# Patient Record
Sex: FEMALE | Race: WHITE | NOT HISPANIC OR LATINO
[De-identification: names, ages, dates, MRNs, and addresses within clinical notes are randomized per-mention and may not be internally consistent; named-entity substitution may affect disease eponyms.]

---

## 2018-04-19 ENCOUNTER — RECORD ABSTRACTING (OUTPATIENT)
Age: 73
End: 2018-04-19

## 2018-04-20 ENCOUNTER — APPOINTMENT (OUTPATIENT)
Dept: CARDIOLOGY | Facility: CLINIC | Age: 73
End: 2018-04-20
Payer: MEDICARE

## 2018-04-20 VITALS
WEIGHT: 160 LBS | SYSTOLIC BLOOD PRESSURE: 155 MMHG | HEIGHT: 59 IN | RESPIRATION RATE: 16 BRPM | DIASTOLIC BLOOD PRESSURE: 85 MMHG | HEART RATE: 56 BPM | BODY MASS INDEX: 32.25 KG/M2

## 2018-04-20 PROCEDURE — 93000 ELECTROCARDIOGRAM COMPLETE: CPT

## 2018-04-20 PROCEDURE — 99214 OFFICE O/P EST MOD 30 MIN: CPT

## 2018-04-20 RX ORDER — DICLOFENAC SODIUM 75 MG/1
75 TABLET, DELAYED RELEASE ORAL
Refills: 0 | Status: DISCONTINUED | COMMUNITY
End: 2018-04-20

## 2018-05-03 ENCOUNTER — MEDICATION RENEWAL (OUTPATIENT)
Age: 73
End: 2018-05-03

## 2018-10-15 ENCOUNTER — APPOINTMENT (OUTPATIENT)
Dept: CARDIOLOGY | Facility: CLINIC | Age: 73
End: 2018-10-15
Payer: MEDICARE

## 2018-10-15 VITALS
HEART RATE: 51 BPM | WEIGHT: 162 LBS | SYSTOLIC BLOOD PRESSURE: 125 MMHG | RESPIRATION RATE: 18 BRPM | BODY MASS INDEX: 32.66 KG/M2 | HEIGHT: 59 IN | DIASTOLIC BLOOD PRESSURE: 75 MMHG

## 2018-10-15 PROCEDURE — 93000 ELECTROCARDIOGRAM COMPLETE: CPT

## 2018-10-15 PROCEDURE — 99214 OFFICE O/P EST MOD 30 MIN: CPT

## 2019-04-01 ENCOUNTER — APPOINTMENT (OUTPATIENT)
Dept: CARDIOLOGY | Facility: CLINIC | Age: 74
End: 2019-04-01
Payer: MEDICARE

## 2019-04-01 ENCOUNTER — NON-APPOINTMENT (OUTPATIENT)
Age: 74
End: 2019-04-01

## 2019-04-01 VITALS
BODY MASS INDEX: 32.46 KG/M2 | WEIGHT: 161 LBS | SYSTOLIC BLOOD PRESSURE: 157 MMHG | DIASTOLIC BLOOD PRESSURE: 80 MMHG | HEART RATE: 68 BPM | RESPIRATION RATE: 16 BRPM | HEIGHT: 59 IN

## 2019-04-01 PROCEDURE — 99214 OFFICE O/P EST MOD 30 MIN: CPT

## 2019-04-01 PROCEDURE — 93000 ELECTROCARDIOGRAM COMPLETE: CPT

## 2019-04-01 NOTE — HISTORY OF PRESENT ILLNESS
[FreeTextEntry1] : The patient presents here for cardiac evaluation with a history which includes:\par \par 1.	T-wave inversions on her EKG which became evident in January of this year.\par 2.	Rheumatoid arthritis.\par 3.	Second-hand smoke exposure.\par 4.	Hyperlipidemia.\par 5.	Hypertension.\par 6.	Obesity.\par 7.	Insulin-requiring diabetes mellitus\par \par \par Seeing Dr. Garcia with regard to her diabetes management.  By her own admission, her diet is frequently imperfect with regard to carbohydrate restriction.\par \par Blood work is done through his office but not available\par \par She denies any new symptoms of shortness of breath, chest pain, palpitations, PND, orthopnea, or edema.\par Chronic shoulder pain

## 2019-04-01 NOTE — ASSESSMENT
[FreeTextEntry1] : ECG: Sinus bradycardia 55  LVH by voltage. T-wave inversions inferiorly and laterally. Not substantially changed from prior study.\par \par Echocardiogram (January 2017) showing LVH, with hyperdynamic left ventricular systolic function\par \par Nuclear stress test January 2017 did not show any significant demonstrable ischemia.\par \par IMPRESSION:  \par 1.	Blood pressure control seems to be adequate.\par 2.	EKG changes have not evolved any further.\par 3.	Lipid and glycemic indices being followed through Dr. Garcia's office and Dr. Hugo's office.Not available for review.\par 4.	No regular exercise.\par 5.	Dietary indiscretions, despite understanding the consequences.\par \par Plan: No indication for any changes in current cardiac management.\par                  \par \par The patient was instructed to follow a symptom limited regimen of moderate aerobic exercise for 30 minutes 3 to 4 days a week. A warm up and cool down period are to be added to the regimen and small incremental changes can be made every few weeks. Any new symptoms of exercise related chest pain or dyspnea should be reported.\par \par Instructed the patient about the benefits of a diet that restricts portion sizes, increases frequency of meals and consists of  vegetables, (more green and leafy),fruit and nuts, whole grains, lean proteins and limits carbohydrates and meat and dairy fats\par \par I asked her to Get me copies of her blood work.\par \par PLAN:  All the above were discussed in some detail.  She understands the consequences of failure to adhere to appropriate dietary and exercise restrictions.  No changes in cardiac management at this juncture.  Followup here is in four to six months.\par

## 2019-04-01 NOTE — REASON FOR VISIT
[FreeTextEntry1] : Patient presents here for cardiac reevaluation with no new significant complaints.\par No intercurrent medical problems.\par

## 2019-04-01 NOTE — PHYSICAL EXAM
[FreeTextEntry1] : General Appearance:  She is a morbidly obese, alert, and oriented 71-year-old female in no apparent distress.  Her affect is appropriate.  Skin free of any significant lesions.  HEENT:  Normocephalic, atraumatic, moist mucous membranes, EOMs intact.  Neck:  No JVD or carotid bruits.  Chest:  Clear to auscultation bilaterally.  Cardiovascular Examination:  Normal S1 and S2.  No S3.  No S4.  There is 1/6 systolic murmur.  No rubs.  Regular rate and rhythm.  Abdominal Examination:  Soft, nontender, nondistended.  Extremities:  No clubbing, cyanosis, or edema.  Neurologic:  Alert and oriented x3.  Psychiatric:  Normal affect

## 2019-07-10 ENCOUNTER — RX RENEWAL (OUTPATIENT)
Age: 74
End: 2019-07-10

## 2019-09-27 ENCOUNTER — APPOINTMENT (OUTPATIENT)
Dept: CARDIOLOGY | Facility: CLINIC | Age: 74
End: 2019-09-27
Payer: MEDICARE

## 2019-09-27 ENCOUNTER — RECORD ABSTRACTING (OUTPATIENT)
Age: 74
End: 2019-09-27

## 2019-09-27 ENCOUNTER — NON-APPOINTMENT (OUTPATIENT)
Age: 74
End: 2019-09-27

## 2019-09-27 VITALS
HEIGHT: 59 IN | WEIGHT: 162 LBS | SYSTOLIC BLOOD PRESSURE: 137 MMHG | DIASTOLIC BLOOD PRESSURE: 65 MMHG | OXYGEN SATURATION: 98 % | RESPIRATION RATE: 16 BRPM | HEART RATE: 61 BPM | BODY MASS INDEX: 32.66 KG/M2

## 2019-09-27 PROCEDURE — 99214 OFFICE O/P EST MOD 30 MIN: CPT

## 2019-09-27 PROCEDURE — 93000 ELECTROCARDIOGRAM COMPLETE: CPT

## 2019-09-27 NOTE — HISTORY OF PRESENT ILLNESS
[FreeTextEntry1] : The patient presents here for cardiac evaluation with a history which includes:\par \par 1.	T-wave inversions on her EKG which became evident in January of this year.\par 2.	Rheumatoid arthritis.\par 3.	Second-hand smoke exposure.\par 4.	Hyperlipidemia.\par 5.	Hypertension.\par 6.	Obesity.\par 7.	Insulin-requiring diabetes mellitus\par \par \par Seeing Dr. aGrcia with regard to her diabetes management.  By her own admission, her diet is frequently imperfect with regard to carbohydrate restriction.\par \par Blood work is done through his office but not available\par \par She denies any new symptoms of shortness of breath, chest pain, palpitations, PND, orthopnea, or edema.\par Chronic shoulder pain

## 2019-09-27 NOTE — ASSESSMENT
[FreeTextEntry1] : ECG: SR at 61 bpm  LVH by voltage with secondary ST-T wave changes\par \par Lab data 7/16/19\par Chol. 142\par LDL    75\par HDL    47\par A1C  7.8\par Creat. 0.68\par \par Echocardiogram (January 2017) showing LVH, with hyperdynamic left ventricular systolic function\par \par Nuclear stress test January 2017 did not show any significant demonstrable ischemia.\par \par IMPRESSION:  \par 1.	Blood pressure control seems to be adequate, today 137/65\par 2.	EKG changes have not evolved any further.\par 3.	Lipid and glycemic indices being followed through Dr. Garcia's office and Dr. Hugo's office. A1C 7.8,                          lipids stable at this time \par 4.	No regular exercise, limited by arthritic  issues.\par 5.	Dietary indiscretions, despite understanding the consequences.\par \par Plan:\par 1. No indication for any changes in current cardiac management.\par \par 2. Continue to F/U with PCP as scheduled and have all blood work faxed to our office. \par \par 3.The patient was instructed to follow a symptom limited regimen of moderate aerobic exercise for 30 minutes 3 to 4 days a week. A warm up and cool down period are to be added to the regimen and small incremental changes can be made every few weeks. Any new symptoms of exercise related chest pain or dyspnea should be reported.\par \par 5. Instructed the patient about the benefits of a diet that restricts portion sizes, increases frequency of meals and consists of  vegetables, (more green and leafy),fruit and nuts, whole grains, lean proteins and limits carbohydrates and meat and dairy fats\par  \par Clinical follow up in 6 months

## 2019-09-27 NOTE — REASON FOR VISIT
[FreeTextEntry1] : Patient presents here for cardiac reevaluation with no new significant complaints.\par \par No intercurrent medical problems.\par \par Generally speaking feels well.\par \par No changes made to her diet, unsuccessful with weight loss. Currently wearing a blood glucose monitor device\par \par Major complaints relate to her arthritic issues.\par \par No chest pain,, edema, SOB, palps or orthopnea. \par

## 2020-03-20 ENCOUNTER — APPOINTMENT (OUTPATIENT)
Dept: CARDIOLOGY | Facility: CLINIC | Age: 75
End: 2020-03-20

## 2020-08-11 ENCOUNTER — RX RENEWAL (OUTPATIENT)
Age: 75
End: 2020-08-11

## 2021-04-16 ENCOUNTER — APPOINTMENT (OUTPATIENT)
Dept: ELECTROPHYSIOLOGY | Facility: CLINIC | Age: 76
End: 2021-04-16
Payer: MEDICARE

## 2021-04-16 ENCOUNTER — APPOINTMENT (OUTPATIENT)
Dept: CARDIOLOGY | Facility: CLINIC | Age: 76
End: 2021-04-16
Payer: MEDICARE

## 2021-04-16 VITALS
HEIGHT: 59 IN | SYSTOLIC BLOOD PRESSURE: 157 MMHG | HEART RATE: 80 BPM | DIASTOLIC BLOOD PRESSURE: 81 MMHG | OXYGEN SATURATION: 100 %

## 2021-04-16 PROCEDURE — 99214 OFFICE O/P EST MOD 30 MIN: CPT

## 2021-04-16 PROCEDURE — 93246 EXT ECG>7D<15D RECORDING: CPT

## 2021-04-16 NOTE — PHYSICAL EXAM
[General Appearance - Well Developed] : well developed [Normal Appearance] : normal appearance [General Appearance - Well Nourished] : well nourished [Normal Conjunctiva] : the conjunctiva exhibited no abnormalities [Eyelids - No Xanthelasma] : the eyelids demonstrated no xanthelasmas [Normal Oral Mucosa] : normal oral mucosa [Normal Oropharynx] : normal oropharynx [Normal Jugular Venous A Waves Present] : normal jugular venous A waves present [Normal Jugular Venous V Waves Present] : normal jugular venous V waves present [No Jugular Venous Sun A Waves] : no jugular venous sun A waves [Heart Rate And Rhythm] : heart rate and rhythm were normal [Heart Sounds] : normal S1 and S2 [Murmurs] : no murmurs present [Arterial Pulses Normal] : the arterial pulses were normal [Edema] : no peripheral edema present [Respiration, Rhythm And Depth] : normal respiratory rhythm and effort [Exaggerated Use Of Accessory Muscles For Inspiration] : no accessory muscle use [Chest Palpation] : palpation of the chest revealed no abnormalities [Auscultation Breath Sounds / Voice Sounds] : lungs were clear to auscultation bilaterally [Bowel Sounds] : normal bowel sounds [Abdomen Soft] : soft [Abdomen Tenderness] : non-tender [Abdomen Mass (___ Cm)] : no abdominal mass palpated [Nail Clubbing] : no clubbing of the fingernails [Cyanosis, Localized] : no localized cyanosis [Skin Color & Pigmentation] : normal skin color and pigmentation [] : no rash [Skin Turgor] : normal skin turgor [No Venous Stasis] : no venous stasis [Oriented To Time, Place, And Person] : oriented to person, place, and time [Impaired Insight] : insight and judgment were intact [Mood] : the mood was normal [FreeTextEntry1] : Unsteady gait

## 2021-04-16 NOTE — ASSESSMENT
[FreeTextEntry1] : Assessment:\par 1.  Cerebral Infarcts\par 2.  HTN\par 3. HLD\par \par Plan\par 1.  Echocardiogram\par 2.  Carotid duplex\par 3.  2 week zio patch to assure no paroxysms of afib\par 4.  Return in 1 month\par \par \par daughter is my patient.  Wants mother to transition care

## 2021-04-16 NOTE — HISTORY OF PRESENT ILLNESS
[FreeTextEntry1] : 74 Yo F hx DM, HTN. Had been following with outside cardiologist. Here today because since December has been feeling weak, falling, with frequent loss of balance. Went to neurologist who did MRI 2/13/21 multiple small subacute deep white matter infarcts in right and left parietal lobes. Chronic lacunar infarcts in thalami. 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 After finding out about possible strokes Daughter who is also a patient at [facility] wanted evaluation with Dr. Cuevas\par \par Home Meds:\par ASA 81mg qD\par Metformin 500mg BID\par metoprolol succinate 25mg qD\par Quinapril 40mg qD\par Atorvastatin 80 qD\par Sertraline 100mg qD\par Tresiba 20/50\par Memantine 5mg BID\par Donepezil 5mg qHS\par \par \par FH: \par Sister (Roseanna): HOCM\par Daughter (Donald): early MI and aortic disease/dissection

## 2021-04-16 NOTE — REVIEW OF SYSTEMS
[Joint Pain] : joint pain [Dizziness] : dizziness [Numbness (Hypesthesia)] : numbness [Negative] : Heme/Lymph

## 2021-05-06 ENCOUNTER — APPOINTMENT (OUTPATIENT)
Dept: CARDIOLOGY | Facility: CLINIC | Age: 76
End: 2021-05-06
Payer: MEDICARE

## 2021-05-06 PROCEDURE — 93306 TTE W/DOPPLER COMPLETE: CPT

## 2021-05-12 PROCEDURE — 93248 EXT ECG>7D<15D REV&INTERPJ: CPT

## 2021-05-27 ENCOUNTER — APPOINTMENT (OUTPATIENT)
Dept: CARDIOLOGY | Facility: CLINIC | Age: 76
End: 2021-05-27
Payer: MEDICARE

## 2021-05-27 VITALS — OXYGEN SATURATION: 100 % | HEART RATE: 75 BPM | SYSTOLIC BLOOD PRESSURE: 153 MMHG | DIASTOLIC BLOOD PRESSURE: 68 MMHG

## 2021-05-27 DIAGNOSIS — E66.9 OBESITY, UNSPECIFIED: ICD-10-CM

## 2021-05-27 PROCEDURE — 99215 OFFICE O/P EST HI 40 MIN: CPT

## 2021-05-27 NOTE — PHYSICAL EXAM
[General Appearance - Well Developed] : well developed [Normal Appearance] : normal appearance [General Appearance - Well Nourished] : well nourished [Normal Conjunctiva] : the conjunctiva exhibited no abnormalities [Eyelids - No Xanthelasma] : the eyelids demonstrated no xanthelasmas [Normal Oral Mucosa] : normal oral mucosa [Normal Oropharynx] : normal oropharynx [Normal Jugular Venous A Waves Present] : normal jugular venous A waves present [Normal Jugular Venous V Waves Present] : normal jugular venous V waves present [No Jugular Venous Sun A Waves] : no jugular venous sun A waves [Exaggerated Use Of Accessory Muscles For Inspiration] : no accessory muscle use [Respiration, Rhythm And Depth] : normal respiratory rhythm and effort [Auscultation Breath Sounds / Voice Sounds] : lungs were clear to auscultation bilaterally [Chest Palpation] : palpation of the chest revealed no abnormalities [Heart Rate And Rhythm] : heart rate and rhythm were normal [Heart Sounds] : normal S1 and S2 [Murmurs] : no murmurs present [Arterial Pulses Normal] : the arterial pulses were normal [Edema] : no peripheral edema present [Bowel Sounds] : normal bowel sounds [Abdomen Soft] : soft [Abdomen Tenderness] : non-tender [Abdomen Mass (___ Cm)] : no abdominal mass palpated [Nail Clubbing] : no clubbing of the fingernails [Cyanosis, Localized] : no localized cyanosis [Skin Color & Pigmentation] : normal skin color and pigmentation [Skin Turgor] : normal skin turgor [] : no rash [No Venous Stasis] : no venous stasis [Oriented To Time, Place, And Person] : oriented to person, place, and time [Impaired Insight] : insight and judgment were intact [Mood] : the mood was normal [FreeTextEntry1] : Unsteady gait

## 2021-05-27 NOTE — ASSESSMENT
[FreeTextEntry1] : Assessment:\par 1.  Cerebral Infarcts\par 2.  HTN\par 3.  HLD\par \par Plan\par 1.  Echocardiogram - reviewed, LVOT gradient.  Continue with metoprolol at 50mg daily \par 2.  Zio patch reviewed - SVT - continue Metoprolol 50mg daily \par 3.  Carotid duplex patient reports was ok per neurologist\par 4.  Continue antiplatelet therapy and statin therapy\par 5.  Continue metoprolol at current dose.  Continue quinapril \par 6.  Consider repeat echo in 6 months to re-assess gradients. \par 7.  Caution with HCTZ it may make the LVOT obstruction worse.  She knows that if she feels light headed or dizzy then she should stop this medication. Stay hydrated\par 8.  Return in 3 months.

## 2021-05-27 NOTE — HISTORY OF PRESENT ILLNESS
[FreeTextEntry1] : Followup visit 5/27/2021\par \par Metoprolol was increased to 50mg daily \par she feels ok\par Here with her daughter\par Using a walker\par Seeing a neurologist and rheumatologist.  \par Neurologist performed carotid duplex. States all was ok.\par Started on HCTZ by Rheumatologist for LE edema, which has improved since starting the med 2 weeks ago.\par  joined by phone\par \par \par Initial visit:\par 76 Yo F hx DM, HTN. Had been following with outside cardiologist. Here today because since December has been feeling weak, falling, with frequent loss of balance. Went to neurologist who did MRI 2/13/21 multiple small subacute deep white matter infarcts in right and left parietal lobes. Chronic lacunar infarcts in thalami. 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 After finding out about possible strokes Daughter who is also a patient at [facility] wanted evaluation with Dr. Cuevas\par \par Home Meds:\par ASA 81mg qD\par Metformin 500mg BID\par metoprolol succinate 25mg qD\par Quinapril 40mg qD\par Atorvastatin 80 qD\par Sertraline 100mg qD\par Tresiba 20/50\par Memantine 5mg BID\par Donepezil 5mg qHS\par \par \par FH: \par Sister (Roseanna): HOCM\par Daughter (Donald): early MI and aortic disease/dissection

## 2021-07-01 ENCOUNTER — APPOINTMENT (OUTPATIENT)
Dept: VASCULAR SURGERY | Facility: CLINIC | Age: 76
End: 2021-07-01
Payer: MEDICARE

## 2021-07-01 ENCOUNTER — NON-APPOINTMENT (OUTPATIENT)
Age: 76
End: 2021-07-01

## 2021-07-01 VITALS
DIASTOLIC BLOOD PRESSURE: 81 MMHG | HEIGHT: 59 IN | HEART RATE: 69 BPM | BODY MASS INDEX: 32.25 KG/M2 | TEMPERATURE: 97.2 F | WEIGHT: 160 LBS | SYSTOLIC BLOOD PRESSURE: 149 MMHG

## 2021-07-01 PROCEDURE — 99203 OFFICE O/P NEW LOW 30 MIN: CPT

## 2021-07-01 PROCEDURE — 93970 EXTREMITY STUDY: CPT

## 2021-07-02 ENCOUNTER — APPOINTMENT (OUTPATIENT)
Dept: VASCULAR SURGERY | Facility: CLINIC | Age: 76
End: 2021-07-02

## 2021-07-09 NOTE — HISTORY OF PRESENT ILLNESS
[FreeTextEntry1] : CHINEDU FORREST is a 75 year old female who presents for evaluation of carotid stenosis and lower extremity swelling. \par \par Referred by XIANG Rendon (neurology).\par \par Patient is company by her daughter, Donald.  The patient had been in [place] from October to December 2020.  Since returning, she has been having increasing weakness, difficulty ambulating, frequent falls with loss of balance.  She was evaluated by a neurologist and underwent an MRI of the brain in February 2021.  This was significant for multiple small subacute deep white matter infarcts in both the right and left parietal lobes.  There was also chronic lacunar infarcts in the thalami.  An MRA was significant for P1 stenosis of the right posterior cerebral artery and also the cavernous portion of the right ICA. 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 Huan Cuevas). She recently underwent echocardiogram (5/6/2021), which was significant for mild aortic stenosis, hyperdynamic left ventricular systolic function with slight increase in LVOT gradient. \par \par + PMH: Rheumatoid arthritis, HTN, HLD, DM\par + PSH: Denies\par + FH: Sister HOCM\par + SH: Quit smoking 20 years ago, denies EtOH use\par + Aller: NKDA\par \par PCP is Dr. Tom Gandhi.\par Vascular Cardiology is Dr. Huan Cuevas.

## 2021-07-09 NOTE — ASSESSMENT
[FreeTextEntry1] : CHINEDU FORREST is a 75 year old female presents for evaluation of global weakness with findings on MRA.\par \par - Will plan for carotid duplex. \par - Plan of care pending carotid duplex.

## 2021-07-09 NOTE — PHYSICAL EXAM
[2+] : left 2+ [Calm] : calm [Ankle Swelling (On Exam)] : not present [Varicose Veins Of Lower Extremities] : not present [] : not present [de-identified] : Well-appearing  [de-identified] : soft, nt, nd  [de-identified] : motor and sensation intact in all four extremities  [de-identified] : A&Ox4

## 2021-07-14 ENCOUNTER — APPOINTMENT (OUTPATIENT)
Dept: VASCULAR SURGERY | Facility: CLINIC | Age: 76
End: 2021-07-14
Payer: MEDICARE

## 2021-07-14 PROCEDURE — 93880 EXTRACRANIAL BILAT STUDY: CPT

## 2021-07-27 ENCOUNTER — NON-APPOINTMENT (OUTPATIENT)
Age: 76
End: 2021-07-27

## 2021-07-27 ENCOUNTER — APPOINTMENT (OUTPATIENT)
Dept: VASCULAR SURGERY | Facility: CLINIC | Age: 76
End: 2021-07-27

## 2021-07-27 NOTE — HISTORY OF PRESENT ILLNESS
[FreeTextEntry1] : CHINEDU FORREST is a 75 year old female who presents for evaluation of carotid stenosis and lower extremity swelling. \par \par Referred by XIANG Rendon (neurology).\par \par Patient is company by her daughter, Donald.  The patient had been in [place] from October to December 2020.  Since returning, she has been having increasing weakness, difficulty ambulating, frequent falls with loss of balance.  She was evaluated by a neurologist and underwent an MRI of the brain in February 2021.  This was significant for multiple small subacute deep white matter infarcts in both the right and left parietal lobes.  There was also chronic lacunar infarcts in the thalami.  An MRA was significant for P1 stenosis of the right posterior cerebral artery and also the cavernous portion of the right ICA. 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 Huan Cuevas). She recently underwent echocardiogram (5/6/2021), which was significant for mild aortic stenosis, hyperdynamic left ventricular systolic function with slight increase in LVOT gradient. \par \par + PMH: Rheumatoid arthritis, HTN, HLD, DM\par + PSH: Denies\par + FH: Sister HOCM\par + SH: Quit smoking 20 years ago, denies EtOH use\par + Aller: NKDA\par \par PCP is Dr. Tom Gandhi.\par Vascular Cardiology is Dr. uHan Cuevas. [de-identified] : 7/27/2021 - Pt presents for telephonic visit.

## 2021-07-27 NOTE — REASON FOR VISIT
[Home] : at home, [unfilled] , at the time of the visit. [Other Location: e.g. Home (Enter Location, City,State)___] : at [unfilled] [Family Member] : family member [Verbal consent obtained from patient] : the patient, [unfilled] [Follow-Up: _____] : a [unfilled] follow-up visit

## 2021-07-27 NOTE — HISTORY OF PRESENT ILLNESS
[FreeTextEntry1] : CHINEDU FORREST is a 75 year old female who presents for evaluation of carotid stenosis and lower extremity swelling. \par \par Referred by XIANG Rendon (neurology).\par \par Patient is company by her daughter, Donald.  The patient had been in [place] from October to December 2020.  Since returning, she has been having increasing weakness, difficulty ambulating, frequent falls with loss of balance.  She was evaluated by a neurologist and underwent an MRI of the brain in February 2021.  This was significant for multiple small subacute deep white matter infarcts in both the right and left parietal lobes.  There was also chronic lacunar infarcts in the thalami.  An MRA was significant for P1 stenosis of the right posterior cerebral artery and also the cavernous portion of the right ICA. 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 Huan Cuevas). She recently underwent echocardiogram (5/6/2021), which was significant for mild aortic stenosis, hyperdynamic left ventricular systolic function with slight increase in LVOT gradient. \par \par + PMH: Rheumatoid arthritis, HTN, HLD, DM\par + PSH: Denies\par + FH: Sister HOCM\par + SH: Quit smoking 20 years ago, denies EtOH use\par + Aller: NKDA\par \par PCP is Dr. Tom Gandhi.\par Vascular Cardiology is Dr. Huan Cuevas. [de-identified] : 7/27/2021 - Pt presents for telephonic visit.

## 2021-09-07 ENCOUNTER — APPOINTMENT (OUTPATIENT)
Dept: NEUROSURGERY | Facility: CLINIC | Age: 76
End: 2021-09-07
Payer: MEDICARE

## 2021-09-07 VITALS
TEMPERATURE: 98.5 F | HEIGHT: 59 IN | OXYGEN SATURATION: 94 % | BODY MASS INDEX: 32.25 KG/M2 | DIASTOLIC BLOOD PRESSURE: 76 MMHG | HEART RATE: 73 BPM | SYSTOLIC BLOOD PRESSURE: 186 MMHG | WEIGHT: 160 LBS

## 2021-09-07 DIAGNOSIS — I67.2 CEREBRAL ATHEROSCLEROSIS: ICD-10-CM

## 2021-09-07 DIAGNOSIS — R26.89 OTHER ABNORMALITIES OF GAIT AND MOBILITY: ICD-10-CM

## 2021-09-07 DIAGNOSIS — Z78.9 OTHER SPECIFIED HEALTH STATUS: ICD-10-CM

## 2021-09-07 PROCEDURE — 99205 OFFICE O/P NEW HI 60 MIN: CPT

## 2021-09-09 PROBLEM — Z78.9 DOES NOT USE TOBACCO: Status: ACTIVE | Noted: 2018-04-19

## 2021-09-09 PROBLEM — I67.2 ASYMPTOMATIC STENOSIS OF INTRACRANIAL ARTERY: Status: ACTIVE | Noted: 2021-09-09

## 2021-09-09 PROBLEM — R26.89: Status: ACTIVE | Noted: 2021-09-09

## 2021-09-09 NOTE — REVIEW OF SYSTEMS
[As Noted in HPI] : as noted in HPI [Negative] : Heme/Lymph [Leg Weakness] : leg weakness [Difficulty Walking] : difficulty walking [Numbness] : no numbness [Tingling] : no tingling [FreeTextEntry9] : ambulates with assistance of a rolling walker.

## 2021-09-09 NOTE — PHYSICAL EXAM
[General Appearance - Alert] : alert [General Appearance - In No Acute Distress] : in no acute distress [Oriented To Time, Place, And Person] : oriented to person, place, and time [Impaired Insight] : insight and judgment were intact [Affect] : the affect was normal [Person] : oriented to person [Place] : oriented to place [Time] : oriented to time [Short Term Intact] : short term memory intact [Fluency] : fluency intact [Comprehension] : comprehension intact [Cranial Nerves Optic (II)] : visual acuity intact bilaterally,  visual fields full to confrontation, pupils equal round and reactive to light [Cranial Nerves Oculomotor (III)] : extraocular motion intact [Cranial Nerves Trigeminal (V)] : facial sensation intact symmetrically [Cranial Nerves Glossopharyngeal (IX)] : tongue and palate midline [Cranial Nerves Facial (VII)] : face symmetrical [Cranial Nerves Accessory (XI - Cranial And Spinal)] : head turning and shoulder shrug symmetric [Cranial Nerves Hypoglossal (XII)] : there was no tongue deviation with protrusion [Motor Tone] : muscle tone was normal in all four extremities [Motor Strength] : muscle strength was normal in all four extremities [Sensation Tactile Decrease] : light touch was intact [Sensation Pain / Temperature Decrease] : pain and temperature was intact [Over the Past 2 Weeks, Have You Felt Down, Depressed, or Hopeless?] : 1.) Over the past 2 weeks, have you felt down, depressed, or hopeless? No [Over the Past 2 Weeks, Have You Felt Little Interest or Pleasure Doing Things?] : 2.) Over the past 2 weeks, have you felt little interest or pleasure doing things? No [FreeTextEntry8] : wide based magnetic gait with assistance of a walker.

## 2021-09-09 NOTE — ASSESSMENT
[FreeTextEntry1] : Impression: Asymptomatic intracranial stenosis. Multiple lacunar infarcts. At this point exam and findings like magnetic gait, memory loss and urinary incontinence are more characteristic of normal pressure hydrocephalus. The lacunar infarcts are not in specific areas or so numerable that will correlate with the mention problems. The intracranial atherosclerosis is diffuse and I don’t think patient is having perfusion problems. There is certainly generalized atrophy in the MRI which could be contributing to the symptoms or perhaps exclude an underlying dementia. I advice patient to pursue evaluation with Nsx first to exclude Normal pressure hydrocephalus.\par \par Plan:\par 1-Evaluation with Nsx exclude NPH \par 2-Secondary stroke prevention measures\par BP <140/90\par LDL <70 needs lipid panel follow up with PCP- Continue Atorvastatin \par HgA1C folllow up with PCP goal <6.5 \par Dietary adjustments avoid saturated fats \par Cont ASA 81 mg \par 3-No need for further follow up but daughter was educated about stroke symptoms and if she needs to see me can make an appointment any time \par 4-Follow up general neurology memory loss and rule out NPH

## 2021-09-16 ENCOUNTER — INPATIENT (INPATIENT)
Facility: HOSPITAL | Age: 76
LOS: 2 days | Discharge: HOME | DRG: 304 | End: 2021-09-19
Attending: STUDENT IN AN ORGANIZED HEALTH CARE EDUCATION/TRAINING PROGRAM | Admitting: FAMILY MEDICINE
Payer: MEDICARE

## 2021-09-16 VITALS
OXYGEN SATURATION: 92 % | HEART RATE: 68 BPM | DIASTOLIC BLOOD PRESSURE: 74 MMHG | SYSTOLIC BLOOD PRESSURE: 196 MMHG | TEMPERATURE: 99 F | RESPIRATION RATE: 18 BRPM

## 2021-09-16 DIAGNOSIS — R26.9 UNSPECIFIED ABNORMALITIES OF GAIT AND MOBILITY: ICD-10-CM

## 2021-09-16 DIAGNOSIS — Z92.89 PERSONAL HISTORY OF OTHER MEDICAL TREATMENT: Chronic | ICD-10-CM

## 2021-09-16 LAB
ALBUMIN SERPL ELPH-MCNC: 3.5 G/DL — SIGNIFICANT CHANGE UP (ref 3.3–5.2)
ALP SERPL-CCNC: 84 U/L — SIGNIFICANT CHANGE UP (ref 40–120)
ALT FLD-CCNC: 11 U/L — SIGNIFICANT CHANGE UP
ANION GAP SERPL CALC-SCNC: 11 MMOL/L — SIGNIFICANT CHANGE UP (ref 5–17)
ANION GAP SERPL CALC-SCNC: 14 MMOL/L — SIGNIFICANT CHANGE UP (ref 5–17)
ANISOCYTOSIS BLD QL: SIGNIFICANT CHANGE UP
APPEARANCE UR: CLEAR — SIGNIFICANT CHANGE UP
AST SERPL-CCNC: 13 U/L — SIGNIFICANT CHANGE UP
BACTERIA # UR AUTO: ABNORMAL
BASOPHILS # BLD AUTO: 0 K/UL — SIGNIFICANT CHANGE UP (ref 0–0.2)
BASOPHILS NFR BLD AUTO: 0 % — SIGNIFICANT CHANGE UP (ref 0–2)
BILIRUB SERPL-MCNC: 0.2 MG/DL — LOW (ref 0.4–2)
BILIRUB UR-MCNC: NEGATIVE — SIGNIFICANT CHANGE UP
BUN SERPL-MCNC: 16 MG/DL — SIGNIFICANT CHANGE UP (ref 8–20)
BUN SERPL-MCNC: 20.6 MG/DL — HIGH (ref 8–20)
CALCIUM SERPL-MCNC: 9 MG/DL — SIGNIFICANT CHANGE UP (ref 8.6–10.2)
CALCIUM SERPL-MCNC: 9.1 MG/DL — SIGNIFICANT CHANGE UP (ref 8.6–10.2)
CHLORIDE SERPL-SCNC: 101 MMOL/L — SIGNIFICANT CHANGE UP (ref 98–107)
CHLORIDE SERPL-SCNC: 102 MMOL/L — SIGNIFICANT CHANGE UP (ref 98–107)
CK SERPL-CCNC: 23 U/L — LOW (ref 25–170)
CO2 SERPL-SCNC: 25 MMOL/L — SIGNIFICANT CHANGE UP (ref 22–29)
CO2 SERPL-SCNC: 26 MMOL/L — SIGNIFICANT CHANGE UP (ref 22–29)
COD CRY URNS QL: ABNORMAL
COLOR SPEC: YELLOW — SIGNIFICANT CHANGE UP
CREAT SERPL-MCNC: 0.4 MG/DL — LOW (ref 0.5–1.3)
CREAT SERPL-MCNC: 0.5 MG/DL — SIGNIFICANT CHANGE UP (ref 0.5–1.3)
CRP SERPL-MCNC: 41 MG/L — HIGH
D DIMER BLD IA.RAPID-MCNC: 382 NG/ML DDU — HIGH
DIFF PNL FLD: ABNORMAL
ELLIPTOCYTES BLD QL SMEAR: SLIGHT — SIGNIFICANT CHANGE UP
EOSINOPHIL # BLD AUTO: 0.35 K/UL — SIGNIFICANT CHANGE UP (ref 0–0.5)
EOSINOPHIL NFR BLD AUTO: 2.7 % — SIGNIFICANT CHANGE UP (ref 0–6)
EPI CELLS # UR: SIGNIFICANT CHANGE UP
FERRITIN SERPL-MCNC: 158 NG/ML — HIGH (ref 15–150)
GLUCOSE SERPL-MCNC: 120 MG/DL — HIGH (ref 70–99)
GLUCOSE SERPL-MCNC: 256 MG/DL — HIGH (ref 70–99)
GLUCOSE UR QL: 100 MG/DL
HCT VFR BLD CALC: 38.5 % — SIGNIFICANT CHANGE UP (ref 34.5–45)
HCT VFR BLD CALC: 40 % — SIGNIFICANT CHANGE UP (ref 34.5–45)
HGB BLD-MCNC: 11.8 G/DL — SIGNIFICANT CHANGE UP (ref 11.5–15.5)
HGB BLD-MCNC: 12.4 G/DL — SIGNIFICANT CHANGE UP (ref 11.5–15.5)
INR BLD: 1.15 RATIO — SIGNIFICANT CHANGE UP (ref 0.88–1.16)
KETONES UR-MCNC: NEGATIVE — SIGNIFICANT CHANGE UP
LEUKOCYTE ESTERASE UR-ACNC: NEGATIVE — SIGNIFICANT CHANGE UP
LYMPHOCYTES # BLD AUTO: 1.92 K/UL — SIGNIFICANT CHANGE UP (ref 1–3.3)
LYMPHOCYTES # BLD AUTO: 15 % — SIGNIFICANT CHANGE UP (ref 13–44)
MANUAL SMEAR VERIFICATION: SIGNIFICANT CHANGE UP
MCHC RBC-ENTMCNC: 23 PG — LOW (ref 27–34)
MCHC RBC-ENTMCNC: 23 PG — LOW (ref 27–34)
MCHC RBC-ENTMCNC: 30.6 GM/DL — LOW (ref 32–36)
MCHC RBC-ENTMCNC: 31 GM/DL — LOW (ref 32–36)
MCV RBC AUTO: 74.1 FL — LOW (ref 80–100)
MCV RBC AUTO: 74.9 FL — LOW (ref 80–100)
MICROCYTES BLD QL: SIGNIFICANT CHANGE UP
MONOCYTES # BLD AUTO: 0.91 K/UL — HIGH (ref 0–0.9)
MONOCYTES NFR BLD AUTO: 7.1 % — SIGNIFICANT CHANGE UP (ref 2–14)
NEUTROPHILS # BLD AUTO: 9.62 K/UL — HIGH (ref 1.8–7.4)
NEUTROPHILS NFR BLD AUTO: 75.2 % — SIGNIFICANT CHANGE UP (ref 43–77)
NITRITE UR-MCNC: NEGATIVE — SIGNIFICANT CHANGE UP
OVALOCYTES BLD QL SMEAR: SIGNIFICANT CHANGE UP
PH UR: 6 — SIGNIFICANT CHANGE UP (ref 5–8)
PLAT MORPH BLD: NORMAL — SIGNIFICANT CHANGE UP
PLATELET # BLD AUTO: 391 K/UL — SIGNIFICANT CHANGE UP (ref 150–400)
PLATELET # BLD AUTO: 405 K/UL — HIGH (ref 150–400)
POIKILOCYTOSIS BLD QL AUTO: SIGNIFICANT CHANGE UP
POLYCHROMASIA BLD QL SMEAR: SLIGHT — SIGNIFICANT CHANGE UP
POTASSIUM SERPL-MCNC: 4 MMOL/L — SIGNIFICANT CHANGE UP (ref 3.5–5.3)
POTASSIUM SERPL-MCNC: 4.4 MMOL/L — SIGNIFICANT CHANGE UP (ref 3.5–5.3)
POTASSIUM SERPL-SCNC: 4 MMOL/L — SIGNIFICANT CHANGE UP (ref 3.5–5.3)
POTASSIUM SERPL-SCNC: 4.4 MMOL/L — SIGNIFICANT CHANGE UP (ref 3.5–5.3)
PROCALCITONIN SERPL-MCNC: 0.05 NG/ML — SIGNIFICANT CHANGE UP (ref 0.02–0.1)
PROT SERPL-MCNC: 6.3 G/DL — LOW (ref 6.6–8.7)
PROT UR-MCNC: 30 MG/DL
PROTHROM AB SERPL-ACNC: 13.3 SEC — SIGNIFICANT CHANGE UP (ref 10.6–13.6)
RBC # BLD: 5.14 M/UL — SIGNIFICANT CHANGE UP (ref 3.8–5.2)
RBC # BLD: 5.4 M/UL — HIGH (ref 3.8–5.2)
RBC # FLD: 16 % — HIGH (ref 10.3–14.5)
RBC # FLD: 16.4 % — HIGH (ref 10.3–14.5)
RBC BLD AUTO: ABNORMAL
RBC CASTS # UR COMP ASSIST: SIGNIFICANT CHANGE UP /HPF (ref 0–4)
SARS-COV-2 RNA SPEC QL NAA+PROBE: DETECTED
SODIUM SERPL-SCNC: 138 MMOL/L — SIGNIFICANT CHANGE UP (ref 135–145)
SODIUM SERPL-SCNC: 141 MMOL/L — SIGNIFICANT CHANGE UP (ref 135–145)
SP GR SPEC: 1.02 — SIGNIFICANT CHANGE UP (ref 1.01–1.02)
TROPONIN T SERPL-MCNC: <0.01 NG/ML — SIGNIFICANT CHANGE UP (ref 0–0.06)
UROBILINOGEN FLD QL: NEGATIVE MG/DL — SIGNIFICANT CHANGE UP
WBC # BLD: 12.79 K/UL — HIGH (ref 3.8–10.5)
WBC # BLD: 13.7 K/UL — HIGH (ref 3.8–10.5)
WBC # FLD AUTO: 12.79 K/UL — HIGH (ref 3.8–10.5)
WBC # FLD AUTO: 13.7 K/UL — HIGH (ref 3.8–10.5)
WBC UR QL: SIGNIFICANT CHANGE UP

## 2021-09-16 PROCEDURE — 93010 ELECTROCARDIOGRAM REPORT: CPT

## 2021-09-16 PROCEDURE — 99221 1ST HOSP IP/OBS SF/LOW 40: CPT

## 2021-09-16 PROCEDURE — 99285 EMERGENCY DEPT VISIT HI MDM: CPT | Mod: CS

## 2021-09-16 PROCEDURE — 71045 X-RAY EXAM CHEST 1 VIEW: CPT | Mod: 26

## 2021-09-16 PROCEDURE — 72125 CT NECK SPINE W/O DYE: CPT | Mod: 26,MH

## 2021-09-16 PROCEDURE — 70450 CT HEAD/BRAIN W/O DYE: CPT | Mod: 26,MH

## 2021-09-16 PROCEDURE — 99223 1ST HOSP IP/OBS HIGH 75: CPT

## 2021-09-16 RX ORDER — METOPROLOL TARTRATE 50 MG
50 TABLET ORAL DAILY
Refills: 0 | Status: DISCONTINUED | OUTPATIENT
Start: 2021-09-16 | End: 2021-09-19

## 2021-09-16 RX ORDER — INSULIN GLARGINE 100 [IU]/ML
20 INJECTION, SOLUTION SUBCUTANEOUS AT BEDTIME
Refills: 0 | Status: DISCONTINUED | OUTPATIENT
Start: 2021-09-16 | End: 2021-09-19

## 2021-09-16 RX ORDER — HYDRALAZINE HCL 50 MG
10 TABLET ORAL EVERY 6 HOURS
Refills: 0 | Status: DISCONTINUED | OUTPATIENT
Start: 2021-09-16 | End: 2021-09-17

## 2021-09-16 RX ORDER — ACETAMINOPHEN 500 MG
650 TABLET ORAL EVERY 6 HOURS
Refills: 0 | Status: DISCONTINUED | OUTPATIENT
Start: 2021-09-16 | End: 2021-09-19

## 2021-09-16 RX ORDER — SERTRALINE 25 MG/1
100 TABLET, FILM COATED ORAL DAILY
Refills: 0 | Status: DISCONTINUED | OUTPATIENT
Start: 2021-09-16 | End: 2021-09-19

## 2021-09-16 RX ORDER — SODIUM CHLORIDE 9 MG/ML
1000 INJECTION, SOLUTION INTRAVENOUS
Refills: 0 | Status: DISCONTINUED | OUTPATIENT
Start: 2021-09-16 | End: 2021-09-19

## 2021-09-16 RX ORDER — ASPIRIN/CALCIUM CARB/MAGNESIUM 324 MG
81 TABLET ORAL DAILY
Refills: 0 | Status: DISCONTINUED | OUTPATIENT
Start: 2021-09-16 | End: 2021-09-19

## 2021-09-16 RX ORDER — ACETAMINOPHEN 500 MG
975 TABLET ORAL ONCE
Refills: 0 | Status: COMPLETED | OUTPATIENT
Start: 2021-09-16 | End: 2021-09-16

## 2021-09-16 RX ORDER — FENOFIBRATE,MICRONIZED 130 MG
145 CAPSULE ORAL DAILY
Refills: 0 | Status: DISCONTINUED | OUTPATIENT
Start: 2021-09-16 | End: 2021-09-19

## 2021-09-16 RX ORDER — ATORVASTATIN CALCIUM 80 MG/1
40 TABLET, FILM COATED ORAL AT BEDTIME
Refills: 0 | Status: DISCONTINUED | OUTPATIENT
Start: 2021-09-16 | End: 2021-09-19

## 2021-09-16 RX ORDER — INSULIN LISPRO 100/ML
VIAL (ML) SUBCUTANEOUS
Refills: 0 | Status: DISCONTINUED | OUTPATIENT
Start: 2021-09-16 | End: 2021-09-19

## 2021-09-16 RX ORDER — DONEPEZIL HYDROCHLORIDE 10 MG/1
5 TABLET, FILM COATED ORAL AT BEDTIME
Refills: 0 | Status: DISCONTINUED | OUTPATIENT
Start: 2021-09-16 | End: 2021-09-19

## 2021-09-16 RX ORDER — DEXTROSE 50 % IN WATER 50 %
12.5 SYRINGE (ML) INTRAVENOUS ONCE
Refills: 0 | Status: DISCONTINUED | OUTPATIENT
Start: 2021-09-16 | End: 2021-09-19

## 2021-09-16 RX ORDER — METFORMIN HYDROCHLORIDE 850 MG/1
500 TABLET ORAL
Refills: 0 | Status: DISCONTINUED | OUTPATIENT
Start: 2021-09-16 | End: 2021-09-19

## 2021-09-16 RX ORDER — MEMANTINE HYDROCHLORIDE 10 MG/1
5 TABLET ORAL
Refills: 0 | Status: DISCONTINUED | OUTPATIENT
Start: 2021-09-16 | End: 2021-09-19

## 2021-09-16 RX ORDER — DEXTROSE 50 % IN WATER 50 %
25 SYRINGE (ML) INTRAVENOUS ONCE
Refills: 0 | Status: DISCONTINUED | OUTPATIENT
Start: 2021-09-16 | End: 2021-09-19

## 2021-09-16 RX ORDER — ONDANSETRON 8 MG/1
4 TABLET, FILM COATED ORAL EVERY 6 HOURS
Refills: 0 | Status: DISCONTINUED | OUTPATIENT
Start: 2021-09-16 | End: 2021-09-19

## 2021-09-16 RX ORDER — GLUCAGON INJECTION, SOLUTION 0.5 MG/.1ML
1 INJECTION, SOLUTION SUBCUTANEOUS ONCE
Refills: 0 | Status: DISCONTINUED | OUTPATIENT
Start: 2021-09-16 | End: 2021-09-19

## 2021-09-16 RX ORDER — DEXTROSE 50 % IN WATER 50 %
15 SYRINGE (ML) INTRAVENOUS ONCE
Refills: 0 | Status: DISCONTINUED | OUTPATIENT
Start: 2021-09-16 | End: 2021-09-19

## 2021-09-16 RX ORDER — LISINOPRIL 2.5 MG/1
40 TABLET ORAL DAILY
Refills: 0 | Status: DISCONTINUED | OUTPATIENT
Start: 2021-09-16 | End: 2021-09-19

## 2021-09-16 RX ORDER — ENOXAPARIN SODIUM 100 MG/ML
40 INJECTION SUBCUTANEOUS DAILY
Refills: 0 | Status: DISCONTINUED | OUTPATIENT
Start: 2021-09-16 | End: 2021-09-19

## 2021-09-16 RX ADMIN — MEMANTINE HYDROCHLORIDE 5 MILLIGRAM(S): 10 TABLET ORAL at 14:57

## 2021-09-16 RX ADMIN — Medication 50 MILLIGRAM(S): at 14:58

## 2021-09-16 RX ADMIN — METFORMIN HYDROCHLORIDE 500 MILLIGRAM(S): 850 TABLET ORAL at 14:57

## 2021-09-16 RX ADMIN — DONEPEZIL HYDROCHLORIDE 5 MILLIGRAM(S): 10 TABLET, FILM COATED ORAL at 14:57

## 2021-09-16 RX ADMIN — Medication 10 MILLIGRAM(S): at 16:24

## 2021-09-16 RX ADMIN — Medication 650 MILLIGRAM(S): at 14:58

## 2021-09-16 RX ADMIN — Medication 975 MILLIGRAM(S): at 14:57

## 2021-09-16 RX ADMIN — INSULIN GLARGINE 20 UNIT(S): 100 INJECTION, SOLUTION SUBCUTANEOUS at 22:29

## 2021-09-16 RX ADMIN — SERTRALINE 100 MILLIGRAM(S): 25 TABLET, FILM COATED ORAL at 14:58

## 2021-09-16 RX ADMIN — Medication 975 MILLIGRAM(S): at 15:25

## 2021-09-16 RX ADMIN — ATORVASTATIN CALCIUM 40 MILLIGRAM(S): 80 TABLET, FILM COATED ORAL at 14:57

## 2021-09-16 NOTE — ED ADULT TRIAGE NOTE - NS ED NURSE BANDS TYPE
Noted therapy is now recommending rehab for this pt. CM attempted to contact pt with no success at this time. CM to continue to follow. Name band;

## 2021-09-16 NOTE — ED PROVIDER NOTE - CLINICAL SUMMARY MEDICAL DECISION MAKING FREE TEXT BOX
Fall from standing tonight with frontal head trauma, at baseline mental status, CTH/C-spine with no acute findings. Steady functional decline now falling daily, likely not safe for dc. Will consult neurosurgery to assess need for inpatient evaluation for NPH, otherwise consider PT eval for GÓMEZ placement. Labs sent to evaluate for any obvious metabolic derangements but this does not appear to be an acute process.

## 2021-09-16 NOTE — ED ADULT NURSE REASSESSMENT NOTE - NS ED NURSE REASSESS COMMENT FT1
received pt from previous rn pt AAOx2 breathing w ease on RA in NAD VS WNL. family at bedside, pt c/oo 6/10 back pain pt repositioned awaiting lab results will continue to monitor.

## 2021-09-16 NOTE — H&P ADULT - NSHPPHYSICALEXAM_GEN_ALL_CORE
Vital Signs   T(C): 37.1 (16 Sep 2021 11:53), Max: 37.1 (16 Sep 2021 03:57)  T(F): 98.7 (16 Sep 2021 11:53), Max: 98.8 (16 Sep 2021 03:57)  HR: 62 (16 Sep 2021 11:53) (62 - 68)  BP: 209/76 (16 Sep 2021 11:53) (196/74 - 209/76)  RR: 18 (16 Sep 2021 11:53) (18 - 18)  SpO2: 100% (16 Sep 2021 11:53) (92% - 100%)  General: Elderly female sitting in bed comfortably. No acute distress  HEENT: PERRLA. EOMI. Clear conjunctivae. Moist mucus membrane. Superficial abrasion on right side of forehead.   Neck: Supple.   Chest: CTA bilaterally - no wheezing, rales or rhonchi.   Heart: Normal S1 & S2 with RRR.   Abdomen: Soft. Non-tender. Non-distended. + BS  Ext: No pedal edema. No calf tenderness   Neuro: Active and alert. No focal deficit. No speech disorder.  Skin: Warm and Dry  Psychiatry: Normal mood and affect

## 2021-09-16 NOTE — CONSULT NOTE ADULT - ASSESSMENT
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

## 2021-09-16 NOTE — CONSULT NOTE ADULT - SUBJECTIVE AND OBJECTIVE BOX
[facility]                                     Neurology at [place]                                 Jere Lawson, & Aaron                                  [address]                                        [place], [place], [address]                                             [phone]    CC: fall  HPI:  The patient is a 75y Female who presented with fall, memory loss and incontinence.  She has had urine incontinence for about one year and has used a walker for 9 months.  She does not admit to memory loss.  She had an evaulation scheduled for NPH as an outpatoent but came ot the ED due to fall.  Neurology is asked to evaluate.    PAST MEDICAL & SURGICAL HISTORY:  Rheumatoid arthritis        MEDICATIONS  (STANDING):  aspirin  chewable 81 milliGRAM(s) Oral daily  atorvastatin 40 milliGRAM(s) Oral at bedtime  dextrose 40% Gel 15 Gram(s) Oral once  dextrose 5%. 1000 milliLiter(s) (50 mL/Hr) IV Continuous <Continuous>  dextrose 5%. 1000 milliLiter(s) (100 mL/Hr) IV Continuous <Continuous>  dextrose 50% Injectable 25 Gram(s) IV Push once  dextrose 50% Injectable 12.5 Gram(s) IV Push once  dextrose 50% Injectable 25 Gram(s) IV Push once  donepezil 5 milliGRAM(s) Oral at bedtime  enoxaparin Injectable 40 milliGRAM(s) SubCutaneous daily  fenofibrate Tablet 145 milliGRAM(s) Oral daily  glucagon  Injectable 1 milliGRAM(s) IntraMuscular once  insulin glargine Injectable (LANTUS) 20 Unit(s) SubCutaneous at bedtime  insulin lispro (ADMELOG) corrective regimen sliding scale   SubCutaneous three times a day before meals  lisinopril 40 milliGRAM(s) Oral daily  memantine 5 milliGRAM(s) Oral two times a day  metFORMIN 500 milliGRAM(s) Oral two times a day  metoprolol succinate ER 50 milliGRAM(s) Oral daily  sertraline 100 milliGRAM(s) Oral daily    MEDICATIONS  (PRN):  acetaminophen   Tablet .. 650 milliGRAM(s) Oral every 6 hours PRN Temp greater or equal to 38C (100.4F), Mild Pain (1 - 3), Moderate Pain (4 - 6)  hydrALAZINE Injectable 10 milliGRAM(s) IV Push every 6 hours PRN If SBP > 160  ondansetron Injectable 4 milliGRAM(s) IV Push every 6 hours PRN Nausea and/or Vomiting      Allergies    No Known Allergies    Intolerances        SOCIAL HISTORY:  no tob,   no alcohol   no drugs    FAMILY HISTORY:  father with dementia and TIA      ROS: 14 point ROS negative other than what is present in HPI or below    Vital Signs Last 24 Hrs  T(C): 36.8 (16 Sep 2021 16:07), Max: 37.1 (16 Sep 2021 03:57)  T(F): 98.2 (16 Sep 2021 16:07), Max: 98.8 (16 Sep 2021 03:57)  HR: 73 (16 Sep 2021 16:07) (62 - 73)  BP: 179/88 (16 Sep 2021 16:07) (179/88 - 209/76)  BP(mean): --  RR: 16 (16 Sep 2021 16:07) (16 - 18)  SpO2: 99% (16 Sep 2021 16:07) (92% - 100%)      General: NAD    Detailed Neurologic Exam:    Mental status: The patient is awake and alert and has normal attention span.  The patient is fully oriented in 3 spheres. The patient is oriented to current events. The patient is able to name objects, follow commands, repeat sentences. 3/3 register 1/3 recall, 3/3 with prompt    Cranial nerves: Pupils equal and react symmetrically to light. There is no visual field deficit to confrontation. Extraocular motion is full with no nystagmus. There is no ptosis. Facial sensation is intact. Facial musculature is symmetric. Palate elevates symmetrically. Tongue is midline.    Motor: There is normal bulk and tone.  There is no tremor.  no focal weakness    Sensation: Intact to light touch and pin in 4 extremities    Reflexes: 1+ throughout and plantar responses are flexor.    Cerebellar: There is no dysmetria on finger to nose testing.    Gait : deferred    LABS:                         11.8   13.70 )-----------( 391      ( 16 Sep 2021 07:04 )             38.5       09-16    138  |  101  |  20.6<H>  ----------------------------<  256<H>  4.4   |  26.0  |  0.50    Ca    9.0      16 Sep 2021 07:04      RADIOLOGY & ADDITIONAL STUDIES (independently reviewed unless otherwise noted):  Head CT no acute CVA, mass or blood  (+) SVID, atrophy and mild  ex vacuo hydrocephalus

## 2021-09-16 NOTE — ED ADULT NURSE NOTE - NSIMPLEMENTINTERV_GEN_ALL_ED
Implemented All Fall with Harm Risk Interventions:  [place] to call system. Call bell, personal items and telephone within reach. Instruct patient to call for assistance. Room bathroom lighting operational. Non-slip footwear when patient is off stretcher. Physically safe environment: no spills, clutter or unnecessary equipment. Stretcher in lowest position, wheels locked, appropriate side rails in place. Provide visual cue, wrist band, yellow gown, etc. Monitor gait and stability. Monitor for mental status changes and reorient to person, place, and time. Review medications for side effects contributing to fall risk. Reinforce activity limits and safety measures with patient and family. Provide visual clues: red socks.

## 2021-09-16 NOTE — PROVIDER CONTACT NOTE (OTHER) - RECOMMENDATIONS
D/C recommendation: home PT, assist vs subacute rehab     barriers to D/C: depending on whether family willing to assist throughout the day,

## 2021-09-16 NOTE — PROVIDER CONTACT NOTE (OTHER) - ASSESSMENT
PT ordered. chart reviewed and noted. Neuro PA agreeable to OOB and PT without c-collar. pt received in ED, semifowler position in stretcher, primafit, agreeable to PT. pt requires assistance for functional mobility due to strength and balance deficits. headache 1/10 pain pre/post session: pt left as received will continue to follow, NAD, all lines intact,  goals: 2-3x a week for 2 weeks  bed mobility: S  transfers: S  gait: 50 feet RW supervision   stairs: 4 steps 1 rail supervision

## 2021-09-16 NOTE — H&P ADULT - NSICDXPASTMEDICALHX_GEN_ALL_CORE_FT
PAST MEDICAL HISTORY:  Dementia     Diabetes mellitus, type 2     History of CVA (cerebrovascular accident)     HLD (hyperlipidemia)     Hypertension     Rheumatoid arthritis

## 2021-09-16 NOTE — PHYSICAL THERAPY INITIAL EVALUATION ADULT - ADDITIONAL COMMENTS
owns and uses a RW, pt states family can assist throughout the day, 4 steps 1 rail to enter home, no stairs within home

## 2021-09-16 NOTE — CONSULT NOTE ADULT - ASSESSMENT
IMP:  FREQUENT FALLS  NEURO DECLINE RECENT  WAS PENDING W/U W DR HILARIO OUTPATIENT FOR NPH     PLAN:   CT HEAD (PENDING)  NEUROLOGY CONSULT  IMP:  FREQUENT FALLS, FALL YESTERDAY WITH FRONTAL CONTUSION /HEMATOMA, BL KNEE PAIN/TENDERNESS. + NECK PAIN . NO LOC  NEURO DECLINE RECENTLY, STARTED IN JAN 2021   WAS PENDING OUTPATIENT W/U W DR HILARIO  FOR NPH     PLAN:   CT HEAD (PENDING)  NEUROLOGY CONSULT   TRAUMA CONSULT, C COLLAR UNTIL C SPINE CLEAR   BED REST, FALL RISK

## 2021-09-16 NOTE — H&P ADULT - NSHPLABSRESULTS_GEN_ALL_CORE
LABS:                        11.8   13.70 )-----------( 391      ( 16 Sep 2021 07:04 )             38.5     09-16    138  |  101  |  20.6<H>  ----------------------------<  256<H>  4.4   |  26.0  |  0.50    Ca    9.0      16 Sep 2021 07:04    CT Head No Cont (09.16.21 @ 05:31)     CT HEAD: No acute intracranial hemorrhage, mass effect, or osseous fracture. Chronic changes, as described.    CT CERVICAL SPINE: No acute cervical spine fracture or traumatic malalignment. Multilevel cervical spondylosis.

## 2021-09-16 NOTE — ED PROVIDER NOTE - ENMT, MLM
See other 3/23/2021 encounters R/T details    Airway patent, Nasal mucosa clear. Mouth with normal mucosa.

## 2021-09-16 NOTE — H&P ADULT - NSHPSOCIALHISTORY_GEN_ALL_CORE
, lives with family.  Walks with walker.  No smoking, alcohol or illicit drug use. , lives with family.  Walks with walker.  Former smoker, quit > 35 years ago.  Drinks alcohol socially.   No illicit drug use.

## 2021-09-16 NOTE — H&P ADULT - ASSESSMENT
INCOMPLETE 75 years old female with history of CVA (no residual deficit), HTN, HLD, DM 2, Dementia and Arthritis brought by EMS with fall. As per patient, around 3 am this morning, she was trying to get out of bed to go to the bathroom but she could not reach her walker and fell on floor. Her  woke up from the loud noise from fall. He found her on the floor with contusion on right forehead. She was awake but confused so family called EMS.   On evaluation in ER, no acute injury except abrasion on right forehead. Seen by PT and was plan for discharge to [facility] however her COVID-19 swab came back positive. Positive exposure at home - granddaughter was recently diagnosed.  Denies cough, shortness of breath, chest pain or fever. Had 2 doses of Moderna in March and April.   She is currently complaining of headache due to injury. Denies visual symptoms or dizziness.  Has chronic pain in her knees due to arthritis, denies any change in it.   She is following Neurology as an outpatient due to balance problem and is due to see Dr. Foley for work up for NPH.     1) COVID-19 Infection  - Asymptomatic  - Supportive care    2) Fall  - CT Head and C. Spine with no acute injury   - PT    3) Leukocytosis  - Likely reactive    4) DM 2  - HbA1c  - Accu checks and ISS  - Continue Lantus 20 units at bedtime  - Continue Metformin    5) Hypertensive Urgency   - Resumed Metoprolol and Lisinopril (takes Quinapril at home)  - Hydralazine 10 mg IVP PRN    6) HLD  - Continue Lipitor and Fenofibrate    7) Dementia  - Continue Aricept and Namenda     DVT Prophylaxis -- Lovenox SQ    Dispo: [facility] once approved.     Updated patient's .  75 years old female with history of CVA (no residual deficit), HTN, HLD, DM 2, Dementia and Arthritis brought by EMS with fall. As per patient, around 3 am this morning, she was trying to get out of bed to go to the bathroom but she could not reach her walker and fell on floor. Her  woke up from the loud noise from fall. He found her on the floor with contusion on right forehead. She was awake but confused so family called EMS.   On evaluation in ER, no acute injury except abrasion on right forehead. Seen by PT and was plan for discharge to [facility] however her COVID-19 swab came back positive. Positive exposure at home - granddaughter was recently diagnosed.  Denies cough, shortness of breath, chest pain or fever. Had 2 doses of Moderna in March and April.   She is currently complaining of headache due to injury. Denies visual symptoms or dizziness.  Has chronic pain in her knees due to arthritis, denies any change in it.   She is following Neurology as an outpatient due to balance problem and is due to see Dr. Foley for work up for NPH.     1) COVID-19 Infection  - Asymptomatic  - Supportive care    2) Fall  - Has balance problem, following Neurology. Due to see Neurosurgery for NPH work up.   - CT Head and C. Spine with no acute injury   - PT    3) Hypertensive Urgency   - Resumed Metoprolol and Lisinopril (takes Quinapril at home)  - Hydralazine 10 mg IVP PRN    4) HLD  - Continue Lipitor and Fenofibrate    5) DM 2  - HbA1c  - Accu checks and ISS  - Continue Lantus 20 units at bedtime  - Continue Metformin    6) Dementia  - Continue Aricept and Namenda     DVT Prophylaxis -- Lovenox SQ    Dispo: [facility] once approved.     Updated patient's .

## 2021-09-16 NOTE — CONSULT NOTE ADULT - SUBJECTIVE AND OBJECTIVE BOX
CC: Patient is a 75y old  Female who presents with a chief complaint of frequent falls, neuro decline.   SOURCE;   HPI: Patient is a 75y old  Female who presents with a chief complaint of frequent falls, neuro decline.   Patient was scheduled for outpatient w/u for nph w Dr Chicas later this month but she has been having more frequent falls daily.      pt c/o + -headache  /10 on the pain scale   + - Nausea /+ - Vomiting  admits denies weakness  admits denies numbness/ tingling  admist denies visual changes  admist denies C/T/LS  Spine pain    PAST MEDICAL:    SURGICAL HISTORY:      SOCIAL HISTORY: + - EtOH, + - tobacco, + - drugs    FAMILY HISTORY:      ROS:  CONSTITUTIONAL: No fever, weight loss, or fatigue  EYES: No eye pain, visual disturbances, or discharge  ENMT:  No difficulty hearing, tinnitus, vertigo; No sinus or throat pain  NECK: No pain or stiffness  RESPIRATORY: No cough, wheezing, chills or hemoptysis; No shortness of breath  CARDIOVASCULAR: No chest pain, palpitations, dizziness, or leg swelling  GASTROINTESTINAL: No abdominal or epigastric pain. No nausea, vomiting, or hematemesis; No diarrhea or constipation. No melena or hematochezia.  GENITOURINARY: No dysuria, frequency, hematuria, or incontinence  NEUROLOGICAL: No headaches, memory loss, loss of strength, numbness, or tremors  SKIN: No itching, burning, rashes, or lesions   LYMPH NODES: No enlarged glands  ENDOCRINE: No heat or cold intolerance; No hair loss  MUSCULOSKELETAL: No joint pain or swelling; No muscle, back, or extremity pain  PSYCHIATRIC: No depression, anxiety, mood swings, or difficulty sleeping  HEME/LYMPH: No easy bruising, or bleeding gums  ALLERGY  AND IMMUNOLOGIC: No hives or eczema      MEDICATIONS  (STANDING):    MEDICATIONS  (PRN):      Allergies: No Known Allergies    Vital Signs Last 24 Hrs  T(C): 37.1 (16 Sep 2021 03:57), Max: 37.1 (16 Sep 2021 03:57)  T(F): 98.8 (16 Sep 2021 03:57), Max: 98.8 (16 Sep 2021 03:57)  HR: 68 (16 Sep 2021 03:57) (68 - 68)  BP: 196/74 (16 Sep 2021 03:57) (196/74 - 196/74)  BP(mean): --  RR: 18 (16 Sep 2021 03:57) (18 - 18)  SpO2: 92% (16 Sep 2021 03:57) (92% - 92%)    PHYSICAL EXAM:  GENERAL: NAD, well-groomed, well-developed  HEAD:  Atraumatic, Normocephalic  EYES: EOMI, PERRLA, conjunctiva and sclera clear  ENMT: No tonsillar erythema, exudates, or enlargement; Moist mucous membranes, Good dentition, No lesions  NECK: Supple, No JVD, Normal thyroid  NERVOUS SYSTEM:  Alert & Oriented X3, Good concentration; Motor Strength 5/5 B/L upper and lower extremities; DTRs 2+ intact and symmetric  CHEST/LUNG: Clear to percussion bilaterally; No rales, rhonchi, wheezing, or rubs  HEART: Regular rate and rhythm; No murmurs, rubs, or gallops  ABDOMEN: Soft, Nontender, Nondistended; Bowel sounds present  EXTREMITIES:  2+ Peripheral Pulses, No clubbing, cyanosis, or edema  LYMPH: No lymphadenopathy noted  SKIN: No rashes or lesions      RADIOLOGY & ADDITIONAL STUDIES:    CT HEAD PENDING              CC: Patient is a 75y old  Female who presents with a chief complaint of frequent falls, neuro decline.   SOURCE; Patients daughter at bedside   HPI: Patient is a 75y old  Female who presents with a chief complaint of frequent falls, neuro decline.   Uses walker at baseline for balance but falls with walker.   Patient was scheduled for outpatient w/u for nph w Dr Chicas later this month but she has been having more frequent falls daily.  Was referred to Dr Foley for NPH w/u.   Fell yesterday am has contusion to right forehead, denies LOC, also admits to posterior neck pain     -headache  /10 on the pain scale    - Nausea / - Vomiting  admits general  weakness   denies numbness/ tingling   denies visual changes  admits denies C  Spine pain    PAST MEDICAL: DEMENTIA, OBESITY, FREQUENT FALLS, HYDROCEPHALUS, LE EDEMA, RA, HTN, DM, CRANIAL ARTERY STENOSIS, URINARY INCONTINENCE, GAIT DISTURBANCE     SURGICAL HISTORY: OOPHORECTOMY, CARPEL TUNNEL RELEASE     SOCIAL HX: NO TOB, NO ETOH, NO DRUG ABUSE       FAMILY HISTORY:   MOTHER; CAD      ROS:  CONSTITUTIONAL: No fever, weight loss, or fatigue  EYES: No eye pain, visual disturbances, or discharge  ENMT:  No difficulty hearing, tinnitus, vertigo; No sinus or throat pain  NECK: No pain or stiffness  RESPIRATORY: No cough, wheezing, chills or hemoptysis; No shortness of breath  CARDIOVASCULAR: No chest pain, palpitations, dizziness, or leg swelling  GASTROINTESTINAL: No abdominal or epigastric pain. No nausea, vomiting, or hematemesis; No diarrhea or constipation. No melena or hematochezia.  GENITOURINARY: No dysuria, frequency, hematuria, or incontinence  NEUROLOGICAL: No headaches, memory loss, loss of strength, numbness, or tremors  SKIN: No itching, burning, rashes, or lesions   LYMPH NODES: No enlarged glands  ENDOCRINE: No heat or cold intolerance; No hair loss  MUSCULOSKELETAL: + joint pain AND swelling bilareral knees; No muscle, back, or extremity pain  PSYCHIATRIC: No depression, anxiety, mood swings, or difficulty sleeping, + dementia  HEME/LYMPH: No easy bruising, or bleeding gums  ALLERGY  AND IMMUNOLOGIC: No hives or eczema      MEDICATIONS  (STANDING): metoprolol, insulin, vit d       Allergies: No Known Allergies    Vital Signs Last 24 Hrs  T(C): 37.1 (16 Sep 2021 03:57), Max: 37.1 (16 Sep 2021 03:57)  T(F): 98.8 (16 Sep 2021 03:57), Max: 98.8 (16 Sep 2021 03:57)  HR: 68 (16 Sep 2021 03:57) (68 - 68)  BP: 196/74 (16 Sep 2021 03:57) (196/74 - 196/74)  BP(mean): --  RR: 18 (16 Sep 2021 03:57) (18 - 18)  SpO2: 92% (16 Sep 2021 03:57) (92% - 92%)    PHYSICAL EXAM:  GENERAL: NAD, well-groomed, well-developed  HEAD:  + right frontal hematoma and contusion,  Normocephalic  EYES: EOMI, PERRLA, conjunctiva and sclera clear  ENMT: Moist mucous membranes, Good dentition,  NECK: Supple, No JVD,   NERVOUS SYSTEM:  Alert & Oriented to self, place and partial date,   responses are delayed, need frequent requests and touch cues,  perrla, eomi, cranial nerves 2-12 intact  gross vision and fields intact.    Motor Strength 4/5 B/L upper and lower extremities equal symmetrical strength.   sensory intact  difficulty with fine motor and coordination   no pronator drift  SPINE: + c spine general tenderness no one point. no T/L spine tenderness  CHEST/LUNG: Clear bilaterally; No rales, rhonchi, wheezing, or rubs, no sternal or rib tenderness.   HEART: Regular rate and rhythm; + murmurs, no  rubs, or gallops  ABDOMEN: Soft, Nontender, Nondistended; Bowel sounds present  EXTREMITIES:  1+ Peripheral Pulses radial and DP, No clubbing, cyanosis, or edema  LYMPH: No lymphadenopathy noted  SKIN: No rashes or lesions      RADIOLOGY & ADDITIONAL STUDIES:    CT HEAD PENDING

## 2021-09-16 NOTE — ED PROVIDER NOTE - OBJECTIVE STATEMENT
75yof w/ RA was tryingt o ambulate to bathroom this morning, missed her walker and fell forward onto the floor striking her head. Denies any LOC. Was helped off the floor and able to bear weight but has contusion to the right forehead. Has frequent falls and steady functional decline for several months, falls almost daily, undergoing work up for possible NPH.

## 2021-09-16 NOTE — ED ADULT TRIAGE NOTE - CHIEF COMPLAINT QUOTE
BIBMES from home s/p fall from bed. Pt attempted to go get up and go to the bathroom but was unable to reach her walker and fell getting out of the bed, family was woken up by the sound and found the pt awake but on the floor. Swelling noted to forehead. As per family pt is not on anticoagulation and did not lose consciousness. As per family pt has been seeing neurological specialists for generalized deterioration over time r/t PMHx multiple strokes. Family reports pt is at her usual baseline with usual affect, oriented to self and situation but not date/time. No other complaints at this time.

## 2021-09-16 NOTE — ED PROVIDER NOTE - PROGRESS NOTE DETAILS
Teryr: reassessed, looks well, daughter at bedside, Terry [phone]  patient with steady decline for several months; seen [facility] neurology and neurosurgery; still with unsteady gait and multiple falls; general neuro consult initiated; PT and SW requested. Terry: plan for [facility], however, screened positive for COVID, will require 10 day stay; d/w hospitalist for admission

## 2021-09-16 NOTE — ED ADULT NURSE REASSESSMENT NOTE - NS ED NURSE REASSESS COMMENT FT1
report given to NICK babb on third floor. pt in NAD at this time breathing w ease on RA awaiting transport

## 2021-09-16 NOTE — ED PROVIDER NOTE - CARE PLAN
1 Principal Discharge DX:	Abnormal gait  Secondary Diagnosis:	Minor head injury  Secondary Diagnosis:	COVID-19 virus infection

## 2021-09-17 LAB
COVID-19 SPIKE DOMAIN AB INTERP: POSITIVE
COVID-19 SPIKE DOMAIN ANTIBODY RESULT: >250 U/ML — HIGH
GLUCOSE BLDC GLUCOMTR-MCNC: 172 MG/DL — HIGH (ref 70–99)
GLUCOSE BLDC GLUCOMTR-MCNC: 186 MG/DL — HIGH (ref 70–99)
GLUCOSE BLDC GLUCOMTR-MCNC: 220 MG/DL — HIGH (ref 70–99)
GLUCOSE BLDC GLUCOMTR-MCNC: 91 MG/DL — SIGNIFICANT CHANGE UP (ref 70–99)
HCV AB S/CO SERPL IA: 0.1 S/CO — SIGNIFICANT CHANGE UP (ref 0–0.99)
HCV AB SERPL-IMP: SIGNIFICANT CHANGE UP
SARS-COV-2 IGG+IGM SERPL QL IA: >250 U/ML — HIGH
SARS-COV-2 IGG+IGM SERPL QL IA: POSITIVE

## 2021-09-17 PROCEDURE — 99233 SBSQ HOSP IP/OBS HIGH 50: CPT

## 2021-09-17 RX ORDER — AMLODIPINE BESYLATE 2.5 MG/1
10 TABLET ORAL DAILY
Refills: 0 | Status: DISCONTINUED | OUTPATIENT
Start: 2021-09-17 | End: 2021-09-19

## 2021-09-17 RX ORDER — SODIUM CHLORIDE 9 MG/ML
1000 INJECTION INTRAMUSCULAR; INTRAVENOUS; SUBCUTANEOUS
Refills: 0 | Status: DISCONTINUED | OUTPATIENT
Start: 2021-09-17 | End: 2021-09-19

## 2021-09-17 RX ADMIN — AMLODIPINE BESYLATE 10 MILLIGRAM(S): 2.5 TABLET ORAL at 16:56

## 2021-09-17 RX ADMIN — LISINOPRIL 40 MILLIGRAM(S): 2.5 TABLET ORAL at 05:12

## 2021-09-17 RX ADMIN — Medication 50 MILLIGRAM(S): at 05:12

## 2021-09-17 RX ADMIN — ENOXAPARIN SODIUM 40 MILLIGRAM(S): 100 INJECTION SUBCUTANEOUS at 08:29

## 2021-09-17 RX ADMIN — METFORMIN HYDROCHLORIDE 500 MILLIGRAM(S): 850 TABLET ORAL at 05:55

## 2021-09-17 RX ADMIN — Medication 10 MILLIGRAM(S): at 08:44

## 2021-09-17 RX ADMIN — MEMANTINE HYDROCHLORIDE 5 MILLIGRAM(S): 10 TABLET ORAL at 16:56

## 2021-09-17 RX ADMIN — SERTRALINE 100 MILLIGRAM(S): 25 TABLET, FILM COATED ORAL at 08:29

## 2021-09-17 RX ADMIN — INSULIN GLARGINE 20 UNIT(S): 100 INJECTION, SOLUTION SUBCUTANEOUS at 23:59

## 2021-09-17 RX ADMIN — DONEPEZIL HYDROCHLORIDE 5 MILLIGRAM(S): 10 TABLET, FILM COATED ORAL at 23:59

## 2021-09-17 RX ADMIN — Medication 2: at 13:03

## 2021-09-17 RX ADMIN — MEMANTINE HYDROCHLORIDE 5 MILLIGRAM(S): 10 TABLET ORAL at 05:12

## 2021-09-17 RX ADMIN — SODIUM CHLORIDE 100 MILLILITER(S): 9 INJECTION INTRAMUSCULAR; INTRAVENOUS; SUBCUTANEOUS at 16:55

## 2021-09-17 RX ADMIN — Medication 81 MILLIGRAM(S): at 08:29

## 2021-09-17 RX ADMIN — METFORMIN HYDROCHLORIDE 500 MILLIGRAM(S): 850 TABLET ORAL at 16:56

## 2021-09-17 RX ADMIN — Medication 145 MILLIGRAM(S): at 08:30

## 2021-09-17 RX ADMIN — Medication 2: at 16:56

## 2021-09-18 LAB
A1C WITH ESTIMATED AVERAGE GLUCOSE RESULT: 7.7 % — HIGH (ref 4–5.6)
ANION GAP SERPL CALC-SCNC: 15 MMOL/L — SIGNIFICANT CHANGE UP (ref 5–17)
BASOPHILS # BLD AUTO: 0.06 K/UL — SIGNIFICANT CHANGE UP (ref 0–0.2)
BASOPHILS NFR BLD AUTO: 0.5 % — SIGNIFICANT CHANGE UP (ref 0–2)
BUN SERPL-MCNC: 15.2 MG/DL — SIGNIFICANT CHANGE UP (ref 8–20)
CALCIUM SERPL-MCNC: 8.8 MG/DL — SIGNIFICANT CHANGE UP (ref 8.6–10.2)
CHLORIDE SERPL-SCNC: 102 MMOL/L — SIGNIFICANT CHANGE UP (ref 98–107)
CO2 SERPL-SCNC: 24 MMOL/L — SIGNIFICANT CHANGE UP (ref 22–29)
CREAT SERPL-MCNC: 0.46 MG/DL — LOW (ref 0.5–1.3)
CULTURE RESULTS: SIGNIFICANT CHANGE UP
EOSINOPHIL # BLD AUTO: 0.45 K/UL — SIGNIFICANT CHANGE UP (ref 0–0.5)
EOSINOPHIL NFR BLD AUTO: 3.9 % — SIGNIFICANT CHANGE UP (ref 0–6)
ESTIMATED AVERAGE GLUCOSE: 174 MG/DL — HIGH (ref 68–114)
GLUCOSE BLDC GLUCOMTR-MCNC: 138 MG/DL — HIGH (ref 70–99)
GLUCOSE BLDC GLUCOMTR-MCNC: 159 MG/DL — HIGH (ref 70–99)
GLUCOSE BLDC GLUCOMTR-MCNC: 175 MG/DL — HIGH (ref 70–99)
GLUCOSE BLDC GLUCOMTR-MCNC: 195 MG/DL — HIGH (ref 70–99)
GLUCOSE SERPL-MCNC: 139 MG/DL — HIGH (ref 70–99)
HCT VFR BLD CALC: 39 % — SIGNIFICANT CHANGE UP (ref 34.5–45)
HGB BLD-MCNC: 11.9 G/DL — SIGNIFICANT CHANGE UP (ref 11.5–15.5)
IMM GRANULOCYTES NFR BLD AUTO: 0.4 % — SIGNIFICANT CHANGE UP (ref 0–1.5)
LYMPHOCYTES # BLD AUTO: 1.31 K/UL — SIGNIFICANT CHANGE UP (ref 1–3.3)
LYMPHOCYTES # BLD AUTO: 11.5 % — LOW (ref 13–44)
MAGNESIUM SERPL-MCNC: 1.7 MG/DL — SIGNIFICANT CHANGE UP (ref 1.6–2.6)
MCHC RBC-ENTMCNC: 23 PG — LOW (ref 27–34)
MCHC RBC-ENTMCNC: 30.5 GM/DL — LOW (ref 32–36)
MCV RBC AUTO: 75.4 FL — LOW (ref 80–100)
MONOCYTES # BLD AUTO: 1.08 K/UL — HIGH (ref 0–0.9)
MONOCYTES NFR BLD AUTO: 9.5 % — SIGNIFICANT CHANGE UP (ref 2–14)
NEUTROPHILS # BLD AUTO: 8.46 K/UL — HIGH (ref 1.8–7.4)
NEUTROPHILS NFR BLD AUTO: 74.2 % — SIGNIFICANT CHANGE UP (ref 43–77)
PLATELET # BLD AUTO: 380 K/UL — SIGNIFICANT CHANGE UP (ref 150–400)
POTASSIUM SERPL-MCNC: 4.2 MMOL/L — SIGNIFICANT CHANGE UP (ref 3.5–5.3)
POTASSIUM SERPL-SCNC: 4.2 MMOL/L — SIGNIFICANT CHANGE UP (ref 3.5–5.3)
RBC # BLD: 5.17 M/UL — SIGNIFICANT CHANGE UP (ref 3.8–5.2)
RBC # FLD: 16.5 % — HIGH (ref 10.3–14.5)
SODIUM SERPL-SCNC: 141 MMOL/L — SIGNIFICANT CHANGE UP (ref 135–145)
SPECIMEN SOURCE: SIGNIFICANT CHANGE UP
WBC # BLD: 11.4 K/UL — HIGH (ref 3.8–10.5)
WBC # FLD AUTO: 11.4 K/UL — HIGH (ref 3.8–10.5)

## 2021-09-18 PROCEDURE — 99232 SBSQ HOSP IP/OBS MODERATE 35: CPT

## 2021-09-18 RX ADMIN — Medication 2: at 12:15

## 2021-09-18 RX ADMIN — METFORMIN HYDROCHLORIDE 500 MILLIGRAM(S): 850 TABLET ORAL at 04:24

## 2021-09-18 RX ADMIN — Medication 145 MILLIGRAM(S): at 12:15

## 2021-09-18 RX ADMIN — METFORMIN HYDROCHLORIDE 500 MILLIGRAM(S): 850 TABLET ORAL at 17:27

## 2021-09-18 RX ADMIN — ATORVASTATIN CALCIUM 40 MILLIGRAM(S): 80 TABLET, FILM COATED ORAL at 00:00

## 2021-09-18 RX ADMIN — Medication 2: at 17:26

## 2021-09-18 RX ADMIN — ATORVASTATIN CALCIUM 40 MILLIGRAM(S): 80 TABLET, FILM COATED ORAL at 21:14

## 2021-09-18 RX ADMIN — Medication 50 MILLIGRAM(S): at 04:24

## 2021-09-18 RX ADMIN — Medication 81 MILLIGRAM(S): at 12:15

## 2021-09-18 RX ADMIN — MEMANTINE HYDROCHLORIDE 5 MILLIGRAM(S): 10 TABLET ORAL at 04:24

## 2021-09-18 RX ADMIN — LISINOPRIL 40 MILLIGRAM(S): 2.5 TABLET ORAL at 04:25

## 2021-09-18 RX ADMIN — AMLODIPINE BESYLATE 10 MILLIGRAM(S): 2.5 TABLET ORAL at 04:24

## 2021-09-18 RX ADMIN — SODIUM CHLORIDE 100 MILLILITER(S): 9 INJECTION INTRAMUSCULAR; INTRAVENOUS; SUBCUTANEOUS at 12:21

## 2021-09-18 RX ADMIN — INSULIN GLARGINE 20 UNIT(S): 100 INJECTION, SOLUTION SUBCUTANEOUS at 21:14

## 2021-09-18 RX ADMIN — SERTRALINE 100 MILLIGRAM(S): 25 TABLET, FILM COATED ORAL at 12:15

## 2021-09-18 RX ADMIN — MEMANTINE HYDROCHLORIDE 5 MILLIGRAM(S): 10 TABLET ORAL at 17:29

## 2021-09-18 RX ADMIN — ENOXAPARIN SODIUM 40 MILLIGRAM(S): 100 INJECTION SUBCUTANEOUS at 12:16

## 2021-09-18 RX ADMIN — DONEPEZIL HYDROCHLORIDE 5 MILLIGRAM(S): 10 TABLET, FILM COATED ORAL at 21:14

## 2021-09-18 NOTE — PROGRESS NOTE ADULT - SUBJECTIVE AND OBJECTIVE BOX
Patient is a 75y old  Female who presents with a chief complaint of Fall (16 Sep 2021 17:14)      INTERVAL HPI/OVERNIGHT EVENTS: pt seen and examined at bedside. She is not able to provide a clear history or ROS. Denies any complains, but reports frequent falls at home.   She is COVID positive, but without respiratory symptoms     MEDICATIONS  (STANDING):  aspirin  chewable 81 milliGRAM(s) Oral daily  atorvastatin 40 milliGRAM(s) Oral at bedtime  dextrose 40% Gel 15 Gram(s) Oral once  dextrose 5%. 1000 milliLiter(s) (50 mL/Hr) IV Continuous <Continuous>  dextrose 5%. 1000 milliLiter(s) (100 mL/Hr) IV Continuous <Continuous>  dextrose 50% Injectable 25 Gram(s) IV Push once  dextrose 50% Injectable 12.5 Gram(s) IV Push once  dextrose 50% Injectable 25 Gram(s) IV Push once  donepezil 5 milliGRAM(s) Oral at bedtime  enoxaparin Injectable 40 milliGRAM(s) SubCutaneous daily  fenofibrate Tablet 145 milliGRAM(s) Oral daily  glucagon  Injectable 1 milliGRAM(s) IntraMuscular once  insulin glargine Injectable (LANTUS) 20 Unit(s) SubCutaneous at bedtime  insulin lispro (ADMELOG) corrective regimen sliding scale   SubCutaneous three times a day before meals  lisinopril 40 milliGRAM(s) Oral daily  memantine 5 milliGRAM(s) Oral two times a day  metFORMIN 500 milliGRAM(s) Oral two times a day  metoprolol succinate ER 50 milliGRAM(s) Oral daily  sertraline 100 milliGRAM(s) Oral daily    MEDICATIONS  (PRN):  acetaminophen   Tablet .. 650 milliGRAM(s) Oral every 6 hours PRN Temp greater or equal to 38C (100.4F), Mild Pain (1 - 3), Moderate Pain (4 - 6)  hydrALAZINE Injectable 10 milliGRAM(s) IV Push every 6 hours PRN If SBP > 160  ondansetron Injectable 4 milliGRAM(s) IV Push every 6 hours PRN Nausea and/or Vomiting      Allergies    No Known Allergies    Intolerances        REVIEW OF SYSTEMS:  CONSTITUTIONAL: No fever, weight loss, or fatigue  RESPIRATORY: No cough, wheezing, chills or hemoptysis; No shortness of breath  CARDIOVASCULAR: No chest pain, palpitations, dizziness, or leg swelling  GASTROINTESTINAL: No abdominal or epigastric pain. No nausea, vomiting, or hematemesis; No diarrhea or constipation. No melena or hematochezia.  NEUROLOGICAL: No headaches, memory loss, loss of strength, numbness, or tremors  MUSCULOSKELETAL: No joint pain or swelling; No muscle, back, or extremity pain      Vital Signs Last 24 Hrs  T(C): 36.3 (17 Sep 2021 08:39), Max: 37 (16 Sep 2021 18:31)  T(F): 97.4 (17 Sep 2021 08:39), Max: 98.6 (16 Sep 2021 18:31)  HR: 54 (17 Sep 2021 08:39) (54 - 84)  BP: 183/72 (17 Sep 2021 08:39) (152/74 - 183/72)  BP(mean): --  RR: 18 (17 Sep 2021 08:39) (16 - 18)  SpO2: 96% (17 Sep 2021 08:39) (96% - 99%)    PHYSICAL EXAM:  GENERAL: Pleasant, soft spoken elderly lady, sitting on the bed   HEAD:  Atraumatic, Normocephalic  EYES: EOMI, PERRLA, conjunctiva and sclera clear  NECK: Supple, No JVD, Normal thyroid  NERVOUS SYSTEM:  Alert & Oriented X3, No gross focal deficits  CHEST/LUNG: Clear to percussion bilaterally; No rales, rhonchi, wheezing, or rubs  HEART: Regular rate and rhythm; No murmurs, rubs, or gallops  ABDOMEN: Soft, Nontender, Nondistended; Bowel sounds present  EXTREMITIES:  mild hand tremors   SKIN: No rashes or lesions    LABS:                        12.4   12.79 )-----------( 405      ( 16 Sep 2021 20:38 )             40.0     09-16    141  |  102  |  16.0  ----------------------------<  120<H>  4.0   |  25.0  |  0.40<L>    Ca    9.1      16 Sep 2021 20:38    TPro  6.3<L>  /  Alb  3.5  /  TBili  0.2<L>  /  DBili  x   /  AST  13  /  ALT  11  /  AlkPhos  84  09-16    PT/INR - ( 16 Sep 2021 20:38 )   PT: 13.3 sec;   INR: 1.15 ratio           Urinalysis Basic - ( 16 Sep 2021 10:56 )    Color: Yellow / Appearance: Clear / S.020 / pH: x  Gluc: x / Ketone: Negative  / Bili: Negative / Urobili: Negative mg/dL   Blood: x / Protein: 30 mg/dL / Nitrite: Negative   Leuk Esterase: Negative / RBC: 0-2 /HPF / WBC 0-2   Sq Epi: x / Non Sq Epi: Occasional / Bacteria: Occasional      CAPILLARY BLOOD GLUCOSE      POCT Blood Glucose.: 172 mg/dL (17 Sep 2021 13:02)  POCT Blood Glucose.: 91 mg/dL (17 Sep 2021 08:23)      RADIOLOGY & ADDITIONAL TESTS:    Imaging Personally Reviewed:  [x ] YES  [ ] NO    Consultant(s) Notes Reviewed:  [ x] YES  [ ] NO    Care Discussed with Consultants/Other Providers [x ] YES  [ ] NO    Plan of Care discussed with Housestaff [ ]YES [ ] NO
[facility] Division of Hospital Medicine    Chief Complaint:    Fall  SUBJECTIVE / OVERNIGHT EVENTS:  Patient seen and evaluated this morning. No overnight events.  Started on amlodipine yesterday for HTN urgency    Patient denies chest pain, SOB, abd pain, N/V, fever, chills, dysuria or any other complaints. All remainder ROS negative.     MEDICATIONS  (STANDING):  amLODIPine   Tablet 10 milliGRAM(s) Oral daily  aspirin  chewable 81 milliGRAM(s) Oral daily  atorvastatin 40 milliGRAM(s) Oral at bedtime  dextrose 40% Gel 15 Gram(s) Oral once  dextrose 5%. 1000 milliLiter(s) (50 mL/Hr) IV Continuous <Continuous>  dextrose 5%. 1000 milliLiter(s) (100 mL/Hr) IV Continuous <Continuous>  dextrose 50% Injectable 25 Gram(s) IV Push once  dextrose 50% Injectable 12.5 Gram(s) IV Push once  dextrose 50% Injectable 25 Gram(s) IV Push once  donepezil 5 milliGRAM(s) Oral at bedtime  enoxaparin Injectable 40 milliGRAM(s) SubCutaneous daily  fenofibrate Tablet 145 milliGRAM(s) Oral daily  glucagon  Injectable 1 milliGRAM(s) IntraMuscular once  insulin glargine Injectable (LANTUS) 20 Unit(s) SubCutaneous at bedtime  insulin lispro (ADMELOG) corrective regimen sliding scale   SubCutaneous three times a day before meals  lisinopril 40 milliGRAM(s) Oral daily  memantine 5 milliGRAM(s) Oral two times a day  metFORMIN 500 milliGRAM(s) Oral two times a day  metoprolol succinate ER 50 milliGRAM(s) Oral daily  sertraline 100 milliGRAM(s) Oral daily  sodium chloride 0.9%. 1000 milliLiter(s) (100 mL/Hr) IV Continuous <Continuous>    MEDICATIONS  (PRN):  acetaminophen   Tablet .. 650 milliGRAM(s) Oral every 6 hours PRN Temp greater or equal to 38C (100.4F), Mild Pain (1 - 3), Moderate Pain (4 - 6)  ondansetron Injectable 4 milliGRAM(s) IV Push every 6 hours PRN Nausea and/or Vomiting        I&O's Summary    17 Sep 2021 07:01  -  18 Sep 2021 07:00  --------------------------------------------------------  IN: 0 mL / OUT: 750 mL / NET: -750 mL        PHYSICAL EXAM:  Vital Signs Last 24 Hrs  T(C): 36.4 (18 Sep 2021 08:38), Max: 36.5 (17 Sep 2021 20:15)  T(F): 97.6 (18 Sep 2021 08:38), Max: 97.7 (17 Sep 2021 20:15)  HR: 67 (18 Sep 2021 08:38) (67 - 86)  BP: 148/66 (18 Sep 2021 08:38) (148/66 - 174/66)  BP(mean): --  RR: 19 (18 Sep 2021 08:38) (18 - 19)  SpO2: 98% (18 Sep 2021 08:38) (96% - 98%)        CONSTITUTIONAL: NAD, well-developed, well-groomed  ENMT: Moist oral mucosa, no pharyngeal injection or exudates; normal dentition  RESPIRATORY: Normal respiratory effort; lungs are clear to auscultation bilaterally  CARDIOVASCULAR: Regular rate and rhythm, normal S1 and S2, no murmur/rub/gallop; No lower extremity edema; Peripheral pulses are 2+ bilaterally  ABDOMEN: Nontender to palpation, normoactive bowel sounds, no rebound/guarding; No hepatosplenomegaly  MUSCLOSKELETAL: no clubbing or cyanosis of digits; no joint swelling or tenderness to palpation  PSYCH: A+O to person, place, and time; affect appropriate  NEUROLOGY: CN 2-12 are intact and symmetric; no gross sensory deficits;   SKIN: No rashes; no palpable lesions    LABS:                        11.9   11.40 )-----------( 380      ( 18 Sep 2021 08:47 )             39.0     09-18    141  |  102  |  15.2  ----------------------------<  139<H>  4.2   |  24.0  |  0.46<L>    Ca    8.8      18 Sep 2021 08:47  Mg     1.7     09-18    TPro  6.3<L>  /  Alb  3.5  /  TBili  0.2<L>  /  DBili  x   /  AST  13  /  ALT  11  /  AlkPhos  84  09-16    PT/INR - ( 16 Sep 2021 20:38 )   PT: 13.3 sec;   INR: 1.15 ratio           CARDIAC MARKERS ( 16 Sep 2021 20:38 )  x     / <0.01 ng/mL / 23 U/L / x     / x              Culture - Urine (collected 16 Sep 2021 17:26)  Source: Clean Catch Clean Catch (Midstream)  Final Report (18 Sep 2021 06:22):    >=3 organisms. Probable collection contamination.      CAPILLARY BLOOD GLUCOSE      POCT Blood Glucose.: 195 mg/dL (18 Sep 2021 16:41)  POCT Blood Glucose.: 159 mg/dL (18 Sep 2021 11:35)  POCT Blood Glucose.: 138 mg/dL (18 Sep 2021 07:03)  POCT Blood Glucose.: 220 mg/dL (17 Sep 2021 23:42)  POCT Blood Glucose.: 186 mg/dL (17 Sep 2021 16:55)        RADIOLOGY & ADDITIONAL TESTS:  Results Reviewed:   Imaging Personally Reviewed:  Electrocardiogram Personally Reviewed:

## 2021-09-18 NOTE — PROGRESS NOTE ADULT - ASSESSMENT
75 years old female with history of CVA (no residual deficit), HTN, HLD, DM 2, Dementia, Arthritis, NPH  brought by EMS with fall.  Incidentally COVID +     #COVID-19 Infection  Asymptomatic, no respiratory symptoms     #Fall  - Has balance problem, following Neurology. Due to see Neurosurgery for NPH work up.   Discussed with Dr. Foley -neuro surgeon who pt follows as outpatient, he will see her in the office   - CT Head and C. Spine with no acute injury   - PT done, recommending home with PT    #Hypertensive Urgency , BP still not controlled   -Restarted on home BP meds Metoprolol and Lisinopril (takes Quinapril at home)  as well as Amlodipine 10 mg po daily on 9/17     #HLD  - Continue Lipitor and Fenofibrate    # DM 2  - Accu checks and ISS  - Continue Lantus 20 units at bedtime  - Continue Metformin    #Dementia  - Continue Aricept and Namenda     DVT Prophylaxis -- Lovenox SQ    Dispo: DC home with PT , when BP improves. Likely discharge 9/19      
75 years old female with history of CVA (no residual deficit), HTN, HLD, DM 2, Dementia and Arthritis brought by EMS with fall. As per patient, around 3 am this morning, she was trying to get out of bed to go to the bathroom but she could not reach her walker and fell on floor. Her  woke up from the loud noise from fall. He found her on the floor with contusion on right forehead. She was awake but confused so family called EMS.   On evaluation in ER, no acute injury except abrasion on right forehead. Seen by PT and was plan for discharge to [facility] however her COVID-19 swab came back positive. Positive exposure at home - granddaughter was recently diagnosed.  Denies cough, shortness of breath, chest pain or fever. Had 2 doses of Moderna in March and April.   She is currently complaining of headache due to injury. Denies visual symptoms or dizziness.  Has chronic pain in her knees due to arthritis, denies any change in it.   She is following Neurology as an outpatient due to balance problem and is due to see Dr. Foley for work up for NPH.     1.  COVID-19 Infection  - Asymptomatic, no respiratory symptoms   Give IV fluids       2) Fall  - Has balance problem, following Neurology. Due to see Neurosurgery for NPH work up.   Discussed with Dr. Foley -neuro surgeon who pt follows as outpatient, he will see her in the office   - CT Head and C. Spine with no acute injury   - PT done, recommending home with PT    3) Hypertensive Urgency , BP still not controlled   - Resumed Metoprolol and Lisinopril (takes Quinapril at home)  d/c - Hydralazine 10 mg IVP PRN and start Amlodipine 10 mg po daily     4) HLD  - Continue Lipitor and Fenofibrate    5) DM 2  - HbA1c pending   - Accu checks and ISS  - Continue Lantus 20 units at bedtime  - Continue Metformin    6) Dementia  - Continue Aricept and Namenda     DVT Prophylaxis -- Lovenox SQ    Dispo: DC home with PT , when BP improves   Spoke to patients daughter Donald.

## 2021-09-19 ENCOUNTER — TRANSCRIPTION ENCOUNTER (OUTPATIENT)
Age: 76
End: 2021-09-19

## 2021-09-19 VITALS
DIASTOLIC BLOOD PRESSURE: 83 MMHG | TEMPERATURE: 98 F | SYSTOLIC BLOOD PRESSURE: 158 MMHG | OXYGEN SATURATION: 98 % | RESPIRATION RATE: 20 BRPM | HEART RATE: 73 BPM

## 2021-09-19 LAB
GLUCOSE BLDC GLUCOMTR-MCNC: 105 MG/DL — HIGH (ref 70–99)
GLUCOSE BLDC GLUCOMTR-MCNC: 157 MG/DL — HIGH (ref 70–99)
GLUCOSE BLDC GLUCOMTR-MCNC: 204 MG/DL — HIGH (ref 70–99)

## 2021-09-19 PROCEDURE — 82728 ASSAY OF FERRITIN: CPT

## 2021-09-19 PROCEDURE — 86769 SARS-COV-2 COVID-19 ANTIBODY: CPT

## 2021-09-19 PROCEDURE — 85025 COMPLETE CBC W/AUTO DIFF WBC: CPT

## 2021-09-19 PROCEDURE — 71045 X-RAY EXAM CHEST 1 VIEW: CPT

## 2021-09-19 PROCEDURE — 84145 PROCALCITONIN (PCT): CPT

## 2021-09-19 PROCEDURE — 82550 ASSAY OF CK (CPK): CPT

## 2021-09-19 PROCEDURE — 84484 ASSAY OF TROPONIN QUANT: CPT

## 2021-09-19 PROCEDURE — 36415 COLL VENOUS BLD VENIPUNCTURE: CPT

## 2021-09-19 PROCEDURE — 83735 ASSAY OF MAGNESIUM: CPT

## 2021-09-19 PROCEDURE — 85027 COMPLETE CBC AUTOMATED: CPT

## 2021-09-19 PROCEDURE — 80048 BASIC METABOLIC PNL TOTAL CA: CPT

## 2021-09-19 PROCEDURE — 85379 FIBRIN DEGRADATION QUANT: CPT

## 2021-09-19 PROCEDURE — 99285 EMERGENCY DEPT VISIT HI MDM: CPT

## 2021-09-19 PROCEDURE — 82962 GLUCOSE BLOOD TEST: CPT

## 2021-09-19 PROCEDURE — 83036 HEMOGLOBIN GLYCOSYLATED A1C: CPT

## 2021-09-19 PROCEDURE — 93005 ELECTROCARDIOGRAM TRACING: CPT

## 2021-09-19 PROCEDURE — 80053 COMPREHEN METABOLIC PANEL: CPT

## 2021-09-19 PROCEDURE — 86140 C-REACTIVE PROTEIN: CPT

## 2021-09-19 PROCEDURE — U0005: CPT

## 2021-09-19 PROCEDURE — 96372 THER/PROPH/DIAG INJ SC/IM: CPT | Mod: XU

## 2021-09-19 PROCEDURE — U0003: CPT

## 2021-09-19 PROCEDURE — 85610 PROTHROMBIN TIME: CPT

## 2021-09-19 PROCEDURE — 99239 HOSP IP/OBS DSCHRG MGMT >30: CPT

## 2021-09-19 PROCEDURE — 87086 URINE CULTURE/COLONY COUNT: CPT

## 2021-09-19 PROCEDURE — 72125 CT NECK SPINE W/O DYE: CPT

## 2021-09-19 PROCEDURE — 86803 HEPATITIS C AB TEST: CPT

## 2021-09-19 PROCEDURE — 96374 THER/PROPH/DIAG INJ IV PUSH: CPT

## 2021-09-19 PROCEDURE — 70450 CT HEAD/BRAIN W/O DYE: CPT

## 2021-09-19 PROCEDURE — 81001 URINALYSIS AUTO W/SCOPE: CPT

## 2021-09-19 RX ORDER — QUINAPRIL HYDROCHLORIDE 40 MG/1
1 TABLET, FILM COATED ORAL
Qty: 0 | Refills: 0 | DISCHARGE

## 2021-09-19 RX ORDER — AMLODIPINE BESYLATE 2.5 MG/1
1 TABLET ORAL
Qty: 0 | Refills: 0 | DISCHARGE
Start: 2021-09-19

## 2021-09-19 RX ORDER — AMLODIPINE BESYLATE 2.5 MG/1
1 TABLET ORAL
Qty: 1 | Refills: 0
Start: 2021-09-19

## 2021-09-19 RX ADMIN — ENOXAPARIN SODIUM 40 MILLIGRAM(S): 100 INJECTION SUBCUTANEOUS at 12:50

## 2021-09-19 RX ADMIN — Medication 81 MILLIGRAM(S): at 12:49

## 2021-09-19 RX ADMIN — AMLODIPINE BESYLATE 10 MILLIGRAM(S): 2.5 TABLET ORAL at 05:23

## 2021-09-19 RX ADMIN — Medication 650 MILLIGRAM(S): at 00:16

## 2021-09-19 RX ADMIN — Medication 50 MILLIGRAM(S): at 05:22

## 2021-09-19 RX ADMIN — LISINOPRIL 40 MILLIGRAM(S): 2.5 TABLET ORAL at 05:23

## 2021-09-19 RX ADMIN — SERTRALINE 100 MILLIGRAM(S): 25 TABLET, FILM COATED ORAL at 12:49

## 2021-09-19 RX ADMIN — Medication 145 MILLIGRAM(S): at 12:49

## 2021-09-19 RX ADMIN — METFORMIN HYDROCHLORIDE 500 MILLIGRAM(S): 850 TABLET ORAL at 05:22

## 2021-09-19 RX ADMIN — MEMANTINE HYDROCHLORIDE 5 MILLIGRAM(S): 10 TABLET ORAL at 05:22

## 2021-09-19 RX ADMIN — Medication 4: at 17:19

## 2021-09-19 RX ADMIN — Medication 2: at 12:49

## 2021-09-19 RX ADMIN — Medication 650 MILLIGRAM(S): at 05:22

## 2021-09-19 RX ADMIN — METFORMIN HYDROCHLORIDE 500 MILLIGRAM(S): 850 TABLET ORAL at 17:19

## 2021-09-19 RX ADMIN — MEMANTINE HYDROCHLORIDE 5 MILLIGRAM(S): 10 TABLET ORAL at 17:19

## 2021-09-19 NOTE — DISCHARGE NOTE PROVIDER - CARE PROVIDER_API CALL
Gaurav Foley; PhD)  Neurosurgery  [address] [place] [address]  Phone: [phone]  Fax: [phone]  Follow Up Time: 1 week

## 2021-09-19 NOTE — DISCHARGE NOTE NURSING/CASE MANAGEMENT/SOCIAL WORK - PATIENT PORTAL LINK FT
You can access the FollowMyHealth Patient Portal offered by [facility] by registering at the following website: [email or web address]. By joining [facility]’s FollowMyHealth portal, you will also be able to view your health information using other applications (apps) compatible with our system.

## 2021-09-19 NOTE — DISCHARGE NOTE PROVIDER - NSDCCPCAREPLAN_GEN_ALL_CORE_FT
PRINCIPAL DISCHARGE DIAGNOSIS  Diagnosis: Abnormal gait  Assessment and Plan of Treatment: Cont with home PT and please follow up with NSGY      SECONDARY DISCHARGE DIAGNOSES  Diagnosis: Minor head injury  Assessment and Plan of Treatment:     Diagnosis: COVID-19 virus infection  Assessment and Plan of Treatment:     Diagnosis: Hypertensive urgency  Assessment and Plan of Treatment: please cotinue your medications as prescribed

## 2021-09-19 NOTE — DISCHARGE NOTE PROVIDER - HOSPITAL COURSE
75 years old female with history of CVA (no residual deficit), HTN, HLD, DM 2, Dementia, Arthritis, NPH  brought by EMS with fall.  Incidentally COVID +.       #COVID-19 Infection  Asymptomatic, no respiratory symptoms     #Fall  - Has balance problem, following Neurology. Due to see Neurosurgery for NPH work up.   Discussed with Dr. Foley -neuro surgeon who pt follows as outpatient, he will see her in the office   - CT Head and C. Spine with no acute injury   - PT done, recommending home with PT    #Hypertensive Urgency    -Restarted on home BP meds Metoprolol and Lisinopril (takes Quinapril at home)  as well as Amlodipine 10 mg po daily on 9/17     #HLD  - Continue Lipitor and Fenofibrate    # DM 2  - Continue Lantus 20 units at bedtime  - Continue Metformin    #Dementia  - Continue Aricept and Namenda     Dispo: DC home with PT     Patient is medically stable for discharge with close Neuro Surg follow up and home PT.  BP stable. To be discharged to day with walker.    VITALS:   T(C): 36.4 (09-19-21 @ 10:19), Max: 36.8 (09-18-21 @ 21:00)  HR: 64 (09-19-21 @ 10:19) (54 - 69)  BP: 177/72 (09-19-21 @ 10:19) (125/70 - 177/72)  RR: 19 (09-19-21 @ 10:19) (18 - 20)  SpO2: 99% (09-19-21 @ 10:19) (94% - 99%)    CONSTITUTIONAL: NAD, Elderly appearing well-developed  ENMT: Moist oral mucosa, no pharyngeal injection or exudates; normal dentition  RESPIRATORY: Normal respiratory effort; lungs are clear to auscultation bilaterally  CARDIOVASCULAR: Regular rate and rhythm, normal S1 and S2, no murmur/rub/gallop; No lower extremity edema; Peripheral pulses are 2+ bilaterally  ABDOMEN: Nontender to palpation, normoactive bowel sounds, no rebound/guarding; No hepatosplenomegaly  MUSCLOSKELETAL: no clubbing or cyanosis of digits; no joint swelling or tenderness to palpation  PSYCH: A+O to person, place, affect appropriate  NEUROLOGY: CN 2-12 are intact and symmetric; no gross sensory deficits;   SKIN: No rashes; no palpable lesions      Discharge time: 33 minutes

## 2021-09-19 NOTE — DISCHARGE NOTE NURSING/CASE MANAGEMENT/SOCIAL WORK - NSSCCARECORD_GEN_ALL_CORE
Home Care Agency/Durable Medical Equipment Agency Xolair Counseling:  Patient informed of potential adverse effects including but not limited to fever, muscle aches, rash and allergic reactions.  The patient verbalized understanding of the proper use and possible adverse effects of Xolair.  All of the patient's questions and concerns were addressed.

## 2021-09-19 NOTE — DISCHARGE NOTE PROVIDER - NSDCMRMEDTOKEN_GEN_ALL_CORE_FT
aspirin 81 mg oral tablet, chewable: 1 tab(s) orally once a day  atorvastatin 80 mg oral tablet: 1 tab(s) orally once a day  donepezil 5 mg oral tablet: 1 tab(s) orally once a day (at bedtime)  ergocalciferol 1.25 mg (50,000 intl units) oral capsule: 1 cap(s) orally once a week  fenofibrate 145 mg oral tablet: 1 tab(s) orally once a day  hydrocodone-acetaminophen 5 mg-300 mg oral tablet: 1 tab(s) orally every 12 hours, As Needed  memantine 5 mg oral tablet: 1 tab(s) orally 2 times a day  metFORMIN 500 mg oral tablet: 1 tab(s) orally 2 times a day  Metoprolol Succinate ER 50 mg oral tablet, extended release: 1 tab(s) orally once a day  quinapril 40 mg oral tablet: 1 tab(s) orally once a day  sertraline 100 mg oral tablet: 1 tab(s) orally once a day  Tresiba FlexTouch 200 units/mL subcutaneous solution: subcutaneous 2 times a day  30 units in morning and 40 units at night.    amLODIPine 10 mg oral tablet: 1 tab(s) orally once a day  aspirin 81 mg oral tablet, chewable: 1 tab(s) orally once a day  atorvastatin 80 mg oral tablet: 1 tab(s) orally once a day  donepezil 5 mg oral tablet: 1 tab(s) orally once a day (at bedtime)  ergocalciferol 1.25 mg (50,000 intl units) oral capsule: 1 cap(s) orally once a week  fenofibrate 145 mg oral tablet: 1 tab(s) orally once a day  memantine 5 mg oral tablet: 1 tab(s) orally 2 times a day  metFORMIN 500 mg oral tablet: 1 tab(s) orally 2 times a day  Metoprolol Succinate ER 50 mg oral tablet, extended release: 1 tab(s) orally once a day  sertraline 100 mg oral tablet: 1 tab(s) orally once a day  Tresiba FlexTouch 200 units/mL subcutaneous solution: subcutaneous 2 times a day  30 units in morning and 40 units at night.

## 2021-09-19 NOTE — DISCHARGE NOTE NURSING/CASE MANAGEMENT/SOCIAL WORK - NSDCPEFALRISK_GEN_ALL_CORE
For information on Fall & injury Prevention, visit [email or web address]

## 2021-09-22 ENCOUNTER — NON-APPOINTMENT (OUTPATIENT)
Age: 76
End: 2021-09-22

## 2021-09-30 ENCOUNTER — APPOINTMENT (OUTPATIENT)
Dept: NEUROSURGERY | Facility: CLINIC | Age: 76
End: 2021-09-30
Payer: MEDICARE

## 2021-09-30 VITALS
BODY MASS INDEX: 30.24 KG/M2 | TEMPERATURE: 97.6 F | OXYGEN SATURATION: 96 % | HEART RATE: 57 BPM | SYSTOLIC BLOOD PRESSURE: 203 MMHG | HEIGHT: 59 IN | WEIGHT: 150 LBS | DIASTOLIC BLOOD PRESSURE: 64 MMHG

## 2021-09-30 DIAGNOSIS — R41.3 OTHER AMNESIA: ICD-10-CM

## 2021-09-30 PROCEDURE — 99213 OFFICE O/P EST LOW 20 MIN: CPT

## 2021-10-01 NOTE — HISTORY OF PRESENT ILLNESS
[FreeTextEntry1] : CHINEDU FORREST is a 75 year old female with PMH RA, HTN, HLD, DM. Former smoker, quit 20 years ago who presents for evaluation of symptoms that include urinary incontinence, difficulty ambulating, frequent falls, and memory issues. She is accompanied by her daughter and her . \par The patient had been in [place] from October to December 2020. Since returning, she has been having increasing weakness, difficulty ambulating, frequent falls with loss of balance. 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 \par Per daughter, she is having memory and cognition changes since December 2020. She denies any headaches, n/v or vision changes. She has not had seizures. She has intermittent urinary incontinence. She ambulates with the assistance of a rolling walker since December 2020. She takes aspirin 81 mg daily.Denies any family history of stroke or aneurysm. She was told by neurologist that she has dementia and all symptoms are caused by the multiple strokes that she has had. \par \par

## 2021-10-01 NOTE — DATA REVIEWED
[de-identified] : DATE OF EXAM: 02/12/2021\par MRI-3T BRAIN PRE AND POST IV CONTRAST\par History: Ataxia, multiple falls, cognitive impairment with concern of infarct or\par normal pressure hydrocephalus. Diabetes, hypertension.\par Technique: MR imaging of the brain was obtained both before and after the\par administration of 13cc of Gadoterate Meglumine on a 3.0 Suzanna system.\par Comparison examination: No prior studies are available for comparison with this\par examination.\par Skull base and calvarium: Normal.\par Brain parenchyma: Within the brain parenchyma there is no mass, mass effect or\par edema. There is moderate white matter microvascular disease disease in both\par cerebral hemispheres with small acute-subacute deep white matter infarcts in the\par right left parietal lobes (see axial DWI image #10 of series #59). In addition,\par there are chronic lacunar infarcts in both thalami, the right side of the ayad\par and the deep white matter of both cerebral hemispheres. There is mild atrophy of\par both cerebellar hemispheres and the vermis with abnormally increased signal in\par the middle cerebellar peduncles bilaterally, the medulla and ayad with mild\par atrophy of the ayad and medulla.\par Extra-axial spaces: No abnormal extra-axial collection or mass is noted.\par Blood products: No intracranial hemorrhage is seen.\par Ventricles and sulci: The ventricles and sulci are prominent secondary to\par diffuse cortical atrophy with preferential involvement of the medial temporal\par lobes. No hydrocephalus is seen.\par Orbits: No orbital masses are seen. The globes, extraocular muscles, lacrimal\par glands and optic nerves are normal.\par Paranasal sinuses: Normally aerated.\par Temporal bones: Normal.\par Craniocervical junction: Normal.\par IMPRESSION:\par Moderate white matter microvascular disease in both cerebral hemispheres with \par Page 2 of 2\par small subacute-subacute deep white matter infarcts in the right and left\par parietal lobes. Chronic lacunar infarcts are seen in both thalami, right side of\par the ayad in the deep white matter both cerebral hemispheres.\par Abnormally increased signal on FLAIR and T2-weighted images in the middle\par cerebellar peduncles bilaterally, the medulla and ayad with mild atrophy of the\par ayad and medulla. This pattern is nonspecific but can be seen in primary\par neurodegenerative syndromes and may be a finding of potential clinical\par significance in this patient with ataxia.\par Diffuse cortical atrophy with preferential involvement of the medial temporal\par lobes. This pattern of atrophy is nonspecific but can be seen in patients with\par Alzheimer's dementia.\par Chronic cerebral ischemia (Brain > Parenchyma) - I67.82\par White Matter disease, unspecified (Brain > Parenchyma) - R90.82\par Atrophy (Brain > Parenchyma)

## 2021-10-01 NOTE — PHYSICAL EXAM
[General Appearance - Alert] : alert [General Appearance - In No Acute Distress] : in no acute distress [Impaired Insight] : insight and judgment were intact [Affect] : the affect was normal [Person] : oriented to person [Place] : oriented to place [Time] : oriented to time [Cranial Nerves Optic (II)] : visual acuity intact bilaterally,  pupils equal round and reactive to light [Cranial Nerves Oculomotor (III)] : extraocular motion intact [Cranial Nerves Trigeminal (V)] : facial sensation intact symmetrically [Cranial Nerves Facial (VII)] : face symmetrical [Cranial Nerves Glossopharyngeal (IX)] : tongue and palate midline [Cranial Nerves Accessory (XI - Cranial And Spinal)] : head turning and shoulder shrug symmetric [Cranial Nerves Hypoglossal (XII)] : there was no tongue deviation with protrusion [Motor Strength] : muscle strength was normal in all four extremities [Limited Balance] : the patient's balance was impaired [Sclera] : the sclera and conjunctiva were normal [PERRL With Normal Accommodation] : pupils were equal in size, round, reactive to light, with normal accommodation [Extraocular Movements] : extraocular movements were intact [Involuntary Movements] : no involuntary movements were seen [Motor Tone] : muscle strength and tone were normal [FreeTextEntry8] : wide based magnetic gait with assistance of a walker.

## 2021-10-01 NOTE — ASSESSMENT
[FreeTextEntry1] : Patient with above history and imaging. Memory loss, difficulty walking, and incontinence may be due to NPH or chronic multiple infarctions. Will set up to get lumbar puncture to assess possible need for VPS.\par \par Plan:\par Set up date for lumbar puncture\par Patient knows to call the office if there are any new or worsening symptoms. \par All questions and concerns answered and addressed in detail to patient's complete satisfaction. Patient verbalized understanding and agreed to plan.\par \par

## 2021-10-20 DIAGNOSIS — G91.9 HYDROCEPHALUS, UNSPECIFIED: ICD-10-CM

## 2021-11-01 ENCOUNTER — INPATIENT (INPATIENT)
Facility: HOSPITAL | Age: 76
LOS: 9 days | Discharge: NURSING, REHAB OR LONG-TERM CARE | DRG: 65 | End: 2021-11-11
Attending: HOSPITALIST | Admitting: HOSPITALIST
Payer: MEDICARE

## 2021-11-01 VITALS
HEART RATE: 56 BPM | WEIGHT: 179.9 LBS | HEIGHT: 64 IN | DIASTOLIC BLOOD PRESSURE: 79 MMHG | RESPIRATION RATE: 16 BRPM | SYSTOLIC BLOOD PRESSURE: 174 MMHG | OXYGEN SATURATION: 95 % | TEMPERATURE: 98 F

## 2021-11-01 DIAGNOSIS — I63.9 CEREBRAL INFARCTION, UNSPECIFIED: ICD-10-CM

## 2021-11-01 DIAGNOSIS — Z92.89 PERSONAL HISTORY OF OTHER MEDICAL TREATMENT: Chronic | ICD-10-CM

## 2021-11-01 PROBLEM — E78.5 HYPERLIPIDEMIA, UNSPECIFIED: Chronic | Status: ACTIVE | Noted: 2021-09-16

## 2021-11-01 PROBLEM — E11.9 TYPE 2 DIABETES MELLITUS WITHOUT COMPLICATIONS: Chronic | Status: ACTIVE | Noted: 2021-09-16

## 2021-11-01 PROBLEM — M06.9 RHEUMATOID ARTHRITIS, UNSPECIFIED: Chronic | Status: ACTIVE | Noted: 2021-09-16

## 2021-11-01 PROBLEM — I10 ESSENTIAL (PRIMARY) HYPERTENSION: Chronic | Status: ACTIVE | Noted: 2021-09-16

## 2021-11-01 PROBLEM — F03.90 UNSPECIFIED DEMENTIA, UNSPECIFIED SEVERITY, WITHOUT BEHAVIORAL DISTURBANCE, PSYCHOTIC DISTURBANCE, MOOD DISTURBANCE, AND ANXIETY: Chronic | Status: ACTIVE | Noted: 2021-09-16

## 2021-11-01 PROBLEM — F03.90 UNSPECIFIED DEMENTIA WITHOUT BEHAVIORAL DISTURBANCE: Chronic | Status: ACTIVE | Noted: 2021-09-16

## 2021-11-01 PROBLEM — Z86.73 PERSONAL HISTORY OF TRANSIENT ISCHEMIC ATTACK (TIA), AND CEREBRAL INFARCTION WITHOUT RESIDUAL DEFICITS: Chronic | Status: ACTIVE | Noted: 2021-09-16

## 2021-11-01 LAB
ALBUMIN SERPL ELPH-MCNC: 3.8 G/DL — SIGNIFICANT CHANGE UP (ref 3.3–5.2)
ALP SERPL-CCNC: 86 U/L — SIGNIFICANT CHANGE UP (ref 40–120)
ALT FLD-CCNC: 16 U/L — SIGNIFICANT CHANGE UP
ANION GAP SERPL CALC-SCNC: 11 MMOL/L — SIGNIFICANT CHANGE UP (ref 5–17)
ANISOCYTOSIS BLD QL: SLIGHT — SIGNIFICANT CHANGE UP
APTT BLD: 30.3 SEC — SIGNIFICANT CHANGE UP (ref 27.5–35.5)
AST SERPL-CCNC: 19 U/L — SIGNIFICANT CHANGE UP
BASOPHILS # BLD AUTO: 0.13 K/UL — SIGNIFICANT CHANGE UP (ref 0–0.2)
BASOPHILS NFR BLD AUTO: 0.9 % — SIGNIFICANT CHANGE UP (ref 0–2)
BILIRUB SERPL-MCNC: 0.2 MG/DL — LOW (ref 0.4–2)
BUN SERPL-MCNC: 17.1 MG/DL — SIGNIFICANT CHANGE UP (ref 8–20)
CALCIUM SERPL-MCNC: 8.7 MG/DL — SIGNIFICANT CHANGE UP (ref 8.6–10.2)
CHLORIDE SERPL-SCNC: 103 MMOL/L — SIGNIFICANT CHANGE UP (ref 98–107)
CO2 SERPL-SCNC: 28 MMOL/L — SIGNIFICANT CHANGE UP (ref 22–29)
CREAT SERPL-MCNC: 0.51 MG/DL — SIGNIFICANT CHANGE UP (ref 0.5–1.3)
ELLIPTOCYTES BLD QL SMEAR: SLIGHT — SIGNIFICANT CHANGE UP
EOSINOPHIL # BLD AUTO: 0.61 K/UL — HIGH (ref 0–0.5)
EOSINOPHIL NFR BLD AUTO: 4.3 % — SIGNIFICANT CHANGE UP (ref 0–6)
ETHANOL SERPL-MCNC: <10 MG/DL — SIGNIFICANT CHANGE UP (ref 0–9)
GIANT PLATELETS BLD QL SMEAR: PRESENT — SIGNIFICANT CHANGE UP
GLUCOSE SERPL-MCNC: 65 MG/DL — LOW (ref 70–99)
HCT VFR BLD CALC: 41.4 % — SIGNIFICANT CHANGE UP (ref 34.5–45)
HGB BLD-MCNC: 13.2 G/DL — SIGNIFICANT CHANGE UP (ref 11.5–15.5)
INR BLD: 1.09 RATIO — SIGNIFICANT CHANGE UP (ref 0.88–1.16)
LYMPHOCYTES # BLD AUTO: 14.8 % — SIGNIFICANT CHANGE UP (ref 13–44)
LYMPHOCYTES # BLD AUTO: 2.09 K/UL — SIGNIFICANT CHANGE UP (ref 1–3.3)
MANUAL SMEAR VERIFICATION: SIGNIFICANT CHANGE UP
MCHC RBC-ENTMCNC: 23.9 PG — LOW (ref 27–34)
MCHC RBC-ENTMCNC: 31.9 GM/DL — LOW (ref 32–36)
MCV RBC AUTO: 74.9 FL — LOW (ref 80–100)
MICROCYTES BLD QL: SLIGHT — SIGNIFICANT CHANGE UP
MONOCYTES # BLD AUTO: 1.1 K/UL — HIGH (ref 0–0.9)
MONOCYTES NFR BLD AUTO: 7.8 % — SIGNIFICANT CHANGE UP (ref 2–14)
NEUTROPHILS # BLD AUTO: 10.09 K/UL — HIGH (ref 1.8–7.4)
NEUTROPHILS NFR BLD AUTO: 71.3 % — SIGNIFICANT CHANGE UP (ref 43–77)
OVALOCYTES BLD QL SMEAR: SLIGHT — SIGNIFICANT CHANGE UP
PLAT MORPH BLD: NORMAL — SIGNIFICANT CHANGE UP
PLATELET # BLD AUTO: 288 K/UL — SIGNIFICANT CHANGE UP (ref 150–400)
POIKILOCYTOSIS BLD QL AUTO: SLIGHT — SIGNIFICANT CHANGE UP
POTASSIUM SERPL-MCNC: 4 MMOL/L — SIGNIFICANT CHANGE UP (ref 3.5–5.3)
POTASSIUM SERPL-SCNC: 4 MMOL/L — SIGNIFICANT CHANGE UP (ref 3.5–5.3)
PROT SERPL-MCNC: 6.4 G/DL — LOW (ref 6.6–8.7)
PROTHROM AB SERPL-ACNC: 12.6 SEC — SIGNIFICANT CHANGE UP (ref 10.6–13.6)
RBC # BLD: 5.53 M/UL — HIGH (ref 3.8–5.2)
RBC # FLD: 18.1 % — HIGH (ref 10.3–14.5)
RBC BLD AUTO: SIGNIFICANT CHANGE UP
SODIUM SERPL-SCNC: 142 MMOL/L — SIGNIFICANT CHANGE UP (ref 135–145)
TROPONIN T SERPL-MCNC: <0.01 NG/ML — SIGNIFICANT CHANGE UP (ref 0–0.06)
VARIANT LYMPHS # BLD: 0.9 % — SIGNIFICANT CHANGE UP (ref 0–6)
WBC # BLD: 14.15 K/UL — HIGH (ref 3.8–10.5)
WBC # FLD AUTO: 14.15 K/UL — HIGH (ref 3.8–10.5)

## 2021-11-01 PROCEDURE — 70498 CT ANGIOGRAPHY NECK: CPT | Mod: 26,MA

## 2021-11-01 PROCEDURE — 99291 CRITICAL CARE FIRST HOUR: CPT | Mod: GC

## 2021-11-01 PROCEDURE — 99292 CRITICAL CARE ADDL 30 MIN: CPT

## 2021-11-01 PROCEDURE — 93010 ELECTROCARDIOGRAM REPORT: CPT

## 2021-11-01 PROCEDURE — 99223 1ST HOSP IP/OBS HIGH 75: CPT

## 2021-11-01 PROCEDURE — 70496 CT ANGIOGRAPHY HEAD: CPT | Mod: 26,MA

## 2021-11-01 RX ORDER — DEXTROSE 50 % IN WATER 50 %
25 SYRINGE (ML) INTRAVENOUS ONCE
Refills: 0 | Status: COMPLETED | OUTPATIENT
Start: 2021-11-01 | End: 2021-11-01

## 2021-11-01 RX ORDER — HYDRALAZINE HCL 50 MG
10 TABLET ORAL ONCE
Refills: 0 | Status: COMPLETED | OUTPATIENT
Start: 2021-11-01 | End: 2021-11-01

## 2021-11-01 RX ADMIN — Medication 25 MILLILITER(S): at 23:22

## 2021-11-01 RX ADMIN — Medication 10 MILLIGRAM(S): at 19:53

## 2021-11-01 NOTE — ED ADULT TRIAGE NOTE - INTERNATIONAL TRAVEL
Instructions: This plan will send the code FBSD to the PM system.  DO NOT or CHANGE the price. Detail Level: Simple Price (Do Not Change): 0.00 No

## 2021-11-01 NOTE — ED ADULT TRIAGE NOTE - CHIEF COMPLAINT QUOTE
slurred speech, slow to respond, left sided weakness. unknown last known normal. history of cva. dr. mtz called to bedside for eval.

## 2021-11-01 NOTE — H&P ADULT - HISTORY OF PRESENT ILLNESS
76F with PMHX CVA (no prior deficit), HTN, HLD, IDDM, Dementia, MDD admitted for Subacute CVA L Basal Ganglia, Mod-Severe Stenosis Prox R PCA, HTN Urgency, and Hypoglycemia.  on arrival with some improvement but remains hypertensive. Patient noted to have AMS with abnormal speech by daughter. LKWT 3 days ago. NIHSS 2. Out of window for TPA. Recent hospitalization reviewed. Hypogylcemic on arrival. Limited HPI/ROS 2/2 dementia and new CVA. ROS negative unless mentioned.     PMHX: CVA (no prior deficit), HTN, HLD, IDDM, Dementia, MDD  PSHX: Dental Surgery  Social Hx: Denies etoh/tobacco/drug abuse  FamHx: Father +TIA +Dementia

## 2021-11-01 NOTE — ED ADULT TRIAGE NOTE - TEMPERATURE IN FAHRENHEIT (DEGREES F)
98.3
Alert-The patient is alert, awake and responds to voice. The patient is oriented to time, place, and person. The triage nurse is able to obtain subjective information.

## 2021-11-01 NOTE — ED PROVIDER NOTE - ATTENDING CONTRIBUTION TO CARE
The patient seen immediately due to critical conditions and required multiple visits to stabilize the patient The patient seen immediately due to critical conditions and required multiple visits to stabilize the patient      CVA    I, Raj Neves, performed the initial face to face bedside interview with this patient regarding history of present illness, review of symptoms and relevant past medical, social and family history.  I completed an independent physical examination.  I was the initial provider who evaluated this patient. I have signed out the follow up of any pending tests (i.e. labs, radiological studies) to the resident.  I have communicated the patient’s plan of care and disposition with the resident

## 2021-11-01 NOTE — H&P ADULT - NSHPLABSRESULTS_GEN_ALL_CORE
CTH/CTA Head/Neck: IMPRESSION:  CT head:  -Interval development of age-indeterminate left basal ganglia lacunar infarct. Consider MRI for further evaluation of acuity.  -Numerous chronic lacunar infarcts and extensive small vessel disease again seen.  -No acute intracranial hemorrhage.    CT angiogram head:  -Moderate to severe stenoses of the proximal right PCA.    CT angiogram neck:  -Atherosclerotic changes without hemodynamically significant stenosis.

## 2021-11-01 NOTE — ED PROVIDER NOTE - PHYSICAL EXAMINATION
Gen: no acute distress  Head: normocephalic, atraumatic  Lung: CTAB, no respiratory distress, no wheezing, rales, rhonchi  CV: normal s1/s2, rrr,   Abd: soft, no tenderness, non-distended  MSK: No edema, no visible deformities, full range of motion in all 4 extremities  Neuro: mild aphasia, No focal neurologic deficits, 5/5 strength to all extremities, sensation intact, CN 2-12 intact  Skin: No rash   Psych: normal affect Gen: no acute distress  Head: normocephalic, atraumatic  Lung: CTAB, no respiratory distress, no wheezing, rales, rhonchi  CV: normal s1/s2, rrr,   Abd: soft, no tenderness, non-distended  MSK: No edema, no visible deformities, full range of motion in all 4 extremities  Neuro: mild aphasia, No focal neurologic deficits, 5/5 strength to all extremities, sensation intact, CN 2-12 intact, a/ox2  Skin: No rash   Psych: normal affect

## 2021-11-01 NOTE — H&P ADULT - NSHPPHYSICALEXAM_GEN_ALL_CORE
Vital Signs Last 24 Hrs  T(C): 36.6 (01 Nov 2021 22:34), Max: 36.8 (01 Nov 2021 15:41)  T(F): 97.9 (01 Nov 2021 22:34), Max: 98.3 (01 Nov 2021 15:41)  HR: 54 (02 Nov 2021 00:00) (54 - 61)  BP: 169/64 (02 Nov 2021 00:00) (169/64 - 227/96)  BP(mean): --  RR: 18 (01 Nov 2021 22:34) (16 - 18)  SpO2: 95% (01 Nov 2021 22:34) (95% - 97%)    Constitutional: Well-appearing, NAD, VSS  Head: NC/AT  Eyes: PERRL, EOMI, anicteric sclera, conjunctiva WNL  ENT: Normal Pharynx, MMM, No tonsillar exudate/erythema  Neck: Supple, Non-tender  Chest: Non-tender, no rashes  Cardio: RRR, s1/s2, no appreciable murmurs/rubs/gallops  Resp: BS CTA bilaterally, no wheezing/rhonchi/rales  Abd: Soft, Non-tender, Non-distended, no rebound/guarding/rigidity  : not examined  Rectal: not examined  MSK: moving all extremities, no motor weakness, full ROM x4  Ext: palpable distal pulses, good capillary refill, no clubbing/cyanosis/edema  Psych: appropriate, cooperative  Neuro: +aphasic/dysarthria, +confused, moving all extremities, limited neuro exam 2/2 current mental status  Skin: Warm/Dry. No rashes.

## 2021-11-01 NOTE — ED PROVIDER NOTE - PROGRESS NOTE DETAILS
Given the significant and immediate threats to this patient based on initial presentation, the benefits of emergency contrast-enhanced CT imaging without obtaining GFR/creatinine serum level results or consent greatly outweigh the potential risk of harm due to contrast-induced nephropathy. - Fernando Arcos, PGY-3 Reviewed all results with pt as well as plans for admission. Pt is comfortable with plan for admission. Questions answered. - Fernando Arcos, PGY-3

## 2021-11-01 NOTE — ED PROVIDER NOTE - CLINICAL SUMMARY MEDICAL DECISION MAKING FREE TEXT BOX
Pt presenting for evaluation of possible cva given onset of aphasia with lkw 3 days prior. Neuro intact, VSS. WIll priority CTH, CTA h/n, order labs, ua, trop, ekg, pt tba for cva w/u.

## 2021-11-01 NOTE — H&P ADULT - ASSESSMENT
ASSESSMENT:  76F with PMHX CVA (no prior deficit), HTN, HLD, IDDM, Dementia, MDD admitted for Subacute CVA L Basal Ganglia, Mod-Severe Stenosis Prox R PCA, HTN Urgency, and Hypoglycemia.     PLAN:  AMS, Subacute CVA L Basal Ganglia Infarct, Mod-Severe Stenosis Proximal R PCA  -CTH/CTA H/N +subacute L basal ganglia lacunar infarct + numerous chronic lacunar infarcts  -Admit to Medicine/Stroke Unit  -NIHSS 2 for dysarthria/aphasia and hemianopsia  -Out of Window for TPA   -Repeat labs AM  -Trend cardiac enzymes  -Check TTE  -A1C/TSH/FLP  -ASA 81mg PO q24  -Atorvastatin 80mg PO QHS  -VTE PPX SQH  -NPO pending Swallow  -PT/OT/Speech  -Neuro Consulted AM (Jere)    HTN Urgency  -Given LKWT 3 days ago probable subacute infarct and grossly uncontrolled SBP >230 on arrival will slowly correct as she is out of 24hr window for permissive HTN.   -Goal  for today  -Hydralazine 10mg IV x1 and q6 PRN SBP >180  -Metoprolol Succinate 50mg ER q24  -Amlodipine 10mg PO q24  -Previously on Quinapril with prior EMR notes saying Lisinopril but was not discharged on any ACEI/ARB.   -Restart above regimen and start ACEI if remains above goal in AM or continues to require PRNs.     Leukocytosis  -Unclear etiology. Trend CBC/WBC.   -No abx given lack of clear infection. Possibly reactive.   -Check Procalcitonin. Check UA/UCX. Check CXR. Check BCX. COVID/RVP pending.    Hypoglycemia, IDDM  -Patient was previously on Lantus 20 units QHS in hospital but discharged on Tresiba 30 units qAM and 40 units qhS. Arrives hypogylcemic in ER.   -D50 Amp X1 now. Repeat Accucheck After.  -Restart low basal coverage Lantus 20 units QHS.   -A1C. Accuchecks q6 while NPO. Please address proper insulin regimen prior to discharge pending further accuchecks and A1C.  -Hold Metformin 500mg PO BID while inpt    HLD  -Lipitor 80mg PO QHS  -Fenofibrate 145mg PO QHS    Dementia  -Donepezil 5mg PO QHS  -Memantine 5mg PO BID    COVID19 Infection  -Incidental on prior hospitalization. No hypoxia. COVID Pending.     MDD/Anxiety  -Sertraline 100mg PO q24    Recent Falls  -No obvious repeat fall. Fall Precautions. Workup last admission negative. Plan for outpt f/u for NPH workup.

## 2021-11-01 NOTE — ED ADULT NURSE NOTE - NSIMPLEMENTINTERV_GEN_ALL_ED
Implemented All Fall with Harm Risk Interventions:  [place] to call system. Call bell, personal items and telephone within reach. Instruct patient to call for assistance. Room bathroom lighting operational. Non-slip footwear when patient is off stretcher. Physically safe environment: no spills, clutter or unnecessary equipment. Stretcher in lowest position, wheels locked, appropriate side rails in place. Provide visual cue, wrist band, yellow gown, etc. Monitor gait and stability. Monitor for mental status changes and reorient to person, place, and time. Review medications for side effects contributing to fall risk. Reinforce activity limits and safety measures with patient and family. Provide visual clues: red socks.

## 2021-11-02 ENCOUNTER — TRANSCRIPTION ENCOUNTER (OUTPATIENT)
Age: 76
End: 2021-11-02

## 2021-11-02 LAB
ANION GAP SERPL CALC-SCNC: 10 MMOL/L — SIGNIFICANT CHANGE UP (ref 5–17)
BASOPHILS # BLD AUTO: 0.06 K/UL — SIGNIFICANT CHANGE UP (ref 0–0.2)
BASOPHILS NFR BLD AUTO: 0.5 % — SIGNIFICANT CHANGE UP (ref 0–2)
BUN SERPL-MCNC: 14.7 MG/DL — SIGNIFICANT CHANGE UP (ref 8–20)
CALCIUM SERPL-MCNC: 9.1 MG/DL — SIGNIFICANT CHANGE UP (ref 8.6–10.2)
CHLORIDE SERPL-SCNC: 103 MMOL/L — SIGNIFICANT CHANGE UP (ref 98–107)
CHOLEST SERPL-MCNC: 248 MG/DL — HIGH
CO2 SERPL-SCNC: 29 MMOL/L — SIGNIFICANT CHANGE UP (ref 22–29)
COVID-19 SPIKE DOMAIN AB INTERP: POSITIVE
COVID-19 SPIKE DOMAIN ANTIBODY RESULT: >250 U/ML — HIGH
CREAT SERPL-MCNC: 0.47 MG/DL — LOW (ref 0.5–1.3)
EOSINOPHIL # BLD AUTO: 0.16 K/UL — SIGNIFICANT CHANGE UP (ref 0–0.5)
EOSINOPHIL NFR BLD AUTO: 1.2 % — SIGNIFICANT CHANGE UP (ref 0–6)
GLUCOSE BLDC GLUCOMTR-MCNC: 102 MG/DL — HIGH (ref 70–99)
GLUCOSE BLDC GLUCOMTR-MCNC: 141 MG/DL — HIGH (ref 70–99)
GLUCOSE BLDC GLUCOMTR-MCNC: 63 MG/DL — LOW (ref 70–99)
GLUCOSE BLDC GLUCOMTR-MCNC: 64 MG/DL — LOW (ref 70–99)
GLUCOSE BLDC GLUCOMTR-MCNC: 73 MG/DL — SIGNIFICANT CHANGE UP (ref 70–99)
GLUCOSE BLDC GLUCOMTR-MCNC: 76 MG/DL — SIGNIFICANT CHANGE UP (ref 70–99)
GLUCOSE BLDC GLUCOMTR-MCNC: 79 MG/DL — SIGNIFICANT CHANGE UP (ref 70–99)
GLUCOSE SERPL-MCNC: 102 MG/DL — HIGH (ref 70–99)
HCT VFR BLD CALC: 44.4 % — SIGNIFICANT CHANGE UP (ref 34.5–45)
HDLC SERPL-MCNC: 53 MG/DL — SIGNIFICANT CHANGE UP
HGB BLD-MCNC: 13.7 G/DL — SIGNIFICANT CHANGE UP (ref 11.5–15.5)
IMM GRANULOCYTES NFR BLD AUTO: 0.4 % — SIGNIFICANT CHANGE UP (ref 0–1.5)
LIPID PNL WITH DIRECT LDL SERPL: 165 MG/DL — HIGH
LYMPHOCYTES # BLD AUTO: 1.44 K/UL — SIGNIFICANT CHANGE UP (ref 1–3.3)
LYMPHOCYTES # BLD AUTO: 10.8 % — LOW (ref 13–44)
MCHC RBC-ENTMCNC: 23.5 PG — LOW (ref 27–34)
MCHC RBC-ENTMCNC: 30.9 GM/DL — LOW (ref 32–36)
MCV RBC AUTO: 76.3 FL — LOW (ref 80–100)
MONOCYTES # BLD AUTO: 1.01 K/UL — HIGH (ref 0–0.9)
MONOCYTES NFR BLD AUTO: 7.6 % — SIGNIFICANT CHANGE UP (ref 2–14)
NEUTROPHILS # BLD AUTO: 10.56 K/UL — HIGH (ref 1.8–7.4)
NEUTROPHILS NFR BLD AUTO: 79.5 % — HIGH (ref 43–77)
NON HDL CHOLESTEROL: 195 MG/DL — HIGH
PLATELET # BLD AUTO: 295 K/UL — SIGNIFICANT CHANGE UP (ref 150–400)
POTASSIUM SERPL-MCNC: 4.2 MMOL/L — SIGNIFICANT CHANGE UP (ref 3.5–5.3)
POTASSIUM SERPL-SCNC: 4.2 MMOL/L — SIGNIFICANT CHANGE UP (ref 3.5–5.3)
PROCALCITONIN SERPL-MCNC: 0.07 NG/ML — SIGNIFICANT CHANGE UP (ref 0.02–0.1)
RAPID RVP RESULT: SIGNIFICANT CHANGE UP
RBC # BLD: 5.82 M/UL — HIGH (ref 3.8–5.2)
RBC # FLD: 18.6 % — HIGH (ref 10.3–14.5)
SARS-COV-2 IGG+IGM SERPL QL IA: >250 U/ML — HIGH
SARS-COV-2 IGG+IGM SERPL QL IA: POSITIVE
SARS-COV-2 RNA SPEC QL NAA+PROBE: SIGNIFICANT CHANGE UP
SODIUM SERPL-SCNC: 142 MMOL/L — SIGNIFICANT CHANGE UP (ref 135–145)
TRIGL SERPL-MCNC: 151 MG/DL — HIGH
WBC # BLD: 13.28 K/UL — HIGH (ref 3.8–10.5)
WBC # FLD AUTO: 13.28 K/UL — HIGH (ref 3.8–10.5)

## 2021-11-02 PROCEDURE — 99221 1ST HOSP IP/OBS SF/LOW 40: CPT

## 2021-11-02 PROCEDURE — 99233 SBSQ HOSP IP/OBS HIGH 50: CPT

## 2021-11-02 PROCEDURE — 93010 ELECTROCARDIOGRAM REPORT: CPT

## 2021-11-02 PROCEDURE — 93306 TTE W/DOPPLER COMPLETE: CPT | Mod: 26

## 2021-11-02 RX ORDER — DONEPEZIL HYDROCHLORIDE 10 MG/1
5 TABLET, FILM COATED ORAL AT BEDTIME
Refills: 0 | Status: DISCONTINUED | OUTPATIENT
Start: 2021-11-02 | End: 2021-11-11

## 2021-11-02 RX ORDER — ERGOCALCIFEROL 1.25 MG/1
1 CAPSULE ORAL
Qty: 0 | Refills: 0 | DISCHARGE

## 2021-11-02 RX ORDER — ATORVASTATIN CALCIUM 80 MG/1
80 TABLET, FILM COATED ORAL AT BEDTIME
Refills: 0 | Status: DISCONTINUED | OUTPATIENT
Start: 2021-11-01 | End: 2021-11-11

## 2021-11-02 RX ORDER — SODIUM CHLORIDE 9 MG/ML
1000 INJECTION, SOLUTION INTRAVENOUS
Refills: 0 | Status: DISCONTINUED | OUTPATIENT
Start: 2021-11-02 | End: 2021-11-11

## 2021-11-02 RX ORDER — SODIUM CHLORIDE 9 MG/ML
1000 INJECTION, SOLUTION INTRAVENOUS
Refills: 0 | Status: DISCONTINUED | OUTPATIENT
Start: 2021-11-02 | End: 2021-11-03

## 2021-11-02 RX ORDER — SODIUM CHLORIDE 9 MG/ML
1000 INJECTION, SOLUTION INTRAVENOUS
Refills: 0 | Status: DISCONTINUED | OUTPATIENT
Start: 2021-11-02 | End: 2021-11-04

## 2021-11-02 RX ORDER — ONDANSETRON 8 MG/1
4 TABLET, FILM COATED ORAL EVERY 8 HOURS
Refills: 0 | Status: DISCONTINUED | OUTPATIENT
Start: 2021-11-01 | End: 2021-11-11

## 2021-11-02 RX ORDER — METOPROLOL TARTRATE 50 MG
50 TABLET ORAL DAILY
Refills: 0 | Status: DISCONTINUED | OUTPATIENT
Start: 2021-11-02 | End: 2021-11-02

## 2021-11-02 RX ORDER — INSULIN GLARGINE 100 [IU]/ML
20 INJECTION, SOLUTION SUBCUTANEOUS AT BEDTIME
Refills: 0 | Status: DISCONTINUED | OUTPATIENT
Start: 2021-11-02 | End: 2021-11-02

## 2021-11-02 RX ORDER — DEXTROSE 50 % IN WATER 50 %
12.5 SYRINGE (ML) INTRAVENOUS ONCE
Refills: 0 | Status: COMPLETED | OUTPATIENT
Start: 2021-11-02 | End: 2021-11-02

## 2021-11-02 RX ORDER — DEXTROSE 50 % IN WATER 50 %
15 SYRINGE (ML) INTRAVENOUS ONCE
Refills: 0 | Status: DISCONTINUED | OUTPATIENT
Start: 2021-11-02 | End: 2021-11-11

## 2021-11-02 RX ORDER — INSULIN LISPRO 100/ML
VIAL (ML) SUBCUTANEOUS EVERY 6 HOURS
Refills: 0 | Status: DISCONTINUED | OUTPATIENT
Start: 2021-11-02 | End: 2021-11-05

## 2021-11-02 RX ORDER — FENOFIBRATE,MICRONIZED 130 MG
145 CAPSULE ORAL DAILY
Refills: 0 | Status: DISCONTINUED | OUTPATIENT
Start: 2021-11-02 | End: 2021-11-11

## 2021-11-02 RX ORDER — HYDRALAZINE HCL 50 MG
10 TABLET ORAL EVERY 6 HOURS
Refills: 0 | Status: DISCONTINUED | OUTPATIENT
Start: 2021-11-02 | End: 2021-11-08

## 2021-11-02 RX ORDER — ACETAMINOPHEN 500 MG
650 TABLET ORAL EVERY 6 HOURS
Refills: 0 | Status: DISCONTINUED | OUTPATIENT
Start: 2021-11-01 | End: 2021-11-11

## 2021-11-02 RX ORDER — HYDRALAZINE HCL 50 MG
10 TABLET ORAL ONCE
Refills: 0 | Status: COMPLETED | OUTPATIENT
Start: 2021-11-02 | End: 2021-11-02

## 2021-11-02 RX ORDER — AMLODIPINE BESYLATE 2.5 MG/1
10 TABLET ORAL DAILY
Refills: 0 | Status: DISCONTINUED | OUTPATIENT
Start: 2021-11-02 | End: 2021-11-02

## 2021-11-02 RX ORDER — DEXTROSE 50 % IN WATER 50 %
25 SYRINGE (ML) INTRAVENOUS ONCE
Refills: 0 | Status: DISCONTINUED | OUTPATIENT
Start: 2021-11-02 | End: 2021-11-11

## 2021-11-02 RX ORDER — LANOLIN ALCOHOL/MO/W.PET/CERES
3 CREAM (GRAM) TOPICAL AT BEDTIME
Refills: 0 | Status: DISCONTINUED | OUTPATIENT
Start: 2021-11-01 | End: 2021-11-11

## 2021-11-02 RX ORDER — DEXTROSE 50 % IN WATER 50 %
15 SYRINGE (ML) INTRAVENOUS ONCE
Refills: 0 | Status: COMPLETED | OUTPATIENT
Start: 2021-11-02 | End: 2021-11-02

## 2021-11-02 RX ORDER — GLUCAGON INJECTION, SOLUTION 0.5 MG/.1ML
1 INJECTION, SOLUTION SUBCUTANEOUS ONCE
Refills: 0 | Status: DISCONTINUED | OUTPATIENT
Start: 2021-11-02 | End: 2021-11-11

## 2021-11-02 RX ORDER — HEPARIN SODIUM 5000 [USP'U]/ML
5000 INJECTION INTRAVENOUS; SUBCUTANEOUS EVERY 8 HOURS
Refills: 0 | Status: DISCONTINUED | OUTPATIENT
Start: 2021-11-01 | End: 2021-11-11

## 2021-11-02 RX ORDER — MEMANTINE HYDROCHLORIDE 10 MG/1
5 TABLET ORAL
Refills: 0 | Status: DISCONTINUED | OUTPATIENT
Start: 2021-11-02 | End: 2021-11-11

## 2021-11-02 RX ORDER — SERTRALINE 25 MG/1
100 TABLET, FILM COATED ORAL DAILY
Refills: 0 | Status: DISCONTINUED | OUTPATIENT
Start: 2021-11-02 | End: 2021-11-11

## 2021-11-02 RX ORDER — ASPIRIN/CALCIUM CARB/MAGNESIUM 324 MG
81 TABLET ORAL DAILY
Refills: 0 | Status: DISCONTINUED | OUTPATIENT
Start: 2021-11-01 | End: 2021-11-11

## 2021-11-02 RX ORDER — DEXTROSE 50 % IN WATER 50 %
12.5 SYRINGE (ML) INTRAVENOUS ONCE
Refills: 0 | Status: DISCONTINUED | OUTPATIENT
Start: 2021-11-02 | End: 2021-11-11

## 2021-11-02 RX ADMIN — HEPARIN SODIUM 5000 UNIT(S): 5000 INJECTION INTRAVENOUS; SUBCUTANEOUS at 22:49

## 2021-11-02 RX ADMIN — Medication 12.5 GRAM(S): at 04:26

## 2021-11-02 RX ADMIN — HEPARIN SODIUM 5000 UNIT(S): 5000 INJECTION INTRAVENOUS; SUBCUTANEOUS at 14:06

## 2021-11-02 RX ADMIN — SODIUM CHLORIDE 100 MILLILITER(S): 9 INJECTION, SOLUTION INTRAVENOUS at 04:26

## 2021-11-02 RX ADMIN — Medication 15 GRAM(S): at 23:08

## 2021-11-02 RX ADMIN — MEMANTINE HYDROCHLORIDE 5 MILLIGRAM(S): 10 TABLET ORAL at 17:58

## 2021-11-02 RX ADMIN — ATORVASTATIN CALCIUM 80 MILLIGRAM(S): 80 TABLET, FILM COATED ORAL at 22:49

## 2021-11-02 RX ADMIN — HEPARIN SODIUM 5000 UNIT(S): 5000 INJECTION INTRAVENOUS; SUBCUTANEOUS at 05:38

## 2021-11-02 RX ADMIN — Medication 3 MILLIGRAM(S): at 22:50

## 2021-11-02 RX ADMIN — SODIUM CHLORIDE 50 MILLILITER(S): 9 INJECTION, SOLUTION INTRAVENOUS at 01:05

## 2021-11-02 RX ADMIN — Medication 10 MILLIGRAM(S): at 15:40

## 2021-11-02 RX ADMIN — Medication 10 MILLIGRAM(S): at 23:20

## 2021-11-02 RX ADMIN — DONEPEZIL HYDROCHLORIDE 5 MILLIGRAM(S): 10 TABLET, FILM COATED ORAL at 22:49

## 2021-11-02 NOTE — PROGRESS NOTE ADULT - SUBJECTIVE AND OBJECTIVE BOX
[facility] Division of Hospital Medicine    Chief Complaint:  CVA    SUBJECTIVE / OVERNIGHT EVENTS:  Pt admitted with suspected acute CVA  Case d/w Neuro.   Swallow eval appreciated   Per pt, her speech is at its baseline   Patient denies chest pain, SOB, abd pain, N/V, fever, chills, dysuria or any other complaints. All remainder ROS negative.     MEDICATIONS  (STANDING):  aspirin enteric coated 81 milliGRAM(s) Oral daily  atorvastatin 80 milliGRAM(s) Oral at bedtime  dextrose 40% Gel 15 Gram(s) Oral once  dextrose 5%. 1000 milliLiter(s) (50 mL/Hr) IV Continuous <Continuous>  dextrose 5%. 1000 milliLiter(s) (100 mL/Hr) IV Continuous <Continuous>  dextrose 5%. 1000 milliLiter(s) (50 mL/Hr) IV Continuous <Continuous>  dextrose 5%. 1000 milliLiter(s) (100 mL/Hr) IV Continuous <Continuous>  dextrose 50% Injectable 25 Gram(s) IV Push once  dextrose 50% Injectable 12.5 Gram(s) IV Push once  dextrose 50% Injectable 25 Gram(s) IV Push once  donepezil 5 milliGRAM(s) Oral at bedtime  fenofibrate Tablet 145 milliGRAM(s) Oral daily  glucagon  Injectable 1 milliGRAM(s) IntraMuscular once  heparin   Injectable 5000 Unit(s) SubCutaneous every 8 hours  insulin lispro (ADMELOG) corrective regimen sliding scale   SubCutaneous every 6 hours  memantine 5 milliGRAM(s) Oral two times a day  sertraline 100 milliGRAM(s) Oral daily    MEDICATIONS  (PRN):  acetaminophen     Tablet .. 650 milliGRAM(s) Oral every 6 hours PRN Temp greater or equal to 38C (100.4F), Mild Pain (1 - 3)  aluminum hydroxide/magnesium hydroxide/simethicone Suspension 30 milliLiter(s) Oral every 4 hours PRN Dyspepsia  hydrALAZINE Injectable 10 milliGRAM(s) IV Push every 6 hours PRN SBP >180  melatonin 3 milliGRAM(s) Oral at bedtime PRN Insomnia  ondansetron Injectable 4 milliGRAM(s) IV Push every 8 hours PRN Nausea and/or Vomiting        I&O's Summary    02 Nov 2021 07:01  -  02 Nov 2021 18:37  --------------------------------------------------------  IN: 600 mL / OUT: 100 mL / NET: 500 mL        PHYSICAL EXAM:  Vital Signs Last 24 Hrs  T(C): 36.8 (02 Nov 2021 15:28), Max: 36.8 (02 Nov 2021 15:28)  T(F): 98.2 (02 Nov 2021 15:28), Max: 98.2 (02 Nov 2021 15:28)  HR: 68 (02 Nov 2021 15:28) (49 - 68)  BP: 191/65 (02 Nov 2021 15:28) (150/75 - 227/96)  BP(mean): --  RR: 18 (02 Nov 2021 15:28) (18 - 19)  SpO2: 95% (02 Nov 2021 15:28) (95% - 97%)        CONSTITUTIONAL: Obese nontoxic appearing female, NAD  ENMT: Moist oral mucosa, no pharyngeal injection or exudates;   RESPIRATORY: Normal respiratory effort; lungs are clear to auscultation bilaterally  CARDIOVASCULAR: Regular rate and rhythm, normal S1 and S2, no murmur/rub/gallop; No lower extremity edema; Peripheral pulses are 2+ bilaterally  ABDOMEN: Nontender to palpation, normoactive bowel sounds, no rebound/guarding; No hepatosplenomegaly  MUSCLOSKELETAL:  no clubbing or cyanosis of digits; no joint swelling or tenderness to palpation  PSYCH: A+O to person, place, and time; affect appropriate  NEUROLOGY: CN 2-12 are intact and symmetric; no gross sensory deficits, RUE/LUE 4/5, RLE/LLE 4/5, DTR intact and symmetric, Questionable aphasia (pt does not have aphasia but appears dysarthric which pt reports is her baseline)  SKIN: No rashes; no palpable lesions    LABS:                        13.7   13.28 )-----------( 295      ( 02 Nov 2021 07:03 )             44.4     11-02    142  |  103  |  14.7  ----------------------------<  102<H>  4.2   |  29.0  |  0.47<L>    Ca    9.1      02 Nov 2021 07:03    TPro  6.4<L>  /  Alb  3.8  /  TBili  0.2<L>  /  DBili  x   /  AST  19  /  ALT  16  /  AlkPhos  86  11-01    PT/INR - ( 01 Nov 2021 18:20 )   PT: 12.6 sec;   INR: 1.09 ratio         PTT - ( 01 Nov 2021 18:20 )  PTT:30.3 sec  CARDIAC MARKERS ( 01 Nov 2021 18:20 )  x     / <0.01 ng/mL / x     / x     / x              CAPILLARY BLOOD GLUCOSE      POCT Blood Glucose.: 76 mg/dL (02 Nov 2021 17:19)  POCT Blood Glucose.: 79 mg/dL (02 Nov 2021 12:13)  POCT Blood Glucose.: 141 mg/dL (02 Nov 2021 05:03)  POCT Blood Glucose.: 63 mg/dL (02 Nov 2021 04:09)  POCT Blood Glucose.: 73 mg/dL (02 Nov 2021 00:30)        RADIOLOGY & ADDITIONAL TESTS:  Results Reviewed:   Imaging Personally Reviewed:  Electrocardiogram Personally Reviewed:      TTE   Summary:   1. Technically adequate study.   2. Left ventricular ejection fraction, by visual estimation, is >75%.   3. Hyperdynamic global left ventricular systolic function.   4. Moderately increased LV wall thickness.   5. Spectral Doppler shows impaired relaxation pattern of left ventricular myocardial filling (Grade I diastolic dysfunction).   6. There is moderate concentric left ventricular hypertrophy.   7. Normal left atrial size.   8. Normal right atrial size.   9. Trace mitral valve regurgitation.  10. There is no evidence of pericardial effusion.  11. There are no prior studies on this patient for comparison purposes.

## 2021-11-02 NOTE — OCCUPATIONAL THERAPY INITIAL EVALUATION ADULT - LIVES WITH, PROFILE
Pt questionable historian, at beginning of eval stated lives with , at end of eval stated lives with daughter. Recommend social work to clarify./children/spouse

## 2021-11-02 NOTE — OCCUPATIONAL THERAPY INITIAL EVALUATION ADULT - VISUAL ACUITY
Pt states reading glasses; no complaints in vision offered. Pt able to correctly identify # of digits held in central and peripheral fields bilaterally

## 2021-11-02 NOTE — OCCUPATIONAL THERAPY INITIAL EVALUATION ADULT - NS ASR FOLLOW COMMAND OT EVAL
Pt able to follow single step commands but requires additional time to process. Pt demonstrates decreased attention to task, decreased processing speed, decreased sequencing and decreased problem solving abilities. Pt required repetition and verbal cues throughout session./50% of the time

## 2021-11-02 NOTE — OCCUPATIONAL THERAPY INITIAL EVALUATION ADULT - MODIFIED CLINICAL TEST OF SENSORY INTEGRATION IN BALANCE TEST
Pt unsteady throughout requiring mod A x1 for static sitting, mod A x 2 for static and dynamic standing

## 2021-11-02 NOTE — SWALLOW BEDSIDE ASSESSMENT ADULT - SLP PERTINENT HISTORY OF CURRENT PROBLEM
As per MD note, "76F with PMHX CVA (no prior deficit), HTN, HLD, IDDM, Dementia, MDD admitted for Subacute CVA L Basal Ganglia, Mod-Severe Stenosis Prox R PCA, HTN Urgency, and Hypoglycemia".

## 2021-11-02 NOTE — OCCUPATIONAL THERAPY INITIAL EVALUATION ADULT - SPECIAL TRAINING, OT EVAL
Pt ambulated with RW and mod A x 2, +external cues short distances around bed area taking a few steps forward, backward and to the right due to decreased balance, endurance and strength. Pt with narrow base of support and decreased right step length. Pt required additional time and repetition of cues for all mobility tasks. Pt with decreased sequencing and processing abilities. Pt educated in energy conservation techniques including proper breathing and activity pacing.

## 2021-11-02 NOTE — OCCUPATIONAL THERAPY INITIAL EVALUATION ADULT - IMPAIRMENTS CONTRIBUTING IMPAIRED BED MOBILITY, REHAB EVAL
impaired balance/cognition/impaired coordination/decreased flexibility/impaired postural control/decreased strength

## 2021-11-02 NOTE — OCCUPATIONAL THERAPY INITIAL EVALUATION ADULT - DIAGNOSIS, OT EVAL
76 year old Female presents with a chief complaint of Subacute CVA L Basal Ganglia, mod-severe PCA Stenosis, Uncontrolled HTN, Hypogylcemia

## 2021-11-02 NOTE — PHYSICAL THERAPY INITIAL EVALUATION ADULT - ADDITIONAL COMMENTS
pt a questionable historian, states lives c , uses a RW, owns a shower chair, has 4 steps 1 rail to enter home, no other stairs within home

## 2021-11-02 NOTE — CONSULT NOTE ADULT - SUBJECTIVE AND OBJECTIVE BOX
HPI:  76F with PMHX CVA (no prior deficit), HTN, HLD, IDDM, Dementia, MDD admitted for Subacute CVA L Basal Ganglia, Mod-Severe Stenosis Prox R PCA, HTN Urgency, and Hypoglycemia.  on arrival with some improvement but remains hypertensive. Patient noted to have AMS with abnormal speech by daughter. LKWT 3 days ago. NIHSS 2. Out of window for TPA. Recent hospitalization reviewed. Hypogylcemic on arrival. Limited HPI/ROS 2/2 dementia and new CVA. ROS negative unless mentioned.     PMHX: CVA (no prior deficit), HTN, HLD, IDDM, Dementia, MDD  PSHX: Dental Surgery  Social Hx: Denies etoh/tobacco/drug abuse  FamHx: Father +TIA +Dementia (01 Nov 2021 23:48)    PAST MEDICAL & SURGICAL HISTORY:  Rheumatoid arthritis  Diabetes mellitus, type 2  Hypertension  HLD (hyperlipidemia)  Dementia  History of CVA (cerebrovascular accident)  History of dental surgery      REVIEW OF SYSTEMS:    CONSTITUTIONAL: No fever, weight loss, or fatigue  EYES: No eye pain, visual disturbances, or discharge  ENMT:  No difficulty hearing, tinnitus, vertigo; No sinus or throat pain  NECK: No pain or stiffness  BREASTS: No pain, masses, or nipple discharge  RESPIRATORY: No cough, wheezing, chills or hemoptysis; No shortness of breath  CARDIOVASCULAR: No chest pain, palpitations, dizziness, or leg swelling  GASTROINTESTINAL: No abdominal or epigastric pain. No nausea, vomiting, or hematemesis; No diarrhea or constipation. No melena or hematochezia.  GENITOURINARY: No dysuria, frequency, hematuria, or incontinence  NEUROLOGICAL: No headaches, memory loss, loss of strength, numbness, or tremors  SKIN: No itching, burning, rashes, or lesions   LYMPH NODES: No enlarged glands  ENDOCRINE: No heat or cold intolerance; No hair loss  MUSCULOSKELETAL: No joint pain or swelling; No muscle, back, or extremity pain  PSYCHIATRIC: No depression, anxiety, mood swings, or difficulty sleeping  HEME/LYMPH: No easy bruising, or bleeding gums  ALLERY AND IMMUNOLOGIC: No hives or eczema    Allergies  No Known Allergies  Intolerances      MEDICATIONS  (STANDING):  aspirin enteric coated 81 milliGRAM(s) Oral daily  atorvastatin 80 milliGRAM(s) Oral at bedtime  dextrose 40% Gel 15 Gram(s) Oral once  dextrose 5%. 1000 milliLiter(s) (50 mL/Hr) IV Continuous <Continuous>  dextrose 5%. 1000 milliLiter(s) (100 mL/Hr) IV Continuous <Continuous>  dextrose 5%. 1000 milliLiter(s) (50 mL/Hr) IV Continuous <Continuous>  dextrose 5%. 1000 milliLiter(s) (100 mL/Hr) IV Continuous <Continuous>  dextrose 50% Injectable 25 Gram(s) IV Push once  dextrose 50% Injectable 12.5 Gram(s) IV Push once  dextrose 50% Injectable 25 Gram(s) IV Push once  donepezil 5 milliGRAM(s) Oral at bedtime  fenofibrate Tablet 145 milliGRAM(s) Oral daily  glucagon  Injectable 1 milliGRAM(s) IntraMuscular once  heparin   Injectable 5000 Unit(s) SubCutaneous every 8 hours  insulin glargine Injectable (LANTUS) 20 Unit(s) SubCutaneous at bedtime  insulin lispro (ADMELOG) corrective regimen sliding scale   SubCutaneous every 6 hours  memantine 5 milliGRAM(s) Oral two times a day  sertraline 100 milliGRAM(s) Oral daily    MEDICATIONS  (PRN):  acetaminophen     Tablet .. 650 milliGRAM(s) Oral every 6 hours PRN Temp greater or equal to 38C (100.4F), Mild Pain (1 - 3)  aluminum hydroxide/magnesium hydroxide/simethicone Suspension 30 milliLiter(s) Oral every 4 hours PRN Dyspepsia  hydrALAZINE Injectable 10 milliGRAM(s) IV Push every 6 hours PRN SBP >180  melatonin 3 milliGRAM(s) Oral at bedtime PRN Insomnia  ondansetron Injectable 4 milliGRAM(s) IV Push every 8 hours PRN Nausea and/or Vomiting    SOCIAL HISTORY:  FAMILY HISTORY:    Vital Signs Last 24 Hrs  T(C): 36.8 (02 Nov 2021 15:28), Max: 36.8 (02 Nov 2021 15:28)  T(F): 98.2 (02 Nov 2021 15:28), Max: 98.2 (02 Nov 2021 15:28)  HR: 68 (02 Nov 2021 15:28) (49 - 68)  BP: 191/65 (02 Nov 2021 15:28) (150/75 - 227/96)  BP(mean): --  RR: 18 (02 Nov 2021 15:28) (18 - 19)  SpO2: 95% (02 Nov 2021 15:28) (95% - 97%)    PHYSICAL EXAM:    GENERAL: NAD, well-groomed, well-developed  HEAD:  Atraumatic, Normocephalic  EYES: EOMI, PERRLA, conjunctiva and sclera clear  ENMT: No tonsillar erythema, exudates, or enlargement; Moist mucous membranes, Good dentition, No lesions  NECK: Supple, No JVD, Normal thyroid  NERVOUS SYSTEM:  Alert & Oriented X3, Good concentration; Motor Strength 5/5 B/L upper and lower extremities; DTRs 2+ intact and symmetric  CHEST/LUNG: Clear to percussion bilaterally; No rales, rhonchi, wheezing, or rubs  HEART: Regular rate and rhythm; No murmurs, rubs, or gallops  ABDOMEN: Soft, Nontender, Nondistended; Bowel sounds present  EXTREMITIES:  2+ Peripheral Pulses, No clubbing, cyanosis, or edema  LYMPH: No lymphadenopathy noted  SKIN: No rashes or lesions    LABS:                        13.7   13.28 )-----------( 295      ( 02 Nov 2021 07:03 )             44.4     11-02    142  |  103  |  14.7  ----------------------------<  102<H>  4.2   |  29.0  |  0.47<L>    Ca    9.1      02 Nov 2021 07:03    TPro  6.4<L>  /  Alb  3.8  /  TBili  0.2<L>  /  DBili  x   /  AST  19  /  ALT  16  /  AlkPhos  86  11-01    PT/INR - ( 01 Nov 2021 18:20 )   PT: 12.6 sec;   INR: 1.09 ratio         PTT - ( 01 Nov 2021 18:20 )  PTT:30.3 sec      RADIOLOGY & ADDITIONAL STUDIES:  ~~~~~~~~~~~~~~~~~~~~~~~~~~~~~~  < from: CT Head No Cont (11.01.21 @ 16:11) >   EXAM:  CT ANGIO BRAIN (W)AW IC                         EXAM:  CT ANGIO NECK (W)AW IC                         EXAM:  CT BRAIN                        PROCEDURE DATE:  11/01/2021    <IMPRESSION:  CT head:  -Interval development of age-indeterminate left basal ganglia lacunar infarct. Consider MRI for further evaluation of acuity.  -Numerous chronic lacunar infarcts and extensive small vessel disease again seen.  -No acute intracranial hemorrhage.  CT angiogram head:  -Moderate to severe stenoses of the proximal right PCA.  CT angiogram neck:  -Atherosclerotic changes without hemodynamically significant stenosis.  ____________  NASCET criteria for internal carotid artery stenosis:  Mild: 0% to 49%  Moderate: 50% to 69%  Severe: 70% to 99%  Complete Occlusion  --- End of Report ---  < end of copied text    HPI:  76F with PMHX CVA (no prior deficit), HTN, HLD, IDDM, Dementia, MDD admitted for Subacute CVA L Basal Ganglia, Mod-Severe Stenosis Prox R PCA, HTN Urgency, and Hypoglycemia.  on arrival with some improvement but remains hypertensive. Patient noted to have AMS with abnormal speech by daughter. LKWT 3 days ago. NIHSS 2. Out of window for TPA. Recent hospitalization reviewed. Hypogylcemic on arrival. Limited HPI/ROS 2/2 dementia and new CVA. ROS negative unless mentioned.     PMHX: CVA (no prior deficit), HTN, HLD, IDDM, Dementia, MDD  PSHX: Dental Surgery  Social Hx: Denies etoh/tobacco/drug abuse  FamHx: Father +TIA +Dementia (01 Nov 2021 23:48)    PAST MEDICAL & SURGICAL HISTORY:  Rheumatoid arthritis  Diabetes mellitus, type 2  Hypertension  HLD (hyperlipidemia)  Dementia  History of CVA (cerebrovascular accident)  History of dental surgery      REVIEW OF SYSTEMS:  unable to review  CONSTITUTIONAL: No fever, weight loss, or fatigue  EYES: No eye pain, visual disturbances, or discharge  ENMT:  No difficulty hearing, tinnitus, vertigo; No sinus or throat pain  NECK: No pain or stiffness  BREASTS: No pain, masses, or nipple discharge  RESPIRATORY: No cough, wheezing, chills or hemoptysis; No shortness of breath  CARDIOVASCULAR: No chest pain, palpitations, dizziness, or leg swelling  GASTROINTESTINAL: No abdominal or epigastric pain. No nausea, vomiting, or hematemesis; No diarrhea or constipation. No melena or hematochezia.  GENITOURINARY: No dysuria, frequency, hematuria, or incontinence  NEUROLOGICAL: No headaches, memory loss, loss of strength, numbness, or tremors  SKIN: No itching, burning, rashes, or lesions   LYMPH NODES: No enlarged glands  ENDOCRINE: No heat or cold intolerance; No hair loss  MUSCULOSKELETAL: No joint pain or swelling; No muscle, back, or extremity pain  PSYCHIATRIC: No depression, anxiety, mood swings, or difficulty sleeping  HEME/LYMPH: No easy bruising, or bleeding gums  ALLERY AND IMMUNOLOGIC: No hives or eczema    Allergies  No Known Allergies  Intolerances      MEDICATIONS  (STANDING):  aspirin enteric coated 81 milliGRAM(s) Oral daily  atorvastatin 80 milliGRAM(s) Oral at bedtime  dextrose 40% Gel 15 Gram(s) Oral once  dextrose 5%. 1000 milliLiter(s) (50 mL/Hr) IV Continuous <Continuous>  dextrose 5%. 1000 milliLiter(s) (100 mL/Hr) IV Continuous <Continuous>  dextrose 5%. 1000 milliLiter(s) (50 mL/Hr) IV Continuous <Continuous>  dextrose 5%. 1000 milliLiter(s) (100 mL/Hr) IV Continuous <Continuous>  dextrose 50% Injectable 25 Gram(s) IV Push once  dextrose 50% Injectable 12.5 Gram(s) IV Push once  dextrose 50% Injectable 25 Gram(s) IV Push once  donepezil 5 milliGRAM(s) Oral at bedtime  fenofibrate Tablet 145 milliGRAM(s) Oral daily  glucagon  Injectable 1 milliGRAM(s) IntraMuscular once  heparin   Injectable 5000 Unit(s) SubCutaneous every 8 hours  insulin glargine Injectable (LANTUS) 20 Unit(s) SubCutaneous at bedtime  insulin lispro (ADMELOG) corrective regimen sliding scale   SubCutaneous every 6 hours  memantine 5 milliGRAM(s) Oral two times a day  sertraline 100 milliGRAM(s) Oral daily    MEDICATIONS  (PRN):  acetaminophen     Tablet .. 650 milliGRAM(s) Oral every 6 hours PRN Temp greater or equal to 38C (100.4F), Mild Pain (1 - 3)  aluminum hydroxide/magnesium hydroxide/simethicone Suspension 30 milliLiter(s) Oral every 4 hours PRN Dyspepsia  hydrALAZINE Injectable 10 milliGRAM(s) IV Push every 6 hours PRN SBP >180  melatonin 3 milliGRAM(s) Oral at bedtime PRN Insomnia  ondansetron Injectable 4 milliGRAM(s) IV Push every 8 hours PRN Nausea and/or Vomiting    SOCIAL HISTORY:  FAMILY HISTORY:    Vital Signs Last 24 Hrs  T(C): 36.8 (02 Nov 2021 15:28), Max: 36.8 (02 Nov 2021 15:28)  T(F): 98.2 (02 Nov 2021 15:28), Max: 98.2 (02 Nov 2021 15:28)  HR: 68 (02 Nov 2021 15:28) (49 - 68)  BP: 191/65 (02 Nov 2021 15:28) (150/75 - 227/96)  BP(mean): --  RR: 18 (02 Nov 2021 15:28) (18 - 19)  SpO2: 95% (02 Nov 2021 15:28) (95% - 97%)    PHYSICAL EXAM:  Awake, alert, resp, oriented self, place,   GENERAL: NAD, well-groomed, well-developed  HEAD:  Atraumatic, Normocephalic  EYES: EOMI, PERRLA, conjunctiva and sclera clear  ENMT: No tonsillar erythema, exudates, or enlargement; Moist mucous membranes, Good dentition, No facial,   NECK: Supple,   NERVOUS SYSTEM:  Alert & Oriented X2 ; Motor Strength 5/5 B/L upper and lower extremities; DTRs 2+ intact and symmetric,   CHEST/LUNG: Clear BS bilaterally; No rales, rhonchi, wheezing, or rubs  HEART: Regular rate and rhythm; No murmurs, rubs, or gallops  ABDOMEN: Soft, Nontender, Nondistended; Bowel sounds present  EXTREMITIES:  2+ Peripheral Pulses, No edema  Neg pronators, neg finger to nose      LABS:                        13.7   13.28 )-----------( 295      ( 02 Nov 2021 07:03 )             44.4     11-02    142  |  103  |  14.7  ----------------------------<  102<H>  4.2   |  29.0  |  0.47<L>    Ca    9.1      02 Nov 2021 07:03    TPro  6.4<L>  /  Alb  3.8  /  TBili  0.2<L>  /  DBili  x   /  AST  19  /  ALT  16  /  AlkPhos  86  11-01    PT/INR - ( 01 Nov 2021 18:20 )   PT: 12.6 sec;   INR: 1.09 ratio         PTT - ( 01 Nov 2021 18:20 )  PTT:30.3 sec      RADIOLOGY & ADDITIONAL STUDIES:  ~~~~~~~~~~~~~~~~~~~~~~~~~~~~~~  < from: CT Head No Cont (11.01.21 @ 16:11) >   EXAM:  CT ANGIO BRAIN (W)AW IC                         EXAM:  CT ANGIO NECK (W)AW IC                         EXAM:  CT BRAIN                        PROCEDURE DATE:  11/01/2021    <IMPRESSION:  CT head:  -Interval development of age-indeterminate left basal ganglia lacunar infarct. Consider MRI for further evaluation of acuity.  -Numerous chronic lacunar infarcts and extensive small vessel disease again seen.  -No acute intracranial hemorrhage.  CT angiogram head:  -Moderate to severe stenoses of the proximal right PCA.  CT angiogram neck:  -Atherosclerotic changes without hemodynamically significant stenosis.  ____________  NASCET criteria for internal carotid artery stenosis:  Mild: 0% to 49%  Moderate: 50% to 69%  Severe: 70% to 99%  Complete Occlusion  --- End of Report ---  < end of copied text

## 2021-11-02 NOTE — DISCHARGE NOTE NURSING/CASE MANAGEMENT/SOCIAL WORK - NSDCFUADDAPPT_GEN_ALL_CORE_FT
STAR:  Patient has a prescheduled appt with neurologist , Dr Alexander Arevalo ( [phone]) on ___________________________  ***STAR CVA*** Your Neurologist's office, Dr Alexander Arevalo [phone] to contact you after discharge from [facility] to schedule a follow-up appointment. Upon discharge, your medications will be managed by [facility].

## 2021-11-02 NOTE — SWALLOW BEDSIDE ASSESSMENT ADULT - ORAL PHASE
Within functional limits piecemeal deglutition/Decreased anterior-posterior movement of the bolus/Delayed oral transit time

## 2021-11-02 NOTE — PROVIDER CONTACT NOTE (HYPOGLYCEMIA EVENT) - NS PROVIDER CONTACT RECOMMEND-HYPO
activate additional dextrose 50% 12.5 give stat  increase d5 IVF to 75ml/hr  repeat fs 30 minutes post interventions

## 2021-11-02 NOTE — PROGRESS NOTE ADULT - ASSESSMENT
76F with a h/o CVA (no prior deficit), HTN, HLD, IDDM, Dementia, MDD admitted for Subacute CVA L Basal Ganglia, Mod-Severe Stenosis Prox R PCA, HTN Urgency, and Hypoglycemia.     AMS, Subacute CVA L Basal Ganglia Infarct, Mod-Severe Stenosis Proximal R PCA  Continue telemetry monitoring   CTH/CTA H/N +subacute L basal ganglia lacunar infarct + numerous chronic lacunar infarcts  ASA 81mg PO q24  Atorvastatin 80mg PO QHS  VTE PPX SQH  Swallow eval appreciated : bite sized with crushed meds  MRI briain  D/w neuro, will allow for permissive HTN for 24 hours followed by strict control     HTN Urgency   for today  Hydralazine 10mg IV x1 and q6 PRN SBP >180  Hold on PO Metoprolol Succinate 50mg ER daily and Amlodipine 10 mg daily for now. Restart Amlodipine in am (24 hour window). Restart Metoprolol only if sinus bradycardia resolved     Leukocytosis  Unclear etiology. Trend CBC/WBC.   No abx given lack of clear infection. Possibly reactive.   Procal neg, unlikely infectious     CHF  (Diastolic I HFpEF) with sinius Bradycardia  TTE noted,   Avoid AV geovanny blockers untill HR recovers (possible overmedicated with Metroprolol prior to presenting )    Hypoglycemia, IDDM  Was on 70 units daily Tresiba (30 am, 40 pm) at home despite being within acceptable limits on 20 units daily of lantus on prior admission  f/u A1C  Continue D5 infusion.   Resume PO now that cleared by swallow  Aspiration precautions  Restart Lantus 20 uits QHS IF BS improve   Lispro s/s till then   Hold Metformin while inpt   Endo eval in am if remains hypoglycemic    HLD  Lipitor 80mg PO QHS  Fenofibrate 145mg PO QHS    Dementia  Donepezil 5mg PO QHS  Memantine 5mg PO BID    COVID19 Infection  Incidental on prior hospitalization. No hypoxia. COVID Pending.     MDD/Anxiety  Sertraline 100mg PO q24    Recent Falls  No obvious repeat fall. Fall Precautions. Workup last admission negative  NSx eval for possible NPH     DVT ppx : heparin s/c

## 2021-11-02 NOTE — PROVIDER CONTACT NOTE (HYPOGLYCEMIA EVENT) - NS PROVIDER CONTACT BACKGROUND-HYPO
Age: 76y    Gender: Female    POCT Blood Glucose:  63 mg/dL (11-02-21 @ 04:09)  73 mg/dL (11-02-21 @ 00:30)  85 mg/dL (11-01-21 @ 15:39)      eMAR:  dextrose 50% Injectable   25 milliLiter(s) IV Push (11-01-21 @ 23:22)

## 2021-11-02 NOTE — PHYSICAL THERAPY INITIAL EVALUATION ADULT - PRECAUTIONS/LIMITATIONS, REHAB EVAL
keep head of bed above 30degrees/aspiration precautions/cardiac precautions/fall precautions/seizure precautions

## 2021-11-02 NOTE — PHYSICAL THERAPY INITIAL EVALUATION ADULT - GAIT PATTERN USED, PT EVAL
unsteadiness throughout requiring assist, decreased gait velocity and activity tolerance, decreased activity tolerance, decreased right step length

## 2021-11-02 NOTE — OCCUPATIONAL THERAPY INITIAL EVALUATION ADULT - PLANNED THERAPY INTERVENTIONS, OT EVAL
toilet/ADL retraining/balance training/bed mobility training/cognitive, visual perceptual/fine motor coordination training/motor coordination training/neuromuscular re-education/ROM/strengthening/transfer training

## 2021-11-02 NOTE — OCCUPATIONAL THERAPY INITIAL EVALUATION ADULT - ADDITIONAL COMMENTS
pt a questionable historian reports 4 MARYA with hand rail and none inside. Pt has a shower with doors and no grab bars. Pt states uses a RW. Pt states owns RW and shower chair. Pt states independent PTA with RW. Pt is right handed and states was driving. Recommend social work to clarify all info including PLOF, set up of home/bathroom, DME owned/used, level of assist needed PTA and level of assist available upon discharge.

## 2021-11-02 NOTE — DISCHARGE NOTE NURSING/CASE MANAGEMENT/SOCIAL WORK - PATIENT PORTAL LINK FT
You can access the FollowMyHealth Patient Portal offered by [facility] by registering at the following website: [email or web address]. By joining [facility]’s FollowMyHealth portal, you will also be able to view your health information using other applications (apps) compatible with our system.

## 2021-11-02 NOTE — OCCUPATIONAL THERAPY INITIAL EVALUATION ADULT - PERTINENT HX OF CURRENT PROBLEM, REHAB EVAL
CT head performed in the ED today shows a subacute left thalamo-capsular infarct. PMHX CVA (no prior deficit), HTN, HLD, IDDM, Dementia, MDD. Pt was brought in by daughter

## 2021-11-02 NOTE — CONSULT NOTE ADULT - SUBJECTIVE AND OBJECTIVE BOX
Neurology consult    CHINEDU FORREST 76y Female     Patient is a 76y old  Female who presents with a chief complaint of Subacute CVA, PCA Stenosis, Uncontrolled HTN, Hypogylcemia (01 Nov 2021 23:48)      HPI:  76F with PMHX CVA (no prior deficit), HTN, HLD, IDDM, Dementia brought in by family for evaluation of progressively worsening gait ataxia, urinary incontinence and memory difficulties over the past 10 months. Daughter at bedside reports that prior to Jan 2021 she used no assistive device to ambulate but now she has difficulty ambulating even with a walker. CT head performed in the ED today shows a subacute left thalamo-capsular infarct. She has been evaluated for NPH as an outpatient and is pending a LP.        PMHX: CVA (no prior deficit), HTN, HLD, IDDM, Dementia, MDD  PSHX: Dental Surgery  Social Hx: Denies etoh/tobacco/drug abuse  FamHx: Father +TIA +Dementia (01 Nov 2021 23:48)      MEDICATIONS    acetaminophen     Tablet .. 650 milliGRAM(s) Oral every 6 hours PRN  aluminum hydroxide/magnesium hydroxide/simethicone Suspension 30 milliLiter(s) Oral every 4 hours PRN  amLODIPine   Tablet 10 milliGRAM(s) Oral daily  aspirin enteric coated 81 milliGRAM(s) Oral daily  atorvastatin 80 milliGRAM(s) Oral at bedtime  dextrose 40% Gel 15 Gram(s) Oral once  dextrose 5%. 1000 milliLiter(s) IV Continuous <Continuous>  dextrose 5%. 1000 milliLiter(s) IV Continuous <Continuous>  dextrose 5%. 1000 milliLiter(s) IV Continuous <Continuous>  dextrose 5%. 1000 milliLiter(s) IV Continuous <Continuous>  dextrose 50% Injectable 25 Gram(s) IV Push once  dextrose 50% Injectable 12.5 Gram(s) IV Push once  dextrose 50% Injectable 25 Gram(s) IV Push once  donepezil 5 milliGRAM(s) Oral at bedtime  fenofibrate Tablet 145 milliGRAM(s) Oral daily  glucagon  Injectable 1 milliGRAM(s) IntraMuscular once  heparin   Injectable 5000 Unit(s) SubCutaneous every 8 hours  hydrALAZINE Injectable 10 milliGRAM(s) IV Push every 6 hours PRN  insulin glargine Injectable (LANTUS) 20 Unit(s) SubCutaneous at bedtime  insulin lispro (ADMELOG) corrective regimen sliding scale   SubCutaneous every 6 hours  melatonin 3 milliGRAM(s) Oral at bedtime PRN  memantine 5 milliGRAM(s) Oral two times a day  metoprolol succinate ER 50 milliGRAM(s) Oral daily  ondansetron Injectable 4 milliGRAM(s) IV Push every 8 hours PRN  sertraline 100 milliGRAM(s) Oral daily      PMH: Rheumatoid arthritis    Diabetes mellitus, type 2    Hypertension    HLD (hyperlipidemia)    Dementia    History of CVA (cerebrovascular accident)         PSH: History of dental surgery        Family history: No history of dementia, strokes, or seizures       SOCIAL HISTORY:  No history of tobacco or alcohol use     Allergies    No Known Allergies    Intolerances        Height (cm): 162.6 (11-01 @ 15:41)  Weight (kg): 81.6 (11-01 @ 15:41)  BMI (kg/m2): 30.9 (11-01 @ 15:41)    Vital Signs Last 24 Hrs  T(C): 36.6 (02 Nov 2021 08:24), Max: 36.8 (01 Nov 2021 15:41)  T(F): 97.8 (02 Nov 2021 08:24), Max: 98.3 (01 Nov 2021 15:41)  HR: 57 (02 Nov 2021 08:24) (49 - 61)  BP: 177/70 (02 Nov 2021 08:24) (150/75 - 227/96)  BP(mean): --  RR: 19 (02 Nov 2021 08:24) (16 - 19)  SpO2: 97% (02 Nov 2021 08:24) (95% - 97%)      On Neurological Examination:    Mental Status - Patient is alert, awake, oriented X2  Knows it is November but the year she said was 2001   Knows Judson is the president.. fluent, names correctly, no dysarthria no aphasia Follows commands well and able to answer questions appropriately. Mood and affect  normal    Cranial Nerves - PERRL, EOMI, VFF, V1-V3 intact, no gross facial asymmetry, tongue/uvula midline    Motor Exam -   Right upper 5/5  Left upper 5/5  Right lower 5/5  Left lower 5/5     nml bulk/tone    Sensory    Intact to light touch , EDITH and pinprick bilaterally    Coord: FTN intact bilaterally     Gait - Not assessed                             NIH SS: 0  Date: 11-  Time: 13.10  1a) Level of consciousness (0-3):   1b) Questions (0-2):   1c) Commands (0-2):   2  ) Gaze (0-2):   3  ) Visual field (0-3):   4  ) Facial palsy (0-3):   Motor  5a) Left arm (0-4):   5b) Right arm (0-4):   6a) Left leg (0-4):   6b) Right leg (0-4):   7  ) Ataxia (0-2):   Sensory  8  ) Sensory (0-2):   Speech  9  ) Language (0-3):   10) Dysarthria (0-2):   Extinction  11) Extinction/inattention (0-2):     Total score: 0    Prestroke Modified [place]: 4    (0: No symptoms and no disability.  1: No significant disability despite symptoms; able to carry out all usual duties and activities.  2: Slight disability; unable to carry out all previous activity but able to look after own affairs without assistance.  3: Moderate disability; requiring some help but able to walk without assistance.   4: Moderately severe disability; unable to walk without assistance and unable to attend to own bodily needs without assistance.  5: Severe disability; bedridden, incontinent and requiring constant nursing care and attention.   6: Dead. )         LABS:  CBC Full  -  ( 02 Nov 2021 07:03 )  WBC Count : 13.28 K/uL  RBC Count : 5.82 M/uL  Hemoglobin : 13.7 g/dL  Hematocrit : 44.4 %  Platelet Count - Automated : 295 K/uL  Mean Cell Volume : 76.3 fl  Mean Cell Hemoglobin : 23.5 pg  Mean Cell Hemoglobin Concentration : 30.9 gm/dL  Auto Neutrophil # : 10.56 K/uL  Auto Lymphocyte # : 1.44 K/uL  Auto Monocyte # : 1.01 K/uL  Auto Eosinophil # : 0.16 K/uL  Auto Basophil # : 0.06 K/uL  Auto Neutrophil % : 79.5 %  Auto Lymphocyte % : 10.8 %  Auto Monocyte % : 7.6 %  Auto Eosinophil % : 1.2 %  Auto Basophil % : 0.5 %      11-02    142  |  103  |  14.7  ----------------------------<  102<H>  4.2   |  29.0  |  0.47<L>    Ca    9.1      02 Nov 2021 07:03    TPro  6.4<L>  /  Alb  3.8  /  TBili  0.2<L>  /  DBili  x   /  AST  19  /  ALT  16  /  AlkPhos  86  11-01    LIVER FUNCTIONS - ( 01 Nov 2021 18:20 )  Alb: 3.8 g/dL / Pro: 6.4 g/dL / ALK PHOS: 86 U/L / ALT: 16 U/L / AST: 19 U/L / GGT: x           Hemoglobin A1C:   Lipid Panel 11-02 @ 07:03  Total Cholesterol, Serum 248  LDL --  Triglycerides 151      PT/INR - ( 01 Nov 2021 18:20 )   PT: 12.6 sec;   INR: 1.09 ratio         PTT - ( 01 Nov 2021 18:20 )  PTT:30.3 sec      RADIOLOGY     < from: CT Head No Cont (11.01.21 @ 16:11) >    CT head:  -Interval development of age-indeterminate left basal ganglia lacunar infarct. Consider MRI for further evaluation of acuity.  -Numerous chronic lacunar infarcts and extensive small vessel disease again seen.  -No acute intracranial hemorrhage.    CT angiogram head:  -Moderate to severe stenoses of the proximal right PCA.    CT angiogram neck:  -Atherosclerotic changes without hemodynamically significant stenosis.    _____________  TTE    Summary:   1. Technically adequate study.   2. Left ventricular ejection fraction, by visual estimation, is >75%.   3. Hyperdynamic global left ventricular systolic function.   4. Moderately increased LV wall thickness.   5. Spectral Doppler shows impaired relaxation pattern of left ventricular myocardial filling (Grade I diastolic dysfunction).   6. There is moderate concentric left ventricular hypertrophy.   7. Normal left atrial size.   8. Normal right atrial size.   9. Trace mitral valve regurgitation.  10. There is no evidence of pericardial effusion.  11. There are no prior studies on this patient for comparison purposes.    Daniela Ortega D.O. Electronically signed on 11/2/2021 at 10:27:26 AM

## 2021-11-02 NOTE — OCCUPATIONAL THERAPY INITIAL EVALUATION ADULT - GENERAL OBSERVATIONS, REHAB EVAL
Received pt semifowler on stretcher, NAD, +IV, +Tele/, +Primafit, +on RA. Patient confused throughout with some slurred speech noted. Patient agreeable to OT evaluation

## 2021-11-02 NOTE — SWALLOW BEDSIDE ASSESSMENT ADULT - SWALLOW EVAL: DIAGNOSIS
Pt presents w/mild oral dysphagia with easy-to-chew solids, +prolonged mastication w/piecemeal deglutition. Improved/functional with soft & bite-sized. Suspected pharyngeal dysphagia w/thin liquids, +delayed wet vocal quality at completion of trials. Improved & no overt s/s penetration or aspiration observed w/mildly thick liquids, or foods.

## 2021-11-02 NOTE — SWALLOW BEDSIDE ASSESSMENT ADULT - SLP GENERAL OBSERVATIONS
Pt recd awake/upright in stretcher in ED, A&A Ox1 w/increased time, +aphasic, reduced cognition, delayed processing, 0/10 pain pre/post, tolerating RA no overt distress, baseline hypophonia.

## 2021-11-03 LAB
A1C WITH ESTIMATED AVERAGE GLUCOSE RESULT: 7.6 % — HIGH (ref 4–5.6)
ANION GAP SERPL CALC-SCNC: 10 MMOL/L — SIGNIFICANT CHANGE UP (ref 5–17)
APPEARANCE UR: SIGNIFICANT CHANGE UP
BACTERIA # UR AUTO: ABNORMAL
BILIRUB UR-MCNC: NEGATIVE — SIGNIFICANT CHANGE UP
BUN SERPL-MCNC: 10.9 MG/DL — SIGNIFICANT CHANGE UP (ref 8–20)
CALCIUM SERPL-MCNC: 9.4 MG/DL — SIGNIFICANT CHANGE UP (ref 8.6–10.2)
CHLORIDE SERPL-SCNC: 104 MMOL/L — SIGNIFICANT CHANGE UP (ref 98–107)
CO2 SERPL-SCNC: 28 MMOL/L — SIGNIFICANT CHANGE UP (ref 22–29)
COLOR SPEC: YELLOW — SIGNIFICANT CHANGE UP
COMMENT - URINE: SIGNIFICANT CHANGE UP
CREAT SERPL-MCNC: 0.53 MG/DL — SIGNIFICANT CHANGE UP (ref 0.5–1.3)
DIFF PNL FLD: ABNORMAL
EPI CELLS # UR: SIGNIFICANT CHANGE UP
ESTIMATED AVERAGE GLUCOSE: 171 MG/DL — HIGH (ref 68–114)
GLUCOSE BLDC GLUCOMTR-MCNC: 186 MG/DL — HIGH (ref 70–99)
GLUCOSE BLDC GLUCOMTR-MCNC: 245 MG/DL — HIGH (ref 70–99)
GLUCOSE BLDC GLUCOMTR-MCNC: 76 MG/DL — SIGNIFICANT CHANGE UP (ref 70–99)
GLUCOSE SERPL-MCNC: 66 MG/DL — LOW (ref 70–99)
GLUCOSE UR QL: NEGATIVE — SIGNIFICANT CHANGE UP
HCT VFR BLD CALC: 42.9 % — SIGNIFICANT CHANGE UP (ref 34.5–45)
HGB BLD-MCNC: 13.3 G/DL — SIGNIFICANT CHANGE UP (ref 11.5–15.5)
KETONES UR-MCNC: ABNORMAL
LEUKOCYTE ESTERASE UR-ACNC: ABNORMAL
MCHC RBC-ENTMCNC: 23.1 PG — LOW (ref 27–34)
MCHC RBC-ENTMCNC: 31 GM/DL — LOW (ref 32–36)
MCV RBC AUTO: 74.5 FL — LOW (ref 80–100)
NITRITE UR-MCNC: POSITIVE
PH UR: 9 — HIGH (ref 5–8)
PLATELET # BLD AUTO: 303 K/UL — SIGNIFICANT CHANGE UP (ref 150–400)
POTASSIUM SERPL-MCNC: 4.2 MMOL/L — SIGNIFICANT CHANGE UP (ref 3.5–5.3)
POTASSIUM SERPL-SCNC: 4.2 MMOL/L — SIGNIFICANT CHANGE UP (ref 3.5–5.3)
PROT UR-MCNC: 500 MG/DL
RBC # BLD: 5.76 M/UL — HIGH (ref 3.8–5.2)
RBC # FLD: 18.9 % — HIGH (ref 10.3–14.5)
RBC CASTS # UR COMP ASSIST: ABNORMAL /HPF (ref 0–4)
SODIUM SERPL-SCNC: 142 MMOL/L — SIGNIFICANT CHANGE UP (ref 135–145)
SP GR SPEC: 1.01 — SIGNIFICANT CHANGE UP (ref 1.01–1.02)
TRI-PHOS CRY UR QL COMP ASSIST: ABNORMAL
UROBILINOGEN FLD QL: 1
WBC # BLD: 11.2 K/UL — HIGH (ref 3.8–10.5)
WBC # FLD AUTO: 11.2 K/UL — HIGH (ref 3.8–10.5)
WBC UR QL: SIGNIFICANT CHANGE UP

## 2021-11-03 PROCEDURE — 99232 SBSQ HOSP IP/OBS MODERATE 35: CPT

## 2021-11-03 PROCEDURE — 99223 1ST HOSP IP/OBS HIGH 75: CPT

## 2021-11-03 PROCEDURE — 99233 SBSQ HOSP IP/OBS HIGH 50: CPT

## 2021-11-03 PROCEDURE — 70551 MRI BRAIN STEM W/O DYE: CPT | Mod: 26

## 2021-11-03 RX ORDER — METOPROLOL TARTRATE 50 MG
5 TABLET ORAL ONCE
Refills: 0 | Status: COMPLETED | OUTPATIENT
Start: 2021-11-03 | End: 2021-11-03

## 2021-11-03 RX ORDER — PIPERACILLIN AND TAZOBACTAM 4; .5 G/20ML; G/20ML
3.38 INJECTION, POWDER, LYOPHILIZED, FOR SOLUTION INTRAVENOUS EVERY 8 HOURS
Refills: 0 | Status: COMPLETED | OUTPATIENT
Start: 2021-11-03 | End: 2021-11-06

## 2021-11-03 RX ORDER — METOPROLOL TARTRATE 50 MG
25 TABLET ORAL DAILY
Refills: 0 | Status: DISCONTINUED | OUTPATIENT
Start: 2021-11-03 | End: 2021-11-11

## 2021-11-03 RX ORDER — PIPERACILLIN AND TAZOBACTAM 4; .5 G/20ML; G/20ML
3.38 INJECTION, POWDER, LYOPHILIZED, FOR SOLUTION INTRAVENOUS ONCE
Refills: 0 | Status: COMPLETED | OUTPATIENT
Start: 2021-11-03 | End: 2021-11-03

## 2021-11-03 RX ORDER — AMLODIPINE BESYLATE 2.5 MG/1
10 TABLET ORAL DAILY
Refills: 0 | Status: DISCONTINUED | OUTPATIENT
Start: 2021-11-03 | End: 2021-11-09

## 2021-11-03 RX ADMIN — Medication 25 MILLIGRAM(S): at 10:38

## 2021-11-03 RX ADMIN — PIPERACILLIN AND TAZOBACTAM 25 GRAM(S): 4; .5 INJECTION, POWDER, LYOPHILIZED, FOR SOLUTION INTRAVENOUS at 11:16

## 2021-11-03 RX ADMIN — MEMANTINE HYDROCHLORIDE 5 MILLIGRAM(S): 10 TABLET ORAL at 17:27

## 2021-11-03 RX ADMIN — Medication 81 MILLIGRAM(S): at 11:56

## 2021-11-03 RX ADMIN — PIPERACILLIN AND TAZOBACTAM 200 GRAM(S): 4; .5 INJECTION, POWDER, LYOPHILIZED, FOR SOLUTION INTRAVENOUS at 02:27

## 2021-11-03 RX ADMIN — MEMANTINE HYDROCHLORIDE 5 MILLIGRAM(S): 10 TABLET ORAL at 06:13

## 2021-11-03 RX ADMIN — AMLODIPINE BESYLATE 10 MILLIGRAM(S): 2.5 TABLET ORAL at 10:38

## 2021-11-03 RX ADMIN — HEPARIN SODIUM 5000 UNIT(S): 5000 INJECTION INTRAVENOUS; SUBCUTANEOUS at 17:26

## 2021-11-03 RX ADMIN — SERTRALINE 100 MILLIGRAM(S): 25 TABLET, FILM COATED ORAL at 17:26

## 2021-11-03 RX ADMIN — Medication 1: at 11:56

## 2021-11-03 RX ADMIN — Medication 650 MILLIGRAM(S): at 10:38

## 2021-11-03 RX ADMIN — Medication 145 MILLIGRAM(S): at 11:55

## 2021-11-03 RX ADMIN — HEPARIN SODIUM 5000 UNIT(S): 5000 INJECTION INTRAVENOUS; SUBCUTANEOUS at 06:13

## 2021-11-03 RX ADMIN — Medication 650 MILLIGRAM(S): at 11:08

## 2021-11-03 RX ADMIN — Medication 5 MILLIGRAM(S): at 03:19

## 2021-11-03 RX ADMIN — Medication 10 MILLIGRAM(S): at 15:59

## 2021-11-03 RX ADMIN — Medication 2: at 17:27

## 2021-11-03 RX ADMIN — PIPERACILLIN AND TAZOBACTAM 25 GRAM(S): 4; .5 INJECTION, POWDER, LYOPHILIZED, FOR SOLUTION INTRAVENOUS at 17:26

## 2021-11-03 NOTE — CONSULT NOTE ADULT - SUBJECTIVE AND OBJECTIVE BOX
76yF was admitted on  with AMS by daughter.     Imaging performed:  CT head  - -Interval development of age-indeterminate left basal ganglia lacunar infarct. Consider MRI for further evaluation of acuity. -Numerous chronic lacunar infarcts and extensive small vessel disease again seen. -No acute intracranial hemorrhage.    CT angiogram head  - -Moderate to severe stenoses of the proximal right PCA.    CT angiogram neck  - -Atherosclerotic changes without hemodynamically significant stenosis.    Patient feels weak, but now doesnt.  Reports a mild headache.  As per RN, reports unable to swallow medications provided and BP has been uncontrolled.    REVIEW OF SYSTEMS  Constitutional - No fever, No weight loss, +fatigue  HEENT - No eye pain, No visual disturbances, No difficulty hearing, No tinnitus, No vertigo, No neck pain  Respiratory - No cough, No wheezing, No shortness of breath  Cardiovascular - No chest pain, No palpitations  Gastrointestinal - No abdominal pain, No nausea, No vomiting, No diarrhea, No constipation  Genitourinary - No dysuria, No frequency, No hematuria, No incontinence  Neurological - +headaches, +memory loss, +loss of strength, No numbness, No tremors  Skin - No itching, No rashes, No lesions   Endocrine - No temperature intolerance  Musculoskeletal - No joint pain, No joint swelling, No muscle pain  Psychiatric - No depression, No anxiety    VITALS  T(C): 36.9 (21 @ 07:45), Max: 36.9 (21 @ 23:14)  HR: 69 (21 @ 07:45) (57 - 89)  BP: 177/81 (21 @ 07:45) (160/66 - 191/65)  RR: 18 (21 @ 07:45) (17 - 20)  SpO2: 95% (21 @ 07:45) (93% - 97%)  Wt(kg): --    PAST MEDICAL & SURGICAL HISTORY  Rheumatoid arthritis    Diabetes mellitus, type 2    Hypertension    HLD (hyperlipidemia)    Dementia    History of CVA (cerebrovascular accident)    History of dental surgery        FUNCTIONAL HISTORY  Lives with spouse and daughter, 4 MARYA  Utilized  RW    CURRENT FUNCTIONAL STATUS   SLP  · Recommended Consistencies	Soft & bite-sized, w/mildly thick liquids     PT  Bed Mobility: Rolling/Turning:     · Level of [place]	minimum assist (75% patients effort)    Bed Mobility: Sit to Supine:     · Level of [place]	moderate assist (50% patients effort)    Bed Mobility: Supine to Sit:     · Level of [place]	moderate assist (50% patients effort)    Transfer: Sit to Stand:     · Level of [place]	moderate assist (50% patients effort)  · Physical Assist/Nonphysical Assist	2 person assist    Transfer: Stand to Sit:     · Level of [place]	moderate assist (50% patients effort)  · Physical Assist/Nonphysical Assist	2 person assist    Gait Skills:     · Level of [place]	5'x2 RW modAx2    Gait Analysis:     · Gait Pattern Used	unsteadiness throughout requiring assist, decreased gait velocity and activity tolerance, decreased activity tolerance, decreased right step length    Stair Negotiation:     · Level of [place]	unable to perform; safety     OT  Bathing Training:     · Level of [place]	maximum assist (25% patients effort); to sponge bathe  · Physical Assist/Nonphysical Assist	1 person assist; verbal cues; set-up required    Upper Body Dressing Training:     · Level of [place]	moderate assist (50% patients effort); seated to don gown  · Physical Assist/Nonphysical Assist	1 person assist; verbal cues; set-up required    Lower Body Dressing Training:     · Level of [place]	maximum assist (25% patients effort); seated to don socks  · Physical Assist/Nonphysical Assist	1 person assist; verbal cues; set-up required    Toilet Hygiene Training:     · Level of [place]	to assess; at time of eval +Primafit    Grooming Training:     · Level of [place]	minimum assist (75% patients effort)  · Physical Assist/Nonphysical Assist	1 person assist; verbal cues; set-up required    Eating/Self-Feeding Training:     · Level of [place]	Did not directly observe      SOCIAL HISTORY - as per documentation/history  Smoking - None  EtOH - None  Drugs - None    FAMILY HISTORY   TIA in father      RECENT LABS - Reviewed  CBC Full  -  ( 2021 07:42 )  WBC Count : 11.20 K/uL  RBC Count : 5.76 M/uL  Hemoglobin : 13.3 g/dL  Hematocrit : 42.9 %  Platelet Count - Automated : 303 K/uL  Mean Cell Volume : 74.5 fl  Mean Cell Hemoglobin : 23.1 pg  Mean Cell Hemoglobin Concentration : 31.0 gm/dL  Auto Neutrophil # : x  Auto Lymphocyte # : x  Auto Monocyte # : x  Auto Eosinophil # : x  Auto Basophil # : x  Auto Neutrophil % : x  Auto Lymphocyte % : x  Auto Monocyte % : x  Auto Eosinophil % : x  Auto Basophil % : x    11-    142  |  104  |  10.9  ----------------------------<  66<L>  4.2   |  28.0  |  0.53    Ca    9.4      2021 07:42    TPro  6.4<L>  /  Alb  3.8  /  TBili  0.2<L>  /  DBili  x   /  AST  19  /  ALT  16  /  AlkPhos  86  11-    Urinalysis Basic - ( 2021 00:11 )    Color: Yellow / Appearance: Turbid / S.015 / pH: x  Gluc: x / Ketone: Trace  / Bili: Negative / Urobili: 1   Blood: x / Protein: 500 mg/dL / Nitrite: Positive   Leuk Esterase: Moderate / RBC: 6-10 /HPF / WBC 3-5   Sq Epi: x / Non Sq Epi: Occasional / Bacteria: Moderate        ALLERGIES  No Known Allergies      MEDICATIONS   acetaminophen     Tablet .. 650 milliGRAM(s) Oral every 6 hours PRN  aluminum hydroxide/magnesium hydroxide/simethicone Suspension 30 milliLiter(s) Oral every 4 hours PRN  amLODIPine   Tablet 10 milliGRAM(s) Oral daily  aspirin enteric coated 81 milliGRAM(s) Oral daily  atorvastatin 80 milliGRAM(s) Oral at bedtime  dextrose 40% Gel 15 Gram(s) Oral once  dextrose 5%. 1000 milliLiter(s) IV Continuous <Continuous>  dextrose 5%. 1000 milliLiter(s) IV Continuous <Continuous>  dextrose 5%. 1000 milliLiter(s) IV Continuous <Continuous>  dextrose 5%. 1000 milliLiter(s) IV Continuous <Continuous>  dextrose 50% Injectable 25 Gram(s) IV Push once  dextrose 50% Injectable 12.5 Gram(s) IV Push once  dextrose 50% Injectable 25 Gram(s) IV Push once  donepezil 5 milliGRAM(s) Oral at bedtime  fenofibrate Tablet 145 milliGRAM(s) Oral daily  glucagon  Injectable 1 milliGRAM(s) IntraMuscular once  heparin   Injectable 5000 Unit(s) SubCutaneous every 8 hours  hydrALAZINE Injectable 10 milliGRAM(s) IV Push every 6 hours PRN  insulin lispro (ADMELOG) corrective regimen sliding scale   SubCutaneous every 6 hours  melatonin 3 milliGRAM(s) Oral at bedtime PRN  memantine 5 milliGRAM(s) Oral two times a day  metoprolol succinate ER 25 milliGRAM(s) Oral daily  ondansetron Injectable 4 milliGRAM(s) IV Push every 8 hours PRN  piperacillin/tazobactam IVPB.. 3.375 Gram(s) IV Intermittent every 8 hours  sertraline 100 milliGRAM(s) Oral daily      ----------------------------------------------------------------------------------------  PHYSICAL EXAM  Constitutional - NAD, Comfortable  HEENT - NCAT, EOMI  Neck - Supple, No limited ROM  Chest - Breathing comfortably, No wheezing  Cardiovascular - S1S2   Abdomen - Soft   Extremities - No C/C/E, No calf tenderness   Neurologic Exam -                    Cognitive - AAO to self, PART of situation, being in hospital      Communication - Fluent, No dysarthria     Cranial Nerves - CN 2-12 intact     Motor - No focal deficits, overall 4+/5     Sensory - Intact to LT     Reflexes - DTR Intact, No primitive reflexive  Psychiatric - Mood stable, Affect WNL  ----------------------------------------------------------------------------------------  ASSESSMENT/PLAN  76yFemale with functional deficits after developing AMS  R/O CVA - ASA, Tricor, Lipitor, MRI planned  HTN - Norvasc, Toprol, Hydralazine  UTI - Zosyn  Memory - Aricept, Zoloft  Sleep - Melatonin PRN  Oropharyngeal Dysphagia - Mild thick/Soft  Pain - Tylenol  DVT PPX - SCDs, Heparin  Rehab - Workup pending for R/O CVA as patient's functional status possibly be related to AMS from UTI/NPH/dementia combination.     Pending diagnosis, may need rehab if does not improve functionally with her medical acuity. Will continue to follow and current recommendations may change if functional progress changes.    Recommend ongoing mobilization by staff to maintain cardiopulmonary function and prevention of secondary complications related to debility. Discussed with rehab team.

## 2021-11-03 NOTE — PROGRESS NOTE ADULT - ASSESSMENT
IMP: Dementia NPH versus Vascular dementia or Mixed dementia.  - Left thalmo-capsular infarct. ( Age indeterminate)    PLan:/Rec:  - MRI Brain Stroke protocol reported as above shows an acute left thalamocapsular infarct.  - Would resume ASA 81 mg PO if OK with Neurosurgery ( after MRI Brain)  Lipitor 80 mg PO for LDL goal of < 80  SBP goal keep can be permissive for another 24 hours up to 180/100 an then normotensive.  DVT ppx - on SQ Heparin  PT OT evaluation.  Continue -Telemetry monitoring.

## 2021-11-03 NOTE — PROGRESS NOTE ADULT - SUBJECTIVE AND OBJECTIVE BOX
[facility] Division of Hospital Medicine    Chief Complaint:  CVA    SUBJECTIVE / OVERNIGHT EVENTS:  Adm with suspected CVA  In am per RN pt was chiking, however on evaluation pt was likely unable to tolerate taset of crushed PO meds  Swllow reeval appreciated, o difficulty   Patient denies chest pain, SOB, abd pain, N/V, fever, chills, dysuria or any other complaints. All remainder ROS negative.     MEDICATIONS  (STANDING):  aspirin enteric coated 81 milliGRAM(s) Oral daily  atorvastatin 80 milliGRAM(s) Oral at bedtime  dextrose 40% Gel 15 Gram(s) Oral once  dextrose 5%. 1000 milliLiter(s) (50 mL/Hr) IV Continuous <Continuous>  dextrose 5%. 1000 milliLiter(s) (100 mL/Hr) IV Continuous <Continuous>  dextrose 5%. 1000 milliLiter(s) (50 mL/Hr) IV Continuous <Continuous>  dextrose 5%. 1000 milliLiter(s) (100 mL/Hr) IV Continuous <Continuous>  dextrose 50% Injectable 25 Gram(s) IV Push once  dextrose 50% Injectable 12.5 Gram(s) IV Push once  dextrose 50% Injectable 25 Gram(s) IV Push once  donepezil 5 milliGRAM(s) Oral at bedtime  fenofibrate Tablet 145 milliGRAM(s) Oral daily  glucagon  Injectable 1 milliGRAM(s) IntraMuscular once  heparin   Injectable 5000 Unit(s) SubCutaneous every 8 hours  insulin lispro (ADMELOG) corrective regimen sliding scale   SubCutaneous every 6 hours  memantine 5 milliGRAM(s) Oral two times a day  sertraline 100 milliGRAM(s) Oral daily    MEDICATIONS  (PRN):  acetaminophen     Tablet .. 650 milliGRAM(s) Oral every 6 hours PRN Temp greater or equal to 38C (100.4F), Mild Pain (1 - 3)  aluminum hydroxide/magnesium hydroxide/simethicone Suspension 30 milliLiter(s) Oral every 4 hours PRN Dyspepsia  hydrALAZINE Injectable 10 milliGRAM(s) IV Push every 6 hours PRN SBP >180  melatonin 3 milliGRAM(s) Oral at bedtime PRN Insomnia  ondansetron Injectable 4 milliGRAM(s) IV Push every 8 hours PRN Nausea and/or Vomiting        I&O's Summary    02 Nov 2021 07:01  -  02 Nov 2021 18:37  --------------------------------------------------------  IN: 600 mL / OUT: 100 mL / NET: 500 mL        PHYSICAL EXAM:  Vital Signs Last 24 Hrs  T(C): 36.8 (02 Nov 2021 15:28), Max: 36.8 (02 Nov 2021 15:28)  T(F): 98.2 (02 Nov 2021 15:28), Max: 98.2 (02 Nov 2021 15:28)  HR: 68 (02 Nov 2021 15:28) (49 - 68)  BP: 191/65 (02 Nov 2021 15:28) (150/75 - 227/96)  BP(mean): --  RR: 18 (02 Nov 2021 15:28) (18 - 19)  SpO2: 95% (02 Nov 2021 15:28) (95% - 97%)        CONSTITUTIONAL: Obese nontoxic appearing female, NAD  ENMT: Moist oral mucosa, no pharyngeal injection or exudates;   RESPIRATORY: Normal respiratory effort; lungs are clear to auscultation bilaterally  CARDIOVASCULAR: Regular rate and rhythm, normal S1 and S2, no murmur/rub/gallop; No lower extremity edema; Peripheral pulses are 2+ bilaterally  ABDOMEN: Nontender to palpation, normoactive bowel sounds, no rebound/guarding; No hepatosplenomegaly  MUSCLOSKELETAL:  no clubbing or cyanosis of digits; no joint swelling or tenderness to palpation  PSYCH: A+O to person, place, and time; affect appropriate  NEUROLOGY: CN 2-12 are intact and symmetric; no gross sensory deficits, RUE/LUE 4/5, RLE/LLE 4/5, DTR intact and symmetric, Questionable aphasia (pt does not have aphasia but appears dysarthric which pt reports is her baseline)  SKIN: No rashes; no palpable lesions    LABS:                        13.7   13.28 )-----------( 295      ( 02 Nov 2021 07:03 )             44.4     11-02    142  |  103  |  14.7  ----------------------------<  102<H>  4.2   |  29.0  |  0.47<L>    Ca    9.1      02 Nov 2021 07:03    TPro  6.4<L>  /  Alb  3.8  /  TBili  0.2<L>  /  DBili  x   /  AST  19  /  ALT  16  /  AlkPhos  86  11-01    PT/INR - ( 01 Nov 2021 18:20 )   PT: 12.6 sec;   INR: 1.09 ratio         PTT - ( 01 Nov 2021 18:20 )  PTT:30.3 sec  CARDIAC MARKERS ( 01 Nov 2021 18:20 )  x     / <0.01 ng/mL / x     / x     / x              CAPILLARY BLOOD GLUCOSE      POCT Blood Glucose.: 76 mg/dL (02 Nov 2021 17:19)  POCT Blood Glucose.: 79 mg/dL (02 Nov 2021 12:13)  POCT Blood Glucose.: 141 mg/dL (02 Nov 2021 05:03)  POCT Blood Glucose.: 63 mg/dL (02 Nov 2021 04:09)  POCT Blood Glucose.: 73 mg/dL (02 Nov 2021 00:30)        RADIOLOGY & ADDITIONAL TESTS:  Results Reviewed:   Imaging Personally Reviewed:  Electrocardiogram Personally Reviewed:      TTE   Summary:   1. Technically adequate study.   2. Left ventricular ejection fraction, by visual estimation, is >75%.   3. Hyperdynamic global left ventricular systolic function.   4. Moderately increased LV wall thickness.   5. Spectral Doppler shows impaired relaxation pattern of left ventricular myocardial filling (Grade I diastolic dysfunction).   6. There is moderate concentric left ventricular hypertrophy.   7. Normal left atrial size.   8. Normal right atrial size.   9. Trace mitral valve regurgitation.  10. There is no evidence of pericardial effusion.  11. There are no prior studies on this patient for comparison purposes.

## 2021-11-03 NOTE — PROGRESS NOTE ADULT - ASSESSMENT
76F with a h/o CVA (no prior deficit), HTN, HLD, IDDM, Dementia, MDD admitted for Subacute CVA L Basal Ganglia, Mod-Severe Stenosis Prox R PCA, HTN Urgency, and Hypoglycemia.     AMS, Subacute CVA L Basal Ganglia Infarct, Mod-Severe Stenosis Proximal R PCA  Continue telemetry monitoring   CTH/CTA H/N +subacute L basal ganglia lacunar infarct + numerous chronic lacunar infarcts  ASA 81mg PO q24  Atorvastatin 80mg PO QHS  VTE PPX SQH  Swallow eval appreciated : bite sized with crushed meds  MRI briain pending   D/w neuro, will allow for permissive HTN for 24 hours followed by strict control     HTN Urgency  Hydralazine 10mg IV x1 and q6 PRN SBP >180  Restarted on  Metoprolol Succinate 25 mg lozano (reduced frpom 50 for bradycardia) y and Amlodipine 10 mg daily for now.     Leukocytosis  Unclear etiology. Trend CBC/WBC.   No abx given lack of clear infection. Possibly reactive.   Procal neg, unlikely infectious     CHF  (Diastolic I HFpEF) with sinius Bradycardia  TTE noted,   Avoid AV geovanny blockers untill HR recovers (possible overmedicated with Metroprolol prior to presenting )    Hypoglycemia, IDDM  Was on 70 units daily Tresiba (30 am, 40 pm) at home despite being within acceptable limits on 20 units daily of lantus on prior admission  f/u A1C  Continue D5 infusion.   Resume PO now that cleared by swallow  Aspiration precautions  Restart Lantus 20 uits QHS IF BS improve   Lispro s/s till then   Hold Metformin while inpt   Endo eval in am if remains hypoglycemic    HLD  Lipitor 80mg PO QHS  Fenofibrate 145mg PO QHS    Dementia  Donepezil 5mg PO QHS  Memantine 5mg PO BID    COVID19 Infection  Incidental on prior hospitalization. No hypoxia. COVID Pending.     MDD/Anxiety  Sertraline 100mg PO q24    Recent Falls  No obvious repeat fall. Fall Precautions. Workup last admission negative  NSx eval for possible NPH     DVT ppx : heparin s/c

## 2021-11-03 NOTE — PROGRESS NOTE ADULT - SUBJECTIVE AND OBJECTIVE BOX
HPI:  76F with PMHX CVA (no prior deficit), HTN, HLD, IDDM, Dementia brought in by family for evaluation of progressively worsening gait ataxia, urinary incontinence and memory difficulties over the past 10 months. Daughter at bedside reports that prior to Jan 2021 she used no assistive device to ambulate but now she has difficulty ambulating even with a walker. CT head performed in the ED today shows a subacute left thalamo-capsular infarct. She has been evaluated for NPH as an outpatient and is pending a LP.        PMHX: CVA (no prior deficit), HTN, HLD, IDDM, Dementia, MDD  PSHX: Dental Surgery  Social Hx: Denies etoh/tobacco/drug abuse  FamHx: Father +TIA +Dementia (01 Nov 2021 23:48)          SUBJECTIVE: No events overnight.  No new neurologic complaints.  ROS reported negative unless otherwise noted.    acetaminophen     Tablet .. 650 milliGRAM(s) Oral every 6 hours PRN  aluminum hydroxide/magnesium hydroxide/simethicone Suspension 30 milliLiter(s) Oral every 4 hours PRN  amLODIPine   Tablet 10 milliGRAM(s) Oral daily  aspirin enteric coated 81 milliGRAM(s) Oral daily  atorvastatin 80 milliGRAM(s) Oral at bedtime  dextrose 40% Gel 15 Gram(s) Oral once  dextrose 5%. 1000 milliLiter(s) IV Continuous <Continuous>  dextrose 5%. 1000 milliLiter(s) IV Continuous <Continuous>  dextrose 5%. 1000 milliLiter(s) IV Continuous <Continuous>  dextrose 50% Injectable 25 Gram(s) IV Push once  dextrose 50% Injectable 12.5 Gram(s) IV Push once  dextrose 50% Injectable 25 Gram(s) IV Push once  donepezil 5 milliGRAM(s) Oral at bedtime  fenofibrate Tablet 145 milliGRAM(s) Oral daily  glucagon  Injectable 1 milliGRAM(s) IntraMuscular once  heparin   Injectable 5000 Unit(s) SubCutaneous every 8 hours  hydrALAZINE Injectable 10 milliGRAM(s) IV Push every 6 hours PRN  insulin lispro (ADMELOG) corrective regimen sliding scale   SubCutaneous every 6 hours  melatonin 3 milliGRAM(s) Oral at bedtime PRN  memantine 5 milliGRAM(s) Oral two times a day  metoprolol succinate ER 25 milliGRAM(s) Oral daily  ondansetron Injectable 4 milliGRAM(s) IV Push every 8 hours PRN  piperacillin/tazobactam IVPB.. 3.375 Gram(s) IV Intermittent every 8 hours  sertraline 100 milliGRAM(s) Oral daily      PHYSICAL EXAM:   Vital Signs Last 24 Hrs  T(C): 36.4 (03 Nov 2021 11:51), Max: 36.9 (02 Nov 2021 23:14)  T(F): 97.5 (03 Nov 2021 11:51), Max: 98.4 (02 Nov 2021 23:14)  HR: 62 (03 Nov 2021 11:51) (60 - 89)  BP: 179/67 (03 Nov 2021 11:51) (160/66 - 191/65)  BP(mean): 107 (03 Nov 2021 02:33) (107 - 108)  RR: 18 (03 Nov 2021 11:51) (17 - 20)  SpO2: 96% (03 Nov 2021 11:51) (93% - 96%)    General: No acute distress  HEENT: EOM intact, visual fields full  Abdomen: Soft, nontender, nondistended   Extremities: No edema    NEUROLOGICAL EXAM:    Mental Status - Patient is alert, awake, oriented X2  Knows it is November but the year she said was 2001   Knows Judson is the president.. fluent, names correctly, no dysarthria no aphasia Follows commands well and able to answer questions appropriately. Mood and affect  normal    Cranial Nerves - PERRL, EOMI, VFF, V1-V3 intact, no gross facial asymmetry, tongue/uvula midline    Motor Exam -   Right upper 5-/5  Left upper 5/5  Right lower 5/5  Left lower 5/5     nml bulk/tone    Sensory    Intact to light touch , EDITH and pinprick bilaterally    Coord: FTN intact bilaterally     Gait - Not assessed                      NIH SS: 1  Date: 11-  Time: 15.10  1a) Level of consciousness (0-3):   1b) Questions (0-2):   1c) Commands (0-2):   2  ) Gaze (0-2):   3  ) Visual field (0-3):   4  ) Facial palsy (0-3):   Motor  5a) Left arm (0-4):   5b) Right arm (0-4): 1  6a) Left leg (0-4):   6b) Right leg (0-4):   7  ) Ataxia (0-2):   Sensory  8  ) Sensory (0-2):   Speech  9  ) Language (0-3):   10) Dysarthria (0-2):   Extinction  11) Extinction/inattention (0-2):     Total score: 1    LABS:                        13.3   11.20 )-----------( 303      ( 03 Nov 2021 07:42 )             42.9    11-03    142  |  104  |  10.9  ----------------------------<  66<L>  4.2   |  28.0  |  0.53    Ca    9.4      03 Nov 2021 07:42    TPro  6.4<L>  /  Alb  3.8  /  TBili  0.2<L>  /  DBili  x   /  AST  19  /  ALT  16  /  AlkPhos  86  11-01  PT/INR - ( 01 Nov 2021 18:20 )   PT: 12.6 sec;   INR: 1.09 ratio         PTT - ( 01 Nov 2021 18:20 )  PTT:30.3 sec      IMAGING: Reviewed by me.     CT head:  -Interval development of age-indeterminate left basal ganglia lacunar infarct. Consider MRI for further evaluation of acuity.  -Numerous chronic lacunar infarcts and extensive small vessel disease again seen.  -No acute intracranial hemorrhage.    CT angiogram head:  -Moderate to severe stenoses of the proximal right PCA.    CT angiogram neck:  -Atherosclerotic changes without hemodynamically significant stenosis.    _____________  TTE    Summary:   1. Technically adequate study.   2. Left ventricular ejection fraction, by visual estimation, is >75%.   3. Hyperdynamic global left ventricular systolic function.   4. Moderately increased LV wall thickness.   5. Spectral Doppler shows impaired relaxation pattern of left ventricular myocardial filling (Grade I diastolic dysfunction).   6. There is moderate concentric left ventricular hypertrophy.   7. Normal left atrial size.   8. Normal right atrial size.   9. Trace mitral valve regurgitation.  10. There is no evidence of pericardial effusion.  11. There are no prior studies on this patient for comparison purposes.    Daniela Ortega D.O. Electronically signed on 11/2/2021 at 10:27:26 AM     MRI Brain: 11-  IMPRESSION:  -Acute infarct involving the left posterior limb internal capsule/corona radiata. Additional smaller punctate acute infarcts involving the left anterior frontal corona radiata and right splenium.    -Numerous punctate chronic microhemorrhages, suggestive of underlying amyloid angiopathy.

## 2021-11-04 LAB
ANION GAP SERPL CALC-SCNC: 12 MMOL/L — SIGNIFICANT CHANGE UP (ref 5–17)
BUN SERPL-MCNC: 13.9 MG/DL — SIGNIFICANT CHANGE UP (ref 8–20)
CALCIUM SERPL-MCNC: 8.7 MG/DL — SIGNIFICANT CHANGE UP (ref 8.6–10.2)
CHLORIDE SERPL-SCNC: 102 MMOL/L — SIGNIFICANT CHANGE UP (ref 98–107)
CO2 SERPL-SCNC: 26 MMOL/L — SIGNIFICANT CHANGE UP (ref 22–29)
CREAT SERPL-MCNC: 0.61 MG/DL — SIGNIFICANT CHANGE UP (ref 0.5–1.3)
GLUCOSE BLDC GLUCOMTR-MCNC: 137 MG/DL — HIGH (ref 70–99)
GLUCOSE BLDC GLUCOMTR-MCNC: 141 MG/DL — HIGH (ref 70–99)
GLUCOSE BLDC GLUCOMTR-MCNC: 160 MG/DL — HIGH (ref 70–99)
GLUCOSE BLDC GLUCOMTR-MCNC: 209 MG/DL — HIGH (ref 70–99)
GLUCOSE SERPL-MCNC: 158 MG/DL — HIGH (ref 70–99)
HCT VFR BLD CALC: 38.8 % — SIGNIFICANT CHANGE UP (ref 34.5–45)
HGB BLD-MCNC: 12.1 G/DL — SIGNIFICANT CHANGE UP (ref 11.5–15.5)
MCHC RBC-ENTMCNC: 23.4 PG — LOW (ref 27–34)
MCHC RBC-ENTMCNC: 31.2 GM/DL — LOW (ref 32–36)
MCV RBC AUTO: 75 FL — LOW (ref 80–100)
PLATELET # BLD AUTO: 255 K/UL — SIGNIFICANT CHANGE UP (ref 150–400)
POTASSIUM SERPL-MCNC: 3.7 MMOL/L — SIGNIFICANT CHANGE UP (ref 3.5–5.3)
POTASSIUM SERPL-SCNC: 3.7 MMOL/L — SIGNIFICANT CHANGE UP (ref 3.5–5.3)
RBC # BLD: 5.17 M/UL — SIGNIFICANT CHANGE UP (ref 3.8–5.2)
RBC # FLD: 18.4 % — HIGH (ref 10.3–14.5)
SODIUM SERPL-SCNC: 140 MMOL/L — SIGNIFICANT CHANGE UP (ref 135–145)
WBC # BLD: 9.86 K/UL — SIGNIFICANT CHANGE UP (ref 3.8–10.5)
WBC # FLD AUTO: 9.86 K/UL — SIGNIFICANT CHANGE UP (ref 3.8–10.5)

## 2021-11-04 PROCEDURE — 99233 SBSQ HOSP IP/OBS HIGH 50: CPT

## 2021-11-04 PROCEDURE — 99232 SBSQ HOSP IP/OBS MODERATE 35: CPT

## 2021-11-04 RX ORDER — SODIUM CHLORIDE 9 MG/ML
1000 INJECTION, SOLUTION INTRAVENOUS
Refills: 0 | Status: DISCONTINUED | OUTPATIENT
Start: 2021-11-04 | End: 2021-11-07

## 2021-11-04 RX ORDER — SODIUM CHLORIDE 9 MG/ML
1000 INJECTION INTRAMUSCULAR; INTRAVENOUS; SUBCUTANEOUS
Refills: 0 | Status: DISCONTINUED | OUTPATIENT
Start: 2021-11-04 | End: 2021-11-04

## 2021-11-04 RX ADMIN — MEMANTINE HYDROCHLORIDE 5 MILLIGRAM(S): 10 TABLET ORAL at 17:41

## 2021-11-04 RX ADMIN — AMLODIPINE BESYLATE 10 MILLIGRAM(S): 2.5 TABLET ORAL at 05:01

## 2021-11-04 RX ADMIN — DONEPEZIL HYDROCHLORIDE 5 MILLIGRAM(S): 10 TABLET, FILM COATED ORAL at 21:22

## 2021-11-04 RX ADMIN — PIPERACILLIN AND TAZOBACTAM 25 GRAM(S): 4; .5 INJECTION, POWDER, LYOPHILIZED, FOR SOLUTION INTRAVENOUS at 17:41

## 2021-11-04 RX ADMIN — Medication 1: at 06:18

## 2021-11-04 RX ADMIN — HEPARIN SODIUM 5000 UNIT(S): 5000 INJECTION INTRAVENOUS; SUBCUTANEOUS at 17:41

## 2021-11-04 RX ADMIN — SODIUM CHLORIDE 75 MILLILITER(S): 9 INJECTION INTRAMUSCULAR; INTRAVENOUS; SUBCUTANEOUS at 00:54

## 2021-11-04 RX ADMIN — DONEPEZIL HYDROCHLORIDE 5 MILLIGRAM(S): 10 TABLET, FILM COATED ORAL at 00:57

## 2021-11-04 RX ADMIN — PIPERACILLIN AND TAZOBACTAM 25 GRAM(S): 4; .5 INJECTION, POWDER, LYOPHILIZED, FOR SOLUTION INTRAVENOUS at 09:40

## 2021-11-04 RX ADMIN — SODIUM CHLORIDE 75 MILLILITER(S): 9 INJECTION INTRAMUSCULAR; INTRAVENOUS; SUBCUTANEOUS at 21:22

## 2021-11-04 RX ADMIN — ATORVASTATIN CALCIUM 80 MILLIGRAM(S): 80 TABLET, FILM COATED ORAL at 21:22

## 2021-11-04 RX ADMIN — ATORVASTATIN CALCIUM 80 MILLIGRAM(S): 80 TABLET, FILM COATED ORAL at 00:19

## 2021-11-04 RX ADMIN — Medication 81 MILLIGRAM(S): at 12:06

## 2021-11-04 RX ADMIN — Medication 10 MILLIGRAM(S): at 21:21

## 2021-11-04 RX ADMIN — MEMANTINE HYDROCHLORIDE 5 MILLIGRAM(S): 10 TABLET ORAL at 05:01

## 2021-11-04 RX ADMIN — HEPARIN SODIUM 5000 UNIT(S): 5000 INJECTION INTRAVENOUS; SUBCUTANEOUS at 04:17

## 2021-11-04 RX ADMIN — HEPARIN SODIUM 5000 UNIT(S): 5000 INJECTION INTRAVENOUS; SUBCUTANEOUS at 09:41

## 2021-11-04 RX ADMIN — Medication 145 MILLIGRAM(S): at 12:06

## 2021-11-04 RX ADMIN — SERTRALINE 100 MILLIGRAM(S): 25 TABLET, FILM COATED ORAL at 12:06

## 2021-11-04 RX ADMIN — Medication 25 MILLIGRAM(S): at 05:01

## 2021-11-04 RX ADMIN — Medication 2: at 17:55

## 2021-11-04 RX ADMIN — PIPERACILLIN AND TAZOBACTAM 25 GRAM(S): 4; .5 INJECTION, POWDER, LYOPHILIZED, FOR SOLUTION INTRAVENOUS at 04:16

## 2021-11-04 RX ADMIN — Medication 10 MILLIGRAM(S): at 12:27

## 2021-11-04 NOTE — PROGRESS NOTE ADULT - SUBJECTIVE AND OBJECTIVE BOX
Patient found to have left sided CVAs on MRI.  Patient feels well.  Noted memory loss from Dr. Kennedy to my visit soon after.   Reports no pain.     REVIEW OF SYSTEMS  Constitutional - No fever,  +fatigue  Neurological - No headaches, +memory loss, No loss of strength, No numbness, No tremors  Musculoskeletal - No joint pain, No joint swelling, No muscle pain  Psychiatric - No depression, No anxiety    FUNCTIONAL PROGRESS  11/3 SLP  Summary: functional for soft/bite sized. Continue to suspect pharyngeal dysphagia with thin liquids with wet vocal quality and delayed throat clear post swallows. Pt left as received, NAD, pain 0/10, RN Mindy aware. Will follow to reasess as schedule permits.      PT  Bed Mobility: Rolling/Turning:     · Level of [place]	minimum assist (75% patients effort)    Bed Mobility: Sit to Supine:     · Level of [place]	moderate assist (50% patients effort)    Bed Mobility: Supine to Sit:     · Level of [place]	moderate assist (50% patients effort)    Transfer: Sit to Stand:     · Level of [place]	moderate assist (50% patients effort)  · Physical Assist/Nonphysical Assist	2 person assist    Transfer: Stand to Sit:     · Level of [place]	moderate assist (50% patients effort)  · Physical Assist/Nonphysical Assist	2 person assist    Gait Skills:     · Level of [place]	5'x2 RW modAx2    Gait Analysis:     · Gait Pattern Used	unsteadiness throughout requiring assist, decreased gait velocity and activity tolerance, decreased activity tolerance, decreased right step length    Stair Negotiation:     · Level of [place]	unable to perform; safety     OT  Bathing Training:     · Level of [place]	maximum assist (25% patients effort); to sponge bathe  · Physical Assist/Nonphysical Assist	1 person assist; verbal cues; set-up required    Upper Body Dressing Training:     · Level of [place]	moderate assist (50% patients effort); seated to don gown  · Physical Assist/Nonphysical Assist	1 person assist; verbal cues; set-up required    Lower Body Dressing Training:     · Level of [place]	maximum assist (25% patients effort); seated to don socks  · Physical Assist/Nonphysical Assist	1 person assist; verbal cues; set-up required    Toilet Hygiene Training:     · Level of [place]	to assess; at time of eval +Primafit    Grooming Training:     · Level of [place]	minimum assist (75% patients effort)  · Physical Assist/Nonphysical Assist	1 person assist; verbal cues; set-up required    Eating/Self-Feeding Training:     · Level of [place]	Did not directly observe    VITALS  T(C): 36.7 (21 @ 04:47), Max: 37.1 (21 @ 15:29)  HR: 67 (21 @ 08:10) (59 - 73)  BP: 185/64 (21 @ 08:10) (118/78 - 185/64)  RR: 18 (21 @ 08:10) (18 - 20)  SpO2: 96% (21 @ 08:10) (95% - 98%)  Wt(kg): --    MEDICATIONS   acetaminophen     Tablet .. 650 milliGRAM(s) every 6 hours PRN  aluminum hydroxide/magnesium hydroxide/simethicone Suspension 30 milliLiter(s) every 4 hours PRN  amLODIPine   Tablet 10 milliGRAM(s) daily  aspirin enteric coated 81 milliGRAM(s) daily  atorvastatin 80 milliGRAM(s) at bedtime  dextrose 40% Gel 15 Gram(s) once  dextrose 5%. 1000 milliLiter(s) <Continuous>  dextrose 5%. 1000 milliLiter(s) <Continuous>  dextrose 50% Injectable 25 Gram(s) once  dextrose 50% Injectable 12.5 Gram(s) once  dextrose 50% Injectable 25 Gram(s) once  donepezil 5 milliGRAM(s) at bedtime  fenofibrate Tablet 145 milliGRAM(s) daily  glucagon  Injectable 1 milliGRAM(s) once  heparin   Injectable 5000 Unit(s) every 8 hours  hydrALAZINE Injectable 10 milliGRAM(s) every 6 hours PRN  insulin lispro (ADMELOG) corrective regimen sliding scale   every 6 hours  melatonin 3 milliGRAM(s) at bedtime PRN  memantine 5 milliGRAM(s) two times a day  metoprolol succinate ER 25 milliGRAM(s) daily  ondansetron Injectable 4 milliGRAM(s) every 8 hours PRN  piperacillin/tazobactam IVPB.. 3.375 Gram(s) every 8 hours  sertraline 100 milliGRAM(s) daily  sodium chloride 0.9%. 1000 milliLiter(s) <Continuous>      RECENT LABS/IMAGING                          12.1   9.86  )-----------( 255      ( 2021 03:35 )             38.8     11-    140  |  102  |  13.9  ----------------------------<  158<H>  3.7   |  26.0  |  0.61    Ca    8.7      2021 03:35        Urinalysis Basic - ( 2021 00:11 )    Color: Yellow / Appearance: Turbid / S.015 / pH: x  Gluc: x / Ketone: Trace  / Bili: Negative / Urobili: 1   Blood: x / Protein: 500 mg/dL / Nitrite: Positive   Leuk Esterase: Moderate / RBC: 6-10 /HPF / WBC 3-5   Sq Epi: x / Non Sq Epi: Occasional / Bacteria: Moderate              CT head  - -Interval development of age-indeterminate left basal ganglia lacunar infarct. Consider MRI for further evaluation of acuity. -Numerous chronic lacunar infarcts and extensive small vessel disease again seen. -No acute intracranial hemorrhage.    CT angiogram head  - -Moderate to severe stenoses of the proximal right PCA.    CT angiogram neck  - -Atherosclerotic changes without hemodynamically significant stenosis.    MRI HEAD 11/3 - -Acute infarct involving the left posterior limb internal capsule/corona radiata. Additional smaller punctate acute infarcts involving the left anterior frontal corona radiata and right splenium. -Numerous punctate chronic microhemorrhages, suggestive of underlying amyloid angiopathy.  ----------------------------------------------------------------------------------------  PHYSICAL EXAM  Constitutional - NAD, Comfortable  Extremities - No C/C/E, No calf tenderness   Neurologic Exam -                    Cognitive - AAO to self, PART of situation, being in hospital      Communication - Fluent, No dysarthria     Cranial Nerves - CN 2-12 intact     Motor - No focal/latertalization of deficits, overall 4+/5     Sensory - Intact to LT     Reflexes - DTR Intact, No primitive reflexive  Psychiatric - Mood stable, Affect WNL  ----------------------------------------------------------------------------------------  ASSESSMENT/PLAN  76yFemale with functional deficits after developing AMS  R/O CVA - ASA, Tricor, Lipitor   HTN - Norvasc, Toprol, Hydralazine  UTI - Zosyn  Memory - Aricept, Zoloft  Sleep - Melatonin PRN  Oropharyngeal Dysphagia - Mild thick/Soft  Pain - Tylenol  DVT PPX - SCDs, Heparin  Rehab - Patient has CVA with significant limited movement, despite motor exam. Will likely need rehab, pending progress and level of assist at home needed.     Recommend ongoing mobilization by staff to maintain cardiopulmonary function and prevention of secondary complications related to debility. Discussed with rehab team.

## 2021-11-04 NOTE — PROGRESS NOTE ADULT - ASSESSMENT
76F with a h/o CVA (no prior deficit), HTN, HLD, IDDM, Dementia, MDD admitted for Subacute CVA L Basal Ganglia, Mod-Severe Stenosis Prox R PCA, HTN Urgency, and Hypoglycemia.     AMS, Subacute CVA L Basal Ganglia Infarct, Mod-Severe Stenosis Proximal R PCA  Continue telemetry monitoring   CTH/CTA H/N +subacute L basal ganglia lacunar infarct + numerous chronic lacunar infarcts  MRI with CVA  ASA 81mg PO q24  Atorvastatin 80mg PO QHS  VTE PPX SQH  Swallow eval appreciated : bite sized with crushed meds  MRI brain noted       HTN Urgency  Hydralazine 10mg IV x1 and q6 PRN SBP >180  Restarted on  Metoprolol Succinate 25 mg lozano (reduced frpom 50 for bradycardia) y and Amlodipine 10 mg daily for now.   Can continue permissive for additional 24 hrs (per neuro recs)     Leukocytosis  2/2 UTI (poa)  Ucx with GNR > in process   Continue PipTazo for now       CHF  (Diastolic I HFpEF) with sinus Bradycardia  TTE noted,   Bradycardia resolved     Hypoglycemia, IDDM  Was on 70 units daily Tresiba (30 am, 40 pm) at home despite being within acceptable limits on 20 units daily of lantus on prior admission  f/u A1C  Resolved  Restart Lantus 20 units QHS  Aspiration precautions  Lispro s/s till then   Hold Metformin while inpt     HLD  Lipitor 80mg PO QHS  Fenofibrate 145mg PO QHS    Dementia  Donepezil 5mg PO QHS  Memantine 5mg PO BID    COVID19 Infection  Incidental on prior hospitalization. No hypoxia. COVID Pending.     MDD/Anxiety  Sertraline 100mg PO q24    Recent Falls  No obvious repeat fall. Fall Precautions. Workup last admission negative  NSx eval for possible NPH     DVT ppx : heparin s/c

## 2021-11-04 NOTE — PROGRESS NOTE ADULT - SUBJECTIVE AND OBJECTIVE BOX
[facility] Division of Hospital Medicine    Chief Complaint:  CVA    SUBJECTIVE / OVERNIGHT EVENTS:  Adm with suspected CVA  MRI with acute CVA  Ucx with GNR  Patient denies chest pain, SOB, abd pain, N/V, fever, chills, dysuria or any other complaints. All remainder ROS negative.     MEDICATIONS  (STANDING):  aspirin enteric coated 81 milliGRAM(s) Oral daily  atorvastatin 80 milliGRAM(s) Oral at bedtime  dextrose 40% Gel 15 Gram(s) Oral once  dextrose 5%. 1000 milliLiter(s) (50 mL/Hr) IV Continuous <Continuous>  dextrose 5%. 1000 milliLiter(s) (100 mL/Hr) IV Continuous <Continuous>  dextrose 5%. 1000 milliLiter(s) (50 mL/Hr) IV Continuous <Continuous>  dextrose 5%. 1000 milliLiter(s) (100 mL/Hr) IV Continuous <Continuous>  dextrose 50% Injectable 25 Gram(s) IV Push once  dextrose 50% Injectable 12.5 Gram(s) IV Push once  dextrose 50% Injectable 25 Gram(s) IV Push once  donepezil 5 milliGRAM(s) Oral at bedtime  fenofibrate Tablet 145 milliGRAM(s) Oral daily  glucagon  Injectable 1 milliGRAM(s) IntraMuscular once  heparin   Injectable 5000 Unit(s) SubCutaneous every 8 hours  insulin lispro (ADMELOG) corrective regimen sliding scale   SubCutaneous every 6 hours  memantine 5 milliGRAM(s) Oral two times a day  sertraline 100 milliGRAM(s) Oral daily    MEDICATIONS  (PRN):  acetaminophen     Tablet .. 650 milliGRAM(s) Oral every 6 hours PRN Temp greater or equal to 38C (100.4F), Mild Pain (1 - 3)  aluminum hydroxide/magnesium hydroxide/simethicone Suspension 30 milliLiter(s) Oral every 4 hours PRN Dyspepsia  hydrALAZINE Injectable 10 milliGRAM(s) IV Push every 6 hours PRN SBP >180  melatonin 3 milliGRAM(s) Oral at bedtime PRN Insomnia  ondansetron Injectable 4 milliGRAM(s) IV Push every 8 hours PRN Nausea and/or Vomiting              PHYSICAL EXAM:  Vital Signs Last 24 Hrs  T(C): 36.1 (04 Nov 2021 15:44), Max: 36.7 (03 Nov 2021 21:34)  T(F): 97 (04 Nov 2021 15:44), Max: 98.1 (03 Nov 2021 21:34)  HR: 70 (04 Nov 2021 15:44) (59 - 73)  BP: 154/66 (04 Nov 2021 15:44) (154/66 - 185/64)  BP(mean): --  RR: 18 (04 Nov 2021 15:44) (18 - 20)  SpO2: 95% (04 Nov 2021 15:44) (95% - 96%)      CONSTITUTIONAL: Obese nontoxic appearing female, NAD  ENMT: Moist oral mucosa, no pharyngeal injection or exudates;   RESPIRATORY: Normal respiratory effort; lungs are clear to auscultation bilaterally  CARDIOVASCULAR: Regular rate and rhythm, normal S1 and S2, no murmur/rub/gallop; No lower extremity edema; Peripheral pulses are 2+ bilaterally  ABDOMEN: Nontender to palpation, normoactive bowel sounds, no rebound/guarding; No hepatosplenomegaly  MUSCLOSKELETAL:  no clubbing or cyanosis of digits; no joint swelling or tenderness to palpation  PSYCH: A+O to person, place, and time; affect appropriate  NEUROLOGY: CN 2-12 are intact and symmetric; no gross sensory deficits, RUE/LUE 4/5, RLE/LLE 4/5, DTR intact and symmetric, Questionable aphasia (pt does not have aphasia but appears dysarthric which pt reports is her baseline)  SKIN: No rashes; no palpable lesions    LABS:                        13.7   13.28 )-----------( 295      ( 02 Nov 2021 07:03 )             44.4     11-02    142  |  103  |  14.7  ----------------------------<  102<H>  4.2   |  29.0  |  0.47<L>    Ca    9.1      02 Nov 2021 07:03    TPro  6.4<L>  /  Alb  3.8  /  TBili  0.2<L>  /  DBili  x   /  AST  19  /  ALT  16  /  AlkPhos  86  11-01    PT/INR - ( 01 Nov 2021 18:20 )   PT: 12.6 sec;   INR: 1.09 ratio         PTT - ( 01 Nov 2021 18:20 )  PTT:30.3 sec  CARDIAC MARKERS ( 01 Nov 2021 18:20 )  x     / <0.01 ng/mL / x     / x     / x              CAPILLARY BLOOD GLUCOSE      POCT Blood Glucose.: 76 mg/dL (02 Nov 2021 17:19)  POCT Blood Glucose.: 79 mg/dL (02 Nov 2021 12:13)  POCT Blood Glucose.: 141 mg/dL (02 Nov 2021 05:03)  POCT Blood Glucose.: 63 mg/dL (02 Nov 2021 04:09)  POCT Blood Glucose.: 73 mg/dL (02 Nov 2021 00:30)        RADIOLOGY & ADDITIONAL TESTS:  Results Reviewed:   Imaging Personally Reviewed:  Electrocardiogram Personally Reviewed:      TTE   Summary:   1. Technically adequate study.   2. Left ventricular ejection fraction, by visual estimation, is >75%.   3. Hyperdynamic global left ventricular systolic function.   4. Moderately increased LV wall thickness.   5. Spectral Doppler shows impaired relaxation pattern of left ventricular myocardial filling (Grade I diastolic dysfunction).   6. There is moderate concentric left ventricular hypertrophy.   7. Normal left atrial size.   8. Normal right atrial size.   9. Trace mitral valve regurgitation.  10. There is no evidence of pericardial effusion.  11. There are no prior studies on this patient for comparison purposes.

## 2021-11-04 NOTE — PROGRESS NOTE ADULT - SUBJECTIVE AND OBJECTIVE BOX
HPI:  76F with PMHX CVA (no prior deficit), HTN, HLD, IDDM, Dementia brought in by family for evaluation of progressively worsening gait ataxia, urinary incontinence and memory difficulties over the past 10 months. Daughter at bedside reports that prior to Jan 2021 she used no assistive device to ambulate but now she has difficulty ambulating even with a walker. CT head performed in the ED today shows a subacute left thalamo-capsular infarct. She has been evaluated for NPH as an outpatient and is pending a LP.    11-  No new symptoms or complaints.    PMHX: CVA (no prior deficit), HTN, HLD, IDDM, Dementia, MDD  PSHX: Dental Surgery  Social Hx: Denies etoh/tobacco/drug abuse  FamHx: Father +TIA +Dementia (01 Nov 2021 23:48)          SUBJECTIVE: No events overnight.  No new neurologic complaints.  ROS reported negative unless otherwise noted.    acetaminophen     Tablet .. 650 milliGRAM(s) Oral every 6 hours PRN  aluminum hydroxide/magnesium hydroxide/simethicone Suspension 30 milliLiter(s) Oral every 4 hours PRN  amLODIPine   Tablet 10 milliGRAM(s) Oral daily  aspirin enteric coated 81 milliGRAM(s) Oral daily  atorvastatin 80 milliGRAM(s) Oral at bedtime  dextrose 40% Gel 15 Gram(s) Oral once  dextrose 5%. 1000 milliLiter(s) IV Continuous <Continuous>  dextrose 5%. 1000 milliLiter(s) IV Continuous <Continuous>  dextrose 5%. 1000 milliLiter(s) IV Continuous <Continuous>  dextrose 50% Injectable 25 Gram(s) IV Push once  dextrose 50% Injectable 12.5 Gram(s) IV Push once  dextrose 50% Injectable 25 Gram(s) IV Push once  donepezil 5 milliGRAM(s) Oral at bedtime  fenofibrate Tablet 145 milliGRAM(s) Oral daily  glucagon  Injectable 1 milliGRAM(s) IntraMuscular once  heparin   Injectable 5000 Unit(s) SubCutaneous every 8 hours  hydrALAZINE Injectable 10 milliGRAM(s) IV Push every 6 hours PRN  insulin lispro (ADMELOG) corrective regimen sliding scale   SubCutaneous every 6 hours  melatonin 3 milliGRAM(s) Oral at bedtime PRN  memantine 5 milliGRAM(s) Oral two times a day  metoprolol succinate ER 25 milliGRAM(s) Oral daily  ondansetron Injectable 4 milliGRAM(s) IV Push every 8 hours PRN  piperacillin/tazobactam IVPB.. 3.375 Gram(s) IV Intermittent every 8 hours  sertraline 100 milliGRAM(s) Oral daily      PHYSICAL EXAM:   Vital Signs Last 24 Hrs  T(C): 36.4 (03 Nov 2021 11:51), Max: 36.9 (02 Nov 2021 23:14)  T(F): 97.5 (03 Nov 2021 11:51), Max: 98.4 (02 Nov 2021 23:14)  HR: 62 (03 Nov 2021 11:51) (60 - 89)  BP: 179/67 (03 Nov 2021 11:51) (160/66 - 191/65)  BP(mean): 107 (03 Nov 2021 02:33) (107 - 108)  RR: 18 (03 Nov 2021 11:51) (17 - 20)  SpO2: 96% (03 Nov 2021 11:51) (93% - 96%)    General: No acute distress  HEENT: EOM intact, visual fields full  Abdomen: Soft, nontender, nondistended   Extremities: No edema    NEUROLOGICAL EXAM:    Mental Status - Patient is alert, awake, oriented X2  Knows it is November but the year she said was 2001   Knows Judson is the president.. fluent, names correctly, no dysarthria no aphasia Follows commands well and able to answer questions appropriately. Mood and affect  normal    Cranial Nerves - PERRL, EOMI, VFF, V1-V3 intact, no gross facial asymmetry, tongue/uvula midline    Motor Exam -   Right upper 5-/5  Left upper 5/5  Right lower 5/5  Left lower 5/5     nml bulk/tone    Sensory    Intact to light touch , EDITH and pinprick bilaterally    Coord: FTN intact bilaterally     Gait - Not assessed                      NIH SS: 1  Date: 11-  Time: 15.10  1a) Level of consciousness (0-3):   1b) Questions (0-2):   1c) Commands (0-2):   2  ) Gaze (0-2):   3  ) Visual field (0-3):   4  ) Facial palsy (0-3):   Motor  5a) Left arm (0-4):   5b) Right arm (0-4): 1  6a) Left leg (0-4):   6b) Right leg (0-4):   7  ) Ataxia (0-2):   Sensory  8  ) Sensory (0-2):   Speech  9  ) Language (0-3):   10) Dysarthria (0-2):   Extinction  11) Extinction/inattention (0-2):     Total score: 1    LABS:                        13.3   11.20 )-----------( 303      ( 03 Nov 2021 07:42 )             42.9    11-03    142  |  104  |  10.9  ----------------------------<  66<L>  4.2   |  28.0  |  0.53    Ca    9.4      03 Nov 2021 07:42    TPro  6.4<L>  /  Alb  3.8  /  TBili  0.2<L>  /  DBili  x   /  AST  19  /  ALT  16  /  AlkPhos  86  11-01  PT/INR - ( 01 Nov 2021 18:20 )   PT: 12.6 sec;   INR: 1.09 ratio         PTT - ( 01 Nov 2021 18:20 )  PTT:30.3 sec      IMAGING: Reviewed by me.     CT head:  -Interval development of age-indeterminate left basal ganglia lacunar infarct. Consider MRI for further evaluation of acuity.  -Numerous chronic lacunar infarcts and extensive small vessel disease again seen.  -No acute intracranial hemorrhage.    CT angiogram head:  -Moderate to severe stenoses of the proximal right PCA.    CT angiogram neck:  -Atherosclerotic changes without hemodynamically significant stenosis.    _____________  TTE    Summary:   1. Technically adequate study.   2. Left ventricular ejection fraction, by visual estimation, is >75%.   3. Hyperdynamic global left ventricular systolic function.   4. Moderately increased LV wall thickness.   5. Spectral Doppler shows impaired relaxation pattern of left ventricular myocardial filling (Grade I diastolic dysfunction).   6. There is moderate concentric left ventricular hypertrophy.   7. Normal left atrial size.   8. Normal right atrial size.   9. Trace mitral valve regurgitation.  10. There is no evidence of pericardial effusion.  11. There are no prior studies on this patient for comparison purposes.    Daniela Ortega D.O. Electronically signed on 11/2/2021 at 10:27:26 AM     MRI Brain: 11-  IMPRESSION:  -Acute infarct involving the left posterior limb internal capsule/corona radiata. Additional smaller punctate acute infarcts involving the left anterior frontal corona radiata and right splenium.    -Numerous punctate chronic microhemorrhages, suggestive of underlying amyloid angiopathy.               HPI:  76F with PMHX CVA (no prior deficit), HTN, HLD, IDDM, Dementia brought in by family for evaluation of progressively worsening gait ataxia, urinary incontinence and memory difficulties over the past 10 months. Daughter at bedside reports that prior to Jan 2021 she used no assistive device to ambulate but now she has difficulty ambulating even with a walker. CT head performed in the ED today shows a subacute left thalamo-capsular infarct. She has been evaluated for NPH as an outpatient and is pending a LP.    11-  No new symptoms or complaints.    PMHX: CVA (no prior deficit), HTN, HLD, IDDM, Dementia, MDD  PSHX: Dental Surgery  Social Hx: Denies etoh/tobacco/drug abuse  FamHx: Father +TIA +Dementia (01 Nov 2021 23:48)          SUBJECTIVE: No events overnight.  No new neurologic complaints.  ROS reported negative unless otherwise noted.    acetaminophen     Tablet .. 650 milliGRAM(s) Oral every 6 hours PRN  aluminum hydroxide/magnesium hydroxide/simethicone Suspension 30 milliLiter(s) Oral every 4 hours PRN  amLODIPine   Tablet 10 milliGRAM(s) Oral daily  aspirin enteric coated 81 milliGRAM(s) Oral daily  atorvastatin 80 milliGRAM(s) Oral at bedtime  dextrose 40% Gel 15 Gram(s) Oral once  dextrose 5%. 1000 milliLiter(s) IV Continuous <Continuous>  dextrose 5%. 1000 milliLiter(s) IV Continuous <Continuous>  dextrose 5%. 1000 milliLiter(s) IV Continuous <Continuous>  dextrose 50% Injectable 25 Gram(s) IV Push once  dextrose 50% Injectable 12.5 Gram(s) IV Push once  dextrose 50% Injectable 25 Gram(s) IV Push once  donepezil 5 milliGRAM(s) Oral at bedtime  fenofibrate Tablet 145 milliGRAM(s) Oral daily  glucagon  Injectable 1 milliGRAM(s) IntraMuscular once  heparin   Injectable 5000 Unit(s) SubCutaneous every 8 hours  hydrALAZINE Injectable 10 milliGRAM(s) IV Push every 6 hours PRN  insulin lispro (ADMELOG) corrective regimen sliding scale   SubCutaneous every 6 hours  melatonin 3 milliGRAM(s) Oral at bedtime PRN  memantine 5 milliGRAM(s) Oral two times a day  metoprolol succinate ER 25 milliGRAM(s) Oral daily  ondansetron Injectable 4 milliGRAM(s) IV Push every 8 hours PRN  piperacillin/tazobactam IVPB.. 3.375 Gram(s) IV Intermittent every 8 hours  sertraline 100 milliGRAM(s) Oral daily      PHYSICAL EXAM:   Vital Signs Last 24 Hrs  T(C): 36.4 (03 Nov 2021 11:51), Max: 36.9 (02 Nov 2021 23:14)  T(F): 97.5 (03 Nov 2021 11:51), Max: 98.4 (02 Nov 2021 23:14)  HR: 62 (03 Nov 2021 11:51) (60 - 89)  BP: 179/67 (03 Nov 2021 11:51) (160/66 - 191/65)  BP(mean): 107 (03 Nov 2021 02:33) (107 - 108)  RR: 18 (03 Nov 2021 11:51) (17 - 20)  SpO2: 96% (03 Nov 2021 11:51) (93% - 96%)    General: No acute distress  HEENT: EOM intact, visual fields full  Abdomen: Soft, nontender, nondistended   Extremities: No edema    NEUROLOGICAL EXAM:    Mental Status - Patient is alert, awake, oriented X2  Knows it is November but the year she said was 2001   Knows Judson is the president.. fluent, names correctly, no dysarthria no aphasia Follows commands well and able to answer questions appropriately. Mood and affect  normal    Cranial Nerves - PERRL, EOMI, VFF, V1-V3 intact, no gross facial asymmetry, tongue/uvula midline    Motor Exam -   Right upper 5-/5  Left upper 5/5  Right lower 5/5  Left lower 5/5     nml bulk/tone    Sensory    Intact to light touch , EDITH and pinprick bilaterally    Coord: FTN intact bilaterally     Gait - Not assessed                      NIH SS: 1  Date: 11-  Time: 15.10  1a) Level of consciousness (0-3):   1b) Questions (0-2):   1c) Commands (0-2):   2  ) Gaze (0-2):   3  ) Visual field (0-3):   4  ) Facial palsy (0-3):   Motor  5a) Left arm (0-4):   5b) Right arm (0-4): 1  6a) Left leg (0-4):   6b) Right leg (0-4):   7  ) Ataxia (0-2):   Sensory  8  ) Sensory (0-2):   Speech  9  ) Language (0-3):   10) Dysarthria (0-2):   Extinction  11) Extinction/inattention (0-2):     Total score: 1    LABS:                        13.3   11.20 )-----------( 303      ( 03 Nov 2021 07:42 )             42.9    11-03    142  |  104  |  10.9  ----------------------------<  66<L>  4.2   |  28.0  |  0.53    Ca    9.4      03 Nov 2021 07:42    TPro  6.4<L>  /  Alb  3.8  /  TBili  0.2<L>  /  DBili  x   /  AST  19  /  ALT  16  /  AlkPhos  86  11-01  PT/INR - ( 01 Nov 2021 18:20 )   PT: 12.6 sec;   INR: 1.09 ratio         PTT - ( 01 Nov 2021 18:20 )  PTT:30.3 sec      IMAGING: Reviewed by me.     CT head:  -Interval development of age-indeterminate left basal ganglia lacunar infarct. Consider MRI for further evaluation of acuity.  -Numerous chronic lacunar infarcts and extensive small vessel disease again seen.  -No acute intracranial hemorrhage.    CT angiogram head:  -Moderate to severe stenoses of the proximal right PCA.    CT angiogram neck:  -Atherosclerotic changes without hemodynamically significant stenosis.    _____________  TTE    Summary:   1. Technically adequate study.   2. Left ventricular ejection fraction, by visual estimation, is >75%.   3. Hyperdynamic global left ventricular systolic function.   4. Moderately increased LV wall thickness.   5. Spectral Doppler shows impaired relaxation pattern of left ventricular myocardial filling (Grade I diastolic dysfunction).   6. There is moderate concentric left ventricular hypertrophy.   7. Normal left atrial size.   8. Normal right atrial size.   9. Trace mitral valve regurgitation.  10. There is no evidence of pericardial effusion.  11. There are no prior studies on this patient for comparison purposes.    Daniela Ortega D.O. Electronically signed on 11/2/2021 at 10:27:26 AM.     MRI Brain: 11-  IMPRESSION:  -Acute infarct involving the left posterior limb internal capsule/corona radiata. Additional smaller punctate acute infarcts involving the left anterior frontal corona radiata and right splenium.    -Numerous punctate chronic microhemorrhages, suggestive of underlying amyloid angiopathy.

## 2021-11-05 LAB
-  AMIKACIN: SIGNIFICANT CHANGE UP
-  AMOXICILLIN/CLAVULANIC ACID: SIGNIFICANT CHANGE UP
-  AMPICILLIN/SULBACTAM: SIGNIFICANT CHANGE UP
-  AMPICILLIN: SIGNIFICANT CHANGE UP
-  AZTREONAM: SIGNIFICANT CHANGE UP
-  CEFAZOLIN: SIGNIFICANT CHANGE UP
-  CEFEPIME: SIGNIFICANT CHANGE UP
-  CEFOXITIN: SIGNIFICANT CHANGE UP
-  CEFTRIAXONE: SIGNIFICANT CHANGE UP
-  CIPROFLOXACIN: SIGNIFICANT CHANGE UP
-  ERTAPENEM: SIGNIFICANT CHANGE UP
-  GENTAMICIN: SIGNIFICANT CHANGE UP
-  LEVOFLOXACIN: SIGNIFICANT CHANGE UP
-  MEROPENEM: SIGNIFICANT CHANGE UP
-  NITROFURANTOIN: SIGNIFICANT CHANGE UP
-  PIPERACILLIN/TAZOBACTAM: SIGNIFICANT CHANGE UP
-  TOBRAMYCIN: SIGNIFICANT CHANGE UP
-  TRIMETHOPRIM/SULFAMETHOXAZOLE: SIGNIFICANT CHANGE UP
ANION GAP SERPL CALC-SCNC: 10 MMOL/L — SIGNIFICANT CHANGE UP (ref 5–17)
BUN SERPL-MCNC: 9.9 MG/DL — SIGNIFICANT CHANGE UP (ref 8–20)
CALCIUM SERPL-MCNC: 8.5 MG/DL — LOW (ref 8.6–10.2)
CHLORIDE SERPL-SCNC: 108 MMOL/L — HIGH (ref 98–107)
CO2 SERPL-SCNC: 24 MMOL/L — SIGNIFICANT CHANGE UP (ref 22–29)
CREAT SERPL-MCNC: 0.53 MG/DL — SIGNIFICANT CHANGE UP (ref 0.5–1.3)
CULTURE RESULTS: SIGNIFICANT CHANGE UP
GLUCOSE BLDC GLUCOMTR-MCNC: 134 MG/DL — HIGH (ref 70–99)
GLUCOSE BLDC GLUCOMTR-MCNC: 153 MG/DL — HIGH (ref 70–99)
GLUCOSE BLDC GLUCOMTR-MCNC: 159 MG/DL — HIGH (ref 70–99)
GLUCOSE BLDC GLUCOMTR-MCNC: 165 MG/DL — HIGH (ref 70–99)
GLUCOSE BLDC GLUCOMTR-MCNC: 186 MG/DL — HIGH (ref 70–99)
GLUCOSE SERPL-MCNC: 163 MG/DL — HIGH (ref 70–99)
HCT VFR BLD CALC: 36.8 % — SIGNIFICANT CHANGE UP (ref 34.5–45)
HGB BLD-MCNC: 11.2 G/DL — LOW (ref 11.5–15.5)
MCHC RBC-ENTMCNC: 22.9 PG — LOW (ref 27–34)
MCHC RBC-ENTMCNC: 30.4 GM/DL — LOW (ref 32–36)
MCV RBC AUTO: 75.3 FL — LOW (ref 80–100)
METHOD TYPE: SIGNIFICANT CHANGE UP
ORGANISM # SPEC MICROSCOPIC CNT: SIGNIFICANT CHANGE UP
ORGANISM # SPEC MICROSCOPIC CNT: SIGNIFICANT CHANGE UP
PLATELET # BLD AUTO: 250 K/UL — SIGNIFICANT CHANGE UP (ref 150–400)
POTASSIUM SERPL-MCNC: 3.9 MMOL/L — SIGNIFICANT CHANGE UP (ref 3.5–5.3)
POTASSIUM SERPL-SCNC: 3.9 MMOL/L — SIGNIFICANT CHANGE UP (ref 3.5–5.3)
RBC # BLD: 4.89 M/UL — SIGNIFICANT CHANGE UP (ref 3.8–5.2)
RBC # FLD: 18.4 % — HIGH (ref 10.3–14.5)
SODIUM SERPL-SCNC: 142 MMOL/L — SIGNIFICANT CHANGE UP (ref 135–145)
SPECIMEN SOURCE: SIGNIFICANT CHANGE UP
WBC # BLD: 11.22 K/UL — HIGH (ref 3.8–10.5)
WBC # FLD AUTO: 11.22 K/UL — HIGH (ref 3.8–10.5)

## 2021-11-05 PROCEDURE — 99232 SBSQ HOSP IP/OBS MODERATE 35: CPT

## 2021-11-05 PROCEDURE — 99233 SBSQ HOSP IP/OBS HIGH 50: CPT

## 2021-11-05 RX ORDER — INSULIN LISPRO 100/ML
VIAL (ML) SUBCUTANEOUS
Refills: 0 | Status: DISCONTINUED | OUTPATIENT
Start: 2021-11-05 | End: 2021-11-11

## 2021-11-05 RX ORDER — INSULIN LISPRO 100/ML
VIAL (ML) SUBCUTANEOUS AT BEDTIME
Refills: 0 | Status: DISCONTINUED | OUTPATIENT
Start: 2021-11-05 | End: 2021-11-11

## 2021-11-05 RX ADMIN — PIPERACILLIN AND TAZOBACTAM 25 GRAM(S): 4; .5 INJECTION, POWDER, LYOPHILIZED, FOR SOLUTION INTRAVENOUS at 02:38

## 2021-11-05 RX ADMIN — Medication 1: at 18:27

## 2021-11-05 RX ADMIN — AMLODIPINE BESYLATE 10 MILLIGRAM(S): 2.5 TABLET ORAL at 05:19

## 2021-11-05 RX ADMIN — PIPERACILLIN AND TAZOBACTAM 25 GRAM(S): 4; .5 INJECTION, POWDER, LYOPHILIZED, FOR SOLUTION INTRAVENOUS at 14:13

## 2021-11-05 RX ADMIN — Medication 1: at 00:46

## 2021-11-05 RX ADMIN — Medication 145 MILLIGRAM(S): at 11:43

## 2021-11-05 RX ADMIN — SODIUM CHLORIDE 75 MILLILITER(S): 9 INJECTION, SOLUTION INTRAVENOUS at 13:25

## 2021-11-05 RX ADMIN — HEPARIN SODIUM 5000 UNIT(S): 5000 INJECTION INTRAVENOUS; SUBCUTANEOUS at 17:41

## 2021-11-05 RX ADMIN — Medication 1: at 13:25

## 2021-11-05 RX ADMIN — MEMANTINE HYDROCHLORIDE 5 MILLIGRAM(S): 10 TABLET ORAL at 17:39

## 2021-11-05 RX ADMIN — Medication 10 MILLIGRAM(S): at 05:18

## 2021-11-05 RX ADMIN — Medication 10 MILLIGRAM(S): at 21:30

## 2021-11-05 RX ADMIN — PIPERACILLIN AND TAZOBACTAM 25 GRAM(S): 4; .5 INJECTION, POWDER, LYOPHILIZED, FOR SOLUTION INTRAVENOUS at 21:24

## 2021-11-05 RX ADMIN — Medication 1: at 05:39

## 2021-11-05 RX ADMIN — Medication 81 MILLIGRAM(S): at 11:42

## 2021-11-05 RX ADMIN — MEMANTINE HYDROCHLORIDE 5 MILLIGRAM(S): 10 TABLET ORAL at 05:18

## 2021-11-05 RX ADMIN — ATORVASTATIN CALCIUM 80 MILLIGRAM(S): 80 TABLET, FILM COATED ORAL at 21:31

## 2021-11-05 RX ADMIN — HEPARIN SODIUM 5000 UNIT(S): 5000 INJECTION INTRAVENOUS; SUBCUTANEOUS at 01:28

## 2021-11-05 RX ADMIN — Medication 25 MILLIGRAM(S): at 05:19

## 2021-11-05 RX ADMIN — SERTRALINE 100 MILLIGRAM(S): 25 TABLET, FILM COATED ORAL at 11:42

## 2021-11-05 RX ADMIN — DONEPEZIL HYDROCHLORIDE 5 MILLIGRAM(S): 10 TABLET, FILM COATED ORAL at 21:31

## 2021-11-05 RX ADMIN — SODIUM CHLORIDE 75 MILLILITER(S): 9 INJECTION, SOLUTION INTRAVENOUS at 00:44

## 2021-11-05 RX ADMIN — HEPARIN SODIUM 5000 UNIT(S): 5000 INJECTION INTRAVENOUS; SUBCUTANEOUS at 11:42

## 2021-11-05 NOTE — PROGRESS NOTE ADULT - SUBJECTIVE AND OBJECTIVE BOX
HPI:  76F with PMHX CVA (no prior deficit), HTN, HLD, IDDM, Dementia brought in by family for evaluation of progressively worsening gait ataxia, urinary incontinence and memory difficulties over the past 10 months. Daughter at bedside reports that prior to Jan 2021 she used no assistive device to ambulate but now she has difficulty ambulating even with a walker. CT head performed in the ED today shows a subacute left thalamo-capsular infarct. She has been evaluated for NPH as an outpatient and is pending a LP.    11-  No new symptoms or complaints.  11-05-21  Denies having new complaints.    PMHX: CVA (no prior deficit), HTN, HLD, IDDM, Dementia, MDD  PSHX: Dental Surgery  Social Hx: Denies etoh/tobacco/drug abuse  FamHx: Father +TIA +Dementia (01 Nov 2021 23:48)          SUBJECTIVE: No events overnight.  No new neurologic complaints.  ROS reported negative unless otherwise noted.    acetaminophen     Tablet .. 650 milliGRAM(s) Oral every 6 hours PRN  aluminum hydroxide/magnesium hydroxide/simethicone Suspension 30 milliLiter(s) Oral every 4 hours PRN  amLODIPine   Tablet 10 milliGRAM(s) Oral daily  aspirin enteric coated 81 milliGRAM(s) Oral daily  atorvastatin 80 milliGRAM(s) Oral at bedtime  dextrose 40% Gel 15 Gram(s) Oral once  dextrose 5%. 1000 milliLiter(s) IV Continuous <Continuous>  dextrose 5%. 1000 milliLiter(s) IV Continuous <Continuous>  dextrose 5%. 1000 milliLiter(s) IV Continuous <Continuous>  dextrose 50% Injectable 25 Gram(s) IV Push once  dextrose 50% Injectable 12.5 Gram(s) IV Push once  dextrose 50% Injectable 25 Gram(s) IV Push once  donepezil 5 milliGRAM(s) Oral at bedtime  fenofibrate Tablet 145 milliGRAM(s) Oral daily  glucagon  Injectable 1 milliGRAM(s) IntraMuscular once  heparin   Injectable 5000 Unit(s) SubCutaneous every 8 hours  hydrALAZINE Injectable 10 milliGRAM(s) IV Push every 6 hours PRN  insulin lispro (ADMELOG) corrective regimen sliding scale   SubCutaneous every 6 hours  melatonin 3 milliGRAM(s) Oral at bedtime PRN  memantine 5 milliGRAM(s) Oral two times a day  metoprolol succinate ER 25 milliGRAM(s) Oral daily  ondansetron Injectable 4 milliGRAM(s) IV Push every 8 hours PRN  piperacillin/tazobactam IVPB.. 3.375 Gram(s) IV Intermittent every 8 hours  sertraline 100 milliGRAM(s) Oral daily      PHYSICAL EXAM:   Vital Signs Last 24 Hrs  T(C): 36.4 (03 Nov 2021 11:51), Max: 36.9 (02 Nov 2021 23:14)  T(F): 97.5 (03 Nov 2021 11:51), Max: 98.4 (02 Nov 2021 23:14)  HR: 62 (03 Nov 2021 11:51) (60 - 89)  BP: 179/67 (03 Nov 2021 11:51) (160/66 - 191/65)  BP(mean): 107 (03 Nov 2021 02:33) (107 - 108)  RR: 18 (03 Nov 2021 11:51) (17 - 20)  SpO2: 96% (03 Nov 2021 11:51) (93% - 96%)        NEUROLOGICAL EXAM:    Mental Status - Patient is alert, awake, oriented X3 today ( Nov 2021)    Knows Judson is the president.. fluent, names correctly, no dysarthria no aphasia Follows commands well and able to answer questions appropriately. Mood and affect  normal    Cranial Nerves - PERRL, EOMI, VFF, V1-V3 intact, no gross facial asymmetry, tongue/uvula midline    Motor Exam -   Right upper 5-/5  Left upper 5/5  Right lower 5/5  Left lower 5/5     nml bulk/tone    Sensory    Intact to light touch , EDITH and pinprick bilaterally    Coord: FTN intact bilaterally     Gait - Not assessed                      NIH SS: 1  Date: 11-  Time: 15.10  1a) Level of consciousness (0-3):   1b) Questions (0-2):   1c) Commands (0-2):   2  ) Gaze (0-2):   3  ) Visual field (0-3):   4  ) Facial palsy (0-3):   Motor  5a) Left arm (0-4):   5b) Right arm (0-4): 1  6a) Left leg (0-4):   6b) Right leg (0-4):   7  ) Ataxia (0-2):   Sensory  8  ) Sensory (0-2):   Speech  9  ) Language (0-3):   10) Dysarthria (0-2):   Extinction  11) Extinction/inattention (0-2):     Total score: 1    LABS:                        13.3   11.20 )-----------( 303      ( 03 Nov 2021 07:42 )             42.9    11-03    142  |  104  |  10.9  ----------------------------<  66<L>  4.2   |  28.0  |  0.53    Ca    9.4      03 Nov 2021 07:42    TPro  6.4<L>  /  Alb  3.8  /  TBili  0.2<L>  /  DBili  x   /  AST  19  /  ALT  16  /  AlkPhos  86  11-01  PT/INR - ( 01 Nov 2021 18:20 )   PT: 12.6 sec;   INR: 1.09 ratio         PTT - ( 01 Nov 2021 18:20 )  PTT:30.3 sec      IMAGING: Reviewed by me.     CT head:  -Interval development of age-indeterminate left basal ganglia lacunar infarct. Consider MRI for further evaluation of acuity.  -Numerous chronic lacunar infarcts and extensive small vessel disease again seen.  -No acute intracranial hemorrhage.    CT angiogram head:  -Moderate to severe stenoses of the proximal right PCA.    CT angiogram neck:  -Atherosclerotic changes without hemodynamically significant stenosis.    _____________  TTE    Summary:   1. Technically adequate study.   2. Left ventricular ejection fraction, by visual estimation, is >75%.   3. Hyperdynamic global left ventricular systolic function.   4. Moderately increased LV wall thickness.   5. Spectral Doppler shows impaired relaxation pattern of left ventricular myocardial filling (Grade I diastolic dysfunction).   6. There is moderate concentric left ventricular hypertrophy.   7. Normal left atrial size.   8. Normal right atrial size.   9. Trace mitral valve regurgitation.  10. There is no evidence of pericardial effusion.  11. There are no prior studies on this patient for comparison purposes.    Daniela Ortega D.O. Electronically signed on 11/2/2021 at 10:27:26 AM.     MRI Brain: 11-  IMPRESSION:  -Acute infarct involving the left posterior limb internal capsule/corona radiata. Additional smaller punctate acute infarcts involving the left anterior frontal corona radiata and right splenium.    -Numerous punctate chronic microhemorrhages, suggestive of underlying amyloid angiopathy.

## 2021-11-05 NOTE — PROGRESS NOTE ADULT - SUBJECTIVE AND OBJECTIVE BOX
Patient a bit confused, unaware of her surroundings.  Able to be redirected, not restless or bothered by it.  Would like a cup of coffee. Breakfast being delivered soon.     REVIEW OF SYSTEMS  Constitutional - No fever,  +fatigue  Neurological - +memory loss, +loss of strength, No numbness, No tremors  Musculoskeletal - No joint pain, No joint swelling, No muscle pain  Psychiatric - No depression, No anxiety    FUNCTIONAL PROGRESS  11/3 SLP  Summary: functional for soft/bite sized. Continue to suspect pharyngeal dysphagia with thin liquids with wet vocal quality and delayed throat clear post swallows. Pt left as received, NAD, pain 0/10, RN Mindy aware. Will follow to reasess as schedule permits.     11/2 PT  Bed Mobility: Rolling/Turning:     · Level of [place]	minimum assist (75% patients effort)    Bed Mobility: Sit to Supine:     · Level of [place]	moderate assist (50% patients effort)    Bed Mobility: Supine to Sit:     · Level of [place]	moderate assist (50% patients effort)    Transfer: Sit to Stand:     · Level of [place]	moderate assist (50% patients effort)  · Physical Assist/Nonphysical Assist	2 person assist    Transfer: Stand to Sit:     · Level of [place]	moderate assist (50% patients effort)  · Physical Assist/Nonphysical Assist	2 person assist    Gait Skills:     · Level of [place]	5'x2 RW modAx2    Gait Analysis:     · Gait Pattern Used	unsteadiness throughout requiring assist, decreased gait velocity and activity tolerance, decreased activity tolerance, decreased right step length    Stair Negotiation:     · Level of [place]	unable to perform; safety    11/2 OT  Bathing Training:     · Level of [place]	maximum assist (25% patients effort); to sponge bathe  · Physical Assist/Nonphysical Assist	1 person assist; verbal cues; set-up required    Upper Body Dressing Training:     · Level of [place]	moderate assist (50% patients effort); seated to don gown  · Physical Assist/Nonphysical Assist	1 person assist; verbal cues; set-up required    Lower Body Dressing Training:     · Level of [place]	maximum assist (25% patients effort); seated to don socks  · Physical Assist/Nonphysical Assist	1 person assist; verbal cues; set-up required    Toilet Hygiene Training:     · Level of [place]	to assess; at time of eval +Primafit    Grooming Training:     · Level of [place]	minimum assist (75% patients effort)  · Physical Assist/Nonphysical Assist	1 person assist; verbal cues; set-up required    Eating/Self-Feeding Training:     · Level of [place]	Did not directly observe        VITALS  T(C): 36.9 (11-05-21 @ 07:30), Max: 36.9 (11-05-21 @ 07:30)  HR: 70 (11-05-21 @ 07:30) (62 - 82)  BP: 154/70 (11-05-21 @ 07:30) (140/62 - 187/74)  RR: 18 (11-05-21 @ 07:30) (16 - 18)  SpO2: 96% (11-05-21 @ 07:30) (95% - 98%)  Wt(kg): --    MEDICATIONS   acetaminophen     Tablet .. 650 milliGRAM(s) every 6 hours PRN  aluminum hydroxide/magnesium hydroxide/simethicone Suspension 30 milliLiter(s) every 4 hours PRN  amLODIPine   Tablet 10 milliGRAM(s) daily  aspirin enteric coated 81 milliGRAM(s) daily  atorvastatin 80 milliGRAM(s) at bedtime  dextrose 40% Gel 15 Gram(s) once  dextrose 5% + lactated ringers. 1000 milliLiter(s) <Continuous>  dextrose 5%. 1000 milliLiter(s) <Continuous>  dextrose 5%. 1000 milliLiter(s) <Continuous>  dextrose 50% Injectable 25 Gram(s) once  dextrose 50% Injectable 12.5 Gram(s) once  dextrose 50% Injectable 25 Gram(s) once  donepezil 5 milliGRAM(s) at bedtime  enalaprilat Injectable 1.25 milliGRAM(s) every 6 hours PRN  fenofibrate Tablet 145 milliGRAM(s) daily  glucagon  Injectable 1 milliGRAM(s) once  heparin   Injectable 5000 Unit(s) every 8 hours  hydrALAZINE Injectable 10 milliGRAM(s) every 6 hours PRN  insulin lispro (ADMELOG) corrective regimen sliding scale   every 6 hours  melatonin 3 milliGRAM(s) at bedtime PRN  memantine 5 milliGRAM(s) two times a day  metoprolol succinate ER 25 milliGRAM(s) daily  ondansetron Injectable 4 milliGRAM(s) every 8 hours PRN  piperacillin/tazobactam IVPB.. 3.375 Gram(s) every 8 hours  sertraline 100 milliGRAM(s) daily      RECENT LABS/IMAGING                          11.2   11.22 )-----------( 250      ( 05 Nov 2021 03:19 )             36.8     11-05    142  |  108<H>  |  9.9  ----------------------------<  163<H>  3.9   |  24.0  |  0.53    Ca    8.5<L>      05 Nov 2021 03:19                  CT head 11/1 - -Interval development of age-indeterminate left basal ganglia lacunar infarct. Consider MRI for further evaluation of acuity. -Numerous chronic lacunar infarcts and extensive small vessel disease again seen. -No acute intracranial hemorrhage.    CT angiogram head 11/1 - -Moderate to severe stenoses of the proximal right PCA.    CT angiogram neck 11/1 - -Atherosclerotic changes without hemodynamically significant stenosis.    MRI HEAD 11/3 - -Acute infarct involving the left posterior limb internal capsule/corona radiata. Additional smaller punctate acute infarcts involving the left anterior frontal corona radiata and right splenium. -Numerous punctate chronic microhemorrhages, suggestive of underlying amyloid angiopathy.  ----------------------------------------------------------------------------------------  PHYSICAL EXAM  Constitutional - NAD, Comfortable  Extremities - No C/C/E, No calf tenderness   Neurologic Exam -                    Cognitive - AAO to self, PART of situation, being in hospital      Communication - Fluent, No dysarthria     Cranial Nerves - CN 2-12 intact     Motor - No focal/ lateralization of deficits, overall 4+/5     Sensory - Intact to LT     Reflexes - DTR Intact, No primitive reflexive  Psychiatric - Mood stable, Affect WNL  ----------------------------------------------------------------------------------------  ASSESSMENT/PLAN  76yFemale with functional deficits after developing AMS  R/O CVA - ASA, Tricor, Lipitor   HTN - Norvasc, Toprol, Hydralazine, Vasotec  UTI - Zosyn  Memory - Aricept, Zoloft  Sleep - Melatonin PRN  Oropharyngeal Dysphagia - SLP reeval, NPO  Pain - Tylenol  DVT PPX - SCDs, Heparin  Rehab - Medically being optimized with swallow and BP. Will likely need rehab, pending progress and level of assist at home needed. Would benefit from AR, pending above.     Recommend ongoing mobilization by staff to maintain cardiopulmonary function and prevention of secondary complications related to debility. Discussed with rehab team.

## 2021-11-05 NOTE — PROGRESS NOTE ADULT - ASSESSMENT
IMP: Dementia NPH versus Vascular dementia or Mixed dementia.  - Left thalmo-capsular infarct. ( Age indeterminate)    PLan:/Rec:  - MRI Brain Stroke protocol reported as above shows an acute left thalamocapsular infarct.  - ASA 81 mg PO QD Lipitor 80 mg PO for LDL goal of < 80  SBP goal keep can be permissive for another 24 hours up to 180/100 an then normotensive.  DVT ppx - on SQ Heparin  PT OT evaluation.  Continue -Telemetry monitoring.

## 2021-11-05 NOTE — PROGRESS NOTE ADULT - ASSESSMENT
76F with a h/o CVA (no prior deficit), HTN, HLD, IDDM, Dementia, MDD admitted for Subacute CVA L Basal Ganglia, Mod-Severe Stenosis Prox R PCA, HTN Urgency, and Hypoglycemia.     AMS, Subacute CVA L Basal Ganglia Infarct, Mod-Severe Stenosis Proximal R PCA  Continue telemetry monitoring   CTH/CTA H/N +subacute L basal ganglia lacunar infarct + numerous chronic lacunar infarcts  MRI with CVA  ASA 81mg PO q24  Atorvastatin 80mg PO QHS  VTE PPX SQH  Swallow eval appreciated : bite sized with crushed meds. Pt refusing. Started back on D5 LR and NPO until swallow reevaluation  SInce swallow reeval will likely happen in am, restart prior recommendation diet.   Pt stated that she will eat/drink what she wishes. Understands risks of aspiration PNA, intubation and hypoxia however reports that it improves her mood  Cannot deny pt her rights however , will not enable bad decision. Pt may eat what she wishes (i.e. what daughter rings) as she understands risks involved  MRI brain noted       HTN Urgency  Hydralazine 10mg IV x1 and q6 PRN SBP >180  Restarted on  Metoprolol Succinate 25 mg lozano (reduced frpom 50 for bradycardia) y and Amlodipine 10 mg daily for now.   Enalapril 1.25 mg IV Q 6 PRN     Leukocytosis  2/2 UTI (poa)  Ucx with GNR > in process   Continue PipTazo for now       CHF  (Diastolic I HFpEF) with sinus Bradycardia  TTE noted,   Bradycardia resolved     Hypoglycemia, IDDM  Was on 70 units daily Tresiba (30 am, 40 pm) at home despite being within acceptable limits on 20 units daily of lantus on prior admission  f/u A1C  Resolved  Restart Lantus 20 units QHS  Aspiration precautions  Lispro s/s till then   Hold Metformin while inpt     HLD  Lipitor 80mg PO QHS  Fenofibrate 145mg PO QHS    Dementia  Donepezil 5mg PO QHS  Memantine 5mg PO BID    COVID19 Infection  Incidental on prior hospitalization. No hypoxia. COVID Pending.     MDD/Anxiety  Sertraline 100mg PO q24    Recent Falls  No obvious repeat fall. Fall Precautions. Workup last admission negative  NSx eval for possible NPH     DVT ppx : heparin s/c

## 2021-11-05 NOTE — PROGRESS NOTE ADULT - SUBJECTIVE AND OBJECTIVE BOX
[facility] Division of Hospital Medicine    Chief Complaint:  CVA    SUBJECTIVE / OVERNIGHT EVENTS:  Adm with suspected CVA  Speech significantly improved  Despite diet recs pts daughter reportedly insisted pt drink thin liquids after which pt observed to have coughing episodes.   Today d/w pt in detail. Risks of aspiration given oropharyngeal limitations at this time explained at great length. Has insight and understands risks but insists on eating ehat she wants  Patient denies chest pain, SOB, abd pain, N/V, fever, chills, dysuria or any other complaints. All remainder ROS negative.       MEDICATIONS  (STANDING):  amLODIPine   Tablet 10 milliGRAM(s) Oral daily  aspirin enteric coated 81 milliGRAM(s) Oral daily  atorvastatin 80 milliGRAM(s) Oral at bedtime  dextrose 40% Gel 15 Gram(s) Oral once  dextrose 5% + lactated ringers. 1000 milliLiter(s) (75 mL/Hr) IV Continuous <Continuous>  dextrose 5%. 1000 milliLiter(s) (50 mL/Hr) IV Continuous <Continuous>  dextrose 5%. 1000 milliLiter(s) (100 mL/Hr) IV Continuous <Continuous>  dextrose 50% Injectable 25 Gram(s) IV Push once  dextrose 50% Injectable 12.5 Gram(s) IV Push once  dextrose 50% Injectable 25 Gram(s) IV Push once  donepezil 5 milliGRAM(s) Oral at bedtime  fenofibrate Tablet 145 milliGRAM(s) Oral daily  glucagon  Injectable 1 milliGRAM(s) IntraMuscular once  heparin   Injectable 5000 Unit(s) SubCutaneous every 8 hours  insulin lispro (ADMELOG) corrective regimen sliding scale   SubCutaneous every 6 hours  memantine 5 milliGRAM(s) Oral two times a day  metoprolol succinate ER 25 milliGRAM(s) Oral daily  piperacillin/tazobactam IVPB.. 3.375 Gram(s) IV Intermittent every 8 hours  sertraline 100 milliGRAM(s) Oral daily    MEDICATIONS  (PRN):  acetaminophen     Tablet .. 650 milliGRAM(s) Oral every 6 hours PRN Temp greater or equal to 38C (100.4F), Mild Pain (1 - 3)  aluminum hydroxide/magnesium hydroxide/simethicone Suspension 30 milliLiter(s) Oral every 4 hours PRN Dyspepsia  enalaprilat Injectable 1.25 milliGRAM(s) IV Push every 6 hours PRN SBP > 160  hydrALAZINE Injectable 10 milliGRAM(s) IV Push every 6 hours PRN SBP >180  melatonin 3 milliGRAM(s) Oral at bedtime PRN Insomnia  ondansetron Injectable 4 milliGRAM(s) IV Push every 8 hours PRN Nausea and/or Vomiting        PHYSICAL EXAM:  Vital Signs Last 24 Hrs  T(C): 36.7 (05 Nov 2021 16:14), Max: 36.9 (05 Nov 2021 07:30)  T(F): 98 (05 Nov 2021 16:14), Max: 98.4 (05 Nov 2021 07:30)  HR: 57 (05 Nov 2021 16:14) (57 - 82)  BP: 148/60 (05 Nov 2021 16:14) (140/62 - 187/74)  BP(mean): --  RR: 18 (05 Nov 2021 16:14) (16 - 18)  SpO2: 97% (05 Nov 2021 16:14) (95% - 98%)      CONSTITUTIONAL: Obese nontoxic appearing female, NAD  ENMT: Moist oral mucosa, no pharyngeal injection or exudates;   RESPIRATORY: Normal respiratory effort; lungs are clear to auscultation bilaterally  CARDIOVASCULAR: Regular rate and rhythm, normal S1 and S2, no murmur/rub/gallop; No lower extremity edema; Peripheral pulses are 2+ bilaterally  ABDOMEN: Nontender to palpation, normoactive bowel sounds, no rebound/guarding; No hepatosplenomegaly  MUSCLOSKELETAL:  no clubbing or cyanosis of digits; no joint swelling or tenderness to palpation  PSYCH: A+O to person, place, and time; affect appropriate  NEUROLOGY: CN 2-12 are intact and symmetric; no gross sensory deficits, RUE/LUE 4/5, RLE/LLE 4/5, DTR intact and symmetric, Improved speech.   SKIN: No rashes; no palpable lesions    LABS:                                     11.2   11.22 )-----------( 250      ( 05 Nov 2021 03:19 )             36.8   11-05    142  |  108<H>  |  9.9  ----------------------------<  163<H>  3.9   |  24.0  |  0.53    Ca    8.5<L>      05 Nov 2021 03:19      CAPILLARY BLOOD GLUCOSE      POCT Blood Glucose.: 165 mg/dL (05 Nov 2021 17:49)  POCT Blood Glucose.: 186 mg/dL (05 Nov 2021 12:45)  POCT Blood Glucose.: 153 mg/dL (05 Nov 2021 05:26)  POCT Blood Glucose.: 159 mg/dL (05 Nov 2021 00:36)      RADIOLOGY & ADDITIONAL TESTS:  Results Reviewed:   Imaging Personally Reviewed:  Electrocardiogram Personally Reviewed:      TTE   Summary:   1. Technically adequate study.   2. Left ventricular ejection fraction, by visual estimation, is >75%.   3. Hyperdynamic global left ventricular systolic function.   4. Moderately increased LV wall thickness.   5. Spectral Doppler shows impaired relaxation pattern of left ventricular myocardial filling (Grade I diastolic dysfunction).   6. There is moderate concentric left ventricular hypertrophy.   7. Normal left atrial size.   8. Normal right atrial size.   9. Trace mitral valve regurgitation.  10. There is no evidence of pericardial effusion.  11. There are no prior studies on this patient for comparison purposes.

## 2021-11-06 LAB
ANION GAP SERPL CALC-SCNC: 13 MMOL/L — SIGNIFICANT CHANGE UP (ref 5–17)
BUN SERPL-MCNC: 11.2 MG/DL — SIGNIFICANT CHANGE UP (ref 8–20)
CALCIUM SERPL-MCNC: 8.9 MG/DL — SIGNIFICANT CHANGE UP (ref 8.6–10.2)
CHLORIDE SERPL-SCNC: 104 MMOL/L — SIGNIFICANT CHANGE UP (ref 98–107)
CO2 SERPL-SCNC: 25 MMOL/L — SIGNIFICANT CHANGE UP (ref 22–29)
CREAT SERPL-MCNC: 0.53 MG/DL — SIGNIFICANT CHANGE UP (ref 0.5–1.3)
GLUCOSE BLDC GLUCOMTR-MCNC: 146 MG/DL — HIGH (ref 70–99)
GLUCOSE BLDC GLUCOMTR-MCNC: 150 MG/DL — HIGH (ref 70–99)
GLUCOSE BLDC GLUCOMTR-MCNC: 222 MG/DL — HIGH (ref 70–99)
GLUCOSE BLDC GLUCOMTR-MCNC: 334 MG/DL — HIGH (ref 70–99)
GLUCOSE SERPL-MCNC: 162 MG/DL — HIGH (ref 70–99)
HCT VFR BLD CALC: 38.5 % — SIGNIFICANT CHANGE UP (ref 34.5–45)
HGB BLD-MCNC: 11.8 G/DL — SIGNIFICANT CHANGE UP (ref 11.5–15.5)
MCHC RBC-ENTMCNC: 23.2 PG — LOW (ref 27–34)
MCHC RBC-ENTMCNC: 30.6 GM/DL — LOW (ref 32–36)
MCV RBC AUTO: 75.8 FL — LOW (ref 80–100)
PLATELET # BLD AUTO: 261 K/UL — SIGNIFICANT CHANGE UP (ref 150–400)
POTASSIUM SERPL-MCNC: 4.1 MMOL/L — SIGNIFICANT CHANGE UP (ref 3.5–5.3)
POTASSIUM SERPL-SCNC: 4.1 MMOL/L — SIGNIFICANT CHANGE UP (ref 3.5–5.3)
RBC # BLD: 5.08 M/UL — SIGNIFICANT CHANGE UP (ref 3.8–5.2)
RBC # FLD: 18.5 % — HIGH (ref 10.3–14.5)
SODIUM SERPL-SCNC: 142 MMOL/L — SIGNIFICANT CHANGE UP (ref 135–145)
WBC # BLD: 10.92 K/UL — HIGH (ref 3.8–10.5)
WBC # FLD AUTO: 10.92 K/UL — HIGH (ref 3.8–10.5)

## 2021-11-06 PROCEDURE — 99232 SBSQ HOSP IP/OBS MODERATE 35: CPT

## 2021-11-06 RX ORDER — CEFEPIME 1 G/1
1000 INJECTION, POWDER, FOR SOLUTION INTRAMUSCULAR; INTRAVENOUS EVERY 8 HOURS
Refills: 0 | Status: DISCONTINUED | OUTPATIENT
Start: 2021-11-06 | End: 2021-11-07

## 2021-11-06 RX ADMIN — MEMANTINE HYDROCHLORIDE 5 MILLIGRAM(S): 10 TABLET ORAL at 17:35

## 2021-11-06 RX ADMIN — PIPERACILLIN AND TAZOBACTAM 25 GRAM(S): 4; .5 INJECTION, POWDER, LYOPHILIZED, FOR SOLUTION INTRAVENOUS at 05:28

## 2021-11-06 RX ADMIN — Medication 145 MILLIGRAM(S): at 12:01

## 2021-11-06 RX ADMIN — HEPARIN SODIUM 5000 UNIT(S): 5000 INJECTION INTRAVENOUS; SUBCUTANEOUS at 12:02

## 2021-11-06 RX ADMIN — ATORVASTATIN CALCIUM 80 MILLIGRAM(S): 80 TABLET, FILM COATED ORAL at 22:00

## 2021-11-06 RX ADMIN — AMLODIPINE BESYLATE 10 MILLIGRAM(S): 2.5 TABLET ORAL at 05:27

## 2021-11-06 RX ADMIN — Medication 81 MILLIGRAM(S): at 12:01

## 2021-11-06 RX ADMIN — Medication 25 MILLIGRAM(S): at 05:28

## 2021-11-06 RX ADMIN — DONEPEZIL HYDROCHLORIDE 5 MILLIGRAM(S): 10 TABLET, FILM COATED ORAL at 22:03

## 2021-11-06 RX ADMIN — HEPARIN SODIUM 5000 UNIT(S): 5000 INJECTION INTRAVENOUS; SUBCUTANEOUS at 05:27

## 2021-11-06 RX ADMIN — CEFEPIME 100 MILLIGRAM(S): 1 INJECTION, POWDER, FOR SOLUTION INTRAMUSCULAR; INTRAVENOUS at 15:11

## 2021-11-06 RX ADMIN — HEPARIN SODIUM 5000 UNIT(S): 5000 INJECTION INTRAVENOUS; SUBCUTANEOUS at 21:58

## 2021-11-06 RX ADMIN — MEMANTINE HYDROCHLORIDE 5 MILLIGRAM(S): 10 TABLET ORAL at 05:28

## 2021-11-06 RX ADMIN — Medication 4: at 12:02

## 2021-11-06 RX ADMIN — Medication 3 MILLIGRAM(S): at 21:59

## 2021-11-06 RX ADMIN — Medication 2: at 17:36

## 2021-11-06 RX ADMIN — SERTRALINE 100 MILLIGRAM(S): 25 TABLET, FILM COATED ORAL at 12:01

## 2021-11-06 RX ADMIN — CEFEPIME 100 MILLIGRAM(S): 1 INJECTION, POWDER, FOR SOLUTION INTRAMUSCULAR; INTRAVENOUS at 22:00

## 2021-11-06 NOTE — PROGRESS NOTE ADULT - SUBJECTIVE AND OBJECTIVE BOX
[facility]                                     Neurology at [place]                                 Jere Lawson, & Aaron                                  [address]                                        [place], [place], [address]                                             [phone]      CC: worsening gait  HPI:  76F with PMHX CVA (no prior deficit), HTN, HLD, IDDM, Dementia brought in by family for evaluation of progressively worsening gait ataxia, urinary incontinence and memory difficulties over the past 10 months. Daughter at bedside reports that prior to Jan 2021 she used no assistive device to ambulate but now she has difficulty ambulating even with a walker. CT head performed in the ED today shows a subacute left thalamo-capsular infarct. She has been evaluated for NPH as an outpatient and is pending a LP.    Interval HPI: no ne deficits    Review of systems (neurology): Denies headache or dizziness. Denies weakness/numbness.  Denies speech/language deficits. Denies diplopia/blurred vision.  Denies confusion    MEDICATIONS  (STANDING):  amLODIPine   Tablet 10 milliGRAM(s) Oral daily  aspirin enteric coated 81 milliGRAM(s) Oral daily  atorvastatin 80 milliGRAM(s) Oral at bedtime  cefepime   IVPB 1000 milliGRAM(s) IV Intermittent every 8 hours  dextrose 40% Gel 15 Gram(s) Oral once  dextrose 5% + lactated ringers. 1000 milliLiter(s) (75 mL/Hr) IV Continuous <Continuous>  dextrose 5%. 1000 milliLiter(s) (100 mL/Hr) IV Continuous <Continuous>  dextrose 5%. 1000 milliLiter(s) (50 mL/Hr) IV Continuous <Continuous>  dextrose 50% Injectable 25 Gram(s) IV Push once  dextrose 50% Injectable 12.5 Gram(s) IV Push once  dextrose 50% Injectable 25 Gram(s) IV Push once  donepezil 5 milliGRAM(s) Oral at bedtime  fenofibrate Tablet 145 milliGRAM(s) Oral daily  glucagon  Injectable 1 milliGRAM(s) IntraMuscular once  heparin   Injectable 5000 Unit(s) SubCutaneous every 8 hours  insulin lispro (ADMELOG) corrective regimen sliding scale   SubCutaneous three times a day before meals  insulin lispro (ADMELOG) corrective regimen sliding scale   SubCutaneous at bedtime  memantine 5 milliGRAM(s) Oral two times a day  metoprolol succinate ER 25 milliGRAM(s) Oral daily  sertraline 100 milliGRAM(s) Oral daily    MEDICATIONS  (PRN):  acetaminophen     Tablet .. 650 milliGRAM(s) Oral every 6 hours PRN Temp greater or equal to 38C (100.4F), Mild Pain (1 - 3)  aluminum hydroxide/magnesium hydroxide/simethicone Suspension 30 milliLiter(s) Oral every 4 hours PRN Dyspepsia  enalaprilat Injectable 1.25 milliGRAM(s) IV Push every 6 hours PRN SBP > 160  hydrALAZINE Injectable 10 milliGRAM(s) IV Push every 6 hours PRN SBP >180  melatonin 3 milliGRAM(s) Oral at bedtime PRN Insomnia  ondansetron Injectable 4 milliGRAM(s) IV Push every 8 hours PRN Nausea and/or Vomiting      Vital Signs Last 24 Hrs  T(C): 36.7 (06 Nov 2021 11:26), Max: 37.1 (05 Nov 2021 21:20)  T(F): 98.1 (06 Nov 2021 11:26), Max: 98.7 (05 Nov 2021 21:20)  HR: 72 (06 Nov 2021 11:26) (57 - 76)  BP: 158/73 (06 Nov 2021 11:26) (148/60 - 184/69)  BP(mean): --  RR: 18 (06 Nov 2021 11:26) (18 - 18)  SpO2: 96% (06 Nov 2021 11:26) (93% - 97%)    Detailed Neurologic Exam:    Mental status: The patient is awake and alert and has normal attention span.  The patient is fully oriented in 3 spheres. The patient is oriented to current events. The patient is able to name objects, follow commands, repeat sentences.    Cranial nerves: . Pupils equal and react symmetrically to light. There is no visual field deficit to confrontation. Extraocular motion is full with no nystagmus. There is no ptosis. Facial sensation is intact. Facial musculature is symmetric. Palate elevates symmetrically.  Tongue is midline.    Motor: There is normal bulk and tone.  There is no tremor.  Strength is 4+/5 in the right arm and leg.   Strength is 5/5 in the left arm and leg.    Sensation: Intact to light touch and pin in 4 extremities    Reflexes: 1+ throughout and plantar responses are flexor.    Cerebellar: There is no dysmetria on finger to nose testing.    Gait : deferred    Labs:                          11.8   10.92 )-----------( 261      ( 06 Nov 2021 07:36 )             38.5     11-06    142  |  104  |  11.2  ----------------------------<  162<H>  4.1   |  25.0  |  0.53    Ca    8.9      06 Nov 2021 07:36    Lipid Profile (11.02.21 @ 07:03)    LDL Cholesterol Calculated: 165 mg/dL      Radiology (studies independently reviewed unless otherwise noted):  MRI brain (+) left posterior limb IC/CR stroke, left frontal and right splenium acute strokes, (+) CAA      < from: TTE Echo Complete w/ Contrast w/ Doppler (11.02.21 @ 08:54) >  Summary:   1. Technically adequate study.   2. Left ventricular ejection fraction, by visual estimation, is >75%.   3. Hyperdynamic global left ventricular systolic function.   4. Moderately increased LV wall thickness.   5. Spectral Doppler shows impaired relaxation pattern of left ventricular myocardial filling (Grade I diastolic dysfunction).  6. There is moderate concentric left ventricular hypertrophy.   7. Normal left atrial size.   8. Normal right atrial size.   9. Trace mitral valve regurgitation.  10. There is no evidence of pericardial effusion.  11. There are no prior studies on this patient for comparison purposes.

## 2021-11-06 NOTE — PROGRESS NOTE ADULT - ASSESSMENT
The patient is a 76y Female who is followed by neurology because of stroke and CAA    Stroke   MRI (+) CVA  continue ASA, lipitor and DVT ppx  maintain normal BP  glucose control  PT/OT    stroke work up completed   There is no further neurologic workup suggested at this time.  We will be available for reconsultation as needed.    Thank you.   Huan Oquendo MD, PhD  [phone]

## 2021-11-06 NOTE — PROGRESS NOTE ADULT - ASSESSMENT
76F with a h/o CVA (no prior deficit), HTN, HLD, IDDM, Dementia, MDD admitted for Subacute CVA L Basal Ganglia, Mod-Severe Stenosis Prox R PCA, HTN Urgency, and Hypoglycemia. Pt also diagnosed with possible UTI on admission started on zosyn , urine cx today positive for proteus   1-  Subacute CVA L Basal Ganglia Infarct, Mod-Severe Stenosis Proximal R PCA  stable   neurology PM and R on board input appreciated   con ASA 81mg PO q24 and Atorvastatin 80mg PO QHS  VTE PPX SQH  Swallow eval appreciated : bite sized with crushed meds. Pt refusing. Started back on D5 LR and NPO until swallow reevaluation  SInce swallow reeval will likely happen in am, restart prior recommendation diet.   Pt stated that she will eat/drink what she wishes. Understands risks of aspiration PNA, intubation and hypoxia however reports that it improves her mood  Cannot deny pt her rights however , will not enable bad decision. Pt may eat what she wishes (i.e. what daughter rings) as she understands risks involved  MRI brain noted       HTN Urgency  Hydralazine 10mg IV x1 and q6 PRN SBP >180  Restarted on  Metoprolol Succinate 25 mg lozano (reduced frpom 50 for bradycardia) y and Amlodipine 10 mg daily for now.   Enalapril 1.25 mg IV Q 6 PRN     Leukocytosis  2/2 UTI (poa)  Ucx with GNR > in process   Continue PipTazo for now       CHF  (Diastolic I HFpEF) with sinus Bradycardia  TTE noted,   Bradycardia resolved     Hypoglycemia, IDDM  Was on 70 units daily Tresiba (30 am, 40 pm) at home despite being within acceptable limits on 20 units daily of lantus on prior admission  f/u A1C  Resolved  Restart Lantus 20 units QHS  Aspiration precautions  Lispro s/s till then   Hold Metformin while inpt     HLD  Lipitor 80mg PO QHS  Fenofibrate 145mg PO QHS    Dementia  Donepezil 5mg PO QHS  Memantine 5mg PO BID    COVID19 Infection  Incidental on prior hospitalization. No hypoxia. COVID Pending.     MDD/Anxiety  Sertraline 100mg PO q24    Recent Falls  No obvious repeat fall. Fall Precautions. Workup last admission negative  NSx eval for possible NPH     DVT ppx : heparin s/c    76F with a h/o CVA (no prior deficit), HTN, HLD, IDDM, Dementia, MDD admitted for Subacute CVA L Basal Ganglia, Mod-Severe Stenosis Prox R PCA, HTN Urgency, and Hypoglycemia. Pt also diagnosed with possible UTI on admission started on zosyn , urine cx today positive for proteus     1- Subacute CVA L Basal Ganglia Infarct, Mod-Severe Stenosis Proximal R PCA  neurology PM and R on board input appreciated   con ASA 81mg PO q24 and Atorvastatin 80mg PO QHS  Swallow eval appreciated : bite sized with crushed meds. Pt and daughter apparently has been refusing this diet rec by speech requested regular diet   aspiration precautions   per previous provider as documented ;   Pt stated that she will eat/drink what she wishes. Understands risks of aspiration PNA, intubation and hypoxia however reports that it improves her mood  Cannot deny pt her rights however , will not enable bad decision. Pt may eat what she wishes (i.e. what daughter rings) as she understands risks involved    2- UTI proteus miribalis   s/p zosyn 3 days , start  cefepime     3-HTN Urgency, improved   BP is controlled   cont   Metoprolol Succinate 25 mg lozano (reduced frpom 50 for bradycardia) y and Amlodipine 10 mg daily   Enalapril 1.25 mg IV Q 6 PRN     4-CHF  (Diastolic I HFpEF) / sinus Bradycardia  Bradycardia resolved   cont current  cardiac meds     5- IDDM with hypoglycemia on admission   improving   cont  Lantus 20 units QHS  Aspiration precautions  Lispro s/s till then   Hold Metformin while inpt     6-HLD  Lipitor 80mg PO QHS  Fenofibrate 145mg PO QHS    7-Dementia  Donepezil 5mg PO QHS  Memantine 5mg PO BID    8-COVID19 Infection  Incidental on prior hospitalization. No hypoxia    9-MDD/Anxiety  Sertraline 100mg PO q24    Recent Falls  No obvious repeat fall. Fall Precautions. Workup last admission negative  NSx eval for possible NPH     DVT ppx : heparin s/c   discharge to acute rehab likely

## 2021-11-06 NOTE — PROGRESS NOTE ADULT - SUBJECTIVE AND OBJECTIVE BOX
Patient is a 76y old  Female who presents with a chief complaint of Subacute CVA, PCA Stenosis, Uncontrolled HTN, Hypogylcemia (05 Nov 2021 20:11)      Patient seen and examined at bedside. No overnight events reported.     ALLERGIES:  No Known Allergies    MEDICATIONS  (STANDING):  amLODIPine   Tablet 10 milliGRAM(s) Oral daily  aspirin enteric coated 81 milliGRAM(s) Oral daily  atorvastatin 80 milliGRAM(s) Oral at bedtime  cefepime   IVPB 1000 milliGRAM(s) IV Intermittent every 8 hours  dextrose 40% Gel 15 Gram(s) Oral once  dextrose 5% + lactated ringers. 1000 milliLiter(s) (75 mL/Hr) IV Continuous <Continuous>  dextrose 5%. 1000 milliLiter(s) (50 mL/Hr) IV Continuous <Continuous>  dextrose 5%. 1000 milliLiter(s) (100 mL/Hr) IV Continuous <Continuous>  dextrose 50% Injectable 25 Gram(s) IV Push once  dextrose 50% Injectable 12.5 Gram(s) IV Push once  dextrose 50% Injectable 25 Gram(s) IV Push once  donepezil 5 milliGRAM(s) Oral at bedtime  fenofibrate Tablet 145 milliGRAM(s) Oral daily  glucagon  Injectable 1 milliGRAM(s) IntraMuscular once  heparin   Injectable 5000 Unit(s) SubCutaneous every 8 hours  insulin lispro (ADMELOG) corrective regimen sliding scale   SubCutaneous three times a day before meals  insulin lispro (ADMELOG) corrective regimen sliding scale   SubCutaneous at bedtime  memantine 5 milliGRAM(s) Oral two times a day  metoprolol succinate ER 25 milliGRAM(s) Oral daily  sertraline 100 milliGRAM(s) Oral daily    MEDICATIONS  (PRN):  acetaminophen     Tablet .. 650 milliGRAM(s) Oral every 6 hours PRN Temp greater or equal to 38C (100.4F), Mild Pain (1 - 3)  aluminum hydroxide/magnesium hydroxide/simethicone Suspension 30 milliLiter(s) Oral every 4 hours PRN Dyspepsia  enalaprilat Injectable 1.25 milliGRAM(s) IV Push every 6 hours PRN SBP > 160  hydrALAZINE Injectable 10 milliGRAM(s) IV Push every 6 hours PRN SBP >180  melatonin 3 milliGRAM(s) Oral at bedtime PRN Insomnia  ondansetron Injectable 4 milliGRAM(s) IV Push every 8 hours PRN Nausea and/or Vomiting    Vital Signs Last 24 Hrs  T(F): 98.5 (06 Nov 2021 04:28), Max: 98.7 (05 Nov 2021 21:20)  HR: 66 (06 Nov 2021 04:28) (57 - 76)  BP: 149/76 (06 Nov 2021 04:28) (148/60 - 184/69)  RR: 18 (06 Nov 2021 04:28) (18 - 18)  SpO2: 93% (06 Nov 2021 04:28) (93% - 97%)  I&O's Summary    05 Nov 2021 07:01  -  06 Nov 2021 07:00  --------------------------------------------------------  IN: 0 mL / OUT: 1200 mL / NET: -1200 mL        PHYSICAL EXAM:  General: awake with expressive aphasia   Neck: supple, no JVD   Lungs: CTA bilateral anteriorly   Cardio: RRR, S1/S2, No murmur  Abdomen: Soft, Nontender, Nondistended; Bowel sounds present  Extremities: No calf tenderness, No pitting edema  skin warm normal color   Neuro : limited due to aphasia , alert oriented to self and place   follows commands   moves all extremities , MDS normal, speech expressive arthralgia         LABS:                        11.8   10.92 )-----------( 261      ( 06 Nov 2021 07:36 )             38.5     11-06    142  |  104  |  11.2  ----------------------------<  162  4.1   |  25.0  |  0.53    Ca    8.9      06 Nov 2021 07:36        eGFR if Non : 93 mL/min/1.73M2 (11-06-21 @ 07:36)  eGFR if : 108 mL/min/1.73M2 (11-06-21 @ 07:36)              11-02 Chol 248 mg/dL LDL -- HDL 53 mg/dL Trig 151 mg/dL              POCT Blood Glucose.: 150 mg/dL (06 Nov 2021 08:36)  POCT Blood Glucose.: 134 mg/dL (05 Nov 2021 21:23)  POCT Blood Glucose.: 165 mg/dL (05 Nov 2021 17:49)  POCT Blood Glucose.: 186 mg/dL (05 Nov 2021 12:45)          Culture - Urine (collected 03 Nov 2021 05:40)  Source: Clean Catch Clean Catch (Midstream)  Final Report (05 Nov 2021 22:41):    >100,000 CFU/ml Proteus mirabilis  Organism: Proteus mirabilis (05 Nov 2021 22:41)  Organism: Proteus mirabilis (05 Nov 2021 22:41)      -  Amikacin: S <=16      -  Amoxicillin/Clavulanic Acid: S <=8/4      -  Ampicillin: S <=8 These ampicillin results predict results for amoxicillin      -  Ampicillin/Sulbactam: S <=4/2 Enterobacter, Klebsiella aerogenes, Citrobacter, and Serratia may develop resistance during prolonged therapy (3-4 days)      -  Aztreonam: S <=4      -  Cefazolin: S <=2 (MIC_CL_COM_ENTERIC_CEFAZU) For uncomplicated UTI with K. pneumoniae, E. coli, or P. mirablis: KIM <=16 is sensitive and KIM >=32 is resistant. This also predicts results for oral agents cefaclor, cefdinir, cefpodoxime, cefprozil, cefuroxime axetil, cephalexin and locarbef for uncomplicated UTI. Note that some isolates may be susceptible to these agents while testing resistant to cefazolin.      -  Cefepime: S <=2      -  Cefoxitin: S <=8      -  Ceftriaxone: S <=1 Enterobacter, Klebsiella aerogenes, Citrobacter, and Serratia may develop resistance during prolonged therapy      -  Ciprofloxacin: S <=0.25      -  Ertapenem: S <=0.5      -  Gentamicin: S <=2      -  Levofloxacin: S <=0.5      -  Meropenem: S <=1      -  Nitrofurantoin: R >64 Should not be used to treat pyelonephritis      -  Piperacillin/Tazobactam: S <=8      -  Tobramycin: S <=2      -  Trimethoprim/Sulfamethoxazole: S <=0.5/9.5      Method Type: KIM    Culture - Blood (collected 02 Nov 2021 07:08)  Source: .Blood Blood  Preliminary Report (04 Nov 2021 09:00):    No growth at 48 hours        RADIOLOGY & ADDITIONAL TESTS:       Patient is a 76y old  Female who presents with a chief complaint of Subacute CVA, PCA Stenosis, Uncontrolled HTN, Hypogylcemia (05 Nov 2021 20:11)      Patient seen and examined at bedside. No overnight events   she is with expressive aphasia       ALLERGIES:  No Known Allergies    MEDICATIONS  (STANDING):  amLODIPine   Tablet 10 milliGRAM(s) Oral daily  aspirin enteric coated 81 milliGRAM(s) Oral daily  atorvastatin 80 milliGRAM(s) Oral at bedtime  cefepime   IVPB 1000 milliGRAM(s) IV Intermittent every 8 hours  dextrose 40% Gel 15 Gram(s) Oral once  dextrose 5% + lactated ringers. 1000 milliLiter(s) (75 mL/Hr) IV Continuous <Continuous>  dextrose 5%. 1000 milliLiter(s) (50 mL/Hr) IV Continuous <Continuous>  dextrose 5%. 1000 milliLiter(s) (100 mL/Hr) IV Continuous <Continuous>  dextrose 50% Injectable 25 Gram(s) IV Push once  dextrose 50% Injectable 12.5 Gram(s) IV Push once  dextrose 50% Injectable 25 Gram(s) IV Push once  donepezil 5 milliGRAM(s) Oral at bedtime  fenofibrate Tablet 145 milliGRAM(s) Oral daily  glucagon  Injectable 1 milliGRAM(s) IntraMuscular once  heparin   Injectable 5000 Unit(s) SubCutaneous every 8 hours  insulin lispro (ADMELOG) corrective regimen sliding scale   SubCutaneous three times a day before meals  insulin lispro (ADMELOG) corrective regimen sliding scale   SubCutaneous at bedtime  memantine 5 milliGRAM(s) Oral two times a day  metoprolol succinate ER 25 milliGRAM(s) Oral daily  sertraline 100 milliGRAM(s) Oral daily    MEDICATIONS  (PRN):  acetaminophen     Tablet .. 650 milliGRAM(s) Oral every 6 hours PRN Temp greater or equal to 38C (100.4F), Mild Pain (1 - 3)  aluminum hydroxide/magnesium hydroxide/simethicone Suspension 30 milliLiter(s) Oral every 4 hours PRN Dyspepsia  enalaprilat Injectable 1.25 milliGRAM(s) IV Push every 6 hours PRN SBP > 160  hydrALAZINE Injectable 10 milliGRAM(s) IV Push every 6 hours PRN SBP >180  melatonin 3 milliGRAM(s) Oral at bedtime PRN Insomnia  ondansetron Injectable 4 milliGRAM(s) IV Push every 8 hours PRN Nausea and/or Vomiting    Vital Signs Last 24 Hrs  T(F): 98.5 (06 Nov 2021 04:28), Max: 98.7 (05 Nov 2021 21:20)  HR: 66 (06 Nov 2021 04:28) (57 - 76)  BP: 149/76 (06 Nov 2021 04:28) (148/60 - 184/69)  RR: 18 (06 Nov 2021 04:28) (18 - 18)  SpO2: 93% (06 Nov 2021 04:28) (93% - 97%)  I&O's Summary    05 Nov 2021 07:01  -  06 Nov 2021 07:00  --------------------------------------------------------  IN: 0 mL / OUT: 1200 mL / NET: -1200 mL        PHYSICAL EXAM:  General: awake with expressive aphasia   Neck: supple, no JVD   Lungs: CTA bilateral anteriorly   Cardio: RRR, S1/S2, No murmur  Abdomen: Soft, Nontender, Nondistended; Bowel sounds present  Extremities: No calf tenderness, No pitting edema  skin warm normal color   Neuro : limited due to aphasia , alert oriented to self and place   follows commands   moves all extremities , MDS normal, speech expressive aphasia         LABS:                        11.8   10.92 )-----------( 261      ( 06 Nov 2021 07:36 )             38.5     11-06    142  |  104  |  11.2  ----------------------------<  162  4.1   |  25.0  |  0.53    Ca    8.9      06 Nov 2021 07:36        eGFR if Non : 93 mL/min/1.73M2 (11-06-21 @ 07:36)  eGFR if : 108 mL/min/1.73M2 (11-06-21 @ 07:36)              11-02 Chol 248 mg/dL LDL -- HDL 53 mg/dL Trig 151 mg/dL              POCT Blood Glucose.: 150 mg/dL (06 Nov 2021 08:36)  POCT Blood Glucose.: 134 mg/dL (05 Nov 2021 21:23)  POCT Blood Glucose.: 165 mg/dL (05 Nov 2021 17:49)  POCT Blood Glucose.: 186 mg/dL (05 Nov 2021 12:45)          Culture - Urine (collected 03 Nov 2021 05:40)  Source: Clean Catch Clean Catch (Midstream)  Final Report (05 Nov 2021 22:41):    >100,000 CFU/ml Proteus mirabilis  Organism: Proteus mirabilis (05 Nov 2021 22:41)  Organism: Proteus mirabilis (05 Nov 2021 22:41)      -  Amikacin: S <=16      -  Amoxicillin/Clavulanic Acid: S <=8/4      -  Ampicillin: S <=8 These ampicillin results predict results for amoxicillin      -  Ampicillin/Sulbactam: S <=4/2 Enterobacter, Klebsiella aerogenes, Citrobacter, and Serratia may develop resistance during prolonged therapy (3-4 days)      -  Aztreonam: S <=4      -  Cefazolin: S <=2 (MIC_CL_COM_ENTERIC_CEFAZU) For uncomplicated UTI with K. pneumoniae, E. coli, or P. mirablis: KIM <=16 is sensitive and KIM >=32 is resistant. This also predicts results for oral agents cefaclor, cefdinir, cefpodoxime, cefprozil, cefuroxime axetil, cephalexin and locarbef for uncomplicated UTI. Note that some isolates may be susceptible to these agents while testing resistant to cefazolin.      -  Cefepime: S <=2      -  Cefoxitin: S <=8      -  Ceftriaxone: S <=1 Enterobacter, Klebsiella aerogenes, Citrobacter, and Serratia may develop resistance during prolonged therapy      -  Ciprofloxacin: S <=0.25      -  Ertapenem: S <=0.5      -  Gentamicin: S <=2      -  Levofloxacin: S <=0.5      -  Meropenem: S <=1      -  Nitrofurantoin: R >64 Should not be used to treat pyelonephritis      -  Piperacillin/Tazobactam: S <=8      -  Tobramycin: S <=2      -  Trimethoprim/Sulfamethoxazole: S <=0.5/9.5      Method Type: KIM    Culture - Blood (collected 02 Nov 2021 07:08)  Source: .Blood Blood  Preliminary Report (04 Nov 2021 09:00):    No growth at 48 hours        RADIOLOGY & ADDITIONAL TESTS:

## 2021-11-07 LAB
CULTURE RESULTS: SIGNIFICANT CHANGE UP
GLUCOSE BLDC GLUCOMTR-MCNC: 163 MG/DL — HIGH (ref 70–99)
GLUCOSE BLDC GLUCOMTR-MCNC: 213 MG/DL — HIGH (ref 70–99)
GLUCOSE BLDC GLUCOMTR-MCNC: 250 MG/DL — HIGH (ref 70–99)
GLUCOSE BLDC GLUCOMTR-MCNC: 293 MG/DL — HIGH (ref 70–99)
SPECIMEN SOURCE: SIGNIFICANT CHANGE UP

## 2021-11-07 PROCEDURE — 99223 1ST HOSP IP/OBS HIGH 75: CPT

## 2021-11-07 PROCEDURE — 93010 ELECTROCARDIOGRAM REPORT: CPT

## 2021-11-07 PROCEDURE — 99232 SBSQ HOSP IP/OBS MODERATE 35: CPT

## 2021-11-07 RX ORDER — LISINOPRIL 2.5 MG/1
20 TABLET ORAL DAILY
Refills: 0 | Status: DISCONTINUED | OUTPATIENT
Start: 2021-11-07 | End: 2021-11-09

## 2021-11-07 RX ORDER — INSULIN GLARGINE 100 [IU]/ML
15 INJECTION, SOLUTION SUBCUTANEOUS AT BEDTIME
Refills: 0 | Status: DISCONTINUED | OUTPATIENT
Start: 2021-11-07 | End: 2021-11-09

## 2021-11-07 RX ORDER — LISINOPRIL 2.5 MG/1
5 TABLET ORAL DAILY
Refills: 0 | Status: DISCONTINUED | OUTPATIENT
Start: 2021-11-07 | End: 2021-11-07

## 2021-11-07 RX ADMIN — INSULIN GLARGINE 15 UNIT(S): 100 INJECTION, SOLUTION SUBCUTANEOUS at 22:27

## 2021-11-07 RX ADMIN — Medication 145 MILLIGRAM(S): at 12:55

## 2021-11-07 RX ADMIN — HEPARIN SODIUM 5000 UNIT(S): 5000 INJECTION INTRAVENOUS; SUBCUTANEOUS at 06:03

## 2021-11-07 RX ADMIN — CEFEPIME 100 MILLIGRAM(S): 1 INJECTION, POWDER, FOR SOLUTION INTRAMUSCULAR; INTRAVENOUS at 06:04

## 2021-11-07 RX ADMIN — Medication 2: at 12:54

## 2021-11-07 RX ADMIN — Medication 81 MILLIGRAM(S): at 12:55

## 2021-11-07 RX ADMIN — LISINOPRIL 20 MILLIGRAM(S): 2.5 TABLET ORAL at 17:44

## 2021-11-07 RX ADMIN — Medication 1: at 08:53

## 2021-11-07 RX ADMIN — HEPARIN SODIUM 5000 UNIT(S): 5000 INJECTION INTRAVENOUS; SUBCUTANEOUS at 12:55

## 2021-11-07 RX ADMIN — MEMANTINE HYDROCHLORIDE 5 MILLIGRAM(S): 10 TABLET ORAL at 17:44

## 2021-11-07 RX ADMIN — Medication 10 MILLIGRAM(S): at 18:45

## 2021-11-07 RX ADMIN — Medication 3: at 17:20

## 2021-11-07 RX ADMIN — SERTRALINE 100 MILLIGRAM(S): 25 TABLET, FILM COATED ORAL at 12:55

## 2021-11-07 RX ADMIN — AMLODIPINE BESYLATE 10 MILLIGRAM(S): 2.5 TABLET ORAL at 06:03

## 2021-11-07 RX ADMIN — MEMANTINE HYDROCHLORIDE 5 MILLIGRAM(S): 10 TABLET ORAL at 06:02

## 2021-11-07 RX ADMIN — Medication 25 MILLIGRAM(S): at 06:03

## 2021-11-07 RX ADMIN — DONEPEZIL HYDROCHLORIDE 5 MILLIGRAM(S): 10 TABLET, FILM COATED ORAL at 22:25

## 2021-11-07 RX ADMIN — HEPARIN SODIUM 5000 UNIT(S): 5000 INJECTION INTRAVENOUS; SUBCUTANEOUS at 22:25

## 2021-11-07 RX ADMIN — ATORVASTATIN CALCIUM 80 MILLIGRAM(S): 80 TABLET, FILM COATED ORAL at 22:25

## 2021-11-07 NOTE — CONSULT NOTE ADULT - SUBJECTIVE AND OBJECTIVE BOX
[facility]  INFECTIOUS DISEASES AND INTERNAL MEDICINE at [place]  =======================================================  Dawit Green MD  Diplomates American Board of Internal Medicine and Infectious Diseases  Tel: [phone]      Fax: [phone]  =======================================================      [id number]  [facility]REST    CC: Patient is a 76y old  Female who presents with a chief complaint of Subacute CVA, PCA Stenosis, Uncontrolled HTN, Hypogylcemia (06 Nov 2021 15:31)      76y  Female with h/o CVA (no prior deficit), HTN, HLD, IDDM, Dementia, MDD admitted for Subacute CVA L Basal Ganglia, Mod-Severe Stenosis Prox R PCA, HTN Urgency, and Hypoglycemia. Pt also diagnosed with possible UTI on admission started on zosyn , urine culture positive for proteus. ID input requested.       Past Medical & Surgical Hx:  Rheumatoid arthritis  Diabetes mellitus, type 2  Hypertension  HLD (hyperlipidemia)  Dementia  History of CVA (cerebrovascular accident)  History of dental surgery      Social Hx:  Former smoker       FAMILY HISTORY:  Heart Disease - Daughter       Allergies  No Known Allergies       REVIEW OF SYSTEMS:  CONSTITUTIONAL:  No Fever or chills  HEENT:  No diplopia or blurred vision.  No earache, sore throat or runny nose.  CARDIOVASCULAR:  No pressure, squeezing, strangling, tightness, heaviness or aching about the chest, neck, axilla or epigastrium.  RESPIRATORY:  No cough, shortness of breath  GASTROINTESTINAL:  No nausea, vomiting or diarrhea.  GENITOURINARY:  No dysuria, frequency or urgency. No Blood in urine  MUSCULOSKELETAL:  no joint aches, no muscle pain  SKIN:  No change in skin, hair or nails.  NEUROLOGIC:  No Headaches, seizures   PSYCHIATRIC:  No disorder of thought or mood.  ENDOCRINE:  No heat or cold intolerance  HEMATOLOGICAL:  No easy bruising or bleeding.       Physical Exam:  GEN: NAD, pleasant  HEENT: normocephalic and atraumatic. EOMI. PERRL.  Anicteric  NECK: Supple.   LUNGS: Clear to auscultation.  HEART: Regular rate and rhythm  ABDOMEN: Soft, nontender, and nondistended.  Positive bowel sounds.    : No CVA tenderness  EXTREMITIES: Without any edema.  MSK: No joint swelling  NEUROLOGIC: No Focal Deficits  PSYCHIATRIC: Appropriate affect .  SKIN: No Rash      Vitals:  T(F): 97.8 (07 Nov 2021 04:27), Max: 98.7 (06 Nov 2021 16:21)  HR: 75 (07 Nov 2021 04:27)  BP: 171/69 (07 Nov 2021 04:27)  RR: 18 (07 Nov 2021 04:27)  SpO2: 96% (07 Nov 2021 04:27) (91% - 96%)  temp max in last 48H T(F): , Max: 98.7 (11-05-21 @ 21:20)      Current Antibiotics:  cefepime   IVPB 1000 milliGRAM(s) IV Intermittent every 8 hours    Other medications:  amLODIPine   Tablet 10 milliGRAM(s) Oral daily  aspirin enteric coated 81 milliGRAM(s) Oral daily  atorvastatin 80 milliGRAM(s) Oral at bedtime  dextrose 40% Gel 15 Gram(s) Oral once  dextrose 5% + lactated ringers. 1000 milliLiter(s) IV Continuous <Continuous>  dextrose 5%. 1000 milliLiter(s) IV Continuous <Continuous>  dextrose 5%. 1000 milliLiter(s) IV Continuous <Continuous>  dextrose 50% Injectable 25 Gram(s) IV Push once  dextrose 50% Injectable 12.5 Gram(s) IV Push once  dextrose 50% Injectable 25 Gram(s) IV Push once  donepezil 5 milliGRAM(s) Oral at bedtime  fenofibrate Tablet 145 milliGRAM(s) Oral daily  glucagon  Injectable 1 milliGRAM(s) IntraMuscular once  heparin   Injectable 5000 Unit(s) SubCutaneous every 8 hours  insulin lispro (ADMELOG) corrective regimen sliding scale   SubCutaneous three times a day before meals  insulin lispro (ADMELOG) corrective regimen sliding scale   SubCutaneous at bedtime  memantine 5 milliGRAM(s) Oral two times a day  metoprolol succinate ER 25 milliGRAM(s) Oral daily  sertraline 100 milliGRAM(s) Oral daily                            11.8   10.92 )-----------( 261      ( 06 Nov 2021 07:36 )             38.5     11-06    142  |  104  |  11.2  ----------------------------<  162<H>  4.1   |  25.0  |  0.53    Ca    8.9      06 Nov 2021 07:36      RECENT CULTURES:  11-03 @ 05:40 Clean Catch Clean Catch (Midstream) Proteus mirabilis    >100,000 CFU/ml Proteus mirabilis    11-02 @ 07:08 .Blood Blood     No growth at 5 days.        WBC Count: 10.92 K/uL (11-06-21 @ 07:36)  WBC Count: 11.22 K/uL (11-05-21 @ 03:19)  WBC Count: 9.86 K/uL (11-04-21 @ 03:35)  WBC Count: 11.20 K/uL (11-03-21 @ 07:42)    Creatinine, Serum: 0.53 mg/dL (11-06-21 @ 07:36)  Creatinine, Serum: 0.53 mg/dL (11-05-21 @ 03:19)  Creatinine, Serum: 0.61 mg/dL (11-04-21 @ 03:35)  Creatinine, Serum: 0.53 mg/dL (11-03-21 @ 07:42)    Procalcitonin, Serum: 0.07 ng/mL (11-02-21 @ 07:08)     SARS-CoV-2: NotDetec (11-01-21 @ 21:55)  Rapid RVP Result: NotDetec (11-01-21 @ 21:55)    Urinalysis + Microscopic Examination (11.03.21 @ 00:11)    Nitrite: Positive    Ketone - Urine: Trace    Bilirubin: Negative    Color: Yellow    Glucose Qualitative, Urine: Negative    Blood, Urine: Large    Protein, Urine: 500 mg/dL    pH Urine: 9.0    Leukocyte Esterase Concentration: Moderate    Specific Gravity: 1.015    Urine Appearance: Turbid    Urobilinogen: 1    Red Blood Cell - Urine: 6-10 /HPF    White Blood Cell - Urine: 3-5    Epithelial Cells: Occasional    Triple Phosphate Crystals: Moderate    Bacteria: Moderate    Comment - Urine: moderate amorphous seen 11/03/2021 00:25:08 EDT

## 2021-11-07 NOTE — CONSULT NOTE ADULT - REASON FOR ADMISSION
Subacute CVA, PCA Stenosis, Uncontrolled HTN, Hypogylcemia

## 2021-11-07 NOTE — PROGRESS NOTE ADULT - SUBJECTIVE AND OBJECTIVE BOX
Patient is a 76y old  Female who presents with a chief complaint of Subacute CVA, PCA Stenosis, Uncontrolled HTN, Hypogylcemia     she is seen in am , awake alert , forgetfull   no distress   resting in the bed , no overnight events   cardiac monitor reviewed , SB in 40's -50's at night     asymptomatic   ID consult rec  appreciated     MEDICATIONS  (STANDING):  amLODIPine   Tablet 10 milliGRAM(s) Oral daily  aspirin enteric coated 81 milliGRAM(s) Oral daily  atorvastatin 80 milliGRAM(s) Oral at bedtime  dextrose 40% Gel 15 Gram(s) Oral once  dextrose 5%. 1000 milliLiter(s) (100 mL/Hr) IV Continuous <Continuous>  dextrose 5%. 1000 milliLiter(s) (50 mL/Hr) IV Continuous <Continuous>  dextrose 50% Injectable 25 Gram(s) IV Push once  dextrose 50% Injectable 12.5 Gram(s) IV Push once  dextrose 50% Injectable 25 Gram(s) IV Push once  donepezil 5 milliGRAM(s) Oral at bedtime  fenofibrate Tablet 145 milliGRAM(s) Oral daily  glucagon  Injectable 1 milliGRAM(s) IntraMuscular once  heparin   Injectable 5000 Unit(s) SubCutaneous every 8 hours  insulin glargine Injectable (LANTUS) 15 Unit(s) SubCutaneous at bedtime  insulin lispro (ADMELOG) corrective regimen sliding scale   SubCutaneous three times a day before meals  insulin lispro (ADMELOG) corrective regimen sliding scale   SubCutaneous at bedtime  lisinopril 5 milliGRAM(s) Oral daily  memantine 5 milliGRAM(s) Oral two times a day  metoprolol succinate ER 25 milliGRAM(s) Oral daily  sertraline 100 milliGRAM(s) Oral daily      CAPILLARY BLOOD GLUCOSE      POCT Blood Glucose.: 163 mg/dL (07 Nov 2021 08:12)  POCT Blood Glucose.: 146 mg/dL (06 Nov 2021 21:37)  POCT Blood Glucose.: 222 mg/dL (06 Nov 2021 17:34)  POCT Blood Glucose.: 334 mg/dL (06 Nov 2021 12:00)    Vital Signs Last 24 Hrs  T(C): 36.6 (07 Nov 2021 09:27), Max: 37.1 (06 Nov 2021 16:21)  T(F): 97.9 (07 Nov 2021 09:27), Max: 98.7 (06 Nov 2021 16:21)  HR: 72 (07 Nov 2021 09:27) (71 - 75)  BP: 153/76 (07 Nov 2021 09:27) (145/58 - 171/69)  BP(mean): --  RR: 18 (07 Nov 2021 09:27) (18 - 18)  SpO2: 93% (07 Nov 2021 09:27) (91% - 96%)      PHYSICAL EXAM:  General: awake with expressive aphasia   Neck: supple, no JVD   Lungs: CTA bilateral anteriorly   Cardio: RRR, S1/S2, No murmur  Abdomen: Soft, Nontender, Nondistended; Bowel sounds present  Extremities: No calf tenderness, No pitting edema  skin warm normal color   Neuro : limited due to aphasia , alert oriented to self and place   follows commands   moves all extremities , MDS normal, speech expressive aphasia                           11.8   10.92 )-----------( 261      ( 06 Nov 2021 07:36 )             38.5   11-06    142  |  104  |  11.2  ----------------------------<  162<H>  4.1   |  25.0  |  0.53    Ca    8.9      06 Nov 2021 07:36              POCT Blood Glucose.: 150 mg/dL (06 Nov 2021 08:36)  POCT Blood Glucose.: 134 mg/dL (05 Nov 2021 21:23)  POCT Blood Glucose.: 165 mg/dL (05 Nov 2021 17:49)  POCT Blood Glucose.: 186 mg/dL (05 Nov 2021 12:45)          Culture - Urine (collected 03 Nov 2021 05:40)  Source: Clean Catch Clean Catch (Midstream)  Final Report (05 Nov 2021 22:41):    >100,000 CFU/ml Proteus mirabilis  Organism: Proteus mirabilis (05 Nov 2021 22:41)  Organism: Proteus mirabilis (05 Nov 2021 22:41)      -  Amikacin: S <=16      -  Amoxicillin/Clavulanic Acid: S <=8/4      -  Ampicillin: S <=8 These ampicillin results predict results for amoxicillin      -  Ampicillin/Sulbactam: S <=4/2 Enterobacter, Klebsiella aerogenes, Citrobacter, and Serratia may develop resistance during prolonged therapy (3-4 days)      -  Aztreonam: S <=4      -  Cefazolin: S <=2 (MIC_CL_COM_ENTERIC_CEFAZU) For uncomplicated UTI with K. pneumoniae, E. coli, or P. mirablis: KIM <=16 is sensitive and KIM >=32 is resistant. This also predicts results for oral agents cefaclor, cefdinir, cefpodoxime, cefprozil, cefuroxime axetil, cephalexin and locarbef for uncomplicated UTI. Note that some isolates may be susceptible to these agents while testing resistant to cefazolin.      -  Cefepime: S <=2      -  Cefoxitin: S <=8      -  Ceftriaxone: S <=1 Enterobacter, Klebsiella aerogenes, Citrobacter, and Serratia may develop resistance during prolonged therapy      -  Ciprofloxacin: S <=0.25      -  Ertapenem: S <=0.5      -  Gentamicin: S <=2      -  Levofloxacin: S <=0.5      -  Meropenem: S <=1      -  Nitrofurantoin: R >64 Should not be used to treat pyelonephritis      -  Piperacillin/Tazobactam: S <=8      -  Tobramycin: S <=2      -  Trimethoprim/Sulfamethoxazole: S <=0.5/9.5      Method Type: KIM    Culture - Blood (collected 02 Nov 2021 07:08)  Source: .Blood Blood  Preliminary Report (04 Nov 2021 09:00):    No growth at 48 hours        RADIOLOGY & ADDITIONAL TESTS:

## 2021-11-07 NOTE — PROGRESS NOTE ADULT - ASSESSMENT
76F with a h/o CVA (no prior deficit), HTN, HLD, IDDM, Dementia, MDD admitted for Subacute CVA L Basal Ganglia, Mod-Severe Stenosis Prox R PCA, HTN Urgency, and Hypoglycemia. Pt also diagnosed with possible UTI on admission started on zosyn , urine cx today positive for proteus , IFD consulted, HTN meds adjusted , bradycardic metoprolol dose decreased , hypoglycemia improved started lantus ;low dose     1- Subacute CVA L Basal Ganglia Infarct, Mod-Severe Stenosis Proximal R PCA  neurology,  PM and R on board ; input appreciated   con ASA 81mg PO q24 and Atorvastatin 80mg PO QHS  Swallow eval appreciated : bite sized with crushed meds. Pt and daughter apparently has been refusing this diet rec by speech requested regular diet   aspiration precautions   per previous provider as documented ;   Pt stated that she will eat/drink what she wishes. Understands risks of aspiration PNA, intubation and hypoxia however reports that it improves her mood  Cannot deny pt her rights however , will not enable bad decision. Pt may eat what she wishes (i.e. what daughter rings) as she understands risks involved    2- UTI proteus miribalis   s/p zosyn 3 days , started cefepime 11/06   ID input appreciated   d/w Dr sEtes     3-HTN Urgency  labile high last 24 hours   start lisinopril( old records reviewed pt was on quinapril at home few months ago   cont to monitor   cont   Metoprolol Succinate 25 mg lozano (reduced from 50 for bradycardia 9 and amlodipine       4-CHF  (Diastolic I HFpEF) / sinus Bradycardia  Bradycardia resolved   cont cardiac monitor   check ECG r/o arythmia   cont aspirin , B blocker statin     5- IDDM with hypoglycemia on admission   improving BG   resume lantus at bedtime 15 unit today , to adjust dose as needed   off lantus   20 units QHS  cont diet and accuchcek sliding scale insulin coverage   Hold Metformin while inpt     6-HLD  Lipitor 80mg PO QHS  Fenofibrate 145mg PO QHS    7-Dementia, stable   Donepezil 5mg PO QHS  Memantine 5mg PO BID    8-h/o recent COVID19 Infection  Incidental on prior hospitalization.   No hypoxia    9-MDD/Anxiety  Sertraline 100mg PO q24    Recent Falls  No obvious repeat fall. Fall Precautions. Workup last admission negative  NSx eval for possible NPH as uotpatient   discharge to acute rehab in 24 hours   check vit d levls , folate vit b 12       DVT ppx : heparin s/c   discharge to acute rehab likely in 1-2 days

## 2021-11-07 NOTE — DIETITIAN INITIAL EVALUATION ADULT. - ORAL INTAKE PTA/DIET HISTORY
Pt with good po intake, no weight loss noted. A1C noted of 7.6. Pt seen by SLP yesterday and diet recommended to be changed to easy to chew with mildly thick liquids. Diet not upgraded as of yet. Encouraged pt to follow consistent CHO diet, Dash and diet as per SLP. Pt with aphasia. Plan is GÓMEZ.

## 2021-11-07 NOTE — DIETITIAN INITIAL EVALUATION ADULT. - DIET TYPE
DASH/TLC (sodium and cholesterol restricted diet)/consistent carbohydrate (evening snack)/mildly thick liquids/easy to chew

## 2021-11-07 NOTE — DIETITIAN INITIAL EVALUATION ADULT. - OTHER INFO
76y old  Female who presents with a chief complaint of Subacute CVA, PCA Stenosis, Uncontrolled HTN, Hypoglycemia, dementia, now with UTI on abx.

## 2021-11-07 NOTE — CONSULT NOTE ADULT - ASSESSMENT
76yf presents with hx of ams x 3 days   Pt now present with new CVA, dementia and diff with gait.   Ct of the brain left basal ganglia infarct, numerous chronic lacunar infarcts and extensive small vessel disease. no heme.       Plan  1 Pt now being worked up for stroke etiology -Ct angio demonstrates mod to severe stenosis of the prox right PCA  2. BP - must maintain control  3.  PT/OT/PMR  4. will discuss with Dr Ocampo -would likely prefer that the patient to attend rehab and recover from the acute phase of CVA prior to beginning the normal out patient work up for NPH
IMP: Dementia NPH versus Vascular dementia or Mixed dementia.  - Left thalmo-capsular infarct. ( Age indeterminate)    PLan:/Rec:  - MRI Brain Stroke protocol.  - Would resume ASA 81 mg PO if OK with Neurosurgery ( after MRI Brain)  Lipitor 80 mg PO for LDL goal of < 80  SBP goal keep normotensive.  PT OT Eval  Neurosurgery eval regarding plan for inpatient NPH evaluation with high volume spinal tap and objective gait assessment..  Neuropsycholgy evaluation.    
76y  Female with h/o CVA (no prior deficit), HTN, HLD, IDDM, Dementia, MDD admitted for Subacute CVA L Basal Ganglia, Mod-Severe Stenosis Prox R PCA, HTN Urgency, and Hypoglycemia. Pt also diagnosed with possible UTI on admission started on zosyn , urine culture positive for proteus.      Uncomplicated UTI, Proteus mirabilis  Subacute CVA L Basal Ganglia      - Blood cultures no growth   - Urine culture Proteus mirabilis  - RVP/COVID 19 PCR negative   - UA minimally positive   - Procalcitonin level 0.07  - Patient has no urinary symptoms  - Discontinue  antibiotics   - Follow up cultures  - Trend Fever  - Trend WBC      Thank you for allowing me to participate in the care of your patient.   Will sign off. Please call PRN      d/w Marcy

## 2021-11-07 NOTE — DIETITIAN INITIAL EVALUATION ADULT. - PERTINENT LABORATORY DATA
11-06 Na142 mmol/L Glu 162 mg/dL<H> K+ 4.1 mmol/L Cr  0.53 mg/dL BUN 11.2 mg/dL Phos n/a   Alb n/a   PAB n/a

## 2021-11-08 LAB
24R-OH-CALCIDIOL SERPL-MCNC: 48.3 NG/ML — SIGNIFICANT CHANGE UP (ref 30–80)
ANION GAP SERPL CALC-SCNC: 11 MMOL/L — SIGNIFICANT CHANGE UP (ref 5–17)
BUN SERPL-MCNC: 18.5 MG/DL — SIGNIFICANT CHANGE UP (ref 8–20)
CALCIUM SERPL-MCNC: 8.6 MG/DL — SIGNIFICANT CHANGE UP (ref 8.6–10.2)
CHLORIDE SERPL-SCNC: 106 MMOL/L — SIGNIFICANT CHANGE UP (ref 98–107)
CO2 SERPL-SCNC: 27 MMOL/L — SIGNIFICANT CHANGE UP (ref 22–29)
CREAT SERPL-MCNC: 0.48 MG/DL — LOW (ref 0.5–1.3)
FOLATE SERPL-MCNC: 16.8 NG/ML — SIGNIFICANT CHANGE UP
GLUCOSE BLDC GLUCOMTR-MCNC: 178 MG/DL — HIGH (ref 70–99)
GLUCOSE BLDC GLUCOMTR-MCNC: 188 MG/DL — HIGH (ref 70–99)
GLUCOSE BLDC GLUCOMTR-MCNC: 269 MG/DL — HIGH (ref 70–99)
GLUCOSE BLDC GLUCOMTR-MCNC: 269 MG/DL — HIGH (ref 70–99)
GLUCOSE BLDC GLUCOMTR-MCNC: 302 MG/DL — HIGH (ref 70–99)
GLUCOSE SERPL-MCNC: 204 MG/DL — HIGH (ref 70–99)
HCT VFR BLD CALC: 37.9 % — SIGNIFICANT CHANGE UP (ref 34.5–45)
HGB BLD-MCNC: 11.7 G/DL — SIGNIFICANT CHANGE UP (ref 11.5–15.5)
MCHC RBC-ENTMCNC: 23.4 PG — LOW (ref 27–34)
MCHC RBC-ENTMCNC: 30.9 GM/DL — LOW (ref 32–36)
MCV RBC AUTO: 75.8 FL — LOW (ref 80–100)
PLATELET # BLD AUTO: 264 K/UL — SIGNIFICANT CHANGE UP (ref 150–400)
POTASSIUM SERPL-MCNC: 4 MMOL/L — SIGNIFICANT CHANGE UP (ref 3.5–5.3)
POTASSIUM SERPL-SCNC: 4 MMOL/L — SIGNIFICANT CHANGE UP (ref 3.5–5.3)
RBC # BLD: 5 M/UL — SIGNIFICANT CHANGE UP (ref 3.8–5.2)
RBC # FLD: 18.4 % — HIGH (ref 10.3–14.5)
SARS-COV-2 RNA SPEC QL NAA+PROBE: SIGNIFICANT CHANGE UP
SODIUM SERPL-SCNC: 144 MMOL/L — SIGNIFICANT CHANGE UP (ref 135–145)
TSH SERPL-MCNC: 2.44 UIU/ML — SIGNIFICANT CHANGE UP (ref 0.27–4.2)
VIT B12 SERPL-MCNC: 738 PG/ML — SIGNIFICANT CHANGE UP (ref 232–1245)
WBC # BLD: 12 K/UL — HIGH (ref 3.8–10.5)
WBC # FLD AUTO: 12 K/UL — HIGH (ref 3.8–10.5)

## 2021-11-08 PROCEDURE — 99233 SBSQ HOSP IP/OBS HIGH 50: CPT

## 2021-11-08 PROCEDURE — 99497 ADVNCD CARE PLAN 30 MIN: CPT

## 2021-11-08 RX ORDER — HYDRALAZINE HCL 50 MG
10 TABLET ORAL
Refills: 0 | Status: DISCONTINUED | OUTPATIENT
Start: 2021-11-08 | End: 2021-11-11

## 2021-11-08 RX ADMIN — Medication 1 TABLET(S): at 18:19

## 2021-11-08 RX ADMIN — MEMANTINE HYDROCHLORIDE 5 MILLIGRAM(S): 10 TABLET ORAL at 05:03

## 2021-11-08 RX ADMIN — HEPARIN SODIUM 5000 UNIT(S): 5000 INJECTION INTRAVENOUS; SUBCUTANEOUS at 05:04

## 2021-11-08 RX ADMIN — HEPARIN SODIUM 5000 UNIT(S): 5000 INJECTION INTRAVENOUS; SUBCUTANEOUS at 14:29

## 2021-11-08 RX ADMIN — Medication 10 MILLIGRAM(S): at 05:03

## 2021-11-08 RX ADMIN — ATORVASTATIN CALCIUM 80 MILLIGRAM(S): 80 TABLET, FILM COATED ORAL at 23:07

## 2021-11-08 RX ADMIN — Medication 650 MILLIGRAM(S): at 09:05

## 2021-11-08 RX ADMIN — Medication 145 MILLIGRAM(S): at 13:10

## 2021-11-08 RX ADMIN — AMLODIPINE BESYLATE 10 MILLIGRAM(S): 2.5 TABLET ORAL at 05:03

## 2021-11-08 RX ADMIN — DONEPEZIL HYDROCHLORIDE 5 MILLIGRAM(S): 10 TABLET, FILM COATED ORAL at 23:07

## 2021-11-08 RX ADMIN — Medication 1: at 23:09

## 2021-11-08 RX ADMIN — Medication 10 MILLIGRAM(S): at 19:07

## 2021-11-08 RX ADMIN — Medication 650 MILLIGRAM(S): at 08:35

## 2021-11-08 RX ADMIN — Medication 25 MILLIGRAM(S): at 05:03

## 2021-11-08 RX ADMIN — LISINOPRIL 20 MILLIGRAM(S): 2.5 TABLET ORAL at 05:03

## 2021-11-08 RX ADMIN — Medication 81 MILLIGRAM(S): at 13:10

## 2021-11-08 RX ADMIN — HEPARIN SODIUM 5000 UNIT(S): 5000 INJECTION INTRAVENOUS; SUBCUTANEOUS at 23:07

## 2021-11-08 RX ADMIN — Medication 1: at 18:17

## 2021-11-08 RX ADMIN — Medication 1: at 08:35

## 2021-11-08 RX ADMIN — Medication 3: at 13:08

## 2021-11-08 RX ADMIN — INSULIN GLARGINE 15 UNIT(S): 100 INJECTION, SOLUTION SUBCUTANEOUS at 23:08

## 2021-11-08 RX ADMIN — MEMANTINE HYDROCHLORIDE 5 MILLIGRAM(S): 10 TABLET ORAL at 19:07

## 2021-11-08 RX ADMIN — SERTRALINE 100 MILLIGRAM(S): 25 TABLET, FILM COATED ORAL at 13:10

## 2021-11-08 NOTE — CHART NOTE - NSCHARTNOTEFT_GEN_A_CORE
Hospital Medicine    Repeat FS post d50 amp x1 >70. D5W started 50cc/hr. Repeat Accucheck with recurrent hypoglycemia. Additional D50 x1 given. D5W increased to 100cc/hr. Changed to Accucheck q4. Repeat FS pending.
PA NOTE-MEDICINE  (LATE ENTRY)    Called by RN due to Pt's daughter wanting to give Pt Thin Liquids.   Pt admitted with Subacute CVA, PCA Stenosis, Uncontrolled HTN, Hypogylcemia  Had S/S Eval RX'd Thickened Liquids/ Soft Bite sized food but now reported to be coughing after feeds Daughter thinks it post nasal gtt causing coughing while eating.   NPO X Meds with applesause   Reeval Speech/Swall for AM  Diabetic;  D5 NS @ 75/hr   Q 6hr POCT Glucose   Continue to Monitor Pt   Recall PA if any changed in pt status
RN called to report positive UTI for patient , stated urine foul smelling and cloudy.  Urinalysis Basic - ( 2021 00:11 )    Color: Yellow / Appearance: Turbid / S.015 / pH: x  Gluc: x / Ketone: Trace  / Bili: Negative / Urobili: 1   Blood: x / Protein: 500 mg/dL / Nitrite: Positive   Leuk Esterase: Moderate / RBC: 6-10 /HPF / WBC 3-5   Sq Epi: x / Non Sq Epi: Occasional / Bacteria: Moderate    stat order for zosyn 3.1 gram IV ordered.  Urine cultures pending
Discussed with Spouse Emmanuel, patient was following rheumatology for RA, s/p infusions in the past.  Patient's plan to follow up with rheumatology in January 2022.  No infusions scheduled at this time.

## 2021-11-08 NOTE — PROGRESS NOTE ADULT - CONVERSATION DETAILS
pt is awake alert , asked her re goal advance directives , if she had conversation with her family re CPR . intubation   she said no, wishes not to be ressussiated , however would defer filling form until we discuss with her   called the daughter spoke  to her on the phone , all questions answered

## 2021-11-08 NOTE — PROGRESS NOTE ADULT - SUBJECTIVE AND OBJECTIVE BOX
Patient feels well.  Reports some left shoulder pain.   Discussed with Dr. Vidal and PT.     REVIEW OF SYSTEMS  Constitutional - No fever,  +fatigue  HEENT - No vertigo, No neck pain  Neurological - No headaches, +loss of strength, No numbness, No tremors  Musculoskeletal - +joint pain, No joint swelling, No muscle pain  Psychiatric - No depression, No anxiety    FUNCTIONAL PROGRESS  11/8 PT  Bed Mobility  Bed Mobility Training Supine-to-Sit: moderate assist (50% patient effort);  1 person assist  Bed Mobility Training Limitations: impaired ability to control trunk for mobility;  decreased strength;  impaired balance;  impaired postural control;  left/posterior lean in sitting    Sit-Stand Transfer Training  Transfer Training Sit-to-Stand Transfer: moderate assist (50% patient effort);  1 person assist;  weight-bearing as tolerated   rolling walker  Transfer Training Stand-to-Sit Transfer: moderate assist (50% patient effort);  1 person assist;  weight-bearing as tolerated   rolling walker  Sit-to-Stand Transfer Training Transfer Safety Analysis: decreased balance;  decreased weight-shifting ability;  decreased sequencing ability;  decreased strength;  impaired balance;  impaired postural control;  rolling walker    Gait Training  Gait Training: moderate assist (50% patient effort);  1 person assist;  weight-bearing as tolerated   rolling walker;  bed to chair  Gait Analysis: 2-point gait   decreased armaan;  shuffling;  retropulsion;  decreased step length;  decreased trunk rotation;  decreased weight-shifting ability;  posterior lean, difficulty advancing BLEs, verbal cues needed for sequencing. ;  impaired balance;  decreased strength;  impaired coordination;  impaired postural control;  Bed to Chair;  rolling walker        VITALS  T(C): 36.7 (11-08-21 @ 09:51), Max: 37.1 (11-07-21 @ 17:11)  HR: 61 (11-08-21 @ 09:51) (61 - 75)  BP: 128/73 (11-08-21 @ 09:51) (128/73 - 183/66)  RR: 18 (11-08-21 @ 09:51) (15 - 18)  SpO2: 98% (11-08-21 @ 09:51) (95% - 98%)  Wt(kg): --    MEDICATIONS   acetaminophen     Tablet .. 650 milliGRAM(s) every 6 hours PRN  aluminum hydroxide/magnesium hydroxide/simethicone Suspension 30 milliLiter(s) every 4 hours PRN  amLODIPine   Tablet 10 milliGRAM(s) daily  aspirin enteric coated 81 milliGRAM(s) daily  atorvastatin 80 milliGRAM(s) at bedtime  dextrose 40% Gel 15 Gram(s) once  dextrose 5%. 1000 milliLiter(s) <Continuous>  dextrose 5%. 1000 milliLiter(s) <Continuous>  dextrose 50% Injectable 25 Gram(s) once  dextrose 50% Injectable 12.5 Gram(s) once  dextrose 50% Injectable 25 Gram(s) once  donepezil 5 milliGRAM(s) at bedtime  enalaprilat Injectable 1.25 milliGRAM(s) every 6 hours PRN  fenofibrate Tablet 145 milliGRAM(s) daily  glucagon  Injectable 1 milliGRAM(s) once  heparin   Injectable 5000 Unit(s) every 8 hours  hydrALAZINE Injectable 10 milliGRAM(s) every 6 hours PRN  insulin glargine Injectable (LANTUS) 15 Unit(s) at bedtime  insulin lispro (ADMELOG) corrective regimen sliding scale   three times a day before meals  insulin lispro (ADMELOG) corrective regimen sliding scale   at bedtime  lisinopril 20 milliGRAM(s) daily  melatonin 3 milliGRAM(s) at bedtime PRN  memantine 5 milliGRAM(s) two times a day  metoprolol succinate ER 25 milliGRAM(s) daily  ondansetron Injectable 4 milliGRAM(s) every 8 hours PRN  sertraline 100 milliGRAM(s) daily      RECENT LABS/IMAGING                          11.7   12.00 )-----------( 264      ( 08 Nov 2021 07:08 )             37.9     11-08    144  |  106  |  18.5  ----------------------------<  204<H>  4.0   |  27.0  |  0.48<L>    Ca    8.6      08 Nov 2021 07:08                    CT head 11/1 - -Interval development of age-indeterminate left basal ganglia lacunar infarct. Consider MRI for further evaluation of acuity. -Numerous chronic lacunar infarcts and extensive small vessel disease again seen. -No acute intracranial hemorrhage.    CT angiogram head 11/1 - -Moderate to severe stenoses of the proximal right PCA.    CT angiogram neck 11/1 - -Atherosclerotic changes without hemodynamically significant stenosis.    MRI HEAD 11/3 - -Acute infarct involving the left posterior limb internal capsule/corona radiata. Additional smaller punctate acute infarcts involving the left anterior frontal corona radiata and right splenium. -Numerous punctate chronic microhemorrhages, suggestive of underlying amyloid angiopathy.  ----------------------------------------------------------------------------------------  PHYSICAL EXAM  Constitutional - NAD, Comfortable  Extremities - No C/C/E, No calf tenderness   Neurologic Exam -                    Cognitive - AAO to self, PART of situation, being in hospital      Communication - Fluent, No dysarthria     Cranial Nerves - CN 2-12 intact     Motor - No focal/ lateralization of deficits, overall 4+/5     Sensory - Intact to LT     Reflexes - DTR Intact, No primitive reflexive  Psychiatric - Mood stable, Affect WNL  ----------------------------------------------------------------------------------------  ASSESSMENT/PLAN  76yFemale with functional deficits after developing AMS  R/O CVA - ASA, Tricor, Lipitor   HTN - Lisinopril, Norvasc, Toprol, Hydralazine, Vasotec  UTI - Zosyn  Memory - Namanda, Zoloft  DM2 - Lantus  Sleep - Melatonin PRN  Oropharyngeal Dysphagia - Mildly thick Liquids  Pain - Tylenol  DVT PPX - SCDs, Heparin  Rehab - Recommend ACUTE inpatient rehabilitation for the functional deficits consisting of 3 hours of therapy/day & 24 hour RN/daily PMR physician for comorbid medical management. Will continue to follow for ongoing rehab needs and recommendations. Patient will be able to tolerate 3 hours a day.    Continue bedside therapy as well as OOB throughout the day with mobilization throughout the day with staff to maintain cardiopulmonary function and prevention of secondary complications related to debility.     Discussed with rehab clinical team.

## 2021-11-08 NOTE — PROGRESS NOTE ADULT - ASSESSMENT
76F with a h/o CVA (no prior deficit), HTN, HLD, IDDM, Dementia, MDD admitted for Subacute CVA L Basal Ganglia, Mod-Severe Stenosis Prox R PCA, HTN Urgency, and Hypoglycemia. Pt also diagnosed with possible UTI on admission started on zosyn , urine cx today positive for proteus , IFD consulted, HTN meds adjusted , bradycardic metoprolol dose decreased , hypoglycemia improved started llow dose lantus , high BP meds adjusted added hydralazine     1- Subacute CVA L Basal Ganglia Infarct, Mod-Severe Stenosis Proximal R PCA  neurology,  PM and R on board ; input appreciated   con ASA 81mg PO q24 and Atorvastatin 80mg PO QHS  Swallow eval appreciated : bite sized with crushed meds. Pt and daughter apparently has been refusing this diet rec by speech requested regular diet   aspiration precautions   per previous provider as documented ;   Pt stated that she will eat/drink what she wishes. Understands risks of aspiration PNA, intubation and hypoxia however reports that it improves her mood  Cannot deny pt her rights however , will not enable bad decision. Pt may eat what she wishes (i.e. what daughter rings) as she understands risks involved    2- UTI proteus miribalis   s/p zosyn 3 days , started cefepime 11/06   ID input appreciated   d/w Dr Estes     3-HTN Urgency, labile   improving   add hydralazine , cont lisinopril and metoprolol   start lisinopril( old records reviewed pt was on quinapril at home few months ago       4-CHF  (Diastolic I HFpEF) / sinus Bradycardia  Bradycardia improved , discontinue cardiac monitor   cont aspirin , B blocker , statin     5- IDDM with hypoglycemia on admission   improving BG   on lantus  qhs 15 unit , to adjust dose as needed   cont diet and accuchcek sliding scale insulin coverage   Hold Metformin while inpatient     6-HLD  Lipitor 80mg PO QHS  Fenofibrate 145mg PO QHS    7-Dementia, stable   on Donepezil 5mg PO QHS and   Memantine 5mg PO BID    8-h/o recent COVID 19 Infection  Incidental on prior hospitalization.   No hypoxia    9-MDD/Anxiety  Sertraline 100mg PO q24    10-Recent Falls  No obvious repeat fall. Fall Precautions. Workup last admission negative  NSx eval for possible NPH as out patient     discharge to acute rehab soon         DVT ppx : heparin s/c   discharge to acute rehab likely in 1-2 days

## 2021-11-08 NOTE — PROGRESS NOTE ADULT - SUBJECTIVE AND OBJECTIVE BOX
Patient is a 76y old  Female who presents with a chief complaint of Subacute CVA, PCA Stenosis, Uncontrolled HTN, Hypogylcemia     she is seen in am ,getting out of bed with PT c/o left shoulder arm discomfort   mod assist with PT sitting in the chair seen again later   now denies shoulder pain     BP is elevated , cont to adjust meds   no overnight events reported     Vital Signs Last 24 Hrs  T(C): 36.7 (08 Nov 2021 09:51), Max: 37.1 (07 Nov 2021 17:11)  T(F): 98.1 (08 Nov 2021 09:51), Max: 98.8 (07 Nov 2021 17:11)  HR: 61 (08 Nov 2021 09:51) (61 - 75)  BP: 128/73 (08 Nov 2021 09:51) (128/73 - 183/66)  BP(mean): 105 (08 Nov 2021 04:55) (105 - 105)  RR: 18 (08 Nov 2021 09:51) (15 - 18)  SpO2: 98% (08 Nov 2021 09:51) (95% - 98%)        PHYSICAL EXAM:  General: awake with expressive aphasia  Lungs: CTA bilateral anteriorly   Cardio: RRR, S1/S2, No murmur  Abdomen: Soft, Nontender, Nondistended; Bowel sounds present  Extremities: No calf tenderness, No pitting edema  skin warm normal color   Neuro : limited due to aphasia , alert oriented to self and place , no focal deficits   follows commands   moves all extremities , MDS normal, speech expressive aphasia   Musculoskletal : ROM in left shoulder normal                                11.7   12.00 )-----------( 264      ( 08 Nov 2021 07:08 )             37.9   11-08    144  |  106  |  18.5  ----------------------------<  204<H>  4.0   |  27.0  |  0.48<L>    Ca    8.6      08 Nov 2021 07:08          CAPILLARY BLOOD GLUCOSE      POCT Blood Glucose.: 178 mg/dL (08 Nov 2021 08:33)  POCT Blood Glucose.: 213 mg/dL (07 Nov 2021 22:23)  POCT Blood Glucose.: 293 mg/dL (07 Nov 2021 17:11)  POCT Blood Glucose.: 250 mg/dL (07 Nov 2021 12:04)      Culture - Urine (collected 03 Nov 2021 05:40)  Source: Clean Catch Clean Catch (Midstream)  Final Report (05 Nov 2021 22:41):    >100,000 CFU/ml Proteus mirabilis  Organism: Proteus mirabilis (05 Nov 2021 22:41)  Organism: Proteus mirabilis (05 Nov 2021 22:41)      -  Amikacin: S <=16      -  Amoxicillin/Clavulanic Acid: S <=8/4      -  Ampicillin: S <=8 These ampicillin results predict results for amoxicillin      -  Ampicillin/Sulbactam: S <=4/2 Enterobacter, Klebsiella aerogenes, Citrobacter, and Serratia may develop resistance during prolonged therapy (3-4 days)      -  Aztreonam: S <=4      -  Cefazolin: S <=2 (MIC_CL_COM_ENTERIC_CEFAZU) For uncomplicated UTI with K. pneumoniae, E. coli, or P. mirablis: KIM <=16 is sensitive and KIM >=32 is resistant. This also predicts results for oral agents cefaclor, cefdinir, cefpodoxime, cefprozil, cefuroxime axetil, cephalexin and locarbef for uncomplicated UTI. Note that some isolates may be susceptible to these agents while testing resistant to cefazolin.      -  Cefepime: S <=2      -  Cefoxitin: S <=8      -  Ceftriaxone: S <=1 Enterobacter, Klebsiella aerogenes, Citrobacter, and Serratia may develop resistance during prolonged therapy      -  Ciprofloxacin: S <=0.25      -  Ertapenem: S <=0.5      -  Gentamicin: S <=2      -  Levofloxacin: S <=0.5      -  Meropenem: S <=1      -  Nitrofurantoin: R >64 Should not be used to treat pyelonephritis      -  Piperacillin/Tazobactam: S <=8      -  Tobramycin: S <=2      -  Trimethoprim/Sulfamethoxazole: S <=0.5/9.5      Method Type: KIM    Culture - Blood (collected 02 Nov 2021 07:08)  Source: .Blood Blood  Preliminary Report (04 Nov 2021 09:00):    No growth at 48 hours        RADIOLOGY & ADDITIONAL TESTS:

## 2021-11-09 ENCOUNTER — TRANSCRIPTION ENCOUNTER (OUTPATIENT)
Age: 76
End: 2021-11-09

## 2021-11-09 LAB
ANION GAP SERPL CALC-SCNC: 11 MMOL/L — SIGNIFICANT CHANGE UP (ref 5–17)
BUN SERPL-MCNC: 22.9 MG/DL — HIGH (ref 8–20)
CALCIUM SERPL-MCNC: 9.2 MG/DL — SIGNIFICANT CHANGE UP (ref 8.6–10.2)
CHLORIDE SERPL-SCNC: 103 MMOL/L — SIGNIFICANT CHANGE UP (ref 98–107)
CO2 SERPL-SCNC: 27 MMOL/L — SIGNIFICANT CHANGE UP (ref 22–29)
CREAT SERPL-MCNC: 0.51 MG/DL — SIGNIFICANT CHANGE UP (ref 0.5–1.3)
GLUCOSE BLDC GLUCOMTR-MCNC: 211 MG/DL — HIGH (ref 70–99)
GLUCOSE BLDC GLUCOMTR-MCNC: 222 MG/DL — HIGH (ref 70–99)
GLUCOSE BLDC GLUCOMTR-MCNC: 234 MG/DL — HIGH (ref 70–99)
GLUCOSE BLDC GLUCOMTR-MCNC: 281 MG/DL — HIGH (ref 70–99)
GLUCOSE SERPL-MCNC: 263 MG/DL — HIGH (ref 70–99)
HCT VFR BLD CALC: 40.2 % — SIGNIFICANT CHANGE UP (ref 34.5–45)
HGB BLD-MCNC: 12.5 G/DL — SIGNIFICANT CHANGE UP (ref 11.5–15.5)
MCHC RBC-ENTMCNC: 24.1 PG — LOW (ref 27–34)
MCHC RBC-ENTMCNC: 31.1 GM/DL — LOW (ref 32–36)
MCV RBC AUTO: 77.5 FL — LOW (ref 80–100)
PLATELET # BLD AUTO: 298 K/UL — SIGNIFICANT CHANGE UP (ref 150–400)
POTASSIUM SERPL-MCNC: 4.5 MMOL/L — SIGNIFICANT CHANGE UP (ref 3.5–5.3)
POTASSIUM SERPL-SCNC: 4.5 MMOL/L — SIGNIFICANT CHANGE UP (ref 3.5–5.3)
RBC # BLD: 5.19 M/UL — SIGNIFICANT CHANGE UP (ref 3.8–5.2)
RBC # FLD: 18.5 % — HIGH (ref 10.3–14.5)
SODIUM SERPL-SCNC: 141 MMOL/L — SIGNIFICANT CHANGE UP (ref 135–145)
WBC # BLD: 12.35 K/UL — HIGH (ref 3.8–10.5)
WBC # FLD AUTO: 12.35 K/UL — HIGH (ref 3.8–10.5)

## 2021-11-09 PROCEDURE — 74230 X-RAY XM SWLNG FUNCJ C+: CPT | Mod: 26

## 2021-11-09 PROCEDURE — 99233 SBSQ HOSP IP/OBS HIGH 50: CPT

## 2021-11-09 PROCEDURE — 99232 SBSQ HOSP IP/OBS MODERATE 35: CPT

## 2021-11-09 RX ORDER — LISINOPRIL 2.5 MG/1
1 TABLET ORAL
Qty: 0 | Refills: 0 | DISCHARGE
Start: 2021-11-09

## 2021-11-09 RX ORDER — LISINOPRIL 2.5 MG/1
20 TABLET ORAL ONCE
Refills: 0 | Status: COMPLETED | OUTPATIENT
Start: 2021-11-09 | End: 2021-11-09

## 2021-11-09 RX ORDER — LISINOPRIL 2.5 MG/1
40 TABLET ORAL DAILY
Refills: 0 | Status: DISCONTINUED | OUTPATIENT
Start: 2021-11-10 | End: 2021-11-11

## 2021-11-09 RX ORDER — HYDRALAZINE HCL 50 MG
1 TABLET ORAL
Qty: 0 | Refills: 0 | DISCHARGE
Start: 2021-11-09

## 2021-11-09 RX ORDER — INSULIN DEGLUDEC 100 U/ML
0 INJECTION, SOLUTION SUBCUTANEOUS
Qty: 0 | Refills: 0 | DISCHARGE

## 2021-11-09 RX ORDER — INSULIN GLARGINE 100 [IU]/ML
18 INJECTION, SOLUTION SUBCUTANEOUS AT BEDTIME
Refills: 0 | Status: DISCONTINUED | OUTPATIENT
Start: 2021-11-09 | End: 2021-11-10

## 2021-11-09 RX ORDER — METOPROLOL TARTRATE 50 MG
1 TABLET ORAL
Qty: 0 | Refills: 0 | DISCHARGE

## 2021-11-09 RX ORDER — ACETAMINOPHEN 500 MG
2 TABLET ORAL
Qty: 0 | Refills: 0 | DISCHARGE
Start: 2021-11-09

## 2021-11-09 RX ORDER — METOPROLOL TARTRATE 50 MG
1 TABLET ORAL
Qty: 0 | Refills: 0 | DISCHARGE
Start: 2021-11-09

## 2021-11-09 RX ORDER — PIPERACILLIN AND TAZOBACTAM 4; .5 G/20ML; G/20ML
3.38 INJECTION, POWDER, LYOPHILIZED, FOR SOLUTION INTRAVENOUS ONCE
Refills: 0 | Status: COMPLETED | OUTPATIENT
Start: 2021-11-09 | End: 2021-11-09

## 2021-11-09 RX ORDER — PIPERACILLIN AND TAZOBACTAM 4; .5 G/20ML; G/20ML
3.38 INJECTION, POWDER, LYOPHILIZED, FOR SOLUTION INTRAVENOUS EVERY 8 HOURS
Refills: 0 | Status: DISCONTINUED | OUTPATIENT
Start: 2021-11-09 | End: 2021-11-11

## 2021-11-09 RX ORDER — INSULIN GLARGINE 100 [IU]/ML
22 INJECTION, SOLUTION SUBCUTANEOUS
Qty: 0 | Refills: 0 | DISCHARGE
Start: 2021-11-09

## 2021-11-09 RX ORDER — INSULIN GLARGINE 100 [IU]/ML
18 INJECTION, SOLUTION SUBCUTANEOUS
Qty: 0 | Refills: 0 | DISCHARGE
Start: 2021-11-09

## 2021-11-09 RX ORDER — SACCHAROMYCES BOULARDII 250 MG
250 POWDER IN PACKET (EA) ORAL
Refills: 0 | Status: DISCONTINUED | OUTPATIENT
Start: 2021-11-09 | End: 2021-11-11

## 2021-11-09 RX ADMIN — Medication 25 MILLIGRAM(S): at 07:15

## 2021-11-09 RX ADMIN — MEMANTINE HYDROCHLORIDE 5 MILLIGRAM(S): 10 TABLET ORAL at 18:25

## 2021-11-09 RX ADMIN — Medication 81 MILLIGRAM(S): at 11:07

## 2021-11-09 RX ADMIN — Medication 2: at 09:20

## 2021-11-09 RX ADMIN — PIPERACILLIN AND TAZOBACTAM 200 GRAM(S): 4; .5 INJECTION, POWDER, LYOPHILIZED, FOR SOLUTION INTRAVENOUS at 15:25

## 2021-11-09 RX ADMIN — Medication 1: at 21:23

## 2021-11-09 RX ADMIN — Medication 3 MILLIGRAM(S): at 21:23

## 2021-11-09 RX ADMIN — SERTRALINE 100 MILLIGRAM(S): 25 TABLET, FILM COATED ORAL at 11:06

## 2021-11-09 RX ADMIN — Medication 10 MILLIGRAM(S): at 19:13

## 2021-11-09 RX ADMIN — INSULIN GLARGINE 18 UNIT(S): 100 INJECTION, SOLUTION SUBCUTANEOUS at 21:40

## 2021-11-09 RX ADMIN — Medication 650 MILLIGRAM(S): at 11:10

## 2021-11-09 RX ADMIN — Medication 145 MILLIGRAM(S): at 11:07

## 2021-11-09 RX ADMIN — HEPARIN SODIUM 5000 UNIT(S): 5000 INJECTION INTRAVENOUS; SUBCUTANEOUS at 13:48

## 2021-11-09 RX ADMIN — ATORVASTATIN CALCIUM 80 MILLIGRAM(S): 80 TABLET, FILM COATED ORAL at 21:24

## 2021-11-09 RX ADMIN — Medication 1 TABLET(S): at 07:24

## 2021-11-09 RX ADMIN — MEMANTINE HYDROCHLORIDE 5 MILLIGRAM(S): 10 TABLET ORAL at 07:15

## 2021-11-09 RX ADMIN — AMLODIPINE BESYLATE 10 MILLIGRAM(S): 2.5 TABLET ORAL at 07:16

## 2021-11-09 RX ADMIN — Medication 2: at 18:22

## 2021-11-09 RX ADMIN — HEPARIN SODIUM 5000 UNIT(S): 5000 INJECTION INTRAVENOUS; SUBCUTANEOUS at 07:16

## 2021-11-09 RX ADMIN — HEPARIN SODIUM 5000 UNIT(S): 5000 INJECTION INTRAVENOUS; SUBCUTANEOUS at 21:23

## 2021-11-09 RX ADMIN — Medication 10 MILLIGRAM(S): at 07:15

## 2021-11-09 RX ADMIN — DONEPEZIL HYDROCHLORIDE 5 MILLIGRAM(S): 10 TABLET, FILM COATED ORAL at 21:23

## 2021-11-09 RX ADMIN — LISINOPRIL 20 MILLIGRAM(S): 2.5 TABLET ORAL at 07:15

## 2021-11-09 RX ADMIN — Medication 2: at 13:46

## 2021-11-09 NOTE — DISCHARGE NOTE PROVIDER - CARE PROVIDER_API CALL
Huan Oquendo; PhD)  Neurology; Vascular Neurology  [address] [facility], [address]  [place] [address]  Phone: [phone]  Fax: [phone]  Follow Up Time:     Alexander Arevalo)  Neurology; Vascular Neurology  [address] [facility], [address] [place] [id number]  Phone: [phone]  Fax: [phone]  Follow Up Time:     Primary doctor,   Phone: (   )    -  Fax: (   )    -  Follow Up Time:

## 2021-11-09 NOTE — DISCHARGE NOTE PROVIDER - HOSPITAL COURSE
76F with a h/o CVA (no prior deficit), HTN, HLD, IDDM, Dementia, MDD admitted for Subacute CVA L Basal Ganglia, Mod-Severe Stenosis Prox R PCA, HTN Urgency, and Hypoglycemia. Patient followed by neurology.  Per family, patient was followed by neurosurgery for evaluation of possible NPH with LP as outpatient.  Neurosurgery recommending outpatient follow up after rehab.  Pt also diagnosed with possible UTI on admission started on IV antibiotics, urine cx positive for proteus , ID consulted. HTN meds adjusted , bradycardic metoprolol dose decreased , hypoglycemia improved started low dose Lantus , high BP meds adjusted added hydralazine.  MBS performed and recommended easy to chew with thin liquids, with 100% supervision.  Patient followed by PT and PM&R and recommended acute rehab.  Patient stable for discharge to acute rehab today.

## 2021-11-09 NOTE — PROGRESS NOTE ADULT - SUBJECTIVE AND OBJECTIVE BOX
Patient is confused this AM, but calm.  Reports no pain this AM.   As per RN, family is noncompliant with diet.     REVIEW OF SYSTEMS  Constitutional - No fever,  +fatigue  Neurological - No headaches, +memory loss, No loss of strength   Musculoskeletal - No joint pain, No joint swelling, No muscle pain    FUNCTIONAL PROGRESS  11/8 PT  Bed Mobility  Bed Mobility Training Supine-to-Sit: moderate assist (50% patient effort);  1 person assist  Bed Mobility Training Limitations: impaired ability to control trunk for mobility;  decreased strength;  impaired balance;  impaired postural control;  left/posterior lean in sitting    Sit-Stand Transfer Training  Transfer Training Sit-to-Stand Transfer: moderate assist (50% patient effort);  1 person assist;  weight-bearing as tolerated   rolling walker  Transfer Training Stand-to-Sit Transfer: moderate assist (50% patient effort);  1 person assist;  weight-bearing as tolerated   rolling walker  Sit-to-Stand Transfer Training Transfer Safety Analysis: decreased balance;  decreased weight-shifting ability;  decreased sequencing ability;  decreased strength;  impaired balance;  impaired postural control;  rolling walker    Gait Training  Gait Training: moderate assist (50% patient effort);  1 person assist;  weight-bearing as tolerated   rolling walker;  bed to chair  Gait Analysis: 2-point gait   decreased armaan;  shuffling;  retropulsion;  decreased step length;  decreased trunk rotation;  decreased weight-shifting ability;  posterior lean, difficulty advancing BLEs, verbal cues needed for sequencing. ;  impaired balance;  decreased strength;  impaired coordination;  impaired postural control;  Bed to Chair;  rolling walker        VITALS  T(C): 36.8 (11-09-21 @ 04:10), Max: 36.8 (11-09-21 @ 04:10)  HR: 64 (11-09-21 @ 04:10) (61 - 68)  BP: 176/71 (11-09-21 @ 04:10) (128/73 - 176/71)  RR: 18 (11-09-21 @ 04:10) (18 - 18)  SpO2: 97% (11-09-21 @ 04:10) (95% - 98%)  Wt(kg): --    MEDICATIONS   acetaminophen     Tablet .. 650 milliGRAM(s) every 6 hours PRN  aluminum hydroxide/magnesium hydroxide/simethicone Suspension 30 milliLiter(s) every 4 hours PRN  amLODIPine   Tablet 10 milliGRAM(s) daily  amoxicillin  875 milliGRAM(s)/clavulanate 1 Tablet(s) every 12 hours  aspirin enteric coated 81 milliGRAM(s) daily  atorvastatin 80 milliGRAM(s) at bedtime  dextrose 40% Gel 15 Gram(s) once  dextrose 5%. 1000 milliLiter(s) <Continuous>  dextrose 5%. 1000 milliLiter(s) <Continuous>  dextrose 50% Injectable 25 Gram(s) once  dextrose 50% Injectable 12.5 Gram(s) once  dextrose 50% Injectable 25 Gram(s) once  donepezil 5 milliGRAM(s) at bedtime  fenofibrate Tablet 145 milliGRAM(s) daily  glucagon  Injectable 1 milliGRAM(s) once  heparin   Injectable 5000 Unit(s) every 8 hours  hydrALAZINE 10 milliGRAM(s) two times a day  insulin glargine Injectable (LANTUS) 15 Unit(s) at bedtime  insulin lispro (ADMELOG) corrective regimen sliding scale   three times a day before meals  insulin lispro (ADMELOG) corrective regimen sliding scale   at bedtime  lisinopril 20 milliGRAM(s) daily  melatonin 3 milliGRAM(s) at bedtime PRN  memantine 5 milliGRAM(s) two times a day  metoprolol succinate ER 25 milliGRAM(s) daily  ondansetron Injectable 4 milliGRAM(s) every 8 hours PRN  sertraline 100 milliGRAM(s) daily      RECENT LABS/IMAGING                          11.7   12.00 )-----------( 264      ( 08 Nov 2021 07:08 )             37.9     11-08    144  |  106  |  18.5  ----------------------------<  204<H>  4.0   |  27.0  |  0.48<L>    Ca    8.6      08 Nov 2021 07:08                  CT head 11/1 - -Interval development of age-indeterminate left basal ganglia lacunar infarct. Consider MRI for further evaluation of acuity. -Numerous chronic lacunar infarcts and extensive small vessel disease again seen. -No acute intracranial hemorrhage.    CT angiogram head 11/1 - -Moderate to severe stenoses of the proximal right PCA.    CT angiogram neck 11/1 - -Atherosclerotic changes without hemodynamically significant stenosis.    MRI HEAD 11/3 - -Acute infarct involving the left posterior limb internal capsule/corona radiata. Additional smaller punctate acute infarcts involving the left anterior frontal corona radiata and right splenium. -Numerous punctate chronic microhemorrhages, suggestive of underlying amyloid angiopathy.    ----------------------------------------------------------------------------------------  PHYSICAL EXAM  Constitutional - NAD, Comfortable  Extremities - No C/C/E, No calf tenderness   Neurologic Exam -                    Cognitive - AAO to self, PART of situation      Communication - Fluent, No dysarthria     Cranial Nerves - CN 2-12 intact     Motor - No focal/ lateralization of deficits, overall 4+/5     Sensory - Intact to LT     Reflexes - DTR Intact, No primitive reflexive  Psychiatric - Mood stable, Affect WNL  ----------------------------------------------------------------------------------------  ASSESSMENT/PLAN  76yFemale with functional deficits after developing AMS  R/O CVA - ASA, Tricor, Lipitor   HTN - Lisinopril, Norvasc, Toprol, Hydralazine, Vasotec  UTI - Augmentin  Memory - Namenda, Zoloft  DM2 - Lantus  Sleep - Melatonin PRN  Oropharyngeal Dysphagia - Mildly thick Liquids, MBS planned 11/9  Pain - Tylenol  DVT PPX - SCDs, Heparin  Rehab - Pending ongoing progress, continue to recommend ACUTE inpatient rehabilitation for the functional deficits consisting of 3 hours of therapy/day & 24 hour RN/daily PMR physician for comorbid medical management. Will continue to follow for ongoing rehab needs and recommendations. Patient will be able to tolerate 3 hours a day.    Continue bedside therapy as well as OOB throughout the day with mobilization throughout the day with staff to maintain cardiopulmonary function and prevention of secondary complications related to debility.     Discussed with rehab clinical team.

## 2021-11-09 NOTE — DISCHARGE NOTE PROVIDER - CARE PROVIDERS DIRECT ADDRESSES
[email or web address]

## 2021-11-09 NOTE — PROGRESS NOTE ADULT - ASSESSMENT
76F with a h/o CVA (no prior deficit), HTN, HLD, IDDM, Dementia, MDD admitted for Subacute CVA L Basal Ganglia, Mod-Severe Stenosis Prox R PCA, HTN Urgency, and Hypoglycemia. Pt also diagnosed with possible UTI on admission started on zosyn , urine cx today positive for proteus , IFD consulted, HTN meds adjusted , bradycardic metoprolol dose decreased , hypoglycemia improved started llow dose lantus , high BP meds adjusted added hydralazine     1- Subacute CVA L Basal Ganglia Infarct, Mod-Severe Stenosis Proximal R PCA  neurology,  PM and R  input appreciated   con ASA 81mg PO q24 and Atorvastatin 80mg PO QHS  Swallow eval appreciated : s/p MBS will change diet to esay chew m thin liquid per speech   100 % supervision for aspiration precautions   stable for acute rehab     2- UTI proteus miribalis   s/p zosyn 3 days , per Id no further iv abx , pt is asymptomatic   repeat CBC is pending   ID input appreciated   complete augmentin course     3-HTN Urgency, labile   improving , meds adjusted   increase lisinopril home dose quanipril 40 mg   on metoprolol, hydralazine increase dose frequency if needed   BP si better today   add hydralazine , cont lisinopril and metoprolol     4-CHF  (Diastolic I HFpEF) / sinus Bradycardia  Bradycardia improved , discontinue cardiac monitor   cont aspirin , low dose B blocker , statin     5- IDDM with hypoglycemia on admission   improving BG . cont to adjust lantus for BG control, increase dose to 18 unit today   at home on 20 units howevere she was with poor oral inatke and low BG on admission   cont diet and accuchcek sliding scale insulin coverage   Hold Metformin while inpatient     6-HLD LDL goal < 70   cont high dose intensity statin ; Lipitor 80mg PO QHS  Fenofibrate 145mg PO QHS    7-Dementia, stable   on Donepezil 5mg PO QHS and   Memantine 5mg PO BID    8-h/o recent COVID 19 Infection  Incidental on prior hospitalization.   No hypoxia  repeated COVID swab     9-MDD/Anxiety  Sertraline 100mg PO q24    10-Recent Falls  No obvious repeat fall. Fall Precautions. Workup last admission negative  NSx eval for possible NPH as out patient after rehab   updated the daughter on the phone       discharge to acute rehab soon         DVT ppx : heparin s/c   discharge to acute rehab likely in 1-2 days

## 2021-11-09 NOTE — SWALLOW VFSS/MBS ASSESSMENT ADULT - ROSENBEK'S PENETRATION ASPIRATION SCALE
(1) no aspiration, contrast does not enter airway x 1 via self-administration of larger cup sip; 1- no penetration or aspiration noted w/controlled amount of intake/(3) contrast remains above the vocal cords, visible residue remains (penetration) x 1 via larger cup sip; 1- no penetration or aspiration noted w/controlled amount of intake/(3) contrast remains above the vocal cords, visible residue remains (penetration)

## 2021-11-09 NOTE — PROGRESS NOTE ADULT - SUBJECTIVE AND OBJECTIVE BOX
Patient is a 76y old  Female who presents with a chief complaint of Subacute CVA, PCA Stenosis, Uncontrolled HTN, Hypogylcemia     she is seen examined , s/p MBS this am   d/w speech therapy will change the diet as recommended       Vital Signs Last 24 Hrs  T(C): 36.9 (09 Nov 2021 09:00), Max: 36.9 (09 Nov 2021 09:00)  T(F): 98.5 (09 Nov 2021 09:00), Max: 98.5 (09 Nov 2021 09:00)  HR: 57 (09 Nov 2021 11:00) (57 - 84)  BP: 147/67 (09 Nov 2021 11:00) (129/83 - 176/71)  BP(mean): --  RR: 18 (09 Nov 2021 09:00) (18 - 18)  SpO2: 96% (09 Nov 2021 09:00) (95% - 97%)      PHYSICAL EXAM:  General: awake with expressive aphasia  Lungs: CTA bilateral anteriorly   Cardio: RRR, S1/S2, No murmur  Abdomen: Soft, Nontender, Nondistended; Bowel sounds present  Extremities: No calf tenderness, No pitting edema  skin warm normal color   Neuro : , alert oriented to self and place , no focal deficits   follows commands   moves all extremities , MDS normal, speech expressive aphasia          cbc is pending                           11.7   12.00 )-----------( 264      ( 08 Nov 2021 07:08 )             37.9   11-08    144  |  106  |  18.5  ----------------------------<  204<H>  4.0   |  27.0  |  0.48<L>    Ca    8.6      08 Nov 2021 07:08        CAPILLARY BLOOD GLUCOSE      POCT Blood Glucose.: 222 mg/dL (09 Nov 2021 09:08)  POCT Blood Glucose.: 269 mg/dL (08 Nov 2021 23:05)  POCT Blood Glucose.: 302 mg/dL (08 Nov 2021 21:47)  POCT Blood Glucose.: 188 mg/dL (08 Nov 2021 17:20)  POCT Blood Glucose.: 269 mg/dL (08 Nov 2021 13:04)    Culture - Urine (collected 03 Nov 2021 05:40)  Source: Clean Catch Clean Catch (Midstream)  Final Report (05 Nov 2021 22:41):    >100,000 CFU/ml Proteus mirabilis  Organism: Proteus mirabilis (05 Nov 2021 22:41)  Organism: Proteus mirabilis (05 Nov 2021 22:41)      -  Amikacin: S <=16      -  Amoxicillin/Clavulanic Acid: S <=8/4      -  Ampicillin: S <=8 These ampicillin results predict results for amoxicillin      -  Ampicillin/Sulbactam: S <=4/2 Enterobacter, Klebsiella aerogenes, Citrobacter, and Serratia may develop resistance during prolonged therapy (3-4 days)      -  Aztreonam: S <=4      -  Cefazolin: S <=2 (MIC_CL_COM_ENTERIC_CEFAZU) For uncomplicated UTI with K. pneumoniae, E. coli, or P. mirablis: KIM <=16 is sensitive and KIM >=32 is resistant. This also predicts results for oral agents cefaclor, cefdinir, cefpodoxime, cefprozil, cefuroxime axetil, cephalexin and locarbef for uncomplicated UTI. Note that some isolates may be susceptible to these agents while testing resistant to cefazolin.      -  Cefepime: S <=2      -  Cefoxitin: S <=8      -  Ceftriaxone: S <=1 Enterobacter, Klebsiella aerogenes, Citrobacter, and Serratia may develop resistance during prolonged therapy      -  Ciprofloxacin: S <=0.25      -  Ertapenem: S <=0.5      -  Gentamicin: S <=2      -  Levofloxacin: S <=0.5      -  Meropenem: S <=1      -  Nitrofurantoin: R >64 Should not be used to treat pyelonephritis      -  Piperacillin/Tazobactam: S <=8      -  Tobramycin: S <=2      -  Trimethoprim/Sulfamethoxazole: S <=0.5/9.5      Method Type: KIM    Culture - Blood (collected 02 Nov 2021 07:08)  Source: .Blood Blood  Preliminary Report (04 Nov 2021 09:00):    No growth at 48 hours        RADIOLOGY & ADDITIONAL TESTS:

## 2021-11-09 NOTE — DISCHARGE NOTE PROVIDER - NSDCFUADDAPPT_GEN_ALL_CORE_FT
STAR:  Patient has a prescheduled appt with neurologist , Dr Alexander Arevalo ( [phone]) on ___________________________

## 2021-11-09 NOTE — SWALLOW VFSS/MBS ASSESSMENT ADULT - ORAL PREP COMMENTS
difficulty for self-administration of liquid trials via cup; requiring 1:1 assist difficulty for self-administration of liquid trials via cup sip; requiring 1:1 assist

## 2021-11-09 NOTE — SWALLOW VFSS/MBS ASSESSMENT ADULT - ORAL PHASE
within functional limits/Uncontrolled bolus / spillover in jeny-pharynx within functional limits/Uncontrolled bolus / spillover in jeny-pharynx/Uncontrolled bolus / spillover in hypopharynx Within functional limits/Uncontrolled bolus / spillover in jeny-pharynx Delayed oral transit time/Reduced anterior - posterior transport

## 2021-11-09 NOTE — SWALLOW VFSS/MBS ASSESSMENT ADULT - SLP GENERAL OBSERVATIONS
Pt recd awake/upright in stretcher in radiology, A&A Ox2, reduced cognition, aphasic, increased processing time, 0/10 pain pre/post, tolerating RA no overt distress.

## 2021-11-09 NOTE — SWALLOW VFSS/MBS ASSESSMENT ADULT - SLP PERTINENT HISTORY OF CURRENT PROBLEM
As per MD note, "76F with a h/o CVA (no prior deficit), HTN, HLD, IDDM, Dementia, MDD admitted for Subacute CVA L Basal Ganglia, Mod-Severe Stenosis Prox R PCA, HTN Urgency, and Hypoglycemia. Pt also diagnosed with possible UTI on admission started on zosyn , urine cx today positive for proteus , IFD consulted, HTN meds adjusted , bradycardic metoprolol dose decreased , hypoglycemia improved started llow dose lantus , high BP meds adjusted added hydralazine".

## 2021-11-09 NOTE — DISCHARGE NOTE PROVIDER - NSDCMRMEDTOKEN_GEN_ALL_CORE_FT
acetaminophen 325 mg oral tablet: 2 tab(s) orally every 6 hours, As needed, Temp greater or equal to 38C (100.4F), Mild Pain (1 - 3)  aspirin 81 mg oral tablet, chewable: 1 tab(s) orally once a day  atorvastatin 80 mg oral tablet: 1 tab(s) orally once a day  donepezil 5 mg oral tablet: 1 tab(s) orally once a day (at bedtime)  fenofibrate 145 mg oral tablet: 1 tab(s) orally once a day  hydrALAZINE 10 mg oral tablet: 1 tab(s) orally 2 times a day  insulin glargine: 18 unit(s) subcutaneous once a day (at bedtime)  lisinopril 40 mg oral tablet: 1 tab(s) orally once a day  memantine 5 mg oral tablet: 1 tab(s) orally 2 times a day  metFORMIN 500 mg oral tablet: 1 tab(s) orally 2 times a day  metoprolol succinate 25 mg oral tablet, extended release: 1 tab(s) orally once a day  Metoprolol Succinate ER 50 mg oral tablet, extended release: 1 tab(s) orally once a day  sertraline 100 mg oral tablet: 1 tab(s) orally once a day   acetaminophen 325 mg oral tablet: 2 tab(s) orally every 6 hours, As needed, Temp greater or equal to 38C (100.4F), Mild Pain (1 - 3)  amoxicillin-clavulanate 875 mg-125 mg oral tablet: 1 tab(s) orally every 12 hours for 2more days  aspirin 81 mg oral tablet, chewable: 1 tab(s) orally once a day  atorvastatin 80 mg oral tablet: 1 tab(s) orally once a day  donepezil 5 mg oral tablet: 1 tab(s) orally once a day (at bedtime)  fenofibrate 145 mg oral tablet: 1 tab(s) orally once a day  hydrALAZINE 10 mg oral tablet: 1 tab(s) orally 2 times a day  insulin glargine: 18 unit(s) subcutaneous once a day (at bedtime)  lisinopril 40 mg oral tablet: 1 tab(s) orally once a day  memantine 5 mg oral tablet: 1 tab(s) orally 2 times a day  metFORMIN 500 mg oral tablet: 1 tab(s) orally 2 times a day  metoprolol succinate 25 mg oral tablet, extended release: 1 tab(s) orally once a day  sertraline 100 mg oral tablet: 1 tab(s) orally once a day

## 2021-11-09 NOTE — DISCHARGE NOTE PROVIDER - NSDCCPCAREPLAN_GEN_ALL_CORE_FT
PRINCIPAL DISCHARGE DIAGNOSIS  Diagnosis: CVA (cerebrovascular accident)  Assessment and Plan of Treatment: Continue current medications as prescribed.    Follow up with primary doctor and neurology.      SECONDARY DISCHARGE DIAGNOSES  Diagnosis: HTN (hypertension)  Assessment and Plan of Treatment: Continue current medications as prescribed.  Follow up with primary doctor.    Diagnosis: DM (diabetes mellitus)  Assessment and Plan of Treatment: Continue current medications as prescribed.  Follow up with primary doctor.    Diagnosis: Dementia  Assessment and Plan of Treatment: Continue current medications.

## 2021-11-09 NOTE — DISCHARGE NOTE PROVIDER - REASON FOR ADMISSION
Subacute CVA, PCA Stenosis, Uncontrolled HTN, Hypoglycemia Subacute CVA, PCA Stenosis, Uncontrolled HTN, Hypogylcemia

## 2021-11-09 NOTE — SWALLOW VFSS/MBS ASSESSMENT ADULT - DIAGNOSTIC IMPRESSIONS
Pt presents w/mild-moderate oral dysphagia, & overall functional pharyngeal phase of swallow. Oral stage negatively impacted upon by reduced cognition, notable for difficulty for self-administration of liquid trials via cup sip; requiring hand-over-hand manipulation (inability to tilt cup anteriorly for successful acceptance of PO). +Moderate L sided anterior loss of thins x 1 w/attempted self-administration. Moderately increased oral transit time w/regular solids, improved/functional w/easy-to-chew. Overall functional bolus formation/propulsion noted, with all PO presented. Consistent premature pharyngeal entry to the valleculae w/all trials, w/the exception of thins, which fell to the pyriforms (given reduced oral containment/lingual strength & ROM). No significant oral residue observed.    Pharyngeal phase of swallow characterized by overall intact contractility & epiglottic deflection, w/only trace-min vallecular residue observed post intake (increasing w/consistencies of increased viscosity). Functional hyo-laryngeal elevation/excursion, however episode x 1-2 of reduced upper airway protection, with +penetration above the cords without clearance, during the swallow, with both nectar & thin via larger cup sip. Cues provided for small/controlled size of intake, w/NO further incident noted. NO aspiration visualized throughout course of study. Finger-like projection observed off of the posterior pharyngeal wall at level C5-C6, c/w appearance of cricopharyngeal bar, however do NOT impact bolus progression through pharynx. Pt presents w/mild-moderate oral dysphagia, & overall functional pharyngeal phase of swallow. Oral stage negatively impacted upon by reduced cognition, notable for difficulty for self-administration of liquid trials via cup sip; requiring hand-over-hand manipulation (inability to tilt cup anteriorly for successful acceptance of PO). +L sided anterior loss of thins x 1 w/attempted self-administration. Moderately increased oral transit time w/regular solids, improved/functional w/easy-to-chew, & remaining trials. Consistent premature pharyngeal entry to the valleculae w/all trials, w/the exception of thins, which fell to the pyriforms (given reduced oral containment/lingual strength & ROM). No significant oral residue observed.    Pharyngeal phase of swallow characterized by overall intact contractility & epiglottic deflection, w/only trace-min vallecular residue observed post intake (increasing w/consistencies of increased viscosity). Functional hyo-laryngeal elevation/excursion, however episode x 1-2 of reduced upper airway protection, with +penetration above the cords without clearance, during the swallow, with both nectar & thin via larger cup sip. Cues provided for small/controlled size of intake, w/NO further incident noted. NO aspiration visualized throughout course of study. Finger-like projection observed off of the posterior pharyngeal wall at level C5-C6, c/w appearance of cricopharyngeal bar, however do NOT impact bolus progression through pharynx.

## 2021-11-09 NOTE — DISCHARGE NOTE PROVIDER - PROVIDER TOKENS
PROVIDER:[TOKEN:[[id number]]],PROVIDER:[TOKEN:[[id number]]],FREE:[LAST:[Primary doctor],PHONE:[(   )    -],FAX:[(   )    -]]

## 2021-11-10 LAB
ANION GAP SERPL CALC-SCNC: 10 MMOL/L — SIGNIFICANT CHANGE UP (ref 5–17)
BUN SERPL-MCNC: 20.8 MG/DL — HIGH (ref 8–20)
CALCIUM SERPL-MCNC: 8.6 MG/DL — SIGNIFICANT CHANGE UP (ref 8.6–10.2)
CHLORIDE SERPL-SCNC: 102 MMOL/L — SIGNIFICANT CHANGE UP (ref 98–107)
CO2 SERPL-SCNC: 27 MMOL/L — SIGNIFICANT CHANGE UP (ref 22–29)
CREAT SERPL-MCNC: 0.64 MG/DL — SIGNIFICANT CHANGE UP (ref 0.5–1.3)
GLUCOSE BLDC GLUCOMTR-MCNC: 198 MG/DL — HIGH (ref 70–99)
GLUCOSE BLDC GLUCOMTR-MCNC: 246 MG/DL — HIGH (ref 70–99)
GLUCOSE BLDC GLUCOMTR-MCNC: 271 MG/DL — HIGH (ref 70–99)
GLUCOSE BLDC GLUCOMTR-MCNC: 349 MG/DL — HIGH (ref 70–99)
GLUCOSE SERPL-MCNC: 267 MG/DL — HIGH (ref 70–99)
HCT VFR BLD CALC: 38.8 % — SIGNIFICANT CHANGE UP (ref 34.5–45)
HGB BLD-MCNC: 11.9 G/DL — SIGNIFICANT CHANGE UP (ref 11.5–15.5)
MCHC RBC-ENTMCNC: 23.7 PG — LOW (ref 27–34)
MCHC RBC-ENTMCNC: 30.7 GM/DL — LOW (ref 32–36)
MCV RBC AUTO: 77.1 FL — LOW (ref 80–100)
PLATELET # BLD AUTO: 295 K/UL — SIGNIFICANT CHANGE UP (ref 150–400)
POTASSIUM SERPL-MCNC: 4.4 MMOL/L — SIGNIFICANT CHANGE UP (ref 3.5–5.3)
POTASSIUM SERPL-SCNC: 4.4 MMOL/L — SIGNIFICANT CHANGE UP (ref 3.5–5.3)
RBC # BLD: 5.03 M/UL — SIGNIFICANT CHANGE UP (ref 3.8–5.2)
RBC # FLD: 18.6 % — HIGH (ref 10.3–14.5)
SODIUM SERPL-SCNC: 139 MMOL/L — SIGNIFICANT CHANGE UP (ref 135–145)
WBC # BLD: 12.11 K/UL — HIGH (ref 3.8–10.5)
WBC # FLD AUTO: 12.11 K/UL — HIGH (ref 3.8–10.5)

## 2021-11-10 PROCEDURE — 99232 SBSQ HOSP IP/OBS MODERATE 35: CPT

## 2021-11-10 PROCEDURE — 99233 SBSQ HOSP IP/OBS HIGH 50: CPT

## 2021-11-10 RX ORDER — INSULIN GLARGINE 100 [IU]/ML
20 INJECTION, SOLUTION SUBCUTANEOUS AT BEDTIME
Refills: 0 | Status: DISCONTINUED | OUTPATIENT
Start: 2021-11-10 | End: 2021-11-11

## 2021-11-10 RX ORDER — FAMOTIDINE 10 MG/ML
20 INJECTION INTRAVENOUS DAILY
Refills: 0 | Status: DISCONTINUED | OUTPATIENT
Start: 2021-11-10 | End: 2021-11-11

## 2021-11-10 RX ADMIN — Medication 250 MILLIGRAM(S): at 05:10

## 2021-11-10 RX ADMIN — Medication 3: at 13:43

## 2021-11-10 RX ADMIN — LISINOPRIL 40 MILLIGRAM(S): 2.5 TABLET ORAL at 05:11

## 2021-11-10 RX ADMIN — Medication 145 MILLIGRAM(S): at 12:08

## 2021-11-10 RX ADMIN — Medication 10 MILLIGRAM(S): at 05:10

## 2021-11-10 RX ADMIN — ATORVASTATIN CALCIUM 80 MILLIGRAM(S): 80 TABLET, FILM COATED ORAL at 22:12

## 2021-11-10 RX ADMIN — PIPERACILLIN AND TAZOBACTAM 25 GRAM(S): 4; .5 INJECTION, POWDER, LYOPHILIZED, FOR SOLUTION INTRAVENOUS at 17:23

## 2021-11-10 RX ADMIN — MEMANTINE HYDROCHLORIDE 5 MILLIGRAM(S): 10 TABLET ORAL at 17:23

## 2021-11-10 RX ADMIN — Medication 650 MILLIGRAM(S): at 12:07

## 2021-11-10 RX ADMIN — PIPERACILLIN AND TAZOBACTAM 25 GRAM(S): 4; .5 INJECTION, POWDER, LYOPHILIZED, FOR SOLUTION INTRAVENOUS at 08:26

## 2021-11-10 RX ADMIN — Medication 81 MILLIGRAM(S): at 12:08

## 2021-11-10 RX ADMIN — DONEPEZIL HYDROCHLORIDE 5 MILLIGRAM(S): 10 TABLET, FILM COATED ORAL at 22:12

## 2021-11-10 RX ADMIN — Medication 1: at 09:10

## 2021-11-10 RX ADMIN — MEMANTINE HYDROCHLORIDE 5 MILLIGRAM(S): 10 TABLET ORAL at 05:12

## 2021-11-10 RX ADMIN — Medication 250 MILLIGRAM(S): at 17:23

## 2021-11-10 RX ADMIN — INSULIN GLARGINE 20 UNIT(S): 100 INJECTION, SOLUTION SUBCUTANEOUS at 22:11

## 2021-11-10 RX ADMIN — HEPARIN SODIUM 5000 UNIT(S): 5000 INJECTION INTRAVENOUS; SUBCUTANEOUS at 22:12

## 2021-11-10 RX ADMIN — PIPERACILLIN AND TAZOBACTAM 25 GRAM(S): 4; .5 INJECTION, POWDER, LYOPHILIZED, FOR SOLUTION INTRAVENOUS at 01:00

## 2021-11-10 RX ADMIN — HEPARIN SODIUM 5000 UNIT(S): 5000 INJECTION INTRAVENOUS; SUBCUTANEOUS at 12:08

## 2021-11-10 RX ADMIN — Medication 10 MILLIGRAM(S): at 17:23

## 2021-11-10 RX ADMIN — SERTRALINE 100 MILLIGRAM(S): 25 TABLET, FILM COATED ORAL at 12:08

## 2021-11-10 RX ADMIN — Medication 2: at 22:12

## 2021-11-10 RX ADMIN — HEPARIN SODIUM 5000 UNIT(S): 5000 INJECTION INTRAVENOUS; SUBCUTANEOUS at 05:10

## 2021-11-10 RX ADMIN — Medication 2: at 18:03

## 2021-11-10 NOTE — PROGRESS NOTE ADULT - ASSESSMENT
76F with a h/o CVA (no prior deficit), HTN, HLD, IDDM, Dementia, MDD admitted for Subacute CVA L Basal Ganglia, Mod-Severe Stenosis Prox R PCA, HTN Urgency, and Hypoglycemia. Pt also diagnosed with possible UTI on admission started on zosyn , urine cx today positive for proteus , IFD consulted, HTN meds adjusted , bradycardic metoprolol dose decreased , hypoglycemia improved started low dose lantus , high BP meds adjusted added hydralazine     1- Subacute CVA L Basal Ganglia Infarct, Mod-Severe Stenosis Proximal R PCA  neurology,  PM and R  input appreciated   con ASA 81mg PO q24 and Atorvastatin 80mg PO QHS  Swallow eval appreciated : s/p MBS will change diet to easy chew with thin liquid per speech   100 % supervision for aspiration precautions   stable for acute rehab     2- UTI proteus miribalis   Continue zosyn and change to Augmentin on dc   ID input appreciated    3-HTN Urgency, labile   improving , meds adjusted   increase lisinopril home dose quinapril 40 mg   on metoprolol, hydralazine increase dose frequency if needed   BP is better today   added hydralazine , cont lisinopril and metoprolol     4-CHF  (Diastolic I HFpEF) / sinus Bradycardia  Bradycardia improved   cont aspirin , low dose B blocker , statin     5- IDDM with hypoglycemia on admission   improving BG . cont to adjust lantus for BG control, increase dose to 20 unit today   at home on 20 units however she was with poor oral intake and low BG on admission   cont diet and accuchcek sliding scale insulin coverage   Hold Metformin while inpatient     6-HLD LDL goal < 70   cont high dose intensity statin ; Lipitor 80mg PO QHS  Fenofibrate 145mg PO QHS    7-Dementia, stable   on Donepezil 5mg PO QHS and   Memantine 5mg PO BID    8-h/o recent COVID 19 Infection  Incidental on prior hospitalization.   No hypoxia  repeated COVID swab     9-MDD/Anxiety  Sertraline 100mg PO q24    10-Recent Falls  No obvious repeat fall. Fall Precautions. Workup last admission negative  NSx eval for possible NPH as out patient after rehab        discharge to acute rehab soon         DVT ppx : heparin s/c   discharge to acute rehab, awaiting bed availability   76F with a h/o CVA (no prior deficit), HTN, HLD, IDDM, Dementia, MDD admitted for Subacute CVA L Basal Ganglia, Mod-Severe Stenosis Prox R PCA, HTN Urgency, and Hypoglycemia. Pt also diagnosed with possible UTI on admission started on zosyn , urine cx today positive for proteus , IFD consulted, HTN meds adjusted , bradycardic metoprolol dose decreased , hypoglycemia improved started low dose lantus , high BP meds adjusted added hydralazine     1- Subacute CVA L Basal Ganglia Infarct, Mod-Severe Stenosis Proximal R PCA  neurology,  PM and R  input appreciated   con ASA 81mg PO q24 and Atorvastatin 80mg PO QHS  Swallow eval appreciated : s/p MBS will change diet to easy chew with thin liquid per speech   100 % supervision for aspiration precautions   stable for acute rehab     2- UTI proteus miribalis   Continue zosyn   ID input appreciated  trend wbc , afebrile       3-HTN uncontrolled ,  labile   improving , meds adjusted   added hydralazine , cont lisinopril and metoprolol     4-CHF  (Diastolic I HFpEF) / sinus Bradycardia  Bradycardia improved   cont aspirin , low dose B blocker , statin     5- IDDM with hypoglycemia on admission   improving BG . cont to adjust lantus for BG control, increase dose to 20 unit today   at home on 20 units however she was with poor oral intake and low BG on admission   cont diet and accuchcek sliding scale insulin coverage   Hold Metformin while inpatient     6-HLD LDL goal < 70   cont high dose intensity statin ; Lipitor 80mg PO QHS  Fenofibrate 145mg PO QHS    7-Dementia, stable   on Donepezil 5mg PO QHS and   Memantine 5mg PO BID    8-h/o recent COVID 19 Infection  Incidental on prior hospitalization.   No hypoxia  repeated COVID swab     9-MDD/Anxiety  Sertraline 100mg PO q24    10-Recent Falls  No obvious repeat fall. Fall Precautions. Workup last admission negative  NSx eval for possible NPH as out patient after rehab        discharge to acute rehab soon         DVT ppx : heparin s/c   discharge to acute rehab, awaiting bed availability

## 2021-11-10 NOTE — PROGRESS NOTE ADULT - SUBJECTIVE AND OBJECTIVE BOX
CC: Subacute CVA, PCA Stenosis, Uncontrolled HTN, Hypogylcemia (10 Nov 2021 08:21)    HPI:  76F with PMHX CVA (no prior deficit), HTN, HLD, IDDM, Dementia, MDD admitted for Subacute CVA L Basal Ganglia, Mod-Severe Stenosis Prox R PCA, HTN Urgency, and Hypoglycemia.     INTERVAL HPI/OVERNIGHT EVENTS: Patient seen and examined sitting up in the bed.  Patient denies any headache, dizziness, SOB, CP, abdominal pain, nausea, vomiting, dysuria.  Other ROS reviewed and are negative.    Vital Signs Last 24 Hrs  T(C): 36.3 (10 Nov 2021 09:44), Max: 36.8 (09 Nov 2021 21:30)  T(F): 97.4 (10 Nov 2021 09:44), Max: 98.2 (09 Nov 2021 21:30)  HR: 77 (10 Nov 2021 13:13) (54 - 77)  BP: 149/71 (10 Nov 2021 13:13) (109/41 - 163/68)  BP(mean): --  RR: 18 (10 Nov 2021 09:44) (16 - 18)  SpO2: 92% (10 Nov 2021 09:44) (92% - 97%)  I&O's Detail    09 Nov 2021 07:01  -  10 Nov 2021 07:00  --------------------------------------------------------  IN:  Total IN: 0 mL    OUT:    Voided (mL): 400 mL  Total OUT: 400 mL    Total NET: -400 mL      10 Nov 2021 07:01  -  10 Nov 2021 14:06  --------------------------------------------------------  IN:  Total IN: 0 mL    OUT:    Voided (mL): 400 mL  Total OUT: 400 mL    Total NET: -400 mL      PHYSICAL EXAM:  GENERAL: NAD  HEAD:  Atraumatic, Normocephalic  NECK: Supple, No JVD, Normal thyroid  NERVOUS SYSTEM:  Alert & Oriented X3, expressive aphasia, Good concentration; Motor Strength 5/5 B/L upper and lower extremities  CHEST/LUNG: Clear to auscultation bilaterally  HEART: Regular rate and rhythm; No murmurs, rubs, or gallops  ABDOMEN: Soft, Nontender, Nondistended; Bowel sounds present  EXTREMITIES:  2+ Peripheral Pulses, No clubbing, cyanosis, or edema                                11.9   12.11 )-----------( 295      ( 10 Nov 2021 10:40 )             38.8     10 Nov 2021 10:40    139    |  102    |  20.8   ----------------------------<  267    4.4     |  27.0   |  0.64     Ca    8.6        10 Nov 2021 10:40        CAPILLARY BLOOD GLUCOSE  POCT Blood Glucose.: 271 mg/dL (10 Nov 2021 13:05)  POCT Blood Glucose.: 198 mg/dL (10 Nov 2021 08:29)  POCT Blood Glucose.: 281 mg/dL (09 Nov 2021 21:21)  POCT Blood Glucose.: 211 mg/dL (09 Nov 2021 18:18)          MEDICATIONS  (STANDING):  aspirin enteric coated 81 milliGRAM(s) Oral daily  atorvastatin 80 milliGRAM(s) Oral at bedtime  dextrose 40% Gel 15 Gram(s) Oral once  dextrose 5%. 1000 milliLiter(s) (100 mL/Hr) IV Continuous <Continuous>  dextrose 5%. 1000 milliLiter(s) (50 mL/Hr) IV Continuous <Continuous>  dextrose 50% Injectable 25 Gram(s) IV Push once  dextrose 50% Injectable 12.5 Gram(s) IV Push once  dextrose 50% Injectable 25 Gram(s) IV Push once  donepezil 5 milliGRAM(s) Oral at bedtime  fenofibrate Tablet 145 milliGRAM(s) Oral daily  glucagon  Injectable 1 milliGRAM(s) IntraMuscular once  heparin   Injectable 5000 Unit(s) SubCutaneous every 8 hours  hydrALAZINE 10 milliGRAM(s) Oral two times a day  insulin glargine Injectable (LANTUS) 18 Unit(s) SubCutaneous at bedtime  insulin lispro (ADMELOG) corrective regimen sliding scale   SubCutaneous three times a day before meals  insulin lispro (ADMELOG) corrective regimen sliding scale   SubCutaneous at bedtime  lisinopril 40 milliGRAM(s) Oral daily  memantine 5 milliGRAM(s) Oral two times a day  metoprolol succinate ER 25 milliGRAM(s) Oral daily  piperacillin/tazobactam IVPB.. 3.375 Gram(s) IV Intermittent every 8 hours  saccharomyces boulardii 250 milliGRAM(s) Oral two times a day  sertraline 100 milliGRAM(s) Oral daily    MEDICATIONS  (PRN):  acetaminophen     Tablet .. 650 milliGRAM(s) Oral every 6 hours PRN Temp greater or equal to 38C (100.4F), Mild Pain (1 - 3)  aluminum hydroxide/magnesium hydroxide/simethicone Suspension 30 milliLiter(s) Oral every 4 hours PRN Dyspepsia  melatonin 3 milliGRAM(s) Oral at bedtime PRN Insomnia  ondansetron Injectable 4 milliGRAM(s) IV Push every 8 hours PRN Nausea and/or Vomiting

## 2021-11-10 NOTE — PROGRESS NOTE ADULT - ATTENDING COMMENTS
awake alert , dysarthria   denies any pain .called the daughter Donald this am , update given to her about MBS , current diet condition and discharge [plan   still no bed for acute rehab   continue to dajut BP meds , agree with above increase lantus   cont PT

## 2021-11-10 NOTE — PROGRESS NOTE ADULT - SUBJECTIVE AND OBJECTIVE BOX
Patient fatigued.  Limited verbalizations.  Reports no pain.  Continues to be confused, but calm.     REVIEW OF SYSTEMS  Constitutional - No fever,  +fatigue  HEENT - No vertigo, No neck pain  Neurological - No headaches, +memory loss, No loss of strength, No numbness, No tremors  Musculoskeletal - No joint pain, No joint swelling, No muscle pain  Psychiatric - No depression, No anxiety    FUNCTIONAL PROGRESS  11/9 SLP  Speech Language Pathology Recommendations: 1. Easy-to-chew, w/thin liquids (VIA SMALL/SINGLE SIPS)2. 1:1 assist for all meals3. Strict aspiration precautions4. Meds whole in puree5. Upright for all PO/>30 minutes following6. Small bites/sips, slow rate7. Oral care8. SLP to follow for dysphagia tx, as schedule allows9. Rx speech/language MD nabil order needed     11/9 PT  Bed Mobility  Bed Mobility Training Supine-to-Sit: supervsion  Bed Mobility Training Limitations: impaired balance;  impaired coordination    Sit-Stand Transfer Training  Transfer Training Sit-to-Stand Transfer: moderate assist (50% patient effort);  1 person assist;  verbal cues;  rolling walker  Transfer Training Stand-to-Sit Transfer: moderate assist (50% patient effort);  1 person assist;  rolling walker  Sit-to-Stand Transfer Training Transfer Safety Analysis: decreased weight-shifting ability;  decreased strength;  impaired balance;  impaired coordination;  rolling walker    Gait Training  Gait Training: moderate assist (50% patient effort);  1 person assist;  rolling walker;  15 feet  Gait Analysis: 3-point gait   decreased armaan;  decreased step length;  decreased stride length;  posterior lean;  decreased strength;  impaired balance;  impaired coordination;  15 feet;  rolling walker    Therapeutic Exercise  Therapeutic Exercise Detail: sitting hip flex, LAQs, ankle pumps (15x each bilaterally)           VITALS  T(C): 36.3 (11-10-21 @ 09:44), Max: 36.8 (11-09-21 @ 21:30)  HR: 55 (11-10-21 @ 09:44) (54 - 61)  BP: 152/62 (11-10-21 @ 09:44) (109/41 - 163/68)  RR: 18 (11-10-21 @ 09:44) (16 - 18)  SpO2: 92% (11-10-21 @ 09:44) (92% - 97%)  Wt(kg): --    MEDICATIONS   acetaminophen     Tablet .. 650 milliGRAM(s) every 6 hours PRN  aluminum hydroxide/magnesium hydroxide/simethicone Suspension 30 milliLiter(s) every 4 hours PRN  aspirin enteric coated 81 milliGRAM(s) daily  atorvastatin 80 milliGRAM(s) at bedtime  dextrose 40% Gel 15 Gram(s) once  dextrose 5%. 1000 milliLiter(s) <Continuous>  dextrose 5%. 1000 milliLiter(s) <Continuous>  dextrose 50% Injectable 25 Gram(s) once  dextrose 50% Injectable 12.5 Gram(s) once  dextrose 50% Injectable 25 Gram(s) once  donepezil 5 milliGRAM(s) at bedtime  fenofibrate Tablet 145 milliGRAM(s) daily  glucagon  Injectable 1 milliGRAM(s) once  heparin   Injectable 5000 Unit(s) every 8 hours  hydrALAZINE 10 milliGRAM(s) two times a day  insulin glargine Injectable (LANTUS) 18 Unit(s) at bedtime  insulin lispro (ADMELOG) corrective regimen sliding scale   three times a day before meals  insulin lispro (ADMELOG) corrective regimen sliding scale   at bedtime  lisinopril 40 milliGRAM(s) daily  melatonin 3 milliGRAM(s) at bedtime PRN  memantine 5 milliGRAM(s) two times a day  metoprolol succinate ER 25 milliGRAM(s) daily  ondansetron Injectable 4 milliGRAM(s) every 8 hours PRN  piperacillin/tazobactam IVPB.. 3.375 Gram(s) every 8 hours  saccharomyces boulardii 250 milliGRAM(s) two times a day  sertraline 100 milliGRAM(s) daily      RECENT LABS/IMAGING                          11.9   12.11 )-----------( 295      ( 10 Nov 2021 10:40 )             38.8     11-10    139  |  102  |  20.8<H>  ----------------------------<  267<H>  4.4   |  27.0  |  0.64    Ca    8.6      10 Nov 2021 10:40                    CT head 11/1 - -Interval development of age-indeterminate left basal ganglia lacunar infarct. Consider MRI for further evaluation of acuity. -Numerous chronic lacunar infarcts and extensive small vessel disease again seen. -No acute intracranial hemorrhage.    CT angiogram head 11/1 - -Moderate to severe stenoses of the proximal right PCA.    CT angiogram neck 11/1 - -Atherosclerotic changes without hemodynamically significant stenosis.    MRI HEAD 11/3 - -Acute infarct involving the left posterior limb internal capsule/corona radiata. Additional smaller punctate acute infarcts involving the left anterior frontal corona radiata and right splenium. -Numerous punctate chronic microhemorrhages, suggestive of underlying amyloid angiopathy.    ----------------------------------------------------------------------------------------  PHYSICAL EXAM  Constitutional - NAD, Comfortable  Extremities - No C/C/E, No calf tenderness   Neurologic Exam -                    Cognitive - AAO to self, PART of situation      Communication - Fluent, No dysarthria     Cranial Nerves - CN 2-12 intact     Motor - No focal/ lateralization of deficits, overall 4+/5     Sensory - Intact to LT     Reflexes - DTR Intact, No primitive reflexive  Psychiatric - Mood stable, Affect WNL  ----------------------------------------------------------------------------------------  ASSESSMENT/PLAN  76yFemale with functional deficits after developing AMS  R/O CVA - ASA, Tricor, Lipitor   HTN - Lisinopril, Norvasc, Toprol, Hydralazine, Vasotec  UTI - Zosyn, Florastor  Memory - Namenda, Zoloft  DM2 - Lantus  Sleep - Melatonin PRN  Oropharyngeal Dysphagia - Easy to chew  Pain - Tylenol  DVT PPX - SCDs, Heparin  Rehab - Continue to recommend ACUTE inpatient rehabilitation for the functional deficits consisting of 3 hours of therapy/day & 24 hour RN/daily PMR physician for comorbid medical management. Will continue to follow for ongoing rehab needs and recommendations. Patient will be able to tolerate 3 hours a day.    Continue bedside therapy as well as OOB throughout the day with mobilization throughout the day with staff to maintain cardiopulmonary function and prevention of secondary complications related to debility.     Discussed with rehab clinical team.

## 2021-11-11 ENCOUNTER — INPATIENT (INPATIENT)
Facility: HOSPITAL | Age: 76
LOS: 22 days | Discharge: HOME HEALTH | DRG: 949 | End: 2021-12-04
Attending: STUDENT IN AN ORGANIZED HEALTH CARE EDUCATION/TRAINING PROGRAM | Admitting: STUDENT IN AN ORGANIZED HEALTH CARE EDUCATION/TRAINING PROGRAM
Payer: MEDICARE

## 2021-11-11 VITALS
TEMPERATURE: 98 F | RESPIRATION RATE: 18 BRPM | HEART RATE: 71 BPM | DIASTOLIC BLOOD PRESSURE: 65 MMHG | OXYGEN SATURATION: 94 % | SYSTOLIC BLOOD PRESSURE: 169 MMHG

## 2021-11-11 VITALS
HEIGHT: 59 IN | DIASTOLIC BLOOD PRESSURE: 74 MMHG | RESPIRATION RATE: 16 BRPM | WEIGHT: 147.49 LBS | OXYGEN SATURATION: 95 % | SYSTOLIC BLOOD PRESSURE: 159 MMHG | HEART RATE: 73 BPM | TEMPERATURE: 98 F

## 2021-11-11 DIAGNOSIS — I63.9 CEREBRAL INFARCTION, UNSPECIFIED: ICD-10-CM

## 2021-11-11 DIAGNOSIS — Z92.89 PERSONAL HISTORY OF OTHER MEDICAL TREATMENT: Chronic | ICD-10-CM

## 2021-11-11 LAB
ANION GAP SERPL CALC-SCNC: 8 MMOL/L — SIGNIFICANT CHANGE UP (ref 5–17)
BUN SERPL-MCNC: 18.9 MG/DL — SIGNIFICANT CHANGE UP (ref 8–20)
CALCIUM SERPL-MCNC: 8.9 MG/DL — SIGNIFICANT CHANGE UP (ref 8.6–10.2)
CHLORIDE SERPL-SCNC: 103 MMOL/L — SIGNIFICANT CHANGE UP (ref 98–107)
CO2 SERPL-SCNC: 28 MMOL/L — SIGNIFICANT CHANGE UP (ref 22–29)
CREAT SERPL-MCNC: 0.6 MG/DL — SIGNIFICANT CHANGE UP (ref 0.5–1.3)
GLUCOSE BLDC GLUCOMTR-MCNC: 263 MG/DL — HIGH (ref 70–99)
GLUCOSE BLDC GLUCOMTR-MCNC: 264 MG/DL — HIGH (ref 70–99)
GLUCOSE BLDC GLUCOMTR-MCNC: 316 MG/DL — HIGH (ref 70–99)
GLUCOSE BLDC GLUCOMTR-MCNC: 316 MG/DL — HIGH (ref 70–99)
GLUCOSE SERPL-MCNC: 293 MG/DL — HIGH (ref 70–99)
HCT VFR BLD CALC: 38.4 % — SIGNIFICANT CHANGE UP (ref 34.5–45)
HGB BLD-MCNC: 11.7 G/DL — SIGNIFICANT CHANGE UP (ref 11.5–15.5)
MAGNESIUM SERPL-MCNC: 1.9 MG/DL — SIGNIFICANT CHANGE UP (ref 1.6–2.6)
MCHC RBC-ENTMCNC: 23.5 PG — LOW (ref 27–34)
MCHC RBC-ENTMCNC: 30.5 GM/DL — LOW (ref 32–36)
MCV RBC AUTO: 77.3 FL — LOW (ref 80–100)
PHOSPHATE SERPL-MCNC: 3.3 MG/DL — SIGNIFICANT CHANGE UP (ref 2.4–4.7)
PLATELET # BLD AUTO: 287 K/UL — SIGNIFICANT CHANGE UP (ref 150–400)
POTASSIUM SERPL-MCNC: 4.5 MMOL/L — SIGNIFICANT CHANGE UP (ref 3.5–5.3)
POTASSIUM SERPL-SCNC: 4.5 MMOL/L — SIGNIFICANT CHANGE UP (ref 3.5–5.3)
RBC # BLD: 4.97 M/UL — SIGNIFICANT CHANGE UP (ref 3.8–5.2)
RBC # FLD: 18.8 % — HIGH (ref 10.3–14.5)
SODIUM SERPL-SCNC: 139 MMOL/L — SIGNIFICANT CHANGE UP (ref 135–145)
WBC # BLD: 11.32 K/UL — HIGH (ref 3.8–10.5)
WBC # FLD AUTO: 11.32 K/UL — HIGH (ref 3.8–10.5)

## 2021-11-11 PROCEDURE — 80307 DRUG TEST PRSMV CHEM ANLYZR: CPT

## 2021-11-11 PROCEDURE — 83735 ASSAY OF MAGNESIUM: CPT

## 2021-11-11 PROCEDURE — 70498 CT ANGIOGRAPHY NECK: CPT | Mod: MA

## 2021-11-11 PROCEDURE — 80061 LIPID PANEL: CPT

## 2021-11-11 PROCEDURE — 82962 GLUCOSE BLOOD TEST: CPT

## 2021-11-11 PROCEDURE — 87077 CULTURE AEROBIC IDENTIFY: CPT

## 2021-11-11 PROCEDURE — 74230 X-RAY XM SWLNG FUNCJ C+: CPT

## 2021-11-11 PROCEDURE — 83036 HEMOGLOBIN GLYCOSYLATED A1C: CPT

## 2021-11-11 PROCEDURE — 82746 ASSAY OF FOLIC ACID SERUM: CPT

## 2021-11-11 PROCEDURE — 81001 URINALYSIS AUTO W/SCOPE: CPT

## 2021-11-11 PROCEDURE — 82607 VITAMIN B-12: CPT

## 2021-11-11 PROCEDURE — 97163 PT EVAL HIGH COMPLEX 45 MIN: CPT

## 2021-11-11 PROCEDURE — 85027 COMPLETE CBC AUTOMATED: CPT

## 2021-11-11 PROCEDURE — U0003: CPT

## 2021-11-11 PROCEDURE — 87040 BLOOD CULTURE FOR BACTERIA: CPT

## 2021-11-11 PROCEDURE — 92526 ORAL FUNCTION THERAPY: CPT

## 2021-11-11 PROCEDURE — 84100 ASSAY OF PHOSPHORUS: CPT

## 2021-11-11 PROCEDURE — 86769 SARS-COV-2 COVID-19 ANTIBODY: CPT

## 2021-11-11 PROCEDURE — 84484 ASSAY OF TROPONIN QUANT: CPT

## 2021-11-11 PROCEDURE — 85610 PROTHROMBIN TIME: CPT

## 2021-11-11 PROCEDURE — 85025 COMPLETE CBC W/AUTO DIFF WBC: CPT

## 2021-11-11 PROCEDURE — 87186 SC STD MICRODIL/AGAR DIL: CPT

## 2021-11-11 PROCEDURE — 84145 PROCALCITONIN (PCT): CPT

## 2021-11-11 PROCEDURE — 84443 ASSAY THYROID STIM HORMONE: CPT

## 2021-11-11 PROCEDURE — 97167 OT EVAL HIGH COMPLEX 60 MIN: CPT

## 2021-11-11 PROCEDURE — 93005 ELECTROCARDIOGRAM TRACING: CPT

## 2021-11-11 PROCEDURE — 99285 EMERGENCY DEPT VISIT HI MDM: CPT | Mod: 25

## 2021-11-11 PROCEDURE — 92610 EVALUATE SWALLOWING FUNCTION: CPT

## 2021-11-11 PROCEDURE — 71045 X-RAY EXAM CHEST 1 VIEW: CPT | Mod: 26

## 2021-11-11 PROCEDURE — 70450 CT HEAD/BRAIN W/O DYE: CPT | Mod: MA

## 2021-11-11 PROCEDURE — 36415 COLL VENOUS BLD VENIPUNCTURE: CPT

## 2021-11-11 PROCEDURE — 80048 BASIC METABOLIC PNL TOTAL CA: CPT

## 2021-11-11 PROCEDURE — 99223 1ST HOSP IP/OBS HIGH 75: CPT

## 2021-11-11 PROCEDURE — 71045 X-RAY EXAM CHEST 1 VIEW: CPT

## 2021-11-11 PROCEDURE — 97110 THERAPEUTIC EXERCISES: CPT

## 2021-11-11 PROCEDURE — 99239 HOSP IP/OBS DSCHRG MGMT >30: CPT

## 2021-11-11 PROCEDURE — 97530 THERAPEUTIC ACTIVITIES: CPT

## 2021-11-11 PROCEDURE — 70551 MRI BRAIN STEM W/O DYE: CPT

## 2021-11-11 PROCEDURE — 0225U NFCT DS DNA&RNA 21 SARSCOV2: CPT

## 2021-11-11 PROCEDURE — 87086 URINE CULTURE/COLONY COUNT: CPT

## 2021-11-11 PROCEDURE — 80053 COMPREHEN METABOLIC PANEL: CPT

## 2021-11-11 PROCEDURE — 85730 THROMBOPLASTIN TIME PARTIAL: CPT

## 2021-11-11 PROCEDURE — 99232 SBSQ HOSP IP/OBS MODERATE 35: CPT

## 2021-11-11 PROCEDURE — C8929: CPT

## 2021-11-11 PROCEDURE — 82306 VITAMIN D 25 HYDROXY: CPT

## 2021-11-11 PROCEDURE — 97116 GAIT TRAINING THERAPY: CPT

## 2021-11-11 PROCEDURE — U0005: CPT

## 2021-11-11 PROCEDURE — 70496 CT ANGIOGRAPHY HEAD: CPT | Mod: MA

## 2021-11-11 RX ORDER — NIFEDIPINE 30 MG
30 TABLET, EXTENDED RELEASE 24 HR ORAL DAILY
Refills: 0 | Status: DISCONTINUED | OUTPATIENT
Start: 2021-11-11 | End: 2021-11-15

## 2021-11-11 RX ORDER — LANOLIN ALCOHOL/MO/W.PET/CERES
3 CREAM (GRAM) TOPICAL AT BEDTIME
Refills: 0 | Status: DISCONTINUED | OUTPATIENT
Start: 2021-11-11 | End: 2021-12-04

## 2021-11-11 RX ORDER — ASPIRIN/CALCIUM CARB/MAGNESIUM 324 MG
81 TABLET ORAL DAILY
Refills: 0 | Status: DISCONTINUED | OUTPATIENT
Start: 2021-11-12 | End: 2021-12-04

## 2021-11-11 RX ORDER — PREGABALIN 225 MG/1
1000 CAPSULE ORAL DAILY
Refills: 0 | Status: DISCONTINUED | OUTPATIENT
Start: 2021-11-11 | End: 2021-11-11

## 2021-11-11 RX ORDER — SACCHAROMYCES BOULARDII 250 MG
250 POWDER IN PACKET (EA) ORAL
Refills: 0 | Status: DISCONTINUED | OUTPATIENT
Start: 2021-11-11 | End: 2021-12-04

## 2021-11-11 RX ORDER — INSULIN LISPRO 100/ML
0 VIAL (ML) SUBCUTANEOUS
Qty: 0 | Refills: 0 | DISCHARGE
Start: 2021-11-11

## 2021-11-11 RX ORDER — DONEPEZIL HYDROCHLORIDE 10 MG/1
5 TABLET, FILM COATED ORAL AT BEDTIME
Refills: 0 | Status: DISCONTINUED | OUTPATIENT
Start: 2021-11-11 | End: 2021-12-04

## 2021-11-11 RX ORDER — LISINOPRIL 2.5 MG/1
40 TABLET ORAL DAILY
Refills: 0 | Status: DISCONTINUED | OUTPATIENT
Start: 2021-11-12 | End: 2021-11-13

## 2021-11-11 RX ORDER — SODIUM CHLORIDE 9 MG/ML
1000 INJECTION, SOLUTION INTRAVENOUS
Refills: 0 | Status: DISCONTINUED | OUTPATIENT
Start: 2021-11-11 | End: 2021-12-04

## 2021-11-11 RX ORDER — LANOLIN ALCOHOL/MO/W.PET/CERES
1 CREAM (GRAM) TOPICAL
Qty: 0 | Refills: 0 | DISCHARGE
Start: 2021-11-11

## 2021-11-11 RX ORDER — GLUCAGON INJECTION, SOLUTION 0.5 MG/.1ML
1 INJECTION, SOLUTION SUBCUTANEOUS ONCE
Refills: 0 | Status: DISCONTINUED | OUTPATIENT
Start: 2021-11-11 | End: 2021-12-04

## 2021-11-11 RX ORDER — FENOFIBRATE,MICRONIZED 130 MG
145 CAPSULE ORAL DAILY
Refills: 0 | Status: DISCONTINUED | OUTPATIENT
Start: 2021-11-12 | End: 2021-12-04

## 2021-11-11 RX ORDER — HYDRALAZINE HCL 50 MG
10 TABLET ORAL
Refills: 0 | Status: DISCONTINUED | OUTPATIENT
Start: 2021-11-11 | End: 2021-11-15

## 2021-11-11 RX ORDER — SERTRALINE 25 MG/1
100 TABLET, FILM COATED ORAL DAILY
Refills: 0 | Status: DISCONTINUED | OUTPATIENT
Start: 2021-11-12 | End: 2021-12-04

## 2021-11-11 RX ORDER — ACETAMINOPHEN 500 MG
650 TABLET ORAL EVERY 6 HOURS
Refills: 0 | Status: DISCONTINUED | OUTPATIENT
Start: 2021-11-11 | End: 2021-12-04

## 2021-11-11 RX ORDER — INSULIN GLARGINE 100 [IU]/ML
20 INJECTION, SOLUTION SUBCUTANEOUS AT BEDTIME
Refills: 0 | Status: DISCONTINUED | OUTPATIENT
Start: 2021-11-11 | End: 2021-11-12

## 2021-11-11 RX ORDER — ZINC OXIDE 200 MG/G
1 OINTMENT TOPICAL
Refills: 0 | Status: DISCONTINUED | OUTPATIENT
Start: 2021-11-11 | End: 2021-12-04

## 2021-11-11 RX ORDER — INSULIN LISPRO 100/ML
VIAL (ML) SUBCUTANEOUS AT BEDTIME
Refills: 0 | Status: DISCONTINUED | OUTPATIENT
Start: 2021-11-11 | End: 2021-12-04

## 2021-11-11 RX ORDER — DEXTROSE 50 % IN WATER 50 %
25 SYRINGE (ML) INTRAVENOUS ONCE
Refills: 0 | Status: DISCONTINUED | OUTPATIENT
Start: 2021-11-11 | End: 2021-12-04

## 2021-11-11 RX ORDER — INSULIN LISPRO 100/ML
5 VIAL (ML) SUBCUTANEOUS
Refills: 0 | Status: DISCONTINUED | OUTPATIENT
Start: 2021-11-11 | End: 2021-11-11

## 2021-11-11 RX ORDER — INFLUENZA VIRUS VACCINE 15; 15; 15; 15 UG/.5ML; UG/.5ML; UG/.5ML; UG/.5ML
0.7 SUSPENSION INTRAMUSCULAR ONCE
Refills: 0 | Status: DISCONTINUED | OUTPATIENT
Start: 2021-11-11 | End: 2021-12-04

## 2021-11-11 RX ORDER — METOPROLOL TARTRATE 50 MG
25 TABLET ORAL DAILY
Refills: 0 | Status: DISCONTINUED | OUTPATIENT
Start: 2021-11-12 | End: 2021-11-13

## 2021-11-11 RX ORDER — FAMOTIDINE 10 MG/ML
1 INJECTION INTRAVENOUS
Qty: 0 | Refills: 0 | DISCHARGE
Start: 2021-11-11

## 2021-11-11 RX ORDER — FAMOTIDINE 10 MG/ML
20 INJECTION INTRAVENOUS DAILY
Refills: 0 | Status: DISCONTINUED | OUTPATIENT
Start: 2021-11-12 | End: 2021-12-04

## 2021-11-11 RX ORDER — NIFEDIPINE 30 MG
30 TABLET, EXTENDED RELEASE 24 HR ORAL DAILY
Refills: 0 | Status: DISCONTINUED | OUTPATIENT
Start: 2021-11-11 | End: 2021-11-11

## 2021-11-11 RX ORDER — NIFEDIPINE 30 MG
1 TABLET, EXTENDED RELEASE 24 HR ORAL
Qty: 0 | Refills: 0 | DISCHARGE
Start: 2021-11-11

## 2021-11-11 RX ORDER — HEPARIN SODIUM 5000 [USP'U]/ML
5000 INJECTION INTRAVENOUS; SUBCUTANEOUS EVERY 8 HOURS
Refills: 0 | Status: DISCONTINUED | OUTPATIENT
Start: 2021-11-11 | End: 2021-12-04

## 2021-11-11 RX ORDER — INSULIN LISPRO 100/ML
5 VIAL (ML) SUBCUTANEOUS
Qty: 0 | Refills: 0 | DISCHARGE
Start: 2021-11-11

## 2021-11-11 RX ORDER — PREGABALIN 225 MG/1
1000 CAPSULE ORAL DAILY
Refills: 0 | Status: DISCONTINUED | OUTPATIENT
Start: 2021-11-11 | End: 2021-12-04

## 2021-11-11 RX ORDER — INSULIN LISPRO 100/ML
5 VIAL (ML) SUBCUTANEOUS
Refills: 0 | Status: DISCONTINUED | OUTPATIENT
Start: 2021-11-11 | End: 2021-11-12

## 2021-11-11 RX ORDER — PREGABALIN 225 MG/1
1 CAPSULE ORAL
Qty: 0 | Refills: 0 | DISCHARGE
Start: 2021-11-11

## 2021-11-11 RX ORDER — DEXTROSE 50 % IN WATER 50 %
15 SYRINGE (ML) INTRAVENOUS ONCE
Refills: 0 | Status: DISCONTINUED | OUTPATIENT
Start: 2021-11-11 | End: 2021-12-04

## 2021-11-11 RX ORDER — INSULIN LISPRO 100/ML
VIAL (ML) SUBCUTANEOUS
Refills: 0 | Status: DISCONTINUED | OUTPATIENT
Start: 2021-11-11 | End: 2021-12-04

## 2021-11-11 RX ORDER — MEMANTINE HYDROCHLORIDE 10 MG/1
5 TABLET ORAL
Refills: 0 | Status: DISCONTINUED | OUTPATIENT
Start: 2021-11-11 | End: 2021-12-02

## 2021-11-11 RX ORDER — DEXTROSE 50 % IN WATER 50 %
12.5 SYRINGE (ML) INTRAVENOUS ONCE
Refills: 0 | Status: DISCONTINUED | OUTPATIENT
Start: 2021-11-11 | End: 2021-12-04

## 2021-11-11 RX ORDER — ATORVASTATIN CALCIUM 80 MG/1
80 TABLET, FILM COATED ORAL AT BEDTIME
Refills: 0 | Status: DISCONTINUED | OUTPATIENT
Start: 2021-11-11 | End: 2021-12-04

## 2021-11-11 RX ADMIN — PIPERACILLIN AND TAZOBACTAM 25 GRAM(S): 4; .5 INJECTION, POWDER, LYOPHILIZED, FOR SOLUTION INTRAVENOUS at 00:40

## 2021-11-11 RX ADMIN — Medication 3: at 12:27

## 2021-11-11 RX ADMIN — HEPARIN SODIUM 5000 UNIT(S): 5000 INJECTION INTRAVENOUS; SUBCUTANEOUS at 21:31

## 2021-11-11 RX ADMIN — MEMANTINE HYDROCHLORIDE 5 MILLIGRAM(S): 10 TABLET ORAL at 05:13

## 2021-11-11 RX ADMIN — Medication 81 MILLIGRAM(S): at 12:26

## 2021-11-11 RX ADMIN — ZINC OXIDE 1 APPLICATION(S): 200 OINTMENT TOPICAL at 19:08

## 2021-11-11 RX ADMIN — Medication 145 MILLIGRAM(S): at 12:26

## 2021-11-11 RX ADMIN — DONEPEZIL HYDROCHLORIDE 5 MILLIGRAM(S): 10 TABLET, FILM COATED ORAL at 21:31

## 2021-11-11 RX ADMIN — Medication 3: at 08:59

## 2021-11-11 RX ADMIN — Medication 10 MILLIGRAM(S): at 05:12

## 2021-11-11 RX ADMIN — HEPARIN SODIUM 5000 UNIT(S): 5000 INJECTION INTRAVENOUS; SUBCUTANEOUS at 05:12

## 2021-11-11 RX ADMIN — Medication 10 MILLIGRAM(S): at 17:36

## 2021-11-11 RX ADMIN — Medication 250 MILLIGRAM(S): at 05:13

## 2021-11-11 RX ADMIN — SERTRALINE 100 MILLIGRAM(S): 25 TABLET, FILM COATED ORAL at 12:26

## 2021-11-11 RX ADMIN — ATORVASTATIN CALCIUM 80 MILLIGRAM(S): 80 TABLET, FILM COATED ORAL at 21:31

## 2021-11-11 RX ADMIN — INSULIN GLARGINE 20 UNIT(S): 100 INJECTION, SOLUTION SUBCUTANEOUS at 22:21

## 2021-11-11 RX ADMIN — Medication 4: at 16:46

## 2021-11-11 RX ADMIN — PIPERACILLIN AND TAZOBACTAM 25 GRAM(S): 4; .5 INJECTION, POWDER, LYOPHILIZED, FOR SOLUTION INTRAVENOUS at 09:00

## 2021-11-11 RX ADMIN — Medication 250 MILLIGRAM(S): at 17:36

## 2021-11-11 RX ADMIN — Medication 5 UNIT(S): at 12:28

## 2021-11-11 RX ADMIN — Medication 2: at 21:33

## 2021-11-11 RX ADMIN — Medication 5 UNIT(S): at 16:46

## 2021-11-11 RX ADMIN — FAMOTIDINE 20 MILLIGRAM(S): 10 INJECTION INTRAVENOUS at 12:27

## 2021-11-11 RX ADMIN — HEPARIN SODIUM 5000 UNIT(S): 5000 INJECTION INTRAVENOUS; SUBCUTANEOUS at 12:27

## 2021-11-11 RX ADMIN — Medication 25 MILLIGRAM(S): at 05:13

## 2021-11-11 RX ADMIN — MEMANTINE HYDROCHLORIDE 5 MILLIGRAM(S): 10 TABLET ORAL at 17:36

## 2021-11-11 RX ADMIN — LISINOPRIL 40 MILLIGRAM(S): 2.5 TABLET ORAL at 05:12

## 2021-11-11 NOTE — H&P ADULT - NSHPSOCIALHISTORY_GEN_ALL_CORE
Smoking - Denied  EtOH - Denied   Drugs - Denied     Patient lives with , uses a RW, owns a shower chair, has 4 steps 1 rail to enter home, no other stairs within home  PTA: Independent in ADLs and ambulation     CURRENT FUNCTIONAL STATUS 11/9  Bed Mobility: Mod A of 1 person  Transfers:  Mod A of 1 person  Gait: Mod A of 1 person, RW 15 ft Smoking - Denied  EtOH - Denied   Drugs - Denied     Patient lives with  and daughter in  in [place], uses a RW, owns a shower chair, has 4 steps 1 rail to enter home, no other stairs within home  PTA: Independent in ADLs and Mod I ambulation   Retired--   Some HS education    CURRENT FUNCTIONAL STATUS 11/9  Bed Mobility: Mod A of 1 person  Transfers:  Mod A of 1 person  Gait: Mod A of 1 person, RW 15 ft

## 2021-11-11 NOTE — PROGRESS NOTE ADULT - SUBJECTIVE AND OBJECTIVE BOX
Patient is a 76y old  Female who presents with a chief complaint of Subacute CVA, PCA Stenosis, Uncontrolled HTN, Hypoglycemia (10 Nov 2021 14:05)      Patient seen and examined at bedside. No overnight events reported.     ALLERGIES:  No Known Allergies    MEDICATIONS  (STANDING):  aspirin enteric coated 81 milliGRAM(s) Oral daily  atorvastatin 80 milliGRAM(s) Oral at bedtime  dextrose 40% Gel 15 Gram(s) Oral once  dextrose 5%. 1000 milliLiter(s) (100 mL/Hr) IV Continuous <Continuous>  dextrose 5%. 1000 milliLiter(s) (50 mL/Hr) IV Continuous <Continuous>  dextrose 50% Injectable 25 Gram(s) IV Push once  dextrose 50% Injectable 12.5 Gram(s) IV Push once  dextrose 50% Injectable 25 Gram(s) IV Push once  donepezil 5 milliGRAM(s) Oral at bedtime  famotidine    Tablet 20 milliGRAM(s) Oral daily  fenofibrate Tablet 145 milliGRAM(s) Oral daily  glucagon  Injectable 1 milliGRAM(s) IntraMuscular once  heparin   Injectable 5000 Unit(s) SubCutaneous every 8 hours  hydrALAZINE 10 milliGRAM(s) Oral two times a day  insulin glargine Injectable (LANTUS) 20 Unit(s) SubCutaneous at bedtime  insulin lispro (ADMELOG) corrective regimen sliding scale   SubCutaneous three times a day before meals  insulin lispro (ADMELOG) corrective regimen sliding scale   SubCutaneous at bedtime  lisinopril 40 milliGRAM(s) Oral daily  memantine 5 milliGRAM(s) Oral two times a day  metoprolol succinate ER 25 milliGRAM(s) Oral daily  NIFEdipine XL 30 milliGRAM(s) Oral daily  piperacillin/tazobactam IVPB.. 3.375 Gram(s) IV Intermittent every 8 hours  saccharomyces boulardii 250 milliGRAM(s) Oral two times a day  sertraline 100 milliGRAM(s) Oral daily    MEDICATIONS  (PRN):  acetaminophen     Tablet .. 650 milliGRAM(s) Oral every 6 hours PRN Temp greater or equal to 38C (100.4F), Mild Pain (1 - 3)  aluminum hydroxide/magnesium hydroxide/simethicone Suspension 30 milliLiter(s) Oral every 4 hours PRN Dyspepsia  melatonin 3 milliGRAM(s) Oral at bedtime PRN Insomnia  ondansetron Injectable 4 milliGRAM(s) IV Push every 8 hours PRN Nausea and/or Vomiting    Vital Signs Last 24 Hrs  T(F): 98 (11 Nov 2021 04:30), Max: 98.1 (10 Nov 2021 17:46)  HR: 65 (11 Nov 2021 04:30) (55 - 83)  BP: 144/67 (11 Nov 2021 04:30) (144/67 - 170/54)  RR: 18 (11 Nov 2021 04:30) (16 - 18)  SpO2: 96% (11 Nov 2021 04:30) (92% - 97%)  I&O's Summary    10 Nov 2021 07:01  -  11 Nov 2021 07:00  --------------------------------------------------------  IN: 0 mL / OUT: 400 mL / NET: -400 mL        PHYSICAL EXAM:  General:   ENT:   Neck:   Lungs:   Cardio: RRR, S1/S2, No murmur  Abdomen: Soft, Nontender, Nondistended; Bowel sounds present  Extremities: No calf tenderness, No pitting edema    LABS:                        11.7   11.32 )-----------( 287      ( 11 Nov 2021 07:50 )             38.4     11-11    139  |  103  |  18.9  ----------------------------<  293  4.5   |  28.0  |  0.60    Ca    8.9      11 Nov 2021 07:50  Phos  3.3     11-11  Mg     1.9     11-11          eGFR if Non African American: 89 mL/min/1.73M2 (11-11-21 @ 07:50)  eGFR if : 103 mL/min/1.73M2 (11-11-21 @ 07:50)              11-02 Chol 248 mg/dL LDL -- HDL 53 mg/dL Trig 151 mg/dL              POCT Blood Glucose.: 349 mg/dL (10 Nov 2021 22:10)  POCT Blood Glucose.: 246 mg/dL (10 Nov 2021 17:45)  POCT Blood Glucose.: 271 mg/dL (10 Nov 2021 13:05)          COVID-19 PCR: NotDetec (11-08-21 @ 08:58)    RADIOLOGY & ADDITIONAL TESTS:       Patient is a 76y old  Female who presents with a chief complaint of Subacute CVA, PCA Stenosis, Uncontrolled HTN, Hypoglycemia (10 Nov 2021 14:05)      Patient seen and examined at bedside in am , awake alert   No overnight events reported  + interittent cough noted earlier by nurse   pt denies SOB  sitting in the bed comfortable       ALLERGIES:  No Known Allergies    MEDICATIONS  (STANDING):  aspirin enteric coated 81 milliGRAM(s) Oral daily  atorvastatin 80 milliGRAM(s) Oral at bedtime  dextrose 40% Gel 15 Gram(s) Oral once  dextrose 5%. 1000 milliLiter(s) (100 mL/Hr) IV Continuous <Continuous>  dextrose 5%. 1000 milliLiter(s) (50 mL/Hr) IV Continuous <Continuous>  dextrose 50% Injectable 25 Gram(s) IV Push once  dextrose 50% Injectable 12.5 Gram(s) IV Push once  dextrose 50% Injectable 25 Gram(s) IV Push once  donepezil 5 milliGRAM(s) Oral at bedtime  famotidine    Tablet 20 milliGRAM(s) Oral daily  fenofibrate Tablet 145 milliGRAM(s) Oral daily  glucagon  Injectable 1 milliGRAM(s) IntraMuscular once  heparin   Injectable 5000 Unit(s) SubCutaneous every 8 hours  hydrALAZINE 10 milliGRAM(s) Oral two times a day  insulin glargine Injectable (LANTUS) 20 Unit(s) SubCutaneous at bedtime  insulin lispro (ADMELOG) corrective regimen sliding scale   SubCutaneous three times a day before meals  insulin lispro (ADMELOG) corrective regimen sliding scale   SubCutaneous at bedtime  lisinopril 40 milliGRAM(s) Oral daily  memantine 5 milliGRAM(s) Oral two times a day  metoprolol succinate ER 25 milliGRAM(s) Oral daily  NIFEdipine XL 30 milliGRAM(s) Oral daily  piperacillin/tazobactam IVPB.. 3.375 Gram(s) IV Intermittent every 8 hours  saccharomyces boulardii 250 milliGRAM(s) Oral two times a day  sertraline 100 milliGRAM(s) Oral daily    MEDICATIONS  (PRN):  acetaminophen     Tablet .. 650 milliGRAM(s) Oral every 6 hours PRN Temp greater or equal to 38C (100.4F), Mild Pain (1 - 3)  aluminum hydroxide/magnesium hydroxide/simethicone Suspension 30 milliLiter(s) Oral every 4 hours PRN Dyspepsia  melatonin 3 milliGRAM(s) Oral at bedtime PRN Insomnia  ondansetron Injectable 4 milliGRAM(s) IV Push every 8 hours PRN Nausea and/or Vomiting    Vital Signs Last 24 Hrs  T(F): 98 (11 Nov 2021 04:30), Max: 98.1 (10 Nov 2021 17:46)  HR: 65 (11 Nov 2021 04:30) (55 - 83)  BP: 144/67 (11 Nov 2021 04:30) (144/67 - 170/54)  RR: 18 (11 Nov 2021 04:30) (16 - 18)  SpO2: 96% (11 Nov 2021 04:30) (92% - 97%)  I&O's Summary    10 Nov 2021 07:01  -  11 Nov 2021 07:00  --------------------------------------------------------  IN: 0 mL / OUT: 400 mL / NET: -400 mL        PHYSICAL EXAM:  General: awake alert , no distress slow speech   Neck: supple, no JVD   Lungs: CTA bilateral , diminished BS poor inspiratory effort    Cardio: RRR, S1/S2, No murmur  Abdomen: Soft, Nontender, Nondistended; Bowel sounds present  Extremities: No calf tenderness, No pitting edema    LABS:                        11.7   11.32 )-----------( 287      ( 11 Nov 2021 07:50 )             38.4     11-11    139  |  103  |  18.9  ----------------------------<  293  4.5   |  28.0  |  0.60    Ca    8.9      11 Nov 2021 07:50  Phos  3.3     11-11  Mg     1.9     11-11          eGFR if Non African American: 89 mL/min/1.73M2 (11-11-21 @ 07:50)  eGFR if : 103 mL/min/1.73M2 (11-11-21 @ 07:50)              11-02 Chol 248 mg/dL LDL -- HDL 53 mg/dL Trig 151 mg/dL              POCT Blood Glucose.: 349 mg/dL (10 Nov 2021 22:10)  POCT Blood Glucose.: 246 mg/dL (10 Nov 2021 17:45)  POCT Blood Glucose.: 271 mg/dL (10 Nov 2021 13:05)          COVID-19 PCR: NotDetec (11-08-21 @ 08:58)    RADIOLOGY & ADDITIONAL TESTS:

## 2021-11-11 NOTE — H&P ADULT - NSHPPHYSICALEXAM_GEN_ALL_CORE
Vital Signs  T(C): 36.7 (11-11-21 @ 15:28), Max: 36.7 (11-10-21 @ 17:46)  HR: 73 (11-11-21 @ 15:28) (65 - 83)  BP: 159/74 (11-11-21 @ 15:28) (144/67 - 170/54)  RR: 16 (11-11-21 @ 15:28) (16 - 18)  SpO2: 95% (11-11-21 @ 15:28) (94% - 97%)    Gen - NAD, Comfortable  HEENT - NCAT, EOMI, MMM  Neck - Supple, No limited ROM  Pulm - CTAB, No wheeze, No rhonchi, No crackles  Cardiovascular - RRR, S1S2, No m/r/g  Abdomen - Soft, NT/ND, +BS  Extremities - No C/C/E, No calf tenderness  Neuro-     Cognitive - AAO to self, hospital ("[facility]" with cueing), month, and year     Communication - Fluent, No dysarthria, hypophonic, delay processing     Attention: Intact      Judgement: Good evidence of judgement     Memory: Recall 3 objects immediately with cueing x1 and 3 min later with cueing x2     Cranial Nerves - CN 2-12 intact     Motor -                    LEFT    UE - ShAB 5/5, EF 5/5, EE 5/5, WE 5/5,  5/5                    RIGHT UE - ShAB 4/5, EF 5/5, EE 5/5, WE 5/5,  5/5                    LEFT    LE - HF 5/5, KE 5/5, DF 5/5, PF 5/5                    RIGHT LE - HF 4/5, KE 5-/5, DF 5/5, PF 5/5        Sensory - Intact to LT     Reflexes - DTR Intact, No primitive reflex     Coordination - FTN intact     Tone - normal  Psychiatric - Mood stable, Affect WNL  Skin: Intact Vital Signs  T(C): 36.7 (11-11-21 @ 15:28), Max: 36.7 (11-10-21 @ 17:46)  HR: 73 (11-11-21 @ 15:28) (65 - 83)  BP: 159/74 (11-11-21 @ 15:28) (144/67 - 170/54)  RR: 16 (11-11-21 @ 15:28) (16 - 18)  SpO2: 95% (11-11-21 @ 15:28) (94% - 97%)    Gen - NAD, Comfortable  HEENT - NCAT, EOMI, MMM  Neck - Supple, No limited ROM  Pulm - CTAB, No wheeze, No rhonchi, No crackles  Cardiovascular - RRR, S1S2, No m/r/g  Abdomen - Soft, NT/ND, +BS  Extremities - No C/C/E, No calf tenderness  Neuro-     Cognitive - AAO to self, hospital ("[facility]" with cueing), month, and year     Communication - +dysarthria, hypophonic, delay processing     Attention: Intact      Judgement: Good evidence of judgement     Memory: Recall 3 objects immediately with cueing x1 and 3 min later with cueing x2     Cranial Nerves - CN 2-12 intact     Motor -                    LEFT    UE - ShAB 5/5, EF 5/5, EE 5/5, WE 5/5,  5/5                    RIGHT UE - ShAB 4/5, EF 5/5, EE 5/5, WE 5/5,  5/5                    LEFT    LE - HF 5/5, KE 5/5, DF 5/5, PF 5/5                    RIGHT LE - HF 4/5, KE 5-/5, DF 5/5, PF 5/5        Sensory - Intact to LT     Reflexes - DTR Intact, No primitive reflex     Coordination - FTN intact     Tone - normal  Psychiatric - Mood stable, Affect WNL  Skin: Intact

## 2021-11-11 NOTE — H&P ADULT - HISTORY OF PRESENT ILLNESS
This is a 76 Female with a h/o CVA (no prior deficit), HTN, HLD, IDDM, Dementia, MDD admitted to [facility] on 11/1 for Subacute CVA L Basal Ganglia, Mod-Severe Stenosis Prox R PCA, HTN Urgency, and Hypoglycemia. Patient followed by neurology OP. In ED,  on arrival with some improvement with medications but remains hypertensive. Patient noted to have AMS with abnormal speech by daughter. LKWT 3 days prior to presentation. NIHSS 2. Out of window for TPA.  Per family, patient was followed by neurosurgery for evaluation of possible NPH with LP as outpatient. Neurosurgery recommending outpatient follow up after rehab. Pt also diagnosed with possible UTI on admission and treatedwith IV antibiotics, urine cx positive for proteus, ID consulted. HTN meds adjusted, bradycardic metoprolol dose decreased, hypoglycemia improved started low dose Lantus, high BP meds adjusted added hydralazine.  MBS performed and recommended easy to chew with thin liquids, with 100% supervision.   Patient was evaluated by PM&R and therapy for functional deficits, gait/ADL impairments and acute rehabilitation was recommended. Patient was medically optimized for discharge to [facility] IRU on 11/11/21.     This is a 76 Female with a h/o CVA (no prior deficit), HTN, HLD, IDDM, Dementia, MDD admitted to [facility] on 11/1 for Subacute CVA L Basal Ganglia infarct,  Mod-Severe Stenosis Prox R PCA, HTN Urgency, and Hypoglycemia. Patient followed by neurology OP. In ED,  on arrival with some improvement with medications but remains hypertensive. Patient noted to have AMS with abnormal speech by daughter. LKWT 3 days prior to presentation. NIHSS 2. Out of window for TPA.  MRI brain showed--  left posterior limb IC/CR stroke, left frontal and right splenium acute strokes,    Per family, patient was followed by neurosurgery for evaluation of possible NPH with LP as outpatient. Neurosurgery recommending outpatient follow up after rehab. Pt also diagnosed with possible UTI on admission and treated with IV antibiotics, urine cx positive for proteus, ID consulted. HTN meds adjusted, bradycardic metoprolol dose decreased, hypoglycemia improved started low dose Lantus, high BP meds adjusted added hydralazine.  MBS performed and recommended easy to chew with thin liquids, with 100% supervision.   Patient was evaluated by PM&R and therapy for functional deficits, gait/ADL impairments and acute rehabilitation was recommended. Patient was medically optimized for discharge to [facility] IRU on 11/11/21.     This is a 76 Female with a h/o CVA (no prior deficit), HTN, HLD, IDDM, Dementia, MDD admitted to [facility] on 11/1 for Subacute CVA L Basal Ganglia infarct,  CTA showedMod-Severe Stenosis Prox R PCA.  Pt. with HTN Urgency, and Hypoglycemia. Patient followed by neurology OP. In ED,  on arrival with some improvement with medications but remains hypertensive. Patient noted to have AMS with abnormal speech by daughter. LKWT 3 days prior to presentation. NIHSS 2. Out of window for TPA.  MRI brain showed--  left posterior limb IC/CR stroke, left frontal and right splenium acute strokes,    Per family, patient was followed by neurosurgery for evaluation of possible NPH with LP as outpatient. Neurosurgery recommending outpatient follow up after rehab. Pt also diagnosed with possible UTI on admission and treated with IV antibiotics, urine cx positive for proteus, ID consulted. HTN meds adjusted, bradycardic metoprolol dose decreased, hypoglycemia improved started low dose Lantus, high BP meds adjusted added hydralazine.  MBS performed and recommended easy to chew with thin liquids, with 100% supervision.   Patient was evaluated by PM&R and therapy for functional deficits, gait/ADL impairments and acute rehabilitation was recommended. Patient was medically optimized for discharge to [facility] IRU on 11/11/21.

## 2021-11-11 NOTE — H&P ADULT - NSHPLABSRESULTS_GEN_ALL_CORE
11.7   11.32 )-----------( 287      ( 11 Nov 2021 07:50 )             38.4   11-11    139  |  103  |  18.9  ----------------------------<  293<H>  4.5   |  28.0  |  0.60    Ca    8.9      11 Nov 2021 07:50  Phos  3.3     11-11  Mg     1.9     11-11    TTE Echo Complete w/ Contrast w/ Doppler (11.02.21 @ 08:54)     Summary:   1. Technically adequate study.   2. Left ventricular ejection fraction, by visual estimation, is >75%.   3. Hyperdynamic global left ventricular systolic function.   4. Moderately increased LV wall thickness.   5. Spectral Doppler shows impaired relaxation pattern of left ventricular myocardial filling (Grade I diastolic dysfunction).  6. There is moderate concentric left ventricular hypertrophy.   7. Normal left atrial size.   8. Normal right atrial size.   9. Trace mitral valve regurgitation.  10. There is no evidence of pericardial effusion.  11. There are no prior studies on this patient for comparison purposes.    from: MR Head No Cont (11.03.21 @ 13:18)     IMPRESSION:  -Acute infarct involving the left posterior limb internalcapsule/corona radiata. Additional smaller punctate acute infarcts involving the left anterior frontal corona radiata and right splenium.    -Numerous punctate chronic microhemorrhages, suggestive of underlying amyloid angiopathy.      CT Angio Head w/ IV Cont (11.01.21 @ 16:15)     IMPRESSION:  CT head:  -Interval development of age-indeterminate left basal ganglia lacunar infarct. Consider MRI for further evaluation of acuity.  -Numerous chronic lacunar infarcts and extensive small vessel disease again seen.  -No acute intracranial hemorrhage.    CT angiogram head:  -Moderate to severe stenoses of the proximal right PCA.    CT angiogram neck:  -Atherosclerotic changes without hemodynamically significant stenosis.    _____________  NASCET criteria for internal carotid artery stenosis:  Mild: 0% to 49%  Moderate: 50% to 69%  Severe: 70% to 99%  Complete Occlusion

## 2021-11-11 NOTE — H&P ADULT - NSICDXPASTMEDICALHX_GEN_ALL_CORE_FT
PAST MEDICAL HISTORY:  Dementia     Diabetes mellitus, type 2     History of CVA (cerebrovascular accident)     HLD (hyperlipidemia)     Hypertension     Rheumatoid arthritis      PAST MEDICAL HISTORY:  Anxiety     Dementia     Diabetes mellitus, type 2     History of CVA (cerebrovascular accident)     HLD (hyperlipidemia)     Hypertension     Major depression     Rheumatoid arthritis     UTI (urinary tract infection)

## 2021-11-11 NOTE — PROGRESS NOTE ADULT - ASSESSMENT
76F with a h/o CVA (no prior deficit), HTN, HLD, IDDM, Dementia, MDD admitted for Subacute CVA L Basal Ganglia, Mod-Severe Stenosis Prox R PCA, HTN Urgency, and Hypoglycemia. Pt also diagnosed with possible UTI on admission started on zosyn , urine cx today positive for proteus , IFD consulted, HTN meds adjusted , bradycardic metoprolol dose decreased , hypoglycemia improved started low dose lantus , high BP meds adjusted added hydralazine     1- Subacute CVA L Basal Ganglia Infarct, Mod-Severe Stenosis Proximal R PCA  neurology,  PM and R  input appreciated   con ASA 81mg PO q24 and Atorvastatin 80mg PO QHS  Swallow eval appreciated : s/p MBS will change diet to easy chew with thin liquid per speech   100 % supervision for aspiration precautions   stable for acute rehab     2- UTI proteus miribalis   Continue zosyn   ID input appreciated  trend wbc , afebrile       3-HTN uncontrolled ,  labile   improving , meds adjusted   added hydralazine , cont lisinopril and metoprolol     4-CHF  (Diastolic I HFpEF) / sinus Bradycardia  Bradycardia improved   cont aspirin , low dose B blocker , statin     5- IDDM with hypoglycemia on admission   improving BG . cont to adjust lantus for BG control, increase dose to 20 unit today   at home on 20 units however she was with poor oral intake and low BG on admission   cont diet and accuchcek sliding scale insulin coverage   Hold Metformin while inpatient     6-HLD LDL goal < 70   cont high dose intensity statin ; Lipitor 80mg PO QHS  Fenofibrate 145mg PO QHS    7-Dementia, stable   on Donepezil 5mg PO QHS and   Memantine 5mg PO BID    8-h/o recent COVID 19 Infection  Incidental on prior hospitalization.   No hypoxia  repeated COVID swab     9-MDD/Anxiety  Sertraline 100mg PO q24    10-Recent Falls  No obvious repeat fall. Fall Precautions. Workup last admission negative  NSx eval for possible NPH as out patient after rehab        discharge to acute rehab soon         DVT ppx : heparin s/c   discharge to acute rehab, awaiting bed availability   76F with a h/o CVA (no prior deficit), HTN, HLD, IDDM, Dementia, MDD admitted for Subacute CVA L Basal Ganglia, Mod-Severe Stenosis Prox R PCA, HTN Urgency, and Hypoglycemia. Pt also diagnosed with possible UTI on admission started on zosyn , urine cx today positive for proteus , IFD consulted, HTN meds adjusted , bradycardic metoprolol dose decreased , hypoglycemia improved started low dose lantus , high BP meds adjusted added hydralazine     1- Subacute CVA L Basal Ganglia Infarct, Mod-Severe Stenosis Proximal R PCA  neurology,  PM and R  input appreciated   con ASA 81mg PO q24 and Atorvastatin 80mg PO QHS  Swallow eval appreciated : s/p MBS will change diet to easy chew with thin liquid per speech   needs  supervision for aspiration precautions with fluid intake eating   stable for acute rehab bed avalible     2- UTI proteus miribalis   Continue zosyn   trend wbc , afebrile       3-HTN uncontrolled ,  labile   improving , meds adjusted   added hydralazine , cont lisinopril and metoprolol     4-CHF  (Diastolic I HFpEF) / sinus Bradycardia  Bradycardia improved   cont aspirin , low dose B blocker , statin     5- IDDM with hypoglycemia on admission , resolved   improving BG . cont to adjust lantus for BG control   add  premeals insulin 4 units   at home on 20 units however she was with poor oral intake and low BG on admission   cont diet and accuchcek sliding scale insulin coverage   Hold Metformin while inpatient     6-HLD LDL goal < 70   cont high dose intensity statin ; Lipitor 80mg PO QHS  Fenofibrate 145mg PO QHS    7-Dementia, stable   on Donepezil 5mg PO QHS and   Memantine 5mg PO BID    8-h/o recent COVID 19 Infection  Incidental on prior hospitalization.   No hypoxia  repeated COVID swab neg     9-MDD/Anxiety  Sertraline 100mg PO q24    10-Recent Falls at home    Fall Precautions. Workup last admission negative  NSx eval for possible NPH as out patient after rehab  to see DR Ocampo       discharge to acute rehab soon     CXR ordered to evaluate cough       DVT ppx : heparin s/c   discharge to acute rehab

## 2021-11-11 NOTE — H&P ADULT - ATTENDING COMMENTS
Pt. seen with resident on Admission.  Agree with documentation above as per resident with amendments made as appropriate. Patient medically stable. Appropriate for acute rehabilitation    Left Basal Ganglia and Corona radiata infarct with cognitive deficits, and gait and ADL impairments.   cont. current medications

## 2021-11-11 NOTE — PROGRESS NOTE ADULT - PROVIDER SPECIALTY LIST ADULT
Hospitalist
Internal Medicine
Neurology
Rehab Medicine
Internal Medicine
Internal Medicine
Neurology
Rehab Medicine
Hospitalist
Hospitalist
Neurology
Rehab Medicine
Rehab Medicine
Hospitalist
Internal Medicine
Neurology

## 2021-11-11 NOTE — PROGRESS NOTE ADULT - SUBJECTIVE AND OBJECTIVE BOX
She report she is doing well today.  She is resting comfortably.       REVIEW OF SYSTEMS  Constitutional - No fever,  No fatigue  HEENT - No vertigo, No neck pain  Neurological - No headaches, No memory loss, No loss of strength, No numbness, No tremors  Skin - No rashes, No lesions   Musculoskeletal - No joint pain, No joint swelling, No muscle pain  Psychiatric - No depression, No anxiety    FUNCTIONAL PROGRESS    Bed Mobility  Bed Mobility Training Supine-to-Sit: supervsion  Bed Mobility Training Limitations: impaired balance;  impaired coordination    Sit-Stand Transfer Training  Transfer Training Sit-to-Stand Transfer: moderate assist (50% patient effort);  1 person assist;  verbal cues;  rolling walker  Transfer Training Stand-to-Sit Transfer: moderate assist (50% patient effort);  1 person assist;  rolling walker  Sit-to-Stand Transfer Training Transfer Safety Analysis: decreased weight-shifting ability;  decreased strength;  impaired balance;  impaired coordination;  rolling walker    Gait Training  Gait Training: moderate assist (50% patient effort);  1 person assist;  rolling walker;  15 feet  Gait Analysis: 3-point gait   decreased armaan;  decreased step length;  decreased stride length;  posterior lean;  decreased strength;  impaired balance;  impaired coordination;  15 feet;  rolling walker        VITALS  T(C): 36.3 (11-11-21 @ 09:54), Max: 36.7 (11-10-21 @ 17:46)  HR: 71 (11-11-21 @ 09:54) (65 - 83)  BP: 159/65 (11-11-21 @ 09:54) (144/67 - 170/54)  RR: 18 (11-11-21 @ 09:54) (16 - 18)  SpO2: 95% (11-11-21 @ 09:54) (94% - 97%)  Wt(kg): --    MEDICATIONS   acetaminophen     Tablet .. 650 milliGRAM(s) every 6 hours PRN  aluminum hydroxide/magnesium hydroxide/simethicone Suspension 30 milliLiter(s) every 4 hours PRN  aspirin enteric coated 81 milliGRAM(s) daily  atorvastatin 80 milliGRAM(s) at bedtime  cyanocobalamin 1000 MICROGram(s) daily  dextrose 40% Gel 15 Gram(s) once  dextrose 5%. 1000 milliLiter(s) <Continuous>  dextrose 5%. 1000 milliLiter(s) <Continuous>  dextrose 50% Injectable 25 Gram(s) once  dextrose 50% Injectable 12.5 Gram(s) once  dextrose 50% Injectable 25 Gram(s) once  donepezil 5 milliGRAM(s) at bedtime  famotidine    Tablet 20 milliGRAM(s) daily  fenofibrate Tablet 145 milliGRAM(s) daily  glucagon  Injectable 1 milliGRAM(s) once  heparin   Injectable 5000 Unit(s) every 8 hours  hydrALAZINE 10 milliGRAM(s) two times a day  insulin glargine Injectable (LANTUS) 20 Unit(s) at bedtime  insulin lispro (ADMELOG) corrective regimen sliding scale   three times a day before meals  insulin lispro (ADMELOG) corrective regimen sliding scale   at bedtime  insulin lispro Injectable (ADMELOG) 5 Unit(s) three times a day before meals  lisinopril 40 milliGRAM(s) daily  melatonin 3 milliGRAM(s) at bedtime PRN  memantine 5 milliGRAM(s) two times a day  metoprolol succinate ER 25 milliGRAM(s) daily  NIFEdipine XL 30 milliGRAM(s) daily  ondansetron Injectable 4 milliGRAM(s) every 8 hours PRN  piperacillin/tazobactam IVPB.. 3.375 Gram(s) every 8 hours  saccharomyces boulardii 250 milliGRAM(s) two times a day  sertraline 100 milliGRAM(s) daily      RECENT LABS/IMAGING - Reviewed                        11.7   11.32 )-----------( 287      ( 11 Nov 2021 07:50 )             38.4     11-11    139  |  103  |  18.9  ----------------------------<  293<H>  4.5   |  28.0  |  0.60    Ca    8.9      11 Nov 2021 07:50  Phos  3.3     11-11  Mg     1.9     11-11        ----------------------------------------------------------------------------------------  PHYSICAL EXAM  Constitutional - NAD, Comfortable  Extremities - No C/C/E, No calf tenderness   Neurologic Exam -                    Cognitive - AAO to self, month and year      Communication - Fluent, No dysarthria     Cranial Nerves - CN 2-12 intact     Motor -  4/5 throughout      Sensory - Intact to LT     Reflexes - DTR Intact, No primitive reflexive  Psychiatric - Mood stable, Affect WNL  ----------------------------------------------------------------------------------------  ASSESSMENT/PLAN  76yFemale with functional deficits after developing AMS  HTN - Lisinopril, Nifedipine, Hydralazine, metoprolol  UTI - Zosyn, WBC trending down   Memory - Namenda, Zoloft  DM2 - Lantus  Sleep - Melatonin PRN  Oropharyngeal Dysphagia - Easy to chew, aspiration precautions   Pain - Tylenol  DVT PPX - SCDs, Heparin  Rehab - Recommend ACUTE inpatient rehabilitation for the functional deficits consisting of 3 hours of therapy/day & 24 hour RN/daily PMR physician for comorbid medical management. Will continue to follow for ongoing rehab needs and recommendations. Patient will be able to tolerate 3 hours a day.    Continue bedside therapy as well as OOB throughout the day with mobilization throughout the day with staff to maintain cardiopulmonary function and prevention of secondary complications related to debility.

## 2021-11-11 NOTE — H&P ADULT - NSHPREVIEWOFSYSTEMS_GEN_ALL_CORE
REVIEW OF SYSTEMS  Constitutional: No fever, No Chills, No fatigue  HEENT: No eye pain, No visual disturbances, No difficulty hearing, No Dysphagia   Pulm: No cough,  No shortness of breath  Cardio: No chest pain, No palpitations  GI:  No abdominal pain, No nausea, No vomiting, No diarrhea, No constipation, No incontinence  : No dysuria, No frequency, No hematuria, No incontinence  Neuro: No headaches, No memory loss, +loss of strength, No numbness, No tremors  Skin: No itching, No rashes, No lesions   Endo: No temperature intolerance  MSK: No joint pain, No joint swelling, No muscle pain, No Neck or back pain  Psych:  No depression, No anxiety REVIEW OF SYSTEMS  Constitutional: No fever, No Chills, No fatigue  HEENT: No eye pain, No visual disturbances, No difficulty hearing, No Dysphagia   Pulm: No cough,  No shortness of breath  Cardio: No chest pain, No palpitations  GI:  No abdominal pain, No nausea, No vomiting, No diarrhea, No constipation, No incontinence  : No dysuria, No frequency, No hematuria, No incontinence  Neuro: No headaches, + memory loss, +loss of strength, No numbness, No tremors  Skin: No itching, No rashes, No lesions   Endo: No temperature intolerance  MSK: No joint pain, No joint swelling, No muscle pain, No Neck or back pain  Psych:  No depression, No anxiety

## 2021-11-11 NOTE — PROGRESS NOTE ADULT - REASON FOR ADMISSION
Subacute CVA, PCA Stenosis, Uncontrolled HTN, Hypogylcemia
Subacute CVA, PCA Stenosis, Uncontrolled HTN, Hypoglycemia
Subacute CVA, PCA Stenosis, Uncontrolled HTN, Hypogylcemia

## 2021-11-11 NOTE — H&P ADULT - ASSESSMENT
ASSESSMENT/PLAN  This is a 76 Female with a h/o CVA (no prior deficit), HTN, HLD, IDDM, Dementia, MDD admitted to [facility] on 11/1 for Subacute CVA L Basal Ganglia, Mod-Severe Stenosis Prox R PCA, HTN Urgency, AMS, abnormal speech, and Hypoglycemia.  In ED,  on arrival with some improvement with medications but remains hypertensive.  LKWT 3 days prior to presentation, NIHSS 2, Out of window for TPA. Hospital course significant for UTI- treated with ABT. Patient now with gait Instability, ADL impairments and Functional impairments.    # CVA  - Subacute CVA L Basal Ganglia  - Mod-Severe Stenosis Prox R PCA  - Start Comprehensive Rehab Program: PT/OT/ST  - PT: Focused on improving strength, endurance, coordination, balance, functional mobility, and transfers  - OT: Focused on improving strength, fine motor skills, coordination, posture and ADLs.    - ST: to diagnose and treat deficits in swallowing, cognition and communication.   - c/a ASA, high dose statin: lipitor    #NPH  - OP f/u with NSGY    #HTN  - Procardia XL  - Lisinopril 40 mg daily  - hydralazine 10 mg BID  - Metoprolol succinate XL 25mg daily    #HLD  - on Lipitor  - Fenofibrate 145mg daily    #DM II  - ISS and FS  - Lantus and Admelog    #Depression  - Sertraline 100mg daily    #UTI proteus miribalis   - Augmentin  875-125mg BID x 2 more days    #Pain management  - Tylenol PRN    #DVT ppx  - Heparin, SCD, TEDs    #Dementia  - Donepezil 5mg daily  - Memantine 5mg daily    #GI ppx  - Protonix  - maalox for dyspepsia    #Bowel Regimen  - Senna, miralax PRN    #Bladder management  - BS on admission, and q 8 hours (SC if > 400)  - Monitor UO    #FEN   - Diet: Easy to chew + 100% supervision  - Supplements:    #Precaution  - Fall, Aspiration, Seizure      #Skin:  - No active issues at this time  - Pressure injury/Skin: Turn Q2hrs while in bed, OOB to Chair, PT/OT     #Dysphagia    - SLP: evaluation and treatment  - s/p MBS: easy chew m thin liquid per speech. 100 % supervision for aspiration precaution      #Sleep:  - Melatonin   - Maintain quiet hours and low stim environment.  - Melatonin PRN to maximize participation in therapy during the day.   - Monitor sleep logs    Outpatient Follow-up (Specialty/Name of physician):    Huan Oquendo; PhD)  Neurology; Vascular Neurology  [address] [facility], [address]  [place] [address]  Phone: [phone]  Fax: [phone]    Alexander Arevalo (MD)  Neurology; Vascular Neurology  [address] [facility], [address] [place] [address]  Phone: [phone]  Fax: [phone]    MEDICAL PROGNOSIS: GOOD            REHAB POTENTIAL: GOOD             ESTIMATED DISPOSITION: HOME WITH HOME CARE            ELOS: 10-14 Days   EXPECTED THERAPY:     P.T. 1hr/day       O.T. 1hr/day      S.L.P. 1hr/day     P&O Unnecessary     EXP FREQUENCY: 5 days per 7 day period     PRESCREEN COMPARISON:   I have reviewed the prescreen information and I have found no relevant changes between the preadmission screening and my post admission evaluation     RATIONALE FOR INPATIENT ADMISSION - Patient demonstrates the following: (check all that apply)  [X] Medically appropriate for rehabilitation admission  [X] Has attainable rehab goals with an appropriate initial discharge plan  [X] Has rehabilitation potential (expected to make a significant improvement within a reasonable period of time)   [X] Requires close medical management by a rehab physician, rehab nursing care, Hospitalist and comprehensive interdisciplinary team (including PT, OT, & or SLP, Prosthetics and Orthotics)   ASSESSMENT/PLAN  This is a 76 Female with a h/o CVA (no prior deficit), HTN, HLD, IDDM, Dementia, MDD admitted to [facility] on 11/1 for Subacute CVA L Basal Ganglia, Mod-Severe Stenosis Prox R PCA, HTN Urgency, AMS, abnormal speech, and Hypoglycemia.  In ED,  on arrival with some improvement with medications but remains hypertensive.  LKWT 3 days prior to presentation, NIHSS 2, Out of window for TPA. Hospital course significant for UTI- treated with ABT. Patient now with gait Instability, ADL impairments and Functional impairments.    # CVA  - Subacute CVA L Basal Ganglia  - Mod-Severe Stenosis Prox R PCA  - Start Comprehensive Rehab Program: PT/OT/ST  - PT: Focused on improving strength, endurance, coordination, balance, functional mobility, and transfers  - OT: Focused on improving strength, fine motor skills, coordination, posture and ADLs.    - ST: to diagnose and treat deficits in swallowing, cognition and communication.   - c/a ASA, high dose statin: lipitor    #NPH  - OP f/u with NSGY    #HTN  - Procardia XL  - Lisinopril 40 mg daily  - hydralazine 10 mg BID  - Metoprolol succinate XL 25mg daily    #HLD  - on Lipitor  - Fenofibrate 145mg daily    #DM II  - ISS and FS  - Lantus and Admelog    #Depression  - Sertraline 100mg daily    #UTI proteus miribalis   - Augmentin  875-125mg BID x 2 more days    #Pain management  - Tylenol PRN    #DVT ppx  - Heparin, SCD, TEDs    #Dementia  - Donepezil 5mg daily  - Memantine 5mg daily    #GI ppx  - Protonix  - maalox for dyspepsia    #Bowel Regimen  - Senna, miralax PRN    #Bladder management  - BS on admission, and q 8 hours (SC if > 400)  - Monitor UO    #FEN   - Diet: Easy to chew + 100% supervision  - Supplements:    #Precaution  - Fall, Aspiration, Seizure      #Skin:  - No active issues at this time  - Desitin to buttocks for IAD  - Pressure injury/Skin: Turn Q2hrs while in bed, OOB to Chair, PT/OT     #Dysphagia    - SLP: evaluation and treatment  - s/p MBS: easy chew m thin liquid per speech. 100 % supervision for aspiration precaution      #Sleep:  - Melatonin   - Maintain quiet hours and low stim environment.  - Melatonin PRN to maximize participation in therapy during the day.   - Monitor sleep logs    Outpatient Follow-up (Specialty/Name of physician):    Huan Oquendo; PhD)  Neurology; Vascular Neurology  [address] [facility], [address]  [place] [address]  Phone: [phone]  Fax: [phone]    Alexander Arevalo (MD)  Neurology; Vascular Neurology  [address] [facility], [address] [place] [address]  Phone: [phone]  Fax: [phone]    MEDICAL PROGNOSIS: GOOD            REHAB POTENTIAL: GOOD             ESTIMATED DISPOSITION: HOME WITH HOME CARE            ELOS: 10-14 Days   EXPECTED THERAPY:     P.T. 1hr/day       O.T. 1hr/day      S.L.P. 1hr/day     P&O Unnecessary     EXP FREQUENCY: 5 days per 7 day period     PRESCREEN COMPARISON:   I have reviewed the prescreen information and I have found no relevant changes between the preadmission screening and my post admission evaluation     RATIONALE FOR INPATIENT ADMISSION - Patient demonstrates the following: (check all that apply)  [X] Medically appropriate for rehabilitation admission  [X] Has attainable rehab goals with an appropriate initial discharge plan  [X] Has rehabilitation potential (expected to make a significant improvement within a reasonable period of time)   [X] Requires close medical management by a rehab physician, rehab nursing care, Hospitalist and comprehensive interdisciplinary team (including PT, OT, & or SLP, Prosthetics and Orthotics)   ASSESSMENT/PLAN  This is a 76 Female with a h/o CVA (no prior deficit), HTN, HLD, IDDM, Dementia, MDD admitted to [facility] on 11/1 for Subacute CVA L Basal Ganglia, Mod-Severe Stenosis Prox R PCA, HTN Urgency, AMS, abnormal speech, and Hypoglycemia.  In ED,  on arrival with some improvement with medications but remains hypertensive.  LKWT 3 days prior to presentation, NIHSS 2, Out of window for TPA. Hospital course significant for UTI- treated with ABT. Patient now with gait Instability, ADL impairments and Functional impairments.    # CVA  - Subacute CVA L Basal Ganglia  - Mod-Severe Stenosis Prox R PCA  - Start Comprehensive Rehab Program: PT/OT/ST  - PT: Focused on improving strength, endurance, coordination, balance, functional mobility, and transfers  - OT: Focused on improving strength, fine motor skills, coordination, posture and ADLs.    - ST: to diagnose and treat deficits in swallowing, cognition and communication.   - c/a ASA, high dose statin: lipitor    #NPH  - OP f/u with NSGY    #HTN  - Procardia XL  - Lisinopril 40 mg daily  - hydralazine 10 mg BID  - Metoprolol succinate XL 25mg daily    #HLD  - on Lipitor  - Fenofibrate 145mg daily    #DM II  - ISS and FS  - Lantus and Admelog    #Depression  - Sertraline 100mg daily    #UTI proteus miribalis   - Augmentin  875-125mg BID x 2 more days    #Pain management  - Tylenol PRN    #DVT ppx  - Heparin, SCD, TEDs    #Dementia  - Donepezil 5mg daily  - Memantine 5mg daily    #GI ppx  - Protonix  - maalox for dyspepsia    #Bowel Regimen  - Senna, miralax PRN    #Bladder management  - BS on admission, and q 8 hours (SC if > 400)  - Monitor UO    #FEN   - Diet: Easy to chew + 100% supervision  - Supplements:    #Precaution  - Fall, Aspiration, Seizure      #Skin:  - No active issues at this time  - Desitin to buttocks for IAD  - Pressure injury/Skin: Turn Q2hrs while in bed, OOB to Chair, PT/OT     #Dysphagia    - SLP: evaluation and treatment  - s/p MBS: easy chew m thin liquid per speech. 100 % supervision for aspiration precaution      #Sleep:  - Maintain quiet hours and low stim environment.  - Melatonin PRN to maximize participation in therapy during the day.   - Monitor sleep logs    Outpatient Follow-up (Specialty/Name of physician):    Huan Oquendo; PhD)  Neurology; Vascular Neurology  [address] [facility], [address]  [place] [address]  Phone: [phone]  Fax: [phone]    Alexander Arevalo (MD)  Neurology; Vascular Neurology  [address] [facility], [address] [place] [address]  Phone: [phone]  Fax: [phone]    MEDICAL PROGNOSIS: GOOD            REHAB POTENTIAL: GOOD             ESTIMATED DISPOSITION: HOME WITH HOME CARE            ELOS: 10-14 Days   EXPECTED THERAPY:     P.T. 1hr/day       O.T. 1hr/day      S.L.P. 1hr/day     P&O Unnecessary     EXP FREQUENCY: 5 days per 7 day period     PRESCREEN COMPARISON:   I have reviewed the prescreen information and I have found no relevant changes between the preadmission screening and my post admission evaluation     RATIONALE FOR INPATIENT ADMISSION - Patient demonstrates the following: (check all that apply)  [X] Medically appropriate for rehabilitation admission  [X] Has attainable rehab goals with an appropriate initial discharge plan  [X] Has rehabilitation potential (expected to make a significant improvement within a reasonable period of time)   [X] Requires close medical management by a rehab physician, rehab nursing care, Hospitalist and comprehensive interdisciplinary team (including PT, OT, & or SLP, Prosthetics and Orthotics)   ASSESSMENT/PLAN  This is a 76 Female with a h/o CVA (no prior deficit), HTN, HLD, IDDM, Dementia, MDD admitted to [facility] on 11/1 for Subacute CVA L Basal Ganglia, Mod-Severe Stenosis Prox R PCA, HTN Urgency, AMS, abnormal speech, and Hypoglycemia.  In ED,  on arrival with some improvement with medications but remains hypertensive.  LKWT 3 days prior to presentation, NIHSS 2, Out of window for TPA. Hospital course significant for UTI- treated with ABT. Patient now with gait Instability, ADL impairments and Functional impairments.    # CVA  - Subacute CVA L Basal Ganglia with very mild right lower extremity weakness, no dysphagia  - Mod-Severe Stenosis Prox R PCA  - Start Comprehensive Rehab Program: PT/OT/ST  - PT: Focused on improving strength, endurance, coordination, balance, functional mobility, and transfers  - OT: Focused on improving strength, fine motor skills, coordination, posture and ADLs.    - ST: to diagnose and treat deficits in swallowing, cognition and communication.   - c/a ASA, high dose statin: lipitor    #NPH  - OP f/u with NSGY    #HTN  - Procardia XL  - Lisinopril 40 mg daily  - hydralazine 10 mg BID  - Metoprolol succinate XL 25mg daily    #HLD  - on Lipitor  - Fenofibrate 145mg daily    #DM II  - ISS and FS  - Lantus and Admelog    #Depression  - Sertraline 100mg daily    #UTI proteus miribalis   - Augmentin  875-125mg BID x 2 more days    #Pain management  - Tylenol PRN    #DVT ppx  - Heparin, SCD, TEDs    #Dementia  - Donepezil 5mg daily  - Memantine 5mg daily    #GI ppx  - Protonix  - maalox for dyspepsia    #Bowel Regimen  - Senna, miralax PRN    #Bladder management  - BS on admission, and q 8 hours (SC if > 400)  - Monitor UO    #FEN   - Diet: Easy to chew + 100% supervision  - Supplements:    #Precaution  - Fall, Aspiration, Seizure      #Skin:  - No active issues at this time  - Desitin to buttocks for IAD  - Pressure injury/Skin: Turn Q2hrs while in bed, OOB to Chair, PT/OT     #Dysphagia    - SLP: evaluation and treatment  - s/p MBS: easy chew m thin liquid per speech. 100 % supervision for aspiration precaution      #Sleep:  - Maintain quiet hours and low stim environment.  - Melatonin PRN to maximize participation in therapy during the day.   - Monitor sleep logs    Outpatient Follow-up (Specialty/Name of physician):    Huan Oquendo (MD; PhD)  Neurology; Vascular Neurology  [address] [facility], [address]  [place] [address]  Phone: [phone]  Fax: [phone]    Alexander Arevalo (MD)  Neurology; Vascular Neurology  [address] [facility], [address] [place] [address]  Phone: [phone]  Fax: [phone]    MEDICAL PROGNOSIS: GOOD            REHAB POTENTIAL: GOOD             ESTIMATED DISPOSITION: HOME WITH HOME CARE            ELOS: 10-14 Days   EXPECTED THERAPY:     P.T. 1hr/day       O.T. 1hr/day      S.L.P. 1hr/day     P&O Unnecessary     EXP FREQUENCY: 5 days per 7 day period     PRESCREEN COMPARISON:   I have reviewed the prescreen information and I have found no relevant changes between the preadmission screening and my post admission evaluation     RATIONALE FOR INPATIENT ADMISSION - Patient demonstrates the following: (check all that apply)  [X] Medically appropriate for rehabilitation admission  [X] Has attainable rehab goals with an appropriate initial discharge plan  [X] Has rehabilitation potential (expected to make a significant improvement within a reasonable period of time)   [X] Requires close medical management by a rehab physician, rehab nursing care, Hospitalist and comprehensive interdisciplinary team (including PT, OT, & or SLP, Prosthetics and Orthotics)

## 2021-11-12 LAB
ALBUMIN SERPL ELPH-MCNC: 2.8 G/DL — LOW (ref 3.3–5)
ALP SERPL-CCNC: 52 U/L — SIGNIFICANT CHANGE UP (ref 40–120)
ALT FLD-CCNC: 33 U/L — SIGNIFICANT CHANGE UP (ref 10–45)
ANION GAP SERPL CALC-SCNC: 5 MMOL/L — SIGNIFICANT CHANGE UP (ref 5–17)
AST SERPL-CCNC: 18 U/L — SIGNIFICANT CHANGE UP (ref 10–40)
BASOPHILS # BLD AUTO: 0.09 K/UL — SIGNIFICANT CHANGE UP (ref 0–0.2)
BASOPHILS NFR BLD AUTO: 0.8 % — SIGNIFICANT CHANGE UP (ref 0–2)
BILIRUB SERPL-MCNC: 0.2 MG/DL — SIGNIFICANT CHANGE UP (ref 0.2–1.2)
BUN SERPL-MCNC: 17 MG/DL — SIGNIFICANT CHANGE UP (ref 7–23)
CALCIUM SERPL-MCNC: 8.8 MG/DL — SIGNIFICANT CHANGE UP (ref 8.4–10.5)
CHLORIDE SERPL-SCNC: 109 MMOL/L — HIGH (ref 96–108)
CO2 SERPL-SCNC: 32 MMOL/L — HIGH (ref 22–31)
COVID-19 NUCLEOCAPSID GAM AB INTERP: POSITIVE
COVID-19 NUCLEOCAPSID TOTAL GAM ANTIBODY RESULT: 3.61 INDEX — HIGH
COVID-19 SPIKE DOMAIN AB INTERP: POSITIVE
COVID-19 SPIKE DOMAIN ANTIBODY RESULT: >250 U/ML — HIGH
CREAT SERPL-MCNC: 0.78 MG/DL — SIGNIFICANT CHANGE UP (ref 0.5–1.3)
EOSINOPHIL # BLD AUTO: 0.61 K/UL — HIGH (ref 0–0.5)
EOSINOPHIL NFR BLD AUTO: 5.7 % — SIGNIFICANT CHANGE UP (ref 0–6)
GLUCOSE BLDC GLUCOMTR-MCNC: 194 MG/DL — HIGH (ref 70–99)
GLUCOSE BLDC GLUCOMTR-MCNC: 314 MG/DL — HIGH (ref 70–99)
GLUCOSE BLDC GLUCOMTR-MCNC: 349 MG/DL — HIGH (ref 70–99)
GLUCOSE BLDC GLUCOMTR-MCNC: 355 MG/DL — HIGH (ref 70–99)
GLUCOSE BLDC GLUCOMTR-MCNC: 430 MG/DL — HIGH (ref 70–99)
GLUCOSE BLDC GLUCOMTR-MCNC: 440 MG/DL — HIGH (ref 70–99)
GLUCOSE SERPL-MCNC: 201 MG/DL — HIGH (ref 70–99)
HCT VFR BLD CALC: 37.2 % — SIGNIFICANT CHANGE UP (ref 34.5–45)
HGB BLD-MCNC: 11.6 G/DL — SIGNIFICANT CHANGE UP (ref 11.5–15.5)
IMM GRANULOCYTES NFR BLD AUTO: 0.3 % — SIGNIFICANT CHANGE UP (ref 0–1.5)
LYMPHOCYTES # BLD AUTO: 2.66 K/UL — SIGNIFICANT CHANGE UP (ref 1–3.3)
LYMPHOCYTES # BLD AUTO: 25 % — SIGNIFICANT CHANGE UP (ref 13–44)
MCHC RBC-ENTMCNC: 23.6 PG — LOW (ref 27–34)
MCHC RBC-ENTMCNC: 31.2 GM/DL — LOW (ref 32–36)
MCV RBC AUTO: 75.6 FL — LOW (ref 80–100)
MONOCYTES # BLD AUTO: 1.01 K/UL — HIGH (ref 0–0.9)
MONOCYTES NFR BLD AUTO: 9.5 % — SIGNIFICANT CHANGE UP (ref 2–14)
NEUTROPHILS # BLD AUTO: 6.25 K/UL — SIGNIFICANT CHANGE UP (ref 1.8–7.4)
NEUTROPHILS NFR BLD AUTO: 58.7 % — SIGNIFICANT CHANGE UP (ref 43–77)
NRBC # BLD: 0 /100 WBCS — SIGNIFICANT CHANGE UP (ref 0–0)
PLATELET # BLD AUTO: 272 K/UL — SIGNIFICANT CHANGE UP (ref 150–400)
POTASSIUM SERPL-MCNC: 4.2 MMOL/L — SIGNIFICANT CHANGE UP (ref 3.5–5.3)
POTASSIUM SERPL-SCNC: 4.2 MMOL/L — SIGNIFICANT CHANGE UP (ref 3.5–5.3)
PROT SERPL-MCNC: 6.1 G/DL — SIGNIFICANT CHANGE UP (ref 6–8.3)
RBC # BLD: 4.92 M/UL — SIGNIFICANT CHANGE UP (ref 3.8–5.2)
RBC # FLD: 18.8 % — HIGH (ref 10.3–14.5)
SARS-COV-2 IGG+IGM SERPL QL IA: 3.61 INDEX — HIGH
SARS-COV-2 IGG+IGM SERPL QL IA: >250 U/ML — HIGH
SARS-COV-2 IGG+IGM SERPL QL IA: POSITIVE
SARS-COV-2 IGG+IGM SERPL QL IA: POSITIVE
SARS-COV-2 RNA SPEC QL NAA+PROBE: SIGNIFICANT CHANGE UP
SODIUM SERPL-SCNC: 146 MMOL/L — HIGH (ref 135–145)
WBC # BLD: 10.65 K/UL — HIGH (ref 3.8–10.5)
WBC # FLD AUTO: 10.65 K/UL — HIGH (ref 3.8–10.5)

## 2021-11-12 PROCEDURE — 99223 1ST HOSP IP/OBS HIGH 75: CPT

## 2021-11-12 PROCEDURE — 99232 SBSQ HOSP IP/OBS MODERATE 35: CPT | Mod: GC

## 2021-11-12 RX ORDER — INSULIN GLARGINE 100 [IU]/ML
20 INJECTION, SOLUTION SUBCUTANEOUS AT BEDTIME
Refills: 0 | Status: DISCONTINUED | OUTPATIENT
Start: 2021-11-12 | End: 2021-11-13

## 2021-11-12 RX ORDER — INSULIN LISPRO 100/ML
8 VIAL (ML) SUBCUTANEOUS
Refills: 0 | Status: DISCONTINUED | OUTPATIENT
Start: 2021-11-12 | End: 2021-11-13

## 2021-11-12 RX ORDER — INSULIN GLARGINE 100 [IU]/ML
25 INJECTION, SOLUTION SUBCUTANEOUS AT BEDTIME
Refills: 0 | Status: DISCONTINUED | OUTPATIENT
Start: 2021-11-12 | End: 2021-11-12

## 2021-11-12 RX ADMIN — MEMANTINE HYDROCHLORIDE 5 MILLIGRAM(S): 10 TABLET ORAL at 17:38

## 2021-11-12 RX ADMIN — Medication 5: at 16:52

## 2021-11-12 RX ADMIN — Medication 30 MILLIGRAM(S): at 05:29

## 2021-11-12 RX ADMIN — DONEPEZIL HYDROCHLORIDE 5 MILLIGRAM(S): 10 TABLET, FILM COATED ORAL at 21:32

## 2021-11-12 RX ADMIN — INSULIN GLARGINE 20 UNIT(S): 100 INJECTION, SOLUTION SUBCUTANEOUS at 21:30

## 2021-11-12 RX ADMIN — HEPARIN SODIUM 5000 UNIT(S): 5000 INJECTION INTRAVENOUS; SUBCUTANEOUS at 21:32

## 2021-11-12 RX ADMIN — Medication 25 MILLIGRAM(S): at 05:29

## 2021-11-12 RX ADMIN — ZINC OXIDE 1 APPLICATION(S): 200 OINTMENT TOPICAL at 21:32

## 2021-11-12 RX ADMIN — Medication 250 MILLIGRAM(S): at 05:29

## 2021-11-12 RX ADMIN — MEMANTINE HYDROCHLORIDE 5 MILLIGRAM(S): 10 TABLET ORAL at 05:29

## 2021-11-12 RX ADMIN — HEPARIN SODIUM 5000 UNIT(S): 5000 INJECTION INTRAVENOUS; SUBCUTANEOUS at 05:29

## 2021-11-12 RX ADMIN — Medication 6: at 12:18

## 2021-11-12 RX ADMIN — Medication 8 UNIT(S): at 16:52

## 2021-11-12 RX ADMIN — FAMOTIDINE 20 MILLIGRAM(S): 10 INJECTION INTRAVENOUS at 12:10

## 2021-11-12 RX ADMIN — LISINOPRIL 40 MILLIGRAM(S): 2.5 TABLET ORAL at 05:29

## 2021-11-12 RX ADMIN — Medication 81 MILLIGRAM(S): at 12:10

## 2021-11-12 RX ADMIN — Medication 2: at 21:32

## 2021-11-12 RX ADMIN — Medication 250 MILLIGRAM(S): at 17:38

## 2021-11-12 RX ADMIN — ATORVASTATIN CALCIUM 80 MILLIGRAM(S): 80 TABLET, FILM COATED ORAL at 21:32

## 2021-11-12 RX ADMIN — Medication 145 MILLIGRAM(S): at 12:10

## 2021-11-12 RX ADMIN — Medication 10 MILLIGRAM(S): at 05:29

## 2021-11-12 RX ADMIN — Medication 1: at 08:01

## 2021-11-12 RX ADMIN — ZINC OXIDE 1 APPLICATION(S): 200 OINTMENT TOPICAL at 05:30

## 2021-11-12 RX ADMIN — Medication 10 MILLIGRAM(S): at 17:38

## 2021-11-12 RX ADMIN — Medication 8 UNIT(S): at 12:18

## 2021-11-12 RX ADMIN — HEPARIN SODIUM 5000 UNIT(S): 5000 INJECTION INTRAVENOUS; SUBCUTANEOUS at 13:58

## 2021-11-12 RX ADMIN — Medication 5 UNIT(S): at 08:01

## 2021-11-12 RX ADMIN — PREGABALIN 1000 MICROGRAM(S): 225 CAPSULE ORAL at 12:10

## 2021-11-12 RX ADMIN — SERTRALINE 100 MILLIGRAM(S): 25 TABLET, FILM COATED ORAL at 12:10

## 2021-11-12 NOTE — CONSULT NOTE ADULT - SUBJECTIVE AND OBJECTIVE BOX
Patient is a 76y old  Female who presents with a chief complaint of CVA (11 Nov 2021 13:54)      HPI:  HPI:  This is a 76 Female with a h/o CVA (no prior deficit), HTN, HLD, IDDM, Dementia, MDD admitted to [facility] on 11/1 for Subacute CVA L Basal Ganglia infarct,  CTA showedMod-Severe Stenosis Prox R PCA.  Pt. with HTN Urgency, and Hypoglycemia. Patient followed by neurology OP. In ED,  on arrival with some improvement with medications but remains hypertensive. Patient noted to have AMS with abnormal speech by daughter. LKWT 3 days prior to presentation. NIHSS 2. Out of window for TPA.  MRI brain showed--  left posterior limb IC/CR stroke, left frontal and right splenium acute strokes,    Per family, patient was followed by neurosurgery for evaluation of possible NPH with LP as outpatient. Neurosurgery recommending outpatient follow up after rehab. Pt also diagnosed with possible UTI on admission and treated with IV antibiotics, urine cx positive for proteus, ID consulted. HTN meds adjusted, bradycardic metoprolol dose decreased, hypoglycemia improved started low dose Lantus, high BP meds adjusted added hydralazine.  MBS performed and recommended easy to chew with thin liquids, with 100% supervision.   Patient was evaluated by PM&R and therapy for functional deficits, gait/ADL impairments and acute rehabilitation was recommended. Patient was medically optimized for discharge to  IRU on 11/11/21.     (11 Nov 2021 13:54)      PAST MEDICAL & SURGICAL HISTORY:  Rheumatoid arthritis  Diabetes mellitus, type 2  Hypertension  HLD (hyperlipidemia)  Dementia  History of CVA (cerebrovascular accident)  Major depression  Anxiety  UTI (urinary tract infection)  History of dental surgery    FAMILY HISTORY  Father: - at age - with history of   Mother: - at age - with history of     SOCIAL HISTORY  Substance Use (street drugs): (  ) never used  (  ) other:  Tobacco Usage:  (   ) never smoked   (   ) former smoker   (   ) current smoker  (     ) pack year  Alcohol Usage:  Sexual History:   Recent Travel:      Allergies  No Known Allergies  Intolerances    MEDICATIONS  influenza  Vaccine (HIGH DOSE) 0.7 milliLiter(s) IntraMuscular once  zinc oxide 40% Ointment 1 Application(s) Topical two times a day      REVIEW OF SYSTEMS:  CONSTITUTIONAL: No fever, weight loss, or fatigue  EYES: No eye pain, visual disturbances, or discharge  ENMT:  No difficulty hearing, tinnitus, vertigo; No sinus or throat pain  NECK: No pain or stiffness  BREASTS: No pain, masses, or nipple discharge  RESPIRATORY: No cough, wheezing, chills or hemoptysis; No shortness of breath  CARDIOVASCULAR: No chest pain, palpitations, dizziness, or leg swelling  GASTROINTESTINAL: No abdominal or epigastric pain. No nausea, vomiting, or hematemesis; No diarrhea or constipation. No melena or hematochezia.  GENITOURINARY: No dysuria, frequency, hematuria, or incontinence  NEUROLOGICAL: No headaches, memory loss, loss of strength, numbness, or tremors  SKIN: No itching, burning, rashes, or lesions   LYMPH NODES: No enlarged glands  ENDOCRINE: No heat or cold intolerance; No hair loss  MUSCULOSKELETAL: No joint pain or swelling; No muscle, back, or extremity pain  PSYCHIATRIC: No depression, anxiety, mood swings, or difficulty sleeping  HEME/LYMPH: No easy bruising, or bleeding gums  ALLERY AND IMMUNOLOGIC: No hives or eczema    ALL ROS REVIEWED AND NORMAL EXCEPT AS STATED ABOVE    T(C): 36.7 (11-11-21 @ 20:27), Max: 36.7 (11-11-21 @ 14:14)  HR: 78 (11-12-21 @ 05:40) (68 - 78)  BP: 150/70 (11-12-21 @ 05:40) (150/70 - 173/66)  RR: 16 (11-11-21 @ 20:27) (16 - 18)  SpO2: 96% (11-11-21 @ 20:27) (94% - 96%)  Wt(kg): --Vital Signs Last 24 Hrs  T(C): 36.7 (11 Nov 2021 20:27), Max: 36.7 (11 Nov 2021 14:14)  T(F): 98.1 (11 Nov 2021 20:27), Max: 98.1 (11 Nov 2021 14:14)  HR: 78 (12 Nov 2021 05:40) (68 - 78)  BP: 150/70 (12 Nov 2021 05:40) (150/70 - 173/66)  BP(mean): --  RR: 16 (11 Nov 2021 20:27) (16 - 18)  SpO2: 96% (11 Nov 2021 20:27) (94% - 96%)    PHYSICAL EXAM:  GENERAL: NAD, well-groomed, well-developed  HEAD:  Atraumatic, Normocephalic  EYES: EOMI, PERRLA, conjunctiva and sclera clear  ENMT: No tonsillar erythema, exudates, or enlargement; Moist mucous membranes, Good dentition, No lesions  NECK: Supple, No JVD, Normal thyroid  NERVOUS SYSTEM:  Alert & Oriented X3, Good concentration; Motor Strength 5/5 B/L upper and lower extremities; DTRs 2+ intact and symmetric  CHEST/LUNG: Clear to percussion bilaterally; No rales, rhonchi, wheezing, or rubs  HEART: Regular rate and rhythm; No murmurs, rubs, or gallops  ABDOMEN: Soft, Nontender, Nondistended; Bowel sounds present  EXTREMITIES:  2+ Peripheral Pulses, No clubbing, cyanosis, or edema  LYMPH: No lymphadenopathy noted  SKIN: No rashes or lesions    LABS:                        11.6   10.65 )-----------( 272      ( 12 Nov 2021 05:20 )             37.2     11-12    146<H>  |  109<H>  |  17  ----------------------------<  201<H>  4.2   |  32<H>  |  0.78    Ca    8.8      12 Nov 2021 05:20  Phos  3.3     11-11  Mg     1.9     11-11    TPro  6.1  /  Alb  2.8<L>  /  TBili  0.2  /  DBili  x   /  AST  18  /  ALT  33  /  AlkPhos  52  11-12    CAPILLARY BLOOD GLUCOSE    POCT Blood Glucose.: 316 mg/dL (11 Nov 2021 21:30)  POCT Blood Glucose.: 316 mg/dL (11 Nov 2021 16:36)  POCT Blood Glucose.: 264 mg/dL (11 Nov 2021 12:23)  POCT Blood Glucose.: 263 mg/dL (11 Nov 2021 08:58)      RADIOLOGY & ADDITIONAL TESTS:  Xray Chest 1 View- PORTABLE-Urgent (Xray Chest 1 View- PORTABLE-Urgent .) (11.11.21 @ 11:44) >  IMPRESSION:  No acute radiographic findings and no change    Consultant(s) Notes Reviewed:  [x ] YES  [ ] NO  Care Discussed with Consultants/Other Providers [ x] YES  [ ] NO  Imaging Personally Reviewed:  [ ] YES  [ ] NO This is a 76 Female with a h/o CVA (no prior deficit), HTN, HLD, IDDM, Dementia, MDD admitted to [facility] on 11/1 for Subacute CVA L Basal Ganglia infarct,  CTA showedMod-Severe Stenosis Prox R PCA.  Pt. with HTN Urgency, and Hypoglycemia. Patient followed by neurology OP. In ED,  on arrival with some improvement with medications but remains hypertensive. Patient noted to have AMS with abnormal speech by daughter. LKWT 3 days prior to presentation. NIHSS 2. Out of window for TPA.  MRI brain showed--  left posterior limb IC/CR stroke, left frontal and right splenium acute strokes,    Per family, patient was followed by neurosurgery for evaluation of possible NPH with LP as outpatient. Neurosurgery recommending outpatient follow up after rehab. Pt also diagnosed with possible UTI on admission and treated with IV antibiotics, urine cx positive for proteus, ID consulted. HTN meds adjusted, bradycardic metoprolol dose decreased, hypoglycemia improved started low dose Lantus, high BP meds adjusted added hydralazine.  MBS performed and recommended easy to chew with thin liquids, with 100% supervision.   Patient was evaluated by PM&R and therapy for functional deficits, gait/ADL impairments and acute rehabilitation was recommended. Patient was medically optimized for discharge to [facility] IRU on 11/11/21.    Denies chest pain, SOB, N/V, abd pain    PAST MEDICAL & SURGICAL HISTORY:  Rheumatoid arthritis  Diabetes mellitus, type 2  Hypertension  HLD (hyperlipidemia)  Dementia  History of CVA (cerebrovascular accident)  Major depression  Anxiety  UTI (urinary tract infection)  History of dental surgery    FAMILY HISTORY  Denies CAD, DM, HTN, HLD, CVA    SOCIAL HISTORY  Substance Use (street drugs): (x  ) never used  (  ) other:  Tobacco Usage:  (  x ) never smoked   (   ) former smoker   (   ) current smoker  (     ) pack year  Alcohol Usage:Denies  Recent Travel:Denies    Allergies  No Known Allergies  Intolerances    MEDICATIONS  influenza  Vaccine (HIGH DOSE) 0.7 milliLiter(s) IntraMuscular once  zinc oxide 40% Ointment 1 Application(s) Topical two times a day      REVIEW OF SYSTEMS:  CONSTITUTIONAL: No fever, weight loss, or fatigue  EYES: No eye pain, visual disturbances, or discharge  ENMT:  No difficulty hearing, tinnitus, vertigo; No sinus or throat pain  NECK: No pain or stiffness  BREASTS: No pain, masses, or nipple discharge  RESPIRATORY: No cough, wheezing, chills or hemoptysis; No shortness of breath  CARDIOVASCULAR: No chest pain, palpitations, dizziness, or leg swelling  GASTROINTESTINAL: No abdominal or epigastric pain. No nausea, vomiting, or hematemesis; No diarrhea or constipation. No melena or hematochezia.  GENITOURINARY: No dysuria, frequency, hematuria, or incontinence  NEUROLOGICAL: No headaches, memory loss, loss of strength, numbness, or tremors  SKIN: No itching, burning, rashes, or lesions   LYMPH NODES: No enlarged glands  ENDOCRINE: No heat or cold intolerance; No hair loss  MUSCULOSKELETAL: No joint pain or swelling; No muscle, back, or extremity pain  PSYCHIATRIC: No depression, anxiety, mood swings, or difficulty sleeping  HEME/LYMPH: No easy bruising, or bleeding gums  ALLERY AND IMMUNOLOGIC: No hives or eczema    ALL ROS REVIEWED AND NORMAL EXCEPT AS STATED ABOVE    T(C): 36.7 (11-11-21 @ 20:27), Max: 36.7 (11-11-21 @ 14:14)  HR: 78 (11-12-21 @ 05:40) (68 - 78)  BP: 150/70 (11-12-21 @ 05:40) (150/70 - 173/66)  RR: 16 (11-11-21 @ 20:27) (16 - 18)  SpO2: 96% (11-11-21 @ 20:27) (94% - 96%)  Wt(kg): --Vital Signs Last 24 Hrs  T(C): 36.7 (11 Nov 2021 20:27), Max: 36.7 (11 Nov 2021 14:14)  T(F): 98.1 (11 Nov 2021 20:27), Max: 98.1 (11 Nov 2021 14:14)  HR: 78 (12 Nov 2021 05:40) (68 - 78)  BP: 150/70 (12 Nov 2021 05:40) (150/70 - 173/66)  BP(mean): --  RR: 16 (11 Nov 2021 20:27) (16 - 18)  SpO2: 96% (11 Nov 2021 20:27) (94% - 96%)    PHYSICAL EXAM:  GENERAL: NAD, well-groomed, well-developed  HEAD:  Atraumatic, Normocephalic  EYES: EOMI, PERRLA, conjunctiva and sclera clear  ENMT: No tonsillar erythema, exudates, or enlargement; Moist mucous membranes, Good dentition, No lesions  NECK: Supple, No JVD, Normal thyroid  NERVOUS SYSTEM:  Alert & Oriented X3, Good concentration; Motor Strength 5/5 B/L upper and lower extremities; DTRs 2+ intact and symmetric  CHEST/LUNG: Clear to percussion bilaterally; No rales, rhonchi, wheezing, or rubs  HEART: Regular rate and rhythm; No murmurs, rubs, or gallops  ABDOMEN: Soft, Nontender, Nondistended; Bowel sounds present  EXTREMITIES:  2+ Peripheral Pulses, No clubbing, cyanosis, or edema  LYMPH: No lymphadenopathy noted  SKIN: No rashes or lesions    LABS:                        11.6   10.65 )-----------( 272      ( 12 Nov 2021 05:20 )             37.2     11-12    146<H>  |  109<H>  |  17  ----------------------------<  201<H>  4.2   |  32<H>  |  0.78    Ca    8.8      12 Nov 2021 05:20  Phos  3.3     11-11  Mg     1.9     11-11    TPro  6.1  /  Alb  2.8<L>  /  TBili  0.2  /  DBili  x   /  AST  18  /  ALT  33  /  AlkPhos  52  11-12    CAPILLARY BLOOD GLUCOSE    POCT Blood Glucose.: 316 mg/dL (11 Nov 2021 21:30)  POCT Blood Glucose.: 316 mg/dL (11 Nov 2021 16:36)  POCT Blood Glucose.: 264 mg/dL (11 Nov 2021 12:23)  POCT Blood Glucose.: 263 mg/dL (11 Nov 2021 08:58)      RADIOLOGY & ADDITIONAL TESTS:  Xray Chest 1 View- PORTABLE-Urgent (Xray Chest 1 View- PORTABLE-Urgent .) (11.11.21 @ 11:44) >  IMPRESSION:  No acute radiographic findings and no change    Consultant(s) Notes Reviewed:  [x ] YES  [ ] NO  Care Discussed with Consultants/Other Providers [ x] YES  [ ] NO  Imaging Personally Reviewed:  [ ] YES  [x ] NO

## 2021-11-12 NOTE — DIETITIAN INITIAL EVALUATION ADULT. - ORAL INTAKE PTA/DIET HISTORY
Pt is noted with aphasia and forgetfullness. Pt was unable to recall appetite PTA or UBW. Pt reports no food allergies.

## 2021-11-12 NOTE — PROGRESS NOTE ADULT - ATTENDING COMMENTS
Patient seen at bedside this am  Seated in chair  Was evaluated by OT prior to our rounds, but patient unable to recall her session  Calm and cooperative with exam  Hypophonic at times  Follows simple commands    Labs with elevated sodium- Encourage PO fluid  Finger sticks - elevated. On Lantus - 20 units and 8 units premeal    2. HTN:on multiple medications. Monitor BP closely  Hospitalist consult noted    3. Begin full rehab program

## 2021-11-12 NOTE — PROVIDER CONTACT NOTE (OTHER) - ACTION/TREATMENT ORDERED:
DR. diez notified. 8 units premeal given. 6 units sliding scale given. recheck bg in two hours. pt bg 349. dr diez made aware.

## 2021-11-12 NOTE — DIETITIAN INITIAL EVALUATION ADULT. - EDUCATION DIETARY MODIFICATIONS
(1) partially meets; needs review/practice/verbalization Pt unable to participate in meaningful nutrition education due to confusion./(0) unable to meet; needs instruction/verbalization

## 2021-11-12 NOTE — PROGRESS NOTE ADULT - SUBJECTIVE AND OBJECTIVE BOX
Patient is a 76y old  Female who presents with a chief complaint of CVA (12 Nov 2021 09:31)      HPI:  This is a 76 Female with a h/o CVA (no prior deficit), HTN, HLD, IDDM, Dementia, MDD admitted to [facility] on 11/1 for Subacute CVA L Basal Ganglia infarct,  CTA showedMod-Severe Stenosis Prox R PCA.  Pt. with HTN Urgency, and Hypoglycemia. Patient followed by neurology OP. In ED,  on arrival with some improvement with medications but remains hypertensive. Patient noted to have AMS with abnormal speech by daughter. LKWT 3 days prior to presentation. NIHSS 2. Out of window for TPA.  MRI brain showed--  left posterior limb IC/CR stroke, left frontal and right splenium acute strokes,    Per family, patient was followed by neurosurgery for evaluation of possible NPH with LP as outpatient. Neurosurgery recommending outpatient follow up after rehab. Pt also diagnosed with possible UTI on admission and treated with IV antibiotics, urine cx positive for proteus, ID consulted. HTN meds adjusted, bradycardic metoprolol dose decreased, hypoglycemia improved started low dose Lantus, high BP meds adjusted added hydralazine.  MBS performed and recommended easy to chew with thin liquids, with 100% supervision.   Patient was evaluated by PM&R and therapy for functional deficits, gait/ADL impairments and acute rehabilitation was recommended. Patient was medically optimized for discharge to [facility] IRU on 11/11/21.     (11 Nov 2021 13:54)      PAST MEDICAL & SURGICAL HISTORY:  Rheumatoid arthritis    Diabetes mellitus, type 2    Hypertension    HLD (hyperlipidemia)    Dementia    History of CVA (cerebrovascular accident)    Major depression    Anxiety    UTI (urinary tract infection)    History of dental surgery        Allergies    No Known Allergies    Intolerances      SUBJECTIVE: Patient seen and assessed at bedside. No acute events overnight. Slept well overnight. Denies pain, discomfort, headache.       VITALS  76y  Vital Signs Last 24 Hrs  T(C): 36.4 (12 Nov 2021 07:19), Max: 36.7 (11 Nov 2021 14:14)  T(F): 97.6 (12 Nov 2021 07:19), Max: 98.1 (11 Nov 2021 14:14)  HR: 56 (12 Nov 2021 07:19) (56 - 78)  BP: 134/69 (12 Nov 2021 07:19) (134/69 - 173/66)  BP(mean): --  RR: 16 (12 Nov 2021 07:19) (16 - 18)  SpO2: 99% (12 Nov 2021 07:19) (94% - 99%)  Daily Height in cm: 149.86 (11 Nov 2021 15:28)    Daily     Gen - NAD, Comfortable  HEENT - NCAT, EOMI, MMM  Neck - Supple, No limited ROM  Pulm - CTAB, No wheeze, No rhonchi, No crackles  Cardiovascular - RRR, S1S2, No m/r/g  Abdomen - Soft, NT/ND, +BS  Extremities - No C/C/E, No calf tenderness  Neuro-     Cognitive - AAO to self, hospital ("[place]" with cueing), month, and year     Communication - Fluent, +dysarthria, hypophonic, delay processing     Attention: Intact      Judgement: Good evidence of judgement     Memory: Recall 3 objects immediately with cueing x1 and 3 min later with cueing x2     Cranial Nerves - CN 2-12 intact     Motor -                    LEFT    UE - ShAB 5/5, EF 5/5, EE 5/5, WE 5/5,  5/5                    RIGHT UE - ShAB 4/5, EF 5/5, EE 5/5, WE 5/5,  5/5                    LEFT    LE - HF 5/5, KE 5/5, DF 5/5, PF 5/5                    RIGHT LE - HF 4/5, KE 5-/5, DF 5/5, PF 5/5        Sensory - Intact to LT     Reflexes - DTR Intact, No primitive reflex     Coordination - FTN intact     Tone - normal  Psychiatric - Mood stable, Affect WNL  Skin: Intact    RECENT LABS:                          11.6   10.65 )-----------( 272      ( 12 Nov 2021 05:20 )             37.2     11-12    146<H>  |  109<H>  |  17  ----------------------------<  201<H>  4.2   |  32<H>  |  0.78    Ca    8.8      12 Nov 2021 05:20  Phos  3.3     11-11  Mg     1.9     11-11    TPro  6.1  /  Alb  2.8<L>  /  TBili  0.2  /  DBili  x   /  AST  18  /  ALT  33  /  AlkPhos  52  11-12    LIVER FUNCTIONS - ( 12 Nov 2021 05:20 )  Alb: 2.8 g/dL / Pro: 6.1 g/dL / ALK PHOS: 52 U/L / ALT: 33 U/L / AST: 18 U/L / GGT: x                   CAPILLARY BLOOD GLUCOSE      POCT Blood Glucose.: 194 mg/dL (12 Nov 2021 07:22)  POCT Blood Glucose.: 316 mg/dL (11 Nov 2021 21:30)  POCT Blood Glucose.: 316 mg/dL (11 Nov 2021 16:36)  POCT Blood Glucose.: 264 mg/dL (11 Nov 2021 12:23)      MEDICATIONS  (STANDING):  aspirin enteric coated 81 milliGRAM(s) Oral daily  atorvastatin 80 milliGRAM(s) Oral at bedtime  cyanocobalamin 1000 MICROGram(s) Oral daily  dextrose 40% Gel 15 Gram(s) Oral once  dextrose 5%. 1000 milliLiter(s) (50 mL/Hr) IV Continuous <Continuous>  dextrose 5%. 1000 milliLiter(s) (100 mL/Hr) IV Continuous <Continuous>  dextrose 50% Injectable 25 Gram(s) IV Push once  dextrose 50% Injectable 12.5 Gram(s) IV Push once  dextrose 50% Injectable 25 Gram(s) IV Push once  donepezil 5 milliGRAM(s) Oral at bedtime  famotidine    Tablet 20 milliGRAM(s) Oral daily  fenofibrate Tablet 145 milliGRAM(s) Oral daily  glucagon  Injectable 1 milliGRAM(s) IntraMuscular once  heparin   Injectable 5000 Unit(s) SubCutaneous every 8 hours  hydrALAZINE 10 milliGRAM(s) Oral two times a day  influenza  Vaccine (HIGH DOSE) 0.7 milliLiter(s) IntraMuscular once  insulin glargine Injectable (LANTUS) 25 Unit(s) SubCutaneous at bedtime  insulin lispro (ADMELOG) corrective regimen sliding scale   SubCutaneous three times a day before meals  insulin lispro (ADMELOG) corrective regimen sliding scale   SubCutaneous at bedtime  insulin lispro Injectable (ADMELOG) 8 Unit(s) SubCutaneous three times a day before meals  lisinopril 40 milliGRAM(s) Oral daily  memantine 5 milliGRAM(s) Oral two times a day  metoprolol succinate ER 25 milliGRAM(s) Oral daily  NIFEdipine XL 30 milliGRAM(s) Oral daily  saccharomyces boulardii 250 milliGRAM(s) Oral two times a day  sertraline 100 milliGRAM(s) Oral daily  zinc oxide 40% Ointment 1 Application(s) Topical two times a day    MEDICATIONS  (PRN):  acetaminophen     Tablet .. 650 milliGRAM(s) Oral every 6 hours PRN Temp greater or equal to 38C (100.4F), Mild Pain (1 - 3)  aluminum hydroxide/magnesium hydroxide/simethicone Suspension 30 milliLiter(s) Oral every 4 hours PRN Dyspepsia  melatonin 3 milliGRAM(s) Oral at bedtime PRN Insomnia           Patient is a 76y old  Female who presents with a chief complaint of CVA (12 Nov 2021 09:31)      HPI:  This is a 76 Female with a h/o CVA (no prior deficit), HTN, HLD, IDDM, Dementia, MDD admitted to [facility] on 11/1 for Subacute CVA L Basal Ganglia infarct,  CTA showedMod-Severe Stenosis Prox R PCA.  Pt. with HTN Urgency, and Hypoglycemia. Patient followed by neurology OP. In ED,  on arrival with some improvement with medications but remains hypertensive. Patient noted to have AMS with abnormal speech by daughter. LKWT 3 days prior to presentation. NIHSS 2. Out of window for TPA.  MRI brain showed--  left posterior limb IC/CR stroke, left frontal and right splenium acute strokes,    Per family, patient was followed by neurosurgery for evaluation of possible NPH with LP as outpatient. Neurosurgery recommending outpatient follow up after rehab. Pt also diagnosed with possible UTI on admission and treated with IV antibiotics, urine cx positive for proteus, ID consulted. HTN meds adjusted, bradycardic metoprolol dose decreased, hypoglycemia improved started low dose Lantus, high BP meds adjusted added hydralazine.  MBS performed and recommended easy to chew with thin liquids, with 100% supervision.   Patient was evaluated by PM&R and therapy for functional deficits, gait/ADL impairments and acute rehabilitation was recommended. Patient was medically optimized for discharge to [facility] IRU on 11/11/21.     (11 Nov 2021 13:54)      PAST MEDICAL & SURGICAL HISTORY:  Rheumatoid arthritis    Diabetes mellitus, type 2    Hypertension    HLD (hyperlipidemia)    Dementia    History of CVA (cerebrovascular accident)    Major depression    Anxiety    UTI (urinary tract infection)    History of dental surgery        Allergies    No Known Allergies    Intolerances      SUBJECTIVE: Patient seen and assessed at bedside. No acute events overnight. Slept well overnight. Denies pain, discomfort, headache.       VITALS  76y  Vital Signs Last 24 Hrs  T(C): 36.4 (12 Nov 2021 07:19), Max: 36.7 (11 Nov 2021 14:14)  T(F): 97.6 (12 Nov 2021 07:19), Max: 98.1 (11 Nov 2021 14:14)  HR: 56 (12 Nov 2021 07:19) (56 - 78)  BP: 134/69 (12 Nov 2021 07:19) (134/69 - 173/66)  BP(mean): --  RR: 16 (12 Nov 2021 07:19) (16 - 18)  SpO2: 99% (12 Nov 2021 07:19) (94% - 99%)  Daily Height in cm: 149.86 (11 Nov 2021 15:28)    Daily     Gen - NAD, Comfortable  HEENT - NCAT, EOMI, MMM  Neck - Supple, No limited ROM  Pulm - CTAB, No wheeze, No rhonchi, No crackles  Cardiovascular - RRR, S1S2, No m/r/g  Abdomen - Soft, NT/ND, +BS  Extremities - No C/C/E, No calf tenderness  Neuro-     Cognitive - AAO to self, hospital ("[place]" with cueing), month, and year     Communication - +dysarthria, hypophonic, delay processing     Attention: Intact      Judgement: Good evidence of judgement     Memory: Recall 3 objects immediately with cueing x1 and 3 min later with cueing x2     Cranial Nerves - CN 2-12 intact     Motor -                    LEFT    UE - ShAB 5/5, EF 5/5, EE 5/5, WE 5/5,  5/5                    RIGHT UE - ShAB 4/5, EF 5/5, EE 5/5, WE 5/5,  5/5                    LEFT    LE - HF 5/5, KE 5/5, DF 5/5, PF 5/5                    RIGHT LE - HF 4/5, KE 5-/5, DF 5/5, PF 5/5        Sensory - Intact to LT     Reflexes - DTR Intact, No primitive reflex     Coordination - FTN intact     Tone - normal  Psychiatric - Mood stable, Affect WNL  Skin: Intact    RECENT LABS:                          11.6   10.65 )-----------( 272      ( 12 Nov 2021 05:20 )             37.2     11-12    146<H>  |  109<H>  |  17  ----------------------------<  201<H>  4.2   |  32<H>  |  0.78    Ca    8.8      12 Nov 2021 05:20  Phos  3.3     11-11  Mg     1.9     11-11    TPro  6.1  /  Alb  2.8<L>  /  TBili  0.2  /  DBili  x   /  AST  18  /  ALT  33  /  AlkPhos  52  11-12    LIVER FUNCTIONS - ( 12 Nov 2021 05:20 )  Alb: 2.8 g/dL / Pro: 6.1 g/dL / ALK PHOS: 52 U/L / ALT: 33 U/L / AST: 18 U/L / GGT: x                   CAPILLARY BLOOD GLUCOSE      POCT Blood Glucose.: 194 mg/dL (12 Nov 2021 07:22)  POCT Blood Glucose.: 316 mg/dL (11 Nov 2021 21:30)  POCT Blood Glucose.: 316 mg/dL (11 Nov 2021 16:36)  POCT Blood Glucose.: 264 mg/dL (11 Nov 2021 12:23)      MEDICATIONS  (STANDING):  aspirin enteric coated 81 milliGRAM(s) Oral daily  atorvastatin 80 milliGRAM(s) Oral at bedtime  cyanocobalamin 1000 MICROGram(s) Oral daily  dextrose 40% Gel 15 Gram(s) Oral once  dextrose 5%. 1000 milliLiter(s) (50 mL/Hr) IV Continuous <Continuous>  dextrose 5%. 1000 milliLiter(s) (100 mL/Hr) IV Continuous <Continuous>  dextrose 50% Injectable 25 Gram(s) IV Push once  dextrose 50% Injectable 12.5 Gram(s) IV Push once  dextrose 50% Injectable 25 Gram(s) IV Push once  donepezil 5 milliGRAM(s) Oral at bedtime  famotidine    Tablet 20 milliGRAM(s) Oral daily  fenofibrate Tablet 145 milliGRAM(s) Oral daily  glucagon  Injectable 1 milliGRAM(s) IntraMuscular once  heparin   Injectable 5000 Unit(s) SubCutaneous every 8 hours  hydrALAZINE 10 milliGRAM(s) Oral two times a day  influenza  Vaccine (HIGH DOSE) 0.7 milliLiter(s) IntraMuscular once  insulin glargine Injectable (LANTUS) 25 Unit(s) SubCutaneous at bedtime  insulin lispro (ADMELOG) corrective regimen sliding scale   SubCutaneous three times a day before meals  insulin lispro (ADMELOG) corrective regimen sliding scale   SubCutaneous at bedtime  insulin lispro Injectable (ADMELOG) 8 Unit(s) SubCutaneous three times a day before meals  lisinopril 40 milliGRAM(s) Oral daily  memantine 5 milliGRAM(s) Oral two times a day  metoprolol succinate ER 25 milliGRAM(s) Oral daily  NIFEdipine XL 30 milliGRAM(s) Oral daily  saccharomyces boulardii 250 milliGRAM(s) Oral two times a day  sertraline 100 milliGRAM(s) Oral daily  zinc oxide 40% Ointment 1 Application(s) Topical two times a day    MEDICATIONS  (PRN):  acetaminophen     Tablet .. 650 milliGRAM(s) Oral every 6 hours PRN Temp greater or equal to 38C (100.4F), Mild Pain (1 - 3)  aluminum hydroxide/magnesium hydroxide/simethicone Suspension 30 milliLiter(s) Oral every 4 hours PRN Dyspepsia  melatonin 3 milliGRAM(s) Oral at bedtime PRN Insomnia           Patient is a 76y old  Female who presents with a chief complaint of CVA (12 Nov 2021 09:31)      HPI:  This is a 76 Female with a h/o CVA (no prior deficit), HTN, HLD, IDDM, Dementia, MDD admitted to [facility] on 11/1 for Subacute CVA L Basal Ganglia infarct,  CTA showedMod-Severe Stenosis Prox R PCA.  Pt. with HTN Urgency, and Hypoglycemia. Patient followed by neurology OP. In ED,  on arrival with some improvement with medications but remains hypertensive. Patient noted to have AMS with abnormal speech by daughter. LKWT 3 days prior to presentation. NIHSS 2. Out of window for TPA.  MRI brain showed--  left posterior limb IC/CR stroke, left frontal and right splenium acute strokes,    Per family, patient was followed by neurosurgery for evaluation of possible NPH with LP as outpatient. Neurosurgery recommending outpatient follow up after rehab. Pt also diagnosed with possible UTI on admission and treated with IV antibiotics, urine cx positive for proteus, ID consulted. HTN meds adjusted, bradycardic metoprolol dose decreased, hypoglycemia improved started low dose Lantus, high BP meds adjusted added hydralazine.  MBS performed and recommended easy to chew with thin liquids, with 100% supervision.   Patient was evaluated by PM&R and therapy for functional deficits, gait/ADL impairments and acute rehabilitation was recommended. Patient was medically optimized for discharge to [facility] IRU on 11/11/21.     (11 Nov 2021 13:54)      PAST MEDICAL & SURGICAL HISTORY:  Rheumatoid arthritis    Diabetes mellitus, type 2    Hypertension    HLD (hyperlipidemia)    Dementia    History of CVA (cerebrovascular accident)    Major depression    Anxiety    UTI (urinary tract infection)    History of dental surgery        Allergies    No Known Allergies    Intolerances      SUBJECTIVE: Patient seen and assessed at bedside. No acute events overnight. Slept well overnight. Denies pain, discomfort, headache.       VITALS  76y  Vital Signs Last 24 Hrs  T(C): 36.4 (12 Nov 2021 07:19), Max: 36.7 (11 Nov 2021 14:14)  T(F): 97.6 (12 Nov 2021 07:19), Max: 98.1 (11 Nov 2021 14:14)  HR: 56 (12 Nov 2021 07:19) (56 - 78)  BP: 134/69 (12 Nov 2021 07:19) (134/69 - 173/66)  BP(mean): --  RR: 16 (12 Nov 2021 07:19) (16 - 18)  SpO2: 99% (12 Nov 2021 07:19) (94% - 99%)  Daily Height in cm: 149.86 (11 Nov 2021 15:28)    Daily     Gen - NAD, Comfortable  HEENT - NCAT, EOMI, MMM  Neck - Supple, No limited ROM  Pulm - CTAB, No wheeze, No rhonchi, No crackles  Cardiovascular - RRR, S1S2, No m/r/g  Abdomen - Soft, NT/ND, +BS  Extremities - No C/C/E, No calf tenderness  Neuro-     Cognitive - AAO to self, hospital ("[place]" with cueing), month, and year     Communication - +dysarthria, hypophonic, delay processing     Attention: Intact      Judgement: Good evidence of judgement     Memory: Recall 3 objects immediately with cueing x1 and 3 min later with cueing x2     Cranial Nerves - CN 2-12 intact     Motor -                    LEFT    UE - ShAB 5/5, EF 5/5, EE 5/5, WE 5/5,  5/5                    RIGHT UE - ShAB 5/5, EF 5/5, EE 5/5, WE 5/5,  5/5                    LEFT    LE - HF 5/5, KE 5/5, DF 5/5, PF 5/5                    RIGHT LE - HF 4/5, KE 5-/5, DF 5/5, PF 5/5        Sensory - Intact to LT     Reflexes - DTR Intact, No primitive reflex     Coordination - FTN intact     Tone - normal  Psychiatric - Mood stable, Affect WNL  Skin: Intact    RECENT LABS:                          11.6   10.65 )-----------( 272      ( 12 Nov 2021 05:20 )             37.2     11-12    146<H>  |  109<H>  |  17  ----------------------------<  201<H>  4.2   |  32<H>  |  0.78    Ca    8.8      12 Nov 2021 05:20  Phos  3.3     11-11  Mg     1.9     11-11    TPro  6.1  /  Alb  2.8<L>  /  TBili  0.2  /  DBili  x   /  AST  18  /  ALT  33  /  AlkPhos  52  11-12    LIVER FUNCTIONS - ( 12 Nov 2021 05:20 )  Alb: 2.8 g/dL / Pro: 6.1 g/dL / ALK PHOS: 52 U/L / ALT: 33 U/L / AST: 18 U/L / GGT: x                   CAPILLARY BLOOD GLUCOSE      POCT Blood Glucose.: 194 mg/dL (12 Nov 2021 07:22)  POCT Blood Glucose.: 316 mg/dL (11 Nov 2021 21:30)  POCT Blood Glucose.: 316 mg/dL (11 Nov 2021 16:36)  POCT Blood Glucose.: 264 mg/dL (11 Nov 2021 12:23)      MEDICATIONS  (STANDING):  aspirin enteric coated 81 milliGRAM(s) Oral daily  atorvastatin 80 milliGRAM(s) Oral at bedtime  cyanocobalamin 1000 MICROGram(s) Oral daily  dextrose 40% Gel 15 Gram(s) Oral once  dextrose 5%. 1000 milliLiter(s) (50 mL/Hr) IV Continuous <Continuous>  dextrose 5%. 1000 milliLiter(s) (100 mL/Hr) IV Continuous <Continuous>  dextrose 50% Injectable 25 Gram(s) IV Push once  dextrose 50% Injectable 12.5 Gram(s) IV Push once  dextrose 50% Injectable 25 Gram(s) IV Push once  donepezil 5 milliGRAM(s) Oral at bedtime  famotidine    Tablet 20 milliGRAM(s) Oral daily  fenofibrate Tablet 145 milliGRAM(s) Oral daily  glucagon  Injectable 1 milliGRAM(s) IntraMuscular once  heparin   Injectable 5000 Unit(s) SubCutaneous every 8 hours  hydrALAZINE 10 milliGRAM(s) Oral two times a day  influenza  Vaccine (HIGH DOSE) 0.7 milliLiter(s) IntraMuscular once  insulin glargine Injectable (LANTUS) 25 Unit(s) SubCutaneous at bedtime  insulin lispro (ADMELOG) corrective regimen sliding scale   SubCutaneous three times a day before meals  insulin lispro (ADMELOG) corrective regimen sliding scale   SubCutaneous at bedtime  insulin lispro Injectable (ADMELOG) 8 Unit(s) SubCutaneous three times a day before meals  lisinopril 40 milliGRAM(s) Oral daily  memantine 5 milliGRAM(s) Oral two times a day  metoprolol succinate ER 25 milliGRAM(s) Oral daily  NIFEdipine XL 30 milliGRAM(s) Oral daily  saccharomyces boulardii 250 milliGRAM(s) Oral two times a day  sertraline 100 milliGRAM(s) Oral daily  zinc oxide 40% Ointment 1 Application(s) Topical two times a day    MEDICATIONS  (PRN):  acetaminophen     Tablet .. 650 milliGRAM(s) Oral every 6 hours PRN Temp greater or equal to 38C (100.4F), Mild Pain (1 - 3)  aluminum hydroxide/magnesium hydroxide/simethicone Suspension 30 milliLiter(s) Oral every 4 hours PRN Dyspepsia  melatonin 3 milliGRAM(s) Oral at bedtime PRN Insomnia           Patient is a 76y old  Female who presents with a chief complaint of CVA (12 Nov 2021 09:31)      HPI:  This is a 76 Female with a h/o CVA (no prior deficit), HTN, HLD, IDDM, Dementia, MDD admitted to [facility] on 11/1 for Subacute CVA L Basal Ganglia infarct,  CTA showedMod-Severe Stenosis Prox R PCA.  Pt. with HTN Urgency, and Hypoglycemia. Patient followed by neurology OP. In ED,  on arrival with some improvement with medications but remains hypertensive. Patient noted to have AMS with abnormal speech by daughter. LKWT 3 days prior to presentation. NIHSS 2. Out of window for TPA.  MRI brain showed--  left posterior limb IC/CR stroke, left frontal and right splenium acute strokes,    Per family, patient was followed by neurosurgery for evaluation of possible NPH with LP as outpatient. Neurosurgery recommending outpatient follow up after rehab. Pt also diagnosed with possible UTI on admission and treated with IV antibiotics, urine cx positive for proteus, ID consulted. HTN meds adjusted, bradycardic metoprolol dose decreased, hypoglycemia improved started low dose Lantus, high BP meds adjusted added hydralazine.  MBS performed and recommended easy to chew with thin liquids, with 100% supervision.   Patient was evaluated by PM&R and therapy for functional deficits, gait/ADL impairments and acute rehabilitation was recommended. Patient was medically optimized for discharge to [facility] IRU on 11/11/21.     (11 Nov 2021 13:54)      PAST MEDICAL & SURGICAL HISTORY:  Rheumatoid arthritis    Diabetes mellitus, type 2    Hypertension    HLD (hyperlipidemia)    Dementia    History of CVA (cerebrovascular accident)    Major depression    Anxiety    UTI (urinary tract infection)    History of dental surgery        Allergies    No Known Allergies    Intolerances      SUBJECTIVE: Patient seen and assessed at bedside. No acute events overnight. Slept well overnight.     ROS:  Denies pain, discomfort, headache.   Denies CP/dyspnea  Denies abdominal pain       VITALS  76y  Vital Signs Last 24 Hrs  T(C): 36.4 (12 Nov 2021 07:19), Max: 36.7 (11 Nov 2021 14:14)  T(F): 97.6 (12 Nov 2021 07:19), Max: 98.1 (11 Nov 2021 14:14)  HR: 56 (12 Nov 2021 07:19) (56 - 78)  BP: 134/69 (12 Nov 2021 07:19) (134/69 - 173/66)  BP(mean): --  RR: 16 (12 Nov 2021 07:19) (16 - 18)  SpO2: 99% (12 Nov 2021 07:19) (94% - 99%)  Daily Height in cm: 149.86 (11 Nov 2021 15:28)    Daily     Gen - NAD, Comfortable  Pulm - CTAB,   Cardiovascular - RRR, S1S2, No m/r/g  Abdomen - Soft, NT/ND, +BS  Extremities - No C/C/E, No calf tenderness  Neuro-     Cognitive - AAO to self, hospital ("[place]" with cueing), month, and year     Communication - +dysarthria, hypophonic, delay processing     Attention: Intact      Judgement: Good evidence of judgement     No facial weakness        Motor -                    LEFT    UE - ShAB 5/5, EF 5/5, EE 5/5, WE 5/5,  5/5                    RIGHT UE - ShAB 5/5, EF 5/5, EE 5/5, WE 5/5,  5/5                    LEFT    LE - HF 5/5, KE 5/5, DF 5/5, PF 5/5                    RIGHT LE - HF 4/5, KE 5-/5, DF 5/5, PF 5/5        Sensory - Intact to LT     Coordination - FTN intact     Tone - normal  Psychiatric - Mood stable, Affect WNL  Skin: Intact    RECENT LABS:                          11.6   10.65 )-----------( 272      ( 12 Nov 2021 05:20 )             37.2     11-12    146<H>  |  109<H>  |  17  ----------------------------<  201<H>  4.2   |  32<H>  |  0.78    Ca    8.8      12 Nov 2021 05:20  Phos  3.3     11-11  Mg     1.9     11-11    TPro  6.1  /  Alb  2.8<L>  /  TBili  0.2  /  DBili  x   /  AST  18  /  ALT  33  /  AlkPhos  52  11-12    LIVER FUNCTIONS - ( 12 Nov 2021 05:20 )  Alb: 2.8 g/dL / Pro: 6.1 g/dL / ALK PHOS: 52 U/L / ALT: 33 U/L / AST: 18 U/L / GGT: x                   CAPILLARY BLOOD GLUCOSE      POCT Blood Glucose.: 194 mg/dL (12 Nov 2021 07:22)  POCT Blood Glucose.: 316 mg/dL (11 Nov 2021 21:30)  POCT Blood Glucose.: 316 mg/dL (11 Nov 2021 16:36)  POCT Blood Glucose.: 264 mg/dL (11 Nov 2021 12:23)      MEDICATIONS  (STANDING):  aspirin enteric coated 81 milliGRAM(s) Oral daily  atorvastatin 80 milliGRAM(s) Oral at bedtime  cyanocobalamin 1000 MICROGram(s) Oral daily  dextrose 40% Gel 15 Gram(s) Oral once  dextrose 5%. 1000 milliLiter(s) (50 mL/Hr) IV Continuous <Continuous>  dextrose 5%. 1000 milliLiter(s) (100 mL/Hr) IV Continuous <Continuous>  dextrose 50% Injectable 25 Gram(s) IV Push once  dextrose 50% Injectable 12.5 Gram(s) IV Push once  dextrose 50% Injectable 25 Gram(s) IV Push once  donepezil 5 milliGRAM(s) Oral at bedtime  famotidine    Tablet 20 milliGRAM(s) Oral daily  fenofibrate Tablet 145 milliGRAM(s) Oral daily  glucagon  Injectable 1 milliGRAM(s) IntraMuscular once  heparin   Injectable 5000 Unit(s) SubCutaneous every 8 hours  hydrALAZINE 10 milliGRAM(s) Oral two times a day  influenza  Vaccine (HIGH DOSE) 0.7 milliLiter(s) IntraMuscular once  insulin glargine Injectable (LANTUS) 25 Unit(s) SubCutaneous at bedtime  insulin lispro (ADMELOG) corrective regimen sliding scale   SubCutaneous three times a day before meals  insulin lispro (ADMELOG) corrective regimen sliding scale   SubCutaneous at bedtime  insulin lispro Injectable (ADMELOG) 8 Unit(s) SubCutaneous three times a day before meals  lisinopril 40 milliGRAM(s) Oral daily  memantine 5 milliGRAM(s) Oral two times a day  metoprolol succinate ER 25 milliGRAM(s) Oral daily  NIFEdipine XL 30 milliGRAM(s) Oral daily  saccharomyces boulardii 250 milliGRAM(s) Oral two times a day  sertraline 100 milliGRAM(s) Oral daily  zinc oxide 40% Ointment 1 Application(s) Topical two times a day    MEDICATIONS  (PRN):  acetaminophen     Tablet .. 650 milliGRAM(s) Oral every 6 hours PRN Temp greater or equal to 38C (100.4F), Mild Pain (1 - 3)  aluminum hydroxide/magnesium hydroxide/simethicone Suspension 30 milliLiter(s) Oral every 4 hours PRN Dyspepsia  melatonin 3 milliGRAM(s) Oral at bedtime PRN Insomnia

## 2021-11-12 NOTE — PROGRESS NOTE ADULT - SUBJECTIVE AND OBJECTIVE BOX
Patient is a 76y old  Female who presents with a chief complaint of CVA (12 Nov 2021 07:09)    No events overnight  Patient seen and examined at bedside.    ALLERGIES:  No Known Allergies    MEDICATIONS  (STANDING):  aspirin enteric coated 81 milliGRAM(s) Oral daily  atorvastatin 80 milliGRAM(s) Oral at bedtime  cyanocobalamin 1000 MICROGram(s) Oral daily  dextrose 40% Gel 15 Gram(s) Oral once  dextrose 5%. 1000 milliLiter(s) (50 mL/Hr) IV Continuous <Continuous>  dextrose 5%. 1000 milliLiter(s) (100 mL/Hr) IV Continuous <Continuous>  dextrose 50% Injectable 25 Gram(s) IV Push once  dextrose 50% Injectable 12.5 Gram(s) IV Push once  dextrose 50% Injectable 25 Gram(s) IV Push once  donepezil 5 milliGRAM(s) Oral at bedtime  famotidine    Tablet 20 milliGRAM(s) Oral daily  fenofibrate Tablet 145 milliGRAM(s) Oral daily  glucagon  Injectable 1 milliGRAM(s) IntraMuscular once  heparin   Injectable 5000 Unit(s) SubCutaneous every 8 hours  hydrALAZINE 10 milliGRAM(s) Oral two times a day  influenza  Vaccine (HIGH DOSE) 0.7 milliLiter(s) IntraMuscular once  insulin glargine Injectable (LANTUS) 20 Unit(s) SubCutaneous at bedtime  insulin lispro (ADMELOG) corrective regimen sliding scale   SubCutaneous three times a day before meals  insulin lispro (ADMELOG) corrective regimen sliding scale   SubCutaneous at bedtime  insulin lispro Injectable (ADMELOG) 5 Unit(s) SubCutaneous three times a day before meals  lisinopril 40 milliGRAM(s) Oral daily  memantine 5 milliGRAM(s) Oral two times a day  metoprolol succinate ER 25 milliGRAM(s) Oral daily  NIFEdipine XL 30 milliGRAM(s) Oral daily  saccharomyces boulardii 250 milliGRAM(s) Oral two times a day  sertraline 100 milliGRAM(s) Oral daily  zinc oxide 40% Ointment 1 Application(s) Topical two times a day    MEDICATIONS  (PRN):  acetaminophen     Tablet .. 650 milliGRAM(s) Oral every 6 hours PRN Temp greater or equal to 38C (100.4F), Mild Pain (1 - 3)  aluminum hydroxide/magnesium hydroxide/simethicone Suspension 30 milliLiter(s) Oral every 4 hours PRN Dyspepsia  melatonin 3 milliGRAM(s) Oral at bedtime PRN Insomnia    Vital Signs Last 24 Hrs  T(F): 97.6 (12 Nov 2021 07:19), Max: 98.1 (11 Nov 2021 14:14)  HR: 56 (12 Nov 2021 07:19) (56 - 78)  BP: 134/69 (12 Nov 2021 07:19) (134/69 - 173/66)  RR: 16 (12 Nov 2021 07:19) (16 - 18)  SpO2: 99% (12 Nov 2021 07:19) (94% - 99%)  I&O's Summary    BMI (kg/m2): 29.8 (11-11-21 @ 15:28)  PHYSICAL EXAM:  GENERAL: NAD, well-groomed, well-developed  HEAD:  Atraumatic, Normocephalic  EYES: EOMI, PERRLA, conjunctiva and sclera clear  ENMT: No tonsillar erythema, exudates, or enlargement; Moist mucous membranes, Good dentition, No lesions  NECK: Supple, No JVD, Normal thyroid  NERVOUS SYSTEM:  Alert & Oriented X3, Good concentration; Motor Strength 5/5 B/L upper and lower extremities; DTRs 2+ intact and symmetric  CHEST/LUNG: Clear to percussion bilaterally; No rales, rhonchi, wheezing, or rubs  HEART: Regular rate and rhythm; No murmurs, rubs, or gallops  ABDOMEN: Soft, Nontender, Nondistended; Bowel sounds present  EXTREMITIES:  2+ Peripheral Pulses, No clubbing, cyanosis, or edema  LYMPH: No lymphadenopathy noted  SKIN: No rashes or lesions    LABS:                        11.6   10.65 )-----------( 272      ( 12 Nov 2021 05:20 )             37.2       11-12    146  |  109  |  17  ----------------------------<  201  4.2   |  32  |  0.78    Ca    8.8      12 Nov 2021 05:20  Phos  3.3     11-11  Mg     1.9     11-11    TPro  6.1  /  Alb  2.8  /  TBili  0.2  /  DBili  x   /  AST  18  /  ALT  33  /  AlkPhos  52  11-12     eGFR if Non African American: 74 mL/min/1.73M2 (11-12-21 @ 05:20)  eGFR if African American: 86 mL/min/1.73M2 (11-12-21 @ 05:20)    11-02 Chol 248 mg/dL LDL -- HDL 53 mg/dL Trig 151 mg/dL    POCT Blood Glucose.: 194 mg/dL (12 Nov 2021 07:22)  POCT Blood Glucose.: 316 mg/dL (11 Nov 2021 21:30)  POCT Blood Glucose.: 316 mg/dL (11 Nov 2021 16:36)  POCT Blood Glucose.: 264 mg/dL (11 Nov 2021 12:23)    COVID-19 PCR: NotDetec (11-11-21 @ 17:00)  COVID-19 PCR: NotDetec (11-08-21 @ 08:58)  COVID-19 PCR: NotDetec (11-11-21 @ 17:00)  COVID-19 PCR: NotDetec (11-08-21 @ 08:58)  COVID-19 PCR: Detected (09-16-21 @ 07:04)

## 2021-11-12 NOTE — DIETITIAN INITIAL EVALUATION ADULT. - OTHER INFO
This is a 76 Female with a h/o CVA (no prior deficit), HTN, HLD, IDDM, Dementia, MDD admitted to [facility] on 11/1 for Subacute CVA L Basal Ganglia, Mod-Severe Stenosis Prox R PCA, HTN Urgency, AMS, abnormal speech, and Hypoglycemia.  In ED,  on arrival with some improvement with medications but remains hypertensive.  LKWT 3 days prior to presentation, NIHSS 2, Out of window for TPA. Hospital course significant for UTI- treated with ABT. Patient now with gait Instability, ADL impairments and Functional impairments. Patient was medically optimized for discharge to [facility] IRU on 11/11/21.    Pt reports good appetite since being admitted to [place]. Pt consumes % of meals and snacks as per documentation. Pt provided food preferences. Pt denies GI distress. Last BM on 11/11 as per nursing flowsheets. Pt denies chewing or swallowing difficulties. No edema noted as per nursing flowsheets. Skin noted with incontinence associated dermatitis as per nursing flowsheets. Latest HbA1c is 7.6 (on 11/3). Pt provided verbal education about consistent carbohydrate diet to maintain glycemic control.

## 2021-11-12 NOTE — DIETITIAN INITIAL EVALUATION ADULT. - PERTINENT MEDS FT
MEDICATIONS  (STANDING):  aspirin enteric coated 81 milliGRAM(s) Oral daily  atorvastatin 80 milliGRAM(s) Oral at bedtime  cyanocobalamin 1000 MICROGram(s) Oral daily  dextrose 40% Gel 15 Gram(s) Oral once  dextrose 5%. 1000 milliLiter(s) (50 mL/Hr) IV Continuous <Continuous>  dextrose 5%. 1000 milliLiter(s) (100 mL/Hr) IV Continuous <Continuous>  dextrose 50% Injectable 25 Gram(s) IV Push once  dextrose 50% Injectable 12.5 Gram(s) IV Push once  dextrose 50% Injectable 25 Gram(s) IV Push once  donepezil 5 milliGRAM(s) Oral at bedtime  famotidine    Tablet 20 milliGRAM(s) Oral daily  fenofibrate Tablet 145 milliGRAM(s) Oral daily  glucagon  Injectable 1 milliGRAM(s) IntraMuscular once  heparin   Injectable 5000 Unit(s) SubCutaneous every 8 hours  hydrALAZINE 10 milliGRAM(s) Oral two times a day  influenza  Vaccine (HIGH DOSE) 0.7 milliLiter(s) IntraMuscular once  insulin glargine Injectable (LANTUS) 20 Unit(s) SubCutaneous at bedtime  insulin lispro (ADMELOG) corrective regimen sliding scale   SubCutaneous three times a day before meals  insulin lispro (ADMELOG) corrective regimen sliding scale   SubCutaneous at bedtime  insulin lispro Injectable (ADMELOG) 8 Unit(s) SubCutaneous three times a day before meals  lisinopril 40 milliGRAM(s) Oral daily  memantine 5 milliGRAM(s) Oral two times a day  metoprolol succinate ER 25 milliGRAM(s) Oral daily  NIFEdipine XL 30 milliGRAM(s) Oral daily  saccharomyces boulardii 250 milliGRAM(s) Oral two times a day  sertraline 100 milliGRAM(s) Oral daily  zinc oxide 40% Ointment 1 Application(s) Topical two times a day    MEDICATIONS  (PRN):  acetaminophen     Tablet .. 650 milliGRAM(s) Oral every 6 hours PRN Temp greater or equal to 38C (100.4F), Mild Pain (1 - 3)  aluminum hydroxide/magnesium hydroxide/simethicone Suspension 30 milliLiter(s) Oral every 4 hours PRN Dyspepsia  melatonin 3 milliGRAM(s) Oral at bedtime PRN Insomnia

## 2021-11-12 NOTE — PROGRESS NOTE ADULT - ASSESSMENT
This is a 76 Female with a h/o CVA (no prior deficit), HTN, HLD, IDDM, Dementia, MDD admitted to [facility] on 11/1 for Subacute CVA L Basal Ganglia, Mod-Severe Stenosis Prox R PCA, HTN Urgency, AMS, abnormal speech, and Hypoglycemia.  In ED,  on arrival with some improvement with medications but remains hypertensive.  LKWT 3 days prior to presentation, NIHSS 2, Out of window for TPA. Hospital course significant for UTI- treated with ABT. Patient now with gait Instability, ADL impairments and Functional impairments.    # CVA  - Subacute CVA L Basal Ganglia  - Mod-Severe Stenosis Prox R PCA  - Start Comprehensive Rehab Program: PT/OT/ST  - PT: Focused on improving strength, endurance, coordination, balance, functional mobility, and transfers  - OT: Focused on improving strength, fine motor skills, coordination, posture and ADLs.    - ST: to diagnose and treat deficits in swallowing, cognition and communication.   - c/a ASA 81mg daily, lipitor 80mg qhs    #NPH  - OP f/u with NSGY    #HTN  - Procardia XL  - Lisinopril 40 mg daily  - hydralazine 10 mg BID  - Metoprolol succinate XL 25mg daily    #HLD  - on Lipitor  - Fenofibrate 145mg daily    #DM II  - A1c 11/3 was 7.6  - ISS and FS  - BS not controlled; will increase lantus to 25units  - Will adjust Admelog    #Depression  - Sertraline 100mg daily    #Dementia  - Donepezil 5mg daily  - Memantine 5mg daily    #UTI proteus miribalis   - S/p Augmentin    #DVT ppx  - Heparin

## 2021-11-12 NOTE — DIETITIAN INITIAL EVALUATION ADULT. - PERSON TAUGHT/METHOD
Pt provided verbal education about consistent carbohydrate diet to maintain glycemic control./verbal instruction/patient instructed

## 2021-11-12 NOTE — DIETITIAN INITIAL EVALUATION ADULT. - PERTINENT LABORATORY DATA
Date: 12 Nov 2021 05:20  Hemoglobin 11.6   Hematocrit 37.2     Date: 11-12  Sodium 146<H>  Potassium 4.2  Glucose Serum 201<H>  BUN 17  Creatinine 0.78    HbA1c 7.6 % (11/3)     ACCUCHECK  POCT Blood Glucose.: 430 mg/dL (12 Nov 2021 12:09)  POCT Blood Glucose.: 440 mg/dL (12 Nov 2021 12:07)  POCT Blood Glucose.: 194 mg/dL (12 Nov 2021 07:22)  POCT Blood Glucose.: 316 mg/dL (11 Nov 2021 21:30)

## 2021-11-12 NOTE — CONSULT NOTE ADULT - ASSESSMENT
This is a 76 Female with a h/o CVA (no prior deficit), HTN, HLD, IDDM, Dementia, MDD admitted to [facility] on 11/1 for Subacute CVA L Basal Ganglia, Mod-Severe Stenosis Prox R PCA, HTN Urgency, AMS, abnormal speech, and Hypoglycemia.  In ED,  on arrival with some improvement with medications but remains hypertensive.  LKWT 3 days prior to presentation, NIHSS 2, Out of window for TPA. Hospital course significant for UTI- treated with ABT. Patient now with gait Instability, ADL impairments and Functional impairments.    # CVA  - Subacute CVA L Basal Ganglia  - Mod-Severe Stenosis Prox R PCA  - Start Comprehensive Rehab Program: PT/OT/ST  - PT: Focused on improving strength, endurance, coordination, balance, functional mobility, and transfers  - OT: Focused on improving strength, fine motor skills, coordination, posture and ADLs.    - ST: to diagnose and treat deficits in swallowing, cognition and communication.   - c/a ASA 81mg daily, lipitor 80mg qhs    #NPH  - OP f/u with NSGY    #HTN  - Procardia XL  - Lisinopril 40 mg daily  - hydralazine 10 mg BID  - Metoprolol succinate XL 25mg daily    #HLD  - on Lipitor  - Fenofibrate 145mg daily    #DM II  - A1c 11/3 was 7.6  - ISS and FS  - Lantus 20units qhs and Admelog    #Depression  - Sertraline 100mg daily    #Dementia  - Donepezil 5mg daily  - Memantine 5mg daily    #UTI proteus miribalis   - Augmentin  875-125mg BID x 2 more days    #DVT ppx  - Heparin, SCD, TEDs    Thank you for pleasure of consult  Will follow course with you This is a 76 Female with a h/o CVA (no prior deficit), HTN, HLD, IDDM, Dementia, MDD admitted to [facility] on 11/1 for Subacute CVA L Basal Ganglia, Mod-Severe Stenosis Prox R PCA, HTN Urgency, AMS, abnormal speech, and Hypoglycemia.  In ED,  on arrival with some improvement with medications but remains hypertensive.  LKWT 3 days prior to presentation, NIHSS 2, Out of window for TPA. Hospital course significant for UTI- treated with ABT. Patient now with gait Instability, ADL impairments and Functional impairments.    # CVA  - Subacute CVA L Basal Ganglia  - Mod-Severe Stenosis Prox R PCA  - Start Comprehensive Rehab Program: PT/OT/ST  - PT: Focused on improving strength, endurance, coordination, balance, functional mobility, and transfers  - OT: Focused on improving strength, fine motor skills, coordination, posture and ADLs.    - ST: to diagnose and treat deficits in swallowing, cognition and communication.   - c/a ASA 81mg daily, lipitor 80mg qhs    #NPH  - OP f/u with NSGY    #HTN  - Procardia XL 30mg daily  - Lisinopril 40 mg daily  - hydralazine 10 mg BID  - Metoprolol succinate XL 25mg daily    #HLD  - on Lipitor  - Fenofibrate 145mg daily    #DM II  - A1c 11/3 was 7.6  - ISS and FS  - Increase regimen: Lantus 25units qhs and Admelog 8 units premeal    #Depression  - Sertraline 100mg daily    #Dementia  - Donepezil 5mg daily  - Memantine 5mg daily    #UTI proteus miribalis   - Augmentin  875-125mg BID x 2 more days    #DVT ppx  - Heparin, SCD, TEDs    Thank you for pleasure of consult  Will follow course with you This is a 76 Female with a h/o CVA (no prior deficit), HTN, HLD, IDDM, Dementia, MDD admitted to [facility] on 11/1 for Subacute CVA L Basal Ganglia, Mod-Severe Stenosis Prox R PCA, HTN Urgency, AMS, abnormal speech, and Hypoglycemia.  In ED,  on arrival with some improvement with medications but remains hypertensive.  LKWT 3 days prior to presentation, NIHSS 2, Out of window for TPA. Hospital course significant for UTI- treated with ABT. Patient now with gait Instability, ADL impairments and Functional impairments.    # CVA  - Subacute CVA L Basal Ganglia  - Mod-Severe Stenosis Prox R PCA  - Start Comprehensive Rehab Program: PT/OT/ST  - PT: Focused on improving strength, endurance, coordination, balance, functional mobility, and transfers  - OT: Focused on improving strength, fine motor skills, coordination, posture and ADLs.    - ST: to diagnose and treat deficits in swallowing, cognition and communication.   - c/a ASA 81mg daily, lipitor 80mg qhs    #NPH  - OP f/u with NSGY    #HTN  - Procardia XL 30mg daily  - Lisinopril 40 mg daily  - hydralazine 10 mg BID  - Metoprolol succinate XL 25mg daily    #HLD  - on Lipitor  - Fenofibrate 145mg daily    #DM II  - A1c 11/3 was 7.6  - ISS and FS  - C/w lantus 20units qhs and will increase fast acting to Admelog 8 units premeal  - DIet changed to diabetic diet    #Depression  - Sertraline 100mg daily    #Dementia  - Donepezil 5mg daily  - Memantine 5mg daily    #UTI proteus miribalis   - S/p augmentin  - Will monitor F/WBC count    #DVT ppx  - Heparin, SCD, TEDs    Thank you for pleasure of consult  Will follow course with you

## 2021-11-12 NOTE — PROGRESS NOTE ADULT - ASSESSMENT
This is a 76 Female with a h/o CVA (no prior deficit), HTN, HLD, IDDM, Dementia, MDD admitted to [facility] on 11/1 for Subacute CVA L Basal Ganglia, Mod-Severe Stenosis Prox R PCA, HTN Urgency, AMS, abnormal speech, and Hypoglycemia.  In ED,  on arrival with some improvement with medications but remains hypertensive.  LKWT 3 days prior to presentation, NIHSS 2, Out of window for TPA. Hospital course significant for UTI- treated with ABT. Patient now with gait Instability, ADL impairments and Functional impairments.    # CVA  - Subacute CVA L Basal Ganglia  - Mod-Severe Stenosis Prox R PCA  - Start Comprehensive Rehab Program: PT/OT/ST  - PT: Focused on improving strength, endurance, coordination, balance, functional mobility, and transfers  - OT: Focused on improving strength, fine motor skills, coordination, posture and ADLs.    - ST: to diagnose and treat deficits in swallowing, cognition and communication.   - c/a ASA 81mg daily, lipitor 80mg qhs    #NPH  - OP f/u with NSGY    #HTN  - Procardia XL  - Lisinopril 40 mg daily  - hydralazine 10 mg BID  - Metoprolol succinate XL 25mg daily    #HLD  - on Lipitor  - Fenofibrate 145mg daily    #DM II  - A1c 11/3 was 7.6  - ISS and FS  - Lantus 20units qhs and Admelog    #Depression  - Sertraline 100mg daily    #Dementia  - Donepezil 5mg daily  - Memantine 5mg daily    #UTI proteus miribalis   - Augmentin  875-125mg BID x 2 more days    #DVT ppx  - Heparin, SCD, TEDs

## 2021-11-12 NOTE — PROGRESS NOTE ADULT - SUBJECTIVE AND OBJECTIVE BOX
This is a 76 Female with a h/o CVA (no prior deficit), HTN, HLD, IDDM, Dementia, MDD admitted to [facility] on 11/1 for Subacute CVA L Basal Ganglia infarct,  CTA showedMod-Severe Stenosis Prox R PCA.  Pt. with HTN Urgency, and Hypoglycemia. Patient followed by neurology OP. In ED,  on arrival with some improvement with medications but remains hypertensive. Patient noted to have AMS with abnormal speech by daughter. LKWT 3 days prior to presentation. NIHSS 2. Out of window for TPA.  MRI brain showed--  left posterior limb IC/CR stroke, left frontal and right splenium acute strokes,    Per family, patient was followed by neurosurgery for evaluation of possible NPH with LP as outpatient. Neurosurgery recommending outpatient follow up after rehab. Pt also diagnosed with possible UTI on admission and treated with IV antibiotics, urine cx positive for proteus, ID consulted. HTN meds adjusted, bradycardic metoprolol dose decreased, hypoglycemia improved started low dose Lantus, high BP meds adjusted added hydralazine.  MBS performed and recommended easy to chew with thin liquids, with 100% supervision.   Patient was evaluated by PM&R and therapy for functional deficits, gait/ADL impairments and acute rehabilitation was recommended. Patient was medically optimized for discharge to [facility] IRU on 11/11/21.        PAST MEDICAL & SURGICAL HISTORY:  Rheumatoid arthritis  Diabetes mellitus, type 2  Hypertension  HLD (hyperlipidemia)  Dementia  History of CVA (cerebrovascular accident)  Major depression  Anxiety  UTI (urinary tract infection)  History of dental surgery    FAMILY HISTORY  Father: - at age - with history of   Mother: - at age - with history of     SOCIAL HISTORY  Substance Use (street drugs): (  ) never used  (  ) other:  Tobacco Usage:  (   ) never smoked   (   ) former smoker   (   ) current smoker  (     ) pack year  Alcohol Usage:  Sexual History:   Recent Travel:    Allergies  No Known Allergies  Intolerances    MEDICATIONS  influenza  Vaccine (HIGH DOSE) 0.7 milliLiter(s) IntraMuscular once  zinc oxide 40% Ointment 1 Application(s) Topical two times a day      REVIEW OF SYSTEMS:  CONSTITUTIONAL: No fever, weight loss, or fatigue  EYES: No eye pain, visual disturbances, or discharge  ENMT:  No difficulty hearing, tinnitus, vertigo; No sinus or throat pain  NECK: No pain or stiffness  BREASTS: No pain, masses, or nipple discharge  RESPIRATORY: No cough, wheezing, chills or hemoptysis; No shortness of breath  CARDIOVASCULAR: No chest pain, palpitations, dizziness, or leg swelling  GASTROINTESTINAL: No abdominal or epigastric pain. No nausea, vomiting, or hematemesis; No diarrhea or constipation. No melena or hematochezia.  GENITOURINARY: No dysuria, frequency, hematuria, or incontinence  NEUROLOGICAL: No headaches, memory loss, loss of strength, numbness, or tremors  SKIN: No itching, burning, rashes, or lesions   LYMPH NODES: No enlarged glands  ENDOCRINE: No heat or cold intolerance; No hair loss  MUSCULOSKELETAL: No joint pain or swelling; No muscle, back, or extremity pain  PSYCHIATRIC: No depression, anxiety, mood swings, or difficulty sleeping  HEME/LYMPH: No easy bruising, or bleeding gums  ALLERY AND IMMUNOLOGIC: No hives or eczema    ALL ROS REVIEWED AND NORMAL EXCEPT AS STATED ABOVE    T(C): 36.7 (11-11-21 @ 20:27), Max: 36.7 (11-11-21 @ 14:14)  HR: 78 (11-12-21 @ 05:40) (68 - 78)  BP: 150/70 (11-12-21 @ 05:40) (150/70 - 173/66)  RR: 16 (11-11-21 @ 20:27) (16 - 18)  SpO2: 96% (11-11-21 @ 20:27) (94% - 96%)  Wt(kg): --Vital Signs Last 24 Hrs  T(C): 36.7 (11 Nov 2021 20:27), Max: 36.7 (11 Nov 2021 14:14)  T(F): 98.1 (11 Nov 2021 20:27), Max: 98.1 (11 Nov 2021 14:14)  HR: 78 (12 Nov 2021 05:40) (68 - 78)  BP: 150/70 (12 Nov 2021 05:40) (150/70 - 173/66)  BP(mean): --  RR: 16 (11 Nov 2021 20:27) (16 - 18)  SpO2: 96% (11 Nov 2021 20:27) (94% - 96%)    PHYSICAL EXAM:  GENERAL: NAD, well-groomed, well-developed  HEAD:  Atraumatic, Normocephalic  EYES: EOMI, PERRLA, conjunctiva and sclera clear  ENMT: No tonsillar erythema, exudates, or enlargement; Moist mucous membranes, Good dentition, No lesions  NECK: Supple, No JVD, Normal thyroid  NERVOUS SYSTEM:  Alert & Oriented X3, Good concentration; Motor Strength 5/5 B/L upper and lower extremities; DTRs 2+ intact and symmetric  CHEST/LUNG: Clear to percussion bilaterally; No rales, rhonchi, wheezing, or rubs  HEART: Regular rate and rhythm; No murmurs, rubs, or gallops  ABDOMEN: Soft, Nontender, Nondistended; Bowel sounds present  EXTREMITIES:  2+ Peripheral Pulses, No clubbing, cyanosis, or edema  LYMPH: No lymphadenopathy noted  SKIN: No rashes or lesions    LABS:                        11.6   10.65 )-----------( 272      ( 12 Nov 2021 05:20 )             37.2     11-12    146<H>  |  109<H>  |  17  ----------------------------<  201<H>  4.2   |  32<H>  |  0.78    Ca    8.8      12 Nov 2021 05:20  Phos  3.3     11-11  Mg     1.9     11-11    TPro  6.1  /  Alb  2.8<L>  /  TBili  0.2  /  DBili  x   /  AST  18  /  ALT  33  /  AlkPhos  52  11-12    CAPILLARY BLOOD GLUCOSE    POCT Blood Glucose.: 316 mg/dL (11 Nov 2021 21:30)  POCT Blood Glucose.: 316 mg/dL (11 Nov 2021 16:36)  POCT Blood Glucose.: 264 mg/dL (11 Nov 2021 12:23)  POCT Blood Glucose.: 263 mg/dL (11 Nov 2021 08:58)    RADIOLOGY & ADDITIONAL TESTS:  Xray Chest 1 View- PORTABLE-Urgent (Xray Chest 1 View- PORTABLE-Urgent .) (11.11.21 @ 11:44) >  IMPRESSION:  No acute radiographic findings and no change    Consultant(s) Notes Reviewed:  [x ] YES  [ ] NO  Care Discussed with Consultants/Other Providers [ x] YES  [ ] NO  Imaging Personally Reviewed:  [ ] YES  [x ] NO

## 2021-11-12 NOTE — PROGRESS NOTE ADULT - ASSESSMENT
ASSESSMENT/PLAN  This is a 76 Female with a h/o CVA (no prior deficit), HTN, HLD, IDDM, Dementia, MDD admitted to [facility] on 11/1 for Subacute CVA L Basal Ganglia, Mod-Severe Stenosis Prox R PCA, HTN Urgency, AMS, abnormal speech, and Hypoglycemia.  In ED,  on arrival with some improvement with medications but remains hypertensive.  LKWT 3 days prior to presentation, NIHSS 2, Out of window for TPA. Hospital course significant for UTI- treated with ABT. Patient now with gait Instability, ADL impairments and Functional impairments.    # CVA  - Subacute CVA L Basal Ganglia  - Mod-Severe Stenosis Prox R PCA  - Start Comprehensive Rehab Program: PT/OT/ST  - PT: Focused on improving strength, endurance, coordination, balance, functional mobility, and transfers  - OT: Focused on improving strength, fine motor skills, coordination, posture and ADLs.    - ST: to diagnose and treat deficits in swallowing, cognition and communication.   - c/a ASA, high dose statin: lipitor    #NPH  - OP f/u with NSGY    #HTN  - Procardia XL  - Lisinopril 40 mg daily  - hydralazine 10 mg BID  - Metoprolol succinate XL 25mg daily    #HLD  - on Lipitor  - Fenofibrate 145mg daily    #DM II  - ISS and FS  - Lantus and Admelog    #Depression  - Sertraline 100mg daily    #Pain management  - Tylenol PRN    #DVT ppx  - Heparin, SCD, TEDs    #Dementia  - Donepezil 5mg daily  - Memantine 5mg daily    #GI ppx  - Protonix  - maalox for dyspepsia    #Bowel Regimen  - Senna, miralax PRN    #Bladder management  - BS on admission, and q 8 hours (SC if > 400)  - Monitor UO    #FEN   - Diet: Easy to chew + 100% supervision  - Supplements:    #Precaution  - Fall, Aspiration, Seizure      #Skin:  - No active issues at this time  - Desitin to buttocks for IAD  - Pressure injury/Skin: Turn Q2hrs while in bed, OOB to Chair, PT/OT     #Dysphagia    - SLP: evaluation and treatment  - s/p MBS: easy chew m thin liquid per speech. 100 % supervision for aspiration precaution      #Sleep:  - Maintain quiet hours and low stim environment.  - Melatonin PRN to maximize participation in therapy during the day.   - Monitor sleep logs    Outpatient Follow-up (Specialty/Name of physician):    Huan Oquendo; PhD)  Neurology; Vascular Neurology  [address] [facility], [address]  [place] [address]  Phone: [phone]  Fax: [phone]    Alexander Arevalo (MD)  Neurology; Vascular Neurology  [address] [facility], [address] [place] [address]  Phone: [phone]  Fax: [phone] ASSESSMENT/PLAN  This is a 76 Female with a h/o CVA (no prior deficit), HTN, HLD, IDDM, Dementia, MDD admitted to [facility] on 11/1 for Subacute CVA L Basal Ganglia, Mod-Severe Stenosis Prox R PCA, HTN Urgency, AMS, abnormal speech, and Hypoglycemia.  In ED,  on arrival with some improvement with medications but remains hypertensive.  LKWT 3 days prior to presentation, NIHSS 2, Out of window for TPA. Hospital course significant for UTI- treated with ABT. Patient now with gait Instability, ADL impairments and Functional impairments.    # CVA  - Subacute CVA L Basal Ganglia  - Mod-Severe Stenosis Prox R PCA  - Start Comprehensive Rehab Program: PT/OT/ST- total of 3 hrs/day 5 days/week   - c/w ASA, high dose statin: lipitor    #NPH  - OP f/u with NSGY    #HTN  - Procardia XL  - Lisinopril 40 mg daily  - hydralazine 10 mg BID  - Metoprolol succinate XL 25mg daily    #HLD  - on Lipitor  - Fenofibrate 145mg daily    #DM II  - ISS and FS  - Lantus and Admelog    #Depression  - Sertraline 100mg daily    #Pain management  - Tylenol PRN    #DVT ppx  - Heparin, SCD, TEDs    #Dementia  - Donepezil 5mg daily  - Memantine 5mg daily    #GI ppx  - Protonix  - maalox for dyspepsia    #Bowel Regimen  - Senna, miralax PRN    #Bladder management  - BS on admission, and q 8 hours (SC if > 400)  - Monitor UO    #FEN   - Diet: Easy to chew + 100% supervision  - Supplements:    #Precaution  - Fall, Aspiration, Seizure      #Skin:  - No active issues at this time  - Desitin to buttocks for IAD  - Pressure injury/Skin: Turn Q2hrs while in bed, OOB to Chair, PT/OT     #Dysphagia    - SLP: evaluation and treatment  - s/p MBS: easy chew m thin liquid per speech. 100 % supervision for aspiration precaution      #Sleep:  - Maintain quiet hours and low stim environment.  - Melatonin PRN to maximize participation in therapy during the day.   - Monitor sleep logs    Outpatient Follow-up (Specialty/Name of physician):    Huan Oquendo (MD; PhD)  Neurology; Vascular Neurology  [address] [facility], [address]  [place] [address]  Phone: [phone]  Fax: [phone]    Alexander Arevalo)  Neurology; Vascular Neurology  [address] [address], [address] [place] [address]  Phone: [phone]  Fax: [phone]

## 2021-11-13 LAB
GLUCOSE BLDC GLUCOMTR-MCNC: 118 MG/DL — HIGH (ref 70–99)
GLUCOSE BLDC GLUCOMTR-MCNC: 209 MG/DL — HIGH (ref 70–99)
GLUCOSE BLDC GLUCOMTR-MCNC: 239 MG/DL — HIGH (ref 70–99)
GLUCOSE BLDC GLUCOMTR-MCNC: 297 MG/DL — HIGH (ref 70–99)

## 2021-11-13 PROCEDURE — 99232 SBSQ HOSP IP/OBS MODERATE 35: CPT

## 2021-11-13 RX ORDER — METOPROLOL TARTRATE 50 MG
25 TABLET ORAL DAILY
Refills: 0 | Status: DISCONTINUED | OUTPATIENT
Start: 2021-11-13 | End: 2021-11-15

## 2021-11-13 RX ORDER — INSULIN GLARGINE 100 [IU]/ML
25 INJECTION, SOLUTION SUBCUTANEOUS AT BEDTIME
Refills: 0 | Status: DISCONTINUED | OUTPATIENT
Start: 2021-11-13 | End: 2021-11-14

## 2021-11-13 RX ORDER — INSULIN LISPRO 100/ML
10 VIAL (ML) SUBCUTANEOUS
Refills: 0 | Status: DISCONTINUED | OUTPATIENT
Start: 2021-11-13 | End: 2021-11-14

## 2021-11-13 RX ORDER — LISINOPRIL 2.5 MG/1
40 TABLET ORAL DAILY
Refills: 0 | Status: DISCONTINUED | OUTPATIENT
Start: 2021-11-13 | End: 2021-11-15

## 2021-11-13 RX ADMIN — Medication 3: at 08:19

## 2021-11-13 RX ADMIN — FAMOTIDINE 20 MILLIGRAM(S): 10 INJECTION INTRAVENOUS at 12:26

## 2021-11-13 RX ADMIN — MEMANTINE HYDROCHLORIDE 5 MILLIGRAM(S): 10 TABLET ORAL at 05:13

## 2021-11-13 RX ADMIN — ZINC OXIDE 1 APPLICATION(S): 200 OINTMENT TOPICAL at 05:14

## 2021-11-13 RX ADMIN — Medication 10 MILLIGRAM(S): at 17:04

## 2021-11-13 RX ADMIN — PREGABALIN 1000 MICROGRAM(S): 225 CAPSULE ORAL at 12:26

## 2021-11-13 RX ADMIN — Medication 250 MILLIGRAM(S): at 05:13

## 2021-11-13 RX ADMIN — INSULIN GLARGINE 25 UNIT(S): 100 INJECTION, SOLUTION SUBCUTANEOUS at 21:32

## 2021-11-13 RX ADMIN — HEPARIN SODIUM 5000 UNIT(S): 5000 INJECTION INTRAVENOUS; SUBCUTANEOUS at 21:33

## 2021-11-13 RX ADMIN — Medication 10 UNIT(S): at 12:27

## 2021-11-13 RX ADMIN — HEPARIN SODIUM 5000 UNIT(S): 5000 INJECTION INTRAVENOUS; SUBCUTANEOUS at 13:56

## 2021-11-13 RX ADMIN — Medication 2: at 12:27

## 2021-11-13 RX ADMIN — Medication 10 UNIT(S): at 17:04

## 2021-11-13 RX ADMIN — ATORVASTATIN CALCIUM 80 MILLIGRAM(S): 80 TABLET, FILM COATED ORAL at 21:33

## 2021-11-13 RX ADMIN — Medication 8 UNIT(S): at 08:18

## 2021-11-13 RX ADMIN — Medication 10 MILLIGRAM(S): at 05:21

## 2021-11-13 RX ADMIN — MEMANTINE HYDROCHLORIDE 5 MILLIGRAM(S): 10 TABLET ORAL at 17:03

## 2021-11-13 RX ADMIN — ZINC OXIDE 1 APPLICATION(S): 200 OINTMENT TOPICAL at 17:06

## 2021-11-13 RX ADMIN — SERTRALINE 100 MILLIGRAM(S): 25 TABLET, FILM COATED ORAL at 12:26

## 2021-11-13 RX ADMIN — Medication 30 MILLIGRAM(S): at 05:21

## 2021-11-13 RX ADMIN — Medication 145 MILLIGRAM(S): at 12:26

## 2021-11-13 RX ADMIN — Medication 81 MILLIGRAM(S): at 12:26

## 2021-11-13 RX ADMIN — Medication 250 MILLIGRAM(S): at 17:03

## 2021-11-13 RX ADMIN — HEPARIN SODIUM 5000 UNIT(S): 5000 INJECTION INTRAVENOUS; SUBCUTANEOUS at 05:13

## 2021-11-13 RX ADMIN — LISINOPRIL 40 MILLIGRAM(S): 2.5 TABLET ORAL at 05:21

## 2021-11-13 RX ADMIN — Medication 25 MILLIGRAM(S): at 05:21

## 2021-11-13 RX ADMIN — DONEPEZIL HYDROCHLORIDE 5 MILLIGRAM(S): 10 TABLET, FILM COATED ORAL at 21:33

## 2021-11-13 NOTE — PROVIDER CONTACT NOTE (OTHER) - ASSESSMENT
BG 314mg/dl, pt is asymptomatic of s/s of hyperglycemia. BG 314mg/dl, pt is asymptomatic of s/s of hyperglycemia. Pt has 2 POC >250mg/dl within 24 hours.

## 2021-11-13 NOTE — PROVIDER CONTACT NOTE (OTHER) - ACTION/TREATMENT ORDERED:
MD made aware. Dr. Tree Santos made aware of BG. 20 units lantus and 2 units admelog administered. No changes in plan at this time. Continue to monitor patient.

## 2021-11-13 NOTE — PROGRESS NOTE ADULT - SUBJECTIVE AND OBJECTIVE BOX
Patient is a 76y old  Female who presents with a chief complaint of CVA (12 Nov 2021 13:36)    No events overnight  Patient seen and examined at bedside.    ALLERGIES:  No Known Allergies    MEDICATIONS  (STANDING):  aspirin enteric coated 81 milliGRAM(s) Oral daily  atorvastatin 80 milliGRAM(s) Oral at bedtime  cyanocobalamin 1000 MICROGram(s) Oral daily  dextrose 40% Gel 15 Gram(s) Oral once  dextrose 5%. 1000 milliLiter(s) (50 mL/Hr) IV Continuous <Continuous>  dextrose 5%. 1000 milliLiter(s) (100 mL/Hr) IV Continuous <Continuous>  dextrose 50% Injectable 25 Gram(s) IV Push once  dextrose 50% Injectable 12.5 Gram(s) IV Push once  dextrose 50% Injectable 25 Gram(s) IV Push once  donepezil 5 milliGRAM(s) Oral at bedtime  famotidine    Tablet 20 milliGRAM(s) Oral daily  fenofibrate Tablet 145 milliGRAM(s) Oral daily  glucagon  Injectable 1 milliGRAM(s) IntraMuscular once  heparin   Injectable 5000 Unit(s) SubCutaneous every 8 hours  hydrALAZINE 10 milliGRAM(s) Oral two times a day  influenza  Vaccine (HIGH DOSE) 0.7 milliLiter(s) IntraMuscular once  insulin glargine Injectable (LANTUS) 20 Unit(s) SubCutaneous at bedtime  insulin lispro (ADMELOG) corrective regimen sliding scale   SubCutaneous three times a day before meals  insulin lispro (ADMELOG) corrective regimen sliding scale   SubCutaneous at bedtime  insulin lispro Injectable (ADMELOG) 8 Unit(s) SubCutaneous three times a day before meals  lisinopril 40 milliGRAM(s) Oral daily  memantine 5 milliGRAM(s) Oral two times a day  metoprolol succinate ER 25 milliGRAM(s) Oral daily  NIFEdipine XL 30 milliGRAM(s) Oral daily  saccharomyces boulardii 250 milliGRAM(s) Oral two times a day  sertraline 100 milliGRAM(s) Oral daily  zinc oxide 40% Ointment 1 Application(s) Topical two times a day    MEDICATIONS  (PRN):  acetaminophen     Tablet .. 650 milliGRAM(s) Oral every 6 hours PRN Temp greater or equal to 38C (100.4F), Mild Pain (1 - 3)  aluminum hydroxide/magnesium hydroxide/simethicone Suspension 30 milliLiter(s) Oral every 4 hours PRN Dyspepsia  melatonin 3 milliGRAM(s) Oral at bedtime PRN Insomnia    Vital Signs Last 24 Hrs  T(F): 98.6 (12 Nov 2021 19:51), Max: 98.6 (12 Nov 2021 19:51)  HR: 63 (13 Nov 2021 05:20) (63 - 80)  BP: 158/76 (13 Nov 2021 05:20) (139/56 - 158/76)  RR: 16 (12 Nov 2021 19:51) (16 - 16)  SpO2: 95% (12 Nov 2021 19:51) (95% - 95%)  I&O's Summary    13 Nov 2021 07:01  -  13 Nov 2021 10:39  --------------------------------------------------------  IN: 240 mL / OUT: 0 mL / NET: 240 mL      BMI (kg/m2): 29.8 (11-11-21 @ 15:28)  PHYSICAL EXAM:  GENERAL: NAD, well-groomed, well-developed  HEAD:  Atraumatic, Normocephalic  EYES: EOMI, PERRLA, conjunctiva and sclera clear  ENMT: No tonsillar erythema, exudates, or enlargement; Moist mucous membranes, Good dentition, No lesions  NECK: Supple, No JVD, Normal thyroid  NERVOUS SYSTEM:  Alert & Oriented X3, Good concentration; Motor Strength 5/5 B/L upper and lower extremities; DTRs 2+ intact and symmetric  CHEST/LUNG: Clear to percussion bilaterally; No rales, rhonchi, wheezing, or rubs  HEART: Regular rate and rhythm; No murmurs, rubs, or gallops  ABDOMEN: Soft, Nontender, Nondistended; Bowel sounds present  EXTREMITIES:  2+ Peripheral Pulses, No clubbing, cyanosis, or edema  LYMPH: No lymphadenopathy noted  SKIN: No rashes or lesions    LABS:                        11.6   10.65 )-----------( 272      ( 12 Nov 2021 05:20 )             37.2       11-12    146  |  109  |  17  ----------------------------<  201  4.2   |  32  |  0.78    Ca    8.8      12 Nov 2021 05:20  Phos  3.3     11-11  Mg     1.9     11-11    TPro  6.1  /  Alb  2.8  /  TBili  0.2  /  DBili  x   /  AST  18  /  ALT  33  /  AlkPhos  52  11-12     eGFR if Non African American: 74 mL/min/1.73M2 (11-12-21 @ 05:20)  eGFR if African American: 86 mL/min/1.73M2 (11-12-21 @ 05:20)    11-02 Chol 248 mg/dL LDL -- HDL 53 mg/dL Trig 151 mg/dL    POCT Blood Glucose.: 297 mg/dL (13 Nov 2021 08:02)  POCT Blood Glucose.: 314 mg/dL (12 Nov 2021 21:27)  POCT Blood Glucose.: 355 mg/dL (12 Nov 2021 16:48)  POCT Blood Glucose.: 349 mg/dL (12 Nov 2021 13:55)  POCT Blood Glucose.: 430 mg/dL (12 Nov 2021 12:09)  POCT Blood Glucose.: 440 mg/dL (12 Nov 2021 12:07)    COVID-19 PCR: NotDetec (11-11-21 @ 17:00)  COVID-19 PCR: NotDetec (11-08-21 @ 08:58)  COVID-19 PCR: NotDetec (11-11-21 @ 17:00)  COVID-19 PCR: NotDetec (11-08-21 @ 08:58)  COVID-19 PCR: Detected (09-16-21 @ 07:04)

## 2021-11-13 NOTE — PROGRESS NOTE ADULT - ASSESSMENT
his is a 76 Female with a h/o CVA (no prior deficit), HTN, HLD, IDDM, Dementia, MDD admitted to [facility] on 11/1 for Subacute CVA L Basal Ganglia, Mod-Severe Stenosis Prox R PCA, HTN Urgency, AMS, abnormal speech, and Hypoglycemia.  In ED,  on arrival with some improvement with medications but remains hypertensive.  LKWT 3 days prior to presentation, NIHSS 2, Out of window for TPA. Hospital course significant for UTI- treated with ABT. Patient now with gait Instability, ADL impairments and Functional impairments.    # CVA  - Subacute CVA L Basal Ganglia  - Mod-Severe Stenosis Prox R PCA  - Start Comprehensive Rehab Program: PT/OT/ST  - PT: Focused on improving strength, endurance, coordination, balance, functional mobility, and transfers  - OT: Focused on improving strength, fine motor skills, coordination, posture and ADLs.    - ST: to diagnose and treat deficits in swallowing, cognition and communication.   - c/a ASA 81mg daily, lipitor 80mg qhs    #NPH  - OP f/u with NSGY    #HTN  - Procardia XL 30mg daily  - Lisinopril 40 mg daily  - hydralazine 10 mg BID  - Metoprolol succinate XL 25mg daily    #HLD  - on Lipitor  - Fenofibrate 145mg daily    #DM II  - A1c 11/3 was 7.6  - ISS and FS  - Will increast lantus to 25units qhs and will increase fast acting to Admelog 10 units premeal  - DIet changed to diabetic diet    #Depression  - Sertraline 100mg daily    #Dementia  - Donepezil 5mg daily  - Memantine 5mg daily    #UTI proteus miribalis   - S/p augmentin  - Will monitor F/WBC count    #DVT ppx  - Heparin

## 2021-11-13 NOTE — PROVIDER CONTACT NOTE (OTHER) - SITUATION
Patient BG 314mg/dl. @20 units lantus and 2 units of admelog sliding scale administered. Patient BG 314mg/dl. 20 units lantus and 2 units of admelog sliding scale administered. MD made aware.

## 2021-11-13 NOTE — PROGRESS NOTE ADULT - SUBJECTIVE AND OBJECTIVE BOX
Patient is a 76y old  Female who presents with a chief complaint of left basal ganglia CVA with very mild right lower extremity weakness (13 Nov 2021 10:38)      HPI:  This is a 76 Female with a h/o CVA (no prior deficit), HTN, HLD, IDDM, Dementia, MDD admitted to [facility] on 11/1 for Subacute CVA L Basal Ganglia infarct,  CTA showedMod-Severe Stenosis Prox R PCA.  Pt. with HTN Urgency, and Hypoglycemia. Patient followed by neurology OP. In ED,  on arrival with some improvement with medications but remains hypertensive. Patient noted to have AMS with abnormal speech by daughter. LKWT 3 days prior to presentation. NIHSS 2. Out of window for TPA.  MRI brain showed--  left posterior limb IC/CR stroke, left frontal and right splenium acute strokes,    Per family, patient was followed by neurosurgery for evaluation of possible NPH with LP as outpatient. Neurosurgery recommending outpatient follow up after rehab. Pt also diagnosed with possible UTI on admission and treated with IV antibiotics, urine cx positive for proteus, ID consulted. HTN meds adjusted, bradycardic metoprolol dose decreased, hypoglycemia improved started low dose Lantus, high BP meds adjusted added hydralazine.  MBS performed and recommended easy to chew with thin liquids, with 100% supervision.   Patient was evaluated by PM&R and therapy for functional deficits, gait/ADL impairments and acute rehabilitation was recommended. Patient was medically optimized for discharge to [facility]U on 11/11/21.     (11 Nov 2021 13:54)    blood glucose elevated overnight.  Lantus recently decreased to 20 units from 25  patient denies complaint, seen transferring from toilet with OT    REVIEW OF SYSTEMS  + weakness  Denies cp or sob    PAST MEDICAL & SURGICAL HISTORY  Rheumatoid arthritis    Diabetes mellitus, type 2    Hypertension    HLD (hyperlipidemia)    Dementia    History of CVA (cerebrovascular accident)    Major depression    Anxiety    UTI (urinary tract infection)    History of dental surgery       FAMILY HISTORY   No pertinent family history in first degree relatives        RECENT LABS/IMAGING  CBC Full  -  ( 12 Nov 2021 05:20 )  WBC Count : 10.65 K/uL  RBC Count : 4.92 M/uL  Hemoglobin : 11.6 g/dL  Hematocrit : 37.2 %  Platelet Count - Automated : 272 K/uL  Mean Cell Volume : 75.6 fl  Mean Cell Hemoglobin : 23.6 pg  Mean Cell Hemoglobin Concentration : 31.2 gm/dL  Auto Neutrophil # : 6.25 K/uL  Auto Lymphocyte # : 2.66 K/uL  Auto Monocyte # : 1.01 K/uL  Auto Eosinophil # : 0.61 K/uL  Auto Basophil # : 0.09 K/uL  Auto Neutrophil % : 58.7 %  Auto Lymphocyte % : 25.0 %  Auto Monocyte % : 9.5 %  Auto Eosinophil % : 5.7 %  Auto Basophil % : 0.8 %    11-12    146<H>  |  109<H>  |  17  ----------------------------<  201<H>  4.2   |  32<H>  |  0.78    Ca    8.8      12 Nov 2021 05:20    TPro  6.1  /  Alb  2.8<L>  /  TBili  0.2  /  DBili  x   /  AST  18  /  ALT  33  /  AlkPhos  52  11-12        VITALS  T(C): 37 (11-12-21 @ 19:51), Max: 37 (11-12-21 @ 19:51)  HR: 63 (11-13-21 @ 05:20) (63 - 80)  BP: 158/76 (11-13-21 @ 05:20) (139/56 - 158/76)  RR: 16 (11-12-21 @ 19:51) (16 - 16)  SpO2: 95% (11-12-21 @ 19:51) (95% - 95%)  Wt(kg): --    ALLERGIES  No Known Allergies      MEDICATIONS   acetaminophen     Tablet .. 650 milliGRAM(s) Oral every 6 hours PRN  aluminum hydroxide/magnesium hydroxide/simethicone Suspension 30 milliLiter(s) Oral every 4 hours PRN  aspirin enteric coated 81 milliGRAM(s) Oral daily  atorvastatin 80 milliGRAM(s) Oral at bedtime  cyanocobalamin 1000 MICROGram(s) Oral daily  dextrose 40% Gel 15 Gram(s) Oral once  dextrose 5%. 1000 milliLiter(s) IV Continuous <Continuous>  dextrose 5%. 1000 milliLiter(s) IV Continuous <Continuous>  dextrose 50% Injectable 25 Gram(s) IV Push once  dextrose 50% Injectable 12.5 Gram(s) IV Push once  dextrose 50% Injectable 25 Gram(s) IV Push once  donepezil 5 milliGRAM(s) Oral at bedtime  famotidine    Tablet 20 milliGRAM(s) Oral daily  fenofibrate Tablet 145 milliGRAM(s) Oral daily  glucagon  Injectable 1 milliGRAM(s) IntraMuscular once  heparin   Injectable 5000 Unit(s) SubCutaneous every 8 hours  hydrALAZINE 10 milliGRAM(s) Oral two times a day  influenza  Vaccine (HIGH DOSE) 0.7 milliLiter(s) IntraMuscular once  insulin glargine Injectable (LANTUS) 25 Unit(s) SubCutaneous at bedtime  insulin lispro (ADMELOG) corrective regimen sliding scale   SubCutaneous three times a day before meals  insulin lispro (ADMELOG) corrective regimen sliding scale   SubCutaneous at bedtime  insulin lispro Injectable (ADMELOG) 10 Unit(s) SubCutaneous three times a day before meals  lisinopril 40 milliGRAM(s) Oral daily  melatonin 3 milliGRAM(s) Oral at bedtime PRN  memantine 5 milliGRAM(s) Oral two times a day  metoprolol succinate ER 25 milliGRAM(s) Oral daily  NIFEdipine XL 30 milliGRAM(s) Oral daily  saccharomyces boulardii 250 milliGRAM(s) Oral two times a day  sertraline 100 milliGRAM(s) Oral daily  zinc oxide 40% Ointment 1 Application(s) Topical two times a day      ----------------------------------------------------------------------------------------  PHYSICAL EXAM  Constitutional - NAD, Comfortable  HEENT - NCAT, EOMI  Neck - Supple, No limited ROM  Chest - CTA bilaterally, No wheeze, No rhonchi, No crackles  Cardiovascular - RRR, S1S2, No murmurs  Abdomen - BS+, Soft, NTND  Extremities - No C/C/E, No calf tenderness   Neurologic Exam -                    Cognitive - Awake, Alert, AAO to self, place, date, year, situation     Communication - mild dysarthria     Cranial Nerves -  no facial assymetry     Motor -                      LEFT    UE - ShAB 5/5, EF 5/5, EE 5/5, WE 5/5,  5/5                    RIGHT UE - ShAB 5/5, EF 5/5, EE 5/5, WE 5/5,  5/5                    LEFT    LE - HF 5/5, KE 5/5, DF 5/5, PF 5/5                    RIGHT LE - HF 4/5, KE 5/5, DF 5/5, PF 5/5        Sensory - Intact to LT      Balance - WNL Static  Psychiatric - Mood stable, Affect WNL  ----------------------------------------------------------------------------------------  ASSESSMENT/PLAN     76 Female with a h/o CVA (no prior deficit), HTN, HLD, IDDM, Dementia, MDD admitted to [facility] on 11/1 for Subacute CVA L Basal Ganglia, Mod-Severe Stenosis Prox R PCA, HTN Urgency, AMS, abnormal speech, and Hypoglycemia.  In ED,  on arrival with some improvement with medications but remains hypertensive.  LKWT 3 days prior to presentation, NIHSS 2, Out of window for TPA. Hospital course significant for UTI- treated with ABT. Patient now with gait Instability, ADL impairments and Functional impairments.    continue current treatment plan  monitor blood glucose- elevated bg this morning, lantus increased per hospitalist     # CVA  - Subacute CVA L Basal Ganglia  - Mod-Severe Stenosis Prox R PCA  - Start Comprehensive Rehab Program: PT/OT/ST- total of 3 hrs/day 5 days/week   - c/w ASA, high dose statin: lipitor    #NPH  - OP f/u with NSGY    #HTN  - Procardia XL  - Lisinopril 40 mg daily  - hydralazine 10 mg BID  - Metoprolol succinate XL 25mg daily    #HLD  - on Lipitor  - Fenofibrate 145mg daily    #DM II  - ISS and FS  - Lantus and Admelog    #Depression  - Sertraline 100mg daily    #Pain management  - Tylenol PRN    #DVT ppx  - Heparin, SCD, TEDs    #Dementia  - Donepezil 5mg daily  - Memantine 5mg daily    #GI ppx  - Protonix  - maalox for dyspepsia    #Bowel Regimen  - Senna, miralax PRN    #Bladder management  - BS on admission, and q 8 hours (SC if > 400)  - Monitor UO    #FEN   - Diet: Easy to chew + 100% supervision  - Supplements:    #Precaution  - Fall, Aspiration, Seizure      #Skin:  - No active issues at this time  - Desitin to buttocks for IAD  - Pressure injury/Skin: Turn Q2hrs while in bed, OOB to Chair, PT/OT     #Dysphagia    - SLP: evaluation and treatment  - s/p MBS: easy chew w thin liquid per speech. 100 % supervision for aspiration precaution      #Sleep:  - Maintain quiet hours and low stim environment.  - Melatonin PRN to maximize participation in therapy during the day.   - Monitor sleep logs

## 2021-11-14 LAB
GLUCOSE BLDC GLUCOMTR-MCNC: 180 MG/DL — HIGH (ref 70–99)
GLUCOSE BLDC GLUCOMTR-MCNC: 215 MG/DL — HIGH (ref 70–99)
GLUCOSE BLDC GLUCOMTR-MCNC: 252 MG/DL — HIGH (ref 70–99)
GLUCOSE BLDC GLUCOMTR-MCNC: 266 MG/DL — HIGH (ref 70–99)

## 2021-11-14 PROCEDURE — 99232 SBSQ HOSP IP/OBS MODERATE 35: CPT

## 2021-11-14 RX ORDER — INSULIN GLARGINE 100 [IU]/ML
30 INJECTION, SOLUTION SUBCUTANEOUS AT BEDTIME
Refills: 0 | Status: DISCONTINUED | OUTPATIENT
Start: 2021-11-14 | End: 2021-11-18

## 2021-11-14 RX ORDER — INSULIN LISPRO 100/ML
12 VIAL (ML) SUBCUTANEOUS
Refills: 0 | Status: DISCONTINUED | OUTPATIENT
Start: 2021-11-14 | End: 2021-11-19

## 2021-11-14 RX ADMIN — Medication 10 MILLIGRAM(S): at 17:24

## 2021-11-14 RX ADMIN — Medication 250 MILLIGRAM(S): at 05:31

## 2021-11-14 RX ADMIN — ATORVASTATIN CALCIUM 80 MILLIGRAM(S): 80 TABLET, FILM COATED ORAL at 21:22

## 2021-11-14 RX ADMIN — MEMANTINE HYDROCHLORIDE 5 MILLIGRAM(S): 10 TABLET ORAL at 05:31

## 2021-11-14 RX ADMIN — HEPARIN SODIUM 5000 UNIT(S): 5000 INJECTION INTRAVENOUS; SUBCUTANEOUS at 14:05

## 2021-11-14 RX ADMIN — SERTRALINE 100 MILLIGRAM(S): 25 TABLET, FILM COATED ORAL at 11:19

## 2021-11-14 RX ADMIN — Medication 10 MILLIGRAM(S): at 05:32

## 2021-11-14 RX ADMIN — HEPARIN SODIUM 5000 UNIT(S): 5000 INJECTION INTRAVENOUS; SUBCUTANEOUS at 05:31

## 2021-11-14 RX ADMIN — Medication 30 MILLIGRAM(S): at 05:32

## 2021-11-14 RX ADMIN — Medication 1: at 17:23

## 2021-11-14 RX ADMIN — INSULIN GLARGINE 30 UNIT(S): 100 INJECTION, SOLUTION SUBCUTANEOUS at 21:58

## 2021-11-14 RX ADMIN — ZINC OXIDE 1 APPLICATION(S): 200 OINTMENT TOPICAL at 17:21

## 2021-11-14 RX ADMIN — PREGABALIN 1000 MICROGRAM(S): 225 CAPSULE ORAL at 11:19

## 2021-11-14 RX ADMIN — Medication 81 MILLIGRAM(S): at 11:19

## 2021-11-14 RX ADMIN — Medication 12 UNIT(S): at 17:23

## 2021-11-14 RX ADMIN — Medication 3: at 07:58

## 2021-11-14 RX ADMIN — HEPARIN SODIUM 5000 UNIT(S): 5000 INJECTION INTRAVENOUS; SUBCUTANEOUS at 21:22

## 2021-11-14 RX ADMIN — Medication 250 MILLIGRAM(S): at 17:24

## 2021-11-14 RX ADMIN — MEMANTINE HYDROCHLORIDE 5 MILLIGRAM(S): 10 TABLET ORAL at 17:24

## 2021-11-14 RX ADMIN — Medication 10 UNIT(S): at 07:58

## 2021-11-14 RX ADMIN — ZINC OXIDE 1 APPLICATION(S): 200 OINTMENT TOPICAL at 05:32

## 2021-11-14 RX ADMIN — Medication 3: at 11:19

## 2021-11-14 RX ADMIN — Medication 10 UNIT(S): at 11:19

## 2021-11-14 RX ADMIN — FAMOTIDINE 20 MILLIGRAM(S): 10 INJECTION INTRAVENOUS at 11:19

## 2021-11-14 RX ADMIN — DONEPEZIL HYDROCHLORIDE 5 MILLIGRAM(S): 10 TABLET, FILM COATED ORAL at 21:22

## 2021-11-14 RX ADMIN — LISINOPRIL 40 MILLIGRAM(S): 2.5 TABLET ORAL at 05:31

## 2021-11-14 RX ADMIN — Medication 145 MILLIGRAM(S): at 11:19

## 2021-11-14 NOTE — PROGRESS NOTE ADULT - SUBJECTIVE AND OBJECTIVE BOX
Patient is a 76y old  Female who presents with a chief complaint of CVA (12 Nov 2021 13:36)    No events overnight  Patient seen and examined at bedside.    ALLERGIES:  No Known Allergies    MEDICATIONS  (STANDING):  aspirin enteric coated 81 milliGRAM(s) Oral daily  atorvastatin 80 milliGRAM(s) Oral at bedtime  cyanocobalamin 1000 MICROGram(s) Oral daily  dextrose 40% Gel 15 Gram(s) Oral once  dextrose 5%. 1000 milliLiter(s) (50 mL/Hr) IV Continuous <Continuous>  dextrose 5%. 1000 milliLiter(s) (100 mL/Hr) IV Continuous <Continuous>  dextrose 50% Injectable 25 Gram(s) IV Push once  dextrose 50% Injectable 12.5 Gram(s) IV Push once  dextrose 50% Injectable 25 Gram(s) IV Push once  donepezil 5 milliGRAM(s) Oral at bedtime  famotidine    Tablet 20 milliGRAM(s) Oral daily  fenofibrate Tablet 145 milliGRAM(s) Oral daily  glucagon  Injectable 1 milliGRAM(s) IntraMuscular once  heparin   Injectable 5000 Unit(s) SubCutaneous every 8 hours  hydrALAZINE 10 milliGRAM(s) Oral two times a day  influenza  Vaccine (HIGH DOSE) 0.7 milliLiter(s) IntraMuscular once  insulin glargine Injectable (LANTUS) 20 Unit(s) SubCutaneous at bedtime  insulin lispro (ADMELOG) corrective regimen sliding scale   SubCutaneous three times a day before meals  insulin lispro (ADMELOG) corrective regimen sliding scale   SubCutaneous at bedtime  insulin lispro Injectable (ADMELOG) 8 Unit(s) SubCutaneous three times a day before meals  lisinopril 40 milliGRAM(s) Oral daily  memantine 5 milliGRAM(s) Oral two times a day  metoprolol succinate ER 25 milliGRAM(s) Oral daily  NIFEdipine XL 30 milliGRAM(s) Oral daily  saccharomyces boulardii 250 milliGRAM(s) Oral two times a day  sertraline 100 milliGRAM(s) Oral daily  zinc oxide 40% Ointment 1 Application(s) Topical two times a day    MEDICATIONS  (PRN):  acetaminophen     Tablet .. 650 milliGRAM(s) Oral every 6 hours PRN Temp greater or equal to 38C (100.4F), Mild Pain (1 - 3)  aluminum hydroxide/magnesium hydroxide/simethicone Suspension 30 milliLiter(s) Oral every 4 hours PRN Dyspepsia  melatonin 3 milliGRAM(s) Oral at bedtime PRN Insomnia    ICU Vital Signs Last 24 Hrs  T(C): 36.4 (14 Nov 2021 07:55), Max: 37.1 (13 Nov 2021 17:00)  T(F): 97.6 (14 Nov 2021 07:55), Max: 98.7 (13 Nov 2021 17:00)  HR: 65 (14 Nov 2021 07:55) (56 - 66)  BP: 159/73 (14 Nov 2021 07:55) (135/66 - 159/73)  RR: 15 (14 Nov 2021 07:55) (15 - 17)  SpO2: 97% (14 Nov 2021 07:55) (95% - 97%)        BMI (kg/m2): 29.8 (11-11-21 @ 15:28)  PHYSICAL EXAM:  GENERAL: NAD, well-groomed, well-developed  HEAD:  Atraumatic, Normocephalic  EYES: EOMI, PERRLA, conjunctiva and sclera clear  ENMT: No tonsillar erythema, exudates, or enlargement; Moist mucous membranes, Good dentition, No lesions  NECK: Supple, No JVD, Normal thyroid  NERVOUS SYSTEM:  Alert & Oriented X3, Good concentration; Motor Strength 5/5 B/L upper and lower extremities; DTRs 2+ intact and symmetric  CHEST/LUNG: Clear to percussion bilaterally; No rales, rhonchi, wheezing, or rubs  HEART: Regular rate and rhythm; No murmurs, rubs, or gallops  ABDOMEN: Soft, Nontender, Nondistended; Bowel sounds present  EXTREMITIES:  2+ Peripheral Pulses, No clubbing, cyanosis, or edema  LYMPH: No lymphadenopathy noted  SKIN: No rashes or lesions    LABS:                        11.6   10.65 )-----------( 272      ( 12 Nov 2021 05:20 )             37.2       11-12    146  |  109  |  17  ----------------------------<  201  4.2   |  32  |  0.78    Ca    8.8      12 Nov 2021 05:20  Phos  3.3     11-11  Mg     1.9     11-11    TPro  6.1  /  Alb  2.8  /  TBili  0.2  /  DBili  x   /  AST  18  /  ALT  33  /  AlkPhos  52  11-12     eGFR if Non African American: 74 mL/min/1.73M2 (11-12-21 @ 05:20)  eGFR if African American: 86 mL/min/1.73M2 (11-12-21 @ 05:20)    11-02 Chol 248 mg/dL LDL -- HDL 53 mg/dL Trig 151 mg/dL    POCT Blood Glucose.: 297 mg/dL (13 Nov 2021 08:02)  POCT Blood Glucose.: 314 mg/dL (12 Nov 2021 21:27)  POCT Blood Glucose.: 355 mg/dL (12 Nov 2021 16:48)  POCT Blood Glucose.: 349 mg/dL (12 Nov 2021 13:55)  POCT Blood Glucose.: 430 mg/dL (12 Nov 2021 12:09)  POCT Blood Glucose.: 440 mg/dL (12 Nov 2021 12:07)    COVID-19 PCR: NotDetec (11-11-21 @ 17:00)  COVID-19 PCR: NotDetec (11-08-21 @ 08:58)  COVID-19 PCR: NotDetec (11-11-21 @ 17:00)  COVID-19 PCR: NotDetec (11-08-21 @ 08:58)  COVID-19 PCR: Detected (09-16-21 @ 07:04)

## 2021-11-14 NOTE — PROGRESS NOTE ADULT - ASSESSMENT
his is a 76 Female with a h/o CVA (no prior deficit), HTN, HLD, IDDM, Dementia, MDD admitted to [facility] on 11/1 for Subacute CVA L Basal Ganglia, Mod-Severe Stenosis Prox R PCA, HTN Urgency, AMS, abnormal speech, and Hypoglycemia.  In ED,  on arrival with some improvement with medications but remains hypertensive.  LKWT 3 days prior to presentation, NIHSS 2, Out of window for TPA. Hospital course significant for UTI- treated with ABT. Patient now with gait Instability, ADL impairments and Functional impairments.    # CVA  - Subacute CVA L Basal Ganglia  - Mod-Severe Stenosis Prox R PCA  - Start Comprehensive Rehab Program: PT/OT/ST  - PT: Focused on improving strength, endurance, coordination, balance, functional mobility, and transfers  - OT: Focused on improving strength, fine motor skills, coordination, posture and ADLs.    - ST: to diagnose and treat deficits in swallowing, cognition and communication.   - c/a ASA 81mg daily, lipitor 80mg qhs    #NPH  - OP f/u with NSGY    #HTN  - Procardia XL 30mg daily  - Lisinopril 40 mg daily  - hydralazine 10 mg BID  - Metoprolol succinate XL 25mg daily    #HLD  - on Lipitor  - Fenofibrate 145mg daily    #DM II kristen hyperglycemia, uncontrolled   - A1c 11/3 was 7.6  - ISS and FS  - Will increase lantus to 30units qhs and will increase fast acting to Admelog 12 units premeal  - DIet changed to diabetic diet    #Depression  - Sertraline 100mg daily    #Dementia  - Donepezil 5mg daily  - Memantine 5mg daily    #UTI proteus miribalis   - S/p augmentin  - Will monitor F/WBC count    #DVT ppx  - Heparin

## 2021-11-14 NOTE — PROGRESS NOTE ADULT - SUBJECTIVE AND OBJECTIVE BOX
Cc: Gait dysfunction    HPI: Patient with no new medical issues today.  Seen sitting in wheelchair, requests to go back to bed. Denies complaint.    Pain controlled, no chest pain, no N/V, no Fevers/Chills. No other new  ROS  Has been tolerating rehabilitation program.    acetaminophen     Tablet .. 650 milliGRAM(s) Oral every 6 hours PRN  aluminum hydroxide/magnesium hydroxide/simethicone Suspension 30 milliLiter(s) Oral every 4 hours PRN  aspirin enteric coated 81 milliGRAM(s) Oral daily  atorvastatin 80 milliGRAM(s) Oral at bedtime  cyanocobalamin 1000 MICROGram(s) Oral daily  dextrose 40% Gel 15 Gram(s) Oral once  dextrose 5%. 1000 milliLiter(s) IV Continuous <Continuous>  dextrose 5%. 1000 milliLiter(s) IV Continuous <Continuous>  dextrose 50% Injectable 25 Gram(s) IV Push once  dextrose 50% Injectable 12.5 Gram(s) IV Push once  dextrose 50% Injectable 25 Gram(s) IV Push once  donepezil 5 milliGRAM(s) Oral at bedtime  famotidine    Tablet 20 milliGRAM(s) Oral daily  fenofibrate Tablet 145 milliGRAM(s) Oral daily  glucagon  Injectable 1 milliGRAM(s) IntraMuscular once  heparin   Injectable 5000 Unit(s) SubCutaneous every 8 hours  hydrALAZINE 10 milliGRAM(s) Oral two times a day  influenza  Vaccine (HIGH DOSE) 0.7 milliLiter(s) IntraMuscular once  insulin glargine Injectable (LANTUS) 25 Unit(s) SubCutaneous at bedtime  insulin lispro (ADMELOG) corrective regimen sliding scale   SubCutaneous three times a day before meals  insulin lispro (ADMELOG) corrective regimen sliding scale   SubCutaneous at bedtime  insulin lispro Injectable (ADMELOG) 10 Unit(s) SubCutaneous three times a day before meals  lisinopril 40 milliGRAM(s) Oral daily  melatonin 3 milliGRAM(s) Oral at bedtime PRN  memantine 5 milliGRAM(s) Oral two times a day  metoprolol succinate ER 25 milliGRAM(s) Oral daily  NIFEdipine XL 30 milliGRAM(s) Oral daily  saccharomyces boulardii 250 milliGRAM(s) Oral two times a day  sertraline 100 milliGRAM(s) Oral daily  zinc oxide 40% Ointment 1 Application(s) Topical two times a day      T(C): 36.4 (11-14-21 @ 07:55), Max: 37.1 (11-13-21 @ 17:00)  HR: 65 (11-14-21 @ 07:55) (56 - 66)  BP: 159/73 (11-14-21 @ 07:55) (135/66 - 159/73)  RR: 15 (11-14-21 @ 07:55) (15 - 17)  SpO2: 97% (11-14-21 @ 07:55) (95% - 97%)    In NAD  HEENT- EOMI  Heart- RRR, S1S2  Lungs-  no labored breathing  Abd- + BS, NT  Ext- No calf pain  Neuro- Exam unchanged          Imp:     76 Female with a h/o CVA (no prior deficit), HTN, HLD, IDDM, Dementia, MDD admitted to [facility] on 11/1 for Subacute CVA L Basal Ganglia, Mod-Severe Stenosis Prox R PCA, HTN Urgency, AMS, abnormal speech, and Hypoglycemia.  In ED,  on arrival with some improvement with medications but remains hypertensive.  LKWT 3 days prior to presentation, NIHSS 2, Out of window for TPA. Hospital course significant for UTI- treated with ABT. Patient now with gait Instability, ADL impairments and Functional impairments. Admitted for comprehensive acute rehabilitation.    Plan:  - Continue therapies  - , on lantus 25 units and pre meal insulin 10 units lispro, sliding scale started 11/13  - DVT prophylaxis- heparin  - Skin- Turn q2h, check skin daily  - Continue current medications; patient medically stable.   - Patient is stable to continue current rehabilitation program.

## 2021-11-15 DIAGNOSIS — G91.2 (IDIOPATHIC) NORMAL PRESSURE HYDROCEPHALUS: ICD-10-CM

## 2021-11-15 DIAGNOSIS — E78.5 HYPERLIPIDEMIA, UNSPECIFIED: ICD-10-CM

## 2021-11-15 DIAGNOSIS — N39.0 URINARY TRACT INFECTION, SITE NOT SPECIFIED: ICD-10-CM

## 2021-11-15 DIAGNOSIS — I63.9 CEREBRAL INFARCTION, UNSPECIFIED: ICD-10-CM

## 2021-11-15 DIAGNOSIS — I10 ESSENTIAL (PRIMARY) HYPERTENSION: ICD-10-CM

## 2021-11-15 DIAGNOSIS — F03.90 UNSPECIFIED DEMENTIA WITHOUT BEHAVIORAL DISTURBANCE: ICD-10-CM

## 2021-11-15 DIAGNOSIS — E11.9 TYPE 2 DIABETES MELLITUS WITHOUT COMPLICATIONS: ICD-10-CM

## 2021-11-15 DIAGNOSIS — F32.9 MAJOR DEPRESSIVE DISORDER, SINGLE EPISODE, UNSPECIFIED: ICD-10-CM

## 2021-11-15 LAB
ALBUMIN SERPL ELPH-MCNC: 3 G/DL — LOW (ref 3.3–5)
ALP SERPL-CCNC: 54 U/L — SIGNIFICANT CHANGE UP (ref 40–120)
ALT FLD-CCNC: 31 U/L — SIGNIFICANT CHANGE UP (ref 10–45)
ANION GAP SERPL CALC-SCNC: 8 MMOL/L — SIGNIFICANT CHANGE UP (ref 5–17)
AST SERPL-CCNC: 19 U/L — SIGNIFICANT CHANGE UP (ref 10–40)
BILIRUB SERPL-MCNC: 0.3 MG/DL — SIGNIFICANT CHANGE UP (ref 0.2–1.2)
BUN SERPL-MCNC: 25 MG/DL — HIGH (ref 7–23)
CALCIUM SERPL-MCNC: 9.1 MG/DL — SIGNIFICANT CHANGE UP (ref 8.4–10.5)
CHLORIDE SERPL-SCNC: 104 MMOL/L — SIGNIFICANT CHANGE UP (ref 96–108)
CO2 SERPL-SCNC: 27 MMOL/L — SIGNIFICANT CHANGE UP (ref 22–31)
CREAT SERPL-MCNC: 0.77 MG/DL — SIGNIFICANT CHANGE UP (ref 0.5–1.3)
GLUCOSE BLDC GLUCOMTR-MCNC: 111 MG/DL — HIGH (ref 70–99)
GLUCOSE BLDC GLUCOMTR-MCNC: 248 MG/DL — HIGH (ref 70–99)
GLUCOSE BLDC GLUCOMTR-MCNC: 266 MG/DL — HIGH (ref 70–99)
GLUCOSE BLDC GLUCOMTR-MCNC: 270 MG/DL — HIGH (ref 70–99)
GLUCOSE SERPL-MCNC: 286 MG/DL — HIGH (ref 70–99)
HCT VFR BLD CALC: 38.6 % — SIGNIFICANT CHANGE UP (ref 34.5–45)
HGB BLD-MCNC: 12 G/DL — SIGNIFICANT CHANGE UP (ref 11.5–15.5)
MCHC RBC-ENTMCNC: 24.3 PG — LOW (ref 27–34)
MCHC RBC-ENTMCNC: 31.1 GM/DL — LOW (ref 32–36)
MCV RBC AUTO: 78.3 FL — LOW (ref 80–100)
NRBC # BLD: 0 /100 WBCS — SIGNIFICANT CHANGE UP (ref 0–0)
PLATELET # BLD AUTO: 272 K/UL — SIGNIFICANT CHANGE UP (ref 150–400)
POTASSIUM SERPL-MCNC: 4.3 MMOL/L — SIGNIFICANT CHANGE UP (ref 3.5–5.3)
POTASSIUM SERPL-SCNC: 4.3 MMOL/L — SIGNIFICANT CHANGE UP (ref 3.5–5.3)
PROT SERPL-MCNC: 6.2 G/DL — SIGNIFICANT CHANGE UP (ref 6–8.3)
RBC # BLD: 4.93 M/UL — SIGNIFICANT CHANGE UP (ref 3.8–5.2)
RBC # FLD: 19 % — HIGH (ref 10.3–14.5)
SODIUM SERPL-SCNC: 139 MMOL/L — SIGNIFICANT CHANGE UP (ref 135–145)
WBC # BLD: 10.45 K/UL — SIGNIFICANT CHANGE UP (ref 3.8–10.5)
WBC # FLD AUTO: 10.45 K/UL — SIGNIFICANT CHANGE UP (ref 3.8–10.5)

## 2021-11-15 PROCEDURE — 99232 SBSQ HOSP IP/OBS MODERATE 35: CPT

## 2021-11-15 RX ORDER — NIFEDIPINE 30 MG
30 TABLET, EXTENDED RELEASE 24 HR ORAL DAILY
Refills: 0 | Status: DISCONTINUED | OUTPATIENT
Start: 2021-11-15 | End: 2021-12-04

## 2021-11-15 RX ORDER — METOPROLOL TARTRATE 50 MG
25 TABLET ORAL DAILY
Refills: 0 | Status: DISCONTINUED | OUTPATIENT
Start: 2021-11-15 | End: 2021-11-17

## 2021-11-15 RX ORDER — HYDRALAZINE HCL 50 MG
10 TABLET ORAL
Refills: 0 | Status: DISCONTINUED | OUTPATIENT
Start: 2021-11-15 | End: 2021-12-04

## 2021-11-15 RX ORDER — LISINOPRIL 2.5 MG/1
40 TABLET ORAL DAILY
Refills: 0 | Status: DISCONTINUED | OUTPATIENT
Start: 2021-11-15 | End: 2021-12-04

## 2021-11-15 RX ADMIN — Medication 250 MILLIGRAM(S): at 05:11

## 2021-11-15 RX ADMIN — Medication 30 MILLIGRAM(S): at 05:11

## 2021-11-15 RX ADMIN — ZINC OXIDE 1 APPLICATION(S): 200 OINTMENT TOPICAL at 05:11

## 2021-11-15 RX ADMIN — MEMANTINE HYDROCHLORIDE 5 MILLIGRAM(S): 10 TABLET ORAL at 05:11

## 2021-11-15 RX ADMIN — SERTRALINE 100 MILLIGRAM(S): 25 TABLET, FILM COATED ORAL at 13:41

## 2021-11-15 RX ADMIN — LISINOPRIL 40 MILLIGRAM(S): 2.5 TABLET ORAL at 05:11

## 2021-11-15 RX ADMIN — HEPARIN SODIUM 5000 UNIT(S): 5000 INJECTION INTRAVENOUS; SUBCUTANEOUS at 05:11

## 2021-11-15 RX ADMIN — Medication 12 UNIT(S): at 12:30

## 2021-11-15 RX ADMIN — MEMANTINE HYDROCHLORIDE 5 MILLIGRAM(S): 10 TABLET ORAL at 17:06

## 2021-11-15 RX ADMIN — Medication 2: at 12:30

## 2021-11-15 RX ADMIN — INSULIN GLARGINE 30 UNIT(S): 100 INJECTION, SOLUTION SUBCUTANEOUS at 21:32

## 2021-11-15 RX ADMIN — ATORVASTATIN CALCIUM 80 MILLIGRAM(S): 80 TABLET, FILM COATED ORAL at 21:32

## 2021-11-15 RX ADMIN — Medication 12 UNIT(S): at 17:07

## 2021-11-15 RX ADMIN — Medication 30 MILLIGRAM(S): at 13:41

## 2021-11-15 RX ADMIN — HEPARIN SODIUM 5000 UNIT(S): 5000 INJECTION INTRAVENOUS; SUBCUTANEOUS at 21:32

## 2021-11-15 RX ADMIN — Medication 10 MILLIGRAM(S): at 05:10

## 2021-11-15 RX ADMIN — Medication 10 MILLIGRAM(S): at 17:06

## 2021-11-15 RX ADMIN — Medication 250 MILLIGRAM(S): at 17:06

## 2021-11-15 RX ADMIN — Medication 25 MILLIGRAM(S): at 13:41

## 2021-11-15 RX ADMIN — LISINOPRIL 40 MILLIGRAM(S): 2.5 TABLET ORAL at 13:47

## 2021-11-15 RX ADMIN — ZINC OXIDE 1 APPLICATION(S): 200 OINTMENT TOPICAL at 17:10

## 2021-11-15 RX ADMIN — PREGABALIN 1000 MICROGRAM(S): 225 CAPSULE ORAL at 13:36

## 2021-11-15 RX ADMIN — FAMOTIDINE 20 MILLIGRAM(S): 10 INJECTION INTRAVENOUS at 13:36

## 2021-11-15 RX ADMIN — Medication 145 MILLIGRAM(S): at 13:36

## 2021-11-15 RX ADMIN — Medication 3: at 17:07

## 2021-11-15 RX ADMIN — DONEPEZIL HYDROCHLORIDE 5 MILLIGRAM(S): 10 TABLET, FILM COATED ORAL at 21:32

## 2021-11-15 RX ADMIN — Medication 81 MILLIGRAM(S): at 13:36

## 2021-11-15 RX ADMIN — Medication 12 UNIT(S): at 08:34

## 2021-11-15 RX ADMIN — Medication 25 MILLIGRAM(S): at 05:11

## 2021-11-15 RX ADMIN — Medication 3: at 08:34

## 2021-11-15 RX ADMIN — HEPARIN SODIUM 5000 UNIT(S): 5000 INJECTION INTRAVENOUS; SUBCUTANEOUS at 14:10

## 2021-11-15 NOTE — PROGRESS NOTE ADULT - ASSESSMENT
ASSESSMENT/PLAN  This is a 76 Female with a h/o CVA (no prior deficit), HTN, HLD, IDDM, Dementia, MDD admitted to [facility] on 11/1 for Subacute CVA L Basal Ganglia, Mod-Severe Stenosis Prox R PCA, HTN Urgency, AMS, abnormal speech, and Hypoglycemia.  In ED,  on arrival with some improvement with medications but remains hypertensive.  LKWT 3 days prior to presentation, NIHSS 2, Out of window for TPA. Hospital course significant for UTI- treated with ABT. Patient now with gait Instability, ADL impairments and Functional impairments.    # CVA  - Subacute CVA L Basal Ganglia  - Mod-Severe Stenosis Prox R PCA  - Start Comprehensive Rehab Program: PT/OT/ST- total of 3 hrs/day 5 days/week   - c/w ASA, high dose statin: lipitor    #NPH  - OP f/u with NSGY    #HTN  - Procardia XL  - Lisinopril 40 mg daily  - hydralazine 10 mg BID  - Metoprolol succinate XL 25mg daily    #HLD  - on Lipitor  - Fenofibrate 145mg daily    #DM II  - ISS and FS  - Lantus and Admelog    #Depression  - Sertraline 100mg daily    #Pain management  - Tylenol PRN    #DVT ppx  - Heparin, SCD, TEDs    #Dementia  - Donepezil 5mg daily  - Memantine 5mg daily    #GI ppx  - Protonix  - maalox for dyspepsia    #Bowel Regimen  - Senna, miralax PRN    #Bladder management  - BS on admission, and q 8 hours (SC if > 400)  - Monitor UO    #FEN   - Diet: Easy to chew + 100% supervision  - Supplements:    #Precaution  - Fall, Aspiration, Seizure      #Skin:  - No active issues at this time  - Desitin to buttocks for IAD  - Pressure injury/Skin: Turn Q2hrs while in bed, OOB to Chair, PT/OT     #Dysphagia    - SLP: evaluation and treatment  - s/p MBS: easy chew m thin liquid per speech. 100 % supervision for aspiration precaution      #Sleep:  - Maintain quiet hours and low stim environment.  - Melatonin PRN to maximize participation in therapy during the day.   - Monitor sleep logs    Outpatient Follow-up (Specialty/Name of physician):    Huan Oquendo (MD; PhD)  Neurology; Vascular Neurology  [address] [facility] [address], [address]  [place] [address]  Phone: [phone]  Fax: [phone]    Alexander Arevalo)  Neurology; Vascular Neurology  [address], [address] [place] [address]  Phone: [phone]  Fax: [phone] ASSESSMENT/PLAN  This is a 76 Female with a h/o CVA (no prior deficit), HTN, HLD, IDDM, Dementia, MDD admitted to [facility] on 11/1 for Subacute CVA L Basal Ganglia, Mod-Severe Stenosis Prox R PCA, HTN Urgency, AMS, abnormal speech, and Hypoglycemia.  In ED,  on arrival with some improvement with medications but remains hypertensive.  LKWT 3 days prior to presentation, NIHSS 2, Out of window for TPA. Hospital course significant for UTI- treated with ABT. Patient now with gait Instability, ADL impairments and Functional impairments.    # CVA  - Subacute CVA L Basal Ganglia  - Mod-Severe Stenosis Prox R PCA  - Start Comprehensive Rehab Program: PT/OT/ST- total of 3 hrs/day 5 days/week   - c/w ASA, high dose statin: lipitor    #NPH  - OP f/u with NSGY    #HTN  --SBP goal 120-150s  - Procardia XL  - Lisinopril 40 mg daily  - hydralazine 10 mg BID  - Metoprolol succinate XL 25mg daily  -adjusted holding parameters.     #HLD  - on Lipitor  - Fenofibrate 145mg daily    #DM II  - ISS and FS  - Lantus and Admelog  -Management as per hospitalist    #Depression  - Sertraline 100mg daily    #Pain management  - Tylenol PRN    #DVT ppx  - Heparin, SCD, TEDs    #Dementia  - Donepezil 5mg daily  - Memantine 5mg daily    #GI ppx  - Protonix  - maalox for dyspepsia    #Bowel Regimen  - Senna, miralax PRN    #Bladder management  -voiding with low PVRs  --d/c PVR monitoring    #Dysphagia    - SLP: evaluation and treatment  - s/p MBS: easy chew m thin liquid per speech. 100 % supervision for aspiration precaution    #Precaution  - Fall, Aspiration, Seizure      #Skin:  - No active issues at this time  - Desitin to buttocks for IAD  - Pressure injury/Skin: Turn Q2hrs while in bed, OOB to Chair, PT/OT       #Sleep:  - Maintain quiet hours and low stim environment.  -melatonin PRN  - Monitor sleep logs    Outpatient Follow-up (Specialty/Name of physician):    Huan Oquendo (MD; PhD)  Neurology; Vascular Neurology  [address] [facility] [address] [facility], [address]  [place] [address]  Phone: [phone]  Fax: [phone]    Alexander Arevalo)  Neurology; Vascular Neurology  [address] [facility], [address] [place] [address]  Phone: [phone]  Fax: [phone] ASSESSMENT/PLAN  This is a 76 Female with a h/o CVA (no prior deficit), HTN, HLD, IDDM, Dementia, MDD admitted to [facility] on 11/1 for Subacute CVA L Basal Ganglia, Mod-Severe Stenosis Prox R PCA, HTN Urgency, AMS, abnormal speech, and Hypoglycemia.  In ED,  on arrival with some improvement with medications but remains hypertensive.  LKWT 3 days prior to presentation, NIHSS 2, Out of window for TPA. Hospital course significant for UTI- treated with ABT. Patient now with gait Instability, ADL impairments and Functional impairments.    # CVA  - Subacute CVA L Basal Ganglia  - Mod-Severe Stenosis Prox R PCA  - Start Comprehensive Rehab Program: PT/OT/ST- total of 3 hrs/day 5 days/week   - c/w ASA, high dose statin: lipitor    #NPH  - OP f/u with NSGY    #HTN  --SBP goal 120-150s  - Procardia XL  - Lisinopril 40 mg daily  - hydralazine 10 mg BID  - Metoprolol succinate XL 25mg daily  -adjusted holding parameters.     #HLD  - on Lipitor  - Fenofibrate 145mg daily    #DM II with hyperglycemia  - ISS and FS  - Lantus and Admelog  -Management as per hospitalist    #Depression  - Sertraline 100mg daily    #Pain management  - Tylenol PRN    #DVT ppx  - Heparin, SCD, TEDs    #Dementia  - Donepezil 5mg daily  - Memantine 5mg daily    #GI ppx  - Protonix  - maalox for dyspepsia    #Bowel Regimen  - Senna, miralax PRN    #Bladder management  -voiding with low PVRs  --d/c PVR monitoring    #Dysphagia    - SLP: evaluation and treatment  - s/p MBS: easy chew m thin liquid per speech. 100 % supervision for aspiration precaution    #Precaution  - Fall, Aspiration, Seizure      #Skin:  - No active issues at this time  - Desitin to buttocks for IAD  - Pressure injury/Skin: Turn Q2hrs while in bed, OOB to Chair, PT/OT       #Sleep:  - Maintain quiet hours and low stim environment.  -melatonin PRN  - Monitor sleep logs    Outpatient Follow-up (Specialty/Name of physician):    Huan Oquendo (MD; PhD)  Neurology; Vascular Neurology  [address] [facility], [address]  [place] [address]  Phone: [phone]  Fax: [phone]    Alexander Arevalo (MD)  Neurology; Vascular Neurology  [address] [facility], [address] [place] [address]  Phone: [phone]  Fax: [phone]

## 2021-11-15 NOTE — PROGRESS NOTE ADULT - ASSESSMENT
his is a 76 Female with a h/o CVA (no prior deficit), HTN, HLD, IDDM, Dementia, MDD admitted to [facility] on 11/1 for Subacute CVA L Basal Ganglia, Mod-Severe Stenosis Prox R PCA, HTN Urgency, AMS, abnormal speech, and Hypoglycemia.  In ED,  on arrival with some improvement with medications but remains hypertensive.  LKWT 3 days prior to presentation, NIHSS 2, Out of window for TPA. Hospital course significant for UTI- treated with ABT. Patient now with gait Instability, ADL impairments and Functional impairments.    # CVA  - Subacute CVA L Basal Ganglia  - Mod-Severe Stenosis Prox R PCA  - Start Comprehensive Rehab Program: PT/OT/ST  - PT: Focused on improving strength, endurance, coordination, balance, functional mobility, and transfers  - OT: Focused on improving strength, fine motor skills, coordination, posture and ADLs.    - ST: to diagnose and treat deficits in swallowing, cognition and communication.   - c/a ASA 81mg daily, lipitor 80mg qhs    #NPH  - OP f/u with NSGY    #HTN  - Procardia XL 30mg daily  - Lisinopril 40 mg daily  - hydralazine 10 mg BID  - Metoprolol succinate XL 25mg daily    #HLD  - on Lipitor  - Fenofibrate 145mg daily    #DM II kristen hyperglycemia, uncontrolled   - A1c 11/3 was 7.6  - ISS and FS  - Increased lantus to 30units qhs and Admelog to 12 units premeal on 11/14- will monitor response today and change tomorrow if needed  - DIet changed to diabetic diet    #Depression  - Sertraline 100mg daily    #Dementia  - Donepezil 5mg daily  - Memantine 5mg daily    #UTI proteus miribalis   - S/p augmentin  - Will monitor F/WBC count    #DVT ppx  - Heparin

## 2021-11-15 NOTE — PROGRESS NOTE ADULT - SUBJECTIVE AND OBJECTIVE BOX
Patient is a 76y old  Female who presents with a chief complaint of CVA (12 Nov 2021 09:31)      HPI:  This is a 76 Female with a h/o CVA (no prior deficit), HTN, HLD, IDDM, Dementia, MDD admitted to [facility] on 11/1 for Subacute CVA L Basal Ganglia infarct,  CTA showedMod-Severe Stenosis Prox R PCA.  Pt. with HTN Urgency, and Hypoglycemia. Patient followed by neurology OP. In ED,  on arrival with some improvement with medications but remains hypertensive. Patient noted to have AMS with abnormal speech by daughter. LKWT 3 days prior to presentation. NIHSS 2. Out of window for TPA.  MRI brain showed--  left posterior limb IC/CR stroke, left frontal and right splenium acute strokes,    Per family, patient was followed by neurosurgery for evaluation of possible NPH with LP as outpatient. Neurosurgery recommending outpatient follow up after rehab. Pt also diagnosed with possible UTI on admission and treated with IV antibiotics, urine cx positive for proteus, ID consulted. HTN meds adjusted, bradycardic metoprolol dose decreased, hypoglycemia improved started low dose Lantus, high BP meds adjusted added hydralazine.  MBS performed and recommended easy to chew with thin liquids, with 100% supervision.   Patient was evaluated by PM&R and therapy for functional deficits, gait/ADL impairments and acute rehabilitation was recommended. Patient was medically optimized for discharge to [facility] IRU on 11/11/21.     (11 Nov 2021 13:54)      PAST MEDICAL & SURGICAL HISTORY:  Rheumatoid arthritis    Diabetes mellitus, type 2    Hypertension    HLD (hyperlipidemia)    Dementia    History of CVA (cerebrovascular accident)    Major depression    Anxiety    UTI (urinary tract infection)    History of dental surgery        Allergies    No Known Allergies    Intolerances      SUBJECTIVE: Patient seen and assessed at bedside. No acute events overnight. Slept well overnight. Denies headache, pain, discomfort.    ROS:  Denies pain, discomfort, headache.   Denies CP/dyspnea  Denies palpitations  Denies abdominal pain       Vital Signs Last 24 Hrs  T(C): 36.4 (15 Nov 2021 08:00), Max: 36.7 (14 Nov 2021 19:25)  T(F): 97.6 (15 Nov 2021 08:00), Max: 98 (14 Nov 2021 19:25)  HR: 58 (15 Nov 2021 08:00) (58 - 82)  BP: 118/74 (15 Nov 2021 08:00) (118/74 - 167/74)  BP(mean): --  RR: 16 (15 Nov 2021 08:00) (15 - 16)  SpO2: 100% (15 Nov 2021 08:00) (95% - 100%)    Gen - NAD, Comfortable  Pulm - CTAB,   Cardiovascular - RRR, S1S2, No m/r/g  Abdomen - Soft, NT/ND, +BS  Extremities - No C/C/E, No calf tenderness  Neuro-     Cognitive - AAO to self, hospital ("[place]" with cueing), month, and year     Communication - +dysarthria, hypophonic, delay processing     Attention: Intact      Judgement: Good evidence of judgement     No facial weakness        Motor -                    LEFT    UE - ShAB 5/5, EF 5/5, EE 5/5, WE 5/5,  5/5                    RIGHT UE - ShAB 5/5, EF 5/5, EE 5/5, WE 5/5,  5/5                    LEFT    LE - HF 5/5, KE 5/5, DF 5/5, PF 5/5                    RIGHT LE - HF 4/5, KE 5-/5, DF 5/5, PF 5/5        Sensory - Intact to LT     Coordination - FTN intact     Tone - normal  Psychiatric - Mood stable, Affect WNL  Skin: Intact      RECENT LABS                        12.0   10.45 )-----------( 272      ( 15 Nov 2021 06:47 )             38.6     11-15    139  |  104  |  25<H>  ----------------------------<  286<H>  4.3   |  27  |  0.77    Ca    9.1      15 Nov 2021 06:47    TPro  6.2  /  Alb  3.0<L>  /  TBili  0.3  /  DBili  x   /  AST  19  /  ALT  31  /  AlkPhos  54  11-15        CAPILLARY BLOOD GLUCOSE      POCT Blood Glucose.: 270 mg/dL (15 Nov 2021 07:48)  POCT Blood Glucose.: 215 mg/dL (14 Nov 2021 21:21)  POCT Blood Glucose.: 180 mg/dL (14 Nov 2021 17:18)  POCT Blood Glucose.: 252 mg/dL (14 Nov 2021 11:18)                MEDICATIONS  (STANDING):  aspirin enteric coated 81 milliGRAM(s) Oral daily  atorvastatin 80 milliGRAM(s) Oral at bedtime  cyanocobalamin 1000 MICROGram(s) Oral daily  dextrose 40% Gel 15 Gram(s) Oral once  dextrose 5%. 1000 milliLiter(s) (50 mL/Hr) IV Continuous <Continuous>  dextrose 5%. 1000 milliLiter(s) (100 mL/Hr) IV Continuous <Continuous>  dextrose 50% Injectable 25 Gram(s) IV Push once  dextrose 50% Injectable 12.5 Gram(s) IV Push once  dextrose 50% Injectable 25 Gram(s) IV Push once  donepezil 5 milliGRAM(s) Oral at bedtime  famotidine    Tablet 20 milliGRAM(s) Oral daily  fenofibrate Tablet 145 milliGRAM(s) Oral daily  glucagon  Injectable 1 milliGRAM(s) IntraMuscular once  heparin   Injectable 5000 Unit(s) SubCutaneous every 8 hours  hydrALAZINE 10 milliGRAM(s) Oral two times a day  influenza  Vaccine (HIGH DOSE) 0.7 milliLiter(s) IntraMuscular once  insulin glargine Injectable (LANTUS) 30 Unit(s) SubCutaneous at bedtime  insulin lispro (ADMELOG) corrective regimen sliding scale   SubCutaneous three times a day before meals  insulin lispro (ADMELOG) corrective regimen sliding scale   SubCutaneous at bedtime  insulin lispro Injectable (ADMELOG) 12 Unit(s) SubCutaneous three times a day before meals  lisinopril 40 milliGRAM(s) Oral daily  memantine 5 milliGRAM(s) Oral two times a day  metoprolol succinate ER 25 milliGRAM(s) Oral daily  NIFEdipine XL 30 milliGRAM(s) Oral daily  saccharomyces boulardii 250 milliGRAM(s) Oral two times a day  sertraline 100 milliGRAM(s) Oral daily  zinc oxide 40% Ointment 1 Application(s) Topical two times a day    MEDICATIONS  (PRN):  acetaminophen     Tablet .. 650 milliGRAM(s) Oral every 6 hours PRN Temp greater or equal to 38C (100.4F), Mild Pain (1 - 3)  aluminum hydroxide/magnesium hydroxide/simethicone Suspension 30 milliLiter(s) Oral every 4 hours PRN Dyspepsia  melatonin 3 milliGRAM(s) Oral at bedtime PRN Insomnia           Patient is a 76y old  Female who presents with a chief complaint of CVA (12 Nov 2021 09:31)      HPI:  This is a 76 Female with a h/o CVA (no prior deficit), HTN, HLD, IDDM, Dementia, MDD admitted to [facility] on 11/1 for Subacute CVA L Basal Ganglia infarct,  CTA showedMod-Severe Stenosis Prox R PCA.  Pt. with HTN Urgency, and Hypoglycemia. Patient followed by neurology OP. In ED,  on arrival with some improvement with medications but remains hypertensive. Patient noted to have AMS with abnormal speech by daughter. LKWT 3 days prior to presentation. NIHSS 2. Out of window for TPA.  MRI brain showed--  left posterior limb IC/CR stroke, left frontal and right splenium acute strokes,    Per family, patient was followed by neurosurgery for evaluation of possible NPH with LP as outpatient. Neurosurgery recommending outpatient follow up after rehab. Pt also diagnosed with possible UTI on admission and treated with IV antibiotics, urine cx positive for proteus, ID consulted. HTN meds adjusted, bradycardic metoprolol dose decreased, hypoglycemia improved started low dose Lantus, high BP meds adjusted added hydralazine.  MBS performed and recommended easy to chew with thin liquids, with 100% supervision.   Patient was evaluated by PM&R and therapy for functional deficits, gait/ADL impairments and acute rehabilitation was recommended. Patient was medically optimized for discharge to [facility] IRU on 11/11/21.     (11 Nov 2021 13:54)      PAST MEDICAL & SURGICAL HISTORY:  Rheumatoid arthritis    Diabetes mellitus, type 2    Hypertension    HLD (hyperlipidemia)    Dementia    History of CVA (cerebrovascular accident)    Major depression    Anxiety    UTI (urinary tract infection)    History of dental surgery        Allergies    No Known Allergies    Intolerances      SUBJECTIVE: Patient seen and assessed at bedside. No acute events overnight. Slept well overnight. Denies headache, pain, discomfort. SBPs 120s in therapy.     ROS:  Denies pain, discomfort, headache.   Denies CP/dyspnea  Denies palpitations  Denies abdominal pain       Vital Signs Last 24 Hrs  T(C): 36.4 (15 Nov 2021 08:00), Max: 36.7 (14 Nov 2021 19:25)  T(F): 97.6 (15 Nov 2021 08:00), Max: 98 (14 Nov 2021 19:25)  HR: 58 (15 Nov 2021 08:00) (58 - 82)  BP: 118/74 (15 Nov 2021 08:00) (118/74 - 167/74)  BP(mean): --  RR: 16 (15 Nov 2021 08:00) (15 - 16)  SpO2: 100% (15 Nov 2021 08:00) (95% - 100%)    Gen - NAD, Comfortable  Pulm - CTAB,   Cardiovascular - RRR, S1S2, No m/r/g  Abdomen - Soft, NT/ND, +BS  Extremities - No C/C/E, No calf tenderness  Neuro-     Cognitive - AAO to self, hospital ("[place]" with cueing), --Needs cues for month, and year     Communication - +dysarthria, hypophonic, delay processing     Attention: impaired-- delayed processing, distractable     CN: visual fields intact, No facial weakness        Motor -                    LEFT    UE - ShAB 5/5, EF 5/5, EE 5/5, WE 5/5,  5/5                    RIGHT UE - ShAB 5/5, EF 5/5, EE 5/5, WE 5/5,  5/5                    LEFT    LE - HF 5/5, KE 5/5, DF 5/5, PF 5/5                    RIGHT LE - HF 4/5, KE 5-/5, DF 5/5, PF 5/5        Sensory - Intact to LT     Coordination - FTN intact     Tone - normal  Psychiatric - Mood stable, Affect WNL      RECENT LABS                        12.0   10.45 )-----------( 272      ( 15 Nov 2021 06:47 )             38.6     11-15    139  |  104  |  25<H>  ----------------------------<  286<H>  4.3   |  27  |  0.77    Ca    9.1      15 Nov 2021 06:47    TPro  6.2  /  Alb  3.0<L>  /  TBili  0.3  /  DBili  x   /  AST  19  /  ALT  31  /  AlkPhos  54  11-15        CAPILLARY BLOOD GLUCOSE      POCT Blood Glucose.: 270 mg/dL (15 Nov 2021 07:48)  POCT Blood Glucose.: 215 mg/dL (14 Nov 2021 21:21)  POCT Blood Glucose.: 180 mg/dL (14 Nov 2021 17:18)  POCT Blood Glucose.: 252 mg/dL (14 Nov 2021 11:18)                MEDICATIONS  (STANDING):  aspirin enteric coated 81 milliGRAM(s) Oral daily  atorvastatin 80 milliGRAM(s) Oral at bedtime  cyanocobalamin 1000 MICROGram(s) Oral daily  dextrose 40% Gel 15 Gram(s) Oral once  dextrose 5%. 1000 milliLiter(s) (50 mL/Hr) IV Continuous <Continuous>  dextrose 5%. 1000 milliLiter(s) (100 mL/Hr) IV Continuous <Continuous>  dextrose 50% Injectable 25 Gram(s) IV Push once  dextrose 50% Injectable 12.5 Gram(s) IV Push once  dextrose 50% Injectable 25 Gram(s) IV Push once  donepezil 5 milliGRAM(s) Oral at bedtime  famotidine    Tablet 20 milliGRAM(s) Oral daily  fenofibrate Tablet 145 milliGRAM(s) Oral daily  glucagon  Injectable 1 milliGRAM(s) IntraMuscular once  heparin   Injectable 5000 Unit(s) SubCutaneous every 8 hours  hydrALAZINE 10 milliGRAM(s) Oral two times a day  influenza  Vaccine (HIGH DOSE) 0.7 milliLiter(s) IntraMuscular once  insulin glargine Injectable (LANTUS) 30 Unit(s) SubCutaneous at bedtime  insulin lispro (ADMELOG) corrective regimen sliding scale   SubCutaneous three times a day before meals  insulin lispro (ADMELOG) corrective regimen sliding scale   SubCutaneous at bedtime  insulin lispro Injectable (ADMELOG) 12 Unit(s) SubCutaneous three times a day before meals  lisinopril 40 milliGRAM(s) Oral daily  memantine 5 milliGRAM(s) Oral two times a day  metoprolol succinate ER 25 milliGRAM(s) Oral daily  NIFEdipine XL 30 milliGRAM(s) Oral daily  saccharomyces boulardii 250 milliGRAM(s) Oral two times a day  sertraline 100 milliGRAM(s) Oral daily  zinc oxide 40% Ointment 1 Application(s) Topical two times a day    MEDICATIONS  (PRN):  acetaminophen     Tablet .. 650 milliGRAM(s) Oral every 6 hours PRN Temp greater or equal to 38C (100.4F), Mild Pain (1 - 3)  aluminum hydroxide/magnesium hydroxide/simethicone Suspension 30 milliLiter(s) Oral every 4 hours PRN Dyspepsia  melatonin 3 milliGRAM(s) Oral at bedtime PRN Insomnia

## 2021-11-15 NOTE — PROGRESS NOTE ADULT - ATTENDING COMMENTS
Pt. seen with resident.  Agree with documentation above as per resident with amendments made as appropriate. Patient medically stable. Making progress towards rehab goals.     Left Basal ganglia infarct , and right PCA stenosis  Adjusted parameters on HTN meds to hold for SBP <120.  goal -150.

## 2021-11-15 NOTE — PROGRESS NOTE ADULT - SUBJECTIVE AND OBJECTIVE BOX
Patient is a 76y old  Female who presents with a chief complaint of left basal ganglia CVA with very mild right lower extremity weakness (14 Nov 2021 14:33)      Patient seen and examined at bedside. No events overnight. Patient has no acute complaints at this time- denies headaches, changes in vision, chest pain, SOB. No other acute complaints.    ALLERGIES:  No Known Allergies    MEDICATIONS  (STANDING):  aspirin enteric coated 81 milliGRAM(s) Oral daily  atorvastatin 80 milliGRAM(s) Oral at bedtime  cyanocobalamin 1000 MICROGram(s) Oral daily  dextrose 40% Gel 15 Gram(s) Oral once  dextrose 5%. 1000 milliLiter(s) (50 mL/Hr) IV Continuous <Continuous>  dextrose 5%. 1000 milliLiter(s) (100 mL/Hr) IV Continuous <Continuous>  dextrose 50% Injectable 25 Gram(s) IV Push once  dextrose 50% Injectable 12.5 Gram(s) IV Push once  dextrose 50% Injectable 25 Gram(s) IV Push once  donepezil 5 milliGRAM(s) Oral at bedtime  famotidine    Tablet 20 milliGRAM(s) Oral daily  fenofibrate Tablet 145 milliGRAM(s) Oral daily  glucagon  Injectable 1 milliGRAM(s) IntraMuscular once  heparin   Injectable 5000 Unit(s) SubCutaneous every 8 hours  hydrALAZINE 10 milliGRAM(s) Oral two times a day  influenza  Vaccine (HIGH DOSE) 0.7 milliLiter(s) IntraMuscular once  insulin glargine Injectable (LANTUS) 30 Unit(s) SubCutaneous at bedtime  insulin lispro (ADMELOG) corrective regimen sliding scale   SubCutaneous three times a day before meals  insulin lispro (ADMELOG) corrective regimen sliding scale   SubCutaneous at bedtime  insulin lispro Injectable (ADMELOG) 12 Unit(s) SubCutaneous three times a day before meals  lisinopril 40 milliGRAM(s) Oral daily  memantine 5 milliGRAM(s) Oral two times a day  metoprolol succinate ER 25 milliGRAM(s) Oral daily  NIFEdipine XL 30 milliGRAM(s) Oral daily  saccharomyces boulardii 250 milliGRAM(s) Oral two times a day  sertraline 100 milliGRAM(s) Oral daily  zinc oxide 40% Ointment 1 Application(s) Topical two times a day    MEDICATIONS  (PRN):  acetaminophen     Tablet .. 650 milliGRAM(s) Oral every 6 hours PRN Temp greater or equal to 38C (100.4F), Mild Pain (1 - 3)  aluminum hydroxide/magnesium hydroxide/simethicone Suspension 30 milliLiter(s) Oral every 4 hours PRN Dyspepsia  melatonin 3 milliGRAM(s) Oral at bedtime PRN Insomnia    Vital Signs Last 24 Hrs  T(F): 97.6 (15 Nov 2021 08:00), Max: 98 (14 Nov 2021 19:25)  HR: 58 (15 Nov 2021 08:00) (58 - 82)  BP: 118/74 (15 Nov 2021 08:00) (118/74 - 167/74)  RR: 16 (15 Nov 2021 08:00) (15 - 16)  SpO2: 100% (15 Nov 2021 08:00) (95% - 100%)  I&O's Summary    14 Nov 2021 07:01  -  15 Nov 2021 07:00  --------------------------------------------------------  IN: 480 mL / OUT: 0 mL / NET: 480 mL        PHYSICAL EXAM:  GENERAL: NAD, well-groomed, sitting in chair comfortably  HEAD:  Atraumatic, Normocephalic  EYES: EOMI, PERRLA, conjunctiva and sclera clear  ENMT: No tonsillar erythema, exudates, or enlargement; Moist mucous membranes, Good dentition, No lesions  NECK: Supple, No JVD, Normal thyroid  CHEST/LUNG: Clear to percussion bilaterally; No rales, rhonchi, wheezing, or rubs  HEART: Regular rate and rhythm; No murmurs, rubs, or gallops  ABDOMEN: Soft, Nontender, Nondistended; Bowel sounds present  EXTREMITIES:  2+ Peripheral Pulses, No clubbing, cyanosis, or edema    LABS:                        12.0   10.45 )-----------( 272      ( 15 Nov 2021 06:47 )             38.6     11-15    139  |  104  |  25  ----------------------------<  286  4.3   |  27  |  0.77    Ca    9.1      15 Nov 2021 06:47    TPro  6.2  /  Alb  3.0  /  TBili  0.3  /  DBili  x   /  AST  19  /  ALT  31  /  AlkPhos  54  11-15    eGFR if Non African American: 76 mL/min/1.73M2 (11-15-21 @ 06:47)  eGFR if : 88 mL/min/1.73M2 (11-15-21 @ 06:47)            11-02 Chol 248 mg/dL LDL -- HDL 53 mg/dL Trig 151 mg/dL              POCT Blood Glucose.: 270 mg/dL (15 Nov 2021 07:48)  POCT Blood Glucose.: 215 mg/dL (14 Nov 2021 21:21)  POCT Blood Glucose.: 180 mg/dL (14 Nov 2021 17:18)  POCT Blood Glucose.: 252 mg/dL (14 Nov 2021 11:18)          COVID-19 PCR: NotDetec (11-11-21 @ 17:00)  COVID-19 PCR: NotDetec (11-08-21 @ 08:58)      RADIOLOGY & ADDITIONAL TESTS: reviewed all labs personally    Care Discussed with Consultants/Other Providers: discussed with Dr. Hurtado

## 2021-11-16 LAB
GLUCOSE BLDC GLUCOMTR-MCNC: 138 MG/DL — HIGH (ref 70–99)
GLUCOSE BLDC GLUCOMTR-MCNC: 144 MG/DL — HIGH (ref 70–99)
GLUCOSE BLDC GLUCOMTR-MCNC: 170 MG/DL — HIGH (ref 70–99)
GLUCOSE BLDC GLUCOMTR-MCNC: 181 MG/DL — HIGH (ref 70–99)

## 2021-11-16 PROCEDURE — 99232 SBSQ HOSP IP/OBS MODERATE 35: CPT

## 2021-11-16 RX ADMIN — LISINOPRIL 40 MILLIGRAM(S): 2.5 TABLET ORAL at 08:26

## 2021-11-16 RX ADMIN — SERTRALINE 100 MILLIGRAM(S): 25 TABLET, FILM COATED ORAL at 12:33

## 2021-11-16 RX ADMIN — Medication 10 MILLIGRAM(S): at 08:26

## 2021-11-16 RX ADMIN — Medication 12 UNIT(S): at 12:31

## 2021-11-16 RX ADMIN — HEPARIN SODIUM 5000 UNIT(S): 5000 INJECTION INTRAVENOUS; SUBCUTANEOUS at 13:41

## 2021-11-16 RX ADMIN — ATORVASTATIN CALCIUM 80 MILLIGRAM(S): 80 TABLET, FILM COATED ORAL at 21:13

## 2021-11-16 RX ADMIN — Medication 12 UNIT(S): at 17:05

## 2021-11-16 RX ADMIN — Medication 1: at 17:05

## 2021-11-16 RX ADMIN — PREGABALIN 1000 MICROGRAM(S): 225 CAPSULE ORAL at 12:06

## 2021-11-16 RX ADMIN — Medication 12 UNIT(S): at 08:30

## 2021-11-16 RX ADMIN — MEMANTINE HYDROCHLORIDE 5 MILLIGRAM(S): 10 TABLET ORAL at 06:05

## 2021-11-16 RX ADMIN — Medication 250 MILLIGRAM(S): at 17:15

## 2021-11-16 RX ADMIN — Medication 1: at 12:31

## 2021-11-16 RX ADMIN — DONEPEZIL HYDROCHLORIDE 5 MILLIGRAM(S): 10 TABLET, FILM COATED ORAL at 21:13

## 2021-11-16 RX ADMIN — ZINC OXIDE 1 APPLICATION(S): 200 OINTMENT TOPICAL at 06:07

## 2021-11-16 RX ADMIN — Medication 145 MILLIGRAM(S): at 12:06

## 2021-11-16 RX ADMIN — MEMANTINE HYDROCHLORIDE 5 MILLIGRAM(S): 10 TABLET ORAL at 17:15

## 2021-11-16 RX ADMIN — HEPARIN SODIUM 5000 UNIT(S): 5000 INJECTION INTRAVENOUS; SUBCUTANEOUS at 21:13

## 2021-11-16 RX ADMIN — HEPARIN SODIUM 5000 UNIT(S): 5000 INJECTION INTRAVENOUS; SUBCUTANEOUS at 06:05

## 2021-11-16 RX ADMIN — Medication 250 MILLIGRAM(S): at 06:05

## 2021-11-16 RX ADMIN — FAMOTIDINE 20 MILLIGRAM(S): 10 INJECTION INTRAVENOUS at 12:06

## 2021-11-16 RX ADMIN — Medication 81 MILLIGRAM(S): at 12:06

## 2021-11-16 RX ADMIN — Medication 10 MILLIGRAM(S): at 17:15

## 2021-11-16 RX ADMIN — ZINC OXIDE 1 APPLICATION(S): 200 OINTMENT TOPICAL at 17:15

## 2021-11-16 RX ADMIN — INSULIN GLARGINE 30 UNIT(S): 100 INJECTION, SOLUTION SUBCUTANEOUS at 21:14

## 2021-11-16 NOTE — PROGRESS NOTE ADULT - ASSESSMENT
his is a 76 Female with a h/o CVA (no prior deficit), HTN, HLD, IDDM, Dementia, MDD admitted to [facility] on 11/1 for Subacute CVA L Basal Ganglia, Mod-Severe Stenosis Prox R PCA, HTN Urgency, AMS, abnormal speech, and Hypoglycemia.  In ED,  on arrival with some improvement with medications but remains hypertensive.  LKWT 3 days prior to presentation, NIHSS 2, Out of window for TPA. Hospital course significant for UTI- treated with ABT. Patient now with gait Instability, ADL impairments and Functional impairments.    # CVA  - Subacute CVA L Basal Ganglia  - Mod-Severe Stenosis Prox R PCA  - continue Comprehensive Rehab Program: PT/OT/ST  - PT: Focused on improving strength, endurance, coordination, balance, functional mobility, and transfers  - OT: Focused on improving strength, fine motor skills, coordination, posture and ADLs.    - ST: to diagnose and treat deficits in swallowing, cognition and communication.   - c/a ASA 81mg daily, lipitor 80mg qhs    #NPH  - OP f/u with NSGY    #HTN  - Procardia XL 30mg daily  - Lisinopril 40 mg daily  - hydralazine 10 mg BID  - Metoprolol succinate XL 25mg daily    #HLD  - on Lipitor  - Fenofibrate 145mg daily    #DM II kristen hyperglycemia, uncontrolled   - A1c 11/3 was 7.6  - ISS and FS  - Increased lantus to 30units qhs and Admelog to 12 units premeal on 11/14  - DIet changed to diabetic diet    #Depression  - Sertraline 100mg daily    #Dementia  - Donepezil 5mg daily  - Memantine 5mg daily    #UTI proteus miribalis   - S/p augmentin  - Will monitor F/WBC count    #DVT ppx  - Heparin

## 2021-11-16 NOTE — PROGRESS NOTE ADULT - SUBJECTIVE AND OBJECTIVE BOX
Patient is a 76y old  Female who presents with a chief complaint of left basal ganglia CVA with very mild right lower extremity weakness (15 Nov 2021 10:14)    Patient seen and examined at bedside. No events overnight. Patient has no acute complaints at this time- denies headaches, changes in vision, chest pain, SOB. No other acute complaints. This morning, BP on lower end therefore morning meds were held. BP checked again later at 8:30 and told nurse to administer hydralazine and lisinopril only.    ALLERGIES:  No Known Allergies    MEDICATIONS  (STANDING):  aspirin enteric coated 81 milliGRAM(s) Oral daily  atorvastatin 80 milliGRAM(s) Oral at bedtime  cyanocobalamin 1000 MICROGram(s) Oral daily  dextrose 40% Gel 15 Gram(s) Oral once  dextrose 5%. 1000 milliLiter(s) (50 mL/Hr) IV Continuous <Continuous>  dextrose 5%. 1000 milliLiter(s) (100 mL/Hr) IV Continuous <Continuous>  dextrose 50% Injectable 25 Gram(s) IV Push once  dextrose 50% Injectable 12.5 Gram(s) IV Push once  dextrose 50% Injectable 25 Gram(s) IV Push once  donepezil 5 milliGRAM(s) Oral at bedtime  famotidine    Tablet 20 milliGRAM(s) Oral daily  fenofibrate Tablet 145 milliGRAM(s) Oral daily  glucagon  Injectable 1 milliGRAM(s) IntraMuscular once  heparin   Injectable 5000 Unit(s) SubCutaneous every 8 hours  hydrALAZINE 10 milliGRAM(s) Oral two times a day  influenza  Vaccine (HIGH DOSE) 0.7 milliLiter(s) IntraMuscular once  insulin glargine Injectable (LANTUS) 30 Unit(s) SubCutaneous at bedtime  insulin lispro (ADMELOG) corrective regimen sliding scale   SubCutaneous three times a day before meals  insulin lispro (ADMELOG) corrective regimen sliding scale   SubCutaneous at bedtime  insulin lispro Injectable (ADMELOG) 12 Unit(s) SubCutaneous three times a day before meals  lisinopril 40 milliGRAM(s) Oral daily  memantine 5 milliGRAM(s) Oral two times a day  metoprolol succinate ER 25 milliGRAM(s) Oral daily  NIFEdipine XL 30 milliGRAM(s) Oral daily  saccharomyces boulardii 250 milliGRAM(s) Oral two times a day  sertraline 100 milliGRAM(s) Oral daily  zinc oxide 40% Ointment 1 Application(s) Topical two times a day    MEDICATIONS  (PRN):  acetaminophen     Tablet .. 650 milliGRAM(s) Oral every 6 hours PRN Temp greater or equal to 38C (100.4F), Mild Pain (1 - 3)  aluminum hydroxide/magnesium hydroxide/simethicone Suspension 30 milliLiter(s) Oral every 4 hours PRN Dyspepsia  melatonin 3 milliGRAM(s) Oral at bedtime PRN Insomnia    Vital Signs Last 24 Hrs  T(F): 97.6 (16 Nov 2021 07:40), Max: 98.2 (15 Nov 2021 20:13)  HR: 54 (16 Nov 2021 08:25) (51 - 66)  BP: 136/66 (16 Nov 2021 08:25) (115/63 - 162/65)  RR: 14 (16 Nov 2021 07:40) (14 - 16)  SpO2: 99% (16 Nov 2021 07:40) (98% - 100%)  I&O's Summary      PHYSICAL EXAM:  GENERAL: NAD, well-groomed, sitting in chair comfortably having breakfast  HEAD:  Atraumatic, Normocephalic  EYES: EOMI, PERRLA, conjunctiva and sclera clear  ENMT: No tonsillar erythema, exudates, or enlargement; Moist mucous membranes, Good dentition, No lesions  NECK: Supple, No JVD, Normal thyroid  CHEST/LUNG: Clear to percussion bilaterally; No rales, rhonchi, wheezing, or rubs  HEART: Regular rate and rhythm; No murmurs, rubs, or gallops  ABDOMEN: Soft, Nontender, Nondistended; Bowel sounds present  EXTREMITIES:  2+ Peripheral Pulses, No clubbing, cyanosis, or edema    LABS:                        12.0   10.45 )-----------( 272      ( 15 Nov 2021 06:47 )             38.6     11-15    139  |  104  |  25  ----------------------------<  286  4.3   |  27  |  0.77    Ca    9.1      15 Nov 2021 06:47    TPro  6.2  /  Alb  3.0  /  TBili  0.3  /  DBili  x   /  AST  19  /  ALT  31  /  AlkPhos  54  11-15    eGFR if Non African American: 76 mL/min/1.73M2 (11-15-21 @ 06:47)  eGFR if : 88 mL/min/1.73M2 (11-15-21 @ 06:47)            11-02 Chol 248 mg/dL LDL -- HDL 53 mg/dL Trig 151 mg/dL              POCT Blood Glucose.: 144 mg/dL (16 Nov 2021 07:44)  POCT Blood Glucose.: 111 mg/dL (15 Nov 2021 21:19)  POCT Blood Glucose.: 266 mg/dL (15 Nov 2021 16:44)  POCT Blood Glucose.: 248 mg/dL (15 Nov 2021 12:18)          COVID-19 PCR: NotDetec (11-11-21 @ 17:00)  COVID-19 PCR: NotDetec (11-08-21 @ 08:58)      RADIOLOGY & ADDITIONAL TESTS:    Care Discussed with Consultants/Other Providers: discussed with Dr. Hurtado

## 2021-11-16 NOTE — PROGRESS NOTE ADULT - ATTENDING COMMENTS
Pt. seen with resident.  Agree with documentation above as per resident with amendments made as appropriate. Patient medically stable. Making progress towards rehab goals.       Left basal ganglia, right PCA stenosis  Monitor BP-- metoprolol and nifedipine held due to parameter

## 2021-11-16 NOTE — PROGRESS NOTE ADULT - ASSESSMENT
ASSESSMENT/PLAN  This is a 76 Female with a h/o CVA (no prior deficit), HTN, HLD, IDDM, Dementia, MDD admitted to [facility] on 11/1 for Subacute CVA L Basal Ganglia, Mod-Severe Stenosis Prox R PCA, HTN Urgency, AMS, abnormal speech, and Hypoglycemia.  In ED,  on arrival with some improvement with medications but remains hypertensive.  LKWT 3 days prior to presentation, NIHSS 2, Out of window for TPA. Hospital course significant for UTI- treated with ABT. Patient now with gait Instability, ADL impairments and Functional impairments.    # CVA  - Subacute CVA L Basal Ganglia  - Mod-Severe Stenosis Prox R PCA  - Start Comprehensive Rehab Program: PT/OT/ST- total of 3 hrs/day 5 days/week   - c/w ASA, high dose statin: lipitor    #NPH  - OP f/u with NSGY    #HTN  --SBP goal 120-150s  - Procardia XL  - Lisinopril 40 mg daily  - hydralazine 10 mg BID  - Metoprolol succinate XL 25mg daily. Consider holding given low BP in AM and low HR -- discussed with hospitalist  -adjusted holding parameters.     #HLD  - on Lipitor  - Fenofibrate 145mg daily    #DM II with hyperglycemia  - ISS and FS  - Lantus and Admelog  -Management as per hospitalist    #Depression  - Sertraline 100mg daily    #Pain management  - Tylenol PRN    #DVT ppx  - Heparin, SCD, TEDs    #Dementia  - Donepezil 5mg daily  - Memantine 5mg daily    #GI ppx  - Protonix  - maalox for dyspepsia    #Bowel Regimen  - Senna, miralax PRN    #Bladder management  -voiding with low PVRs  --d/c PVR monitoring    #Dysphagia    - SLP: evaluation and treatment  - s/p MBS: easy chew m thin liquid per speech. 100 % supervision for aspiration precaution    #Precaution  - Fall, Aspiration, Seizure      #Skin:  - No active issues at this time  - Desitin to buttocks for IAD  - Pressure injury/Skin: Turn Q2hrs while in bed, OOB to Chair, PT/OT       #Sleep:  - Maintain quiet hours and low stim environment.  -melatonin PRN  - Monitor sleep logs    Outpatient Follow-up (Specialty/Name of physician):    Huan Oquendo (MD; PhD)  Neurology; Vascular Neurology  [address] [facility], [address]  [place] [address]  Phone: [phone]  Fax: [phone]    Alexander Arevalo)  Neurology; Vascular Neurology  [address] [place] [address], [address] [place] [address]  Phone: [phone]  Fax: [phone] ASSESSMENT/PLAN  This is a 76 Female with a h/o CVA (no prior deficit), HTN, HLD, IDDM, Dementia, MDD admitted to [facility] on 11/1 for Subacute CVA L Basal Ganglia, Mod-Severe Stenosis Prox R PCA, HTN Urgency, AMS, abnormal speech, and Hypoglycemia.  In ED,  on arrival with some improvement with medications but remains hypertensive.  LKWT 3 days prior to presentation, NIHSS 2, Out of window for TPA. Hospital course significant for UTI- treated with ABT. Patient now with gait Instability, ADL impairments and Functional impairments.    # CVA  - Subacute CVA L Basal Ganglia  - Mod-Severe Stenosis Prox R PCA  - Start Comprehensive Rehab Program: PT/OT/ST- total of 3 hrs/day 5 days/week   - c/w ASA, high dose statin: lipitor    #NPH  - OP f/u with NSGY    #HTN  --SBP goal 120-150s  - Procardia XL  - Lisinopril 40 mg daily  - hydralazine 10 mg BID  - Metoprolol succinate XL 25mg daily. Consider holding given low BP in AM and low HR -- discussed with hospitalist  -adjusted holding parameters.     #HLD  - on Lipitor  - Fenofibrate 145mg daily    #DM II with hyperglycemia  - ISS and FS  - Lantus and Admelog  -Management as per hospitalist    #Depression  - Sertraline 100mg daily    #Pain management  - Tylenol PRN    #DVT ppx  - Heparin, SCD, TEDs    #Dementia  - Donepezil 5mg daily  - Memantine 5mg daily    #GI ppx  - Protonix  - maalox for dyspepsia    #Bowel Regimen  - Senna, miralax PRN    #Bladder management  -voiding with low PVRs  --d/c PVR monitoring    #Dysphagia    - SLP: evaluation and treatment  - s/p MBS: easy chew m thin liquid per speech. 100 % supervision for aspiration precaution    #Precaution  - Fall, Aspiration, Seizure      #Skin:  - No active issues at this time  - Desitin to buttocks for IAD  - Pressure injury/Skin: Turn Q2hrs while in bed, OOB to Chair, PT/OT       #Sleep:  - Maintain quiet hours and low stim environment.  -melatonin PRN  - Monitor sleep logs    #IDT 11/16:  - SW: lives in  [place]STE with  and daughter. She previously required assistance with ADLs. Owns WC, RW, and had home care.  - Barriers: retropulsion, coordination, poor balance  - Eating SV, Grooming Kaley, UBD Kaley, LBD maxA, bathing maxA, toilet transfer modA, shower transfer totalA, transfers Kaley, ambulation 62' with RW totalA and WC follow. Needs assist moving RLE forward and preventing hyperextension of knee, 4 steps with 2 HR maxA.  - On easy to chew diet, modA cognition, decreased attention. poor problem solving, mild-mod receptive deficits, mod dysarthria  - Goals: shower transfers with Kaley o/w SC with ADLs, Kaley with stairs and transfers, SV with ambulation, SV cognition  - Will need family training  - EDOD 12/4 home    Outpatient Follow-up (Specialty/Name of physician):    Huan Oquendo; PhD)  Neurology; Vascular Neurology  [address] [facility], [address]  [place] [address]  Phone: [phone]  Fax: [phone]    Alexander Arevalo)  Neurology; Vascular Neurology  [address] [place] [address], [address] [place] [address]  Phone: [phone]  Fax: [phone] ASSESSMENT/PLAN  This is a 76 Female with a h/o CVA (no prior deficit), HTN, HLD, IDDM, Dementia, MDD admitted to [facility] on 11/1 for Subacute CVA L Basal Ganglia, Mod-Severe Stenosis Prox R PCA, HTN Urgency, AMS, abnormal speech, and Hypoglycemia.  In ED,  on arrival with some improvement with medications but remains hypertensive.  LKWT 3 days prior to presentation, NIHSS 2, Out of window for TPA. Hospital course significant for UTI- treated with ABT. Patient now with gait Instability, ADL impairments and Functional impairments.    # CVA  - Subacute CVA L Basal Ganglia  - Mod-Severe Stenosis Prox R PCA  - Start Comprehensive Rehab Program: PT/OT/ST- total of 3 hrs/day 5 days/week   - c/w ASA, high dose statin: lipitor    #NPH  - OP f/u with NSGY    #HTN  --SBP goal 120-150s  - Procardia XL  - Lisinopril 40 mg daily  - hydralazine 10 mg BID  - Metoprolol succinate XL 25mg daily. Consider holding given low BP in AM and low HR -- discussed with hospitalist  -adjusted holding parameters.     #HLD  - on Lipitor  - Fenofibrate 145mg daily    #DM II with hyperglycemia  - ISS and FS  - Lantus and Admelog  -Management as per hospitalist    #Depression  - Sertraline 100mg daily    #Pain management  - Tylenol PRN    #DVT ppx  - Heparin, SCD, TEDs    #Dementia  - Donepezil 5mg daily  - Memantine 5mg daily    #GI ppx  - Protonix  - maalox for dyspepsia    #Bowel Regimen  - Senna, miralax PRN    #Bladder management  -voiding with low PVRs  --d/c PVR monitoring    #Dysphagia    - SLP: evaluation and treatment  - s/p MBS: easy chew m thin liquid per speech. 100 % supervision for aspiration precaution    #Precaution  - Fall, Aspiration, Seizure      #Skin:  - No active issues at this time  - Desitin to buttocks for IAD  - Pressure injury/Skin: Turn Q2hrs while in bed, OOB to Chair, PT/OT       #Sleep:  - Maintain quiet hours and low stim environment.  -melatonin PRN  - Monitor sleep logs    #IDT 11/16:  - SW: lives in  3STE with  and daughter.  and 2 granddaughters assisted with ADLs. Owns WC, RW, rollator.  - Barriers: retropulsion, coordination, poor balance  - Eating SV, Grooming Kaley, UBD Kaley, LBD maxA, bathing maxA, toilet transfer modA, shower transfer totalA, transfers Kaley, ambulation 62' with RW totalA and WC follow. Needs assist moving RLE forward and preventing hyperextension of knee, 4 steps with 2 HR maxA.  - On easy to chew diet, modA cognition, decreased attention. poor problem solving, mild-mod receptive deficits, mod dysarthria  - Goals: shower transfers with Kaley o/w SC with ADLs, Kaley with stairs and transfers, SV with ambulation, SV cognition  - Will need family training  - EDOD 12/4 home  - , Emmanuel, updated 11/16.     Outpatient Follow-up (Specialty/Name of physician):    Huan Oquendo; PhD)  Neurology; Vascular Neurology  [address] [facility], [address]  [place] [address]  Phone: [phone]  Fax: [phone]    Alexander Arevalo)  Neurology; Vascular Neurology  [address] [facility], [address] [place] [address]  Phone: [phone]  Fax: [phone] ASSESSMENT/PLAN  This is a 76 Female with a h/o CVA (no prior deficit), HTN, HLD, IDDM, Dementia, MDD admitted to [facility] on 11/1 for Subacute CVA L Basal Ganglia, Mod-Severe Stenosis Prox R PCA, HTN Urgency, AMS, abnormal speech, and Hypoglycemia.  In ED,  on arrival with some improvement with medications but remains hypertensive.  LKWT 3 days prior to presentation, NIHSS 2, Out of window for TPA. Hospital course significant for UTI- treated with ABT. Patient now with gait Instability, ADL impairments and Functional impairments.    # CVA  - Subacute CVA L Basal Ganglia  - Mod-Severe Stenosis Prox R PCA  - Start Comprehensive Rehab Program: PT/OT/ST- total of 3 hrs/day 5 days/week   - c/w ASA, high dose statin: lipitor    #NPH  - OP f/u with NSGY    #HTN  --SBP goal 120-150s  - Procardia XL  - Lisinopril 40 mg daily  - hydralazine 10 mg BID  - Metoprolol succinate XL 25mg daily. Consider holding given low BP in AM and low HR -- discussed with hospitalist  -adjusted holding parameters.     #HLD  - on Lipitor  - Fenofibrate 145mg daily    #DM II with hyperglycemia  - ISS and FS  - Lantus and Admelog  -Management as per hospitalist    #Depression  - Sertraline 100mg daily    #Pain management  - Tylenol PRN    #DVT ppx  - Heparin, SCD, TEDs    #Dementia  - Donepezil 5mg daily  - Memantine 5mg daily    #GI ppx  - Protonix  - maalox for dyspepsia    #Bowel Regimen  - Senna, miralax PRN    #Bladder management  -voiding with low PVRs  --d/c PVR monitoring    #Dysphagia    - SLP: evaluation and treatment  - s/p MBS: easy chew m thin liquid per speech. 100 % supervision for aspiration precaution    #Precaution  - Fall, Aspiration, Seizure      #Skin:  - No active issues at this time  - Desitin to buttocks for IAD  - Pressure injury/Skin: Turn Q2hrs while in bed, OOB to Chair, PT/OT       #Sleep:  - Maintain quiet hours and low stim environment.  -melatonin PRN  - Monitor sleep logs    #IDT 11/16:  - SW: lives in  3STE with  and daughter.  and 2 granddaughters assisted with ADLs. Owns WC, RW, rollator.  - Barriers: retropulsion, coordination, poor balance  - Eating SV, Grooming Kaley, UBD Kaley, LBD maxA, bathing maxA, toilet transfer modA, shower transfer totalA, transfers Kaley, ambulation 62' with RW Mod A and WC follow. Needs assist moving RLE forward and preventing hyperextension of knee, 4 steps with 2 HR maxA.  - On easy to chew diet, modA cognition, decreased attention. poor problem solving, decreased initiation,  mild-mod receptive deficits, mod dysarthria  - Goals: shower transfers with Kaley o/w SC with ADLs, Kaley with stairs and CG transfers, SV with ambulation, SV cognition  - Will need family training  - EDOD 12/4 home  - , Emmanuel, updated 11/16.     Outpatient Follow-up (Specialty/Name of physician):    Huan Oquendo; PhD)  Neurology; Vascular Neurology  [address] [facility], [address]  [place] [address]  Phone: [phone]  Fax: [phone]    Alexander Arevalo)  Neurology; Vascular Neurology  [address] [facility], [address] [place] [address]  Phone: [phone]  Fax: [phone]

## 2021-11-16 NOTE — PROGRESS NOTE ADULT - SUBJECTIVE AND OBJECTIVE BOX
Patient is a 76y old  Female who presents with a chief complaint of CVA (12 Nov 2021 09:31)      HPI:  This is a 76 Female with a h/o CVA (no prior deficit), HTN, HLD, IDDM, Dementia, MDD admitted to [facility] on 11/1 for Subacute CVA L Basal Ganglia infarct,  CTA showedMod-Severe Stenosis Prox R PCA.  Pt. with HTN Urgency, and Hypoglycemia. Patient followed by neurology OP. In ED,  on arrival with some improvement with medications but remains hypertensive. Patient noted to have AMS with abnormal speech by daughter. LKWT 3 days prior to presentation. NIHSS 2. Out of window for TPA.  MRI brain showed--  left posterior limb IC/CR stroke, left frontal and right splenium acute strokes,    Per family, patient was followed by neurosurgery for evaluation of possible NPH with LP as outpatient. Neurosurgery recommending outpatient follow up after rehab. Pt also diagnosed with possible UTI on admission and treated with IV antibiotics, urine cx positive for proteus, ID consulted. HTN meds adjusted, bradycardic metoprolol dose decreased, hypoglycemia improved started low dose Lantus, high BP meds adjusted added hydralazine.  MBS performed and recommended easy to chew with thin liquids, with 100% supervision.   Patient was evaluated by PM&R and therapy for functional deficits, gait/ADL impairments and acute rehabilitation was recommended. Patient was medically optimized for discharge to [facility] on 11/11/21.     (11 Nov 2021 13:54)      PAST MEDICAL & SURGICAL HISTORY:  Rheumatoid arthritis    Diabetes mellitus, type 2    Hypertension    HLD (hyperlipidemia)    Dementia    History of CVA (cerebrovascular accident)    Major depression    Anxiety    UTI (urinary tract infection)    History of dental surgery        Allergies    No Known Allergies    Intolerances      SUBJECTIVE: Patient seen and assessed at bedside. No acute events overnight. Slept well overnight. Denies headache, pain, discomfort, palpitations, CP. BP 110s in the AM, HR low 50s.     ROS:  Denies pain, discomfort, headache.   Denies CP/dyspnea  Denies palpitations  Denies abdominal pain     Vital Signs Last 24 Hrs  T(C): 36.4 (16 Nov 2021 07:40), Max: 36.8 (15 Nov 2021 20:13)  T(F): 97.6 (16 Nov 2021 07:40), Max: 98.2 (15 Nov 2021 20:13)  HR: 54 (16 Nov 2021 08:25) (51 - 66)  BP: 136/66 (16 Nov 2021 08:25) (115/63 - 162/65)  BP(mean): --  RR: 14 (16 Nov 2021 07:40) (14 - 16)  SpO2: 99% (16 Nov 2021 07:40) (98% - 100%)      Gen - NAD, Comfortable  Pulm - CTAB,   Cardiovascular - RRR, S1S2, No m/r/g  Abdomen - Soft, NT/ND, +BS  Extremities - No C/C/E, No calf tenderness  Neuro-     Cognitive - AAO to self, hospital ("[facility]" with cueing),month --Needs cue for year     Communication - +dysarthria, hypophonic, delay processing     Attention: impaired-- delayed processing, distractable     CN: visual fields intact, No facial weakness        Motor -                    LEFT    UE - ShAB 5/5, EF 5/5, EE 5/5, WE 5/5,  5/5                    RIGHT UE - ShAB 5/5, EF 5/5, EE 5/5, WE 5/5,  5/5                    LEFT    LE - HF 5/5, KE 5/5, DF 5/5, PF 5/5                    RIGHT LE - HF 4/5, KE 5-/5, DF 5/5, PF 5/5        Sensory - Intact to LT     Coordination - FTN intact     Tone - normal  Psychiatric - Mood stable, Affect WNL      RECENT LABS                        12.0   10.45 )-----------( 272      ( 15 Nov 2021 06:47 )             38.6     11-15    139  |  104  |  25<H>  ----------------------------<  286<H>  4.3   |  27  |  0.77    Ca    9.1      15 Nov 2021 06:47    TPro  6.2  /  Alb  3.0<L>  /  TBili  0.3  /  DBili  x   /  AST  19  /  ALT  31  /  AlkPhos  54  11-15        CAPILLARY BLOOD GLUCOSE      POCT Blood Glucose.: 270 mg/dL (15 Nov 2021 07:48)  POCT Blood Glucose.: 215 mg/dL (14 Nov 2021 21:21)  POCT Blood Glucose.: 180 mg/dL (14 Nov 2021 17:18)  POCT Blood Glucose.: 252 mg/dL (14 Nov 2021 11:18)          MEDICATIONS  (STANDING):  aspirin enteric coated 81 milliGRAM(s) Oral daily  atorvastatin 80 milliGRAM(s) Oral at bedtime  cyanocobalamin 1000 MICROGram(s) Oral daily  dextrose 40% Gel 15 Gram(s) Oral once  dextrose 5%. 1000 milliLiter(s) (50 mL/Hr) IV Continuous <Continuous>  dextrose 5%. 1000 milliLiter(s) (100 mL/Hr) IV Continuous <Continuous>  dextrose 50% Injectable 25 Gram(s) IV Push once  dextrose 50% Injectable 12.5 Gram(s) IV Push once  dextrose 50% Injectable 25 Gram(s) IV Push once  donepezil 5 milliGRAM(s) Oral at bedtime  famotidine    Tablet 20 milliGRAM(s) Oral daily  fenofibrate Tablet 145 milliGRAM(s) Oral daily  glucagon  Injectable 1 milliGRAM(s) IntraMuscular once  heparin   Injectable 5000 Unit(s) SubCutaneous every 8 hours  hydrALAZINE 10 milliGRAM(s) Oral two times a day  influenza  Vaccine (HIGH DOSE) 0.7 milliLiter(s) IntraMuscular once  insulin glargine Injectable (LANTUS) 30 Unit(s) SubCutaneous at bedtime  insulin lispro (ADMELOG) corrective regimen sliding scale   SubCutaneous three times a day before meals  insulin lispro (ADMELOG) corrective regimen sliding scale   SubCutaneous at bedtime  insulin lispro Injectable (ADMELOG) 12 Unit(s) SubCutaneous three times a day before meals  lisinopril 40 milliGRAM(s) Oral daily  memantine 5 milliGRAM(s) Oral two times a day  metoprolol succinate ER 25 milliGRAM(s) Oral daily  NIFEdipine XL 30 milliGRAM(s) Oral daily  saccharomyces boulardii 250 milliGRAM(s) Oral two times a day  sertraline 100 milliGRAM(s) Oral daily  zinc oxide 40% Ointment 1 Application(s) Topical two times a day    MEDICATIONS  (PRN):  acetaminophen     Tablet .. 650 milliGRAM(s) Oral every 6 hours PRN Temp greater or equal to 38C (100.4F), Mild Pain (1 - 3)  aluminum hydroxide/magnesium hydroxide/simethicone Suspension 30 milliLiter(s) Oral every 4 hours PRN Dyspepsia  melatonin 3 milliGRAM(s) Oral at bedtime PRN Insomnia

## 2021-11-17 LAB
GLUCOSE BLDC GLUCOMTR-MCNC: 131 MG/DL — HIGH (ref 70–99)
GLUCOSE BLDC GLUCOMTR-MCNC: 176 MG/DL — HIGH (ref 70–99)
GLUCOSE BLDC GLUCOMTR-MCNC: 214 MG/DL — HIGH (ref 70–99)
GLUCOSE BLDC GLUCOMTR-MCNC: 249 MG/DL — HIGH (ref 70–99)

## 2021-11-17 PROCEDURE — 99233 SBSQ HOSP IP/OBS HIGH 50: CPT

## 2021-11-17 PROCEDURE — 99232 SBSQ HOSP IP/OBS MODERATE 35: CPT

## 2021-11-17 RX ADMIN — PREGABALIN 1000 MICROGRAM(S): 225 CAPSULE ORAL at 12:14

## 2021-11-17 RX ADMIN — Medication 30 MILLIGRAM(S): at 05:33

## 2021-11-17 RX ADMIN — ATORVASTATIN CALCIUM 80 MILLIGRAM(S): 80 TABLET, FILM COATED ORAL at 21:47

## 2021-11-17 RX ADMIN — ZINC OXIDE 1 APPLICATION(S): 200 OINTMENT TOPICAL at 18:14

## 2021-11-17 RX ADMIN — Medication 81 MILLIGRAM(S): at 12:14

## 2021-11-17 RX ADMIN — Medication 2: at 12:17

## 2021-11-17 RX ADMIN — HEPARIN SODIUM 5000 UNIT(S): 5000 INJECTION INTRAVENOUS; SUBCUTANEOUS at 05:32

## 2021-11-17 RX ADMIN — LISINOPRIL 40 MILLIGRAM(S): 2.5 TABLET ORAL at 05:33

## 2021-11-17 RX ADMIN — Medication 10 MILLIGRAM(S): at 05:33

## 2021-11-17 RX ADMIN — MEMANTINE HYDROCHLORIDE 5 MILLIGRAM(S): 10 TABLET ORAL at 18:14

## 2021-11-17 RX ADMIN — MEMANTINE HYDROCHLORIDE 5 MILLIGRAM(S): 10 TABLET ORAL at 05:33

## 2021-11-17 RX ADMIN — Medication 10 MILLIGRAM(S): at 18:14

## 2021-11-17 RX ADMIN — SERTRALINE 100 MILLIGRAM(S): 25 TABLET, FILM COATED ORAL at 12:14

## 2021-11-17 RX ADMIN — FAMOTIDINE 20 MILLIGRAM(S): 10 INJECTION INTRAVENOUS at 12:14

## 2021-11-17 RX ADMIN — Medication 250 MILLIGRAM(S): at 18:14

## 2021-11-17 RX ADMIN — Medication 12 UNIT(S): at 16:55

## 2021-11-17 RX ADMIN — ZINC OXIDE 1 APPLICATION(S): 200 OINTMENT TOPICAL at 05:34

## 2021-11-17 RX ADMIN — HEPARIN SODIUM 5000 UNIT(S): 5000 INJECTION INTRAVENOUS; SUBCUTANEOUS at 21:47

## 2021-11-17 RX ADMIN — Medication 12 UNIT(S): at 08:32

## 2021-11-17 RX ADMIN — INSULIN GLARGINE 30 UNIT(S): 100 INJECTION, SOLUTION SUBCUTANEOUS at 21:46

## 2021-11-17 RX ADMIN — DONEPEZIL HYDROCHLORIDE 5 MILLIGRAM(S): 10 TABLET, FILM COATED ORAL at 21:47

## 2021-11-17 RX ADMIN — Medication 250 MILLIGRAM(S): at 05:32

## 2021-11-17 RX ADMIN — Medication 2: at 08:32

## 2021-11-17 RX ADMIN — Medication 12 UNIT(S): at 12:16

## 2021-11-17 RX ADMIN — HEPARIN SODIUM 5000 UNIT(S): 5000 INJECTION INTRAVENOUS; SUBCUTANEOUS at 14:33

## 2021-11-17 RX ADMIN — Medication 145 MILLIGRAM(S): at 12:14

## 2021-11-17 NOTE — PROGRESS NOTE ADULT - ASSESSMENT
his is a 76 Female with a h/o CVA (no prior deficit), HTN, HLD, IDDM, Dementia, MDD admitted to [facility] on 11/1 for Subacute CVA L Basal Ganglia, Mod-Severe Stenosis Prox R PCA, HTN Urgency, AMS, abnormal speech, and Hypoglycemia.  In ED,  on arrival with some improvement with medications but remains hypertensive.  LKWT 3 days prior to presentation, NIHSS 2, Out of window for TPA. Hospital course significant for UTI- treated with ABT. Patient now with gait Instability, ADL impairments and Functional impairments.    # CVA  - Subacute CVA L Basal Ganglia  - Mod-Severe Stenosis Prox R PCA  - continue Comprehensive Rehab Program: PT/OT/ST   - c/a ASA 81mg daily, lipitor 80mg qhs    #NPH  - OP f/u with NSGY    #HTN  - Procardia XL 30mg daily  - Lisinopril 40 mg daily  - hydralazine 10 mg BID  - discontinued Toprol 25mg daily due to bradycardia- can consider restarting low dose at 12.5mg daily if HR can tolerate    #HLD  - on Lipitor  - Fenofibrate 145mg daily    #DM II kristen hyperglycemia, uncontrolled   - A1c 11/3 was 7.6  - ISS and FS  - cotninue lantus to 30units qhs and Admelog to 12 units premeal   - diabetic diet    #Depression  - Sertraline 100mg daily    #Dementia  - Donepezil 5mg daily  - Memantine 5mg daily    #UTI proteus miribalis   - S/p augmentin  - Will monitor F/WBC count    #DVT ppx  - Heparin

## 2021-11-17 NOTE — PROGRESS NOTE ADULT - SUBJECTIVE AND OBJECTIVE BOX
HPI:  This is a 76 Female with a h/o CVA (no prior deficit), HTN, HLD, IDDM, Dementia, MDD admitted to [facility] on 11/1 for Subacute CVA L Basal Ganglia infarct,  CTA showedMod-Severe Stenosis Prox R PCA.  Pt. with HTN Urgency, and Hypoglycemia. Patient followed by neurology OP. In ED,  on arrival with some improvement with medications but remains hypertensive. Patient noted to have AMS with abnormal speech by daughter. LKWT 3 days prior to presentation. NIHSS 2. Out of window for TPA.  MRI brain showed--  left posterior limb IC/CR stroke, left frontal and right splenium acute strokes,    Per family, patient was followed by neurosurgery for evaluation of possible NPH with LP as outpatient. Neurosurgery recommending outpatient follow up after rehab. Pt also diagnosed with possible UTI on admission and treated with IV antibiotics, urine cx positive for proteus, ID consulted. HTN meds adjusted, bradycardic metoprolol dose decreased, hypoglycemia improved started low dose Lantus, high BP meds adjusted added hydralazine.  MBS performed and recommended easy to chew with thin liquids, with 100% supervision.   Patient was evaluated by PM&R and therapy for functional deficits, gait/ADL impairments and acute rehabilitation was recommended. Patient was medically optimized for discharge to [facility] IRU on 11/11/21.     (11 Nov 2021 13:54)      PAST MEDICAL & SURGICAL HISTORY:  Rheumatoid arthritis    Diabetes mellitus, type 2    Hypertension    HLD (hyperlipidemia)    Dementia    History of CVA (cerebrovascular accident)    Major depression    Anxiety    UTI (urinary tract infection)    History of dental surgery        Subjective:  Seen during OT.  , HR 68-- Tolerating therapy.  Slept during the night.  Eating meals.       VITALS  Vital Signs Last 24 Hrs  T(C): 36.3 (17 Nov 2021 07:40), Max: 36.4 (16 Nov 2021 19:55)  T(F): 97.4 (17 Nov 2021 07:40), Max: 97.6 (16 Nov 2021 19:55)  HR: 56 (17 Nov 2021 07:40) (52 - 68)  BP: 126/68 (17 Nov 2021 07:40) (126/68 - 172/67)  BP(mean): --  RR: 14 (17 Nov 2021 07:40) (14 - 14)  SpO2: 97% (17 Nov 2021 07:40) (95% - 99%)    REVIEW OF SYMPTOMS  Denies pain, discomfort, headache.   Denies CP/dyspnea  Denies palpitations  Denies abdominal pain     Physical Exam:  Gen - NAD, Comfortable  Pulm - CTAB,   Cardiovascular - RRR, S1S2, No m/r/g  Abdomen - Soft, NT/ND, +BS  Extremities - No C/C/E, No calf tenderness  Neuro-     Cognitive - AAO to self, hospital ("[facility]" with cueing),month --Needs cue for year     Communication - +dysarthria, hypophonic, delay processing     Attention: impaired-- delayed processing, distractable     CN: visual fields intact, No facial weakness        Motor -                    LEFT    UE - ShAB 5/5, EF 5/5, EE 5/5, WE 5/5,  5/5                    RIGHT UE - ShAB 5/5, EF 5/5, EE 5/5, WE 5/5,  5/5                    LEFT    LE - HF 5/5, KE 5/5, DF 5/5, PF 5/5                    RIGHT LE - HF 4/5, KE 5-/5, DF 5/5, PF 5/5        Sensory - Intact to LT     Coordination - FTN intact     Tone - normal  Psychiatric - Mood stable, Affect WNL      RECENT LABS                  RADIOLOGY/OTHER RESULTS      MEDICATIONS  (STANDING):  aspirin enteric coated 81 milliGRAM(s) Oral daily  atorvastatin 80 milliGRAM(s) Oral at bedtime  cyanocobalamin 1000 MICROGram(s) Oral daily  dextrose 40% Gel 15 Gram(s) Oral once  dextrose 5%. 1000 milliLiter(s) (50 mL/Hr) IV Continuous <Continuous>  dextrose 5%. 1000 milliLiter(s) (100 mL/Hr) IV Continuous <Continuous>  dextrose 50% Injectable 25 Gram(s) IV Push once  dextrose 50% Injectable 12.5 Gram(s) IV Push once  dextrose 50% Injectable 25 Gram(s) IV Push once  donepezil 5 milliGRAM(s) Oral at bedtime  famotidine    Tablet 20 milliGRAM(s) Oral daily  fenofibrate Tablet 145 milliGRAM(s) Oral daily  glucagon  Injectable 1 milliGRAM(s) IntraMuscular once  heparin   Injectable 5000 Unit(s) SubCutaneous every 8 hours  hydrALAZINE 10 milliGRAM(s) Oral two times a day  influenza  Vaccine (HIGH DOSE) 0.7 milliLiter(s) IntraMuscular once  insulin glargine Injectable (LANTUS) 30 Unit(s) SubCutaneous at bedtime  insulin lispro (ADMELOG) corrective regimen sliding scale   SubCutaneous three times a day before meals  insulin lispro (ADMELOG) corrective regimen sliding scale   SubCutaneous at bedtime  insulin lispro Injectable (ADMELOG) 12 Unit(s) SubCutaneous three times a day before meals  lisinopril 40 milliGRAM(s) Oral daily  memantine 5 milliGRAM(s) Oral two times a day  NIFEdipine XL 30 milliGRAM(s) Oral daily  saccharomyces boulardii 250 milliGRAM(s) Oral two times a day  sertraline 100 milliGRAM(s) Oral daily  zinc oxide 40% Ointment 1 Application(s) Topical two times a day    MEDICATIONS  (PRN):  acetaminophen     Tablet .. 650 milliGRAM(s) Oral every 6 hours PRN Temp greater or equal to 38C (100.4F), Mild Pain (1 - 3)  aluminum hydroxide/magnesium hydroxide/simethicone Suspension 30 milliLiter(s) Oral every 4 hours PRN Dyspepsia  melatonin 3 milliGRAM(s) Oral at bedtime PRN Insomnia

## 2021-11-17 NOTE — PROGRESS NOTE ADULT - SUBJECTIVE AND OBJECTIVE BOX
Patient is a 76y old  Female who presents with a chief complaint of left basal ganglia CVA with very mild right lower extremity weakness (16 Nov 2021 10:49)      Patient seen and examined at bedside. No events overnight. Patient has no acute complaints at this time- denies headaches, changes in vision, chest pain, SOB. No other acute complaints. Patient with isolated elevated BP overnight, however missed dose of procardia and toprol in the morning, BP now improved.    ALLERGIES:  No Known Allergies    MEDICATIONS  (STANDING):  aspirin enteric coated 81 milliGRAM(s) Oral daily  atorvastatin 80 milliGRAM(s) Oral at bedtime  cyanocobalamin 1000 MICROGram(s) Oral daily  dextrose 40% Gel 15 Gram(s) Oral once  dextrose 5%. 1000 milliLiter(s) (50 mL/Hr) IV Continuous <Continuous>  dextrose 5%. 1000 milliLiter(s) (100 mL/Hr) IV Continuous <Continuous>  dextrose 50% Injectable 25 Gram(s) IV Push once  dextrose 50% Injectable 12.5 Gram(s) IV Push once  dextrose 50% Injectable 25 Gram(s) IV Push once  donepezil 5 milliGRAM(s) Oral at bedtime  famotidine    Tablet 20 milliGRAM(s) Oral daily  fenofibrate Tablet 145 milliGRAM(s) Oral daily  glucagon  Injectable 1 milliGRAM(s) IntraMuscular once  heparin   Injectable 5000 Unit(s) SubCutaneous every 8 hours  hydrALAZINE 10 milliGRAM(s) Oral two times a day  influenza  Vaccine (HIGH DOSE) 0.7 milliLiter(s) IntraMuscular once  insulin glargine Injectable (LANTUS) 30 Unit(s) SubCutaneous at bedtime  insulin lispro (ADMELOG) corrective regimen sliding scale   SubCutaneous three times a day before meals  insulin lispro (ADMELOG) corrective regimen sliding scale   SubCutaneous at bedtime  insulin lispro Injectable (ADMELOG) 12 Unit(s) SubCutaneous three times a day before meals  lisinopril 40 milliGRAM(s) Oral daily  memantine 5 milliGRAM(s) Oral two times a day  NIFEdipine XL 30 milliGRAM(s) Oral daily  saccharomyces boulardii 250 milliGRAM(s) Oral two times a day  sertraline 100 milliGRAM(s) Oral daily  zinc oxide 40% Ointment 1 Application(s) Topical two times a day    MEDICATIONS  (PRN):  acetaminophen     Tablet .. 650 milliGRAM(s) Oral every 6 hours PRN Temp greater or equal to 38C (100.4F), Mild Pain (1 - 3)  aluminum hydroxide/magnesium hydroxide/simethicone Suspension 30 milliLiter(s) Oral every 4 hours PRN Dyspepsia  melatonin 3 milliGRAM(s) Oral at bedtime PRN Insomnia    Vital Signs Last 24 Hrs  T(F): 97.6 (16 Nov 2021 19:55), Max: 97.6 (16 Nov 2021 19:55)  HR: 52 (17 Nov 2021 05:25) (52 - 68)  BP: 145/66 (17 Nov 2021 05:25) (145/63 - 172/67)  RR: 14 (17 Nov 2021 05:25) (14 - 14)  SpO2: 95% (17 Nov 2021 05:25) (95% - 99%)  I&O's Summary    PHYSICAL EXAM:  GENERAL: NAD, well-groomed, sitting in bed comfortably   HEAD:  Atraumatic, Normocephalic  EYES:  PERRLA, conjunctiva and sclera clear  ENMT: No tonsillar erythema, exudates, or enlargement; Moist mucous membranes  NECK: Supple, No JVD, Normal thyroid  CHEST/LUNG: Clear to percussion bilaterally; No rales, rhonchi, wheezing, or rubs  HEART: Regular rate and rhythm; No murmurs, rubs, or gallops  ABDOMEN: Soft, Nontender, Nondistended; Bowel sounds present  EXTREMITIES:  2+ Peripheral Pulses, No clubbing, cyanosis, or edema    LABS:                        12.0   10.45 )-----------( 272      ( 15 Nov 2021 06:47 )             38.6     11-15    139  |  104  |  25  ----------------------------<  286  4.3   |  27  |  0.77    Ca    9.1      15 Nov 2021 06:47    TPro  6.2  /  Alb  3.0  /  TBili  0.3  /  DBili  x   /  AST  19  /  ALT  31  /  AlkPhos  54  11-15    eGFR if Non African American: 76 mL/min/1.73M2 (11-15-21 @ 06:47)  eGFR if : 88 mL/min/1.73M2 (11-15-21 @ 06:47)            11-02 Chol 248 mg/dL LDL -- HDL 53 mg/dL Trig 151 mg/dL              POCT Blood Glucose.: 214 mg/dL (17 Nov 2021 07:50)  POCT Blood Glucose.: 138 mg/dL (16 Nov 2021 21:12)  POCT Blood Glucose.: 170 mg/dL (16 Nov 2021 16:52)  POCT Blood Glucose.: 181 mg/dL (16 Nov 2021 12:03)          COVID-19 PCR: NotDetec (11-11-21 @ 17:00)  COVID-19 PCR: NotDetec (11-08-21 @ 08:58)      RADIOLOGY & ADDITIONAL TESTS:    Care Discussed with Consultants/Other Providers: discussed with Dr. Hurtado

## 2021-11-18 LAB
ALBUMIN SERPL ELPH-MCNC: 3 G/DL — LOW (ref 3.3–5)
ALP SERPL-CCNC: 52 U/L — SIGNIFICANT CHANGE UP (ref 40–120)
ALT FLD-CCNC: 17 U/L — SIGNIFICANT CHANGE UP (ref 10–45)
ANION GAP SERPL CALC-SCNC: 5 MMOL/L — SIGNIFICANT CHANGE UP (ref 5–17)
AST SERPL-CCNC: 15 U/L — SIGNIFICANT CHANGE UP (ref 10–40)
BILIRUB SERPL-MCNC: 0.3 MG/DL — SIGNIFICANT CHANGE UP (ref 0.2–1.2)
BUN SERPL-MCNC: 34 MG/DL — HIGH (ref 7–23)
CALCIUM SERPL-MCNC: 9.2 MG/DL — SIGNIFICANT CHANGE UP (ref 8.4–10.5)
CHLORIDE SERPL-SCNC: 105 MMOL/L — SIGNIFICANT CHANGE UP (ref 96–108)
CO2 SERPL-SCNC: 28 MMOL/L — SIGNIFICANT CHANGE UP (ref 22–31)
CREAT SERPL-MCNC: 0.9 MG/DL — SIGNIFICANT CHANGE UP (ref 0.5–1.3)
GLUCOSE BLDC GLUCOMTR-MCNC: 136 MG/DL — HIGH (ref 70–99)
GLUCOSE BLDC GLUCOMTR-MCNC: 206 MG/DL — HIGH (ref 70–99)
GLUCOSE BLDC GLUCOMTR-MCNC: 214 MG/DL — HIGH (ref 70–99)
GLUCOSE BLDC GLUCOMTR-MCNC: 220 MG/DL — HIGH (ref 70–99)
GLUCOSE SERPL-MCNC: 234 MG/DL — HIGH (ref 70–99)
HCT VFR BLD CALC: 37.5 % — SIGNIFICANT CHANGE UP (ref 34.5–45)
HGB BLD-MCNC: 11.7 G/DL — SIGNIFICANT CHANGE UP (ref 11.5–15.5)
MCHC RBC-ENTMCNC: 24.2 PG — LOW (ref 27–34)
MCHC RBC-ENTMCNC: 31.2 GM/DL — LOW (ref 32–36)
MCV RBC AUTO: 77.6 FL — LOW (ref 80–100)
NRBC # BLD: 0 /100 WBCS — SIGNIFICANT CHANGE UP (ref 0–0)
PLATELET # BLD AUTO: 261 K/UL — SIGNIFICANT CHANGE UP (ref 150–400)
POTASSIUM SERPL-MCNC: 4.7 MMOL/L — SIGNIFICANT CHANGE UP (ref 3.5–5.3)
POTASSIUM SERPL-SCNC: 4.7 MMOL/L — SIGNIFICANT CHANGE UP (ref 3.5–5.3)
PROT SERPL-MCNC: 6.2 G/DL — SIGNIFICANT CHANGE UP (ref 6–8.3)
RBC # BLD: 4.83 M/UL — SIGNIFICANT CHANGE UP (ref 3.8–5.2)
RBC # FLD: 20 % — HIGH (ref 10.3–14.5)
SARS-COV-2 RNA SPEC QL NAA+PROBE: SIGNIFICANT CHANGE UP
SODIUM SERPL-SCNC: 138 MMOL/L — SIGNIFICANT CHANGE UP (ref 135–145)
WBC # BLD: 8.76 K/UL — SIGNIFICANT CHANGE UP (ref 3.8–10.5)
WBC # FLD AUTO: 8.76 K/UL — SIGNIFICANT CHANGE UP (ref 3.8–10.5)

## 2021-11-18 PROCEDURE — 99232 SBSQ HOSP IP/OBS MODERATE 35: CPT

## 2021-11-18 PROCEDURE — 99233 SBSQ HOSP IP/OBS HIGH 50: CPT

## 2021-11-18 RX ORDER — INSULIN GLARGINE 100 [IU]/ML
32 INJECTION, SOLUTION SUBCUTANEOUS AT BEDTIME
Refills: 0 | Status: DISCONTINUED | OUTPATIENT
Start: 2021-11-18 | End: 2021-11-25

## 2021-11-18 RX ADMIN — HEPARIN SODIUM 5000 UNIT(S): 5000 INJECTION INTRAVENOUS; SUBCUTANEOUS at 22:10

## 2021-11-18 RX ADMIN — Medication 10 MILLIGRAM(S): at 17:35

## 2021-11-18 RX ADMIN — Medication 12 UNIT(S): at 11:47

## 2021-11-18 RX ADMIN — Medication 12 UNIT(S): at 08:03

## 2021-11-18 RX ADMIN — DONEPEZIL HYDROCHLORIDE 5 MILLIGRAM(S): 10 TABLET, FILM COATED ORAL at 22:10

## 2021-11-18 RX ADMIN — PREGABALIN 1000 MICROGRAM(S): 225 CAPSULE ORAL at 11:46

## 2021-11-18 RX ADMIN — Medication 2: at 11:47

## 2021-11-18 RX ADMIN — SERTRALINE 100 MILLIGRAM(S): 25 TABLET, FILM COATED ORAL at 14:00

## 2021-11-18 RX ADMIN — MEMANTINE HYDROCHLORIDE 5 MILLIGRAM(S): 10 TABLET ORAL at 06:13

## 2021-11-18 RX ADMIN — MEMANTINE HYDROCHLORIDE 5 MILLIGRAM(S): 10 TABLET ORAL at 17:33

## 2021-11-18 RX ADMIN — Medication 2: at 08:03

## 2021-11-18 RX ADMIN — ZINC OXIDE 1 APPLICATION(S): 200 OINTMENT TOPICAL at 17:33

## 2021-11-18 RX ADMIN — LISINOPRIL 40 MILLIGRAM(S): 2.5 TABLET ORAL at 06:13

## 2021-11-18 RX ADMIN — Medication 10 MILLIGRAM(S): at 06:13

## 2021-11-18 RX ADMIN — Medication 81 MILLIGRAM(S): at 11:46

## 2021-11-18 RX ADMIN — Medication 145 MILLIGRAM(S): at 11:46

## 2021-11-18 RX ADMIN — Medication 30 MILLIGRAM(S): at 06:13

## 2021-11-18 RX ADMIN — INSULIN GLARGINE 32 UNIT(S): 100 INJECTION, SOLUTION SUBCUTANEOUS at 22:09

## 2021-11-18 RX ADMIN — FAMOTIDINE 20 MILLIGRAM(S): 10 INJECTION INTRAVENOUS at 11:46

## 2021-11-18 RX ADMIN — HEPARIN SODIUM 5000 UNIT(S): 5000 INJECTION INTRAVENOUS; SUBCUTANEOUS at 13:59

## 2021-11-18 RX ADMIN — HEPARIN SODIUM 5000 UNIT(S): 5000 INJECTION INTRAVENOUS; SUBCUTANEOUS at 06:13

## 2021-11-18 RX ADMIN — Medication 2: at 16:19

## 2021-11-18 RX ADMIN — ZINC OXIDE 1 APPLICATION(S): 200 OINTMENT TOPICAL at 06:14

## 2021-11-18 RX ADMIN — Medication 12 UNIT(S): at 16:19

## 2021-11-18 RX ADMIN — Medication 250 MILLIGRAM(S): at 06:13

## 2021-11-18 RX ADMIN — Medication 250 MILLIGRAM(S): at 17:33

## 2021-11-18 RX ADMIN — ATORVASTATIN CALCIUM 80 MILLIGRAM(S): 80 TABLET, FILM COATED ORAL at 22:10

## 2021-11-18 NOTE — CHART NOTE - NSCHARTNOTEFT_GEN_A_CORE
Nutrition Follow Up Note  Hospital Course (Per Electronic Medical Record): 76 Female with a h/o CVA (no prior deficit), HTN, HLD, IDDM, Dementia, MDD admitted to [facility] on 11/1 for Subacute CVA L Basal Ganglia, Mod-Severe Stenosis Prox R PCA, HTN Urgency, AMS, abnormal speech, and Hypoglycemia.  In ED,  on arrival with some improvement with medications but remains hypertensive.  LKWT 3 days prior to presentation, NIHSS 2, Out of window for TPA. Hospital course significant for UTI- treated with ABT. Patient now with gait Instability, ADL impairments and Functional impairments.    Source: Medical Record [X] Patient [X] Family [ ]         Diet: Easy to chew, consistent carbohydrate (no snacks), DASH/TLC  Pt tolerating diet with good PO intake, typically eating 75-85% of meals per nursing flow sheets. Observed with outside food by bedside. Explained consistent carbohydrate diet + encouraged compliance to promote good glycemic control. Denies nausea, vomiting, diarrhea, constipation. Pt denies chewing/swallowing difficulties on current diet.    Enteral/Parenteral Nutrition: N/A    Current Weight: 147.9 lbs (11/17)  147.9 lbs (11/14)  147.4 lbs (11/11)      Pertinent Medications: MEDICATIONS  (STANDING):  aspirin enteric coated 81 milliGRAM(s) Oral daily  atorvastatin 80 milliGRAM(s) Oral at bedtime  cyanocobalamin 1000 MICROGram(s) Oral daily  dextrose 40% Gel 15 Gram(s) Oral once  dextrose 5%. 1000 milliLiter(s) (50 mL/Hr) IV Continuous <Continuous>  dextrose 5%. 1000 milliLiter(s) (100 mL/Hr) IV Continuous <Continuous>  dextrose 50% Injectable 25 Gram(s) IV Push once  dextrose 50% Injectable 12.5 Gram(s) IV Push once  dextrose 50% Injectable 25 Gram(s) IV Push once  donepezil 5 milliGRAM(s) Oral at bedtime  famotidine    Tablet 20 milliGRAM(s) Oral daily  fenofibrate Tablet 145 milliGRAM(s) Oral daily  glucagon  Injectable 1 milliGRAM(s) IntraMuscular once  heparin   Injectable 5000 Unit(s) SubCutaneous every 8 hours  hydrALAZINE 10 milliGRAM(s) Oral two times a day  influenza  Vaccine (HIGH DOSE) 0.7 milliLiter(s) IntraMuscular once  insulin glargine Injectable (LANTUS) 32 Unit(s) SubCutaneous at bedtime  insulin lispro (ADMELOG) corrective regimen sliding scale   SubCutaneous three times a day before meals  insulin lispro (ADMELOG) corrective regimen sliding scale   SubCutaneous at bedtime  insulin lispro Injectable (ADMELOG) 12 Unit(s) SubCutaneous three times a day before meals  lisinopril 40 milliGRAM(s) Oral daily  memantine 5 milliGRAM(s) Oral two times a day  NIFEdipine XL 30 milliGRAM(s) Oral daily  saccharomyces boulardii 250 milliGRAM(s) Oral two times a day  sertraline 100 milliGRAM(s) Oral daily  zinc oxide 40% Ointment 1 Application(s) Topical two times a day    MEDICATIONS  (PRN):  acetaminophen     Tablet .. 650 milliGRAM(s) Oral every 6 hours PRN Temp greater or equal to 38C (100.4F), Mild Pain (1 - 3)  aluminum hydroxide/magnesium hydroxide/simethicone Suspension 30 milliLiter(s) Oral every 4 hours PRN Dyspepsia  melatonin 3 milliGRAM(s) Oral at bedtime PRN Insomnia      Pertinent Labs:  11-18 Na138 mmol/L Glu 234 mg/dL<H> K+ 4.7 mmol/L Cr  0.90 mg/dL BUN 34 mg/dL<H> 11-18 Alb 3.0 g/dL<L> 11-02 Chol 248 mg/dL<H> LDL --    HDL 53 mg/dL Trig 151 mg/dL<H>  POCT glucose x 4: 206 (H), 214 (H), 220 (H), 176 (H)      Skin: incontinence associated dermatitis per nursing flow sheets.     Edema: No edema per nursing flow sheets     Last BM: on 11/18 per nursing flow sheets    Estimated Needs:   [X] No Change since Previous Assessment  [ ] Recalculated:     Previous Nutrition Diagnosis:   Altered nutrition related lab values    Nutrition Diagnosis is [X] Ongoing    [ ] Resolved   [ ] Not Applicable      New Nutrition Diagnosis: [X] Not Applicable  [ ] Inadequate Protein Energy Intake   [ ] Inadequate Oral Intake   [ ] Excessive Energy Intake   [ ] Increased Nutrient Needs   [ ] Obesity   [ ] Altered GI Function   [ ] Unintended Weight Loss   [ ] Food & Nutrition Related Knowledge Deficit  [ ] Limited Adherence to nutrition related recommendations   [ ] Malnutrition      Interventions:   1. Recommend continuing with current diet  2. Pt provided with nutrition education on current diet  3. follow SLP recommendations.    Monitoring & Evaluation:   [X] Weights   [X] PO Intake   [X] Follow Up (Per Protocol)  [X] Tolerance to Diet Prescription   [X] Other: Labs    RD Remains Available.  Jeannine Marinelli RD

## 2021-11-18 NOTE — PROGRESS NOTE ADULT - SUBJECTIVE AND OBJECTIVE BOX
Patient is a 76y old  Female who presents with a chief complaint of left basal ganglia CVA with very mild right lower extremity weakness (17 Nov 2021 11:49)    Patient seen and examined at bedside. No events overnight. Patient has no acute complaints at this time- denies headaches, changes in vision, chest pain, SOB.     ALLERGIES:  No Known Allergies    MEDICATIONS  (STANDING):  aspirin enteric coated 81 milliGRAM(s) Oral daily  atorvastatin 80 milliGRAM(s) Oral at bedtime  cyanocobalamin 1000 MICROGram(s) Oral daily  dextrose 40% Gel 15 Gram(s) Oral once  dextrose 5%. 1000 milliLiter(s) (50 mL/Hr) IV Continuous <Continuous>  dextrose 5%. 1000 milliLiter(s) (100 mL/Hr) IV Continuous <Continuous>  dextrose 50% Injectable 25 Gram(s) IV Push once  dextrose 50% Injectable 12.5 Gram(s) IV Push once  dextrose 50% Injectable 25 Gram(s) IV Push once  donepezil 5 milliGRAM(s) Oral at bedtime  famotidine    Tablet 20 milliGRAM(s) Oral daily  fenofibrate Tablet 145 milliGRAM(s) Oral daily  glucagon  Injectable 1 milliGRAM(s) IntraMuscular once  heparin   Injectable 5000 Unit(s) SubCutaneous every 8 hours  hydrALAZINE 10 milliGRAM(s) Oral two times a day  influenza  Vaccine (HIGH DOSE) 0.7 milliLiter(s) IntraMuscular once  insulin glargine Injectable (LANTUS) 32 Unit(s) SubCutaneous at bedtime  insulin lispro (ADMELOG) corrective regimen sliding scale   SubCutaneous three times a day before meals  insulin lispro (ADMELOG) corrective regimen sliding scale   SubCutaneous at bedtime  insulin lispro Injectable (ADMELOG) 12 Unit(s) SubCutaneous three times a day before meals  lisinopril 40 milliGRAM(s) Oral daily  memantine 5 milliGRAM(s) Oral two times a day  NIFEdipine XL 30 milliGRAM(s) Oral daily  saccharomyces boulardii 250 milliGRAM(s) Oral two times a day  sertraline 100 milliGRAM(s) Oral daily  zinc oxide 40% Ointment 1 Application(s) Topical two times a day    MEDICATIONS  (PRN):  acetaminophen     Tablet .. 650 milliGRAM(s) Oral every 6 hours PRN Temp greater or equal to 38C (100.4F), Mild Pain (1 - 3)  aluminum hydroxide/magnesium hydroxide/simethicone Suspension 30 milliLiter(s) Oral every 4 hours PRN Dyspepsia  melatonin 3 milliGRAM(s) Oral at bedtime PRN Insomnia    Vital Signs Last 24 Hrs  T(F): 98.1 (17 Nov 2021 21:39), Max: 98.1 (17 Nov 2021 21:39)  HR: 60 (18 Nov 2021 06:10) (60 - 68)  BP: 133/69 (18 Nov 2021 06:10) (125/70 - 133/69)  RR: 15 (18 Nov 2021 06:10) (14 - 15)  SpO2: 97% (18 Nov 2021 06:10) (96% - 97%)  I&O's Summary    17 Nov 2021 07:01  -  18 Nov 2021 07:00  --------------------------------------------------------  IN: 0 mL / OUT: 250 mL / NET: -250 mL      PHYSICAL EXAM:  GENERAL: NAD, well-groomed, sitting in chair comfortably having breakfast  HEAD:  Atraumatic, Normocephalic  EYES:  PERRLA, conjunctiva and sclera clear  ENMT: No tonsillar erythema, exudates, or enlargement; Moist mucous membranes  NECK: Supple, No JVD, Normal thyroid  CHEST/LUNG: Clear to percussion bilaterally; No rales, rhonchi, wheezing, or rubs  HEART: Regular rate and rhythm; No murmurs, rubs, or gallops  ABDOMEN: Soft, Nontender, Nondistended; Bowel sounds present  EXTREMITIES:  2+ Peripheral Pulses, No clubbing, cyanosis, or edema      LABS:                        11.7   8.76  )-----------( 261      ( 18 Nov 2021 05:52 )             37.5     11-18    138  |  105  |  34  ----------------------------<  234  4.7   |  28  |  0.90    Ca    9.2      18 Nov 2021 05:52    TPro  6.2  /  Alb  3.0  /  TBili  0.3  /  DBili  x   /  AST  15  /  ALT  17  /  AlkPhos  52  11-18    eGFR if Non African American: 62 mL/min/1.73M2 (11-18-21 @ 05:52)  eGFR if African American: 72 mL/min/1.73M2 (11-18-21 @ 05:52)            11-02 Chol 248 mg/dL LDL -- HDL 53 mg/dL Trig 151 mg/dL              POCT Blood Glucose.: 220 mg/dL (18 Nov 2021 08:01)  POCT Blood Glucose.: 176 mg/dL (17 Nov 2021 21:45)  POCT Blood Glucose.: 131 mg/dL (17 Nov 2021 16:28)  POCT Blood Glucose.: 249 mg/dL (17 Nov 2021 12:10)          COVID-19 PCR: NotDetec (11-11-21 @ 17:00)  COVID-19 PCR: NotDetec (11-08-21 @ 08:58)      RADIOLOGY & ADDITIONAL TESTS:    Care Discussed with Consultants/Other Providers: discussed with Dr. Hurtado   Patient is a 76y old  Female who presents with a chief complaint of left basal ganglia CVA with very mild right lower extremity weakness (17 Nov 2021 11:49)    Patient seen and examined at bedside. No events overnight. Patient has no acute complaints at this time- denies headaches, changes in vision, chest pain, SOB.     ALLERGIES:  No Known Allergies    MEDICATIONS  (STANDING):  aspirin enteric coated 81 milliGRAM(s) Oral daily  atorvastatin 80 milliGRAM(s) Oral at bedtime  cyanocobalamin 1000 MICROGram(s) Oral daily  dextrose 40% Gel 15 Gram(s) Oral once  dextrose 5%. 1000 milliLiter(s) (50 mL/Hr) IV Continuous <Continuous>  dextrose 5%. 1000 milliLiter(s) (100 mL/Hr) IV Continuous <Continuous>  dextrose 50% Injectable 25 Gram(s) IV Push once  dextrose 50% Injectable 12.5 Gram(s) IV Push once  dextrose 50% Injectable 25 Gram(s) IV Push once  donepezil 5 milliGRAM(s) Oral at bedtime  famotidine    Tablet 20 milliGRAM(s) Oral daily  fenofibrate Tablet 145 milliGRAM(s) Oral daily  glucagon  Injectable 1 milliGRAM(s) IntraMuscular once  heparin   Injectable 5000 Unit(s) SubCutaneous every 8 hours  hydrALAZINE 10 milliGRAM(s) Oral two times a day  influenza  Vaccine (HIGH DOSE) 0.7 milliLiter(s) IntraMuscular once  insulin glargine Injectable (LANTUS) 32 Unit(s) SubCutaneous at bedtime  insulin lispro (ADMELOG) corrective regimen sliding scale   SubCutaneous three times a day before meals  insulin lispro (ADMELOG) corrective regimen sliding scale   SubCutaneous at bedtime  insulin lispro Injectable (ADMELOG) 12 Unit(s) SubCutaneous three times a day before meals  lisinopril 40 milliGRAM(s) Oral daily  memantine 5 milliGRAM(s) Oral two times a day  NIFEdipine XL 30 milliGRAM(s) Oral daily  saccharomyces boulardii 250 milliGRAM(s) Oral two times a day  sertraline 100 milliGRAM(s) Oral daily  zinc oxide 40% Ointment 1 Application(s) Topical two times a day    MEDICATIONS  (PRN):  acetaminophen     Tablet .. 650 milliGRAM(s) Oral every 6 hours PRN Temp greater or equal to 38C (100.4F), Mild Pain (1 - 3)  aluminum hydroxide/magnesium hydroxide/simethicone Suspension 30 milliLiter(s) Oral every 4 hours PRN Dyspepsia  melatonin 3 milliGRAM(s) Oral at bedtime PRN Insomnia    Vital Signs Last 24 Hrs  T(F): 98.1 (17 Nov 2021 21:39), Max: 98.1 (17 Nov 2021 21:39)  HR: 60 (18 Nov 2021 06:10) (60 - 68)  BP: 133/69 (18 Nov 2021 06:10) (125/70 - 133/69)  RR: 15 (18 Nov 2021 06:10) (14 - 15)  SpO2: 97% (18 Nov 2021 06:10) (96% - 97%)  I&O's Summary    17 Nov 2021 07:01  -  18 Nov 2021 07:00  --------------------------------------------------------  IN: 0 mL / OUT: 250 mL / NET: -250 mL      PHYSICAL EXAM:  GENERAL: NAD, well-groomed, sitting in chair comfortably having breakfast  HEAD:  Atraumatic, Normocephalic  EYES:  PERRLA, conjunctiva and sclera clear  ENMT: No tonsillar erythema, exudates, or enlargement; Moist mucous membranes  NECK: Supple, No JVD, Normal thyroid  CHEST/LUNG: Clear to percussion bilaterally; No rales, rhonchi, wheezing, or rubs  HEART: Regular rate and rhythm; No murmurs, rubs, or gallops  ABDOMEN: Soft, Nontender, Nondistended; Bowel sounds present  EXTREMITIES:  2+ Peripheral Pulses, No clubbing, cyanosis, or edema      LABS:                        11.7   8.76  )-----------( 261      ( 18 Nov 2021 05:52 )             37.5     11-18    138  |  105  |  34  ----------------------------<  234  4.7   |  28  |  0.90    Ca    9.2      18 Nov 2021 05:52    TPro  6.2  /  Alb  3.0  /  TBili  0.3  /  DBili  x   /  AST  15  /  ALT  17  /  AlkPhos  52  11-18    eGFR if Non African American: 62 mL/min/1.73M2 (11-18-21 @ 05:52)  eGFR if African American: 72 mL/min/1.73M2 (11-18-21 @ 05:52)            11-02 Chol 248 mg/dL LDL -- HDL 53 mg/dL Trig 151 mg/dL              POCT Blood Glucose.: 220 mg/dL (18 Nov 2021 08:01)  POCT Blood Glucose.: 176 mg/dL (17 Nov 2021 21:45)  POCT Blood Glucose.: 131 mg/dL (17 Nov 2021 16:28)  POCT Blood Glucose.: 249 mg/dL (17 Nov 2021 12:10)          COVID-19 PCR: NotDetec (11-11-21 @ 17:00)  COVID-19 PCR: NotDetec (11-08-21 @ 08:58)      RADIOLOGY & ADDITIONAL TESTS: reviewed all labs personally    Care Discussed with Consultants/Other Providers: discussed with Dr. Hurtado

## 2021-11-18 NOTE — PROGRESS NOTE ADULT - ASSESSMENT
ASSESSMENT/PLAN  This is a 76 Female with a h/o CVA (no prior deficit), HTN, HLD, IDDM, Dementia, MDD admitted to [facility] on 11/1 for Subacute CVA L Basal Ganglia, Mod-Severe Stenosis Prox R PCA, HTN Urgency, AMS, abnormal speech, and Hypoglycemia.  In ED,  on arrival with some improvement with medications but remains hypertensive.  LKWT 3 days prior to presentation, NIHSS 2, Out of window for TPA. Hospital course significant for UTI- treated with ABT. Patient now with gait Instability, ADL impairments and Functional impairments.    # CVA  - Subacute CVA L Basal Ganglia  - Mod-Severe Stenosis Prox R PCA  - Start Comprehensive Rehab Program: PT/OT/ST- total of 3 hrs/day 5 days/week   - c/w ASA, high dose statin: lipitor    #NPH  - OP f/u with NSGY    #HTN  --SBP goal 120-140s  --D/c Metoprolol 11/16  - Procardia XL  - Lisinopril 40 mg daily  - hydralazine 10 mg BID  -  #HLD  - on Lipitor  - Fenofibrate 145mg daily    #DM II with hyperglycemia  - ISS and FS  - Lantus and Admelog  -Management as per hospitalist    #Depression  - Sertraline 100mg daily    #Pain management  - Tylenol PRN    #DVT ppx  - Heparin, SCD, TEDs    #Dementia  - Donepezil 5mg daily  - Memantine 5mg BID    #GI ppx  - Protonix  - maalox for dyspepsia    #Bowel Regimen  - Senna, miralax PRN    #Bladder management  -voiding with low PVRs      #Dysphagia    - SLP: evaluation and treatment  - s/p MBS: easy chew m thin liquid per speech. 100 % supervision for aspiration precaution    #Precaution  - Fall, Aspiration, Seizure      #Skin:  - No active issues at this time  - Desitin to buttocks for IAD  - Pressure injury/Skin: Turn Q2hrs while in bed, OOB to Chair, PT/OT       #Sleep:  - Maintain quiet hours and low stim environment.  -melatonin PRN  - Monitor sleep logs    #IDT 11/16:  - SW: lives in  3STE with  and daughter.  and 2 granddaughters assisted with ADLs. Owns WC, RW, rollator.  - Barriers: retropulsion, coordination, poor balance  - Eating SV, Grooming Kaley, UBD Kaley, LBD maxA, bathing maxA, toilet transfer modA, shower transfer totalA, transfers Kaley, ambulation 62' with RW Mod A and WC follow. Needs assist moving RLE forward and preventing hyperextension of knee, 4 steps with 2 HR maxA.  - On easy to chew diet, modA cognition, decreased attention. poor problem solving, decreased initiation,  mild-mod receptive deficits, mod dysarthria  - Goals: shower transfers with Kaley o/w SC with ADLs, Kaley with stairs and CG transfers, SV with ambulation, SV cognition  - Will need family training  - EDOD 12/4 home  - , Emmanuel, updated 11/16.     Outpatient Follow-up (Specialty/Name of physician):    Huan Oquendo; PhD)  Neurology; Vascular Neurology  [address] [facility], [address]  [place] [address]  Phone: [phone]  Fax: [phone]    Alexander Arevalo (MD)  Neurology; Vascular Neurology  [address] [place] [address], [address] [place] [address]  Phone: [phone]  Fax: [phone] ASSESSMENT/PLAN  This is a 76 Female with a h/o CVA (no prior deficit), HTN, HLD, IDDM, Dementia, MDD admitted to [facility] on 11/1 for Subacute CVA L Basal Ganglia, Mod-Severe Stenosis Prox R PCA, HTN Urgency, AMS, abnormal speech, and Hypoglycemia.  In ED,  on arrival with some improvement with medications but remains hypertensive.  LKWT 3 days prior to presentation, NIHSS 2, Out of window for TPA. Hospital course significant for UTI- treated with ABT. Patient now with gait Instability, ADL impairments and Functional impairments.    # CVA  - Subacute CVA L Basal Ganglia  - Mod-Severe Stenosis Prox R PCA  - cont Comprehensive Rehab Program: PT/OT/ST- total of 3 hrs/day 5 days/week   - c/w ASA, high dose statin: lipitor    #NPH  - OP f/u with NSGY    #HTN  --SBP goal 120-140s  -stopped Metoprolol 11/16  - Procardia XL  - Lisinopril 40 mg daily  - hydralazine 10 mg BID  -  #HLD  - on Lipitor  - Fenofibrate 145mg daily    #DM II with hyperglycemia  - ISS and FS  - Lantus and Admelog  -Management as per hospitalist    #Depression  - Sertraline 100mg daily    #Pain management  - Tylenol PRN    #DVT ppx  - Heparin, SCD, TEDs    #Dementia  - Donepezil 5mg daily  - Memantine 5mg BID    #GI ppx  - Protonix  - maalox for dyspepsia    #Bowel Regimen  - Senna, miralax PRN    #Bladder management  -voiding with low PVRs      #Dysphagia    - SLP: evaluation and treatment  - s/p MBS: easy chew m thin liquid per speech. 100 % supervision for aspiration precaution    #Precaution  - Fall, Aspiration, Seizure      #Skin:  - No active issues at this time  - Desitin to buttocks for IAD  - Pressure injury/Skin: Turn Q2hrs while in bed, OOB to Chair, PT/OT       #Sleep:  - Maintain quiet hours and low stim environment.  -melatonin PRN  - Monitor sleep logs    #IDT 11/16:  - SW: lives in  3STE with  and daughter.  and 2 granddaughters assisted with ADLs. Owns WC, RW, rollator.  - Barriers: retropulsion, coordination, poor balance  - Eating SV, Grooming Kaley, UBD Kaley, LBD maxA, bathing maxA, toilet transfer modA, shower transfer totalA, transfers Kaley, ambulation 62' with RW Mod A and WC follow. Needs assist moving RLE forward and preventing hyperextension of knee, 4 steps with 2 HR maxA.  - On easy to chew diet, modA cognition, decreased attention. poor problem solving, decreased initiation,  mild-mod receptive deficits, mod dysarthria  - Goals: shower transfers with Kaley o/w SC with ADLs, Kaley with stairs and CG transfers, SV with ambulation, SV cognition  - Will need family training  - EDOD 12/4 home  - , Emmanuel, updated 11/16.     Outpatient Follow-up (Specialty/Name of physician):    Huan Oquendo; PhD)  Neurology; Vascular Neurology  [address] [facility], [address]  [place] [address]  Phone: [phone]  Fax: [phone]    Alexander Arevalo (MD)  Neurology; Vascular Neurology  [address] [facility], [address] [place] [address]  Phone: [phone]  Fax: [phone]

## 2021-11-18 NOTE — PROGRESS NOTE ADULT - ATTENDING COMMENTS
Pt. seen with resident.  Agree with documentation above as per resident with amendments made as appropriate. Patient medically stable. Making progress towards rehab goals.       Left basal ganglia infarct  Stable.  BP stable--cont. current medications

## 2021-11-18 NOTE — PROGRESS NOTE ADULT - ASSESSMENT
his is a 76 Female with a h/o CVA (no prior deficit), HTN, HLD, IDDM, Dementia, MDD admitted to [facility] on 11/1 for Subacute CVA L Basal Ganglia, Mod-Severe Stenosis Prox R PCA, HTN Urgency, AMS, abnormal speech, and Hypoglycemia.  In ED,  on arrival with some improvement with medications but remains hypertensive.  LKWT 3 days prior to presentation, NIHSS 2, Out of window for TPA. Hospital course significant for UTI- treated with ABT. Patient now with gait Instability, ADL impairments and Functional impairments.    # CVA  - Subacute CVA L Basal Ganglia  - Mod-Severe Stenosis Prox R PCA  - continue Comprehensive Rehab Program: PT/OT/ST   - c/a ASA 81mg daily, lipitor 80mg qhs    #NPH  - OP f/u with NSGY    #HTN  - Procardia XL 30mg daily  - Lisinopril 40 mg daily  - hydralazine 10 mg BID  - discontinued Toprol 25mg daily due to bradycardia- can consider restarting low dose at 12.5mg daily if HR can tolerate    #HLD  - on Lipitor  - Fenofibrate 145mg daily    #DM II kristen hyperglycemia, uncontrolled   - A1c 11/3 was 7.6  - ISS and FS  - cotninue lantus to 30units qhs and Admelog to 12 units premeal   - diabetic diet    #Depression  - Sertraline 100mg daily    #Dementia  - Donepezil 5mg daily  - Memantine 5mg daily    #UTI proteus miribalis   - S/p augmentin  - Will monitor F/WBC count    #DVT ppx  - Heparin   76 Female with a h/o CVA (no prior deficit), HTN, HLD, IDDM, Dementia, MDD admitted to [facility] on 11/1 for Subacute CVA L Basal Ganglia, Mod-Severe Stenosis Prox R PCA, HTN Urgency, AMS, abnormal speech, and Hypoglycemia.  In ED,  on arrival with some improvement with medications but remains hypertensive.  LKWT 3 days prior to presentation, NIHSS 2, Out of window for TPA. Hospital course significant for UTI- treated with ABT. Patient now with gait Instability, ADL impairments and Functional impairments.    # CVA  - Subacute CVA L Basal Ganglia  - Mod-Severe Stenosis Prox R PCA  - continue Comprehensive Rehab Program: PT/OT/ST   - c/a ASA 81mg daily, lipitor 80mg qhs    #NPH  - OP f/u with NSGY    #HTN  - Procardia XL 30mg daily  - Lisinopril 40 mg daily  - hydralazine 10 mg BID  - discontinued Toprol 25mg daily 11/17 due to bradycardia- can consider restarting low dose at 12.5mg daily if HR can tolerate    #HLD  - on Lipitor  - Fenofibrate 145mg daily    #DM II kristen hyperglycemia, uncontrolled   - A1c 11/3 was 7.6  - ISS and FS  - increase lantus to 32units qhs and continue Admelog 12 units premeal   - diabetic diet    #Depression  - Sertraline 100mg daily    #Dementia  - Donepezil 5mg daily  - Memantine 5mg daily    #UTI proteus miribalis   - S/p augmentin  - Will monitor F/WBC count    #DVT ppx  - Heparin

## 2021-11-18 NOTE — PROGRESS NOTE ADULT - SUBJECTIVE AND OBJECTIVE BOX
HPI:  This is a 76 Female with a h/o CVA (no prior deficit), HTN, HLD, IDDM, Dementia, MDD admitted to [facility] on 11/1 for Subacute CVA L Basal Ganglia infarct,  CTA showedMod-Severe Stenosis Prox R PCA.  Pt. with HTN Urgency, and Hypoglycemia. Patient followed by neurology OP. In ED,  on arrival with some improvement with medications but remains hypertensive. Patient noted to have AMS with abnormal speech by daughter. LKWT 3 days prior to presentation. NIHSS 2. Out of window for TPA.  MRI brain showed--  left posterior limb IC/CR stroke, left frontal and right splenium acute strokes,    Per family, patient was followed by neurosurgery for evaluation of possible NPH with LP as outpatient. Neurosurgery recommending outpatient follow up after rehab. Pt also diagnosed with possible UTI on admission and treated with IV antibiotics, urine cx positive for proteus, ID consulted. HTN meds adjusted, bradycardic metoprolol dose decreased, hypoglycemia improved started low dose Lantus, high BP meds adjusted added hydralazine.  MBS performed and recommended easy to chew with thin liquids, with 100% supervision.   Patient was evaluated by PM&R and therapy for functional deficits, gait/ADL impairments and acute rehabilitation was recommended. Patient was medically optimized for discharge to [facility] IRU on 11/11/21.     (11 Nov 2021 13:54)      PAST MEDICAL & SURGICAL HISTORY:  Rheumatoid arthritis    Diabetes mellitus, type 2    Hypertension    HLD (hyperlipidemia)    Dementia    History of CVA (cerebrovascular accident)    Major depression    Anxiety    UTI (urinary tract infection)    History of dental surgery        Subjective: patient seen and assessed at bedside. No acute events overnight. Patient feeling well, slept well overnight. Denies HA, pain, discomfort.      Vital Signs Last 24 Hrs  T(C): 36.7 (18 Nov 2021 10:33), Max: 36.7 (17 Nov 2021 21:39)  T(F): 98.1 (18 Nov 2021 10:33), Max: 98.1 (17 Nov 2021 21:39)  HR: 55 (18 Nov 2021 10:33) (55 - 68)  BP: 145/66 (18 Nov 2021 10:33) (125/70 - 145/66)  BP(mean): --  RR: 14 (18 Nov 2021 10:33) (14 - 15)  SpO2: 97% (18 Nov 2021 10:33) (96% - 97%)    REVIEW OF SYMPTOMS  Denies pain, discomfort, headache.   Denies CP/dyspnea  Denies palpitations  Denies abdominal pain     Physical Exam:  Gen - NAD, Comfortable  Pulm - CTAB,   Cardiovascular - RRR, S1S2, No m/r/g  Abdomen - Soft, NT/ND, +BS  Extremities - No C/C/E, No calf tenderness  Neuro-     Cognitive - [facility] self[facility] --Needs cue for year     Communication - +dysarthria, hypophonic, delay processing     Attention: impaired-- delayed processing, distractable     CN: visual fields intact, No facial weakness        Motor -                    LEFT    UE - ShAB 5/5, EF 5/5, EE 5/5, WE 5/5,  5/5                    RIGHT UE - ShAB 5/5, EF 5/5, EE 5/5, WE 5/5,  5/5                    LEFT    LE - HF 5/5, KE 5/5, DF 5/5, PF 5/5                    RIGHT LE - HF 4/5, KE 5-/5, DF 5/5, PF 5/5        Sensory - Intact to LT     Coordination - FTN intact     Tone - normal  Psychiatric - Mood stable, Affect WNL      RECENT LABS                        11.7   8.76  )-----------( 261      ( 18 Nov 2021 05:52 )             37.5     11-18    138  |  105  |  34<H>  ----------------------------<  234<H>  4.7   |  28  |  0.90    Ca    9.2      18 Nov 2021 05:52    TPro  6.2  /  Alb  3.0<L>  /  TBili  0.3  /  DBili  x   /  AST  15  /  ALT  17  /  AlkPhos  52  11-18        CAPILLARY BLOOD GLUCOSE      POCT Blood Glucose.: 220 mg/dL (18 Nov 2021 08:01)  POCT Blood Glucose.: 176 mg/dL (17 Nov 2021 21:45)  POCT Blood Glucose.: 131 mg/dL (17 Nov 2021 16:28)  POCT Blood Glucose.: 249 mg/dL (17 Nov 2021 12:10)                        RADIOLOGY/OTHER RESULTS      MEDICATIONS  (STANDING):  aspirin enteric coated 81 milliGRAM(s) Oral daily  atorvastatin 80 milliGRAM(s) Oral at bedtime  cyanocobalamin 1000 MICROGram(s) Oral daily  dextrose 40% Gel 15 Gram(s) Oral once  dextrose 5%. 1000 milliLiter(s) (50 mL/Hr) IV Continuous <Continuous>  dextrose 5%. 1000 milliLiter(s) (100 mL/Hr) IV Continuous <Continuous>  dextrose 50% Injectable 25 Gram(s) IV Push once  dextrose 50% Injectable 12.5 Gram(s) IV Push once  dextrose 50% Injectable 25 Gram(s) IV Push once  donepezil 5 milliGRAM(s) Oral at bedtime  famotidine    Tablet 20 milliGRAM(s) Oral daily  fenofibrate Tablet 145 milliGRAM(s) Oral daily  glucagon  Injectable 1 milliGRAM(s) IntraMuscular once  heparin   Injectable 5000 Unit(s) SubCutaneous every 8 hours  hydrALAZINE 10 milliGRAM(s) Oral two times a day  influenza  Vaccine (HIGH DOSE) 0.7 milliLiter(s) IntraMuscular once  insulin glargine Injectable (LANTUS) 32 Unit(s) SubCutaneous at bedtime  insulin lispro (ADMELOG) corrective regimen sliding scale   SubCutaneous three times a day before meals  insulin lispro (ADMELOG) corrective regimen sliding scale   SubCutaneous at bedtime  insulin lispro Injectable (ADMELOG) 12 Unit(s) SubCutaneous three times a day before meals  lisinopril 40 milliGRAM(s) Oral daily  memantine 5 milliGRAM(s) Oral two times a day  NIFEdipine XL 30 milliGRAM(s) Oral daily  saccharomyces boulardii 250 milliGRAM(s) Oral two times a day  sertraline 100 milliGRAM(s) Oral daily  zinc oxide 40% Ointment 1 Application(s) Topical two times a day    MEDICATIONS  (PRN):  acetaminophen     Tablet .. 650 milliGRAM(s) Oral every 6 hours PRN Temp greater or equal to 38C (100.4F), Mild Pain (1 - 3)  aluminum hydroxide/magnesium hydroxide/simethicone Suspension 30 milliLiter(s) Oral every 4 hours PRN Dyspepsia  melatonin 3 milliGRAM(s) Oral at bedtime PRN Insomnia

## 2021-11-19 LAB
GLUCOSE BLDC GLUCOMTR-MCNC: 191 MG/DL — HIGH (ref 70–99)
GLUCOSE BLDC GLUCOMTR-MCNC: 260 MG/DL — HIGH (ref 70–99)
GLUCOSE BLDC GLUCOMTR-MCNC: 269 MG/DL — HIGH (ref 70–99)
GLUCOSE BLDC GLUCOMTR-MCNC: 288 MG/DL — HIGH (ref 70–99)

## 2021-11-19 PROCEDURE — 99232 SBSQ HOSP IP/OBS MODERATE 35: CPT

## 2021-11-19 RX ORDER — INSULIN LISPRO 100/ML
14 VIAL (ML) SUBCUTANEOUS
Refills: 0 | Status: DISCONTINUED | OUTPATIENT
Start: 2021-11-19 | End: 2021-11-26

## 2021-11-19 RX ADMIN — HEPARIN SODIUM 5000 UNIT(S): 5000 INJECTION INTRAVENOUS; SUBCUTANEOUS at 06:31

## 2021-11-19 RX ADMIN — Medication 14 UNIT(S): at 16:50

## 2021-11-19 RX ADMIN — HEPARIN SODIUM 5000 UNIT(S): 5000 INJECTION INTRAVENOUS; SUBCUTANEOUS at 21:41

## 2021-11-19 RX ADMIN — PREGABALIN 1000 MICROGRAM(S): 225 CAPSULE ORAL at 12:22

## 2021-11-19 RX ADMIN — Medication 12 UNIT(S): at 12:14

## 2021-11-19 RX ADMIN — INSULIN GLARGINE 32 UNIT(S): 100 INJECTION, SOLUTION SUBCUTANEOUS at 21:41

## 2021-11-19 RX ADMIN — Medication 3: at 12:16

## 2021-11-19 RX ADMIN — FAMOTIDINE 20 MILLIGRAM(S): 10 INJECTION INTRAVENOUS at 12:22

## 2021-11-19 RX ADMIN — ZINC OXIDE 1 APPLICATION(S): 200 OINTMENT TOPICAL at 18:25

## 2021-11-19 RX ADMIN — HEPARIN SODIUM 5000 UNIT(S): 5000 INJECTION INTRAVENOUS; SUBCUTANEOUS at 13:59

## 2021-11-19 RX ADMIN — Medication 12 UNIT(S): at 08:21

## 2021-11-19 RX ADMIN — Medication 10 MILLIGRAM(S): at 06:31

## 2021-11-19 RX ADMIN — ATORVASTATIN CALCIUM 80 MILLIGRAM(S): 80 TABLET, FILM COATED ORAL at 21:41

## 2021-11-19 RX ADMIN — SERTRALINE 100 MILLIGRAM(S): 25 TABLET, FILM COATED ORAL at 12:22

## 2021-11-19 RX ADMIN — Medication 3 MILLIGRAM(S): at 21:41

## 2021-11-19 RX ADMIN — Medication 30 MILLIGRAM(S): at 06:31

## 2021-11-19 RX ADMIN — MEMANTINE HYDROCHLORIDE 5 MILLIGRAM(S): 10 TABLET ORAL at 18:25

## 2021-11-19 RX ADMIN — DONEPEZIL HYDROCHLORIDE 5 MILLIGRAM(S): 10 TABLET, FILM COATED ORAL at 22:40

## 2021-11-19 RX ADMIN — Medication 3: at 08:21

## 2021-11-19 RX ADMIN — Medication 3: at 16:51

## 2021-11-19 RX ADMIN — Medication 250 MILLIGRAM(S): at 18:25

## 2021-11-19 RX ADMIN — Medication 81 MILLIGRAM(S): at 12:22

## 2021-11-19 RX ADMIN — MEMANTINE HYDROCHLORIDE 5 MILLIGRAM(S): 10 TABLET ORAL at 06:31

## 2021-11-19 RX ADMIN — Medication 10 MILLIGRAM(S): at 18:26

## 2021-11-19 RX ADMIN — Medication 250 MILLIGRAM(S): at 06:31

## 2021-11-19 RX ADMIN — LISINOPRIL 40 MILLIGRAM(S): 2.5 TABLET ORAL at 06:31

## 2021-11-19 RX ADMIN — Medication 145 MILLIGRAM(S): at 12:22

## 2021-11-19 RX ADMIN — ZINC OXIDE 1 APPLICATION(S): 200 OINTMENT TOPICAL at 06:31

## 2021-11-19 NOTE — PROGRESS NOTE ADULT - ATTENDING COMMENTS
Pt. seen with resident.  Agree with documentation above as per resident with amendments made as appropriate. Patient medically stable. Making progress towards rehab goals.     left Basal Ganglia infarct  Stable

## 2021-11-19 NOTE — PROGRESS NOTE ADULT - ASSESSMENT
ASSESSMENT/PLAN  This is a 76 Female with a h/o CVA (no prior deficit), HTN, HLD, IDDM, Dementia, MDD admitted to [facility] on 11/1 for Subacute CVA L Basal Ganglia, Mod-Severe Stenosis Prox R PCA, HTN Urgency, AMS, abnormal speech, and Hypoglycemia.  In ED,  on arrival with some improvement with medications but remains hypertensive.  LKWT 3 days prior to presentation, NIHSS 2, Out of window for TPA. Hospital course significant for UTI- treated with ABT. Patient now with gait Instability, ADL impairments and Functional impairments.    # CVA  - Subacute CVA L Basal Ganglia  - Mod-Severe Stenosis Prox R PCA  - cont Comprehensive Rehab Program: PT/OT/ST- total of 3 hrs/day 5 days/week   - c/w ASA, high dose statin: lipitor    #NPH  - OP f/u with NSGY    #HTN  --SBP goal 120-150s  -stopped Metoprolol 11/16  - Procardia XL  - Lisinopril 40 mg daily  - hydralazine 10 mg BID  -  #HLD  - on Lipitor  - Fenofibrate 145mg daily    #DM II with hyperglycemia  - ISS and FS  - Lantus and Admelog  -Management as per hospitalist    #Depression  - Sertraline 100mg daily    #Pain management  - Tylenol PRN    #DVT ppx  - Heparin, SCD, TEDs    #Dementia  - Donepezil 5mg daily  - Memantine 5mg BID    #GI ppx  - Protonix  - maalox for dyspepsia    #Bowel Regimen  - Senna, miralax PRN    #Bladder management  -voiding with low PVRs      #Dysphagia    - SLP: evaluation and treatment  - s/p MBS: easy chew m thin liquid per speech. 100 % supervision for aspiration precaution    #Precaution  - Fall, Aspiration, Seizure      #Skin:  - No active issues at this time  - Desitin to buttocks for IAD  - Pressure injury/Skin: Turn Q2hrs while in bed, OOB to Chair, PT/OT       #Sleep:  - Maintain quiet hours and low stim environment.  -melatonin PRN  - Monitor sleep logs    #IDT 11/16:  - SW: lives in  3STE with  and daughter.  and 2 granddaughters assisted with ADLs. Owns WC, RW, rollator.  - Barriers: retropulsion, coordination, poor balance  - Eating SV, Grooming Kaley, UBD Kaley, LBD maxA, bathing maxA, toilet transfer modA, shower transfer totalA, transfers Kaley, ambulation 62' with RW Mod A and WC follow. Needs assist moving RLE forward and preventing hyperextension of knee, 4 steps with 2 HR maxA.  - On easy to chew diet, modA cognition, decreased attention. poor problem solving, decreased initiation,  mild-mod receptive deficits, mod dysarthria  - Goals: shower transfers with Kaley o/w SC with ADLs, Kaley with stairs and CG transfers, SV with ambulation, SV cognition  - Will need family training  - EDOD 12/4 home  - , Emmanuel, updated 11/16.     Outpatient Follow-up (Specialty/Name of physician):    Huan Oquendo; PhD)  Neurology; Vascular Neurology  [address] [facility], [address]  [place] [address]  Phone: [phone]  Fax: [phone]    Alexander Arevalo (MD)  Neurology; Vascular Neurology  [address] [facility], [address] [place] [address]  Phone: [phone]  Fax: [phone]

## 2021-11-19 NOTE — PROGRESS NOTE ADULT - SUBJECTIVE AND OBJECTIVE BOX
HPI:  This is a 76 Female with a h/o CVA (no prior deficit), HTN, HLD, IDDM, Dementia, MDD admitted to [facility] on 11/1 for Subacute CVA L Basal Ganglia infarct,  CTA showedMod-Severe Stenosis Prox R PCA.  Pt. with HTN Urgency, and Hypoglycemia. Patient followed by neurology OP. In ED,  on arrival with some improvement with medications but remains hypertensive. Patient noted to have AMS with abnormal speech by daughter. LKWT 3 days prior to presentation. NIHSS 2. Out of window for TPA.  MRI brain showed--  left posterior limb IC/CR stroke, left frontal and right splenium acute strokes,    Per family, patient was followed by neurosurgery for evaluation of possible NPH with LP as outpatient. Neurosurgery recommending outpatient follow up after rehab. Pt also diagnosed with possible UTI on admission and treated with IV antibiotics, urine cx positive for proteus, ID consulted. HTN meds adjusted, bradycardic metoprolol dose decreased, hypoglycemia improved started low dose Lantus, high BP meds adjusted added hydralazine.  MBS performed and recommended easy to chew with thin liquids, with 100% supervision.   Patient was evaluated by PM&R and therapy for functional deficits, gait/ADL impairments and acute rehabilitation was recommended. Patient was medically optimized for discharge to [facility] IRU on 11/11/21.     (11 Nov 2021 13:54)      PAST MEDICAL & SURGICAL HISTORY:  Rheumatoid arthritis    Diabetes mellitus, type 2    Hypertension    HLD (hyperlipidemia)    Dementia    History of CVA (cerebrovascular accident)    Major depression    Anxiety    UTI (urinary tract infection)    History of dental surgery        Subjective: Patient seen and assessed at bedside, eating breakfast. No acute events overnight. Patient feeling well, slept well overnight. Denies HA, pain, discomfort.       Vital Signs Last 24 Hrs  T(C): 36.4 (19 Nov 2021 08:26), Max: 36.7 (18 Nov 2021 10:33)  T(F): 97.6 (19 Nov 2021 08:26), Max: 98.1 (18 Nov 2021 10:33)  HR: 63 (19 Nov 2021 08:26) (55 - 78)  BP: 117/69 (19 Nov 2021 08:26) (117/69 - 150/76)  BP(mean): --  RR: 16 (19 Nov 2021 08:26) (14 - 16)  SpO2: 98% (19 Nov 2021 08:26) (97% - 98%)    REVIEW OF SYMPTOMS  Denies pain, discomfort, headache.   Denies CP/dyspnea  Denies palpitations  Denies abdominal pain     Physical Exam:  Gen - NAD, Comfortable  Pulm - CTAB,   Cardiovascular - RRR, S1S2, No m/r/g  Abdomen - Soft, NT/ND, +BS  Extremities - No C/C/E, No calf tenderness  Neuro-     Cognitive - AAO to self, hospital ("[facility]" with cueing),month --Needs cue for year     Communication - +dysarthria, hypophonic, delay processing     Attention: impaired-- delayed processing, distractable     CN: visual fields intact, No facial weakness        Motor -                    LEFT    UE - ShAB 5/5, EF 5/5, EE 5/5, WE 5/5,  5/5                    RIGHT UE - ShAB 5/5, EF 5/5, EE 5/5, WE 5/5,  5/5                    LEFT    LE - HF 5/5, KE 5/5, DF 5/5, PF 5/5                    RIGHT LE - HF 4/5, KE 5-/5, DF 5/5, PF 5/5        Sensory - Intact to LT     Coordination - FTN intact     Tone - normal  Psychiatric - Mood stable, Affect WNL      RECENT LABS                        11.7   8.76  )-----------( 261      ( 18 Nov 2021 05:52 )             37.5     11-18    138  |  105  |  34<H>  ----------------------------<  234<H>  4.7   |  28  |  0.90    Ca    9.2      18 Nov 2021 05:52    TPro  6.2  /  Alb  3.0<L>  /  TBili  0.3  /  DBili  x   /  AST  15  /  ALT  17  /  AlkPhos  52  11-18        CAPILLARY BLOOD GLUCOSE      POCT Blood Glucose.: 260 mg/dL (19 Nov 2021 08:20)  POCT Blood Glucose.: 136 mg/dL (18 Nov 2021 22:08)  POCT Blood Glucose.: 206 mg/dL (18 Nov 2021 16:18)  POCT Blood Glucose.: 214 mg/dL (18 Nov 2021 11:44)                      RADIOLOGY/OTHER RESULTS      MEDICATIONS  (STANDING):  aspirin enteric coated 81 milliGRAM(s) Oral daily  atorvastatin 80 milliGRAM(s) Oral at bedtime  cyanocobalamin 1000 MICROGram(s) Oral daily  dextrose 40% Gel 15 Gram(s) Oral once  dextrose 5%. 1000 milliLiter(s) (50 mL/Hr) IV Continuous <Continuous>  dextrose 5%. 1000 milliLiter(s) (100 mL/Hr) IV Continuous <Continuous>  dextrose 50% Injectable 25 Gram(s) IV Push once  dextrose 50% Injectable 12.5 Gram(s) IV Push once  dextrose 50% Injectable 25 Gram(s) IV Push once  donepezil 5 milliGRAM(s) Oral at bedtime  famotidine    Tablet 20 milliGRAM(s) Oral daily  fenofibrate Tablet 145 milliGRAM(s) Oral daily  glucagon  Injectable 1 milliGRAM(s) IntraMuscular once  heparin   Injectable 5000 Unit(s) SubCutaneous every 8 hours  hydrALAZINE 10 milliGRAM(s) Oral two times a day  influenza  Vaccine (HIGH DOSE) 0.7 milliLiter(s) IntraMuscular once  insulin glargine Injectable (LANTUS) 32 Unit(s) SubCutaneous at bedtime  insulin lispro (ADMELOG) corrective regimen sliding scale   SubCutaneous three times a day before meals  insulin lispro (ADMELOG) corrective regimen sliding scale   SubCutaneous at bedtime  insulin lispro Injectable (ADMELOG) 12 Unit(s) SubCutaneous three times a day before meals  lisinopril 40 milliGRAM(s) Oral daily  memantine 5 milliGRAM(s) Oral two times a day  NIFEdipine XL 30 milliGRAM(s) Oral daily  saccharomyces boulardii 250 milliGRAM(s) Oral two times a day  sertraline 100 milliGRAM(s) Oral daily  zinc oxide 40% Ointment 1 Application(s) Topical two times a day    MEDICATIONS  (PRN):  acetaminophen     Tablet .. 650 milliGRAM(s) Oral every 6 hours PRN Temp greater or equal to 38C (100.4F), Mild Pain (1 - 3)  aluminum hydroxide/magnesium hydroxide/simethicone Suspension 30 milliLiter(s) Oral every 4 hours PRN Dyspepsia  melatonin 3 milliGRAM(s) Oral at bedtime PRN Insomnia

## 2021-11-20 LAB
GLUCOSE BLDC GLUCOMTR-MCNC: 125 MG/DL — HIGH (ref 70–99)
GLUCOSE BLDC GLUCOMTR-MCNC: 166 MG/DL — HIGH (ref 70–99)
GLUCOSE BLDC GLUCOMTR-MCNC: 180 MG/DL — HIGH (ref 70–99)
GLUCOSE BLDC GLUCOMTR-MCNC: 270 MG/DL — HIGH (ref 70–99)

## 2021-11-20 PROCEDURE — 99232 SBSQ HOSP IP/OBS MODERATE 35: CPT

## 2021-11-20 RX ORDER — METFORMIN HYDROCHLORIDE 850 MG/1
500 TABLET ORAL
Refills: 0 | Status: DISCONTINUED | OUTPATIENT
Start: 2021-11-20 | End: 2021-11-26

## 2021-11-20 RX ADMIN — LISINOPRIL 40 MILLIGRAM(S): 2.5 TABLET ORAL at 06:26

## 2021-11-20 RX ADMIN — Medication 250 MILLIGRAM(S): at 17:41

## 2021-11-20 RX ADMIN — Medication 1: at 12:09

## 2021-11-20 RX ADMIN — ZINC OXIDE 1 APPLICATION(S): 200 OINTMENT TOPICAL at 17:41

## 2021-11-20 RX ADMIN — Medication 10 MILLIGRAM(S): at 06:26

## 2021-11-20 RX ADMIN — ZINC OXIDE 1 APPLICATION(S): 200 OINTMENT TOPICAL at 06:27

## 2021-11-20 RX ADMIN — DONEPEZIL HYDROCHLORIDE 5 MILLIGRAM(S): 10 TABLET, FILM COATED ORAL at 21:42

## 2021-11-20 RX ADMIN — HEPARIN SODIUM 5000 UNIT(S): 5000 INJECTION INTRAVENOUS; SUBCUTANEOUS at 21:42

## 2021-11-20 RX ADMIN — Medication 3 MILLIGRAM(S): at 21:44

## 2021-11-20 RX ADMIN — Medication 250 MILLIGRAM(S): at 06:26

## 2021-11-20 RX ADMIN — FAMOTIDINE 20 MILLIGRAM(S): 10 INJECTION INTRAVENOUS at 11:31

## 2021-11-20 RX ADMIN — HEPARIN SODIUM 5000 UNIT(S): 5000 INJECTION INTRAVENOUS; SUBCUTANEOUS at 13:02

## 2021-11-20 RX ADMIN — Medication 14 UNIT(S): at 08:15

## 2021-11-20 RX ADMIN — Medication 30 MILLIGRAM(S): at 06:26

## 2021-11-20 RX ADMIN — Medication 14 UNIT(S): at 12:08

## 2021-11-20 RX ADMIN — Medication 3: at 08:16

## 2021-11-20 RX ADMIN — Medication 81 MILLIGRAM(S): at 11:31

## 2021-11-20 RX ADMIN — Medication 10 MILLIGRAM(S): at 17:41

## 2021-11-20 RX ADMIN — MEMANTINE HYDROCHLORIDE 5 MILLIGRAM(S): 10 TABLET ORAL at 06:26

## 2021-11-20 RX ADMIN — Medication 14 UNIT(S): at 17:03

## 2021-11-20 RX ADMIN — ATORVASTATIN CALCIUM 80 MILLIGRAM(S): 80 TABLET, FILM COATED ORAL at 21:42

## 2021-11-20 RX ADMIN — INSULIN GLARGINE 32 UNIT(S): 100 INJECTION, SOLUTION SUBCUTANEOUS at 21:43

## 2021-11-20 RX ADMIN — Medication 145 MILLIGRAM(S): at 11:31

## 2021-11-20 RX ADMIN — HEPARIN SODIUM 5000 UNIT(S): 5000 INJECTION INTRAVENOUS; SUBCUTANEOUS at 06:26

## 2021-11-20 RX ADMIN — SERTRALINE 100 MILLIGRAM(S): 25 TABLET, FILM COATED ORAL at 11:31

## 2021-11-20 RX ADMIN — METFORMIN HYDROCHLORIDE 500 MILLIGRAM(S): 850 TABLET ORAL at 17:41

## 2021-11-20 RX ADMIN — PREGABALIN 1000 MICROGRAM(S): 225 CAPSULE ORAL at 11:31

## 2021-11-20 RX ADMIN — MEMANTINE HYDROCHLORIDE 5 MILLIGRAM(S): 10 TABLET ORAL at 17:41

## 2021-11-20 NOTE — PROGRESS NOTE ADULT - SUBJECTIVE AND OBJECTIVE BOX
Patient is a 76y old  Female who presents with a chief complaint of left basal ganglia CVA with very mild right lower extremity weakness (20 Nov 2021 08:53)      SUBJECTIVE / OVERNIGHT EVENTS:  Pt seen and examined at bedside. No acute events overnight.    Allergies    No Known Allergies    Intolerances        MEDICATIONS  (STANDING):  aspirin enteric coated 81 milliGRAM(s) Oral daily  atorvastatin 80 milliGRAM(s) Oral at bedtime  cyanocobalamin 1000 MICROGram(s) Oral daily  dextrose 40% Gel 15 Gram(s) Oral once  dextrose 5%. 1000 milliLiter(s) (50 mL/Hr) IV Continuous <Continuous>  dextrose 5%. 1000 milliLiter(s) (100 mL/Hr) IV Continuous <Continuous>  dextrose 50% Injectable 25 Gram(s) IV Push once  dextrose 50% Injectable 12.5 Gram(s) IV Push once  dextrose 50% Injectable 25 Gram(s) IV Push once  donepezil 5 milliGRAM(s) Oral at bedtime  famotidine    Tablet 20 milliGRAM(s) Oral daily  fenofibrate Tablet 145 milliGRAM(s) Oral daily  glucagon  Injectable 1 milliGRAM(s) IntraMuscular once  heparin   Injectable 5000 Unit(s) SubCutaneous every 8 hours  hydrALAZINE 10 milliGRAM(s) Oral two times a day  influenza  Vaccine (HIGH DOSE) 0.7 milliLiter(s) IntraMuscular once  insulin glargine Injectable (LANTUS) 32 Unit(s) SubCutaneous at bedtime  insulin lispro (ADMELOG) corrective regimen sliding scale   SubCutaneous three times a day before meals  insulin lispro (ADMELOG) corrective regimen sliding scale   SubCutaneous at bedtime  insulin lispro Injectable (ADMELOG) 14 Unit(s) SubCutaneous three times a day before meals  lisinopril 40 milliGRAM(s) Oral daily  memantine 5 milliGRAM(s) Oral two times a day  metFORMIN 500 milliGRAM(s) Oral two times a day  NIFEdipine XL 30 milliGRAM(s) Oral daily  saccharomyces boulardii 250 milliGRAM(s) Oral two times a day  sertraline 100 milliGRAM(s) Oral daily  zinc oxide 40% Ointment 1 Application(s) Topical two times a day    MEDICATIONS  (PRN):  acetaminophen     Tablet .. 650 milliGRAM(s) Oral every 6 hours PRN Temp greater or equal to 38C (100.4F), Mild Pain (1 - 3)  aluminum hydroxide/magnesium hydroxide/simethicone Suspension 30 milliLiter(s) Oral every 4 hours PRN Dyspepsia  melatonin 3 milliGRAM(s) Oral at bedtime PRN Insomnia      Vital Signs Last 24 Hrs  T(C): 36.6 (20 Nov 2021 07:37), Max: 36.7 (19 Nov 2021 21:40)  T(F): 97.8 (20 Nov 2021 07:37), Max: 98 (19 Nov 2021 21:40)  HR: 74 (20 Nov 2021 07:37) (60 - 74)  BP: 148/74 (20 Nov 2021 07:37) (133/66 - 155/61)  BP(mean): --  RR: 16 (20 Nov 2021 07:37) (15 - 16)  SpO2: 98% (20 Nov 2021 07:37) (96% - 98%)  CAPILLARY BLOOD GLUCOSE      POCT Blood Glucose.: 270 mg/dL (20 Nov 2021 07:42)  POCT Blood Glucose.: 191 mg/dL (19 Nov 2021 21:37)  POCT Blood Glucose.: 288 mg/dL (19 Nov 2021 16:49)  POCT Blood Glucose.: 269 mg/dL (19 Nov 2021 12:13)    I&O's Summary      PHYSICAL EXAM:  GENERAL: NAD, well-developed  HEAD:  Atraumatic, Normocephalic  EYES: EOMI, PERRLA, conjunctiva and sclera clear  NECK: Supple, No JVD  CHEST/LUNG: Clear to auscultation bilaterally; No wheeze, nonlabored breathing  HEART: Regular rate and rhythm; No murmurs, rubs, or gallops  ABDOMEN: Soft, Nontender, Nondistended; Bowel sounds present  EXTREMITIES:  2+ Peripheral Pulses, No clubbing, cyanosis, or edema  NEUROLOGY: AAOx3, non-focal  PSYCH: calm, appropriate mood    LABS:                    RADIOLOGY & ADDITIONAL TESTS:  Results Reviewed:   Imaging Personally Reviewed:  Electrocardiogram Personally Reviewed:    COORDINATION OF CARE:  Care Discussed with Consultants/Other Providers [Y/N]:  Prior or Outpatient Records Reviewed [Y/N]:

## 2021-11-20 NOTE — PROGRESS NOTE ADULT - ASSESSMENT
76 Female with a h/o CVA (no prior deficit), HTN, HLD, IDDM, Dementia, MDD admitted to [facility] on 11/1 for Subacute CVA L Basal Ganglia, Mod-Severe Stenosis Prox R PCA, HTN Urgency, AMS, abnormal speech, and Hypoglycemia.  In ED,  on arrival with some improvement with medications but remains hypertensive.  LKWT 3 days prior to presentation, NIHSS 2, Out of window for TPA. Hospital course significant for UTI- treated with ABT. Patient now with gait Instability, ADL impairments and Functional impairments.    # CVA  - Subacute CVA L Basal Ganglia  - Mod-Severe Stenosis Prox R PCA  - continue Comprehensive Rehab Program: PT/OT/ST   - c/a ASA 81mg daily, lipitor 80mg qhs    #NPH  - OP f/u with NSGY    #HTN  - Procardia XL 30mg daily  - Lisinopril 40 mg daily  - hydralazine 10 mg BID    #HLD  - on Lipitor  - Fenofibrate 145mg daily    #DM II kristen hyperglycemia, uncontrolled   - HA1c  7.6  - ISS and FS  - Continue with Lantus 32units qhs and continue Admelog 12 units premeal   - Reviewed home meds, pt was on Metformin 500mg BID. Will restart it.  Expect better glycemic control    #Depression  - Sertraline 100mg daily    #Dementia  - Donepezil 5mg daily  - Memantine 5mg daily    #UTI proteus miribalis   - S/p augmentin    #DVT ppx  - Heparin

## 2021-11-20 NOTE — PROGRESS NOTE ADULT - SUBJECTIVE AND OBJECTIVE BOX
Pt. seen and examined at bedside.  No overnight events.      REVIEW OF SYSTEMS  Constitutional - No fever,  No fatigue  Neurological - No headaches, ++ loss of strength  Musculoskeletal - No joint pain, No joint swelling, No muscle pain    VITALS  T(C): 36.6 (11-20-21 @ 07:37), Max: 36.7 (11-19-21 @ 21:40)  HR: 74 (11-20-21 @ 07:37) (60 - 74)  BP: 148/74 (11-20-21 @ 07:37) (133/66 - 155/61)  RR: 16 (11-20-21 @ 07:37) (15 - 16)  SpO2: 98% (11-20-21 @ 07:37) (96% - 98%)  Wt(kg): --       MEDICATIONS   acetaminophen     Tablet .. 650 milliGRAM(s) every 6 hours PRN  aluminum hydroxide/magnesium hydroxide/simethicone Suspension 30 milliLiter(s) every 4 hours PRN  aspirin enteric coated 81 milliGRAM(s) daily  atorvastatin 80 milliGRAM(s) at bedtime  cyanocobalamin 1000 MICROGram(s) daily  dextrose 40% Gel 15 Gram(s) once  dextrose 5%. 1000 milliLiter(s) <Continuous>  dextrose 5%. 1000 milliLiter(s) <Continuous>  dextrose 50% Injectable 25 Gram(s) once  dextrose 50% Injectable 12.5 Gram(s) once  dextrose 50% Injectable 25 Gram(s) once  donepezil 5 milliGRAM(s) at bedtime  famotidine    Tablet 20 milliGRAM(s) daily  fenofibrate Tablet 145 milliGRAM(s) daily  glucagon  Injectable 1 milliGRAM(s) once  heparin   Injectable 5000 Unit(s) every 8 hours  hydrALAZINE 10 milliGRAM(s) two times a day  influenza  Vaccine (HIGH DOSE) 0.7 milliLiter(s) once  insulin glargine Injectable (LANTUS) 32 Unit(s) at bedtime  insulin lispro (ADMELOG) corrective regimen sliding scale   three times a day before meals  insulin lispro (ADMELOG) corrective regimen sliding scale   at bedtime  insulin lispro Injectable (ADMELOG) 14 Unit(s) three times a day before meals  lisinopril 40 milliGRAM(s) daily  melatonin 3 milliGRAM(s) at bedtime PRN  memantine 5 milliGRAM(s) two times a day  NIFEdipine XL 30 milliGRAM(s) daily  saccharomyces boulardii 250 milliGRAM(s) two times a day  sertraline 100 milliGRAM(s) daily  zinc oxide 40% Ointment 1 Application(s) two times a day      RECENT LABS/IMAGING                      POCT Blood Glucose.: 270 mg/dL (11-20-21 @ 07:42)  POCT Blood Glucose.: 191 mg/dL (11-19-21 @ 21:37)  POCT Blood Glucose.: 288 mg/dL (11-19-21 @ 16:49)  POCT Blood Glucose.: 269 mg/dL (11-19-21 @ 12:13)    ---------  PHYSICAL EXAM  Constitutional - NAD, Comfortable  Pulm - Breathing comfortably, No wheezing  Abd - Soft, NTND  Extremities - No edema, No calf tenderness  Neurologic Exam -                    Cognitive - A&Ox1     Communication - DYSARTHRIA/HYPOPHONIC     Motor - RIGHT HEMIPARESIS GROSSLY 4-/5     Sensory - Intact to LT  Psychiatric - Mood WNL, Affect WNL    ASSESSMENT/PLAN  76y Female with functional deficits LEFT BG CVA -> RIGHT HP  Continue current medical management  Pain - Tylenol PRN  DVT PPX - heparin   Injectable 5000 Unit(s) every 8 hours  Active issues - NONE  Continue 3hrs a day of comprehensive rehab program.

## 2021-11-21 LAB
GLUCOSE BLDC GLUCOMTR-MCNC: 100 MG/DL — HIGH (ref 70–99)
GLUCOSE BLDC GLUCOMTR-MCNC: 128 MG/DL — HIGH (ref 70–99)
GLUCOSE BLDC GLUCOMTR-MCNC: 143 MG/DL — HIGH (ref 70–99)
GLUCOSE BLDC GLUCOMTR-MCNC: 149 MG/DL — HIGH (ref 70–99)

## 2021-11-21 PROCEDURE — 99232 SBSQ HOSP IP/OBS MODERATE 35: CPT

## 2021-11-21 RX ADMIN — METFORMIN HYDROCHLORIDE 500 MILLIGRAM(S): 850 TABLET ORAL at 18:19

## 2021-11-21 RX ADMIN — FAMOTIDINE 20 MILLIGRAM(S): 10 INJECTION INTRAVENOUS at 11:26

## 2021-11-21 RX ADMIN — Medication 10 MILLIGRAM(S): at 18:19

## 2021-11-21 RX ADMIN — Medication 14 UNIT(S): at 12:26

## 2021-11-21 RX ADMIN — HEPARIN SODIUM 5000 UNIT(S): 5000 INJECTION INTRAVENOUS; SUBCUTANEOUS at 22:37

## 2021-11-21 RX ADMIN — MEMANTINE HYDROCHLORIDE 5 MILLIGRAM(S): 10 TABLET ORAL at 05:29

## 2021-11-21 RX ADMIN — SERTRALINE 100 MILLIGRAM(S): 25 TABLET, FILM COATED ORAL at 11:26

## 2021-11-21 RX ADMIN — Medication 30 MILLIGRAM(S): at 05:28

## 2021-11-21 RX ADMIN — Medication 14 UNIT(S): at 08:01

## 2021-11-21 RX ADMIN — Medication 14 UNIT(S): at 16:58

## 2021-11-21 RX ADMIN — MEMANTINE HYDROCHLORIDE 5 MILLIGRAM(S): 10 TABLET ORAL at 18:19

## 2021-11-21 RX ADMIN — ZINC OXIDE 1 APPLICATION(S): 200 OINTMENT TOPICAL at 05:29

## 2021-11-21 RX ADMIN — LISINOPRIL 40 MILLIGRAM(S): 2.5 TABLET ORAL at 05:28

## 2021-11-21 RX ADMIN — PREGABALIN 1000 MICROGRAM(S): 225 CAPSULE ORAL at 11:25

## 2021-11-21 RX ADMIN — HEPARIN SODIUM 5000 UNIT(S): 5000 INJECTION INTRAVENOUS; SUBCUTANEOUS at 05:28

## 2021-11-21 RX ADMIN — METFORMIN HYDROCHLORIDE 500 MILLIGRAM(S): 850 TABLET ORAL at 08:00

## 2021-11-21 RX ADMIN — ATORVASTATIN CALCIUM 80 MILLIGRAM(S): 80 TABLET, FILM COATED ORAL at 22:37

## 2021-11-21 RX ADMIN — HEPARIN SODIUM 5000 UNIT(S): 5000 INJECTION INTRAVENOUS; SUBCUTANEOUS at 14:34

## 2021-11-21 RX ADMIN — ZINC OXIDE 1 APPLICATION(S): 200 OINTMENT TOPICAL at 18:19

## 2021-11-21 RX ADMIN — Medication 145 MILLIGRAM(S): at 11:25

## 2021-11-21 RX ADMIN — Medication 250 MILLIGRAM(S): at 05:28

## 2021-11-21 RX ADMIN — INSULIN GLARGINE 32 UNIT(S): 100 INJECTION, SOLUTION SUBCUTANEOUS at 22:38

## 2021-11-21 RX ADMIN — Medication 10 MILLIGRAM(S): at 05:28

## 2021-11-21 RX ADMIN — Medication 250 MILLIGRAM(S): at 18:19

## 2021-11-21 RX ADMIN — Medication 81 MILLIGRAM(S): at 11:25

## 2021-11-21 RX ADMIN — DONEPEZIL HYDROCHLORIDE 5 MILLIGRAM(S): 10 TABLET, FILM COATED ORAL at 22:37

## 2021-11-21 NOTE — PROGRESS NOTE ADULT - ASSESSMENT
76 Female with a h/o CVA (no prior deficit), HTN, HLD, IDDM, Dementia, MDD admitted to [facility] on 11/1 for Subacute CVA L Basal Ganglia, Mod-Severe Stenosis Prox R PCA, HTN Urgency, AMS, abnormal speech, and Hypoglycemia.  In ED,  on arrival with some improvement with medications but remains hypertensive.  LKWT 3 days prior to presentation, NIHSS 2, Out of window for TPA. Hospital course significant for UTI- treated with ABT. Patient now with gait Instability, ADL impairments and Functional impairments.    # CVA  - Subacute CVA L Basal Ganglia  - Mod-Severe Stenosis Prox R PCA  - continue Comprehensive Rehab Program: PT/OT/ST   - c/a ASA 81mg daily, lipitor 80mg qhs    #NPH  - OP f/u with NSGY    #HTN  - Procardia XL 30mg daily  - Lisinopril 40 mg daily  - hydralazine 10 mg BID    #HLD  - on Lipitor  - Fenofibrate 145mg daily    #DM II kristen hyperglycemia, uncontrolled   - HA1c  7.6  - ISS and FS  - Continue with Lantus 32units qhs and continue Admelog 14 units premeal   - Home meds reviewed, restarted Metformin 500mg BID    #Depression  - Sertraline 100mg daily    #Dementia  - Donepezil 5mg daily  - Memantine 5mg daily    #UTI proteus miribalis   - S/p augmentin    #DVT ppx  - Heparin

## 2021-11-21 NOTE — PROGRESS NOTE ADULT - SUBJECTIVE AND OBJECTIVE BOX
Pt. seen and examined at bedside.  No overnight events.      REVIEW OF SYSTEMS  Constitutional - No fever,  No fatigue  Neurological - No headaches, ++ loss of strength  Musculoskeletal - No joint pain, No joint swelling, No muscle pain    VITALS  T(C): 36.4 (11-21-21 @ 08:03), Max: 36.4 (11-20-21 @ 20:07)  HR: 63 (11-21-21 @ 08:03) (56 - 73)  BP: 125/60 (11-21-21 @ 08:03) (125/60 - 157/68)  RR: 16 (11-21-21 @ 08:03) (15 - 16)  SpO2: 98% (11-21-21 @ 08:03) (95% - 98%)  Wt(kg): --       MEDICATIONS   acetaminophen     Tablet .. 650 milliGRAM(s) every 6 hours PRN  aluminum hydroxide/magnesium hydroxide/simethicone Suspension 30 milliLiter(s) every 4 hours PRN  aspirin enteric coated 81 milliGRAM(s) daily  atorvastatin 80 milliGRAM(s) at bedtime  cyanocobalamin 1000 MICROGram(s) daily  dextrose 40% Gel 15 Gram(s) once  dextrose 5%. 1000 milliLiter(s) <Continuous>  dextrose 5%. 1000 milliLiter(s) <Continuous>  dextrose 50% Injectable 25 Gram(s) once  dextrose 50% Injectable 12.5 Gram(s) once  dextrose 50% Injectable 25 Gram(s) once  donepezil 5 milliGRAM(s) at bedtime  famotidine    Tablet 20 milliGRAM(s) daily  fenofibrate Tablet 145 milliGRAM(s) daily  glucagon  Injectable 1 milliGRAM(s) once  heparin   Injectable 5000 Unit(s) every 8 hours  hydrALAZINE 10 milliGRAM(s) two times a day  influenza  Vaccine (HIGH DOSE) 0.7 milliLiter(s) once  insulin glargine Injectable (LANTUS) 32 Unit(s) at bedtime  insulin lispro (ADMELOG) corrective regimen sliding scale   three times a day before meals  insulin lispro (ADMELOG) corrective regimen sliding scale   at bedtime  insulin lispro Injectable (ADMELOG) 14 Unit(s) three times a day before meals  lisinopril 40 milliGRAM(s) daily  melatonin 3 milliGRAM(s) at bedtime PRN  memantine 5 milliGRAM(s) two times a day  metFORMIN 500 milliGRAM(s) two times a day  NIFEdipine XL 30 milliGRAM(s) daily  saccharomyces boulardii 250 milliGRAM(s) two times a day  sertraline 100 milliGRAM(s) daily  zinc oxide 40% Ointment 1 Application(s) two times a day      RECENT LABS/IMAGING                      POCT Blood Glucose.: 143 mg/dL (11-21-21 @ 07:59)  POCT Blood Glucose.: 166 mg/dL (11-20-21 @ 21:41)  POCT Blood Glucose.: 125 mg/dL (11-20-21 @ 17:02)  POCT Blood Glucose.: 180 mg/dL (11-20-21 @ 11:30)    ---------  PHYSICAL EXAM  Constitutional - NAD, Comfortable  Pulm - Breathing comfortably, No wheezing  Abd - Soft, NTND  Extremities - No edema, No calf tenderness  Neurologic Exam -                    Cognitive - A&Ox2     Communication - DYSARTHRIA/HYPOPHONIA     Motor - RIGHT HEMIPARESIS     Sensory - Intact to LT  Psychiatric - Mood WNL, Affect WNL    ASSESSMENT/PLAN  76y Female with functional deficits s/p L BG CVA -> R HP.  Continue current medical management  Pain - Tylenol PRN  DVT PPX - heparin   Injectable 5000 Unit(s) every 8 hours  Active issues - NONE  Continue 3hrs a day of comprehensive rehab program.

## 2021-11-21 NOTE — PROGRESS NOTE ADULT - SUBJECTIVE AND OBJECTIVE BOX
Patient is a 76y old  Female who presents with a chief complaint of left basal ganglia CVA with very mild right lower extremity weakness (21 Nov 2021 08:05)      SUBJECTIVE / OVERNIGHT EVENTS:  Pt seen and examined at bedside. No acute events overnight.  Pt denies cp, palpitations, sob, abd pain, N/V, fever, chills.    ROS:  All other review of systems negative    Allergies    No Known Allergies    Intolerances        MEDICATIONS  (STANDING):  aspirin enteric coated 81 milliGRAM(s) Oral daily  atorvastatin 80 milliGRAM(s) Oral at bedtime  cyanocobalamin 1000 MICROGram(s) Oral daily  dextrose 40% Gel 15 Gram(s) Oral once  dextrose 5%. 1000 milliLiter(s) (50 mL/Hr) IV Continuous <Continuous>  dextrose 5%. 1000 milliLiter(s) (100 mL/Hr) IV Continuous <Continuous>  dextrose 50% Injectable 25 Gram(s) IV Push once  dextrose 50% Injectable 12.5 Gram(s) IV Push once  dextrose 50% Injectable 25 Gram(s) IV Push once  donepezil 5 milliGRAM(s) Oral at bedtime  famotidine    Tablet 20 milliGRAM(s) Oral daily  fenofibrate Tablet 145 milliGRAM(s) Oral daily  glucagon  Injectable 1 milliGRAM(s) IntraMuscular once  heparin   Injectable 5000 Unit(s) SubCutaneous every 8 hours  hydrALAZINE 10 milliGRAM(s) Oral two times a day  influenza  Vaccine (HIGH DOSE) 0.7 milliLiter(s) IntraMuscular once  insulin glargine Injectable (LANTUS) 32 Unit(s) SubCutaneous at bedtime  insulin lispro (ADMELOG) corrective regimen sliding scale   SubCutaneous three times a day before meals  insulin lispro (ADMELOG) corrective regimen sliding scale   SubCutaneous at bedtime  insulin lispro Injectable (ADMELOG) 14 Unit(s) SubCutaneous three times a day before meals  lisinopril 40 milliGRAM(s) Oral daily  memantine 5 milliGRAM(s) Oral two times a day  metFORMIN 500 milliGRAM(s) Oral two times a day  NIFEdipine XL 30 milliGRAM(s) Oral daily  saccharomyces boulardii 250 milliGRAM(s) Oral two times a day  sertraline 100 milliGRAM(s) Oral daily  zinc oxide 40% Ointment 1 Application(s) Topical two times a day    MEDICATIONS  (PRN):  acetaminophen     Tablet .. 650 milliGRAM(s) Oral every 6 hours PRN Temp greater or equal to 38C (100.4F), Mild Pain (1 - 3)  aluminum hydroxide/magnesium hydroxide/simethicone Suspension 30 milliLiter(s) Oral every 4 hours PRN Dyspepsia  melatonin 3 milliGRAM(s) Oral at bedtime PRN Insomnia      Vital Signs Last 24 Hrs  T(C): 36.4 (21 Nov 2021 08:03), Max: 36.4 (20 Nov 2021 20:07)  T(F): 97.5 (21 Nov 2021 08:03), Max: 97.5 (20 Nov 2021 20:07)  HR: 63 (21 Nov 2021 08:03) (56 - 73)  BP: 125/60 (21 Nov 2021 08:03) (125/60 - 157/68)  BP(mean): --  RR: 16 (21 Nov 2021 08:03) (15 - 16)  SpO2: 98% (21 Nov 2021 08:03) (95% - 98%)  CAPILLARY BLOOD GLUCOSE      POCT Blood Glucose.: 143 mg/dL (21 Nov 2021 07:59)  POCT Blood Glucose.: 166 mg/dL (20 Nov 2021 21:41)  POCT Blood Glucose.: 125 mg/dL (20 Nov 2021 17:02)  POCT Blood Glucose.: 180 mg/dL (20 Nov 2021 11:30)    I&O's Summary      PHYSICAL EXAM:  GENERAL: NAD, well-developed  CHEST/LUNG: Clear to auscultation bilaterally; No wheeze, nonlabored breathing  HEART: Regular rate and rhythm; No murmurs, rubs, or gallops  ABDOMEN: Soft, Nontender, Nondistended; Bowel sounds present  EXTREMITIES:  2+ Peripheral Pulses, No clubbing, cyanosis, or edema  NEUROLOGY: AAOx3, non-focal  PSYCH: calm, appropriate mood    LABS:                    RADIOLOGY & ADDITIONAL TESTS:  Results Reviewed:   Imaging Personally Reviewed:  Electrocardiogram Personally Reviewed:    COORDINATION OF CARE:  Care Discussed with Consultants/Other Providers [Y/N]:  Prior or Outpatient Records Reviewed [Y/N]:

## 2021-11-22 LAB
ALBUMIN SERPL ELPH-MCNC: 3 G/DL — LOW (ref 3.3–5)
ALP SERPL-CCNC: 40 U/L — SIGNIFICANT CHANGE UP (ref 40–120)
ALT FLD-CCNC: 27 U/L — SIGNIFICANT CHANGE UP (ref 10–45)
ANION GAP SERPL CALC-SCNC: 8 MMOL/L — SIGNIFICANT CHANGE UP (ref 5–17)
AST SERPL-CCNC: 20 U/L — SIGNIFICANT CHANGE UP (ref 10–40)
BILIRUB SERPL-MCNC: 0.2 MG/DL — SIGNIFICANT CHANGE UP (ref 0.2–1.2)
BUN SERPL-MCNC: 36 MG/DL — HIGH (ref 7–23)
CALCIUM SERPL-MCNC: 9.1 MG/DL — SIGNIFICANT CHANGE UP (ref 8.4–10.5)
CHLORIDE SERPL-SCNC: 105 MMOL/L — SIGNIFICANT CHANGE UP (ref 96–108)
CO2 SERPL-SCNC: 28 MMOL/L — SIGNIFICANT CHANGE UP (ref 22–31)
CREAT SERPL-MCNC: 1.13 MG/DL — SIGNIFICANT CHANGE UP (ref 0.5–1.3)
GLUCOSE BLDC GLUCOMTR-MCNC: 107 MG/DL — HIGH (ref 70–99)
GLUCOSE BLDC GLUCOMTR-MCNC: 110 MG/DL — HIGH (ref 70–99)
GLUCOSE BLDC GLUCOMTR-MCNC: 125 MG/DL — HIGH (ref 70–99)
GLUCOSE BLDC GLUCOMTR-MCNC: 232 MG/DL — HIGH (ref 70–99)
GLUCOSE SERPL-MCNC: 228 MG/DL — HIGH (ref 70–99)
HCT VFR BLD CALC: 38.3 % — SIGNIFICANT CHANGE UP (ref 34.5–45)
HGB BLD-MCNC: 11.8 G/DL — SIGNIFICANT CHANGE UP (ref 11.5–15.5)
MCHC RBC-ENTMCNC: 24.9 PG — LOW (ref 27–34)
MCHC RBC-ENTMCNC: 30.8 GM/DL — LOW (ref 32–36)
MCV RBC AUTO: 80.8 FL — SIGNIFICANT CHANGE UP (ref 80–100)
NRBC # BLD: 0 /100 WBCS — SIGNIFICANT CHANGE UP (ref 0–0)
PLATELET # BLD AUTO: 282 K/UL — SIGNIFICANT CHANGE UP (ref 150–400)
POTASSIUM SERPL-MCNC: 4.3 MMOL/L — SIGNIFICANT CHANGE UP (ref 3.5–5.3)
POTASSIUM SERPL-SCNC: 4.3 MMOL/L — SIGNIFICANT CHANGE UP (ref 3.5–5.3)
PROT SERPL-MCNC: 6.2 G/DL — SIGNIFICANT CHANGE UP (ref 6–8.3)
RBC # BLD: 4.74 M/UL — SIGNIFICANT CHANGE UP (ref 3.8–5.2)
RBC # FLD: 20.2 % — HIGH (ref 10.3–14.5)
SODIUM SERPL-SCNC: 141 MMOL/L — SIGNIFICANT CHANGE UP (ref 135–145)
WBC # BLD: 9.05 K/UL — SIGNIFICANT CHANGE UP (ref 3.8–10.5)
WBC # FLD AUTO: 9.05 K/UL — SIGNIFICANT CHANGE UP (ref 3.8–10.5)

## 2021-11-22 PROCEDURE — 99232 SBSQ HOSP IP/OBS MODERATE 35: CPT

## 2021-11-22 RX ADMIN — SERTRALINE 100 MILLIGRAM(S): 25 TABLET, FILM COATED ORAL at 11:48

## 2021-11-22 RX ADMIN — Medication 250 MILLIGRAM(S): at 17:15

## 2021-11-22 RX ADMIN — Medication 145 MILLIGRAM(S): at 11:48

## 2021-11-22 RX ADMIN — METFORMIN HYDROCHLORIDE 500 MILLIGRAM(S): 850 TABLET ORAL at 17:15

## 2021-11-22 RX ADMIN — HEPARIN SODIUM 5000 UNIT(S): 5000 INJECTION INTRAVENOUS; SUBCUTANEOUS at 15:00

## 2021-11-22 RX ADMIN — Medication 2: at 08:15

## 2021-11-22 RX ADMIN — ZINC OXIDE 1 APPLICATION(S): 200 OINTMENT TOPICAL at 17:16

## 2021-11-22 RX ADMIN — LISINOPRIL 40 MILLIGRAM(S): 2.5 TABLET ORAL at 05:27

## 2021-11-22 RX ADMIN — Medication 250 MILLIGRAM(S): at 05:27

## 2021-11-22 RX ADMIN — PREGABALIN 1000 MICROGRAM(S): 225 CAPSULE ORAL at 11:48

## 2021-11-22 RX ADMIN — HEPARIN SODIUM 5000 UNIT(S): 5000 INJECTION INTRAVENOUS; SUBCUTANEOUS at 21:16

## 2021-11-22 RX ADMIN — Medication 30 MILLIGRAM(S): at 05:27

## 2021-11-22 RX ADMIN — HEPARIN SODIUM 5000 UNIT(S): 5000 INJECTION INTRAVENOUS; SUBCUTANEOUS at 05:26

## 2021-11-22 RX ADMIN — DONEPEZIL HYDROCHLORIDE 5 MILLIGRAM(S): 10 TABLET, FILM COATED ORAL at 21:14

## 2021-11-22 RX ADMIN — Medication 14 UNIT(S): at 08:15

## 2021-11-22 RX ADMIN — Medication 10 MILLIGRAM(S): at 05:27

## 2021-11-22 RX ADMIN — Medication 14 UNIT(S): at 17:15

## 2021-11-22 RX ADMIN — METFORMIN HYDROCHLORIDE 500 MILLIGRAM(S): 850 TABLET ORAL at 08:15

## 2021-11-22 RX ADMIN — ATORVASTATIN CALCIUM 80 MILLIGRAM(S): 80 TABLET, FILM COATED ORAL at 21:14

## 2021-11-22 RX ADMIN — Medication 14 UNIT(S): at 11:56

## 2021-11-22 RX ADMIN — FAMOTIDINE 20 MILLIGRAM(S): 10 INJECTION INTRAVENOUS at 11:48

## 2021-11-22 RX ADMIN — ZINC OXIDE 1 APPLICATION(S): 200 OINTMENT TOPICAL at 05:25

## 2021-11-22 RX ADMIN — MEMANTINE HYDROCHLORIDE 5 MILLIGRAM(S): 10 TABLET ORAL at 17:15

## 2021-11-22 RX ADMIN — MEMANTINE HYDROCHLORIDE 5 MILLIGRAM(S): 10 TABLET ORAL at 05:27

## 2021-11-22 RX ADMIN — Medication 81 MILLIGRAM(S): at 11:48

## 2021-11-22 RX ADMIN — INSULIN GLARGINE 32 UNIT(S): 100 INJECTION, SOLUTION SUBCUTANEOUS at 21:24

## 2021-11-22 NOTE — PROGRESS NOTE ADULT - SUBJECTIVE AND OBJECTIVE BOX
Patient is a 76y old  Female who presents with a chief complaint of left basal ganglia CVA with very mild right lower extremity weakness (21 Nov 2021 08:31)      Patient seen and examined at bedside.    ALLERGIES:  No Known Allergies    MEDICATIONS  (STANDING):  aspirin enteric coated 81 milliGRAM(s) Oral daily  atorvastatin 80 milliGRAM(s) Oral at bedtime  cyanocobalamin 1000 MICROGram(s) Oral daily  dextrose 40% Gel 15 Gram(s) Oral once  dextrose 5%. 1000 milliLiter(s) (50 mL/Hr) IV Continuous <Continuous>  dextrose 5%. 1000 milliLiter(s) (100 mL/Hr) IV Continuous <Continuous>  dextrose 50% Injectable 25 Gram(s) IV Push once  dextrose 50% Injectable 25 Gram(s) IV Push once  dextrose 50% Injectable 12.5 Gram(s) IV Push once  donepezil 5 milliGRAM(s) Oral at bedtime  famotidine    Tablet 20 milliGRAM(s) Oral daily  fenofibrate Tablet 145 milliGRAM(s) Oral daily  glucagon  Injectable 1 milliGRAM(s) IntraMuscular once  heparin   Injectable 5000 Unit(s) SubCutaneous every 8 hours  hydrALAZINE 10 milliGRAM(s) Oral two times a day  influenza  Vaccine (HIGH DOSE) 0.7 milliLiter(s) IntraMuscular once  insulin glargine Injectable (LANTUS) 32 Unit(s) SubCutaneous at bedtime  insulin lispro (ADMELOG) corrective regimen sliding scale   SubCutaneous three times a day before meals  insulin lispro (ADMELOG) corrective regimen sliding scale   SubCutaneous at bedtime  insulin lispro Injectable (ADMELOG) 14 Unit(s) SubCutaneous three times a day before meals  lisinopril 40 milliGRAM(s) Oral daily  memantine 5 milliGRAM(s) Oral two times a day  metFORMIN 500 milliGRAM(s) Oral two times a day  NIFEdipine XL 30 milliGRAM(s) Oral daily  saccharomyces boulardii 250 milliGRAM(s) Oral two times a day  sertraline 100 milliGRAM(s) Oral daily  zinc oxide 40% Ointment 1 Application(s) Topical two times a day    MEDICATIONS  (PRN):  acetaminophen     Tablet .. 650 milliGRAM(s) Oral every 6 hours PRN Temp greater or equal to 38C (100.4F), Mild Pain (1 - 3)  aluminum hydroxide/magnesium hydroxide/simethicone Suspension 30 milliLiter(s) Oral every 4 hours PRN Dyspepsia  melatonin 3 milliGRAM(s) Oral at bedtime PRN Insomnia    Vital Signs Last 24 Hrs  T(F): 97.7 (22 Nov 2021 08:11), Max: 98.3 (21 Nov 2021 22:28)  HR: 71 (22 Nov 2021 08:11) (62 - 71)  BP: 149/73 (22 Nov 2021 08:11) (117/62 - 149/73)  RR: 16 (22 Nov 2021 08:11) (15 - 16)  SpO2: 98% (22 Nov 2021 08:11) (95% - 98%)  I&O's Summary      PHYSICAL EXAM:  General: NAD, A/O x 3  ENT: MMM, no scleral icterus  Neck: Supple, No JVD, no thyroidomegaly  Lungs: Clear to auscultation bilaterally, no wheezes, no rales, no rhonchi, good inspiratory effort  Cardio: RRR, S1/S2, No murmurs  Abdomen: Soft, Nontender, Nondistended; Bowel sounds present  Extremities: No calf tenderness, No pitting edema, no skin changes    LABS:                        11.8   9.05  )-----------( 282      ( 22 Nov 2021 06:35 )             38.3       11-22    141  |  105  |  36  ----------------------------<  228  4.3   |  28  |  1.13    Ca    9.1      22 Nov 2021 06:35    TPro  6.2  /  Alb  3.0  /  TBili  0.2  /  DBili  x   /  AST  20  /  ALT  27  /  AlkPhos  40  11-22     eGFR if Non African American: 48 mL/min/1.73M2 (11-22-21 @ 06:35)  eGFR if African American: 55 mL/min/1.73M2 (11-22-21 @ 06:35)    11-02 Chol 248 mg/dL LDL -- HDL 53 mg/dL Trig 151 mg/dL    POCT Blood Glucose.: 232 mg/dL (22 Nov 2021 08:10)  POCT Blood Glucose.: 128 mg/dL (21 Nov 2021 22:31)  POCT Blood Glucose.: 100 mg/dL (21 Nov 2021 16:57)  POCT Blood Glucose.: 149 mg/dL (21 Nov 2021 12:24)    COVID-19 PCR: NotDetec (11-18-21 @ 07:02)  COVID-19 PCR: NotDetec (11-11-21 @ 17:00)  COVID-19 PCR: NotDetec (11-08-21 @ 08:58)  COVID-19 PCR: NotDetec (11-18-21 @ 07:02)  COVID-19 PCR: NotDetec (11-11-21 @ 17:00)  COVID-19 PCR: NotDetec (11-08-21 @ 08:58)  COVID-19 PCR: Detected (09-16-21 @ 07:04)

## 2021-11-22 NOTE — PROGRESS NOTE ADULT - ASSESSMENT
76 Female with a h/o CVA (no prior deficit), HTN, HLD, IDDM, Dementia, MDD admitted to [facility] on 11/1 for Subacute CVA L Basal Ganglia, Mod-Severe Stenosis Prox R PCA, HTN Urgency, AMS, abnormal speech, and Hypoglycemia.  In ED,  on arrival with some improvement with medications but remains hypertensive.  LKWT 3 days prior to presentation, NIHSS 2, Out of window for TPA. Hospital course significant for UTI- treated with ABT. Patient now with gait Instability, ADL impairments and Functional impairments.    # CVA  - Subacute CVA L Basal Ganglia  - Mod-Severe Stenosis Prox R PCA  - continue Comprehensive Rehab Program: PT/OT/ST   - c/a ASA 81mg daily, lipitor 80mg qhs    #NPH  - OP f/u with NSGY    #HTN  - Procardia XL 30mg daily  - Lisinopril 40 mg daily  - hydralazine 10 mg BID    #HLD  - on Lipitor  - Fenofibrate 145mg daily    #DM II kristen hyperglycemia, uncontrolled   - HA1c  7.6  - ISS and FS  - Continue with Lantus 32units qhs and continue Admelog 14 units premeal   - Home meds reviewed, restarted Metformin 500mg BID    #Depression  - Sertraline 100mg daily    #Dementia  - Donepezil 5mg daily  - Memantine 5mg BID    #UTI proteus miribalis   - S/p augmentin    #DVT ppx  - Heparin

## 2021-11-22 NOTE — PROGRESS NOTE ADULT - ASSESSMENT
ASSESSMENT/PLAN  This is a 76 Female with a h/o CVA (no prior deficit), HTN, HLD, IDDM, Dementia, MDD admitted to [facility] on 11/1 for Subacute CVA L Basal Ganglia, Mod-Severe Stenosis Prox R PCA, HTN Urgency, AMS, abnormal speech, and Hypoglycemia.  In ED,  on arrival with some improvement with medications but remains hypertensive.  LKWT 3 days prior to presentation, NIHSS 2, Out of window for TPA. Hospital course significant for UTI- treated with ABT. Patient now with gait Instability, ADL impairments and Functional impairments.    # CVA  - Subacute CVA L Basal Ganglia  - Mod-Severe Stenosis Prox R PCA  - cont Comprehensive Rehab Program: PT/OT/ST- total of 3 hrs/day 5 days/week   - c/w ASA, high dose statin: lipitor    #NPH  - OP f/u with NSGY    #HTN  --SBP goal 120-150s  -stopped Metoprolol 11/16  - Procardia XL  - Lisinopril 40 mg daily  - hydralazine 10 mg BID    #HLD  - on Lipitor  - Fenofibrate 145mg daily    #DM II with hyperglycemia  - ISS and FS  - Lantus and Admelog  -Management as per hospitalist    #Depression  - Sertraline 100mg daily    #Pain management  - Tylenol PRN    #DVT ppx  - Heparin, SCD, TEDs    #Dementia  - Donepezil 5mg daily  - Memantine 5mg BID    #GI ppx  - Protonix  - maalox for dyspepsia    #Bowel Regimen  - Senna, miralax PRN    #Bladder management  -voiding with low PVRs      #Dysphagia    - SLP: evaluation and treatment  - s/p MBS: easy chew m thin liquid per speech. 100 % supervision for aspiration precaution    #Precaution  - Fall, Aspiration, Seizure      #Skin:  - No active issues at this time  - Desitin to buttocks for IAD  - Pressure injury/Skin: Turn Q2hrs while in bed, OOB to Chair, PT/OT       #Sleep:  - Maintain quiet hours and low stim environment.  -melatonin PRN  - Monitor sleep logs    #IDT 11/16:  - SW: lives in  3STE with  and daughter.  and 2 granddaughters assisted with ADLs. Owns WC, RW, rollator.  - Barriers: retropulsion, coordination, poor balance  - Eating SV, Grooming Kaley, UBD Kaley, LBD maxA, bathing maxA, toilet transfer modA, shower transfer totalA, transfers Kaley, ambulation 62' with RW Mod A and WC follow. Needs assist moving RLE forward and preventing hyperextension of knee, 4 steps with 2 HR maxA.  - On easy to chew diet, modA cognition, decreased attention. poor problem solving, decreased initiation,  mild-mod receptive deficits, mod dysarthria  - Goals: shower transfers with Kaley o/w SC with ADLs, Kaley with stairs and CG transfers, SV with ambulation, SV cognition  - Will need family training  - EDOD 12/4 home  - , Emmanuel, updated 11/16.     Outpatient Follow-up (Specialty/Name of physician):    Huan Oquendo; PhD)  Neurology; Vascular Neurology  [address] [facility] [address]  [place] [address]  Phone: [phone]  Fax: [phone]    Alexander Arevalo (MD)  Neurology; Vascular Neurology  [address] [facility], [address] [place] [address]  Phone: [phone]  Fax: [phone] ASSESSMENT/PLAN  This is a 76 Female with a h/o CVA (no prior deficit), HTN, HLD, IDDM, Dementia, MDD admitted to [facility] on 11/1 for Subacute CVA L Basal Ganglia, Mod-Severe Stenosis Prox R PCA, HTN Urgency, AMS, abnormal speech, and Hypoglycemia.  In ED,  on arrival with some improvement with medications but remains hypertensive.  LKWT 3 days prior to presentation, NIHSS 2, Out of window for TPA. Hospital course significant for UTI- treated with ABT. Patient now with gait Instability, ADL impairments and Functional impairments.    # CVA  - Subacute CVA L Basal Ganglia  - Mod-Severe Stenosis Prox R PCA  - cont Comprehensive Rehab Program: PT/OT/ST- total of 3 hrs/day 5 days/week   - c/w ASA, high dose statin: lipitor    #NPH  - OP f/u with NSGY    #HTN  --SBP goal 120-150s  -stopped Metoprolol 11/16  - Procardia XL  - Lisinopril 40 mg daily  - hydralazine 10 mg BID    #HLD  - on Lipitor  - Fenofibrate 145mg daily    #DM II with hyperglycemia  - ISS and FS  - Lantus and Admelog  -Management as per hospitalist    #Depression  - Sertraline 100mg daily    #Pain management  - Tylenol PRN    #DVT ppx  - Heparin, SCD, TEDs    #Dementia  - Donepezil 5mg daily  - Memantine 5mg BID    #GI ppx  - Protonix  - maalox for dyspepsia    #Bowel Regimen  - Senna, miralax PRN    #Bladder management  -voiding with low PVRs      #Dysphagia    - SLP: evaluation and treatment  - s/p MBS: easy chew with thin liquid per speech. 100 % supervision for aspiration precaution    #Precaution  - Fall, Aspiration, Seizure      #Skin:  - No active issues at this time  - Desitin to buttocks for IAD  - Pressure injury/Skin: Turn Q2hrs while in bed, OOB to Chair, PT/OT       #Sleep:  - Maintain quiet hours and low stim environment.  -melatonin PRN  - Monitor sleep logs    #IDT 11/16:  - SW: lives in  3STE with  and daughter.  and 2 granddaughters assisted with ADLs. Owns WC, RW, rollator.  - Barriers: retropulsion, coordination, poor balance  - Eating SV, Grooming Kaley, UBD Kalye, LBD maxA, bathing maxA, toilet transfer modA, shower transfer totalA, transfers Kaley, ambulation 62' with RW Mod A and WC follow. Needs assist moving RLE forward and preventing hyperextension of knee, 4 steps with 2 HR maxA.  - On easy to chew diet, modA cognition, decreased attention. poor problem solving, decreased initiation,  mild-mod receptive deficits, mod dysarthria  - Goals: shower transfers with Kaley o/w SC with ADLs, Kaley with stairs and CG transfers, SV with ambulation, SV cognition  - Will need family training  - EDOD 12/4 home      Outpatient Follow-up (Specialty/Name of physician):    Huan Oquendo; PhD)  Neurology; Vascular Neurology  [address] [facility], [address]  [place] [address]  Phone: [phone]  Fax: [phone]    Alexander Arevalo (MD)  Neurology; Vascular Neurology  [address] [facility], [address] [place] [address]  Phone: [phone]  Fax: [phone]

## 2021-11-22 NOTE — PROGRESS NOTE ADULT - SUBJECTIVE AND OBJECTIVE BOX
HPI:  This is a 76 Female with a h/o CVA (no prior deficit), HTN, HLD, IDDM, Dementia, MDD admitted to [facility] on 11/1 for Subacute CVA L Basal Ganglia infarct,  CTA showedMod-Severe Stenosis Prox R PCA.  Pt. with HTN Urgency, and Hypoglycemia. Patient followed by neurology OP. In ED,  on arrival with some improvement with medications but remains hypertensive. Patient noted to have AMS with abnormal speech by daughter. LKWT 3 days prior to presentation. NIHSS 2. Out of window for TPA.  MRI brain showed--  left posterior limb IC/CR stroke, left frontal and right splenium acute strokes,    Per family, patient was followed by neurosurgery for evaluation of possible NPH with LP as outpatient. Neurosurgery recommending outpatient follow up after rehab. Pt also diagnosed with possible UTI on admission and treated with IV antibiotics, urine cx positive for proteus, ID consulted. HTN meds adjusted, bradycardic metoprolol dose decreased, hypoglycemia improved started low dose Lantus, high BP meds adjusted added hydralazine.  MBS performed and recommended easy to chew with thin liquids, with 100% supervision.   Patient was evaluated by PM&R and therapy for functional deficits, gait/ADL impairments and acute rehabilitation was recommended. Patient was medically optimized for discharge to [facility] IRU on 11/11/21.     (11 Nov 2021 13:54)      PAST MEDICAL & SURGICAL HISTORY:  Rheumatoid arthritis    Diabetes mellitus, type 2    Hypertension    HLD (hyperlipidemia)    Dementia    History of CVA (cerebrovascular accident)    Major depression    Anxiety    UTI (urinary tract infection)    History of dental surgery        Subjective: Patient seen and assessed at bedside, eating breakfast. No acute events overnight. Patient feeling well, slept well overnight. Denies HA, pain, discomfort.       Vital Signs Last 24 Hrs  T(C): 36.5 (22 Nov 2021 08:11), Max: 36.8 (21 Nov 2021 22:28)  T(F): 97.7 (22 Nov 2021 08:11), Max: 98.3 (21 Nov 2021 22:28)  HR: 71 (22 Nov 2021 08:11) (62 - 71)  BP: 149/73 (22 Nov 2021 08:11) (117/62 - 149/73)  BP(mean): --  RR: 16 (22 Nov 2021 08:11) (15 - 16)  SpO2: 98% (22 Nov 2021 08:11) (95% - 98%)    REVIEW OF SYMPTOMS  Denies pain, discomfort, headache.   Denies CP/dyspnea  Denies palpitations  Denies abdominal pain     Physical Exam:  Gen - NAD, Comfortable  Pulm - CTAB,   Cardiovascular - RRR, S1S2, No m/r/g  Abdomen - Soft, NT/ND, +BS  Extremities - No C/C/E, No calf tenderness  Neuro-     Cognitive - AAO to self, hospital ("[facility]" with cueing),month --Needs cue for year     Communication - +dysarthria, hypophonic, delay processing     Attention: impaired-- delayed processing, distractable     CN: visual fields intact, No facial weakness        Motor -                    LEFT    UE - ShAB 5/5, EF 5/5, EE 5/5, WE 5/5,  5/5                    RIGHT UE - ShAB 5/5, EF 5/5, EE 5/5, WE 5/5,  5/5                    LEFT    LE - HF 5/5, KE 5/5, DF 5/5, PF 5/5                    RIGHT LE - HF 4/5, KE 5-/5, DF 5/5, PF 5/5        Sensory - Intact to LT     Coordination - FTN intact     Tone - normal  Psychiatric - Mood stable, Affect WNL      RECENT LABS                        11.8   9.05  )-----------( 282      ( 22 Nov 2021 06:35 )             38.3     11-22    141  |  105  |  36<H>  ----------------------------<  228<H>  4.3   |  28  |  1.13    Ca    9.1      22 Nov 2021 06:35    TPro  6.2  /  Alb  3.0<L>  /  TBili  0.2  /  DBili  x   /  AST  20  /  ALT  27  /  AlkPhos  40  11-22        CAPILLARY BLOOD GLUCOSE      POCT Blood Glucose.: 110 mg/dL (22 Nov 2021 11:55)  POCT Blood Glucose.: 232 mg/dL (22 Nov 2021 08:10)  POCT Blood Glucose.: 128 mg/dL (21 Nov 2021 22:31)  POCT Blood Glucose.: 100 mg/dL (21 Nov 2021 16:57)  POCT Blood Glucose.: 149 mg/dL (21 Nov 2021 12:24)                            RADIOLOGY/OTHER RESULTS      MEDICATIONS  (STANDING):  aspirin enteric coated 81 milliGRAM(s) Oral daily  atorvastatin 80 milliGRAM(s) Oral at bedtime  cyanocobalamin 1000 MICROGram(s) Oral daily  dextrose 40% Gel 15 Gram(s) Oral once  dextrose 5%. 1000 milliLiter(s) (50 mL/Hr) IV Continuous <Continuous>  dextrose 5%. 1000 milliLiter(s) (100 mL/Hr) IV Continuous <Continuous>  dextrose 50% Injectable 25 Gram(s) IV Push once  dextrose 50% Injectable 25 Gram(s) IV Push once  dextrose 50% Injectable 12.5 Gram(s) IV Push once  donepezil 5 milliGRAM(s) Oral at bedtime  famotidine    Tablet 20 milliGRAM(s) Oral daily  fenofibrate Tablet 145 milliGRAM(s) Oral daily  glucagon  Injectable 1 milliGRAM(s) IntraMuscular once  heparin   Injectable 5000 Unit(s) SubCutaneous every 8 hours  hydrALAZINE 10 milliGRAM(s) Oral two times a day  influenza  Vaccine (HIGH DOSE) 0.7 milliLiter(s) IntraMuscular once  insulin glargine Injectable (LANTUS) 32 Unit(s) SubCutaneous at bedtime  insulin lispro (ADMELOG) corrective regimen sliding scale   SubCutaneous three times a day before meals  insulin lispro (ADMELOG) corrective regimen sliding scale   SubCutaneous at bedtime  insulin lispro Injectable (ADMELOG) 14 Unit(s) SubCutaneous three times a day before meals  lisinopril 40 milliGRAM(s) Oral daily  memantine 5 milliGRAM(s) Oral two times a day  metFORMIN 500 milliGRAM(s) Oral two times a day  NIFEdipine XL 30 milliGRAM(s) Oral daily  saccharomyces boulardii 250 milliGRAM(s) Oral two times a day  sertraline 100 milliGRAM(s) Oral daily  zinc oxide 40% Ointment 1 Application(s) Topical two times a day    MEDICATIONS  (PRN):  acetaminophen     Tablet .. 650 milliGRAM(s) Oral every 6 hours PRN Temp greater or equal to 38C (100.4F), Mild Pain (1 - 3)  aluminum hydroxide/magnesium hydroxide/simethicone Suspension 30 milliLiter(s) Oral every 4 hours PRN Dyspepsia  melatonin 3 milliGRAM(s) Oral at bedtime PRN Insomnia

## 2021-11-23 LAB
GLUCOSE BLDC GLUCOMTR-MCNC: 189 MG/DL — HIGH (ref 70–99)
GLUCOSE BLDC GLUCOMTR-MCNC: 220 MG/DL — HIGH (ref 70–99)
GLUCOSE BLDC GLUCOMTR-MCNC: 255 MG/DL — HIGH (ref 70–99)
GLUCOSE BLDC GLUCOMTR-MCNC: 90 MG/DL — SIGNIFICANT CHANGE UP (ref 70–99)

## 2021-11-23 PROCEDURE — 99232 SBSQ HOSP IP/OBS MODERATE 35: CPT

## 2021-11-23 RX ADMIN — HEPARIN SODIUM 5000 UNIT(S): 5000 INJECTION INTRAVENOUS; SUBCUTANEOUS at 05:24

## 2021-11-23 RX ADMIN — ATORVASTATIN CALCIUM 80 MILLIGRAM(S): 80 TABLET, FILM COATED ORAL at 21:57

## 2021-11-23 RX ADMIN — Medication 250 MILLIGRAM(S): at 05:23

## 2021-11-23 RX ADMIN — Medication 3: at 08:11

## 2021-11-23 RX ADMIN — SERTRALINE 100 MILLIGRAM(S): 25 TABLET, FILM COATED ORAL at 11:46

## 2021-11-23 RX ADMIN — FAMOTIDINE 20 MILLIGRAM(S): 10 INJECTION INTRAVENOUS at 11:46

## 2021-11-23 RX ADMIN — Medication 14 UNIT(S): at 08:11

## 2021-11-23 RX ADMIN — Medication 81 MILLIGRAM(S): at 11:46

## 2021-11-23 RX ADMIN — Medication 2: at 11:47

## 2021-11-23 RX ADMIN — INSULIN GLARGINE 32 UNIT(S): 100 INJECTION, SOLUTION SUBCUTANEOUS at 22:02

## 2021-11-23 RX ADMIN — ZINC OXIDE 1 APPLICATION(S): 200 OINTMENT TOPICAL at 18:06

## 2021-11-23 RX ADMIN — MEMANTINE HYDROCHLORIDE 5 MILLIGRAM(S): 10 TABLET ORAL at 05:23

## 2021-11-23 RX ADMIN — Medication 14 UNIT(S): at 16:48

## 2021-11-23 RX ADMIN — HEPARIN SODIUM 5000 UNIT(S): 5000 INJECTION INTRAVENOUS; SUBCUTANEOUS at 21:56

## 2021-11-23 RX ADMIN — HEPARIN SODIUM 5000 UNIT(S): 5000 INJECTION INTRAVENOUS; SUBCUTANEOUS at 13:46

## 2021-11-23 RX ADMIN — Medication 145 MILLIGRAM(S): at 11:46

## 2021-11-23 RX ADMIN — ZINC OXIDE 1 APPLICATION(S): 200 OINTMENT TOPICAL at 05:24

## 2021-11-23 RX ADMIN — METFORMIN HYDROCHLORIDE 500 MILLIGRAM(S): 850 TABLET ORAL at 08:10

## 2021-11-23 RX ADMIN — DONEPEZIL HYDROCHLORIDE 5 MILLIGRAM(S): 10 TABLET, FILM COATED ORAL at 21:57

## 2021-11-23 RX ADMIN — Medication 1: at 16:48

## 2021-11-23 RX ADMIN — Medication 250 MILLIGRAM(S): at 18:27

## 2021-11-23 RX ADMIN — Medication 14 UNIT(S): at 11:47

## 2021-11-23 RX ADMIN — METFORMIN HYDROCHLORIDE 500 MILLIGRAM(S): 850 TABLET ORAL at 18:26

## 2021-11-23 RX ADMIN — Medication 10 MILLIGRAM(S): at 18:26

## 2021-11-23 RX ADMIN — PREGABALIN 1000 MICROGRAM(S): 225 CAPSULE ORAL at 11:46

## 2021-11-23 RX ADMIN — Medication 10 MILLIGRAM(S): at 05:23

## 2021-11-23 RX ADMIN — MEMANTINE HYDROCHLORIDE 5 MILLIGRAM(S): 10 TABLET ORAL at 18:26

## 2021-11-23 RX ADMIN — Medication 30 MILLIGRAM(S): at 05:23

## 2021-11-23 RX ADMIN — LISINOPRIL 40 MILLIGRAM(S): 2.5 TABLET ORAL at 05:23

## 2021-11-23 NOTE — PROGRESS NOTE ADULT - ATTENDING COMMENTS
Pt. seen with resident.  Agree with documentation above as per resident with amendments made as appropriate. Patient medically stable. Making progress towards rehab goals.     left basal ganglia infarct  Stable.

## 2021-11-23 NOTE — PROGRESS NOTE ADULT - ASSESSMENT
76 Female with a h/o CVA (no prior deficit), HTN, HLD, IDDM, Dementia, MDD admitted to [facility] on 11/1 for Subacute CVA L Basal Ganglia, Mod-Severe Stenosis Prox R PCA, HTN Urgency, AMS, abnormal speech, and Hypoglycemia.  In ED,  on arrival with some improvement with medications but remains hypertensive.  LKWT 3 days prior to presentation, NIHSS 2, Out of window for TPA. Hospital course significant for UTI- treated with ABT. Patient now with gait Instability, ADL impairments and Functional impairments.    # CVA  - Subacute CVA L Basal Ganglia  - Mod-Severe Stenosis Prox R PCA  - continue Comprehensive Rehab Program: PT/OT/ST   - c/a ASA 81mg daily, lipitor 80mg qhs    #NPH  - OP f/u with NSGY    #HTN  - Procardia XL 30mg daily  - Lisinopril 40 mg daily  - hydralazine 10 mg BID    #HLD  - on Lipitor  - Fenofibrate 145mg daily    #DM II kristen hyperglycemia, uncontrolled   - HA1c  7.6  - ISS and FS  - Continue with Lantus 32units qhs and continue Admelog 14 units premeal   - Continue Metformin 500mg BID    #Depression  - Sertraline 100mg daily    #Dementia  - Donepezil 5mg daily  - Memantine 5mg BID    #UTI proteus miribalis   - S/p augmentin    #DVT ppx  - Heparin

## 2021-11-23 NOTE — PROGRESS NOTE ADULT - SUBJECTIVE AND OBJECTIVE BOX
Patient is a 76y old  Female who presents with a chief complaint of left basal ganglia CVA with very mild right lower extremity weakness (22 Nov 2021 12:07)      SUBJECTIVE / OVERNIGHT EVENTS:  Pt seen and examined at bedside. No acute events overnight.  Pt denies cp, palpitations, sob, abd pain, N/V, fever, chills.    ROS:  All other review of systems negative    Allergies    No Known Allergies    Intolerances        MEDICATIONS  (STANDING):  aspirin enteric coated 81 milliGRAM(s) Oral daily  atorvastatin 80 milliGRAM(s) Oral at bedtime  cyanocobalamin 1000 MICROGram(s) Oral daily  dextrose 40% Gel 15 Gram(s) Oral once  dextrose 5%. 1000 milliLiter(s) (50 mL/Hr) IV Continuous <Continuous>  dextrose 5%. 1000 milliLiter(s) (100 mL/Hr) IV Continuous <Continuous>  dextrose 50% Injectable 25 Gram(s) IV Push once  dextrose 50% Injectable 12.5 Gram(s) IV Push once  dextrose 50% Injectable 25 Gram(s) IV Push once  donepezil 5 milliGRAM(s) Oral at bedtime  famotidine    Tablet 20 milliGRAM(s) Oral daily  fenofibrate Tablet 145 milliGRAM(s) Oral daily  glucagon  Injectable 1 milliGRAM(s) IntraMuscular once  heparin   Injectable 5000 Unit(s) SubCutaneous every 8 hours  hydrALAZINE 10 milliGRAM(s) Oral two times a day  influenza  Vaccine (HIGH DOSE) 0.7 milliLiter(s) IntraMuscular once  insulin glargine Injectable (LANTUS) 32 Unit(s) SubCutaneous at bedtime  insulin lispro (ADMELOG) corrective regimen sliding scale   SubCutaneous three times a day before meals  insulin lispro (ADMELOG) corrective regimen sliding scale   SubCutaneous at bedtime  insulin lispro Injectable (ADMELOG) 14 Unit(s) SubCutaneous three times a day before meals  lisinopril 40 milliGRAM(s) Oral daily  memantine 5 milliGRAM(s) Oral two times a day  metFORMIN 500 milliGRAM(s) Oral two times a day  NIFEdipine XL 30 milliGRAM(s) Oral daily  saccharomyces boulardii 250 milliGRAM(s) Oral two times a day  sertraline 100 milliGRAM(s) Oral daily  zinc oxide 40% Ointment 1 Application(s) Topical two times a day    MEDICATIONS  (PRN):  acetaminophen     Tablet .. 650 milliGRAM(s) Oral every 6 hours PRN Temp greater or equal to 38C (100.4F), Mild Pain (1 - 3)  aluminum hydroxide/magnesium hydroxide/simethicone Suspension 30 milliLiter(s) Oral every 4 hours PRN Dyspepsia  melatonin 3 milliGRAM(s) Oral at bedtime PRN Insomnia      Vital Signs Last 24 Hrs  T(C): 37 (22 Nov 2021 19:59), Max: 37 (22 Nov 2021 19:59)  T(F): 98.6 (22 Nov 2021 19:59), Max: 98.6 (22 Nov 2021 19:59)  HR: 77 (23 Nov 2021 05:20) (67 - 77)  BP: 159/70 (23 Nov 2021 05:20) (117/56 - 159/70)  BP(mean): --  RR: 15 (22 Nov 2021 19:59) (15 - 16)  SpO2: 94% (22 Nov 2021 19:59) (94% - 96%)  CAPILLARY BLOOD GLUCOSE      POCT Blood Glucose.: 255 mg/dL (23 Nov 2021 08:09)  POCT Blood Glucose.: 107 mg/dL (22 Nov 2021 21:12)  POCT Blood Glucose.: 125 mg/dL (22 Nov 2021 16:44)  POCT Blood Glucose.: 110 mg/dL (22 Nov 2021 11:55)    I&O's Summary    22 Nov 2021 07:01  -  23 Nov 2021 07:00  --------------------------------------------------------  IN: 0 mL / OUT: 600 mL / NET: -600 mL        PHYSICAL EXAM:  General: NAD, A/O x 3  ENT: MMM, no scleral icterus  Neck: Supple, No JVD, no thyroidomegaly  Lungs: Clear to auscultation bilaterally, no wheezes, no rales, no rhonchi, good inspiratory effort  Cardio: RRR, S1/S2, No murmurs  Abdomen: Soft, Nontender, Nondistended; Bowel sounds present  Extremities: No calf tenderness, No pitting edema, no skin changes    LABS:                        11.8   9.05  )-----------( 282      ( 22 Nov 2021 06:35 )             38.3     11-22    141  |  105  |  36<H>  ----------------------------<  228<H>  4.3   |  28  |  1.13    Ca    9.1      22 Nov 2021 06:35    TPro  6.2  /  Alb  3.0<L>  /  TBili  0.2  /  DBili  x   /  AST  20  /  ALT  27  /  AlkPhos  40  11-22              RADIOLOGY & ADDITIONAL TESTS:  Results Reviewed:   Imaging Personally Reviewed:  Electrocardiogram Personally Reviewed:    COORDINATION OF CARE:  Care Discussed with Consultants/Other Providers [Y/N]:  Prior or Outpatient Records Reviewed [Y/N]: Patient is a 76y old  Female who presents with a chief complaint of left basal ganglia CVA with very mild right lower extremity weakness (22 Nov 2021 12:07)      SUBJECTIVE / OVERNIGHT EVENTS:  Pt seen and examined at bedside. No acute events overnight.  Pt denies cp, palpitations, sob, abd pain, N/V, fever, chills.    ROS:  All other review of systems negative    Allergies    No Known Allergies    Intolerances        MEDICATIONS  (STANDING):  aspirin enteric coated 81 milliGRAM(s) Oral daily  atorvastatin 80 milliGRAM(s) Oral at bedtime  cyanocobalamin 1000 MICROGram(s) Oral daily  dextrose 40% Gel 15 Gram(s) Oral once  dextrose 5%. 1000 milliLiter(s) (50 mL/Hr) IV Continuous <Continuous>  dextrose 5%. 1000 milliLiter(s) (100 mL/Hr) IV Continuous <Continuous>  dextrose 50% Injectable 25 Gram(s) IV Push once  dextrose 50% Injectable 12.5 Gram(s) IV Push once  dextrose 50% Injectable 25 Gram(s) IV Push once  donepezil 5 milliGRAM(s) Oral at bedtime  famotidine    Tablet 20 milliGRAM(s) Oral daily  fenofibrate Tablet 145 milliGRAM(s) Oral daily  glucagon  Injectable 1 milliGRAM(s) IntraMuscular once  heparin   Injectable 5000 Unit(s) SubCutaneous every 8 hours  hydrALAZINE 10 milliGRAM(s) Oral two times a day  influenza  Vaccine (HIGH DOSE) 0.7 milliLiter(s) IntraMuscular once  insulin glargine Injectable (LANTUS) 32 Unit(s) SubCutaneous at bedtime  insulin lispro (ADMELOG) corrective regimen sliding scale   SubCutaneous three times a day before meals  insulin lispro (ADMELOG) corrective regimen sliding scale   SubCutaneous at bedtime  insulin lispro Injectable (ADMELOG) 14 Unit(s) SubCutaneous three times a day before meals  lisinopril 40 milliGRAM(s) Oral daily  memantine 5 milliGRAM(s) Oral two times a day  metFORMIN 500 milliGRAM(s) Oral two times a day  NIFEdipine XL 30 milliGRAM(s) Oral daily  saccharomyces boulardii 250 milliGRAM(s) Oral two times a day  sertraline 100 milliGRAM(s) Oral daily  zinc oxide 40% Ointment 1 Application(s) Topical two times a day    MEDICATIONS  (PRN):  acetaminophen     Tablet .. 650 milliGRAM(s) Oral every 6 hours PRN Temp greater or equal to 38C (100.4F), Mild Pain (1 - 3)  aluminum hydroxide/magnesium hydroxide/simethicone Suspension 30 milliLiter(s) Oral every 4 hours PRN Dyspepsia  melatonin 3 milliGRAM(s) Oral at bedtime PRN Insomnia      Vital Signs Last 24 Hrs  T(C): 37 (22 Nov 2021 19:59), Max: 37 (22 Nov 2021 19:59)  T(F): 98.6 (22 Nov 2021 19:59), Max: 98.6 (22 Nov 2021 19:59)  HR: 77 (23 Nov 2021 05:20) (67 - 77)  BP: 159/70 (23 Nov 2021 05:20) (117/56 - 159/70)  BP(mean): --  RR: 15 (22 Nov 2021 19:59) (15 - 16)  SpO2: 94% (22 Nov 2021 19:59) (94% - 96%)  CAPILLARY BLOOD GLUCOSE      POCT Blood Glucose.: 255 mg/dL (23 Nov 2021 08:09)  POCT Blood Glucose.: 107 mg/dL (22 Nov 2021 21:12)  POCT Blood Glucose.: 125 mg/dL (22 Nov 2021 16:44)  POCT Blood Glucose.: 110 mg/dL (22 Nov 2021 11:55)    I&O's Summary    22 Nov 2021 07:01  -  23 Nov 2021 07:00  --------------------------------------------------------  IN: 0 mL / OUT: 600 mL / NET: -600 mL        PHYSICAL EXAM:  GENERAL: NAD, well-developed  CHEST/LUNG: Clear to auscultation bilaterally; No wheeze, nonlabored breathing  HEART: Regular rate and rhythm; No murmurs, rubs, or gallops  ABDOMEN: Soft, Nontender, Nondistended; Bowel sounds present  EXTREMITIES:  2+ Peripheral Pulses, No clubbing, cyanosis, or edema  NEUROLOGY: AAOx3, non-focal  PSYCH: calm, appropriate mood    LABS:                        11.8   9.05  )-----------( 282      ( 22 Nov 2021 06:35 )             38.3     11-22    141  |  105  |  36<H>  ----------------------------<  228<H>  4.3   |  28  |  1.13    Ca    9.1      22 Nov 2021 06:35    TPro  6.2  /  Alb  3.0<L>  /  TBili  0.2  /  DBili  x   /  AST  20  /  ALT  27  /  AlkPhos  40  11-22              RADIOLOGY & ADDITIONAL TESTS:  Results Reviewed:   Imaging Personally Reviewed:  Electrocardiogram Personally Reviewed:    COORDINATION OF CARE:  Care Discussed with Consultants/Other Providers [Y/N]:  Prior or Outpatient Records Reviewed [Y/N]:

## 2021-11-23 NOTE — PROGRESS NOTE ADULT - SUBJECTIVE AND OBJECTIVE BOX
HPI:  This is a 76 Female with a h/o CVA (no prior deficit), HTN, HLD, IDDM, Dementia, MDD admitted to [facility] on 11/1 for Subacute CVA L Basal Ganglia infarct,  CTA showedMod-Severe Stenosis Prox R PCA.  Pt. with HTN Urgency, and Hypoglycemia. Patient followed by neurology OP. In ED,  on arrival with some improvement with medications but remains hypertensive. Patient noted to have AMS with abnormal speech by daughter. LKWT 3 days prior to presentation. NIHSS 2. Out of window for TPA.  MRI brain showed--  left posterior limb IC/CR stroke, left frontal and right splenium acute strokes,    Per family, patient was followed by neurosurgery for evaluation of possible NPH with LP as outpatient. Neurosurgery recommending outpatient follow up after rehab. Pt also diagnosed with possible UTI on admission and treated with IV antibiotics, urine cx positive for proteus, ID consulted. HTN meds adjusted, bradycardic metoprolol dose decreased, hypoglycemia improved started low dose Lantus, high BP meds adjusted added hydralazine.  MBS performed and recommended easy to chew with thin liquids, with 100% supervision.   Patient was evaluated by PM&R and therapy for functional deficits, gait/ADL impairments and acute rehabilitation was recommended. Patient was medically optimized for discharge to [facility] IRU on 11/11/21.     (11 Nov 2021 13:54)      PAST MEDICAL & SURGICAL HISTORY:  Rheumatoid arthritis    Diabetes mellitus, type 2    Hypertension    HLD (hyperlipidemia)    Dementia    History of CVA (cerebrovascular accident)    Major depression    Anxiety    UTI (urinary tract infection)    History of dental surgery        Subjective: Patient seen and assessed at bedside, eating breakfast. No acute events overnight. Patient feeling well, slept well overnight. Denies HA, pain, discomfort, abdominal pain.       Vital Signs Last 24 Hrs  T(C): 37 (22 Nov 2021 19:59), Max: 37 (22 Nov 2021 19:59)  T(F): 98.6 (22 Nov 2021 19:59), Max: 98.6 (22 Nov 2021 19:59)  HR: 77 (23 Nov 2021 05:20) (67 - 77)  BP: 159/70 (23 Nov 2021 05:20) (117/56 - 159/70)  BP(mean): --  RR: 15 (22 Nov 2021 19:59) (15 - 16)  SpO2: 94% (22 Nov 2021 19:59) (94% - 96%)    REVIEW OF SYMPTOMS  Denies pain, discomfort, headache.   Denies CP/dyspnea  Denies palpitations  Denies abdominal pain     Physical Exam:  Gen - NAD, Comfortable  Pulm - CTAB,   Cardiovascular - RRR, S1S2, No m/r/g  Abdomen - Soft, NT/ND, +BS  Extremities - No C/C/E, No calf tenderness  Neuro-     Cognitive - AAO to self, hospital ("[facility]" with cueing),month --Needs cue for year     Communication - +dysarthria, hypophonic, delay processing     Attention: impaired-- delayed processing, distractable     CN: visual fields intact, No facial weakness        Motor -                    LEFT    UE - ShAB 5/5, EF 5/5, EE 5/5, WE 5/5,  5/5                    RIGHT UE - ShAB 5/5, EF 5/5, EE 5/5, WE 5/5,  5/5                    LEFT    LE - HF 5/5, KE 5/5, DF 5/5, PF 5/5                    RIGHT LE - HF 4/5, KE 5-/5, DF 5/5, PF 5/5        Sensory - Intact to LT     Coordination - FTN intact     Tone - normal  Psychiatric - Mood stable, Affect WNL      RECENT LABS                        11.8   9.05  )-----------( 282      ( 22 Nov 2021 06:35 )             38.3     11-22    141  |  105  |  36<H>  ----------------------------<  228<H>  4.3   |  28  |  1.13    Ca    9.1      22 Nov 2021 06:35    TPro  6.2  /  Alb  3.0<L>  /  TBili  0.2  /  DBili  x   /  AST  20  /  ALT  27  /  AlkPhos  40  11-22        CAPILLARY BLOOD GLUCOSE      POCT Blood Glucose.: 110 mg/dL (22 Nov 2021 11:55)  POCT Blood Glucose.: 232 mg/dL (22 Nov 2021 08:10)  POCT Blood Glucose.: 128 mg/dL (21 Nov 2021 22:31)  POCT Blood Glucose.: 100 mg/dL (21 Nov 2021 16:57)  POCT Blood Glucose.: 149 mg/dL (21 Nov 2021 12:24)                            RADIOLOGY/OTHER RESULTS      MEDICATIONS  (STANDING):  aspirin enteric coated 81 milliGRAM(s) Oral daily  atorvastatin 80 milliGRAM(s) Oral at bedtime  cyanocobalamin 1000 MICROGram(s) Oral daily  dextrose 40% Gel 15 Gram(s) Oral once  dextrose 5%. 1000 milliLiter(s) (50 mL/Hr) IV Continuous <Continuous>  dextrose 5%. 1000 milliLiter(s) (100 mL/Hr) IV Continuous <Continuous>  dextrose 50% Injectable 25 Gram(s) IV Push once  dextrose 50% Injectable 12.5 Gram(s) IV Push once  dextrose 50% Injectable 25 Gram(s) IV Push once  donepezil 5 milliGRAM(s) Oral at bedtime  famotidine    Tablet 20 milliGRAM(s) Oral daily  fenofibrate Tablet 145 milliGRAM(s) Oral daily  glucagon  Injectable 1 milliGRAM(s) IntraMuscular once  heparin   Injectable 5000 Unit(s) SubCutaneous every 8 hours  hydrALAZINE 10 milliGRAM(s) Oral two times a day  influenza  Vaccine (HIGH DOSE) 0.7 milliLiter(s) IntraMuscular once  insulin glargine Injectable (LANTUS) 32 Unit(s) SubCutaneous at bedtime  insulin lispro (ADMELOG) corrective regimen sliding scale   SubCutaneous three times a day before meals  insulin lispro (ADMELOG) corrective regimen sliding scale   SubCutaneous at bedtime  insulin lispro Injectable (ADMELOG) 14 Unit(s) SubCutaneous three times a day before meals  lisinopril 40 milliGRAM(s) Oral daily  memantine 5 milliGRAM(s) Oral two times a day  metFORMIN 500 milliGRAM(s) Oral two times a day  NIFEdipine XL 30 milliGRAM(s) Oral daily  saccharomyces boulardii 250 milliGRAM(s) Oral two times a day  sertraline 100 milliGRAM(s) Oral daily  zinc oxide 40% Ointment 1 Application(s) Topical two times a day    MEDICATIONS  (PRN):  acetaminophen     Tablet .. 650 milliGRAM(s) Oral every 6 hours PRN Temp greater or equal to 38C (100.4F), Mild Pain (1 - 3)  aluminum hydroxide/magnesium hydroxide/simethicone Suspension 30 milliLiter(s) Oral every 4 hours PRN Dyspepsia  melatonin 3 milliGRAM(s) Oral at bedtime PRN Insomnia

## 2021-11-24 LAB
GLUCOSE BLDC GLUCOMTR-MCNC: 102 MG/DL — HIGH (ref 70–99)
GLUCOSE BLDC GLUCOMTR-MCNC: 115 MG/DL — HIGH (ref 70–99)
GLUCOSE BLDC GLUCOMTR-MCNC: 130 MG/DL — HIGH (ref 70–99)
GLUCOSE BLDC GLUCOMTR-MCNC: 180 MG/DL — HIGH (ref 70–99)

## 2021-11-24 PROCEDURE — 99232 SBSQ HOSP IP/OBS MODERATE 35: CPT

## 2021-11-24 RX ADMIN — FAMOTIDINE 20 MILLIGRAM(S): 10 INJECTION INTRAVENOUS at 11:56

## 2021-11-24 RX ADMIN — Medication 250 MILLIGRAM(S): at 17:35

## 2021-11-24 RX ADMIN — Medication 10 MILLIGRAM(S): at 17:35

## 2021-11-24 RX ADMIN — METFORMIN HYDROCHLORIDE 500 MILLIGRAM(S): 850 TABLET ORAL at 08:13

## 2021-11-24 RX ADMIN — ATORVASTATIN CALCIUM 80 MILLIGRAM(S): 80 TABLET, FILM COATED ORAL at 22:19

## 2021-11-24 RX ADMIN — Medication 145 MILLIGRAM(S): at 11:56

## 2021-11-24 RX ADMIN — Medication 14 UNIT(S): at 08:13

## 2021-11-24 RX ADMIN — SERTRALINE 100 MILLIGRAM(S): 25 TABLET, FILM COATED ORAL at 11:56

## 2021-11-24 RX ADMIN — Medication 1: at 08:13

## 2021-11-24 RX ADMIN — Medication 14 UNIT(S): at 16:38

## 2021-11-24 RX ADMIN — Medication 3 MILLIGRAM(S): at 00:22

## 2021-11-24 RX ADMIN — MEMANTINE HYDROCHLORIDE 5 MILLIGRAM(S): 10 TABLET ORAL at 17:35

## 2021-11-24 RX ADMIN — ZINC OXIDE 1 APPLICATION(S): 200 OINTMENT TOPICAL at 05:40

## 2021-11-24 RX ADMIN — Medication 81 MILLIGRAM(S): at 11:56

## 2021-11-24 RX ADMIN — Medication 250 MILLIGRAM(S): at 05:40

## 2021-11-24 RX ADMIN — HEPARIN SODIUM 5000 UNIT(S): 5000 INJECTION INTRAVENOUS; SUBCUTANEOUS at 22:19

## 2021-11-24 RX ADMIN — HEPARIN SODIUM 5000 UNIT(S): 5000 INJECTION INTRAVENOUS; SUBCUTANEOUS at 13:40

## 2021-11-24 RX ADMIN — METFORMIN HYDROCHLORIDE 500 MILLIGRAM(S): 850 TABLET ORAL at 17:35

## 2021-11-24 RX ADMIN — MEMANTINE HYDROCHLORIDE 5 MILLIGRAM(S): 10 TABLET ORAL at 05:40

## 2021-11-24 RX ADMIN — Medication 14 UNIT(S): at 11:56

## 2021-11-24 RX ADMIN — DONEPEZIL HYDROCHLORIDE 5 MILLIGRAM(S): 10 TABLET, FILM COATED ORAL at 22:19

## 2021-11-24 RX ADMIN — HEPARIN SODIUM 5000 UNIT(S): 5000 INJECTION INTRAVENOUS; SUBCUTANEOUS at 05:41

## 2021-11-24 RX ADMIN — ZINC OXIDE 1 APPLICATION(S): 200 OINTMENT TOPICAL at 17:33

## 2021-11-24 RX ADMIN — PREGABALIN 1000 MICROGRAM(S): 225 CAPSULE ORAL at 11:56

## 2021-11-24 RX ADMIN — INSULIN GLARGINE 32 UNIT(S): 100 INJECTION, SOLUTION SUBCUTANEOUS at 22:19

## 2021-11-24 NOTE — PROGRESS NOTE ADULT - SUBJECTIVE AND OBJECTIVE BOX
Patient is a 76y old  Female who presents with a chief complaint of left basal ganglia CVA with very mild right lower extremity weakness (23 Nov 2021 10:05)      SUBJECTIVE / OVERNIGHT EVENTS:  Pt seen and examined at bedside. No acute events overnight.  Pt denies cp, palpitations, sob, abd pain, N/V, fever, chills.    ROS:  All other review of systems negative    Allergies    No Known Allergies    Intolerances        MEDICATIONS  (STANDING):  aspirin enteric coated 81 milliGRAM(s) Oral daily  atorvastatin 80 milliGRAM(s) Oral at bedtime  cyanocobalamin 1000 MICROGram(s) Oral daily  dextrose 40% Gel 15 Gram(s) Oral once  dextrose 5%. 1000 milliLiter(s) (50 mL/Hr) IV Continuous <Continuous>  dextrose 5%. 1000 milliLiter(s) (100 mL/Hr) IV Continuous <Continuous>  dextrose 50% Injectable 25 Gram(s) IV Push once  dextrose 50% Injectable 12.5 Gram(s) IV Push once  dextrose 50% Injectable 25 Gram(s) IV Push once  donepezil 5 milliGRAM(s) Oral at bedtime  famotidine    Tablet 20 milliGRAM(s) Oral daily  fenofibrate Tablet 145 milliGRAM(s) Oral daily  glucagon  Injectable 1 milliGRAM(s) IntraMuscular once  heparin   Injectable 5000 Unit(s) SubCutaneous every 8 hours  hydrALAZINE 10 milliGRAM(s) Oral two times a day  influenza  Vaccine (HIGH DOSE) 0.7 milliLiter(s) IntraMuscular once  insulin glargine Injectable (LANTUS) 32 Unit(s) SubCutaneous at bedtime  insulin lispro (ADMELOG) corrective regimen sliding scale   SubCutaneous three times a day before meals  insulin lispro (ADMELOG) corrective regimen sliding scale   SubCutaneous at bedtime  insulin lispro Injectable (ADMELOG) 14 Unit(s) SubCutaneous three times a day before meals  lisinopril 40 milliGRAM(s) Oral daily  memantine 5 milliGRAM(s) Oral two times a day  metFORMIN 500 milliGRAM(s) Oral two times a day  NIFEdipine XL 30 milliGRAM(s) Oral daily  saccharomyces boulardii 250 milliGRAM(s) Oral two times a day  sertraline 100 milliGRAM(s) Oral daily  zinc oxide 40% Ointment 1 Application(s) Topical two times a day    MEDICATIONS  (PRN):  acetaminophen     Tablet .. 650 milliGRAM(s) Oral every 6 hours PRN Temp greater or equal to 38C (100.4F), Mild Pain (1 - 3)  aluminum hydroxide/magnesium hydroxide/simethicone Suspension 30 milliLiter(s) Oral every 4 hours PRN Dyspepsia  melatonin 3 milliGRAM(s) Oral at bedtime PRN Insomnia      Vital Signs Last 24 Hrs  T(C): 36.5 (24 Nov 2021 08:08), Max: 36.5 (23 Nov 2021 19:51)  T(F): 97.7 (24 Nov 2021 08:08), Max: 97.7 (23 Nov 2021 19:51)  HR: 58 (24 Nov 2021 08:08) (58 - 84)  BP: 134/64 (24 Nov 2021 08:08) (118/71 - 146/68)  BP(mean): --  RR: 16 (24 Nov 2021 08:08) (16 - 16)  SpO2: 98% (24 Nov 2021 08:08) (95% - 100%)  CAPILLARY BLOOD GLUCOSE      POCT Blood Glucose.: 180 mg/dL (24 Nov 2021 08:11)  POCT Blood Glucose.: 90 mg/dL (23 Nov 2021 21:55)  POCT Blood Glucose.: 189 mg/dL (23 Nov 2021 16:47)  POCT Blood Glucose.: 220 mg/dL (23 Nov 2021 11:44)    I&O's Summary      PHYSICAL EXAM:  GENERAL: NAD, well-developed  CHEST/LUNG: Clear to auscultation bilaterally; No wheeze, nonlabored breathing  HEART: Regular rate and rhythm; No murmurs, rubs, or gallops  ABDOMEN: Soft, Nontender, Nondistended; Bowel sounds present  EXTREMITIES:  2+ Peripheral Pulses, No clubbing, cyanosis, or edema  NEUROLOGY: AAOx3, non-focal  PSYCH: calm, appropriate mood    LABS:                    RADIOLOGY & ADDITIONAL TESTS:  Results Reviewed:   Imaging Personally Reviewed:  Electrocardiogram Personally Reviewed:    COORDINATION OF CARE:  Care Discussed with Consultants/Other Providers [Y/N]:  Prior or Outpatient Records Reviewed [Y/N]:

## 2021-11-24 NOTE — PROGRESS NOTE ADULT - ASSESSMENT
76 Female with a h/o CVA (no prior deficit), HTN, HLD, IDDM, Dementia, MDD admitted to [facility] on 11/1 for Subacute CVA L Basal Ganglia, Mod-Severe Stenosis Prox R PCA, HTN Urgency, AMS, abnormal speech, and Hypoglycemia.  In ED,  on arrival with some improvement with medications but remains hypertensive.  LKWT 3 days prior to presentation, NIHSS 2, Out of window for TPA. Hospital course significant for UTI- treated with ABT. Patient now with gait Instability, ADL impairments and Functional impairments.    # CVA  - Subacute CVA L Basal Ganglia  - Mod-Severe Stenosis Prox R PCA  - continue Comprehensive Rehab Program: PT/OT/ST   - c/a ASA 81mg daily, lipitor 80mg qhs    #NPH  - OP f/u with NSGY    #HTN  - Procardia XL 30mg daily  - Lisinopril 40 mg daily  - hydralazine 10 mg BID    #HLD  - on Lipitor  - Fenofibrate 145mg daily    #DM II kristen hyperglycemia, uncontrolled   - HA1c 7.6  - Continue with Lantus 32units qhs and continue Admelog 14 units premeal + Metformin 500mg BID + ISS and FS    #Depression  - Sertraline 100mg daily    #Dementia  - Donepezil 5mg daily  - Memantine 5mg BID    #UTI proteus miribalis   - S/p augmentin    #DVT ppx  - Heparin

## 2021-11-24 NOTE — PROGRESS NOTE ADULT - ASSESSMENT
ASSESSMENT/PLAN  This is a 76 Female with a h/o CVA (no prior deficit), HTN, HLD, IDDM, Dementia, MDD admitted to [facility] on 11/1 for Subacute CVA L Basal Ganglia, Mod-Severe Stenosis Prox R PCA, HTN Urgency, AMS, abnormal speech, and Hypoglycemia.  In ED,  on arrival with some improvement with medications but remains hypertensive.  LKWT 3 days prior to presentation, NIHSS 2, Out of window for TPA. Hospital course significant for UTI- treated with ABT. Patient now with gait Instability, ADL impairments and Functional impairments.    # CVA  - Subacute CVA L Basal Ganglia  - Mod-Severe Stenosis Prox R PCA  - cont Comprehensive Rehab Program: PT/OT/ST- total of 3 hrs/day 5 days/week   - c/w ASA, high dose statin: lipitor    #NPH  - OP f/u with NSGY    #HTN  --SBP goal 120-150s  - Procardia XL  - Lisinopril 40 mg daily  - hydralazine 10 mg BID  bp controlled    #HLD  - on Lipitor  - Fenofibrate 145mg daily    #DM II with hyperglycemia  - ISS and FS  - Lantus and Admelog  -Management as per hospitalist    #Depression  - Sertraline 100mg daily    #Pain management  - Tylenol PRN    #DVT ppx  - Heparin, SCD, TEDs    #Dementia  - Donepezil 5mg daily  - Memantine 5mg BID    #GI ppx  - Protonix  - maalox for dyspepsia    #Bowel Regimen  - Senna, miralax PRN    #Bladder management  -voiding with low PVRs      #Dysphagia    - SLP: evaluation and treatment  - s/p MBS: easy chew with thin liquid per speech. 100 % supervision for aspiration precaution    #Precaution  - Fall, Aspiration, Seizure      #Skin:  - No active issues at this time  - Desitin to buttocks for IAD  - Pressure injury/Skin: Turn Q2hrs while in bed, OOB to Chair, PT/OT       #Sleep:  - Maintain quiet hours and low stim environment.  -melatonin PRN  - Monitor sleep logs    #IDT 11/23:  - SW: lives in  3STE with  and daughter.  and 2 granddaughters assisted with ADLs. Owns WC, RW, rollator.  - Barriers: retropulsion, coordination, poor balance  - Eating and Grooming SV, UBD Kaley, LBD modA, bathing maxA, toilet transfer modA, shower transfer totalA,   --transfers Kaley, ambulation 100' with tomas walker with dorsiflexion assist (for genu recurvatum) Min A, 8 steps with 2 HR mod A.  - On easy to chew diet, modA cognition, decreased attention. poor problem solving, decreased initiation,  mild-mod receptive deficits, mod dysarthria  - Goals: shower transfers with Kaley o/w SC with ADLs, Kaley with stairs and CG transfers, CG/Kaley with ambulation, SV cognition  - Will need family training  - EDOD 12/4 home      Outpatient Follow-up (Specialty/Name of physician):    Huan Oquendo; PhD)  Neurology; Vascular Neurology  [address], [address]  [place] [address]  Phone: [phone]  Fax: [phone]    Alexander Arevalo (MD)  Neurology; Vascular Neurology  [address] [facility], [address] [place] [id number]  Phone: [phone]  Fax: [phone]

## 2021-11-24 NOTE — PROGRESS NOTE ADULT - SUBJECTIVE AND OBJECTIVE BOX
HPI:  This is a 76 Female with a h/o CVA (no prior deficit), HTN, HLD, IDDM, Dementia, MDD admitted to [facility] on 11/1 for Subacute CVA L Basal Ganglia infarct,  CTA showedMod-Severe Stenosis Prox R PCA.  Pt. with HTN Urgency, and Hypoglycemia. Patient followed by neurology OP. In ED,  on arrival with some improvement with medications but remains hypertensive. Patient noted to have AMS with abnormal speech by daughter. LKWT 3 days prior to presentation. NIHSS 2. Out of window for TPA.  MRI brain showed--  left posterior limb IC/CR stroke, left frontal and right splenium acute strokes,    Per family, patient was followed by neurosurgery for evaluation of possible NPH with LP as outpatient. Neurosurgery recommending outpatient follow up after rehab. Pt also diagnosed with possible UTI on admission and treated with IV antibiotics, urine cx positive for proteus, ID consulted. HTN meds adjusted, bradycardic metoprolol dose decreased, hypoglycemia improved started low dose Lantus, high BP meds adjusted added hydralazine.  MBS performed and recommended easy to chew with thin liquids, with 100% supervision.   Patient was evaluated by PM&R and therapy for functional deficits, gait/ADL impairments and acute rehabilitation was recommended. Patient was medically optimized for discharge to [facility] IRU on 11/11/21.     (11 Nov 2021 13:54)      PAST MEDICAL & SURGICAL HISTORY:  Rheumatoid arthritis    Diabetes mellitus, type 2    Hypertension    HLD (hyperlipidemia)    Dementia    History of CVA (cerebrovascular accident)    Major depression    Anxiety    UTI (urinary tract infection)    History of dental surgery        Subjective: no new complaints or overnight issues      VITALS  Vital Signs Last 24 Hrs  T(C): 36.5 (24 Nov 2021 08:08), Max: 36.5 (23 Nov 2021 19:51)  T(F): 97.7 (24 Nov 2021 08:08), Max: 97.7 (23 Nov 2021 19:51)  HR: 58 (24 Nov 2021 08:08) (58 - 84)  BP: 134/64 (24 Nov 2021 08:08) (118/71 - 146/68)  BP(mean): --  RR: 16 (24 Nov 2021 08:08) (16 - 16)  SpO2: 98% (24 Nov 2021 08:08) (98% - 100%)    REVIEW OF SYMPTOMS  Neurological: cognitive deficits  Denies pain, discomfort, headache.   Denies CP/dyspnea  Denies palpitations  Denies abdominal pain     Physical Exam:  Gen - NAD, Comfortable  Pulm - CTAB,   Cardiovascular - RRR, S1S2, No m/r/g  Abdomen - Soft, NT/ND, +BS  Extremities - No C/C/E, No calf tenderness  Neuro-     Cognitive - AAO to self, hospital ("[facility]" with cueing),month --Needs cue for year     Communication - +dysarthria, hypophonic, delay processing     Attention: impaired-- delayed processing, distractable     CN: visual fields intact, No facial weakness        Motor -                    LEFT    UE - ShAB 5/5, EF 5/5, EE 5/5, WE 5/5,  5/5                    RIGHT UE - ShAB 5/5, EF 5/5, EE 5/5, WE 5/5,  5/5                    LEFT    LE - HF 5/5, KE 5/5, DF 5/5, PF 5/5                    RIGHT LE - HF 4/5, KE 5-/5, DF 5/5, PF 5/5        Sensory - Intact to LT     Coordination - FTN intact     Tone - normal  Psychiatric - Mood stable, Affect WNL        RECENT LABS                  RADIOLOGY/OTHER RESULTS      MEDICATIONS  (STANDING):  aspirin enteric coated 81 milliGRAM(s) Oral daily  atorvastatin 80 milliGRAM(s) Oral at bedtime  cyanocobalamin 1000 MICROGram(s) Oral daily  dextrose 40% Gel 15 Gram(s) Oral once  dextrose 5%. 1000 milliLiter(s) (50 mL/Hr) IV Continuous <Continuous>  dextrose 5%. 1000 milliLiter(s) (100 mL/Hr) IV Continuous <Continuous>  dextrose 50% Injectable 25 Gram(s) IV Push once  dextrose 50% Injectable 12.5 Gram(s) IV Push once  dextrose 50% Injectable 25 Gram(s) IV Push once  donepezil 5 milliGRAM(s) Oral at bedtime  famotidine    Tablet 20 milliGRAM(s) Oral daily  fenofibrate Tablet 145 milliGRAM(s) Oral daily  glucagon  Injectable 1 milliGRAM(s) IntraMuscular once  heparin   Injectable 5000 Unit(s) SubCutaneous every 8 hours  hydrALAZINE 10 milliGRAM(s) Oral two times a day  influenza  Vaccine (HIGH DOSE) 0.7 milliLiter(s) IntraMuscular once  insulin glargine Injectable (LANTUS) 32 Unit(s) SubCutaneous at bedtime  insulin lispro (ADMELOG) corrective regimen sliding scale   SubCutaneous three times a day before meals  insulin lispro (ADMELOG) corrective regimen sliding scale   SubCutaneous at bedtime  insulin lispro Injectable (ADMELOG) 14 Unit(s) SubCutaneous three times a day before meals  lisinopril 40 milliGRAM(s) Oral daily  memantine 5 milliGRAM(s) Oral two times a day  metFORMIN 500 milliGRAM(s) Oral two times a day  NIFEdipine XL 30 milliGRAM(s) Oral daily  saccharomyces boulardii 250 milliGRAM(s) Oral two times a day  sertraline 100 milliGRAM(s) Oral daily  zinc oxide 40% Ointment 1 Application(s) Topical two times a day    MEDICATIONS  (PRN):  acetaminophen     Tablet .. 650 milliGRAM(s) Oral every 6 hours PRN Temp greater or equal to 38C (100.4F), Mild Pain (1 - 3)  aluminum hydroxide/magnesium hydroxide/simethicone Suspension 30 milliLiter(s) Oral every 4 hours PRN Dyspepsia  melatonin 3 milliGRAM(s) Oral at bedtime PRN Insomnia

## 2021-11-24 NOTE — CHART NOTE - NSCHARTNOTEFT_GEN_A_CORE
Nutrition Follow Up Note  Hospital Course (Per Electronic Medical Record): 76 Female with a h/o CVA (no prior deficit), HTN, HLD, IDDM, Dementia, MDD admitted to [facility] on 11/1 for Subacute CVA L Basal Ganglia, Mod-Severe Stenosis Prox R PCA, HTN Urgency, AMS, abnormal speech, and Hypoglycemia.  In ED,  on arrival with some improvement with medications but remains hypertensive.  LKWT 3 days prior to presentation, NIHSS 2, Out of window for TPA. Hospital course significant for UTI- treated with ABT. Patient now with gait Instability, ADL impairments and Functional impairments.    Source: Medical Record [X] Patient [X] Family [ ]         Diet: Easy to chew, consistent carbohydrate (no snacks), DASH/TLC  Pt tolerating diet with good PO intake. Pt provided with verbal nutrition education on consistent carb diet. Pt able to identify one source of carbohydrates, discussed other sources of carbohydrates. Pt noted w/ elevated POCT glucose earlier in the week. Per discussion with pt, she reports compliance to current diet with the exception of sweet potato chips by bedside. Pt does not appear to be eating a large amount of chips daily (bag still mostly full).  Denies nausea, vomiting, diarrhea, constipation. Pt denies chewing/swallowing difficulties on current diet.    Enteral/Parenteral Nutrition: N/A    Current Weight: 153 lbs (11/23)  150.5 lbs (11/22)  150.7 lbs (11/20)  147.9 lbs (11/17)  147.9 lbs (11/14)  147.4 lbs (11/11)      Pertinent Medications: MEDICATIONS  (STANDING):  aspirin enteric coated 81 milliGRAM(s) Oral daily  atorvastatin 80 milliGRAM(s) Oral at bedtime  cyanocobalamin 1000 MICROGram(s) Oral daily  dextrose 40% Gel 15 Gram(s) Oral once  dextrose 5%. 1000 milliLiter(s) (50 mL/Hr) IV Continuous <Continuous>  dextrose 5%. 1000 milliLiter(s) (100 mL/Hr) IV Continuous <Continuous>  dextrose 50% Injectable 25 Gram(s) IV Push once  dextrose 50% Injectable 12.5 Gram(s) IV Push once  dextrose 50% Injectable 25 Gram(s) IV Push once  donepezil 5 milliGRAM(s) Oral at bedtime  famotidine    Tablet 20 milliGRAM(s) Oral daily  fenofibrate Tablet 145 milliGRAM(s) Oral daily  glucagon  Injectable 1 milliGRAM(s) IntraMuscular once  heparin   Injectable 5000 Unit(s) SubCutaneous every 8 hours  hydrALAZINE 10 milliGRAM(s) Oral two times a day  influenza  Vaccine (HIGH DOSE) 0.7 milliLiter(s) IntraMuscular once  insulin glargine Injectable (LANTUS) 32 Unit(s) SubCutaneous at bedtime  insulin lispro (ADMELOG) corrective regimen sliding scale   SubCutaneous three times a day before meals  insulin lispro (ADMELOG) corrective regimen sliding scale   SubCutaneous at bedtime  insulin lispro Injectable (ADMELOG) 14 Unit(s) SubCutaneous three times a day before meals  lisinopril 40 milliGRAM(s) Oral daily  memantine 5 milliGRAM(s) Oral two times a day  metFORMIN 500 milliGRAM(s) Oral two times a day  NIFEdipine XL 30 milliGRAM(s) Oral daily  saccharomyces boulardii 250 milliGRAM(s) Oral two times a day  sertraline 100 milliGRAM(s) Oral daily  zinc oxide 40% Ointment 1 Application(s) Topical two times a day    MEDICATIONS  (PRN):  acetaminophen     Tablet .. 650 milliGRAM(s) Oral every 6 hours PRN Temp greater or equal to 38C (100.4F), Mild Pain (1 - 3)  aluminum hydroxide/magnesium hydroxide/simethicone Suspension 30 milliLiter(s) Oral every 4 hours PRN Dyspepsia  melatonin 3 milliGRAM(s) Oral at bedtime PRN Insomnia      Pertinent Labs:  11-22 Na141 mmol/L Glu 228 mg/dL<H> K+ 4.3 mmol/L Cr  1.13 mg/dL BUN 36 mg/dL<H> 11-22 Alb 3.0 g/dL<L> 11-02 Chol 248 mg/dL<H> LDL --    HDL 53 mg/dL Trig 151 mg/dL<H>  POCT glucose x 4: 115 (H), 180 (H), 90 (WNL), 189 (H)      Skin: incontinence associated dermatitis per nursing flow sheets.     Edema: No edema per nursing flow sheets       Estimated Needs:   [X] No Change since Previous Assessment  [ ] Recalculated:     Previous Nutrition Diagnosis:   Altered nutrition related lab values    Nutrition Diagnosis is [X] Ongoing    [ ] Resolved   [ ] Not Applicable      New Nutrition Diagnosis: [X] Not Applicable  [ ] Inadequate Protein Energy Intake   [ ] Inadequate Oral Intake   [ ] Excessive Energy Intake   [ ] Increased Nutrient Needs   [ ] Obesity   [ ] Altered GI Function   [ ] Unintended Weight Loss   [ ] Food & Nutrition Related Knowledge Deficit  [ ] Limited Adherence to nutrition related recommendations   [ ] Malnutrition      Interventions:   1. Recommend continuing with current diet  2. Follow SLP recommendations  3. Pt provided with nutrition education on current diet.     Monitoring & Evaluation:   [X] Weights   [X] PO Intake   [X] Follow Up (Per Protocol)  [X] Tolerance to Diet Prescription   [X] Other: Labs     RD Remains Available.  Jeannine Marinelli RD

## 2021-11-25 LAB
GLUCOSE BLDC GLUCOMTR-MCNC: 112 MG/DL — HIGH (ref 70–99)
GLUCOSE BLDC GLUCOMTR-MCNC: 181 MG/DL — HIGH (ref 70–99)
GLUCOSE BLDC GLUCOMTR-MCNC: 69 MG/DL — LOW (ref 70–99)
GLUCOSE BLDC GLUCOMTR-MCNC: 71 MG/DL — SIGNIFICANT CHANGE UP (ref 70–99)
GLUCOSE BLDC GLUCOMTR-MCNC: 87 MG/DL — SIGNIFICANT CHANGE UP (ref 70–99)
SARS-COV-2 RNA SPEC QL NAA+PROBE: SIGNIFICANT CHANGE UP

## 2021-11-25 PROCEDURE — 99232 SBSQ HOSP IP/OBS MODERATE 35: CPT

## 2021-11-25 RX ORDER — INSULIN GLARGINE 100 [IU]/ML
25 INJECTION, SOLUTION SUBCUTANEOUS AT BEDTIME
Refills: 0 | Status: DISCONTINUED | OUTPATIENT
Start: 2021-11-25 | End: 2021-11-27

## 2021-11-25 RX ADMIN — ZINC OXIDE 1 APPLICATION(S): 200 OINTMENT TOPICAL at 06:18

## 2021-11-25 RX ADMIN — PREGABALIN 1000 MICROGRAM(S): 225 CAPSULE ORAL at 12:25

## 2021-11-25 RX ADMIN — HEPARIN SODIUM 5000 UNIT(S): 5000 INJECTION INTRAVENOUS; SUBCUTANEOUS at 13:21

## 2021-11-25 RX ADMIN — Medication 14 UNIT(S): at 07:58

## 2021-11-25 RX ADMIN — Medication 145 MILLIGRAM(S): at 12:25

## 2021-11-25 RX ADMIN — Medication 10 MILLIGRAM(S): at 18:04

## 2021-11-25 RX ADMIN — METFORMIN HYDROCHLORIDE 500 MILLIGRAM(S): 850 TABLET ORAL at 18:04

## 2021-11-25 RX ADMIN — HEPARIN SODIUM 5000 UNIT(S): 5000 INJECTION INTRAVENOUS; SUBCUTANEOUS at 21:48

## 2021-11-25 RX ADMIN — METFORMIN HYDROCHLORIDE 500 MILLIGRAM(S): 850 TABLET ORAL at 07:59

## 2021-11-25 RX ADMIN — Medication 250 MILLIGRAM(S): at 06:18

## 2021-11-25 RX ADMIN — Medication 14 UNIT(S): at 16:49

## 2021-11-25 RX ADMIN — LISINOPRIL 40 MILLIGRAM(S): 2.5 TABLET ORAL at 06:18

## 2021-11-25 RX ADMIN — Medication 81 MILLIGRAM(S): at 12:25

## 2021-11-25 RX ADMIN — Medication 1: at 16:49

## 2021-11-25 RX ADMIN — MEMANTINE HYDROCHLORIDE 5 MILLIGRAM(S): 10 TABLET ORAL at 06:18

## 2021-11-25 RX ADMIN — DONEPEZIL HYDROCHLORIDE 5 MILLIGRAM(S): 10 TABLET, FILM COATED ORAL at 21:48

## 2021-11-25 RX ADMIN — MEMANTINE HYDROCHLORIDE 5 MILLIGRAM(S): 10 TABLET ORAL at 18:04

## 2021-11-25 RX ADMIN — HEPARIN SODIUM 5000 UNIT(S): 5000 INJECTION INTRAVENOUS; SUBCUTANEOUS at 06:18

## 2021-11-25 RX ADMIN — FAMOTIDINE 20 MILLIGRAM(S): 10 INJECTION INTRAVENOUS at 12:25

## 2021-11-25 RX ADMIN — Medication 10 MILLIGRAM(S): at 06:18

## 2021-11-25 RX ADMIN — Medication 250 MILLIGRAM(S): at 18:04

## 2021-11-25 RX ADMIN — INSULIN GLARGINE 25 UNIT(S): 100 INJECTION, SOLUTION SUBCUTANEOUS at 22:07

## 2021-11-25 RX ADMIN — SERTRALINE 100 MILLIGRAM(S): 25 TABLET, FILM COATED ORAL at 12:25

## 2021-11-25 RX ADMIN — ATORVASTATIN CALCIUM 80 MILLIGRAM(S): 80 TABLET, FILM COATED ORAL at 21:48

## 2021-11-25 RX ADMIN — Medication 30 MILLIGRAM(S): at 06:18

## 2021-11-25 RX ADMIN — Medication 3 MILLIGRAM(S): at 21:48

## 2021-11-25 RX ADMIN — ZINC OXIDE 1 APPLICATION(S): 200 OINTMENT TOPICAL at 18:06

## 2021-11-25 NOTE — PROGRESS NOTE ADULT - SUBJECTIVE AND OBJECTIVE BOX
Patient is a 76y old  Female who presents with a chief complaint of left basal ganglia CVA with very mild right lower extremity weakness (25 Nov 2021 06:27)      SUBJECTIVE / OVERNIGHT EVENTS:  Pt seen and examined at bedside. No acute events overnight.  Pt denies cp, palpitations, sob, abd pain, N/V, fever, chills.    ROS:  All other review of systems negative    Allergies    No Known Allergies    Intolerances        MEDICATIONS  (STANDING):  aspirin enteric coated 81 milliGRAM(s) Oral daily  atorvastatin 80 milliGRAM(s) Oral at bedtime  cyanocobalamin 1000 MICROGram(s) Oral daily  dextrose 40% Gel 15 Gram(s) Oral once  dextrose 5%. 1000 milliLiter(s) (50 mL/Hr) IV Continuous <Continuous>  dextrose 5%. 1000 milliLiter(s) (100 mL/Hr) IV Continuous <Continuous>  dextrose 50% Injectable 25 Gram(s) IV Push once  dextrose 50% Injectable 12.5 Gram(s) IV Push once  dextrose 50% Injectable 25 Gram(s) IV Push once  donepezil 5 milliGRAM(s) Oral at bedtime  famotidine    Tablet 20 milliGRAM(s) Oral daily  fenofibrate Tablet 145 milliGRAM(s) Oral daily  glucagon  Injectable 1 milliGRAM(s) IntraMuscular once  heparin   Injectable 5000 Unit(s) SubCutaneous every 8 hours  hydrALAZINE 10 milliGRAM(s) Oral two times a day  influenza  Vaccine (HIGH DOSE) 0.7 milliLiter(s) IntraMuscular once  insulin glargine Injectable (LANTUS) 32 Unit(s) SubCutaneous at bedtime  insulin lispro (ADMELOG) corrective regimen sliding scale   SubCutaneous three times a day before meals  insulin lispro (ADMELOG) corrective regimen sliding scale   SubCutaneous at bedtime  insulin lispro Injectable (ADMELOG) 14 Unit(s) SubCutaneous three times a day before meals  lisinopril 40 milliGRAM(s) Oral daily  memantine 5 milliGRAM(s) Oral two times a day  metFORMIN 500 milliGRAM(s) Oral two times a day  NIFEdipine XL 30 milliGRAM(s) Oral daily  saccharomyces boulardii 250 milliGRAM(s) Oral two times a day  sertraline 100 milliGRAM(s) Oral daily  zinc oxide 40% Ointment 1 Application(s) Topical two times a day    MEDICATIONS  (PRN):  acetaminophen     Tablet .. 650 milliGRAM(s) Oral every 6 hours PRN Temp greater or equal to 38C (100.4F), Mild Pain (1 - 3)  aluminum hydroxide/magnesium hydroxide/simethicone Suspension 30 milliLiter(s) Oral every 4 hours PRN Dyspepsia  melatonin 3 milliGRAM(s) Oral at bedtime PRN Insomnia      Vital Signs Last 24 Hrs  T(C): 36.6 (24 Nov 2021 20:11), Max: 36.6 (24 Nov 2021 20:11)  T(F): 97.8 (24 Nov 2021 20:11), Max: 97.8 (24 Nov 2021 20:11)  HR: 57 (25 Nov 2021 06:17) (57 - 67)  BP: 145/63 (25 Nov 2021 06:17) (145/63 - 150/76)  BP(mean): --  RR: 15 (24 Nov 2021 20:11) (15 - 15)  SpO2: 97% (24 Nov 2021 20:11) (97% - 97%)  CAPILLARY BLOOD GLUCOSE      POCT Blood Glucose.: 112 mg/dL (25 Nov 2021 07:56)  POCT Blood Glucose.: 102 mg/dL (24 Nov 2021 22:09)  POCT Blood Glucose.: 130 mg/dL (24 Nov 2021 16:37)  POCT Blood Glucose.: 115 mg/dL (24 Nov 2021 11:53)    I&O's Summary      PHYSICAL EXAM:  GENERAL: NAD, well-developed  CHEST/LUNG: Clear to auscultation bilaterally; No wheeze, nonlabored breathing  HEART: Regular rate and rhythm; No murmurs, rubs, or gallops  ABDOMEN: Soft, Nontender, Nondistended; Bowel sounds present  EXTREMITIES:  2+ Peripheral Pulses, No clubbing, cyanosis, or edema  NEUROLOGY: AAOx3, non-focal  PSYCH: calm, appropriate mood    LABS:                    RADIOLOGY & ADDITIONAL TESTS:  Results Reviewed:   Imaging Personally Reviewed:  Electrocardiogram Personally Reviewed:    COORDINATION OF CARE:  Care Discussed with Consultants/Other Providers [Y/N]:  Prior or Outpatient Records Reviewed [Y/N]:

## 2021-11-25 NOTE — PROGRESS NOTE ADULT - ASSESSMENT
76 Female with a h/o CVA (no prior deficit), HTN, HLD, IDDM, Dementia, MDD admitted to [facility] on 11/1 for Subacute CVA L Basal Ganglia, Mod-Severe Stenosis Prox R PCA, HTN Urgency, AMS, abnormal speech, and Hypoglycemia.  In ED,  on arrival with some improvement with medications but remains hypertensive.  LKWT 3 days prior to presentation, NIHSS 2, Out of window for TPA. Hospital course significant for UTI- treated with ABT. Patient now with gait Instability, ADL impairments and Functional impairments.    # CVA  - Subacute CVA L Basal Ganglia  - Mod-Severe Stenosis Prox R PCA  - continue Comprehensive Rehab Program: PT/OT/ST   - c/a ASA 81mg daily, lipitor 80mg qhs    #NPH  - OP f/u with NSGY    #HTN  - Procardia XL 30mg daily  - Lisinopril 40 mg daily  - hydralazine 10 mg BID    #HLD  - on Lipitor  - Fenofibrate 145mg daily    #DM II  - HA1c 7.6  - Well controlled fingersticks  - Continue with Lantus 32units qhs and continue Admelog 14 units premeal + Metformin 500mg BID + ISS and FS    #Depression  - Sertraline 100mg daily    #Dementia  - Donepezil 5mg daily  - Memantine 5mg BID    #UTI proteus miribalis   - S/p augmentin    #DVT ppx  - Heparin

## 2021-11-25 NOTE — PROGRESS NOTE ADULT - SUBJECTIVE AND OBJECTIVE BOX
No overnight events.  Pain is controlled.   No other new ROS.  Has been tolerating rehabilitation program.    VITALS  T(C): 36.6 (11-24-21 @ 20:11), Max: 36.6 (11-24-21 @ 20:11)  HR: 57 (11-25-21 @ 06:17) (57 - 67)  BP: 145/63 (11-25-21 @ 06:17) (134/64 - 150/76)  RR: 15 (11-24-21 @ 20:11) (15 - 16)  SpO2: 97% (11-24-21 @ 20:11) (97% - 98%)  Wt(kg): --     MEDICATIONS   acetaminophen     Tablet .. 650 milliGRAM(s) every 6 hours PRN  aluminum hydroxide/magnesium hydroxide/simethicone Suspension 30 milliLiter(s) every 4 hours PRN  aspirin enteric coated 81 milliGRAM(s) daily  atorvastatin 80 milliGRAM(s) at bedtime  cyanocobalamin 1000 MICROGram(s) daily  dextrose 40% Gel 15 Gram(s) once  dextrose 5%. 1000 milliLiter(s) <Continuous>  dextrose 5%. 1000 milliLiter(s) <Continuous>  dextrose 50% Injectable 25 Gram(s) once  dextrose 50% Injectable 12.5 Gram(s) once  dextrose 50% Injectable 25 Gram(s) once  donepezil 5 milliGRAM(s) at bedtime  famotidine    Tablet 20 milliGRAM(s) daily  fenofibrate Tablet 145 milliGRAM(s) daily  glucagon  Injectable 1 milliGRAM(s) once  heparin   Injectable 5000 Unit(s) every 8 hours  hydrALAZINE 10 milliGRAM(s) two times a day  influenza  Vaccine (HIGH DOSE) 0.7 milliLiter(s) once  insulin glargine Injectable (LANTUS) 32 Unit(s) at bedtime  insulin lispro (ADMELOG) corrective regimen sliding scale   three times a day before meals  insulin lispro (ADMELOG) corrective regimen sliding scale   at bedtime  insulin lispro Injectable (ADMELOG) 14 Unit(s) three times a day before meals  lisinopril 40 milliGRAM(s) daily  melatonin 3 milliGRAM(s) at bedtime PRN  memantine 5 milliGRAM(s) two times a day  metFORMIN 500 milliGRAM(s) two times a day  NIFEdipine XL 30 milliGRAM(s) daily  saccharomyces boulardii 250 milliGRAM(s) two times a day  sertraline 100 milliGRAM(s) daily  zinc oxide 40% Ointment 1 Application(s) two times a day      RECENT LABS/IMAGING                   POCT Blood Glucose.: 102 mg/dL (11-24-21 @ 22:09)  POCT Blood Glucose.: 130 mg/dL (11-24-21 @ 16:37)  POCT Blood Glucose.: 115 mg/dL (11-24-21 @ 11:53)  POCT Blood Glucose.: 180 mg/dL (11-24-21 @ 08:11)    ------------------------------------------  PHYSICAL EXAM  Constitutional - NAD, Comfortable  Pulm - Breathing comfortably, No wheezing  Abd - Nondistended  Extremities - No calf tenderness  Neurologic Exam - Awake, Alert  Psychiatric - Mood WNL     ASSESSMENT/PLAN  76y Female with impairments in mobility and ADLs   - Continue current rehabilitation program 3hrs a day   - Continue current medications, patient is medically stable   - DVT prophylaxis  - Skin - OOB and mobilization daily

## 2021-11-26 LAB
ALBUMIN SERPL ELPH-MCNC: 3 G/DL — LOW (ref 3.3–5)
ALP SERPL-CCNC: 37 U/L — LOW (ref 40–120)
ALT FLD-CCNC: 30 U/L — SIGNIFICANT CHANGE UP (ref 10–45)
ANION GAP SERPL CALC-SCNC: 7 MMOL/L — SIGNIFICANT CHANGE UP (ref 5–17)
AST SERPL-CCNC: 18 U/L — SIGNIFICANT CHANGE UP (ref 10–40)
BASOPHILS # BLD AUTO: 0.05 K/UL — SIGNIFICANT CHANGE UP (ref 0–0.2)
BASOPHILS NFR BLD AUTO: 0.6 % — SIGNIFICANT CHANGE UP (ref 0–2)
BILIRUB SERPL-MCNC: 0.3 MG/DL — SIGNIFICANT CHANGE UP (ref 0.2–1.2)
BUN SERPL-MCNC: 30 MG/DL — HIGH (ref 7–23)
CALCIUM SERPL-MCNC: 8.7 MG/DL — SIGNIFICANT CHANGE UP (ref 8.4–10.5)
CHLORIDE SERPL-SCNC: 107 MMOL/L — SIGNIFICANT CHANGE UP (ref 96–108)
CO2 SERPL-SCNC: 28 MMOL/L — SIGNIFICANT CHANGE UP (ref 22–31)
CREAT SERPL-MCNC: 0.94 MG/DL — SIGNIFICANT CHANGE UP (ref 0.5–1.3)
EOSINOPHIL # BLD AUTO: 0.37 K/UL — SIGNIFICANT CHANGE UP (ref 0–0.5)
EOSINOPHIL NFR BLD AUTO: 4.8 % — SIGNIFICANT CHANGE UP (ref 0–6)
GLUCOSE BLDC GLUCOMTR-MCNC: 100 MG/DL — HIGH (ref 70–99)
GLUCOSE BLDC GLUCOMTR-MCNC: 105 MG/DL — HIGH (ref 70–99)
GLUCOSE BLDC GLUCOMTR-MCNC: 123 MG/DL — HIGH (ref 70–99)
GLUCOSE BLDC GLUCOMTR-MCNC: 129 MG/DL — HIGH (ref 70–99)
GLUCOSE SERPL-MCNC: 119 MG/DL — HIGH (ref 70–99)
HCT VFR BLD CALC: 36.5 % — SIGNIFICANT CHANGE UP (ref 34.5–45)
HGB BLD-MCNC: 11.3 G/DL — LOW (ref 11.5–15.5)
IMM GRANULOCYTES NFR BLD AUTO: 0.3 % — SIGNIFICANT CHANGE UP (ref 0–1.5)
LYMPHOCYTES # BLD AUTO: 1.83 K/UL — SIGNIFICANT CHANGE UP (ref 1–3.3)
LYMPHOCYTES # BLD AUTO: 23.8 % — SIGNIFICANT CHANGE UP (ref 13–44)
MCHC RBC-ENTMCNC: 25.1 PG — LOW (ref 27–34)
MCHC RBC-ENTMCNC: 31 GM/DL — LOW (ref 32–36)
MCV RBC AUTO: 81.1 FL — SIGNIFICANT CHANGE UP (ref 80–100)
MONOCYTES # BLD AUTO: 0.92 K/UL — HIGH (ref 0–0.9)
MONOCYTES NFR BLD AUTO: 11.9 % — SIGNIFICANT CHANGE UP (ref 2–14)
NEUTROPHILS # BLD AUTO: 4.51 K/UL — SIGNIFICANT CHANGE UP (ref 1.8–7.4)
NEUTROPHILS NFR BLD AUTO: 58.6 % — SIGNIFICANT CHANGE UP (ref 43–77)
NRBC # BLD: 0 /100 WBCS — SIGNIFICANT CHANGE UP (ref 0–0)
PLATELET # BLD AUTO: 307 K/UL — SIGNIFICANT CHANGE UP (ref 150–400)
POTASSIUM SERPL-MCNC: 4.2 MMOL/L — SIGNIFICANT CHANGE UP (ref 3.5–5.3)
POTASSIUM SERPL-SCNC: 4.2 MMOL/L — SIGNIFICANT CHANGE UP (ref 3.5–5.3)
PROT SERPL-MCNC: 6.1 G/DL — SIGNIFICANT CHANGE UP (ref 6–8.3)
RBC # BLD: 4.5 M/UL — SIGNIFICANT CHANGE UP (ref 3.8–5.2)
RBC # FLD: 19.9 % — HIGH (ref 10.3–14.5)
SODIUM SERPL-SCNC: 142 MMOL/L — SIGNIFICANT CHANGE UP (ref 135–145)
WBC # BLD: 7.7 K/UL — SIGNIFICANT CHANGE UP (ref 3.8–10.5)
WBC # FLD AUTO: 7.7 K/UL — SIGNIFICANT CHANGE UP (ref 3.8–10.5)

## 2021-11-26 PROCEDURE — 99232 SBSQ HOSP IP/OBS MODERATE 35: CPT

## 2021-11-26 RX ORDER — LIDOCAINE 4 G/100G
1 CREAM TOPICAL
Refills: 0 | Status: DISCONTINUED | OUTPATIENT
Start: 2021-11-26 | End: 2021-12-04

## 2021-11-26 RX ORDER — METFORMIN HYDROCHLORIDE 850 MG/1
1000 TABLET ORAL
Refills: 0 | Status: DISCONTINUED | OUTPATIENT
Start: 2021-11-26 | End: 2021-12-04

## 2021-11-26 RX ORDER — LIDOCAINE 4 G/100G
1 CREAM TOPICAL DAILY
Refills: 0 | Status: DISCONTINUED | OUTPATIENT
Start: 2021-11-26 | End: 2021-11-26

## 2021-11-26 RX ORDER — INSULIN LISPRO 100/ML
10 VIAL (ML) SUBCUTANEOUS
Refills: 0 | Status: DISCONTINUED | OUTPATIENT
Start: 2021-11-26 | End: 2021-11-28

## 2021-11-26 RX ADMIN — Medication 10 MILLIGRAM(S): at 06:17

## 2021-11-26 RX ADMIN — PREGABALIN 1000 MICROGRAM(S): 225 CAPSULE ORAL at 12:01

## 2021-11-26 RX ADMIN — Medication 14 UNIT(S): at 08:04

## 2021-11-26 RX ADMIN — ZINC OXIDE 1 APPLICATION(S): 200 OINTMENT TOPICAL at 06:18

## 2021-11-26 RX ADMIN — LIDOCAINE 1 PATCH: 4 CREAM TOPICAL at 12:01

## 2021-11-26 RX ADMIN — Medication 145 MILLIGRAM(S): at 12:01

## 2021-11-26 RX ADMIN — METFORMIN HYDROCHLORIDE 500 MILLIGRAM(S): 850 TABLET ORAL at 08:05

## 2021-11-26 RX ADMIN — SERTRALINE 100 MILLIGRAM(S): 25 TABLET, FILM COATED ORAL at 12:02

## 2021-11-26 RX ADMIN — MEMANTINE HYDROCHLORIDE 5 MILLIGRAM(S): 10 TABLET ORAL at 17:51

## 2021-11-26 RX ADMIN — Medication 10 UNIT(S): at 16:31

## 2021-11-26 RX ADMIN — ZINC OXIDE 1 APPLICATION(S): 200 OINTMENT TOPICAL at 18:03

## 2021-11-26 RX ADMIN — METFORMIN HYDROCHLORIDE 1000 MILLIGRAM(S): 850 TABLET ORAL at 18:38

## 2021-11-26 RX ADMIN — LISINOPRIL 40 MILLIGRAM(S): 2.5 TABLET ORAL at 06:17

## 2021-11-26 RX ADMIN — Medication 3 MILLIGRAM(S): at 21:28

## 2021-11-26 RX ADMIN — Medication 10 UNIT(S): at 11:59

## 2021-11-26 RX ADMIN — Medication 30 MILLIGRAM(S): at 06:17

## 2021-11-26 RX ADMIN — HEPARIN SODIUM 5000 UNIT(S): 5000 INJECTION INTRAVENOUS; SUBCUTANEOUS at 13:37

## 2021-11-26 RX ADMIN — ATORVASTATIN CALCIUM 80 MILLIGRAM(S): 80 TABLET, FILM COATED ORAL at 21:27

## 2021-11-26 RX ADMIN — FAMOTIDINE 20 MILLIGRAM(S): 10 INJECTION INTRAVENOUS at 12:01

## 2021-11-26 RX ADMIN — INSULIN GLARGINE 25 UNIT(S): 100 INJECTION, SOLUTION SUBCUTANEOUS at 21:28

## 2021-11-26 RX ADMIN — Medication 250 MILLIGRAM(S): at 06:17

## 2021-11-26 RX ADMIN — Medication 10 MILLIGRAM(S): at 17:51

## 2021-11-26 RX ADMIN — Medication 81 MILLIGRAM(S): at 12:00

## 2021-11-26 RX ADMIN — DONEPEZIL HYDROCHLORIDE 5 MILLIGRAM(S): 10 TABLET, FILM COATED ORAL at 21:27

## 2021-11-26 RX ADMIN — LIDOCAINE 1 PATCH: 4 CREAM TOPICAL at 19:26

## 2021-11-26 RX ADMIN — HEPARIN SODIUM 5000 UNIT(S): 5000 INJECTION INTRAVENOUS; SUBCUTANEOUS at 21:27

## 2021-11-26 RX ADMIN — Medication 250 MILLIGRAM(S): at 18:03

## 2021-11-26 RX ADMIN — HEPARIN SODIUM 5000 UNIT(S): 5000 INJECTION INTRAVENOUS; SUBCUTANEOUS at 06:17

## 2021-11-26 RX ADMIN — MEMANTINE HYDROCHLORIDE 5 MILLIGRAM(S): 10 TABLET ORAL at 06:17

## 2021-11-26 NOTE — PROGRESS NOTE ADULT - SUBJECTIVE AND OBJECTIVE BOX
HPI:  This is a 76 Female with a h/o CVA (no prior deficit), HTN, HLD, IDDM, Dementia, MDD admitted to [facility] on 11/1 for Subacute CVA L Basal Ganglia infarct,  CTA showedMod-Severe Stenosis Prox R PCA.  Pt. with HTN Urgency, and Hypoglycemia. Patient followed by neurology OP. In ED,  on arrival with some improvement with medications but remains hypertensive. Patient noted to have AMS with abnormal speech by daughter. LKWT 3 days prior to presentation. NIHSS 2. Out of window for TPA.  MRI brain showed--  left posterior limb IC/CR stroke, left frontal and right splenium acute strokes,    Per family, patient was followed by neurosurgery for evaluation of possible NPH with LP as outpatient. Neurosurgery recommending outpatient follow up after rehab. Pt also diagnosed with possible UTI on admission and treated with IV antibiotics, urine cx positive for proteus, ID consulted. HTN meds adjusted, bradycardic metoprolol dose decreased, hypoglycemia improved started low dose Lantus, high BP meds adjusted added hydralazine.  MBS performed and recommended easy to chew with thin liquids, with 100% supervision.   Patient was evaluated by PM&R and therapy for functional deficits, gait/ADL impairments and acute rehabilitation was recommended. Patient was medically optimized for discharge to [facility] IRU on 11/11/21.     (11 Nov 2021 13:54)      PAST MEDICAL & SURGICAL HISTORY:  Rheumatoid arthritis    Diabetes mellitus, type 2    Hypertension    HLD (hyperlipidemia)    Dementia    History of CVA (cerebrovascular accident)    Major depression    Anxiety    UTI (urinary tract infection)    History of dental surgery        Subjective: no new complaints or overnight issues. Endorsed pain in left shoulder. Denies headache, other pain, discomfort. She       Vital Signs Last 24 Hrs  T(C): 36.5 (25 Nov 2021 20:32), Max: 36.5 (25 Nov 2021 20:32)  T(F): 97.7 (25 Nov 2021 20:32), Max: 97.7 (25 Nov 2021 20:32)  HR: 64 (26 Nov 2021 06:16) (64 - 88)  BP: 131/63 (26 Nov 2021 06:16) (109/53 - 144/84)  BP(mean): --  RR: 16 (25 Nov 2021 20:32) (16 - 16)  SpO2: 96% (25 Nov 2021 20:32) (96% - 96%)    REVIEW OF SYMPTOMS  Neurological: cognitive deficits  Denies pain, discomfort, headache.   Denies CP/dyspnea  Denies palpitations  Denies abdominal pain     Physical Exam:  Gen - NAD, Comfortable  Pulm - CTAB,   Cardiovascular - RRR, S1S2, No m/r/g  Abdomen - Soft, NT/ND, +BS  Extremities - No C/C/E, No calf tenderness  Neuro-     Cognitive - AAO to self, hospital ("[facility]" with cueing),month --Needs cue for year     Communication - +dysarthria, hypophonic, delay processing     Attention: impaired-- delayed processing, distractable     CN: visual fields intact, No facial weakness        Motor -                    LEFT    UE - ShAB 5/5, EF 5/5, EE 5/5, WE 5/5,  5/5                    RIGHT UE - ShAB 5/5, EF 5/5, EE 5/5, WE 5/5,  5/5                    LEFT    LE - HF 5/5, KE 5/5, DF 5/5, PF 5/5                    RIGHT LE - HF 4/5, KE 5-/5, DF 5/5, PF 5/5        Sensory - Intact to LT     Coordination - FTN intact     Tone - normal  Psychiatric - Mood stable, Affect WNL        RECENT LABS               11.3   7.70  )-----------( 307      ( 26 Nov 2021 05:30 )             36.5     11-26    142  |  107  |  30<H>  ----------------------------<  119<H>  4.2   |  28  |  0.94    Ca    8.7      26 Nov 2021 05:30    TPro  6.1  /  Alb  3.0<L>  /  TBili  0.3  /  DBili  x   /  AST  18  /  ALT  30  /  AlkPhos  37<L>  11-26        CAPILLARY BLOOD GLUCOSE      POCT Blood Glucose.: 123 mg/dL (26 Nov 2021 08:00)  POCT Blood Glucose.: 87 mg/dL (25 Nov 2021 21:32)  POCT Blood Glucose.: 181 mg/dL (25 Nov 2021 16:47)  POCT Blood Glucose.: 71 mg/dL (25 Nov 2021 12:21)  POCT Blood Glucose.: 69 mg/dL (25 Nov 2021 12:17)        RADIOLOGY/OTHER RESULTS      MEDICATIONS  (STANDING):  aspirin enteric coated 81 milliGRAM(s) Oral daily  atorvastatin 80 milliGRAM(s) Oral at bedtime  cyanocobalamin 1000 MICROGram(s) Oral daily  dextrose 40% Gel 15 Gram(s) Oral once  dextrose 5%. 1000 milliLiter(s) (50 mL/Hr) IV Continuous <Continuous>  dextrose 5%. 1000 milliLiter(s) (100 mL/Hr) IV Continuous <Continuous>  dextrose 50% Injectable 25 Gram(s) IV Push once  dextrose 50% Injectable 12.5 Gram(s) IV Push once  dextrose 50% Injectable 25 Gram(s) IV Push once  donepezil 5 milliGRAM(s) Oral at bedtime  famotidine    Tablet 20 milliGRAM(s) Oral daily  fenofibrate Tablet 145 milliGRAM(s) Oral daily  glucagon  Injectable 1 milliGRAM(s) IntraMuscular once  heparin   Injectable 5000 Unit(s) SubCutaneous every 8 hours  hydrALAZINE 10 milliGRAM(s) Oral two times a day  influenza  Vaccine (HIGH DOSE) 0.7 milliLiter(s) IntraMuscular once  insulin glargine Injectable (LANTUS) 25 Unit(s) SubCutaneous at bedtime  insulin lispro (ADMELOG) corrective regimen sliding scale   SubCutaneous three times a day before meals  insulin lispro (ADMELOG) corrective regimen sliding scale   SubCutaneous at bedtime  insulin lispro Injectable (ADMELOG) 14 Unit(s) SubCutaneous three times a day before meals  lidocaine   4% Patch 1 Patch Transdermal daily  lisinopril 40 milliGRAM(s) Oral daily  memantine 5 milliGRAM(s) Oral two times a day  metFORMIN 500 milliGRAM(s) Oral two times a day  NIFEdipine XL 30 milliGRAM(s) Oral daily  saccharomyces boulardii 250 milliGRAM(s) Oral two times a day  sertraline 100 milliGRAM(s) Oral daily  zinc oxide 40% Ointment 1 Application(s) Topical two times a day    MEDICATIONS  (PRN):  acetaminophen     Tablet .. 650 milliGRAM(s) Oral every 6 hours PRN Temp greater or equal to 38C (100.4F), Mild Pain (1 - 3)  aluminum hydroxide/magnesium hydroxide/simethicone Suspension 30 milliLiter(s) Oral every 4 hours PRN Dyspepsia  melatonin 3 milliGRAM(s) Oral at bedtime PRN Insomnia   HPI:  This is a 76 Female with a h/o CVA (no prior deficit), HTN, HLD, IDDM, Dementia, MDD admitted to [facility] on 11/1 for Subacute CVA L Basal Ganglia infarct,  CTA showedMod-Severe Stenosis Prox R PCA.  Pt. with HTN Urgency, and Hypoglycemia. Patient followed by neurology OP. In ED,  on arrival with some improvement with medications but remains hypertensive. Patient noted to have AMS with abnormal speech by daughter. LKWT 3 days prior to presentation. NIHSS 2. Out of window for TPA.  MRI brain showed--  left posterior limb IC/CR stroke, left frontal and right splenium acute strokes,    Per family, patient was followed by neurosurgery for evaluation of possible NPH with LP as outpatient. Neurosurgery recommending outpatient follow up after rehab. Pt also diagnosed with possible UTI on admission and treated with IV antibiotics, urine cx positive for proteus, ID consulted. HTN meds adjusted, bradycardic metoprolol dose decreased, hypoglycemia improved started low dose Lantus, high BP meds adjusted added hydralazine.  MBS performed and recommended easy to chew with thin liquids, with 100% supervision.   Patient was evaluated by PM&R and therapy for functional deficits, gait/ADL impairments and acute rehabilitation was recommended. Patient was medically optimized for discharge to [facility] IRU on 11/11/21.     (11 Nov 2021 13:54)      PAST MEDICAL & SURGICAL HISTORY:  Rheumatoid arthritis    Diabetes mellitus, type 2    Hypertension    HLD (hyperlipidemia)    Dementia    History of CVA (cerebrovascular accident)    Major depression    Anxiety    UTI (urinary tract infection)    History of dental surgery        Subjective: no new complaints or overnight issues. Endorsed pain in left shoulder with motion. Denies headache, other pain, discomfort.       Vital Signs Last 24 Hrs  T(C): 36.5 (25 Nov 2021 20:32), Max: 36.5 (25 Nov 2021 20:32)  T(F): 97.7 (25 Nov 2021 20:32), Max: 97.7 (25 Nov 2021 20:32)  HR: 64 (26 Nov 2021 06:16) (64 - 88)  BP: 131/63 (26 Nov 2021 06:16) (109/53 - 144/84)  BP(mean): --  RR: 16 (25 Nov 2021 20:32) (16 - 16)  SpO2: 96% (25 Nov 2021 20:32) (96% - 96%)    REVIEW OF SYMPTOMS  Neurological: cognitive deficits  Denies pain, discomfort, headache.   Denies CP/dyspnea  Denies palpitations  Denies abdominal pain     Physical Exam:  Gen - NAD, Comfortable  Pulm - CTAB,   Cardiovascular - RRR, S1S2, No m/r/g  Abdomen - Soft, NT/ND, +BS  Extremities - No C/C/E, No calf tenderness  Neuro-     Cognitive - AAO to self, hospital ("[facility]" with cueing),month --Needs cue for year     Communication - +dysarthria, hypophonic, delay processing     Attention: impaired-- delayed processing, distractable     CN: visual fields intact, No facial weakness        Motor -                    LEFT    UE - ShAB 5/5, EF 5/5, EE 5/5, WE 5/5,  5/5                    RIGHT UE - ShAB 5/5, EF 5/5, EE 5/5, WE 5/5,  5/5                    LEFT    LE - HF 5/5, KE 5/5, DF 5/5, PF 5/5                    RIGHT LE - HF 4/5, KE 5-/5, DF 5/5, PF 5/5        Sensory - Intact to LT     Coordination - FTN intact     Tone - normal  Psychiatric - Mood stable, Affect WNL    MSK: full ROM left shoulder,  Neg neers, +prado.  some pain with lateral palpation        RECENT LABS               11.3   7.70  )-----------( 307      ( 26 Nov 2021 05:30 )             36.5     11-26    142  |  107  |  30<H>  ----------------------------<  119<H>  4.2   |  28  |  0.94    Ca    8.7      26 Nov 2021 05:30    TPro  6.1  /  Alb  3.0<L>  /  TBili  0.3  /  DBili  x   /  AST  18  /  ALT  30  /  AlkPhos  37<L>  11-26        CAPILLARY BLOOD GLUCOSE      POCT Blood Glucose.: 123 mg/dL (26 Nov 2021 08:00)  POCT Blood Glucose.: 87 mg/dL (25 Nov 2021 21:32)  POCT Blood Glucose.: 181 mg/dL (25 Nov 2021 16:47)  POCT Blood Glucose.: 71 mg/dL (25 Nov 2021 12:21)  POCT Blood Glucose.: 69 mg/dL (25 Nov 2021 12:17)        RADIOLOGY/OTHER RESULTS      MEDICATIONS  (STANDING):  aspirin enteric coated 81 milliGRAM(s) Oral daily  atorvastatin 80 milliGRAM(s) Oral at bedtime  cyanocobalamin 1000 MICROGram(s) Oral daily  dextrose 40% Gel 15 Gram(s) Oral once  dextrose 5%. 1000 milliLiter(s) (50 mL/Hr) IV Continuous <Continuous>  dextrose 5%. 1000 milliLiter(s) (100 mL/Hr) IV Continuous <Continuous>  dextrose 50% Injectable 25 Gram(s) IV Push once  dextrose 50% Injectable 12.5 Gram(s) IV Push once  dextrose 50% Injectable 25 Gram(s) IV Push once  donepezil 5 milliGRAM(s) Oral at bedtime  famotidine    Tablet 20 milliGRAM(s) Oral daily  fenofibrate Tablet 145 milliGRAM(s) Oral daily  glucagon  Injectable 1 milliGRAM(s) IntraMuscular once  heparin   Injectable 5000 Unit(s) SubCutaneous every 8 hours  hydrALAZINE 10 milliGRAM(s) Oral two times a day  influenza  Vaccine (HIGH DOSE) 0.7 milliLiter(s) IntraMuscular once  insulin glargine Injectable (LANTUS) 25 Unit(s) SubCutaneous at bedtime  insulin lispro (ADMELOG) corrective regimen sliding scale   SubCutaneous three times a day before meals  insulin lispro (ADMELOG) corrective regimen sliding scale   SubCutaneous at bedtime  insulin lispro Injectable (ADMELOG) 14 Unit(s) SubCutaneous three times a day before meals  lidocaine   4% Patch 1 Patch Transdermal daily  lisinopril 40 milliGRAM(s) Oral daily  memantine 5 milliGRAM(s) Oral two times a day  metFORMIN 500 milliGRAM(s) Oral two times a day  NIFEdipine XL 30 milliGRAM(s) Oral daily  saccharomyces boulardii 250 milliGRAM(s) Oral two times a day  sertraline 100 milliGRAM(s) Oral daily  zinc oxide 40% Ointment 1 Application(s) Topical two times a day    MEDICATIONS  (PRN):  acetaminophen     Tablet .. 650 milliGRAM(s) Oral every 6 hours PRN Temp greater or equal to 38C (100.4F), Mild Pain (1 - 3)  aluminum hydroxide/magnesium hydroxide/simethicone Suspension 30 milliLiter(s) Oral every 4 hours PRN Dyspepsia  melatonin 3 milliGRAM(s) Oral at bedtime PRN Insomnia

## 2021-11-26 NOTE — PROGRESS NOTE ADULT - ASSESSMENT
76 Female with a h/o CVA (no prior deficit), HTN, HLD, IDDM, Dementia, MDD admitted to [facility] on 11/1 for Subacute CVA L Basal Ganglia, Mod-Severe Stenosis Prox R PCA, HTN Urgency, AMS, abnormal speech, and Hypoglycemia.  In ED,  on arrival with some improvement with medications but remains hypertensive.  LKWT 3 days prior to presentation, NIHSS 2, Out of window for TPA. Hospital course significant for UTI- treated with ABT. Patient now with gait Instability, ADL impairments and Functional impairments.    # CVA  - Subacute CVA L Basal Ganglia  - Mod-Severe Stenosis Prox R PCA  - continue Comprehensive Rehab Program: PT/OT/ST   - c/a ASA 81mg daily, lipitor 80mg qhs    #NPH  - OP f/u with NSGY    #HTN  - Procardia XL 30mg daily  - Lisinopril 40 mg daily  - hydralazine 10 mg BID    #HLD  - on Lipitor  - Fenofibrate 145mg daily    #DM II  - HA1c 7.6  - Well controlled fingersticks with intermittent hypoglycemic episodes  - Agree with decreasing Lantus to 25units qhs. Decrease Admelog to 10units premeal. Increase Metformin to 1000mg BID + ISS and FS  - Expect to further decrease Lantus/Asdmelog    #Depression  - Sertraline 100mg daily    #Dementia  - Donepezil 5mg daily  - Memantine 5mg BID    #UTI proteus miribalis   - S/p augmentin    #DVT ppx  - Heparin

## 2021-11-26 NOTE — PROGRESS NOTE ADULT - SUBJECTIVE AND OBJECTIVE BOX
Patient is a 76y old  Female who presents with a chief complaint of left basal ganglia CVA with very mild right lower extremity weakness (26 Nov 2021 10:17)      SUBJECTIVE / OVERNIGHT EVENTS:  Pt seen and examined at bedside. No acute events overnight.  Pt denies cp, palpitations, sob, abd pain, N/V, fever, chills.    ROS:  All other review of systems negative    Allergies    No Known Allergies    Intolerances        MEDICATIONS  (STANDING):  aspirin enteric coated 81 milliGRAM(s) Oral daily  atorvastatin 80 milliGRAM(s) Oral at bedtime  cyanocobalamin 1000 MICROGram(s) Oral daily  dextrose 40% Gel 15 Gram(s) Oral once  dextrose 5%. 1000 milliLiter(s) (50 mL/Hr) IV Continuous <Continuous>  dextrose 5%. 1000 milliLiter(s) (100 mL/Hr) IV Continuous <Continuous>  dextrose 50% Injectable 25 Gram(s) IV Push once  dextrose 50% Injectable 12.5 Gram(s) IV Push once  dextrose 50% Injectable 25 Gram(s) IV Push once  donepezil 5 milliGRAM(s) Oral at bedtime  famotidine    Tablet 20 milliGRAM(s) Oral daily  fenofibrate Tablet 145 milliGRAM(s) Oral daily  glucagon  Injectable 1 milliGRAM(s) IntraMuscular once  heparin   Injectable 5000 Unit(s) SubCutaneous every 8 hours  hydrALAZINE 10 milliGRAM(s) Oral two times a day  influenza  Vaccine (HIGH DOSE) 0.7 milliLiter(s) IntraMuscular once  insulin glargine Injectable (LANTUS) 25 Unit(s) SubCutaneous at bedtime  insulin lispro (ADMELOG) corrective regimen sliding scale   SubCutaneous three times a day before meals  insulin lispro (ADMELOG) corrective regimen sliding scale   SubCutaneous at bedtime  insulin lispro Injectable (ADMELOG) 14 Unit(s) SubCutaneous three times a day before meals  lidocaine   4% Patch 1 Patch Transdermal daily  lisinopril 40 milliGRAM(s) Oral daily  memantine 5 milliGRAM(s) Oral two times a day  metFORMIN 500 milliGRAM(s) Oral two times a day  NIFEdipine XL 30 milliGRAM(s) Oral daily  saccharomyces boulardii 250 milliGRAM(s) Oral two times a day  sertraline 100 milliGRAM(s) Oral daily  zinc oxide 40% Ointment 1 Application(s) Topical two times a day    MEDICATIONS  (PRN):  acetaminophen     Tablet .. 650 milliGRAM(s) Oral every 6 hours PRN Temp greater or equal to 38C (100.4F), Mild Pain (1 - 3)  aluminum hydroxide/magnesium hydroxide/simethicone Suspension 30 milliLiter(s) Oral every 4 hours PRN Dyspepsia  melatonin 3 milliGRAM(s) Oral at bedtime PRN Insomnia      Vital Signs Last 24 Hrs  T(C): 36.5 (25 Nov 2021 20:32), Max: 36.5 (25 Nov 2021 20:32)  T(F): 97.7 (25 Nov 2021 20:32), Max: 97.7 (25 Nov 2021 20:32)  HR: 64 (26 Nov 2021 06:16) (64 - 88)  BP: 131/63 (26 Nov 2021 06:16) (109/53 - 144/84)  BP(mean): --  RR: 16 (25 Nov 2021 20:32) (16 - 16)  SpO2: 96% (25 Nov 2021 20:32) (96% - 96%)  CAPILLARY BLOOD GLUCOSE      POCT Blood Glucose.: 123 mg/dL (26 Nov 2021 08:00)  POCT Blood Glucose.: 87 mg/dL (25 Nov 2021 21:32)  POCT Blood Glucose.: 181 mg/dL (25 Nov 2021 16:47)  POCT Blood Glucose.: 71 mg/dL (25 Nov 2021 12:21)  POCT Blood Glucose.: 69 mg/dL (25 Nov 2021 12:17)    I&O's Summary      PHYSICAL EXAM:  GENERAL: NAD, well-developed  CHEST/LUNG: Clear to auscultation bilaterally; No wheeze, nonlabored breathing  HEART: Regular rate and rhythm; No murmurs, rubs, or gallops  ABDOMEN: Soft, Nontender, Nondistended; Bowel sounds present  EXTREMITIES:  2+ Peripheral Pulses, No clubbing, cyanosis, or edema  NEUROLOGY: AAOx3, non-focal  PSYCH: calm, appropriate mood    LABS:                        11.3   7.70  )-----------( 307      ( 26 Nov 2021 05:30 )             36.5     11-26    142  |  107  |  30<H>  ----------------------------<  119<H>  4.2   |  28  |  0.94    Ca    8.7      26 Nov 2021 05:30    TPro  6.1  /  Alb  3.0<L>  /  TBili  0.3  /  DBili  x   /  AST  18  /  ALT  30  /  AlkPhos  37<L>  11-26              RADIOLOGY & ADDITIONAL TESTS:  Results Reviewed:   Imaging Personally Reviewed:  Electrocardiogram Personally Reviewed:    COORDINATION OF CARE:  Care Discussed with Consultants/Other Providers [Y/N]:  Prior or Outpatient Records Reviewed [Y/N]:

## 2021-11-26 NOTE — PROGRESS NOTE ADULT - ASSESSMENT
ASSESSMENT/PLAN  This is a 76 Female with a h/o CVA (no prior deficit), HTN, HLD, IDDM, Dementia, MDD admitted to [facility] on 11/1 for Subacute CVA L Basal Ganglia, Mod-Severe Stenosis Prox R PCA, HTN Urgency, AMS, abnormal speech, and Hypoglycemia.  In ED,  on arrival with some improvement with medications but remains hypertensive.  LKWT 3 days prior to presentation, NIHSS 2, Out of window for TPA. Hospital course significant for UTI- treated with ABT. Patient now with gait Instability, ADL impairments and Functional impairments.    # CVA  - Subacute CVA L Basal Ganglia  - Mod-Severe Stenosis Prox R PCA  - cont Comprehensive Rehab Program: PT/OT/ST- total of 3 hrs/day 5 days/week   - c/w ASA, high dose statin: lipitor    #NPH  - OP f/u with NSGY    #HTN  --SBP goal 120-150s  - Procardia XL  - Lisinopril 40 mg daily  - hydralazine 10 mg BID  bp controlled    #HLD  - on Lipitor  - Fenofibrate 145mg daily    #DM II with hyperglycemia  - ISS and FS  - Lantus and Admelog  - Management as per hospitalist  - Episodes of hypoglycemia, Lantus decreased.    #Depression  - Sertraline 100mg daily    #Pain management  - Tylenol PRN    #DVT ppx  - Heparin, SCD, TEDs    #Dementia  - Donepezil 5mg daily  - Memantine 5mg BID    #GI ppx  - Protonix  - maalox for dyspepsia    #Bowel Regimen  - Senna, miralax PRN    #Bladder management  -voiding with low PVRs      #Dysphagia    - SLP: evaluation and treatment  - s/p MBS: easy chew with thin liquid per speech. 100 % supervision for aspiration precaution    #Precaution  - Fall, Aspiration, Seizure      #Skin:  - No active issues at this time  - Desitin to buttocks for IAD  - Pressure injury/Skin: Turn Q2hrs while in bed, OOB to Chair, PT/OT       #Sleep:  - Maintain quiet hours and low stim environment.  -melatonin PRN  - Monitor sleep logs    #IDT 11/23:  - SW: lives in  3STE with  and daughter.  and 2 granddaughters assisted with ADLs. Owns WC, RW, rollator.  - Barriers: retropulsion, coordination, poor balance  - Eating and Grooming SV, UBD Kaley, LBD modA, bathing maxA, toilet transfer modA, shower transfer totalA,   --transfers Kaley, ambulation 100' with tomas walker with dorsiflexion assist (for genu recurvatum) Min A, 8 steps with 2 HR mod A.  - On easy to chew diet, modA cognition, decreased attention. poor problem solving, decreased initiation,  mild-mod receptive deficits, mod dysarthria  - Goals: shower transfers with Kaley o/w SC with ADLs, Kaley with stairs and CG transfers, CG/Kaley with ambulation, SV cognition  - Will need family training  - EDOD 12/4 home      Outpatient Follow-up (Specialty/Name of physician):    Huan Oquendo; PhD)  Neurology; Vascular Neurology  [address] [facility], [address]  [place] [address]  Phone: [phone]  Fax: [phone]    Alexander Arevalo (MD)  Neurology; Vascular Neurology  [address] [facility], [address] [place] [address]  Phone: [phone]  Fax: [phone] ASSESSMENT/PLAN  This is a 76 Female with a h/o CVA (no prior deficit), HTN, HLD, IDDM, Dementia, MDD admitted to [facility] on 11/1 for Subacute CVA L Basal Ganglia, Mod-Severe Stenosis Prox R PCA, HTN Urgency, AMS, abnormal speech, and Hypoglycemia.  In ED,  on arrival with some improvement with medications but remains hypertensive.  LKWT 3 days prior to presentation, NIHSS 2, Out of window for TPA. Hospital course significant for UTI- treated with ABT. Patient now with gait Instability, ADL impairments and Functional impairments.    # CVA  - Subacute CVA L Basal Ganglia  - Mod-Severe Stenosis Prox R PCA  - cont Comprehensive Rehab Program: PT/OT/ST- total of 3 hrs/day 5 days/week   - c/w ASA, high dose statin: lipitor    #L shoulder pain  - Lidocaine patch    #NPH  - OP f/u with NSGY    #HTN  --SBP goal 120-150s  - Procardia XL  - Lisinopril 40 mg daily  - hydralazine 10 mg BID  bp controlled    #HLD  - on Lipitor  - Fenofibrate 145mg daily    #DM II with hyperglycemia  - ISS and FS  - Lantus and Admelog  - Management as per hospitalist  - Episodes of hypoglycemia, Lantus decreased.    #Depression  - Sertraline 100mg daily    #Pain management  - Tylenol PRN    #DVT ppx  - Heparin, SCD, TEDs    #Dementia  - Donepezil 5mg daily  - Memantine 5mg BID    #GI ppx  - Protonix  - maalox for dyspepsia    #Bowel Regimen  - Senna, miralax PRN    #Bladder management  -voiding with low PVRs      #Dysphagia    - SLP: evaluation and treatment  - s/p MBS: easy chew with thin liquid per speech. 100 % supervision for aspiration precaution    #Precaution  - Fall, Aspiration, Seizure      #Skin:  - No active issues at this time  - Desitin to buttocks for IAD  - Pressure injury/Skin: Turn Q2hrs while in bed, OOB to Chair, PT/OT       #Sleep:  - Maintain quiet hours and low stim environment.  -melatonin PRN  - Monitor sleep logs    #IDT 11/23:  - SW: lives in  3STE with  and daughter.  and 2 granddaughters assisted with ADLs. Owns WC, RW, rollator.  - Barriers: retropulsion, coordination, poor balance  - Eating and Grooming SV, UBD Kaley, LBD modA, bathing maxA, toilet transfer modA, shower transfer totalA,   --transfers Kaley, ambulation 100' with tomas walker with dorsiflexion assist (for genu recurvatum) Min A, 8 steps with 2 HR mod A.  - On easy to chew diet, modA cognition, decreased attention. poor problem solving, decreased initiation,  mild-mod receptive deficits, mod dysarthria  - Goals: shower transfers with Kaley o/w SC with ADLs, Kaley with stairs and CG transfers, CG/Kaley with ambulation, SV cognition  - Will need family training  - EDOD 12/4 home      Outpatient Follow-up (Specialty/Name of physician):    Huan Oquendo; PhD)  Neurology; Vascular Neurology  [address] [facility], [address]  [place] [address]  Phone: [phone]  Fax: [phone]    Alexander Arevalo (MD)  Neurology; Vascular Neurology  [address] [facility], [address] [place] [address]  Phone: [phone]  Fax: [phone] ASSESSMENT/PLAN  This is a 76 Female with a h/o CVA (no prior deficit), HTN, HLD, IDDM, Dementia, MDD admitted to [facility] on 11/1 for Subacute CVA L Basal Ganglia, Mod-Severe Stenosis Prox R PCA, HTN Urgency, AMS, abnormal speech, and Hypoglycemia.  In ED,  on arrival with some improvement with medications but remains hypertensive.  LKWT 3 days prior to presentation, NIHSS 2, Out of window for TPA. Hospital course significant for UTI- treated with ABT. Patient now with gait Instability, ADL impairments and Functional impairments.    # CVA  - Subacute CVA L Basal Ganglia  - Mod-Severe Stenosis Prox R PCA  - cont Comprehensive Rehab Program: PT/OT/ST- total of 3 hrs/day 5 days/week   - c/w ASA, high dose statin: lipitor    #L shoulder pain--likely RTC tendonitis  - Lidocaine patch in AM    #NPH  - OP f/u with NSGY    #HTN  --SBP goal 120-150s  - Procardia XL  - Lisinopril 40 mg daily  - hydralazine 10 mg BID  bp controlled    #HLD  - on Lipitor  - Fenofibrate 145mg daily    #DM II with hyperglycemia  - ISS and FS  - Lantus and Admelog  - Management as per hospitalist  - Episodes of hypoglycemia, Lantus & Ademelog decreased.    #Depression  - Sertraline 100mg daily    #Pain management  - Tylenol PRN    #DVT ppx  - Heparin, SCD, TEDs    #Dementia  - Donepezil 5mg daily  - Memantine 5mg BID    #GI ppx  - Protonix  - maalox for dyspepsia    #Bowel Regimen  - Senna, miralax PRN    #Bladder management  -voiding with low PVRs      #Dysphagia    - SLP: evaluation and treatment  - s/p MBS: easy chew with thin liquid per speech. 100 % supervision for aspiration precaution    #Precaution  - Fall, Aspiration, Seizure      #Skin:  - No active issues at this time  - Desitin to buttocks for IAD  - Pressure injury/Skin: Turn Q2hrs while in bed, OOB to Chair, PT/OT     #Sleep:  - Maintain quiet hours and low stim environment.  -melatonin PRN  - Monitor sleep logs    #IDT 11/23:  - SW: lives in  3STE with  and daughter.  and 2 granddaughters assisted with ADLs. Owns WC, RW, rollator.  - Barriers: retropulsion, coordination, poor balance  - Eating and Grooming SV, UBD Kaley, LBD modA, bathing maxA, toilet transfer modA, shower transfer totalA,   --transfers Kaley, ambulation 100' with tomas walker with dorsiflexion assist (for genu recurvatum) Min A, 8 steps with 2 HR mod A.  - On easy to chew diet, modA cognition, decreased attention. poor problem solving, decreased initiation,  mild-mod receptive deficits, mod dysarthria  - Goals: shower transfers with Kaley o/w SC with ADLs, Kaley with stairs and CG transfers, CG/Kaley with ambulation, SV cognition  - Will need family training  - EDOD 12/4 home      Outpatient Follow-up (Specialty/Name of physician):    Huan Oquendo; PhD)  Neurology; Vascular Neurology  [address] [facility], [address]  [place] [address]  Phone: [phone]  Fax: [phone]    Alexander Arevalo)  Neurology; Vascular Neurology  [address] [place] [address], [address] [place] [address]  Phone: [phone]  Fax: [phone]

## 2021-11-26 NOTE — PROGRESS NOTE ADULT - ATTENDING COMMENTS
Pt. seen with resident.  Agree with documentation above as per resident with amendments made as appropriate. Patient medically stable. Making progress towards rehab goals.       Left Basal ganglia infarct  Low FS-- Hospitalist adjusted insulin.

## 2021-11-27 LAB
GLUCOSE BLDC GLUCOMTR-MCNC: 114 MG/DL — HIGH (ref 70–99)
GLUCOSE BLDC GLUCOMTR-MCNC: 124 MG/DL — HIGH (ref 70–99)
GLUCOSE BLDC GLUCOMTR-MCNC: 190 MG/DL — HIGH (ref 70–99)
GLUCOSE BLDC GLUCOMTR-MCNC: 87 MG/DL — SIGNIFICANT CHANGE UP (ref 70–99)

## 2021-11-27 PROCEDURE — 99232 SBSQ HOSP IP/OBS MODERATE 35: CPT

## 2021-11-27 RX ORDER — INSULIN GLARGINE 100 [IU]/ML
15 INJECTION, SOLUTION SUBCUTANEOUS AT BEDTIME
Refills: 0 | Status: DISCONTINUED | OUTPATIENT
Start: 2021-11-27 | End: 2021-12-04

## 2021-11-27 RX ADMIN — ATORVASTATIN CALCIUM 80 MILLIGRAM(S): 80 TABLET, FILM COATED ORAL at 22:09

## 2021-11-27 RX ADMIN — ZINC OXIDE 1 APPLICATION(S): 200 OINTMENT TOPICAL at 17:14

## 2021-11-27 RX ADMIN — LIDOCAINE 1 PATCH: 4 CREAM TOPICAL at 00:26

## 2021-11-27 RX ADMIN — HEPARIN SODIUM 5000 UNIT(S): 5000 INJECTION INTRAVENOUS; SUBCUTANEOUS at 05:21

## 2021-11-27 RX ADMIN — INSULIN GLARGINE 15 UNIT(S): 100 INJECTION, SOLUTION SUBCUTANEOUS at 22:25

## 2021-11-27 RX ADMIN — Medication 10 UNIT(S): at 16:50

## 2021-11-27 RX ADMIN — Medication 145 MILLIGRAM(S): at 11:28

## 2021-11-27 RX ADMIN — HEPARIN SODIUM 5000 UNIT(S): 5000 INJECTION INTRAVENOUS; SUBCUTANEOUS at 13:03

## 2021-11-27 RX ADMIN — DONEPEZIL HYDROCHLORIDE 5 MILLIGRAM(S): 10 TABLET, FILM COATED ORAL at 22:09

## 2021-11-27 RX ADMIN — METFORMIN HYDROCHLORIDE 1000 MILLIGRAM(S): 850 TABLET ORAL at 17:13

## 2021-11-27 RX ADMIN — PREGABALIN 1000 MICROGRAM(S): 225 CAPSULE ORAL at 11:28

## 2021-11-27 RX ADMIN — Medication 30 MILLIGRAM(S): at 05:21

## 2021-11-27 RX ADMIN — MEMANTINE HYDROCHLORIDE 5 MILLIGRAM(S): 10 TABLET ORAL at 17:13

## 2021-11-27 RX ADMIN — MEMANTINE HYDROCHLORIDE 5 MILLIGRAM(S): 10 TABLET ORAL at 05:21

## 2021-11-27 RX ADMIN — ZINC OXIDE 1 APPLICATION(S): 200 OINTMENT TOPICAL at 05:25

## 2021-11-27 RX ADMIN — FAMOTIDINE 20 MILLIGRAM(S): 10 INJECTION INTRAVENOUS at 11:28

## 2021-11-27 RX ADMIN — Medication 10 UNIT(S): at 08:06

## 2021-11-27 RX ADMIN — Medication 1: at 16:50

## 2021-11-27 RX ADMIN — Medication 10 MILLIGRAM(S): at 05:21

## 2021-11-27 RX ADMIN — HEPARIN SODIUM 5000 UNIT(S): 5000 INJECTION INTRAVENOUS; SUBCUTANEOUS at 22:09

## 2021-11-27 RX ADMIN — Medication 250 MILLIGRAM(S): at 17:13

## 2021-11-27 RX ADMIN — SERTRALINE 100 MILLIGRAM(S): 25 TABLET, FILM COATED ORAL at 11:28

## 2021-11-27 RX ADMIN — Medication 250 MILLIGRAM(S): at 05:21

## 2021-11-27 RX ADMIN — Medication 10 MILLIGRAM(S): at 17:13

## 2021-11-27 RX ADMIN — LISINOPRIL 40 MILLIGRAM(S): 2.5 TABLET ORAL at 05:21

## 2021-11-27 RX ADMIN — METFORMIN HYDROCHLORIDE 1000 MILLIGRAM(S): 850 TABLET ORAL at 08:02

## 2021-11-27 RX ADMIN — Medication 10 UNIT(S): at 11:29

## 2021-11-27 RX ADMIN — Medication 81 MILLIGRAM(S): at 11:28

## 2021-11-27 NOTE — PROGRESS NOTE ADULT - SUBJECTIVE AND OBJECTIVE BOX
Patient is a 76y old  Female who presents with a chief complaint of left basal ganglia CVA with very mild right lower extremity weakness (27 Nov 2021 06:15)    Patient seen and examined at bedside. No acute medical complaints.     ALLERGIES:  No Known Allergies    MEDICATIONS  (STANDING):  aspirin enteric coated 81 milliGRAM(s) Oral daily  atorvastatin 80 milliGRAM(s) Oral at bedtime  cyanocobalamin 1000 MICROGram(s) Oral daily  dextrose 40% Gel 15 Gram(s) Oral once  dextrose 5%. 1000 milliLiter(s) (50 mL/Hr) IV Continuous <Continuous>  dextrose 5%. 1000 milliLiter(s) (100 mL/Hr) IV Continuous <Continuous>  dextrose 50% Injectable 25 Gram(s) IV Push once  dextrose 50% Injectable 12.5 Gram(s) IV Push once  dextrose 50% Injectable 25 Gram(s) IV Push once  donepezil 5 milliGRAM(s) Oral at bedtime  famotidine    Tablet 20 milliGRAM(s) Oral daily  fenofibrate Tablet 145 milliGRAM(s) Oral daily  glucagon  Injectable 1 milliGRAM(s) IntraMuscular once  heparin   Injectable 5000 Unit(s) SubCutaneous every 8 hours  hydrALAZINE 10 milliGRAM(s) Oral two times a day  influenza  Vaccine (HIGH DOSE) 0.7 milliLiter(s) IntraMuscular once  insulin glargine Injectable (LANTUS) 25 Unit(s) SubCutaneous at bedtime  insulin lispro (ADMELOG) corrective regimen sliding scale   SubCutaneous three times a day before meals  insulin lispro (ADMELOG) corrective regimen sliding scale   SubCutaneous at bedtime  insulin lispro Injectable (ADMELOG) 10 Unit(s) SubCutaneous three times a day before meals  lidocaine   4% Patch 1 Patch Transdermal <User Schedule>  lisinopril 40 milliGRAM(s) Oral daily  memantine 5 milliGRAM(s) Oral two times a day  metFORMIN 1000 milliGRAM(s) Oral two times a day  NIFEdipine XL 30 milliGRAM(s) Oral daily  saccharomyces boulardii 250 milliGRAM(s) Oral two times a day  sertraline 100 milliGRAM(s) Oral daily  zinc oxide 40% Ointment 1 Application(s) Topical two times a day    MEDICATIONS  (PRN):  acetaminophen     Tablet .. 650 milliGRAM(s) Oral every 6 hours PRN Temp greater or equal to 38C (100.4F), Mild Pain (1 - 3)  aluminum hydroxide/magnesium hydroxide/simethicone Suspension 30 milliLiter(s) Oral every 4 hours PRN Dyspepsia  melatonin 3 milliGRAM(s) Oral at bedtime PRN Insomnia    Vital Signs Last 24 Hrs  T(F): 97.9 (26 Nov 2021 21:27), Max: 97.9 (26 Nov 2021 21:27)  HR: 74 (27 Nov 2021 05:27) (63 - 74)  BP: 154/64 (27 Nov 2021 05:27) (127/67 - 154/64)  RR: 16 (26 Nov 2021 21:27) (15 - 16)  SpO2: 96% (26 Nov 2021 21:27) (96% - 98%)  I&O's Summary    26 Nov 2021 07:01  -  27 Nov 2021 07:00  --------------------------------------------------------  IN: 0 mL / OUT: 400 mL / NET: -400 mL          PHYSICAL EXAM:  General: NAD, +dysarthria, +hypophonic  ENT: MMM, no scleral icterus  Neck: Supple  Lungs: Respirations unlabored. Clear to auscultation bilaterally, no wheezes, rales, rhonchi  Cardio: RRR, S1/S2  Abdomen: Soft, Nontender, Nondistended; Bowel sounds present  Extremities: No calf tenderness, No pitting edema LE b/l    LABS:                        11.3   7.70  )-----------( 307      ( 26 Nov 2021 05:30 )             36.5       11-26    142  |  107  |  30  ----------------------------<  119  4.2   |  28  |  0.94    Ca    8.7      26 Nov 2021 05:30    TPro  6.1  /  Alb  3.0  /  TBili  0.3  /  DBili  x   /  AST  18  /  ALT  30  /  AlkPhos  37  11-26     eGFR if Non African American: 59 mL/min/1.73M2 (11-26-21 @ 05:30)  eGFR if : 69 mL/min/1.73M2 (11-26-21 @ 05:30)    11-02 Chol 248 mg/dL LDL -- HDL 53 mg/dL Trig 151 mg/dL    POCT Blood Glucose.: 100 mg/dL (26 Nov 2021 21:23)  POCT Blood Glucose.: 129 mg/dL (26 Nov 2021 16:28)  POCT Blood Glucose.: 105 mg/dL (26 Nov 2021 11:57)  POCT Blood Glucose.: 123 mg/dL (26 Nov 2021 08:00)    COVID-19 PCR: NotDetec (11-24-21 @ 06:00)  COVID-19 PCR: NotDetec (11-18-21 @ 07:02)  COVID-19 PCR: NotDetec (11-11-21 @ 17:00)  COVID-19 PCR: NotDetec (11-08-21 @ 08:58)    RADIOLOGY & ADDITIONAL TESTS: reviewed    Care Discussed with Consultants/Other Providers: yes

## 2021-11-27 NOTE — PROGRESS NOTE ADULT - SUBJECTIVE AND OBJECTIVE BOX
No overnight events.  Slept well.  Pain is controlled.   No other new ROS.  Has been tolerating rehabilitation program.    VITALS  T(C): 36.6 (11-26-21 @ 21:27), Max: 36.6 (11-26-21 @ 21:27)  HR: 74 (11-27-21 @ 05:27) (63 - 74)  BP: 154/64 (11-27-21 @ 05:27) (127/67 - 154/64)  RR: 16 (11-26-21 @ 21:27) (15 - 16)  SpO2: 96% (11-26-21 @ 21:27) (96% - 98%)  Wt(kg): --     MEDICATIONS   acetaminophen     Tablet .. 650 milliGRAM(s) every 6 hours PRN  aluminum hydroxide/magnesium hydroxide/simethicone Suspension 30 milliLiter(s) every 4 hours PRN  aspirin enteric coated 81 milliGRAM(s) daily  atorvastatin 80 milliGRAM(s) at bedtime  cyanocobalamin 1000 MICROGram(s) daily  dextrose 40% Gel 15 Gram(s) once  dextrose 5%. 1000 milliLiter(s) <Continuous>  dextrose 5%. 1000 milliLiter(s) <Continuous>  dextrose 50% Injectable 25 Gram(s) once  dextrose 50% Injectable 25 Gram(s) once  dextrose 50% Injectable 12.5 Gram(s) once  donepezil 5 milliGRAM(s) at bedtime  famotidine    Tablet 20 milliGRAM(s) daily  fenofibrate Tablet 145 milliGRAM(s) daily  glucagon  Injectable 1 milliGRAM(s) once  heparin   Injectable 5000 Unit(s) every 8 hours  hydrALAZINE 10 milliGRAM(s) two times a day  influenza  Vaccine (HIGH DOSE) 0.7 milliLiter(s) once  insulin glargine Injectable (LANTUS) 25 Unit(s) at bedtime  insulin lispro (ADMELOG) corrective regimen sliding scale   three times a day before meals  insulin lispro (ADMELOG) corrective regimen sliding scale   at bedtime  insulin lispro Injectable (ADMELOG) 10 Unit(s) three times a day before meals  lidocaine   4% Patch 1 Patch <User Schedule>  lisinopril 40 milliGRAM(s) daily  melatonin 3 milliGRAM(s) at bedtime PRN  memantine 5 milliGRAM(s) two times a day  metFORMIN 1000 milliGRAM(s) two times a day  NIFEdipine XL 30 milliGRAM(s) daily  saccharomyces boulardii 250 milliGRAM(s) two times a day  sertraline 100 milliGRAM(s) daily  zinc oxide 40% Ointment 1 Application(s) two times a day      RECENT LABS/IMAGING                        11.3   7.70  )-----------( 307      ( 26 Nov 2021 05:30 )             36.5     11-26    142  |  107  |  30<H>  ----------------------------<  119<H>  4.2   |  28  |  0.94    Ca    8.7      26 Nov 2021 05:30    TPro  6.1  /  Alb  3.0<L>  /  TBili  0.3  /  DBili  x   /  AST  18  /  ALT  30  /  AlkPhos  37<L>  11-26             POCT Blood Glucose.: 100 mg/dL (11-26-21 @ 21:23)  POCT Blood Glucose.: 129 mg/dL (11-26-21 @ 16:28)  POCT Blood Glucose.: 105 mg/dL (11-26-21 @ 11:57)  POCT Blood Glucose.: 123 mg/dL (11-26-21 @ 08:00)    ------------------------------------------  PHYSICAL EXAM  Constitutional - NAD, Comfortable  Pulm - Breathing comfortably, No wheezing  Abd - Nondistended  Extremities - No calf tenderness  Neurologic Exam - Awake, Alert  Psychiatric - Fatigued    ASSESSMENT/PLAN  76y Female with impairments in mobility and ADLs   - Continue current rehabilitation program 3hrs a day   - Continue current medications, patient is medically stable   - DVT prophylaxis  - Skin - OOB and mobilization daily

## 2021-11-27 NOTE — PROGRESS NOTE ADULT - ASSESSMENT
77 y/o F with h/o CVA (no prior deficit), HTN, HLD, Type 2 IDDM, Dementia, MDD admitted to [facility] on 11/1 for Subacute CVA L Basal Ganglia, Mod-Severe Stenosis Prox R PCA, HTN Urgency, AMS, abnormal speech, and Hypoglycemia.  In ED,  on arrival with some improvement with medications but remains hypertensive.  LKWT 3 days prior to presentation, NIHSS 2, Out of window for TPA. Hospital course significant for UTI- treated with ABT. Patient now with gait Instability, ADL impairments and Functional impairments.    # CVA  - Subacute CVA L Basal Ganglia  - Mod-Severe Stenosis Prox R PCA  - Continue comprehensive rehab program -PT/OT/SLP per rehab team  - Pain management, bowel regimen per rehab   - Continue ASA 81mg daily, lipitor 80mg qhs    #NPH  - OP f/u with NSGY    #HTN  - Procardia XL 30mg daily  - Lisinopril 40 mg daily  - Hydralazine 10 mg BID    #HLD  - Lipitor  - Fenofibrate 145mg daily    #DM II  - HA1c 7.6  - Well controlled fingersticks with intermittent hypoglycemic episodes  - Lantus to 25units qhs. Admelog 10units premeal. Metformin 1000mg BID + ISS and FS  - Expect to further decrease Lantus/Admelog    #Depression  - Sertraline 100mg daily    #Dementia  - Donepezil 5mg daily  - Memantine 5mg BID    #UTI proteus miribalis   - S/p augmentin    #DVT ppx - Heparin  #GI ppx - Pepcid

## 2021-11-28 LAB
GLUCOSE BLDC GLUCOMTR-MCNC: 148 MG/DL — HIGH (ref 70–99)
GLUCOSE BLDC GLUCOMTR-MCNC: 162 MG/DL — HIGH (ref 70–99)
GLUCOSE BLDC GLUCOMTR-MCNC: 184 MG/DL — HIGH (ref 70–99)
GLUCOSE BLDC GLUCOMTR-MCNC: 189 MG/DL — HIGH (ref 70–99)
GLUCOSE BLDC GLUCOMTR-MCNC: 91 MG/DL — SIGNIFICANT CHANGE UP (ref 70–99)

## 2021-11-28 PROCEDURE — 99233 SBSQ HOSP IP/OBS HIGH 50: CPT

## 2021-11-28 RX ORDER — INSULIN LISPRO 100/ML
5 VIAL (ML) SUBCUTANEOUS
Refills: 0 | Status: DISCONTINUED | OUTPATIENT
Start: 2021-11-28 | End: 2021-12-04

## 2021-11-28 RX ADMIN — HEPARIN SODIUM 5000 UNIT(S): 5000 INJECTION INTRAVENOUS; SUBCUTANEOUS at 05:28

## 2021-11-28 RX ADMIN — ZINC OXIDE 1 APPLICATION(S): 200 OINTMENT TOPICAL at 17:30

## 2021-11-28 RX ADMIN — LIDOCAINE 1 PATCH: 4 CREAM TOPICAL at 19:52

## 2021-11-28 RX ADMIN — LIDOCAINE 1 PATCH: 4 CREAM TOPICAL at 20:00

## 2021-11-28 RX ADMIN — DONEPEZIL HYDROCHLORIDE 5 MILLIGRAM(S): 10 TABLET, FILM COATED ORAL at 22:18

## 2021-11-28 RX ADMIN — MEMANTINE HYDROCHLORIDE 5 MILLIGRAM(S): 10 TABLET ORAL at 05:28

## 2021-11-28 RX ADMIN — Medication 81 MILLIGRAM(S): at 11:20

## 2021-11-28 RX ADMIN — LISINOPRIL 40 MILLIGRAM(S): 2.5 TABLET ORAL at 05:28

## 2021-11-28 RX ADMIN — FAMOTIDINE 20 MILLIGRAM(S): 10 INJECTION INTRAVENOUS at 11:20

## 2021-11-28 RX ADMIN — ZINC OXIDE 1 APPLICATION(S): 200 OINTMENT TOPICAL at 05:28

## 2021-11-28 RX ADMIN — MEMANTINE HYDROCHLORIDE 5 MILLIGRAM(S): 10 TABLET ORAL at 17:30

## 2021-11-28 RX ADMIN — INSULIN GLARGINE 15 UNIT(S): 100 INJECTION, SOLUTION SUBCUTANEOUS at 22:17

## 2021-11-28 RX ADMIN — HEPARIN SODIUM 5000 UNIT(S): 5000 INJECTION INTRAVENOUS; SUBCUTANEOUS at 13:11

## 2021-11-28 RX ADMIN — LIDOCAINE 1 PATCH: 4 CREAM TOPICAL at 08:10

## 2021-11-28 RX ADMIN — Medication 1: at 08:11

## 2021-11-28 RX ADMIN — Medication 250 MILLIGRAM(S): at 05:28

## 2021-11-28 RX ADMIN — Medication 10 MILLIGRAM(S): at 17:30

## 2021-11-28 RX ADMIN — Medication 5 UNIT(S): at 11:54

## 2021-11-28 RX ADMIN — Medication 250 MILLIGRAM(S): at 17:30

## 2021-11-28 RX ADMIN — Medication 30 MILLIGRAM(S): at 05:28

## 2021-11-28 RX ADMIN — HEPARIN SODIUM 5000 UNIT(S): 5000 INJECTION INTRAVENOUS; SUBCUTANEOUS at 22:17

## 2021-11-28 RX ADMIN — Medication 10 UNIT(S): at 08:11

## 2021-11-28 RX ADMIN — Medication 145 MILLIGRAM(S): at 11:19

## 2021-11-28 RX ADMIN — Medication 5 UNIT(S): at 16:48

## 2021-11-28 RX ADMIN — Medication 1: at 16:49

## 2021-11-28 RX ADMIN — SERTRALINE 100 MILLIGRAM(S): 25 TABLET, FILM COATED ORAL at 11:19

## 2021-11-28 RX ADMIN — PREGABALIN 1000 MICROGRAM(S): 225 CAPSULE ORAL at 11:20

## 2021-11-28 RX ADMIN — ATORVASTATIN CALCIUM 80 MILLIGRAM(S): 80 TABLET, FILM COATED ORAL at 22:17

## 2021-11-28 RX ADMIN — METFORMIN HYDROCHLORIDE 1000 MILLIGRAM(S): 850 TABLET ORAL at 08:11

## 2021-11-28 RX ADMIN — METFORMIN HYDROCHLORIDE 1000 MILLIGRAM(S): 850 TABLET ORAL at 17:30

## 2021-11-28 RX ADMIN — Medication 10 MILLIGRAM(S): at 05:28

## 2021-11-28 NOTE — PROVIDER CONTACT NOTE (OTHER) - REASON
Patient  mg/dl
Problem: Skin Integrity  Goal: Risk for impaired skin integrity will decrease  Pt is turned and repositioned q2h for skin integrity. Pt has wound on coccyx and mepilex is CDI.     Problem: Pain Management  Goal: Pain level will decrease to patient's comfort goal  Pt is on scheduled tylenol. Denies any pain or discomfort at this time. No facial grimacing noted. Appears comfortable in bed.     Problem: Metabolic:  Goal: Ability to maintain appropriate glucose levels will improve  ACHS 280, administered scheduled insulin as order per sliding scale (see MAR). No s/s of hyper/hypoglycemia noted.       
Pt's blood glucose low at 87
Hyperglycemia

## 2021-11-28 NOTE — PROGRESS NOTE ADULT - SUBJECTIVE AND OBJECTIVE BOX
Patient is a 76y old  Female who presents with a chief complaint of left basal ganglia CVA with very mild right lower extremity weakness (27 Nov 2021 07:48)    Patient seen and examined at bedside. NAD. denies acute medical complaints. noted with BG 87 overnight, lantus adjusted per rehab.     ALLERGIES:  No Known Allergies    MEDICATIONS  (STANDING):  aspirin enteric coated 81 milliGRAM(s) Oral daily  atorvastatin 80 milliGRAM(s) Oral at bedtime  cyanocobalamin 1000 MICROGram(s) Oral daily  dextrose 40% Gel 15 Gram(s) Oral once  dextrose 5%. 1000 milliLiter(s) (50 mL/Hr) IV Continuous <Continuous>  dextrose 5%. 1000 milliLiter(s) (100 mL/Hr) IV Continuous <Continuous>  dextrose 50% Injectable 25 Gram(s) IV Push once  dextrose 50% Injectable 12.5 Gram(s) IV Push once  dextrose 50% Injectable 25 Gram(s) IV Push once  donepezil 5 milliGRAM(s) Oral at bedtime  famotidine    Tablet 20 milliGRAM(s) Oral daily  fenofibrate Tablet 145 milliGRAM(s) Oral daily  glucagon  Injectable 1 milliGRAM(s) IntraMuscular once  heparin   Injectable 5000 Unit(s) SubCutaneous every 8 hours  hydrALAZINE 10 milliGRAM(s) Oral two times a day  influenza  Vaccine (HIGH DOSE) 0.7 milliLiter(s) IntraMuscular once  insulin glargine Injectable (LANTUS) 15 Unit(s) SubCutaneous at bedtime  insulin lispro (ADMELOG) corrective regimen sliding scale   SubCutaneous three times a day before meals  insulin lispro (ADMELOG) corrective regimen sliding scale   SubCutaneous at bedtime  insulin lispro Injectable (ADMELOG) 10 Unit(s) SubCutaneous three times a day before meals  lidocaine   4% Patch 1 Patch Transdermal <User Schedule>  lisinopril 40 milliGRAM(s) Oral daily  memantine 5 milliGRAM(s) Oral two times a day  metFORMIN 1000 milliGRAM(s) Oral two times a day  NIFEdipine XL 30 milliGRAM(s) Oral daily  saccharomyces boulardii 250 milliGRAM(s) Oral two times a day  sertraline 100 milliGRAM(s) Oral daily  zinc oxide 40% Ointment 1 Application(s) Topical two times a day    MEDICATIONS  (PRN):  acetaminophen     Tablet .. 650 milliGRAM(s) Oral every 6 hours PRN Temp greater or equal to 38C (100.4F), Mild Pain (1 - 3)  aluminum hydroxide/magnesium hydroxide/simethicone Suspension 30 milliLiter(s) Oral every 4 hours PRN Dyspepsia  melatonin 3 milliGRAM(s) Oral at bedtime PRN Insomnia    Vital Signs Last 24 Hrs  T(F): 97.9 (28 Nov 2021 08:09), Max: 98 (27 Nov 2021 19:40)  HR: 75 (28 Nov 2021 08:09) (63 - 75)  BP: 155/72 (28 Nov 2021 08:09) (136/67 - 155/72)  RR: 16 (28 Nov 2021 08:09) (16 - 16)  SpO2: 97% (28 Nov 2021 08:09) (96% - 97%)  I&O's Summary    27 Nov 2021 07:01  -  28 Nov 2021 07:00  --------------------------------------------------------  IN: 0 mL / OUT: 500 mL / NET: -500 mL      PHYSICAL EXAM:  General: NAD, +dysarthria, +hypophonic  ENT: MMM, no scleral icterus  Neck: Supple  Lungs: Respirations unlabored. Clear to auscultation bilaterally, no wheezes, rales, rhonchi  Cardio: RRR, S1/S2  Abdomen: Soft, Nontender, Nondistended; Bowel sounds present  Extremities: No calf tenderness, No pitting edema LE b/l    LABS:                        11.3   7.70  )-----------( 307      ( 26 Nov 2021 05:30 )             36.5       11-26    142  |  107  |  30  ----------------------------<  119  4.2   |  28  |  0.94    Ca    8.7      26 Nov 2021 05:30    TPro  6.1  /  Alb  3.0  /  TBili  0.3  /  DBili  x   /  AST  18  /  ALT  30  /  AlkPhos  37  11-26     eGFR if Non African American: 59 mL/min/1.73M2 (11-26-21 @ 05:30)  eGFR if : 69 mL/min/1.73M2 (11-26-21 @ 05:30)      11-02 Chol 248 mg/dL LDL -- HDL 53 mg/dL Trig 151 mg/dL    POCT Blood Glucose.: 189 mg/dL (28 Nov 2021 08:09)  POCT Blood Glucose.: 87 mg/dL (27 Nov 2021 21:58)  POCT Blood Glucose.: 190 mg/dL (27 Nov 2021 16:49)  POCT Blood Glucose.: 124 mg/dL (27 Nov 2021 11:27)          COVID-19 PCR: NotDetec (11-24-21 @ 06:00)  COVID-19 PCR: NotDetec (11-18-21 @ 07:02)  COVID-19 PCR: NotDetec (11-11-21 @ 17:00)  COVID-19 PCR: NotDetec (11-08-21 @ 08:58)      RADIOLOGY & ADDITIONAL TESTS: reviewed    Care Discussed with Consultants/Other Providers: yes

## 2021-11-28 NOTE — PROGRESS NOTE ADULT - ASSESSMENT
77 y/o F with h/o CVA (no prior deficit), HTN, HLD, Type 2 IDDM, Dementia, MDD admitted to [facility] on 11/1 for Subacute CVA L Basal Ganglia, Mod-Severe Stenosis Prox R PCA, HTN Urgency, AMS, abnormal speech, and Hypoglycemia.  In ED,  on arrival with some improvement with medications but remains hypertensive.  LKWT 3 days prior to presentation, NIHSS 2, Out of window for TPA. Hospital course significant for UTI- treated with ABT. Patient now with gait Instability, ADL impairments and Functional impairments.    # CVA  - Subacute CVA L Basal Ganglia  - Mod-Severe Stenosis Prox R PCA  - Continue comprehensive rehab program -PT/OT/SLP per rehab team  - Pain management, bowel regimen per rehab   - Continue ASA 81mg daily, lipitor 80mg qhs    #NPH  - OP f/u with NSGY    #HTN  - Procardia XL 30mg daily  - Lisinopril 40 mg daily  - Hydralazine 10 mg BID    #HLD  - Lipitor  - Fenofibrate 145mg daily    #DM II  - HA1c 7.6  - Well controlled fingersticks with intermittent hypoglycemic episodes  - Lantus decreased to 15un per rehab. Admelog 10units premeal. Metformin 1000mg BID + ISS and FS  - Expect to further decrease Lantus/Admelog - recommend decreasing admelog prior to further titrating off lantus.    #Depression  - Sertraline 100mg daily    #Dementia  - Donepezil 5mg daily  - Memantine 5mg BID    #UTI proteus miribalis   - S/p augmentin    #DVT ppx - Heparin  #GI ppx - Pepcid   77 y/o F with h/o CVA (no prior deficit), HTN, HLD, Type 2 IDDM, Dementia, MDD admitted to [facility] on 11/1 for Subacute CVA L Basal Ganglia, Mod-Severe Stenosis Prox R PCA, HTN Urgency, AMS, abnormal speech, and Hypoglycemia.  In ED,  on arrival with some improvement with medications but remains hypertensive.  LKWT 3 days prior to presentation, NIHSS 2, Out of window for TPA. Hospital course significant for UTI- treated with ABT. Patient now with gait Instability, ADL impairments and Functional impairments.    # CVA  - Subacute CVA L Basal Ganglia  - Mod-Severe Stenosis Prox R PCA  - Continue comprehensive rehab program -PT/OT/SLP per rehab team  - Pain management, bowel regimen per rehab   - Continue ASA 81mg daily, lipitor 80mg qhs    #NPH  - OP f/u with NSGY    #HTN  - Procardia XL 30mg daily  - Lisinopril 40 mg daily  - Hydralazine 10 mg BID    #HLD  - Lipitor  - Fenofibrate 145mg daily    #DM II  - HA1c 7.6  - Well controlled fingersticks with intermittent hypoglycemic episodes  - Lantus decreased to 15un per rehab. Admelog 10units premeal - will decrease to 5un (11/28). Metformin 1000mg BID + ISS and FS  - Expect to further decrease Lantus/Admelog - recommend decreasing admelog prior to further titrating off lantus.    #Depression  - Sertraline 100mg daily    #Dementia  - Donepezil 5mg daily  - Memantine 5mg BID    #UTI proteus miribalis   - S/p augmentin    #DVT ppx - Heparin  #GI ppx - Pepcid

## 2021-11-29 LAB
ALBUMIN SERPL ELPH-MCNC: 2.9 G/DL — LOW (ref 3.3–5)
ALP SERPL-CCNC: 34 U/L — LOW (ref 40–120)
ALT FLD-CCNC: 27 U/L — SIGNIFICANT CHANGE UP (ref 10–45)
ANION GAP SERPL CALC-SCNC: 8 MMOL/L — SIGNIFICANT CHANGE UP (ref 5–17)
AST SERPL-CCNC: 19 U/L — SIGNIFICANT CHANGE UP (ref 10–40)
BILIRUB SERPL-MCNC: 0.3 MG/DL — SIGNIFICANT CHANGE UP (ref 0.2–1.2)
BUN SERPL-MCNC: 30 MG/DL — HIGH (ref 7–23)
CALCIUM SERPL-MCNC: 8.9 MG/DL — SIGNIFICANT CHANGE UP (ref 8.4–10.5)
CHLORIDE SERPL-SCNC: 108 MMOL/L — SIGNIFICANT CHANGE UP (ref 96–108)
CO2 SERPL-SCNC: 27 MMOL/L — SIGNIFICANT CHANGE UP (ref 22–31)
CREAT SERPL-MCNC: 0.88 MG/DL — SIGNIFICANT CHANGE UP (ref 0.5–1.3)
GLUCOSE BLDC GLUCOMTR-MCNC: 105 MG/DL — HIGH (ref 70–99)
GLUCOSE BLDC GLUCOMTR-MCNC: 125 MG/DL — HIGH (ref 70–99)
GLUCOSE BLDC GLUCOMTR-MCNC: 126 MG/DL — HIGH (ref 70–99)
GLUCOSE BLDC GLUCOMTR-MCNC: 147 MG/DL — HIGH (ref 70–99)
GLUCOSE SERPL-MCNC: 132 MG/DL — HIGH (ref 70–99)
HCT VFR BLD CALC: 36.3 % — SIGNIFICANT CHANGE UP (ref 34.5–45)
HGB BLD-MCNC: 11.3 G/DL — LOW (ref 11.5–15.5)
MCHC RBC-ENTMCNC: 25.5 PG — LOW (ref 27–34)
MCHC RBC-ENTMCNC: 31.1 GM/DL — LOW (ref 32–36)
MCV RBC AUTO: 81.8 FL — SIGNIFICANT CHANGE UP (ref 80–100)
NRBC # BLD: 0 /100 WBCS — SIGNIFICANT CHANGE UP (ref 0–0)
PLATELET # BLD AUTO: 307 K/UL — SIGNIFICANT CHANGE UP (ref 150–400)
POTASSIUM SERPL-MCNC: 3.9 MMOL/L — SIGNIFICANT CHANGE UP (ref 3.5–5.3)
POTASSIUM SERPL-SCNC: 3.9 MMOL/L — SIGNIFICANT CHANGE UP (ref 3.5–5.3)
PROT SERPL-MCNC: 6.1 G/DL — SIGNIFICANT CHANGE UP (ref 6–8.3)
RBC # BLD: 4.44 M/UL — SIGNIFICANT CHANGE UP (ref 3.8–5.2)
RBC # FLD: 19.9 % — HIGH (ref 10.3–14.5)
SODIUM SERPL-SCNC: 143 MMOL/L — SIGNIFICANT CHANGE UP (ref 135–145)
WBC # BLD: 7.52 K/UL — SIGNIFICANT CHANGE UP (ref 3.8–10.5)
WBC # FLD AUTO: 7.52 K/UL — SIGNIFICANT CHANGE UP (ref 3.8–10.5)

## 2021-11-29 PROCEDURE — 99232 SBSQ HOSP IP/OBS MODERATE 35: CPT

## 2021-11-29 RX ADMIN — Medication 145 MILLIGRAM(S): at 12:02

## 2021-11-29 RX ADMIN — LIDOCAINE 1 PATCH: 4 CREAM TOPICAL at 08:21

## 2021-11-29 RX ADMIN — FAMOTIDINE 20 MILLIGRAM(S): 10 INJECTION INTRAVENOUS at 12:01

## 2021-11-29 RX ADMIN — Medication 10 MILLIGRAM(S): at 05:12

## 2021-11-29 RX ADMIN — METFORMIN HYDROCHLORIDE 1000 MILLIGRAM(S): 850 TABLET ORAL at 08:21

## 2021-11-29 RX ADMIN — HEPARIN SODIUM 5000 UNIT(S): 5000 INJECTION INTRAVENOUS; SUBCUTANEOUS at 21:24

## 2021-11-29 RX ADMIN — MEMANTINE HYDROCHLORIDE 5 MILLIGRAM(S): 10 TABLET ORAL at 05:12

## 2021-11-29 RX ADMIN — ZINC OXIDE 1 APPLICATION(S): 200 OINTMENT TOPICAL at 05:13

## 2021-11-29 RX ADMIN — Medication 5 UNIT(S): at 17:02

## 2021-11-29 RX ADMIN — INSULIN GLARGINE 15 UNIT(S): 100 INJECTION, SOLUTION SUBCUTANEOUS at 21:25

## 2021-11-29 RX ADMIN — Medication 5 UNIT(S): at 08:21

## 2021-11-29 RX ADMIN — LIDOCAINE 1 PATCH: 4 CREAM TOPICAL at 19:00

## 2021-11-29 RX ADMIN — ZINC OXIDE 1 APPLICATION(S): 200 OINTMENT TOPICAL at 17:05

## 2021-11-29 RX ADMIN — DONEPEZIL HYDROCHLORIDE 5 MILLIGRAM(S): 10 TABLET, FILM COATED ORAL at 21:24

## 2021-11-29 RX ADMIN — HEPARIN SODIUM 5000 UNIT(S): 5000 INJECTION INTRAVENOUS; SUBCUTANEOUS at 13:02

## 2021-11-29 RX ADMIN — Medication 5 UNIT(S): at 12:18

## 2021-11-29 RX ADMIN — Medication 81 MILLIGRAM(S): at 12:01

## 2021-11-29 RX ADMIN — Medication 30 MILLIGRAM(S): at 05:12

## 2021-11-29 RX ADMIN — HEPARIN SODIUM 5000 UNIT(S): 5000 INJECTION INTRAVENOUS; SUBCUTANEOUS at 05:11

## 2021-11-29 RX ADMIN — Medication 10 MILLIGRAM(S): at 17:02

## 2021-11-29 RX ADMIN — Medication 250 MILLIGRAM(S): at 17:02

## 2021-11-29 RX ADMIN — ATORVASTATIN CALCIUM 80 MILLIGRAM(S): 80 TABLET, FILM COATED ORAL at 21:24

## 2021-11-29 RX ADMIN — MEMANTINE HYDROCHLORIDE 5 MILLIGRAM(S): 10 TABLET ORAL at 17:02

## 2021-11-29 RX ADMIN — Medication 250 MILLIGRAM(S): at 05:12

## 2021-11-29 RX ADMIN — LIDOCAINE 1 PATCH: 4 CREAM TOPICAL at 20:00

## 2021-11-29 RX ADMIN — SERTRALINE 100 MILLIGRAM(S): 25 TABLET, FILM COATED ORAL at 12:02

## 2021-11-29 RX ADMIN — LISINOPRIL 40 MILLIGRAM(S): 2.5 TABLET ORAL at 05:12

## 2021-11-29 RX ADMIN — METFORMIN HYDROCHLORIDE 1000 MILLIGRAM(S): 850 TABLET ORAL at 17:02

## 2021-11-29 RX ADMIN — PREGABALIN 1000 MICROGRAM(S): 225 CAPSULE ORAL at 12:02

## 2021-11-29 NOTE — PROGRESS NOTE ADULT - ASSESSMENT
ASSESSMENT/PLAN  This is a 76 Female with a h/o CVA (no prior deficit), HTN, HLD, IDDM, Dementia, MDD admitted to [facility] on 11/1 for Subacute CVA L Basal Ganglia, Mod-Severe Stenosis Prox R PCA, HTN Urgency, AMS, abnormal speech, and Hypoglycemia.  In ED,  on arrival with some improvement with medications but remains hypertensive.  LKWT 3 days prior to presentation, NIHSS 2, Out of window for TPA. Hospital course significant for UTI- treated with ABT. Patient now with gait Instability, ADL impairments and Functional impairments.    # CVA  - Subacute CVA L Basal Ganglia  - Mod-Severe Stenosis Prox R PCA  - cont Comprehensive Rehab Program: PT/OT/ST- total of 3 hrs/day 5 days/week   - c/w ASA, high dose statin: lipitor    #L shoulder pain--likely RTC tendonitis  - Lidocaine patch in AM    #NPH  - OP f/u with NSGY    #HTN  --SBP goal 120-150s  - Procardia XL  - Lisinopril 40 mg daily  - hydralazine 10 mg BID  bp controlled    #HLD  - on Lipitor  - Fenofibrate 145mg daily    #DM II with hyperglycemia  - ISS and FS  - Lantus and Admelog  - Management as per hospitalist  - Episodes of hypoglycemia, Lantus & Ademelog decreased.    #Depression  - Sertraline 100mg daily    #Pain management  - Tylenol PRN    #DVT ppx  - Heparin, SCD, TEDs    #Dementia  - Donepezil 5mg daily  - Memantine 5mg BID    #GI ppx  - Protonix  - maalox for dyspepsia    #Bowel Regimen  - Senna, miralax PRN    #Bladder management  -voiding with low PVRs      #Dysphagia    - SLP: evaluation and treatment  - s/p MBS: easy chew with thin liquid per speech. 100 % supervision for aspiration precaution    #Precaution  - Fall, Aspiration, Seizure      #Skin:  - No active issues at this time  - Desitin to buttocks for IAD  - Pressure injury/Skin: Turn Q2hrs while in bed, OOB to Chair, PT/OT     #Sleep:  - Maintain quiet hours and low stim environment.  -melatonin PRN  - Monitor sleep logs    #IDT 11/23:  - SW: lives in  [place] with  and daughter.  and 2 granddaughters assisted with ADLs. Owns WC, RW, rollator.  - Barriers: retropulsion, coordination, poor balance  - Eating and Grooming SV, UBD Kaley, LBD modA, bathing maxA, toilet transfer modA, shower transfer totalA,   --transfers Kaley, ambulation 100' with tomas walker with dorsiflexion assist (for genu recurvatum) Min A, 8 steps with 2 HR mod A.  - On easy to chew diet, modA cognition, decreased attention. poor problem solving, decreased initiation,  mild-mod receptive deficits, mod dysarthria  - Goals: shower transfers with Kaley o/w SC with ADLs, Kaley with stairs and CG transfers, CG/Kaley with ambulation, SV cognition  - Will need family training  - EDOD 12/4 home      Outpatient Follow-up (Specialty/Name of physician):    Huan Oquendo; PhD)  Neurology; Vascular Neurology  [address] [facility], [address]  [place] [address]  Phone: [phone]  Fax: [phone]    Alexander Arevalo)  Neurology; Vascular Neurology  [address] [place] [address], [address] [place] [address]  Phone: [phone]  Fax: [phone] ASSESSMENT/PLAN  This is a 76 Female with a h/o CVA (no prior deficit), HTN, HLD, IDDM, Dementia, MDD admitted to [facility] on 11/1 for Subacute CVA L Basal Ganglia, Mod-Severe Stenosis Prox R PCA, HTN Urgency, AMS, abnormal speech, and Hypoglycemia.  In ED,  on arrival with some improvement with medications but remains hypertensive.  LKWT 3 days prior to presentation, NIHSS 2, Out of window for TPA. Hospital course significant for UTI- treated with ABT. Patient now with gait Instability, ADL impairments and Functional impairments.    # CVA  - Subacute CVA L Basal Ganglia  - Mod-Severe Stenosis Prox R PCA  - cont Comprehensive Rehab Program: PT/OT/ST- total of 3 hrs/day 5 days/week   - c/w ASA, high dose statin: lipitor    #L shoulder pain--likely RTC tendonitis  - Lidocaine patch in AM    #NPH  - OP f/u with NSGY    #HTN  --SBP goal 120-150s  - Procardia XL  - Lisinopril 40 mg daily  - hydralazine 10 mg BID  bp controlled    #HLD  - on Lipitor  - Fenofibrate 145mg daily    #DM II with hyperglycemia  - ISS and FS  - Lantus and Admelog  - Management as per hospitalist  - Episodes of hypoglycemia, Lantus & Ademelog decreased.  FS improved today    #Depression  - Sertraline 100mg daily    #Pain management  - Tylenol PRN    #DVT ppx  - Heparin, SCD, TEDs    #Dementia  - Donepezil 5mg daily  - Memantine 5mg BID    #GI ppx  - Protonix  - maalox for dyspepsia    #Bowel Regimen  - Senna, miralax PRN    #Bladder management  -voiding with low PVRs      #Dysphagia    - SLP: evaluation and treatment  - s/p MBS: easy chew with thin liquid per speech. 100 % supervision for aspiration precaution    #Precaution  - Fall, Aspiration, Seizure      #Skin:  - No active issues at this time  - Desitin to buttocks for IAD  - Pressure injury/Skin: Turn Q2hrs while in bed, OOB to Chair, PT/OT     #Sleep:  - Maintain quiet hours and low stim environment.  -melatonin PRN  - Monitor sleep logs    #IDT 11/23:  - SW: lives in  3STE with  and daughter.  and 2 granddaughters assisted with ADLs. Owns WC, RW, rollator.  - Barriers: retropulsion, coordination, poor balance  - Eating and Grooming SV, UBD Kaley, LBD modA, bathing maxA, toilet transfer modA, shower transfer totalA,   --transfers Kaley, ambulation 100' with tomas walker with dorsiflexion assist (for genu recurvatum) Min A, 8 steps with 2 HR mod A.  - On easy to chew diet, modA cognition, decreased attention. poor problem solving, decreased initiation,  mild-mod receptive deficits, mod dysarthria  - Goals: shower transfers with Kaley o/w SC with ADLs, Kaley with stairs and CG transfers, CG/Kaley with ambulation, SV cognition  - Will need family training  - EDOD 12/4 home      Outpatient Follow-up (Specialty/Name of physician):    Huan Oquendo; PhD)  Neurology; Vascular Neurology  [address] [facility], [address]  [place] [address]  Phone: [phone]  Fax: [phone]    Alexander Arevalo)  Neurology; Vascular Neurology  [address] [place] [address], [address] [place] [address]  Phone: [phone]  Fax: [phone]

## 2021-11-29 NOTE — PROGRESS NOTE ADULT - ATTENDING COMMENTS
Pt. seen with resident.  Agree with documentation above as per resident with amendments made as appropriate. Patient medically stable. Making progress towards rehab goals.     Left basal ganglia Infarct  Stable.  FT scheduled 12/1 to prep for d/c home 12/4

## 2021-11-29 NOTE — PROGRESS NOTE ADULT - SUBJECTIVE AND OBJECTIVE BOX
Patient is a 76y old  Female who presents with a chief complaint of left basal ganglia CVA with very mild right lower extremity weakness (28 Nov 2021 09:09)      SUBJECTIVE / OVERNIGHT EVENTS:  Pt seen and examined at bedside. No acute events overnight.  Pt denies cp, palpitations, sob, abd pain, N/V, fever, chills.    ROS:  All other review of systems negative    Allergies    No Known Allergies    Intolerances        MEDICATIONS  (STANDING):  aspirin enteric coated 81 milliGRAM(s) Oral daily  atorvastatin 80 milliGRAM(s) Oral at bedtime  cyanocobalamin 1000 MICROGram(s) Oral daily  dextrose 40% Gel 15 Gram(s) Oral once  dextrose 5%. 1000 milliLiter(s) (50 mL/Hr) IV Continuous <Continuous>  dextrose 5%. 1000 milliLiter(s) (100 mL/Hr) IV Continuous <Continuous>  dextrose 50% Injectable 25 Gram(s) IV Push once  dextrose 50% Injectable 12.5 Gram(s) IV Push once  dextrose 50% Injectable 25 Gram(s) IV Push once  donepezil 5 milliGRAM(s) Oral at bedtime  famotidine    Tablet 20 milliGRAM(s) Oral daily  fenofibrate Tablet 145 milliGRAM(s) Oral daily  glucagon  Injectable 1 milliGRAM(s) IntraMuscular once  heparin   Injectable 5000 Unit(s) SubCutaneous every 8 hours  hydrALAZINE 10 milliGRAM(s) Oral two times a day  influenza  Vaccine (HIGH DOSE) 0.7 milliLiter(s) IntraMuscular once  insulin glargine Injectable (LANTUS) 15 Unit(s) SubCutaneous at bedtime  insulin lispro (ADMELOG) corrective regimen sliding scale   SubCutaneous three times a day before meals  insulin lispro (ADMELOG) corrective regimen sliding scale   SubCutaneous at bedtime  insulin lispro Injectable (ADMELOG) 5 Unit(s) SubCutaneous three times a day before meals  lidocaine   4% Patch 1 Patch Transdermal <User Schedule>  lisinopril 40 milliGRAM(s) Oral daily  memantine 5 milliGRAM(s) Oral two times a day  metFORMIN 1000 milliGRAM(s) Oral two times a day  NIFEdipine XL 30 milliGRAM(s) Oral daily  saccharomyces boulardii 250 milliGRAM(s) Oral two times a day  sertraline 100 milliGRAM(s) Oral daily  zinc oxide 40% Ointment 1 Application(s) Topical two times a day    MEDICATIONS  (PRN):  acetaminophen     Tablet .. 650 milliGRAM(s) Oral every 6 hours PRN Temp greater or equal to 38C (100.4F), Mild Pain (1 - 3)  aluminum hydroxide/magnesium hydroxide/simethicone Suspension 30 milliLiter(s) Oral every 4 hours PRN Dyspepsia  melatonin 3 milliGRAM(s) Oral at bedtime PRN Insomnia      Vital Signs Last 24 Hrs  T(C): 36.6 (29 Nov 2021 08:16), Max: 36.6 (29 Nov 2021 08:16)  T(F): 97.8 (29 Nov 2021 08:16), Max: 97.8 (29 Nov 2021 08:16)  HR: 60 (29 Nov 2021 08:16) (60 - 77)  BP: 138/68 (29 Nov 2021 08:16) (121/60 - 158/66)  BP(mean): --  RR: 16 (29 Nov 2021 08:16) (16 - 16)  SpO2: 98% (29 Nov 2021 08:16) (95% - 98%)  CAPILLARY BLOOD GLUCOSE      POCT Blood Glucose.: 125 mg/dL (29 Nov 2021 08:18)  POCT Blood Glucose.: 184 mg/dL (28 Nov 2021 22:08)  POCT Blood Glucose.: 162 mg/dL (28 Nov 2021 16:46)  POCT Blood Glucose.: 148 mg/dL (28 Nov 2021 11:53)  POCT Blood Glucose.: 91 mg/dL (28 Nov 2021 11:19)    I&O's Summary      PHYSICAL EXAM:  GENERAL: NAD, well-developed  CHEST/LUNG: Clear to auscultation bilaterally; No wheeze, nonlabored breathing  HEART: Regular rate and rhythm; No murmurs, rubs, or gallops  ABDOMEN: Soft, Nontender, Nondistended; Bowel sounds present  EXTREMITIES:  2+ Peripheral Pulses, No clubbing, cyanosis, or edema  NEUROLOGY: AAOx3, non-focal  PSYCH: calm, appropriate mood    LABS:                        11.3   7.52  )-----------( 307      ( 29 Nov 2021 05:35 )             36.3     11-29    143  |  108  |  30<H>  ----------------------------<  132<H>  3.9   |  27  |  0.88    Ca    8.9      29 Nov 2021 05:35    TPro  6.1  /  Alb  2.9<L>  /  TBili  0.3  /  DBili  x   /  AST  19  /  ALT  27  /  AlkPhos  34<L>  11-29              RADIOLOGY & ADDITIONAL TESTS:  Results Reviewed:   Imaging Personally Reviewed:  Electrocardiogram Personally Reviewed:    COORDINATION OF CARE:  Care Discussed with Consultants/Other Providers [Y/N]:  Prior or Outpatient Records Reviewed [Y/N]:

## 2021-11-29 NOTE — PROGRESS NOTE ADULT - SUBJECTIVE AND OBJECTIVE BOX
HPI:  This is a 76 Female with a h/o CVA (no prior deficit), HTN, HLD, IDDM, Dementia, MDD admitted to [facility] on 11/1 for Subacute CVA L Basal Ganglia infarct,  CTA showedMod-Severe Stenosis Prox R PCA.  Pt. with HTN Urgency, and Hypoglycemia. Patient followed by neurology OP. In ED,  on arrival with some improvement with medications but remains hypertensive. Patient noted to have AMS with abnormal speech by daughter. LKWT 3 days prior to presentation. NIHSS 2. Out of window for TPA.  MRI brain showed--  left posterior limb IC/CR stroke, left frontal and right splenium acute strokes,    Per family, patient was followed by neurosurgery for evaluation of possible NPH with LP as outpatient. Neurosurgery recommending outpatient follow up after rehab. Pt also diagnosed with possible UTI on admission and treated with IV antibiotics, urine cx positive for proteus, ID consulted. HTN meds adjusted, bradycardic metoprolol dose decreased, hypoglycemia improved started low dose Lantus, high BP meds adjusted added hydralazine.  MBS performed and recommended easy to chew with thin liquids, with 100% supervision.   Patient was evaluated by PM&R and therapy for functional deficits, gait/ADL impairments and acute rehabilitation was recommended. Patient was medically optimized for discharge to [facility]U on 11/11/21.     (11 Nov 2021 13:54)      PAST MEDICAL & SURGICAL HISTORY:  Rheumatoid arthritis    Diabetes mellitus, type 2    Hypertension    HLD (hyperlipidemia)    Dementia    History of CVA (cerebrovascular accident)    Major depression    Anxiety    UTI (urinary tract infection)    History of dental surgery        Subjective: no new complaints or overnight issues. No acute events overnight. Denies headache, other pain, discomfort. Has lidocaine patch on L shoulder.      Vital Signs Last 24 Hrs  T(C): 36.6 (29 Nov 2021 08:16), Max: 36.6 (29 Nov 2021 08:16)  T(F): 97.8 (29 Nov 2021 08:16), Max: 97.8 (29 Nov 2021 08:16)  HR: 60 (29 Nov 2021 08:16) (60 - 77)  BP: 138/68 (29 Nov 2021 08:16) (121/60 - 158/66)  BP(mean): --  RR: 16 (29 Nov 2021 08:16) (16 - 16)  SpO2: 98% (29 Nov 2021 08:16) (95% - 98%)    REVIEW OF SYMPTOMS  Neurological: cognitive deficits  Denies pain, discomfort, headache.   Denies CP/dyspnea  Denies palpitations  Denies abdominal pain     Physical Exam:  Gen - NAD, Comfortable  Pulm - CTAB,   Cardiovascular - RRR, S1S2, No m/r/g  Abdomen - Soft, NT/ND, +BS  Extremities - No C/C/E, No calf tenderness  Neuro-     Cognitive - [facility] to self[facility]month --Needs cue for year     Communication - +dysarthria, hypophonic, delay processing     Attention: impaired-- delayed processing, distractable     CN: visual fields intact, No facial weakness        Motor -                    LEFT    UE - ShAB 5/5, EF 5/5, EE 5/5, WE 5/5,  5/5                    RIGHT UE - ShAB 5/5, EF 5/5, EE 5/5, WE 5/5,  5/5                    LEFT    LE - HF 5/5, KE 5/5, DF 5/5, PF 5/5                    RIGHT LE - HF 4/5, KE 5-/5, DF 5/5, PF 5/5        Sensory - Intact to LT     Coordination - FTN intact     Tone - normal  Psychiatric - Mood stable, Affect WNL    MSK: full ROM left shoulder,  Neg neers, +prado.  some pain with lateral palpation        RECENT LABS               11.3   7.52  )-----------( 307      ( 29 Nov 2021 05:35 )             36.3     11-29    143  |  108  |  30<H>  ----------------------------<  132<H>  3.9   |  27  |  0.88    Ca    8.9      29 Nov 2021 05:35    TPro  6.1  /  Alb  2.9<L>  /  TBili  0.3  /  DBili  x   /  AST  19  /  ALT  27  /  AlkPhos  34<L>  11-29        CAPILLARY BLOOD GLUCOSE      POCT Blood Glucose.: 125 mg/dL (29 Nov 2021 08:18)  POCT Blood Glucose.: 184 mg/dL (28 Nov 2021 22:08)  POCT Blood Glucose.: 162 mg/dL (28 Nov 2021 16:46)  POCT Blood Glucose.: 148 mg/dL (28 Nov 2021 11:53)  POCT Blood Glucose.: 91 mg/dL (28 Nov 2021 11:19)        RADIOLOGY/OTHER RESULTS      MEDICATIONS  (STANDING):  aspirin enteric coated 81 milliGRAM(s) Oral daily  atorvastatin 80 milliGRAM(s) Oral at bedtime  cyanocobalamin 1000 MICROGram(s) Oral daily  dextrose 40% Gel 15 Gram(s) Oral once  dextrose 5%. 1000 milliLiter(s) (50 mL/Hr) IV Continuous <Continuous>  dextrose 5%. 1000 milliLiter(s) (100 mL/Hr) IV Continuous <Continuous>  dextrose 50% Injectable 25 Gram(s) IV Push once  dextrose 50% Injectable 12.5 Gram(s) IV Push once  dextrose 50% Injectable 25 Gram(s) IV Push once  donepezil 5 milliGRAM(s) Oral at bedtime  famotidine    Tablet 20 milliGRAM(s) Oral daily  fenofibrate Tablet 145 milliGRAM(s) Oral daily  glucagon  Injectable 1 milliGRAM(s) IntraMuscular once  heparin   Injectable 5000 Unit(s) SubCutaneous every 8 hours  hydrALAZINE 10 milliGRAM(s) Oral two times a day  influenza  Vaccine (HIGH DOSE) 0.7 milliLiter(s) IntraMuscular once  insulin glargine Injectable (LANTUS) 15 Unit(s) SubCutaneous at bedtime  insulin lispro (ADMELOG) corrective regimen sliding scale   SubCutaneous three times a day before meals  insulin lispro (ADMELOG) corrective regimen sliding scale   SubCutaneous at bedtime  insulin lispro Injectable (ADMELOG) 5 Unit(s) SubCutaneous three times a day before meals  lidocaine   4% Patch 1 Patch Transdermal <User Schedule>  lisinopril 40 milliGRAM(s) Oral daily  memantine 5 milliGRAM(s) Oral two times a day  metFORMIN 1000 milliGRAM(s) Oral two times a day  NIFEdipine XL 30 milliGRAM(s) Oral daily  saccharomyces boulardii 250 milliGRAM(s) Oral two times a day  sertraline 100 milliGRAM(s) Oral daily  zinc oxide 40% Ointment 1 Application(s) Topical two times a day    MEDICATIONS  (PRN):  acetaminophen     Tablet .. 650 milliGRAM(s) Oral every 6 hours PRN Temp greater or equal to 38C (100.4F), Mild Pain (1 - 3)  aluminum hydroxide/magnesium hydroxide/simethicone Suspension 30 milliLiter(s) Oral every 4 hours PRN Dyspepsia  melatonin 3 milliGRAM(s) Oral at bedtime PRN Insomnia

## 2021-11-29 NOTE — PROGRESS NOTE ADULT - ASSESSMENT
76 Female with a h/o CVA (no prior deficit), HTN, HLD, IDDM, Dementia, MDD admitted to [facility] on 11/1 for Subacute CVA L Basal Ganglia, Mod-Severe Stenosis Prox R PCA, HTN Urgency, AMS, abnormal speech, and Hypoglycemia.  In ED,  on arrival with some improvement with medications but remains hypertensive.  LKWT 3 days prior to presentation, NIHSS 2, Out of window for TPA. Hospital course significant for UTI- treated with ABT. Patient now with gait Instability, ADL impairments and Functional impairments.    # CVA  - Subacute CVA L Basal Ganglia  - Mod-Severe Stenosis Prox R PCA  - continue Comprehensive Rehab Program: PT/OT/ST   - c/a ASA 81mg daily, lipitor 80mg qhs    #NPH  - OP f/u with NSGY    #HTN  - Procardia XL 30mg daily  - Lisinopril 40 mg daily  - hydralazine 10 mg BID    #HLD  - on Lipitor  - Fenofibrate 145mg daily    #DM II  - HA1c 7.6  - Continue Lantus 15units qhs + Admelog 5units premeal + Metformin 1000mg BID + ISS and FS  - Expect to further decrease Lantus/Admelog     #Depression  - Sertraline 100mg daily    #Dementia  - Donepezil 5mg daily  - Memantine 5mg BID    #UTI proteus miribalis   - S/p augmentin    #DVT ppx  - Heparin

## 2021-11-30 LAB
GLUCOSE BLDC GLUCOMTR-MCNC: 119 MG/DL — HIGH (ref 70–99)
GLUCOSE BLDC GLUCOMTR-MCNC: 130 MG/DL — HIGH (ref 70–99)
GLUCOSE BLDC GLUCOMTR-MCNC: 135 MG/DL — HIGH (ref 70–99)
GLUCOSE BLDC GLUCOMTR-MCNC: 146 MG/DL — HIGH (ref 70–99)
SARS-COV-2 RNA SPEC QL NAA+PROBE: SIGNIFICANT CHANGE UP

## 2021-11-30 PROCEDURE — 99232 SBSQ HOSP IP/OBS MODERATE 35: CPT

## 2021-11-30 RX ADMIN — SERTRALINE 100 MILLIGRAM(S): 25 TABLET, FILM COATED ORAL at 11:37

## 2021-11-30 RX ADMIN — Medication 10 MILLIGRAM(S): at 18:11

## 2021-11-30 RX ADMIN — Medication 5 UNIT(S): at 11:36

## 2021-11-30 RX ADMIN — Medication 250 MILLIGRAM(S): at 05:29

## 2021-11-30 RX ADMIN — FAMOTIDINE 20 MILLIGRAM(S): 10 INJECTION INTRAVENOUS at 11:37

## 2021-11-30 RX ADMIN — Medication 250 MILLIGRAM(S): at 18:09

## 2021-11-30 RX ADMIN — DONEPEZIL HYDROCHLORIDE 5 MILLIGRAM(S): 10 TABLET, FILM COATED ORAL at 21:15

## 2021-11-30 RX ADMIN — LIDOCAINE 1 PATCH: 4 CREAM TOPICAL at 07:58

## 2021-11-30 RX ADMIN — ZINC OXIDE 1 APPLICATION(S): 200 OINTMENT TOPICAL at 05:36

## 2021-11-30 RX ADMIN — Medication 5 UNIT(S): at 16:46

## 2021-11-30 RX ADMIN — HEPARIN SODIUM 5000 UNIT(S): 5000 INJECTION INTRAVENOUS; SUBCUTANEOUS at 05:28

## 2021-11-30 RX ADMIN — LISINOPRIL 40 MILLIGRAM(S): 2.5 TABLET ORAL at 05:29

## 2021-11-30 RX ADMIN — PREGABALIN 1000 MICROGRAM(S): 225 CAPSULE ORAL at 11:37

## 2021-11-30 RX ADMIN — LIDOCAINE 1 PATCH: 4 CREAM TOPICAL at 18:58

## 2021-11-30 RX ADMIN — LIDOCAINE 1 PATCH: 4 CREAM TOPICAL at 19:49

## 2021-11-30 RX ADMIN — HEPARIN SODIUM 5000 UNIT(S): 5000 INJECTION INTRAVENOUS; SUBCUTANEOUS at 14:17

## 2021-11-30 RX ADMIN — HEPARIN SODIUM 5000 UNIT(S): 5000 INJECTION INTRAVENOUS; SUBCUTANEOUS at 21:15

## 2021-11-30 RX ADMIN — Medication 5 UNIT(S): at 07:58

## 2021-11-30 RX ADMIN — Medication 30 MILLIGRAM(S): at 05:30

## 2021-11-30 RX ADMIN — Medication 145 MILLIGRAM(S): at 11:37

## 2021-11-30 RX ADMIN — METFORMIN HYDROCHLORIDE 1000 MILLIGRAM(S): 850 TABLET ORAL at 18:09

## 2021-11-30 RX ADMIN — INSULIN GLARGINE 15 UNIT(S): 100 INJECTION, SOLUTION SUBCUTANEOUS at 21:15

## 2021-11-30 RX ADMIN — METFORMIN HYDROCHLORIDE 1000 MILLIGRAM(S): 850 TABLET ORAL at 05:30

## 2021-11-30 RX ADMIN — ZINC OXIDE 1 APPLICATION(S): 200 OINTMENT TOPICAL at 18:09

## 2021-11-30 RX ADMIN — MEMANTINE HYDROCHLORIDE 5 MILLIGRAM(S): 10 TABLET ORAL at 18:09

## 2021-11-30 RX ADMIN — Medication 81 MILLIGRAM(S): at 11:37

## 2021-11-30 RX ADMIN — MEMANTINE HYDROCHLORIDE 5 MILLIGRAM(S): 10 TABLET ORAL at 05:29

## 2021-11-30 RX ADMIN — ATORVASTATIN CALCIUM 80 MILLIGRAM(S): 80 TABLET, FILM COATED ORAL at 21:15

## 2021-11-30 RX ADMIN — Medication 10 MILLIGRAM(S): at 05:30

## 2021-11-30 NOTE — PROGRESS NOTE ADULT - ASSESSMENT
ASSESSMENT/PLAN  This is a 76 Female with a h/o CVA (no prior deficit), HTN, HLD, IDDM, Dementia, MDD admitted to [facility] on 11/1 for Subacute CVA L Basal Ganglia, Mod-Severe Stenosis Prox R PCA, HTN Urgency, AMS, abnormal speech, and Hypoglycemia.  In ED,  on arrival with some improvement with medications but remains hypertensive.  LKWT 3 days prior to presentation, NIHSS 2, Out of window for TPA. Hospital course significant for UTI- treated with ABT. Patient now with gait Instability, ADL impairments and Functional impairments.    # CVA  - Subacute CVA L Basal Ganglia  - Mod-Severe Stenosis Prox R PCA  - cont Comprehensive Rehab Program: PT/OT/ST- total of 3 hrs/day 5 days/week   - c/w ASA, high dose statin: lipitor    #L shoulder pain--likely RTC tendonitis  - Lidocaine patch in AM    #NPH  - OP f/u with NSGY    #HTN  --SBP goal 120-150s  - Procardia XL  - Lisinopril 40 mg daily  - hydralazine 10 mg BID  bp controlled    #HLD  - on Lipitor  - Fenofibrate 145mg daily    #DM II with hyperglycemia  - ISS and FS  - Lantus and Admelog  - Management as per hospitalist  - Episodes of hypoglycemia, Lantus & Ademelog decreased.  FS improved    #Depression  - Sertraline 100mg daily    #Pain management  - Tylenol PRN    #DVT ppx  - Heparin, SCD, TEDs    #Dementia  - Donepezil 5mg daily  - Memantine 5mg BID    #GI ppx  - Protonix  - maalox for dyspepsia    #Bowel Regimen  - Senna, miralax PRN    #Bladder management  -voiding with low PVRs      #Dysphagia    - SLP: evaluation and treatment  - s/p MBS: easy chew with thin liquid per speech. 100 % supervision for aspiration precaution    #Precaution  - Fall, Aspiration, Seizure      #Skin:  - No active issues at this time  - Desitin to buttocks for IAD  - Pressure injury/Skin: Turn Q2hrs while in bed, OOB to Chair, PT/OT     #Sleep:  - Maintain quiet hours and low stim environment.  -melatonin PRN  - Monitor sleep logs    #IDT 11/23:  - SW: lives in  3STE with  and daughter.  and 2 granddaughters assisted with ADLs. Owns WC, RW, rollator.  - Barriers: retropulsion, coordination, poor balance  - Eating and Grooming SV, UBD Kaley, LBD modA, bathing maxA, toilet transfer modA, shower transfer totalA,   --transfers Kaley, ambulation 100' with tomas walker with dorsiflexion assist (for genu recurvatum) Min A, 8 steps with 2 HR mod A.  - On easy to chew diet, modA cognition, decreased attention. poor problem solving, decreased initiation,  mild-mod receptive deficits, mod dysarthria  - Goals: shower transfers with Kaley o/w SC with ADLs, Kaley with stairs and CG transfers, CG/Kaley with ambulation, SV cognition  - Will need family training  - EDOD 12/4 home      Outpatient Follow-up (Specialty/Name of physician):    Huan Oquendo; PhD)  Neurology; Vascular Neurology  [address] [facility], [address]  [place] [address]  Phone: [phone]  Fax: [phone]    Alexander Arevalo)  Neurology; Vascular Neurology  [address] [place] [address], [address] [place] [address]  Phone: [phone]  Fax: [phone] ASSESSMENT/PLAN  This is a 76 Female with a h/o CVA (no prior deficit), HTN, HLD, IDDM, Dementia, MDD admitted to [facility] on 11/1 for Subacute CVA L Basal Ganglia, Mod-Severe Stenosis Prox R PCA, HTN Urgency, AMS, abnormal speech, and Hypoglycemia.  In ED,  on arrival with some improvement with medications but remains hypertensive.  LKWT 3 days prior to presentation, NIHSS 2, Out of window for TPA. Hospital course significant for UTI- treated with ABT. Patient now with gait Instability, ADL impairments and Functional impairments.    # CVA  - Subacute CVA L Basal Ganglia  - Mod-Severe Stenosis Prox R PCA  - cont Comprehensive Rehab Program: PT/OT/ST- total of 3 hrs/day 5 days/week   - c/w ASA, high dose statin: lipitor    #L shoulder pain--likely RTC tendonitis  - Lidocaine patch in AM    #NPH  - OP f/u with NSGY    #HTN  --SBP goal 120-150s  - Procardia XL  - Lisinopril 40 mg daily  - hydralazine 10 mg BID  bp controlled    #HLD  - on Lipitor  - Fenofibrate 145mg daily    #DM II with hyperglycemia  - ISS and FS  - Lantus and Admelog  - Management as per hospitalist  -     #Depression  - Sertraline 100mg daily    #Pain management  - Tylenol PRN    #DVT ppx  - Heparin, SCD, TEDs    #Dementia  - Donepezil 5mg daily  - Memantine 5mg BID    #GI ppx  - Protonix  - maalox for dyspepsia    #Bowel Regimen  - Senna, miralax PRN    #Bladder management  -voiding with low PVRs      #Dysphagia    - SLP: evaluation and treatment  - s/p MBS: easy chew with thin liquid per speech. 100 % supervision for aspiration precaution    #Precaution  - Fall, Aspiration, Seizure      #Skin:  - No active issues at this time  - Desitin to buttocks for IAD  - Pressure injury/Skin: Turn Q2hrs while in bed, OOB to Chair, PT/OT     #Sleep:  - Maintain quiet hours and low stim environment.  -melatonin PRN  - Monitor sleep logs    #IDT 11/23:  - SW: lives in  3STE with  and daughter.  and 2 granddaughters assisted with ADLs. Owns WC, RW, rollator.  - Barriers: retropulsion, coordination, poor balance  - Eating and Grooming SV, UBD Kaley, LBD modA, bathing maxA, toilet transfer modA, shower transfer totalA,   --transfers Kaley, ambulation 100' with tomas walker with dorsiflexion assist (for genu recurvatum) Min A, 8 steps with 2 HR mod A.  - On easy to chew diet, modA cognition, decreased attention. poor problem solving, decreased initiation,  mild-mod receptive deficits, mod dysarthria  - Goals: shower transfers with Kaley o/w SC with ADLs, Kaley with stairs and CG transfers, CG/Kaley with ambulation, SV cognition  - Will need family training  - EDOD 12/4 home      Outpatient Follow-up (Specialty/Name of physician):    Huan Oquendo; PhD)  Neurology; Vascular Neurology  [address] [facility], [address]  [place] [address]  Phone: [phone]  Fax: [phone]    Alexander Arevalo (MD)  Neurology; Vascular Neurology  [address] [facility], [address] [place] [address]  Phone: [phone]  Fax: [phone] ASSESSMENT/PLAN  This is a 76 Female with a h/o CVA (no prior deficit), HTN, HLD, IDDM, Dementia, MDD admitted to [facility] on 11/1 for Subacute CVA L Basal Ganglia, Mod-Severe Stenosis Prox R PCA, HTN Urgency, AMS, abnormal speech, and Hypoglycemia.  In ED,  on arrival with some improvement with medications but remains hypertensive.  LKWT 3 days prior to presentation, NIHSS 2, Out of window for TPA. Hospital course significant for UTI- treated with ABT. Patient now with gait Instability, ADL impairments and Functional impairments.    # CVA  - Subacute CVA L Basal Ganglia  - Mod-Severe Stenosis Prox R PCA  - cont Comprehensive Rehab Program: PT/OT/ST- total of 3 hrs/day 5 days/week   - c/w ASA, high dose statin: lipitor    #L shoulder pain--likely RTC tendonitis  - Lidocaine patch in AM    #NPH  - OP f/u with NSGY    #HTN  --SBP goal 120-150s  - Procardia XL  - Lisinopril 40 mg daily  - hydralazine 10 mg BID  bp controlled    #HLD  - on Lipitor  - Fenofibrate 145mg daily    #DM II with hyperglycemia  - ISS and FS  - Lantus and Admelog  - Management as per hospitalist  -     #Depression  - Sertraline 100mg daily    #Pain management  - Tylenol PRN    #DVT ppx  - Heparin, SCD, TEDs    #Dementia  - Donepezil 5mg daily  - Memantine 5mg BID    #GI ppx  - Protonix  - maalox for dyspepsia    #Bowel Regimen  - Senna, miralax PRN    #Bladder management  -voiding with low PVRs      #Dysphagia    - SLP: evaluation and treatment  - s/p MBS: easy chew with thin liquid per speech. 100 % supervision for aspiration precaution    #Precaution  - Fall, Aspiration, Seizure      #Skin:  - No active issues at this time  - Desitin to buttocks for IAD  - Pressure injury/Skin: Turn Q2hrs while in bed, OOB to Chair, PT/OT     #Sleep:  - Maintain quiet hours and low stim environment.  -melatonin PRN  - Monitor sleep logs    #IDT 11/30:  - SW: lives in  3STE with  and daughter.  and 2 granddaughters assisted with ADLs. Owns WC, RW, rollator.  - Barriers: retropulsion, coordination, poor balance, poor carryover, cognition  - OT: on track to meet goals at WC level; eating and grooming SV, UBD Kaley, LBD modA, bathing maxA, toilet transfer and shower transfer modA,   --PT: not on track to meet goals; transfers min-modA, ambulation 100' with tomas walker with dorsiflexion assist (for genu recurvatum) min-modA, 4 steps with 1HR mod A. Level of assist fluctuates during the day and on a day to day basis. Would help if 2nd HR can be installed at home.   - SLP: on track; on easy to chew diet, modA cognition, decreased attention. poor problem solving, decreased initiation,  mild-mod receptive deficits, mod dysarthria  - Goals: shower transfers with Kaley o/w SC with ADLs, Kaley with stairs and CG transfers, CG/Kaley with ambulation, SV cognition  - Family training tomorrow 12/1 with  and daughter, Donald.  - EDOD 12/4 home      Outpatient Follow-up (Specialty/Name of physician):    Huan Oquendo; PhD)  Neurology; Vascular Neurology  [address] [facility], [address]  [place] [address]  Phone: [phone]  Fax: [phone]    Alexander Arevalo (MD)  Neurology; Vascular Neurology  [address] [facility], [address] [place] [address]  Phone: [phone]  Fax: [phone]

## 2021-11-30 NOTE — PROGRESS NOTE ADULT - SUBJECTIVE AND OBJECTIVE BOX
HPI:  This is a 76 Female with a h/o CVA (no prior deficit), HTN, HLD, IDDM, Dementia, MDD admitted to [facility] on 11/1 for Subacute CVA L Basal Ganglia infarct,  CTA showedMod-Severe Stenosis Prox R PCA.  Pt. with HTN Urgency, and Hypoglycemia. Patient followed by neurology OP. In ED,  on arrival with some improvement with medications but remains hypertensive. Patient noted to have AMS with abnormal speech by daughter. LKWT 3 days prior to presentation. NIHSS 2. Out of window for TPA.  MRI brain showed--  left posterior limb IC/CR stroke, left frontal and right splenium acute strokes,    Per family, patient was followed by neurosurgery for evaluation of possible NPH with LP as outpatient. Neurosurgery recommending outpatient follow up after rehab. Pt also diagnosed with possible UTI on admission and treated with IV antibiotics, urine cx positive for proteus, ID consulted. HTN meds adjusted, bradycardic metoprolol dose decreased, hypoglycemia improved started low dose Lantus, high BP meds adjusted added hydralazine.  MBS performed and recommended easy to chew with thin liquids, with 100% supervision.   Patient was evaluated by PM&R and therapy for functional deficits, gait/ADL impairments and acute rehabilitation was recommended. Patient was medically optimized for discharge to [facility] on 11/11/21.     (11 Nov 2021 13:54)      PAST MEDICAL & SURGICAL HISTORY:  Rheumatoid arthritis    Diabetes mellitus, type 2    Hypertension    HLD (hyperlipidemia)    Dementia    History of CVA (cerebrovascular accident)    Major depression    Anxiety    UTI (urinary tract infection)    History of dental surgery        Subjective: Patient seen and assessed at PT, walking stairs. no new complaints or overnight issues. No acute events overnight. Denies headache, other pain, discomfort.       Vital Signs Last 24 Hrs  T(C): 36.4 (29 Nov 2021 20:30), Max: 36.4 (29 Nov 2021 20:30)  T(F): 97.6 (29 Nov 2021 20:30), Max: 97.6 (29 Nov 2021 20:30)  HR: 68 (30 Nov 2021 05:36) (67 - 69)  BP: 137/69 (30 Nov 2021 05:36) (132/76 - 155/64)  BP(mean): --  RR: 16 (30 Nov 2021 05:36) (16 - 16)  SpO2: 97% (30 Nov 2021 05:36) (97% - 97%)    REVIEW OF SYMPTOMS  Neurological: cognitive deficits  Denies pain, discomfort, headache.   Denies CP/dyspnea  Denies palpitations  Denies abdominal pain     Physical Exam:  Gen - NAD, Comfortable  Pulm - CTAB,   Cardiovascular - RRR, S1S2, No m/r/g  Abdomen - Soft, NT/ND, +BS  Extremities - No C/C/E, No calf tenderness  Neuro-     Cognitive - AAO to self, hospital ("[facility]" with cueing),month --Needs cue for year     Communication - +dysarthria, hypophonic, delay processing     Attention: impaired-- delayed processing, distractable     CN: visual fields intact, No facial weakness        Motor -                    LEFT    UE - ShAB 5/5, EF 5/5, EE 5/5, WE 5/5,  5/5                    RIGHT UE - ShAB 5/5, EF 5/5, EE 5/5, WE 5/5,  5/5                    LEFT    LE - HF 5/5, KE 5/5, DF 5/5, PF 5/5                    RIGHT LE - HF 4/5, KE 5-/5, DF 5/5, PF 5/5        Sensory - Intact to LT     Coordination - FTN intact     Tone - normal  Psychiatric - Mood stable, Affect WNL    MSK: full ROM left shoulder,  Neg neers, +prado.  some pain with lateral palpation        RECENT LABS               11.3   7.52  )-----------( 307      ( 29 Nov 2021 05:35 )             36.3     11-29    143  |  108  |  30<H>  ----------------------------<  132<H>  3.9   |  27  |  0.88    Ca    8.9      29 Nov 2021 05:35    TPro  6.1  /  Alb  2.9<L>  /  TBili  0.3  /  DBili  x   /  AST  19  /  ALT  27  /  AlkPhos  34<L>  11-29        CAPILLARY BLOOD GLUCOSE      POCT Blood Glucose.: 119 mg/dL (30 Nov 2021 07:56)  POCT Blood Glucose.: 105 mg/dL (29 Nov 2021 21:23)  POCT Blood Glucose.: 147 mg/dL (29 Nov 2021 16:38)  POCT Blood Glucose.: 126 mg/dL (29 Nov 2021 12:00)        RADIOLOGY/OTHER RESULTS      MEDICATIONS  (STANDING):  aspirin enteric coated 81 milliGRAM(s) Oral daily  atorvastatin 80 milliGRAM(s) Oral at bedtime  cyanocobalamin 1000 MICROGram(s) Oral daily  dextrose 40% Gel 15 Gram(s) Oral once  dextrose 5%. 1000 milliLiter(s) (50 mL/Hr) IV Continuous <Continuous>  dextrose 5%. 1000 milliLiter(s) (100 mL/Hr) IV Continuous <Continuous>  dextrose 50% Injectable 25 Gram(s) IV Push once  dextrose 50% Injectable 12.5 Gram(s) IV Push once  dextrose 50% Injectable 25 Gram(s) IV Push once  donepezil 5 milliGRAM(s) Oral at bedtime  famotidine    Tablet 20 milliGRAM(s) Oral daily  fenofibrate Tablet 145 milliGRAM(s) Oral daily  glucagon  Injectable 1 milliGRAM(s) IntraMuscular once  heparin   Injectable 5000 Unit(s) SubCutaneous every 8 hours  hydrALAZINE 10 milliGRAM(s) Oral two times a day  influenza  Vaccine (HIGH DOSE) 0.7 milliLiter(s) IntraMuscular once  insulin glargine Injectable (LANTUS) 15 Unit(s) SubCutaneous at bedtime  insulin lispro (ADMELOG) corrective regimen sliding scale   SubCutaneous three times a day before meals  insulin lispro (ADMELOG) corrective regimen sliding scale   SubCutaneous at bedtime  insulin lispro Injectable (ADMELOG) 5 Unit(s) SubCutaneous three times a day before meals  lidocaine   4% Patch 1 Patch Transdermal <User Schedule>  lisinopril 40 milliGRAM(s) Oral daily  memantine 5 milliGRAM(s) Oral two times a day  metFORMIN 1000 milliGRAM(s) Oral two times a day  NIFEdipine XL 30 milliGRAM(s) Oral daily  saccharomyces boulardii 250 milliGRAM(s) Oral two times a day  sertraline 100 milliGRAM(s) Oral daily  zinc oxide 40% Ointment 1 Application(s) Topical two times a day    MEDICATIONS  (PRN):  acetaminophen     Tablet .. 650 milliGRAM(s) Oral every 6 hours PRN Temp greater or equal to 38C (100.4F), Mild Pain (1 - 3)  aluminum hydroxide/magnesium hydroxide/simethicone Suspension 30 milliLiter(s) Oral every 4 hours PRN Dyspepsia  melatonin 3 milliGRAM(s) Oral at bedtime PRN Insomnia

## 2021-11-30 NOTE — CHART NOTE - NSCHARTNOTEFT_GEN_A_CORE
Nutrition Follow Up Note  Hospital Course (Per Electronic Medical Record): 76 Female with a h/o CVA (no prior deficit), HTN, HLD, IDDM, Dementia, MDD admitted to [facility] on 11/1 for Subacute CVA L Basal Ganglia, Mod-Severe Stenosis Prox R PCA, HTN Urgency, AMS, abnormal speech, and Hypoglycemia.  In ED,  on arrival with some improvement with medications but remains hypertensive.  LKWT 3 days prior to presentation, NIHSS 2, Out of window for TPA. Hospital course significant for UTI- treated with ABT. Patient now with gait Instability, ADL impairments and Functional impairments.    Source: Medical Record [X] Patient [x] Family [ ]         Diet: Easy to chew, consistent carbohydrate (no snack), DASH/TLC  Pt tolerating diet with good PO intake, consuming % of meals per nursing flow sheets. Pt reports her appetite is "so-so". Denies nausea, vomiting, diarrhea, constipation. Pt denies chewing/swallowing difficulties on current diet. Obtained food preferences to optimize PO intake. Reviewed consistent carb heart healthy diet.    Enteral/Parenteral Nutrition: N/A    Current Weight: 157.4 lbs (11/29)  160.9 lbs (11/28)  152.3 lbs (11/25)  153 lbs (11/23)  150.5 lbs (11/22)  150.7 lbs (11/20)  147.9 lbs (11/17)  147.9 lbs (11/14)  147.4 lbs (11/11)      Pertinent Medications: MEDICATIONS  (STANDING):  aspirin enteric coated 81 milliGRAM(s) Oral daily  atorvastatin 80 milliGRAM(s) Oral at bedtime  cyanocobalamin 1000 MICROGram(s) Oral daily  dextrose 40% Gel 15 Gram(s) Oral once  dextrose 5%. 1000 milliLiter(s) (50 mL/Hr) IV Continuous <Continuous>  dextrose 5%. 1000 milliLiter(s) (100 mL/Hr) IV Continuous <Continuous>  dextrose 50% Injectable 25 Gram(s) IV Push once  dextrose 50% Injectable 12.5 Gram(s) IV Push once  dextrose 50% Injectable 25 Gram(s) IV Push once  donepezil 5 milliGRAM(s) Oral at bedtime  famotidine    Tablet 20 milliGRAM(s) Oral daily  fenofibrate Tablet 145 milliGRAM(s) Oral daily  glucagon  Injectable 1 milliGRAM(s) IntraMuscular once  heparin   Injectable 5000 Unit(s) SubCutaneous every 8 hours  hydrALAZINE 10 milliGRAM(s) Oral two times a day  influenza  Vaccine (HIGH DOSE) 0.7 milliLiter(s) IntraMuscular once  insulin glargine Injectable (LANTUS) 15 Unit(s) SubCutaneous at bedtime  insulin lispro (ADMELOG) corrective regimen sliding scale   SubCutaneous three times a day before meals  insulin lispro (ADMELOG) corrective regimen sliding scale   SubCutaneous at bedtime  insulin lispro Injectable (ADMELOG) 5 Unit(s) SubCutaneous three times a day before meals  lidocaine   4% Patch 1 Patch Transdermal <User Schedule>  lisinopril 40 milliGRAM(s) Oral daily  memantine 5 milliGRAM(s) Oral two times a day  metFORMIN 1000 milliGRAM(s) Oral two times a day  NIFEdipine XL 30 milliGRAM(s) Oral daily  saccharomyces boulardii 250 milliGRAM(s) Oral two times a day  sertraline 100 milliGRAM(s) Oral daily  zinc oxide 40% Ointment 1 Application(s) Topical two times a day    MEDICATIONS  (PRN):  acetaminophen     Tablet .. 650 milliGRAM(s) Oral every 6 hours PRN Temp greater or equal to 38C (100.4F), Mild Pain (1 - 3)  aluminum hydroxide/magnesium hydroxide/simethicone Suspension 30 milliLiter(s) Oral every 4 hours PRN Dyspepsia  melatonin 3 milliGRAM(s) Oral at bedtime PRN Insomnia      Pertinent Labs:  11-29 Na143 mmol/L Glu 132 mg/dL<H> K+ 3.9 mmol/L Cr  0.88 mg/dL BUN 30 mg/dL<H> 11-29 Alb 2.9 g/dL<L> 11-02 Chol 248 mg/dL<H> LDL --    HDL 53 mg/dL Trig 151 mg/dL<H>        Skin: bruised, incontinence associated dermatitis per nursing flow sheets.     Edema: No edema per nursing flow sheets     Last BM: on 11/27 Per nursing flowsheets     Estimated Needs:   [X] No Change since Previous Assessment  [ ] Recalculated:     Previous Nutrition Diagnosis:   Altered nutrition related lab values    Nutrition Diagnosis is [X] Ongoing    [ ] Resolved   [ ] Not Applicable      New Nutrition Diagnosis: [X] Not Applicable  [ ] Inadequate Protein Energy Intake   [ ] Inadequate Oral Intake   [ ] Excessive Energy Intake   [ ] Increased Nutrient Needs   [ ] Obesity   [ ] Altered GI Function   [ ] Unintended Weight Loss   [ ] Food & Nutrition Related Knowledge Deficit  [ ] Limited Adherence to nutrition related recommendations   [ ] Malnutrition      Interventions:   1. Recommend continuing with current plan of care  2. Pt provided with nutrition education on current diet.  3. Obtain and honor food preferences as able    Monitoring & Evaluation:   [X] Weights   [X] PO Intake   [X] Follow Up (Per Protocol)  [X] Tolerance to Diet Prescription   [X] Other: Labs     RD Remains Available.  Jeannine Marinelli RD

## 2021-11-30 NOTE — PROGRESS NOTE ADULT - ATTENDING COMMENTS
Pt. seen with resident.  Agree with documentation above as per resident with amendments made as appropriate. Patient medically stable. Making progress towards rehab goals.     Left basal ganglia infarct  family training scheduled tomorrow  will f/u with hospitalist regarding insulin regimen for discharge

## 2021-12-01 ENCOUNTER — TRANSCRIPTION ENCOUNTER (OUTPATIENT)
Age: 76
End: 2021-12-01

## 2021-12-01 LAB
GLUCOSE BLDC GLUCOMTR-MCNC: 116 MG/DL — HIGH (ref 70–99)
GLUCOSE BLDC GLUCOMTR-MCNC: 137 MG/DL — HIGH (ref 70–99)
GLUCOSE BLDC GLUCOMTR-MCNC: 168 MG/DL — HIGH (ref 70–99)
GLUCOSE BLDC GLUCOMTR-MCNC: 175 MG/DL — HIGH (ref 70–99)

## 2021-12-01 PROCEDURE — 99233 SBSQ HOSP IP/OBS HIGH 50: CPT

## 2021-12-01 RX ADMIN — DONEPEZIL HYDROCHLORIDE 5 MILLIGRAM(S): 10 TABLET, FILM COATED ORAL at 21:09

## 2021-12-01 RX ADMIN — Medication 1: at 16:46

## 2021-12-01 RX ADMIN — LIDOCAINE 1 PATCH: 4 CREAM TOPICAL at 20:17

## 2021-12-01 RX ADMIN — Medication 1: at 12:17

## 2021-12-01 RX ADMIN — Medication 10 MILLIGRAM(S): at 07:27

## 2021-12-01 RX ADMIN — INSULIN GLARGINE 15 UNIT(S): 100 INJECTION, SOLUTION SUBCUTANEOUS at 21:09

## 2021-12-01 RX ADMIN — LIDOCAINE 1 PATCH: 4 CREAM TOPICAL at 19:55

## 2021-12-01 RX ADMIN — Medication 250 MILLIGRAM(S): at 17:43

## 2021-12-01 RX ADMIN — Medication 5 UNIT(S): at 12:17

## 2021-12-01 RX ADMIN — PREGABALIN 1000 MICROGRAM(S): 225 CAPSULE ORAL at 12:17

## 2021-12-01 RX ADMIN — Medication 5 UNIT(S): at 16:46

## 2021-12-01 RX ADMIN — Medication 81 MILLIGRAM(S): at 12:17

## 2021-12-01 RX ADMIN — MEMANTINE HYDROCHLORIDE 5 MILLIGRAM(S): 10 TABLET ORAL at 05:58

## 2021-12-01 RX ADMIN — Medication 10 MILLIGRAM(S): at 17:46

## 2021-12-01 RX ADMIN — FAMOTIDINE 20 MILLIGRAM(S): 10 INJECTION INTRAVENOUS at 12:17

## 2021-12-01 RX ADMIN — Medication 30 MILLIGRAM(S): at 07:27

## 2021-12-01 RX ADMIN — HEPARIN SODIUM 5000 UNIT(S): 5000 INJECTION INTRAVENOUS; SUBCUTANEOUS at 05:56

## 2021-12-01 RX ADMIN — LISINOPRIL 40 MILLIGRAM(S): 2.5 TABLET ORAL at 07:27

## 2021-12-01 RX ADMIN — Medication 250 MILLIGRAM(S): at 05:58

## 2021-12-01 RX ADMIN — ATORVASTATIN CALCIUM 80 MILLIGRAM(S): 80 TABLET, FILM COATED ORAL at 21:09

## 2021-12-01 RX ADMIN — METFORMIN HYDROCHLORIDE 1000 MILLIGRAM(S): 850 TABLET ORAL at 17:43

## 2021-12-01 RX ADMIN — Medication 3 MILLIGRAM(S): at 21:09

## 2021-12-01 RX ADMIN — ZINC OXIDE 1 APPLICATION(S): 200 OINTMENT TOPICAL at 17:42

## 2021-12-01 RX ADMIN — METFORMIN HYDROCHLORIDE 1000 MILLIGRAM(S): 850 TABLET ORAL at 05:57

## 2021-12-01 RX ADMIN — LIDOCAINE 1 PATCH: 4 CREAM TOPICAL at 08:09

## 2021-12-01 RX ADMIN — HEPARIN SODIUM 5000 UNIT(S): 5000 INJECTION INTRAVENOUS; SUBCUTANEOUS at 14:33

## 2021-12-01 RX ADMIN — ZINC OXIDE 1 APPLICATION(S): 200 OINTMENT TOPICAL at 06:06

## 2021-12-01 RX ADMIN — SERTRALINE 100 MILLIGRAM(S): 25 TABLET, FILM COATED ORAL at 12:17

## 2021-12-01 RX ADMIN — Medication 145 MILLIGRAM(S): at 12:17

## 2021-12-01 RX ADMIN — MEMANTINE HYDROCHLORIDE 5 MILLIGRAM(S): 10 TABLET ORAL at 17:43

## 2021-12-01 RX ADMIN — Medication 5 UNIT(S): at 08:09

## 2021-12-01 RX ADMIN — HEPARIN SODIUM 5000 UNIT(S): 5000 INJECTION INTRAVENOUS; SUBCUTANEOUS at 21:09

## 2021-12-01 NOTE — PROGRESS NOTE ADULT - ASSESSMENT
ASSESSMENT/PLAN  This is a 76 Female with a h/o CVA (no prior deficit), HTN, HLD, IDDM, Dementia, MDD admitted to [facility] on 11/1 for Subacute CVA L Basal Ganglia, Mod-Severe Stenosis Prox R PCA, HTN Urgency, AMS, abnormal speech, and Hypoglycemia.  In ED,  on arrival with some improvement with medications but remains hypertensive.  LKWT 3 days prior to presentation, NIHSS 2, Out of window for TPA. Hospital course significant for UTI- treated with ABT. Patient now with gait Instability, ADL impairments and Functional impairments.    # CVA  - Subacute CVA L Basal Ganglia  - Mod-Severe Stenosis Prox R PCA  - cont Comprehensive Rehab Program: PT/OT/ST- total of 3 hrs/day 5 days/week   - c/w ASA, high dose statin: lipitor    #L shoulder pain--likely RTC tendonitis  - Lidocaine patch in AM    #NPH  - OP f/u with NSGY    #HTN  --SBP goal 120-150s  - Procardia XL  - Lisinopril 40 mg daily  - hydralazine 10 mg BID  bp controlled    #HLD  - on Lipitor  - Fenofibrate 145mg daily    #DM II with hyperglycemia  - ISS and FS  - Lantus and Admelog  --metformin  - Management as per hospitalist  --Discharge on Metformin 1000mg BID and Tresidba 10 units.    -     #Depression  - Sertraline 100mg daily    #Pain management  - Tylenol PRN    #DVT ppx  - Heparin, SCD, TEDs    #Dementia  - Donepezil 5mg daily  - Memantine 5mg BID    #GI ppx  - Protonix  - maalox for dyspepsia    #Bowel Regimen  - Senna, miralax PRN    #Bladder management  -voiding with low PVRs      #Dysphagia    - SLP: evaluation and treatment  - s/p MBS: easy chew with thin liquid per speech. 100 % supervision for aspiration precaution    #Precaution  - Fall, Aspiration, Seizure      #Skin:  - No active issues at this time  - Desitin to buttocks for IAD  - Pressure injury/Skin: Turn Q2hrs while in bed, OOB to Chair, PT/OT     #Sleep:  - Maintain quiet hours and low stim environment.  -melatonin PRN  - Monitor sleep logs    #IDT 11/30:  - SW: lives in  3STE with  and daughter.  and 2 granddaughters assisted with ADLs. Owns WC, RW, rollator.  - Barriers: retropulsion, coordination, poor balance, poor carryover, cognition  - OT: on track to meet goals at WC level; eating and grooming SV, UBD Kaley, LBD modA, bathing maxA, toilet transfer and shower transfer modA,   --PT: not on track to meet goals; transfers min-modA, ambulation 100' with tomas walker with dorsiflexion assist (for genu recurvatum) min-modA, 4 steps with 1HR mod A. Level of assist fluctuates during the day and on a day to day basis. Would help if 2nd HR can be installed at home.   - SLP: on track; on easy to chew diet, modA cognition, decreased attention. poor problem solving, decreased initiation,  mild-mod receptive deficits, mod dysarthria  - Goals: shower transfers with Kaley o/w SC with ADLs, Kaley with stairs and CG transfers, CG/Kaley with ambulation, SV cognition  - Family training tomorrow 12/1 with  and daughter, Donald.  - EDOD 12/4 home      Outpatient Follow-up (Specialty/Name of physician):    Huan Oquendo; PhD)  Neurology; Vascular Neurology  [address] [facility], [address]  [place] [address]  Phone: [phone]  Fax: [phone]    Alexander Arevalo (MD)  Neurology; Vascular Neurology  [address] [facility], [address] [place] [address]  Phone: [phone]  Fax: [phone]

## 2021-12-01 NOTE — DISCHARGE NOTE PROVIDER - HOSPITAL COURSE
HPI:  This is a 76 Female with a h/o CVA (no prior deficit), HTN, HLD, IDDM, Dementia, MDD admitted to [facility] on 11/1 for Subacute CVA L Basal Ganglia infarct,  CTA showedMod-Severe Stenosis Prox R PCA.  Pt. with HTN Urgency, and Hypoglycemia. Patient followed by neurology OP. In ED,  on arrival with some improvement with medications but remains hypertensive. Patient noted to have AMS with abnormal speech by daughter. LKWT 3 days prior to presentation. NIHSS 2. Out of window for TPA.  MRI brain showed--  left posterior limb IC/CR stroke, left frontal and right splenium acute strokes,    Per family, patient was followed by neurosurgery for evaluation of possible NPH with LP as outpatient. Neurosurgery recommending outpatient follow up after rehab. Pt also diagnosed with possible UTI on admission and treated with IV antibiotics, urine cx positive for proteus, ID consulted. HTN meds adjusted, bradycardic metoprolol dose decreased, hypoglycemia improved started low dose Lantus, high BP meds adjusted added hydralazine.  MBS performed and recommended easy to chew with thin liquids, with 100% supervision.   Patient was evaluated by PM&R and therapy for functional deficits, gait/ADL impairments and acute rehabilitation was recommended. Patient was medically optimized for discharge to [facility] IRU on 11/11/21.     (11 Nov 2021 13:54)      Rehab course significant for hyperglycemia, insulin increased and diet changed. Metoprolol was held for bradycardia. Patient c/o left shoulder pain, likely RTC tendonitis, given Lidocaine patch.     Functional Status:  - OT: on track to meet goals at WC level; eating and grooming SV, UBD Kaley, LBD modA, bathing maxA, toilet transfer and shower transfer modA,   --PT: not on track to meet goals; transfers min-modA, ambulation 100' with tomas walker with dorsiflexion assist (for genu recurvatum) min-modA, 4 steps with 1HR mod A. Level of assist fluctuates during the day and on a day to day basis. Would help if 2nd HR can be installed at home.   - SLP: on track; on easy to chew diet, modA cognition, decreased attention. poor problem solving, decreased initiation,  mild-mod receptive deficits, mod dysarthria    All other medical co-morbidities were stable. Patient tolerated course of inpatient PT/OT/SLP rehab with significant improvements and met rehab goals prior to discharge. Patient was medically cleared for discharge to home.     Outpatient Follow-ups:

## 2021-12-01 NOTE — DISCHARGE NOTE PROVIDER - NSDCACTIVITY_GEN_ALL_CORE
Bathing allowed/Do not drive or operate machinery/Showering allowed/Do not make important decisions/Walking - Indoors allowed/No heavy lifting/straining/Walking - Outdoors allowed Bathing allowed/Sex allowed/Do not drive or operate machinery/Showering allowed/Do not make important decisions/Walking - Indoors allowed/No heavy lifting/straining/Walking - Outdoors allowed

## 2021-12-01 NOTE — DISCHARGE NOTE PROVIDER - NSDCCPCAREPLAN_GEN_ALL_CORE_FT
PRINCIPAL DISCHARGE DIAGNOSIS  Diagnosis: Cerebrovascular accident (CVA)  Assessment and Plan of Treatment: Continue taking Aspirin and Lipitor as prescribed. Follow up with your neurologist upon discharge. Follow up with your physiatrist (rehab doctor) in 4 weeks after discharge.      SECONDARY DISCHARGE DIAGNOSES  Diagnosis: HTN (hypertension)  Assessment and Plan of Treatment: Continue your blood pressure medications as prescribed. Follow up with your primary care physician upon discharge to continue monitoring your blood pressure.     PRINCIPAL DISCHARGE DIAGNOSIS  Diagnosis: Cerebrovascular accident (CVA)  Assessment and Plan of Treatment: Continue taking Aspirin and Lipitor as prescribed. Follow up with your neurologist upon discharge. Follow up with your physiatrist (rehab doctor) in 4 weeks after discharge.      SECONDARY DISCHARGE DIAGNOSES  Diagnosis: Diabetes mellitus, type 2  Assessment and Plan of Treatment: Continue taking Metformin and insulin (Tresiba) as prescribed. Follow up with your primary care physician upon discharge.    Diagnosis: HTN (hypertension)  Assessment and Plan of Treatment: Continue your blood pressure medications as prescribed. Follow up with your primary care physician upon discharge to continue monitoring your blood pressure.

## 2021-12-01 NOTE — DISCHARGE NOTE PROVIDER - NSDCMRMEDTOKEN_GEN_ALL_CORE_FT
acetaminophen 325 mg oral tablet: 2 tab(s) orally every 6 hours, As needed, Temp greater or equal to 38C (100.4F), Mild Pain (1 - 3)  aspirin 81 mg oral tablet, chewable: 1 tab(s) orally once a day  atorvastatin 80 mg oral tablet: 1 tab(s) orally once a day  Augmentin 875 mg-125 mg oral tablet: 1 tab(s) orally every 12 hours start tonight for 3 days   cyanocobalamin 1000 mcg oral tablet: 1 tab(s) orally once a day  donepezil 5 mg oral tablet: 1 tab(s) orally once a day (at bedtime)  famotidine 20 mg oral tablet: 1 tab(s) orally once a day  fenofibrate 145 mg oral tablet: 1 tab(s) orally once a day  hydrALAZINE 10 mg oral tablet: 1 tab(s) orally 2 times a day  insulin glargine: 22 unit(s) subcutaneous once a day (at bedtime)  insulin lispro 100 units/mL injectable solution: 5 unit(s) injectable 3 times a day before meals   insulin lispro 100 units/mL injectable solution:  injectable   lisinopril 40 mg oral tablet: 1 tab(s) orally once a day  melatonin 3 mg oral tablet: 1 tab(s) orally once a day (at bedtime)  memantine 5 mg oral tablet: 1 tab(s) orally 2 times a day  metFORMIN 500 mg oral tablet: 1 tab(s) orally 2 times a day  metoprolol succinate 25 mg oral tablet, extended release: 1 tab(s) orally once a day  NIFEdipine 30 mg oral tablet, extended release: 1 tab(s) orally once a day (at ) 8 pm  right LE custom AFO: right LE custom AFO  Dx: Left basal ganglia AFO  sertraline 100 mg oral tablet: 1 tab(s) orally once a day   aspirin 81 mg oral delayed release tablet: 1 tab(s) orally once a day  atorvastatin 80 mg oral tablet: 1 tab(s) orally once a day (at bedtime)  cyanocobalamin 1000 mcg oral tablet: 1 tab(s) orally once a day  donepezil 5 mg oral tablet: 1 tab(s) orally once a day (at bedtime)  famotidine 20 mg oral tablet: 1 tab(s) orally once a day  fenofibrate 145 mg oral tablet: 1 tab(s) orally once a day  hydrALAZINE 10 mg oral tablet: 1 tab(s) orally 2 times a day  lidocaine 4% topical film:  topically to left shoulder as needed.  lisinopril 40 mg oral tablet: 1 tab(s) orally once a day  melatonin 3 mg oral tablet: 1 tab(s) orally once a day (at bedtime), As needed, Insomnia  memantine 10 mg oral tablet: 1 tab(s) orally 2 times a day  metFORMIN 1000 mg oral tablet: 1 tab(s) orally 2 times a day  NIFEdipine 30 mg oral tablet, extended release: 1 tab(s) orally once a day  right LE custom AFO: right LE custom AFO  Dx: Left basal ganglia AFO  sertraline 100 mg oral tablet: 1 tab(s) orally once a day   aspirin 81 mg oral delayed release tablet: 1 tab(s) orally once a day  atorvastatin 80 mg oral tablet: 1 tab(s) orally once a day (at bedtime)  cyanocobalamin 1000 mcg oral tablet: 1 tab(s) orally once a day  donepezil 5 mg oral tablet: 1 tab(s) orally once a day (at bedtime)  famotidine 20 mg oral tablet: 1 tab(s) orally once a day  fenofibrate 145 mg oral tablet: 1 tab(s) orally once a day  hydrALAZINE 10 mg oral tablet: 1 tab(s) orally 2 times a day  lidocaine 4% topical film:  topically to left shoulder as needed.  lisinopril 40 mg oral tablet: 1 tab(s) orally once a day  melatonin 3 mg oral tablet: 1 tab(s) orally once a day (at bedtime), As needed, Insomnia  memantine 10 mg oral tablet: 1 tab(s) orally 2 times a day  metFORMIN 1000 mg oral tablet: 1 tab(s) orally 2 times a day  NIFEdipine 30 mg oral tablet, extended release: 1 tab(s) orally once a day  right LE custom AFO: right LE custom AFO  Dx: Left basal ganglia AFO  sertraline 100 mg oral tablet: 1 tab(s) orally once a day  Tresiba 100 units/mL subcutaneous solution: 10 unit(s) subcutaneous once a day (at bedtime)

## 2021-12-01 NOTE — PROGRESS NOTE ADULT - SUBJECTIVE AND OBJECTIVE BOX
HPI:  This is a 76 Female with a h/o CVA (no prior deficit), HTN, HLD, IDDM, Dementia, MDD admitted to [facility] on 11/1 for Subacute CVA L Basal Ganglia infarct,  CTA showedMod-Severe Stenosis Prox R PCA.  Pt. with HTN Urgency, and Hypoglycemia. Patient followed by neurology OP. In ED,  on arrival with some improvement with medications but remains hypertensive. Patient noted to have AMS with abnormal speech by daughter. LKWT 3 days prior to presentation. NIHSS 2. Out of window for TPA.  MRI brain showed--  left posterior limb IC/CR stroke, left frontal and right splenium acute strokes,    Per family, patient was followed by neurosurgery for evaluation of possible NPH with LP as outpatient. Neurosurgery recommending outpatient follow up after rehab. Pt also diagnosed with possible UTI on admission and treated with IV antibiotics, urine cx positive for proteus, ID consulted. HTN meds adjusted, bradycardic metoprolol dose decreased, hypoglycemia improved started low dose Lantus, high BP meds adjusted added hydralazine.  MBS performed and recommended easy to chew with thin liquids, with 100% supervision.   Patient was evaluated by PM&R and therapy for functional deficits, gait/ADL impairments and acute rehabilitation was recommended. Patient was medically optimized for discharge to [facility] IRU on 11/11/21.     (11 Nov 2021 13:54)      PAST MEDICAL & SURGICAL HISTORY:  Rheumatoid arthritis    Diabetes mellitus, type 2    Hypertension    HLD (hyperlipidemia)    Dementia    History of CVA (cerebrovascular accident)    Major depression    Anxiety    UTI (urinary tract infection)    History of dental surgery        Subjective:  No new complaints or overnight issues.  family training scheduled today      VITALS  Vital Signs Last 24 Hrs  T(C): 36.4 (01 Dec 2021 08:12), Max: 36.6 (30 Nov 2021 19:52)  T(F): 97.5 (01 Dec 2021 08:12), Max: 97.9 (30 Nov 2021 19:52)  HR: 60 (01 Dec 2021 08:12) (60 - 66)  BP: 156/74 (01 Dec 2021 08:12) (124/68 - 156/74)  BP(mean): --  RR: 16 (01 Dec 2021 08:12) (16 - 16)  SpO2: 96% (01 Dec 2021 08:12) (96% - 97%)    REVIEW OF SYMPTOMS  Neurological: cognitive deficits  Denies pain, discomfort, headache.   Denies CP/dyspnea  Denies palpitations  Denies abdominal pain     Physical Exam:  Gen - NAD, Comfortable  Pulm - CTAB,   Cardiovascular - RRR,   Abdomen - Soft, NT/ND, +BS  Extremities - No edema  Neuro-     Cognitive - AAO to self, hospital ("[facility]" with cueing),month --Needs cue for year     Communication - +dysarthria, hypophonic, delay processing     Attention: impaired-- delayed processing, distractable     CN: visual fields intact, No facial weakness        Motor -                    LEFT    UE - ShAB 5/5, EF 5/5, EE 5/5, WE 5/5,  5/5                    RIGHT UE - ShAB 5/5, EF 5/5, EE 5/5, WE 5/5,  5/5                    LEFT    LE - HF 5/5, KE 5/5, DF 5/5, PF 5/5                    RIGHT LE - HF 4/5, KE 5-/5, DF 5/5, PF 5/5        Sensory - Intact to LT     Coordination - FTN intact     Tone - normal  Psychiatric - Mood stable, Affect WNL      RECENT LABS                  RADIOLOGY/OTHER RESULTS      MEDICATIONS  (STANDING):  aspirin enteric coated 81 milliGRAM(s) Oral daily  atorvastatin 80 milliGRAM(s) Oral at bedtime  cyanocobalamin 1000 MICROGram(s) Oral daily  dextrose 40% Gel 15 Gram(s) Oral once  dextrose 5%. 1000 milliLiter(s) (50 mL/Hr) IV Continuous <Continuous>  dextrose 5%. 1000 milliLiter(s) (100 mL/Hr) IV Continuous <Continuous>  dextrose 50% Injectable 25 Gram(s) IV Push once  dextrose 50% Injectable 12.5 Gram(s) IV Push once  dextrose 50% Injectable 25 Gram(s) IV Push once  donepezil 5 milliGRAM(s) Oral at bedtime  famotidine    Tablet 20 milliGRAM(s) Oral daily  fenofibrate Tablet 145 milliGRAM(s) Oral daily  glucagon  Injectable 1 milliGRAM(s) IntraMuscular once  heparin   Injectable 5000 Unit(s) SubCutaneous every 8 hours  hydrALAZINE 10 milliGRAM(s) Oral two times a day  influenza  Vaccine (HIGH DOSE) 0.7 milliLiter(s) IntraMuscular once  insulin glargine Injectable (LANTUS) 15 Unit(s) SubCutaneous at bedtime  insulin lispro (ADMELOG) corrective regimen sliding scale   SubCutaneous three times a day before meals  insulin lispro (ADMELOG) corrective regimen sliding scale   SubCutaneous at bedtime  insulin lispro Injectable (ADMELOG) 5 Unit(s) SubCutaneous three times a day before meals  lidocaine   4% Patch 1 Patch Transdermal <User Schedule>  lisinopril 40 milliGRAM(s) Oral daily  memantine 5 milliGRAM(s) Oral two times a day  metFORMIN 1000 milliGRAM(s) Oral two times a day  NIFEdipine XL 30 milliGRAM(s) Oral daily  saccharomyces boulardii 250 milliGRAM(s) Oral two times a day  sertraline 100 milliGRAM(s) Oral daily  zinc oxide 40% Ointment 1 Application(s) Topical two times a day    MEDICATIONS  (PRN):  acetaminophen     Tablet .. 650 milliGRAM(s) Oral every 6 hours PRN Temp greater or equal to 38C (100.4F), Mild Pain (1 - 3)  aluminum hydroxide/magnesium hydroxide/simethicone Suspension 30 milliLiter(s) Oral every 4 hours PRN Dyspepsia  melatonin 3 milliGRAM(s) Oral at bedtime PRN Insomnia         HPI:  This is a 76 Female with a h/o CVA (no prior deficit), HTN, HLD, IDDM, Dementia, MDD admitted to [facility] on 11/1 for Subacute CVA L Basal Ganglia infarct,  CTA showedMod-Severe Stenosis Prox R PCA.  Pt. with HTN Urgency, and Hypoglycemia. Patient followed by neurology OP. In ED,  on arrival with some improvement with medications but remains hypertensive. Patient noted to have AMS with abnormal speech by daughter. LKWT 3 days prior to presentation. NIHSS 2. Out of window for TPA.  MRI brain showed--  left posterior limb IC/CR stroke, left frontal and right splenium acute strokes,    Per family, patient was followed by neurosurgery for evaluation of possible NPH with LP as outpatient. Neurosurgery recommending outpatient follow up after rehab. Pt also diagnosed with possible UTI on admission and treated with IV antibiotics, urine cx positive for proteus, ID consulted. HTN meds adjusted, bradycardic metoprolol dose decreased, hypoglycemia improved started low dose Lantus, high BP meds adjusted added hydralazine.  MBS performed and recommended easy to chew with thin liquids, with 100% supervision.   Patient was evaluated by PM&R and therapy for functional deficits, gait/ADL impairments and acute rehabilitation was recommended. Patient was medically optimized for discharge to [facility] IRU on 11/11/21.     (11 Nov 2021 13:54)      PAST MEDICAL & SURGICAL HISTORY:  Rheumatoid arthritis    Diabetes mellitus, type 2    Hypertension    HLD (hyperlipidemia)    Dementia    History of CVA (cerebrovascular accident)    Major depression    Anxiety    UTI (urinary tract infection)    History of dental surgery        Subjective:  No new complaints or overnight issues.  family training scheduled today      VITALS  Vital Signs Last 24 Hrs  T(C): 36.4 (01 Dec 2021 08:12), Max: 36.6 (30 Nov 2021 19:52)  T(F): 97.5 (01 Dec 2021 08:12), Max: 97.9 (30 Nov 2021 19:52)  HR: 60 (01 Dec 2021 08:12) (60 - 66)  BP: 156/74 (01 Dec 2021 08:12) (124/68 - 156/74)  BP(mean): --  RR: 16 (01 Dec 2021 08:12) (16 - 16)  SpO2: 96% (01 Dec 2021 08:12) (96% - 97%)    REVIEW OF SYMPTOMS  Neurological: cognitive deficits  Denies pain, discomfort, headache.   Denies CP/dyspnea  Denies palpitations  Denies abdominal pain     Physical Exam:  Gen - NAD, Comfortable  Pulm - CTAB,   Cardiovascular - RRR,   Abdomen - Soft, NT/ND, +BS  Extremities - No edema  Neuro-     Cognitive - AAO to self, hospital ("[facility]" with cueing),month --Needs cue for year     Communication - +dysarthria, hypophonic, delay processing     Attention: impaired-- delayed processing, distractable     CN: visual fields intact, No facial weakness        Motor -                    LEFT    UE - ShAB 5/5, EF 5/5, EE 5/5, WE 5/5,  5/5                    RIGHT UE - ShAB 5/5, EF 5/5, EE 5/5, WE 5/5,  5/5                    LEFT    LE - HF 5/5, KE 5/5, DF 5/5, PF 5/5                    RIGHT LE - HF 4/5, KE 5-/5, DF 5/5, PF 5/5        Sensory - Intact to LT     Coordination - FTN intact     Tone - normal  Psychiatric - Mood stable, Affect WNL      RECENT LABS          RADIOLOGY/OTHER RESULTS      MEDICATIONS  (STANDING):  aspirin enteric coated 81 milliGRAM(s) Oral daily  atorvastatin 80 milliGRAM(s) Oral at bedtime  cyanocobalamin 1000 MICROGram(s) Oral daily  dextrose 40% Gel 15 Gram(s) Oral once  dextrose 5%. 1000 milliLiter(s) (50 mL/Hr) IV Continuous <Continuous>  dextrose 5%. 1000 milliLiter(s) (100 mL/Hr) IV Continuous <Continuous>  dextrose 50% Injectable 25 Gram(s) IV Push once  dextrose 50% Injectable 12.5 Gram(s) IV Push once  dextrose 50% Injectable 25 Gram(s) IV Push once  donepezil 5 milliGRAM(s) Oral at bedtime  famotidine    Tablet 20 milliGRAM(s) Oral daily  fenofibrate Tablet 145 milliGRAM(s) Oral daily  glucagon  Injectable 1 milliGRAM(s) IntraMuscular once  heparin   Injectable 5000 Unit(s) SubCutaneous every 8 hours  hydrALAZINE 10 milliGRAM(s) Oral two times a day  influenza  Vaccine (HIGH DOSE) 0.7 milliLiter(s) IntraMuscular once  insulin glargine Injectable (LANTUS) 15 Unit(s) SubCutaneous at bedtime  insulin lispro (ADMELOG) corrective regimen sliding scale   SubCutaneous three times a day before meals  insulin lispro (ADMELOG) corrective regimen sliding scale   SubCutaneous at bedtime  insulin lispro Injectable (ADMELOG) 5 Unit(s) SubCutaneous three times a day before meals  lidocaine   4% Patch 1 Patch Transdermal <User Schedule>  lisinopril 40 milliGRAM(s) Oral daily  memantine 5 milliGRAM(s) Oral two times a day  metFORMIN 1000 milliGRAM(s) Oral two times a day  NIFEdipine XL 30 milliGRAM(s) Oral daily  saccharomyces boulardii 250 milliGRAM(s) Oral two times a day  sertraline 100 milliGRAM(s) Oral daily  zinc oxide 40% Ointment 1 Application(s) Topical two times a day    MEDICATIONS  (PRN):  acetaminophen     Tablet .. 650 milliGRAM(s) Oral every 6 hours PRN Temp greater or equal to 38C (100.4F), Mild Pain (1 - 3)  aluminum hydroxide/magnesium hydroxide/simethicone Suspension 30 milliLiter(s) Oral every 4 hours PRN Dyspepsia  melatonin 3 milliGRAM(s) Oral at bedtime PRN Insomnia

## 2021-12-01 NOTE — DISCHARGE NOTE PROVIDER - PROVIDER TOKENS
PROVIDER:[TOKEN:[[id number]]],PROVIDER:[TOKEN:[[id number]]],PROVIDER:[TOKEN:[[id number]]] PROVIDER:[TOKEN:[[id number]]],PROVIDER:[TOKEN:[[id number]]],PROVIDER:[TOKEN:[[id number]],FOLLOWUP:[1 month]]

## 2021-12-01 NOTE — DISCHARGE NOTE PROVIDER - CARE PROVIDER_API CALL
Huan Oquendo; PhD)  Neurology; Vascular Neurology  [address] [facility], [address]  [place] [address]  Phone: [phone]  Fax: [phone]  Follow Up Time:     Alexander Arevalo)  Neurology; Vascular Neurology  [address] [facility], [address] [place] [address]  Phone: [phone]  Fax: [phone]  Follow Up Time:     Bela Hurtado (DO)  Brain Injury Medicine; PhysicalRehab Medicine  [address] [place] [address]  Phone: [phone]  Fax: [phone]  Follow Up Time:    Huan Oquendo; PhD)  Neurology; Vascular Neurology  [address] [facility], [address]  [place] [address]  Phone: [phone]  Fax: [phone]  Follow Up Time:     Alexander Arevalo)  Neurology; Vascular Neurology  [address] [facility] [address], [address] [place] [address]  Phone: [phone]  Fax: [phone]  Follow Up Time:     Letty Hammer (DO)  Brain Injury Medicine; PhysicalRehab Medicine  [address] [place] [address]  Phone: [phone]  Fax: [phone]  Follow Up Time: 1 month

## 2021-12-01 NOTE — DISCHARGE NOTE PROVIDER - CARE PROVIDERS DIRECT ADDRESSES
,[email or web address]. ([email or web address]) [email or web address] ,[email or web address]

## 2021-12-02 LAB
ALBUMIN SERPL ELPH-MCNC: 3.1 G/DL — LOW (ref 3.3–5)
ALP SERPL-CCNC: 40 U/L — SIGNIFICANT CHANGE UP (ref 40–120)
ALT FLD-CCNC: 23 U/L — SIGNIFICANT CHANGE UP (ref 10–45)
ANION GAP SERPL CALC-SCNC: 9 MMOL/L — SIGNIFICANT CHANGE UP (ref 5–17)
AST SERPL-CCNC: 19 U/L — SIGNIFICANT CHANGE UP (ref 10–40)
BILIRUB SERPL-MCNC: 0.2 MG/DL — SIGNIFICANT CHANGE UP (ref 0.2–1.2)
BUN SERPL-MCNC: 31 MG/DL — HIGH (ref 7–23)
CALCIUM SERPL-MCNC: 9.3 MG/DL — SIGNIFICANT CHANGE UP (ref 8.4–10.5)
CHLORIDE SERPL-SCNC: 105 MMOL/L — SIGNIFICANT CHANGE UP (ref 96–108)
CO2 SERPL-SCNC: 27 MMOL/L — SIGNIFICANT CHANGE UP (ref 22–31)
CREAT SERPL-MCNC: 0.92 MG/DL — SIGNIFICANT CHANGE UP (ref 0.5–1.3)
GLUCOSE BLDC GLUCOMTR-MCNC: 101 MG/DL — HIGH (ref 70–99)
GLUCOSE BLDC GLUCOMTR-MCNC: 106 MG/DL — HIGH (ref 70–99)
GLUCOSE BLDC GLUCOMTR-MCNC: 172 MG/DL — HIGH (ref 70–99)
GLUCOSE BLDC GLUCOMTR-MCNC: 85 MG/DL — SIGNIFICANT CHANGE UP (ref 70–99)
GLUCOSE SERPL-MCNC: 126 MG/DL — HIGH (ref 70–99)
HCT VFR BLD CALC: 36.6 % — SIGNIFICANT CHANGE UP (ref 34.5–45)
HGB BLD-MCNC: 11.5 G/DL — SIGNIFICANT CHANGE UP (ref 11.5–15.5)
MCHC RBC-ENTMCNC: 24.7 PG — LOW (ref 27–34)
MCHC RBC-ENTMCNC: 31.4 GM/DL — LOW (ref 32–36)
MCV RBC AUTO: 78.5 FL — LOW (ref 80–100)
NRBC # BLD: 0 /100 WBCS — SIGNIFICANT CHANGE UP (ref 0–0)
PLATELET # BLD AUTO: 300 K/UL — SIGNIFICANT CHANGE UP (ref 150–400)
POTASSIUM SERPL-MCNC: 4.2 MMOL/L — SIGNIFICANT CHANGE UP (ref 3.5–5.3)
POTASSIUM SERPL-SCNC: 4.2 MMOL/L — SIGNIFICANT CHANGE UP (ref 3.5–5.3)
PROT SERPL-MCNC: 6.3 G/DL — SIGNIFICANT CHANGE UP (ref 6–8.3)
RBC # BLD: 4.66 M/UL — SIGNIFICANT CHANGE UP (ref 3.8–5.2)
RBC # FLD: 19.7 % — HIGH (ref 10.3–14.5)
SODIUM SERPL-SCNC: 141 MMOL/L — SIGNIFICANT CHANGE UP (ref 135–145)
WBC # BLD: 7.37 K/UL — SIGNIFICANT CHANGE UP (ref 3.8–10.5)
WBC # FLD AUTO: 7.37 K/UL — SIGNIFICANT CHANGE UP (ref 3.8–10.5)

## 2021-12-02 PROCEDURE — 99232 SBSQ HOSP IP/OBS MODERATE 35: CPT

## 2021-12-02 RX ORDER — MEMANTINE HYDROCHLORIDE 10 MG/1
1 TABLET ORAL
Qty: 60 | Refills: 0
Start: 2021-12-02 | End: 2021-12-31

## 2021-12-02 RX ORDER — SERTRALINE 25 MG/1
1 TABLET, FILM COATED ORAL
Qty: 30 | Refills: 0
Start: 2021-12-02 | End: 2021-12-31

## 2021-12-02 RX ORDER — LANOLIN ALCOHOL/MO/W.PET/CERES
1 CREAM (GRAM) TOPICAL
Qty: 0 | Refills: 0 | DISCHARGE
Start: 2021-12-02

## 2021-12-02 RX ORDER — ATORVASTATIN CALCIUM 80 MG/1
1 TABLET, FILM COATED ORAL
Qty: 30 | Refills: 0
Start: 2021-12-02 | End: 2021-12-31

## 2021-12-02 RX ORDER — MEMANTINE HYDROCHLORIDE 10 MG/1
1 TABLET ORAL
Qty: 0 | Refills: 0 | DISCHARGE

## 2021-12-02 RX ORDER — DONEPEZIL HYDROCHLORIDE 10 MG/1
1 TABLET, FILM COATED ORAL
Qty: 30 | Refills: 0
Start: 2021-12-02 | End: 2021-12-31

## 2021-12-02 RX ORDER — FENOFIBRATE,MICRONIZED 130 MG
1 CAPSULE ORAL
Qty: 0 | Refills: 0 | DISCHARGE

## 2021-12-02 RX ORDER — INSULIN DEGLUDEC 100 U/ML
36 INJECTION, SOLUTION SUBCUTANEOUS
Qty: 3 | Refills: 0 | DISCHARGE
Start: 2021-12-02 | End: 2021-12-31

## 2021-12-02 RX ORDER — SERTRALINE 25 MG/1
1 TABLET, FILM COATED ORAL
Qty: 0 | Refills: 0 | DISCHARGE

## 2021-12-02 RX ORDER — INSULIN DEGLUDEC 100 U/ML
10 INJECTION, SOLUTION SUBCUTANEOUS
Qty: 3 | Refills: 0
Start: 2021-12-02 | End: 2021-12-31

## 2021-12-02 RX ORDER — ASPIRIN/CALCIUM CARB/MAGNESIUM 324 MG
1 TABLET ORAL
Qty: 0 | Refills: 0 | DISCHARGE

## 2021-12-02 RX ORDER — MEMANTINE HYDROCHLORIDE 10 MG/1
5 TABLET ORAL ONCE
Refills: 0 | Status: COMPLETED | OUTPATIENT
Start: 2021-12-02 | End: 2021-12-02

## 2021-12-02 RX ORDER — DONEPEZIL HYDROCHLORIDE 10 MG/1
1 TABLET, FILM COATED ORAL
Qty: 0 | Refills: 0 | DISCHARGE

## 2021-12-02 RX ORDER — PREGABALIN 225 MG/1
1 CAPSULE ORAL
Qty: 30 | Refills: 0
Start: 2021-12-02 | End: 2021-12-31

## 2021-12-02 RX ORDER — NIFEDIPINE 30 MG
1 TABLET, EXTENDED RELEASE 24 HR ORAL
Qty: 30 | Refills: 0
Start: 2021-12-02 | End: 2021-12-31

## 2021-12-02 RX ORDER — FAMOTIDINE 10 MG/ML
1 INJECTION INTRAVENOUS
Qty: 30 | Refills: 0
Start: 2021-12-02 | End: 2021-12-31

## 2021-12-02 RX ORDER — ATORVASTATIN CALCIUM 80 MG/1
1 TABLET, FILM COATED ORAL
Qty: 0 | Refills: 0 | DISCHARGE

## 2021-12-02 RX ORDER — FENOFIBRATE,MICRONIZED 130 MG
1 CAPSULE ORAL
Qty: 30 | Refills: 0
Start: 2021-12-02 | End: 2021-12-31

## 2021-12-02 RX ORDER — HYDRALAZINE HCL 50 MG
1 TABLET ORAL
Qty: 60 | Refills: 0
Start: 2021-12-02 | End: 2021-12-31

## 2021-12-02 RX ORDER — LIDOCAINE 4 G/100G
0 CREAM TOPICAL
Qty: 0 | Refills: 0 | DISCHARGE
Start: 2021-12-02

## 2021-12-02 RX ORDER — ASPIRIN/CALCIUM CARB/MAGNESIUM 324 MG
1 TABLET ORAL
Qty: 30 | Refills: 0
Start: 2021-12-02 | End: 2021-12-31

## 2021-12-02 RX ORDER — METFORMIN HYDROCHLORIDE 850 MG/1
1 TABLET ORAL
Qty: 60 | Refills: 0
Start: 2021-12-02 | End: 2021-12-31

## 2021-12-02 RX ORDER — MEMANTINE HYDROCHLORIDE 10 MG/1
10 TABLET ORAL
Refills: 0 | Status: DISCONTINUED | OUTPATIENT
Start: 2021-12-02 | End: 2021-12-04

## 2021-12-02 RX ORDER — LISINOPRIL 2.5 MG/1
1 TABLET ORAL
Qty: 30 | Refills: 0
Start: 2021-12-02 | End: 2021-12-31

## 2021-12-02 RX ORDER — METFORMIN HYDROCHLORIDE 850 MG/1
1 TABLET ORAL
Qty: 0 | Refills: 0 | DISCHARGE

## 2021-12-02 RX ADMIN — Medication 250 MILLIGRAM(S): at 05:27

## 2021-12-02 RX ADMIN — ZINC OXIDE 1 APPLICATION(S): 200 OINTMENT TOPICAL at 05:27

## 2021-12-02 RX ADMIN — Medication 145 MILLIGRAM(S): at 11:42

## 2021-12-02 RX ADMIN — LIDOCAINE 1 PATCH: 4 CREAM TOPICAL at 19:17

## 2021-12-02 RX ADMIN — LISINOPRIL 40 MILLIGRAM(S): 2.5 TABLET ORAL at 06:46

## 2021-12-02 RX ADMIN — HEPARIN SODIUM 5000 UNIT(S): 5000 INJECTION INTRAVENOUS; SUBCUTANEOUS at 21:29

## 2021-12-02 RX ADMIN — Medication 10 MILLIGRAM(S): at 06:46

## 2021-12-02 RX ADMIN — MEMANTINE HYDROCHLORIDE 10 MILLIGRAM(S): 10 TABLET ORAL at 17:50

## 2021-12-02 RX ADMIN — Medication 81 MILLIGRAM(S): at 11:42

## 2021-12-02 RX ADMIN — MEMANTINE HYDROCHLORIDE 5 MILLIGRAM(S): 10 TABLET ORAL at 11:41

## 2021-12-02 RX ADMIN — Medication 3 MILLIGRAM(S): at 21:29

## 2021-12-02 RX ADMIN — LIDOCAINE 1 PATCH: 4 CREAM TOPICAL at 20:17

## 2021-12-02 RX ADMIN — LIDOCAINE 1 PATCH: 4 CREAM TOPICAL at 08:01

## 2021-12-02 RX ADMIN — Medication 30 MILLIGRAM(S): at 06:46

## 2021-12-02 RX ADMIN — HEPARIN SODIUM 5000 UNIT(S): 5000 INJECTION INTRAVENOUS; SUBCUTANEOUS at 05:27

## 2021-12-02 RX ADMIN — ZINC OXIDE 1 APPLICATION(S): 200 OINTMENT TOPICAL at 17:48

## 2021-12-02 RX ADMIN — Medication 5 UNIT(S): at 17:47

## 2021-12-02 RX ADMIN — METFORMIN HYDROCHLORIDE 1000 MILLIGRAM(S): 850 TABLET ORAL at 08:01

## 2021-12-02 RX ADMIN — PREGABALIN 1000 MICROGRAM(S): 225 CAPSULE ORAL at 11:42

## 2021-12-02 RX ADMIN — METFORMIN HYDROCHLORIDE 1000 MILLIGRAM(S): 850 TABLET ORAL at 17:50

## 2021-12-02 RX ADMIN — Medication 250 MILLIGRAM(S): at 17:50

## 2021-12-02 RX ADMIN — SERTRALINE 100 MILLIGRAM(S): 25 TABLET, FILM COATED ORAL at 11:42

## 2021-12-02 RX ADMIN — HEPARIN SODIUM 5000 UNIT(S): 5000 INJECTION INTRAVENOUS; SUBCUTANEOUS at 13:24

## 2021-12-02 RX ADMIN — Medication 1: at 08:05

## 2021-12-02 RX ADMIN — Medication 5 UNIT(S): at 11:40

## 2021-12-02 RX ADMIN — Medication 5 UNIT(S): at 08:04

## 2021-12-02 RX ADMIN — FAMOTIDINE 20 MILLIGRAM(S): 10 INJECTION INTRAVENOUS at 11:42

## 2021-12-02 RX ADMIN — DONEPEZIL HYDROCHLORIDE 5 MILLIGRAM(S): 10 TABLET, FILM COATED ORAL at 21:29

## 2021-12-02 RX ADMIN — INSULIN GLARGINE 15 UNIT(S): 100 INJECTION, SOLUTION SUBCUTANEOUS at 21:29

## 2021-12-02 RX ADMIN — ATORVASTATIN CALCIUM 80 MILLIGRAM(S): 80 TABLET, FILM COATED ORAL at 21:29

## 2021-12-02 RX ADMIN — Medication 10 MILLIGRAM(S): at 21:29

## 2021-12-02 RX ADMIN — MEMANTINE HYDROCHLORIDE 5 MILLIGRAM(S): 10 TABLET ORAL at 05:27

## 2021-12-02 NOTE — PROGRESS NOTE ADULT - ASSESSMENT
76 Female with a h/o CVA (no prior deficit), HTN, HLD, IDDM, Dementia, MDD admitted to [facility] on 11/1 for Subacute CVA L Basal Ganglia, Mod-Severe Stenosis Prox R PCA, HTN Urgency, AMS, abnormal speech, and Hypoglycemia.  In ED,  on arrival with some improvement with medications but remains hypertensive.  LKWT 3 days prior to presentation, NIHSS 2, Out of window for TPA. Hospital course significant for UTI- treated with ABT. Patient now with gait Instability, ADL impairments and Functional impairments.    # CVA  - Subacute CVA L Basal Ganglia  - Mod-Severe Stenosis Prox R PCA  - continue Comprehensive Rehab Program: PT/OT/ST   - c/a ASA 81mg daily, lipitor 80mg qhs    #NPH  - OP f/u with NSGY    #HTN  - Procardia XL 30mg daily  - Lisinopril 40 mg daily  - hydralazine 10 mg BID    #HLD  - on Lipitor  - Fenofibrate 145mg daily    #DM II  - HA1c 7.6  - Continue Lantus 15units qhs + Admelog 5units premeal + Metformin 1000mg BID + ISS and FS    #Depression  - Sertraline 100mg daily    #Dementia  - Donepezil 5mg daily  - Memantine 5mg BID    #UTI proteus miribalis   - S/p augmentin    #DVT ppx  - Heparin

## 2021-12-02 NOTE — PROGRESS NOTE ADULT - ATTENDING COMMENTS
Pt. seen with resident.  Agree with documentation above as per resident with amendments made as appropriate. Patient medically stable. Making progress towards rehab goals.     left basal ganglia infarct  Daughter states pt. with acute decline for past 7 months-- not gradual-- so that is when stroke occurred and pt. misdiagnosed with dementia  Will trial increase namenda for cognitive deficits.

## 2021-12-02 NOTE — PROGRESS NOTE ADULT - ASSESSMENT
ASSESSMENT/PLAN  This is a 76 Female with a h/o CVA (no prior deficit), HTN, HLD, IDDM, Dementia, MDD admitted to [facility] on 11/1 for Subacute CVA L Basal Ganglia, Mod-Severe Stenosis Prox R PCA, HTN Urgency, AMS, abnormal speech, and Hypoglycemia.  In ED,  on arrival with some improvement with medications but remains hypertensive.  LKWT 3 days prior to presentation, NIHSS 2, Out of window for TPA. Hospital course significant for UTI- treated with ABT. Patient now with gait Instability, ADL impairments and Functional impairments.    # CVA  - Subacute CVA L Basal Ganglia  - Mod-Severe Stenosis Prox R PCA  - cont Comprehensive Rehab Program: PT/OT/ST- total of 3 hrs/day 5 days/week   - c/w ASA, high dose statin: lipitor    #L shoulder pain--likely RTC tendonitis  - Lidocaine patch in AM    #NPH  - OP f/u with NSGY    #HTN  --SBP goal 120-150s  - Procardia XL  - Lisinopril 40 mg daily  - hydralazine 10 mg BID  bp controlled    #HLD  - on Lipitor  - Fenofibrate 145mg daily    #DM II with hyperglycemia  - ISS and FS  - Lantus and Admelog  --metformin  - Management as per hospitalist  --Discharge on Metformin 1000mg BID and Tresidba 10 units.    -     #Depression  - Sertraline 100mg daily    #Pain management  - Tylenol PRN    #DVT ppx  - Heparin, SCD, TEDs    #Dementia  - Donepezil 5mg daily  - Memantine 5mg BID    #GI ppx  - Protonix  - maalox for dyspepsia    #Bowel Regimen  - Senna, miralax PRN    #Bladder management  -voiding with low PVRs      #Dysphagia    - SLP: evaluation and treatment  - s/p MBS: easy chew with thin liquid per speech. 100 % supervision for aspiration precaution    #Precaution  - Fall, Aspiration, Seizure      #Skin:  - No active issues at this time  - Desitin to buttocks for IAD  - Pressure injury/Skin: Turn Q2hrs while in bed, OOB to Chair, PT/OT     #Sleep:  - Maintain quiet hours and low stim environment.  -melatonin PRN  - Monitor sleep logs    #IDT 11/30:  - SW: lives in  3STE with  and daughter.  and 2 granddaughters assisted with ADLs. Owns WC, RW, rollator.  - Barriers: retropulsion, coordination, poor balance, poor carryover, cognition  - OT: on track to meet goals at WC level; eating and grooming SV, UBD Kaley, LBD modA, bathing maxA, toilet transfer and shower transfer modA,   --PT: not on track to meet goals; transfers min-modA, ambulation 100' with tomas walker with dorsiflexion assist (for genu recurvatum) min-modA, 4 steps with 1HR mod A. Level of assist fluctuates during the day and on a day to day basis. Would help if 2nd HR can be installed at home.   - SLP: on track; on easy to chew diet, modA cognition, decreased attention. poor problem solving, decreased initiation,  mild-mod receptive deficits, mod dysarthria  - Goals: shower transfers with Kaley o/w SC with ADLs, Kaley with stairs and CG transfers, CG/Kaley with ambulation, SV cognition  - Family training tomorrow 12/1 with  and daughter, Donald.  - EDOD 12/4 home      Outpatient Follow-up (Specialty/Name of physician):    Huan Oquendo; PhD)  Neurology; Vascular Neurology  [address], [address]  [place] [address]  Phone: [phone]  Fax: [phone]    Alexander Arevalo (MD)  Neurology; Vascular Neurology  [address] [facility], [address] [place] [address]  Phone: [phone]  Fax: [phone] ASSESSMENT/PLAN  This is a 76 Female with a h/o CVA (no prior deficit), HTN, HLD, IDDM, Dementia, MDD admitted to [facility] on 11/1 for Subacute CVA L Basal Ganglia, Mod-Severe Stenosis Prox R PCA, HTN Urgency, AMS, abnormal speech, and Hypoglycemia.  In ED,  on arrival with some improvement with medications but remains hypertensive.  LKWT 3 days prior to presentation, NIHSS 2, Out of window for TPA. Hospital course significant for UTI- treated with ABT. Patient now with gait Instability, ADL impairments and Functional impairments.    # CVA  - Subacute CVA L Basal Ganglia  - Mod-Severe Stenosis Prox R PCA  - cont Comprehensive Rehab Program: PT/OT/ST- total of 3 hrs/day 5 days/week   - c/w ASA, high dose statin: lipitor    #L shoulder pain--likely RTC tendonitis  - Lidocaine patch in AM    #NPH  - OP f/u with NSGY    #HTN  --SBP goal 120-150s  - Procardia XL  - Lisinopril 40 mg daily  - hydralazine 10 mg BID  bp controlled    #HLD  - on Lipitor  - Fenofibrate 145mg daily    #DM II with hyperglycemia  - ISS and FS  - Lantus and Admelog  --metformin  - Management as per hospitalist  --Discharge on Metformin 1000mg BID and Tresidba 10 units.    -     #Depression  - Sertraline 100mg daily    #Pain management  - Tylenol PRN    #DVT ppx  - Heparin, SCD, TEDs    #Dementia  - Donepezil 5mg daily  - Increase Memantine 5mg to 10mg BID    #GI ppx  - Protonix  - maalox for dyspepsia    #Bowel Regimen  - Senna, miralax PRN    #Bladder management  -voiding with low PVRs      #Dysphagia    - SLP: evaluation and treatment  - s/p MBS: easy chew with thin liquid per speech. 100 % supervision for aspiration precaution    #Precaution  - Fall, Aspiration, Seizure      #Skin:  - No active issues at this time  - Desitin to buttocks for IAD  - Pressure injury/Skin: Turn Q2hrs while in bed, OOB to Chair, PT/OT     #Sleep:  - Maintain quiet hours and low stim environment.  -melatonin PRN  - Monitor sleep logs    #IDT 11/30:  - SW: lives in  3STE with  and daughter.  and 2 granddaughters assisted with ADLs. Owns WC, RW, rollator.  - Barriers: retropulsion, coordination, poor balance, poor carryover, cognition  - OT: on track to meet goals at WC level; eating and grooming SV, UBD Kaley, LBD modA, bathing maxA, toilet transfer and shower transfer modA,   --PT: not on track to meet goals; transfers min-modA, ambulation 100' with tomas walker with dorsiflexion assist (for genu recurvatum) min-modA, 4 steps with 1HR mod A. Level of assist fluctuates during the day and on a day to day basis. Would help if 2nd HR can be installed at home.   - SLP: on track; on easy to chew diet, modA cognition, decreased attention. poor problem solving, decreased initiation,  mild-mod receptive deficits, mod dysarthria  - Goals: shower transfers with Kaley o/w SC with ADLs, Kaley with stairs and CG transfers, CG/Kaley with ambulation, SV cognition  - Family training tomorrow 12/1 with  and daughter, Donald.  - EDOD 12/4 home      Outpatient Follow-up (Specialty/Name of physician):    Huna Oquendo; PhD)  Neurology; Vascular Neurology  [address] [facility], [address]  [place] [address]  Phone: [phone]  Fax: [phone]    Alexander Arevalo (MD)  Neurology; Vascular Neurology  [address] [facility], [address] [place] [address]  Phone: [phone]  Fax: [phone] ASSESSMENT/PLAN  This is a 76 Female with a h/o CVA (no prior deficit), HTN, HLD, IDDM, Dementia, MDD admitted to [facility] on 11/1 for Subacute CVA L Basal Ganglia, Mod-Severe Stenosis Prox R PCA, HTN Urgency, AMS, abnormal speech, and Hypoglycemia.  In ED,  on arrival with some improvement with medications but remains hypertensive.  LKWT 3 days prior to presentation, NIHSS 2, Out of window for TPA. Hospital course significant for UTI- treated with ABT. Patient now with gait Instability, ADL impairments and Functional impairments.    # CVA  - Subacute CVA L Basal Ganglia  - Mod-Severe Stenosis Prox R PCA  - cont Comprehensive Rehab Program: PT/OT/ST- total of 3 hrs/day 5 days/week   - c/w ASA, high dose statin: lipitor    #L shoulder pain--likely RTC tendonitis  - Lidocaine patch in AM    #NPH  - OP f/u with NSGY    #HTN  --SBP goal 120-150s  - Procardia XL  - Lisinopril 40 mg daily  - hydralazine 10 mg BID  bp controlled    #HLD  - on Lipitor  - Fenofibrate 145mg daily    #DM II with hyperglycemia  - ISS and FS  - Lantus and Admelog  --metformin  - Management as per hospitalist  --Discharge on Metformin 1000mg BID and Tresiba 10 units.    -     #Depression  - Sertraline 100mg daily    #Pain management  - Tylenol PRN    #DVT ppx  - Heparin, SCD, TEDs    cognitive deficits  - Donepezil 5mg daily  - Increase Memantine 5mg to 10mg BID    #GI ppx  - Protonix  - maalox for dyspepsia    #Bowel Regimen  - Senna, miralax PRN    #Bladder management  -voiding with low PVRs      #Dysphagia    - SLP: evaluation and treatment  - s/p MBS: easy chew with thin liquid per speech. 100 % supervision for aspiration precaution    #Precaution  - Fall, Aspiration, Seizure      #Skin:  - No active issues at this time  - Desitin to buttocks for IAD  - Pressure injury/Skin: Turn Q2hrs while in bed, OOB to Chair, PT/OT     #Sleep:  - Maintain quiet hours and low stim environment.  -melatonin PRN  - Monitor sleep logs    #IDT 11/30:  - SW: lives in  3STE with  and daughter.  and 2 granddaughters assisted with ADLs. Owns WC, RW, rollator.  - Barriers: retropulsion, coordination, poor balance, poor carryover, cognition  - OT: on track to meet goals at WC level; eating and grooming SV, UBD Kaley, LBD modA, bathing maxA, toilet transfer and shower transfer modA,   --PT: not on track to meet goals; transfers min-modA, ambulation 100' with tomas walker with dorsiflexion assist (for genu recurvatum) min-modA, 4 steps with 1HR mod A. Level of assist fluctuates during the day and on a day to day basis. Would help if 2nd HR can be installed at home.   - SLP: on track; on easy to chew diet, modA cognition, decreased attention. poor problem solving, decreased initiation,  mild-mod receptive deficits, mod dysarthria  - Goals: shower transfers with Kaley o/w SC with ADLs, Kaley with stairs and CG transfers, CG/Kaley with ambulation, SV cognition  - Family training tomorrow 12/1 with  and daughter, Donald.  - EDOD 12/4 home      Outpatient Follow-up (Specialty/Name of physician):    Huan Oquendo; PhD)  Neurology; Vascular Neurology  [address] [facility], [address]  [place] [address]  Phone: [phone]  Fax: [phone]    Alexander Arevalo (MD)  Neurology; Vascular Neurology  [address] [facility], [address] [place] [address]  Phone: [phone]  Fax: [phone]

## 2021-12-02 NOTE — PROGRESS NOTE ADULT - SUBJECTIVE AND OBJECTIVE BOX
HPI:  This is a 76 Female with a h/o CVA (no prior deficit), HTN, HLD, IDDM, Dementia, MDD admitted to [facility] on 11/1 for Subacute CVA L Basal Ganglia infarct,  CTA showedMod-Severe Stenosis Prox R PCA.  Pt. with HTN Urgency, and Hypoglycemia. Patient followed by neurology OP. In ED,  on arrival with some improvement with medications but remains hypertensive. Patient noted to have AMS with abnormal speech by daughter. LKWT 3 days prior to presentation. NIHSS 2. Out of window for TPA.  MRI brain showed--  left posterior limb IC/CR stroke, left frontal and right splenium acute strokes,    Per family, patient was followed by neurosurgery for evaluation of possible NPH with LP as outpatient. Neurosurgery recommending outpatient follow up after rehab. Pt also diagnosed with possible UTI on admission and treated with IV antibiotics, urine cx positive for proteus, ID consulted. HTN meds adjusted, bradycardic metoprolol dose decreased, hypoglycemia improved started low dose Lantus, high BP meds adjusted added hydralazine.  MBS performed and recommended easy to chew with thin liquids, with 100% supervision.   Patient was evaluated by PM&R and therapy for functional deficits, gait/ADL impairments and acute rehabilitation was recommended. Patient was medically optimized for discharge to [facility] IRU on 11/11/21.     (11 Nov 2021 13:54)      PAST MEDICAL & SURGICAL HISTORY:  Rheumatoid arthritis    Diabetes mellitus, type 2    Hypertension    HLD (hyperlipidemia)    Dementia    History of CVA (cerebrovascular accident)    Major depression    Anxiety    UTI (urinary tract infection)    History of dental surgery        Subjective:  No new complaints or overnight issues. Patient denies pain or discomfort, denies HA.    Vital Signs Last 24 Hrs  T(C): 36.6 (02 Dec 2021 08:06), Max: 36.6 (02 Dec 2021 08:06)  T(F): 97.8 (02 Dec 2021 08:06), Max: 97.8 (02 Dec 2021 08:06)  HR: 68 (02 Dec 2021 08:06) (62 - 74)  BP: 111/60 (02 Dec 2021 08:06) (111/60 - 132/65)  BP(mean): --  RR: 16 (02 Dec 2021 08:06) (15 - 16)  SpO2: 96% (02 Dec 2021 08:06) (95% - 96%)    REVIEW OF SYMPTOMS  Neurological: cognitive deficits  Denies pain, discomfort, headache.   Denies CP/dyspnea  Denies palpitations  Denies abdominal pain     Physical Exam:  Gen - NAD, Comfortable  Pulm - CTAB,   Cardiovascular - RRR,   Abdomen - Soft, NT/ND, +BS  Extremities - No edema  Neuro-     Cognitive - AAO to self, hospital ("[facility]" with cueing),month --Needs cue for year     Communication - +dysarthria, hypophonic, delay processing     Attention: impaired-- delayed processing, distractable     CN: visual fields intact, No facial weakness        Motor -                    LEFT    UE - ShAB 5/5, EF 5/5, EE 5/5, WE 5/5,  5/5                    RIGHT UE - ShAB 5/5, EF 5/5, EE 5/5, WE 5/5,  5/5                    LEFT    LE - HF 5/5, KE 5/5, DF 5/5, PF 5/5                    RIGHT LE - HF 4/5, KE 5-/5, DF 5/5, PF 5/5        Sensory - Intact to LT     Coordination - FTN intact     Tone - normal  Psychiatric - Mood stable, Affect WNL      RECENT LABS    RECENT LABS               11.5   7.37  )-----------( 300      ( 02 Dec 2021 06:30 )             36.6     12-02    141  |  105  |  31<H>  ----------------------------<  126<H>  4.2   |  27  |  0.92    Ca    9.3      02 Dec 2021 06:30    TPro  6.3  /  Alb  3.1<L>  /  TBili  0.2  /  DBili  x   /  AST  19  /  ALT  23  /  AlkPhos  40  12-02        CAPILLARY BLOOD GLUCOSE      POCT Blood Glucose.: 172 mg/dL (02 Dec 2021 08:03)  POCT Blood Glucose.: 116 mg/dL (01 Dec 2021 21:08)  POCT Blood Glucose.: 175 mg/dL (01 Dec 2021 16:44)  POCT Blood Glucose.: 168 mg/dL (01 Dec 2021 11:57)        RADIOLOGY/OTHER RESULTS      MEDICATIONS  (STANDING):  aspirin enteric coated 81 milliGRAM(s) Oral daily  atorvastatin 80 milliGRAM(s) Oral at bedtime  cyanocobalamin 1000 MICROGram(s) Oral daily  dextrose 40% Gel 15 Gram(s) Oral once  dextrose 5%. 1000 milliLiter(s) (100 mL/Hr) IV Continuous <Continuous>  dextrose 5%. 1000 milliLiter(s) (50 mL/Hr) IV Continuous <Continuous>  dextrose 50% Injectable 25 Gram(s) IV Push once  dextrose 50% Injectable 12.5 Gram(s) IV Push once  dextrose 50% Injectable 25 Gram(s) IV Push once  donepezil 5 milliGRAM(s) Oral at bedtime  famotidine    Tablet 20 milliGRAM(s) Oral daily  fenofibrate Tablet 145 milliGRAM(s) Oral daily  glucagon  Injectable 1 milliGRAM(s) IntraMuscular once  heparin   Injectable 5000 Unit(s) SubCutaneous every 8 hours  hydrALAZINE 10 milliGRAM(s) Oral two times a day  influenza  Vaccine (HIGH DOSE) 0.7 milliLiter(s) IntraMuscular once  insulin glargine Injectable (LANTUS) 15 Unit(s) SubCutaneous at bedtime  insulin lispro (ADMELOG) corrective regimen sliding scale   SubCutaneous three times a day before meals  insulin lispro (ADMELOG) corrective regimen sliding scale   SubCutaneous at bedtime  insulin lispro Injectable (ADMELOG) 5 Unit(s) SubCutaneous three times a day before meals  lidocaine   4% Patch 1 Patch Transdermal <User Schedule>  lisinopril 40 milliGRAM(s) Oral daily  memantine 5 milliGRAM(s) Oral once  memantine 10 milliGRAM(s) Oral two times a day  metFORMIN 1000 milliGRAM(s) Oral two times a day  NIFEdipine XL 30 milliGRAM(s) Oral daily  saccharomyces boulardii 250 milliGRAM(s) Oral two times a day  sertraline 100 milliGRAM(s) Oral daily  zinc oxide 40% Ointment 1 Application(s) Topical two times a day    MEDICATIONS  (PRN):  acetaminophen     Tablet .. 650 milliGRAM(s) Oral every 6 hours PRN Temp greater or equal to 38C (100.4F), Mild Pain (1 - 3)  aluminum hydroxide/magnesium hydroxide/simethicone Suspension 30 milliLiter(s) Oral every 4 hours PRN Dyspepsia  melatonin 3 milliGRAM(s) Oral at bedtime PRN Insomnia       HPI:  This is a 76 Female with a h/o CVA (no prior deficit), HTN, HLD, IDDM,  MDD admitted to [facility] on 11/1 for Subacute CVA L Basal Ganglia infarct,  CTA showedMod-Severe Stenosis Prox R PCA.  Pt. with HTN Urgency, and Hypoglycemia. Patient followed by neurology OP. In ED,  on arrival with some improvement with medications but remains hypertensive. Patient noted to have AMS with abnormal speech by daughter. LKWT 3 days prior to presentation. NIHSS 2. Out of window for TPA.  MRI brain showed--  left posterior limb IC/CR stroke, left frontal and right splenium acute strokes,    Per family, patient was followed by neurosurgery for evaluation of possible NPH with LP as outpatient. Neurosurgery recommending outpatient follow up after rehab. Pt also diagnosed with possible UTI on admission and treated with IV antibiotics, urine cx positive for proteus, ID consulted. HTN meds adjusted, bradycardic metoprolol dose decreased, hypoglycemia improved started low dose Lantus, high BP meds adjusted added hydralazine.  MBS performed and recommended easy to chew with thin liquids, with 100% supervision.   Patient was evaluated by PM&R and therapy for functional deficits, gait/ADL impairments and acute rehabilitation was recommended. Patient was medically optimized for discharge to [facility] IRU on 11/11/21.     (11 Nov 2021 13:54)      PAST MEDICAL & SURGICAL HISTORY:  Rheumatoid arthritis    Diabetes mellitus, type 2    Hypertension    HLD (hyperlipidemia)    Dementia    History of CVA (cerebrovascular accident)    Major depression    Anxiety    UTI (urinary tract infection)    History of dental surgery        Subjective:  No new complaints or overnight issues. Patient denies pain or discomfort, denies HA.    Vital Signs Last 24 Hrs  T(C): 36.6 (02 Dec 2021 08:06), Max: 36.6 (02 Dec 2021 08:06)  T(F): 97.8 (02 Dec 2021 08:06), Max: 97.8 (02 Dec 2021 08:06)  HR: 68 (02 Dec 2021 08:06) (62 - 74)  BP: 111/60 (02 Dec 2021 08:06) (111/60 - 132/65)  BP(mean): --  RR: 16 (02 Dec 2021 08:06) (15 - 16)  SpO2: 96% (02 Dec 2021 08:06) (95% - 96%)    REVIEW OF SYMPTOMS  Neurological: cognitive deficits  Denies pain, discomfort, headache.   Denies CP/dyspnea  Denies palpitations  Denies abdominal pain     Physical Exam:  Gen - NAD, Comfortable  Pulm - CTAB,   Cardiovascular - RRR,   Abdomen - Soft, NT/ND, +BS  Extremities - No edema  Neuro-     Cognitive - AAO to self, hospital ("[facility]" with cueing) and year--Needs cue for month/date     Communication - +dysarthria, hypophonic, delay processing     Attention: impaired-- delayed processing, distractable     CN: visual fields intact, No facial weakness        Motor -                    LEFT    UE - ShAB 5/5, EF 5/5, EE 5/5, WE 5/5,  5/5                    RIGHT UE - ShAB 5/5, EF 5/5, EE 5/5, WE 5/5,  5/5                    LEFT    LE - HF 5/5, KE 5/5, DF 5/5, PF 5/5                    RIGHT LE - HF 4/5, KE 5-/5, DF 5/5, PF 5/5        Sensory - Intact to LT     Coordination - FTN intact     Tone - normal  Psychiatric - Mood stable, Affect WNL      RECENT LABS    RECENT LABS               11.5   7.37  )-----------( 300      ( 02 Dec 2021 06:30 )             36.6     12-02    141  |  105  |  31<H>  ----------------------------<  126<H>  4.2   |  27  |  0.92    Ca    9.3      02 Dec 2021 06:30    TPro  6.3  /  Alb  3.1<L>  /  TBili  0.2  /  DBili  x   /  AST  19  /  ALT  23  /  AlkPhos  40  12-02        CAPILLARY BLOOD GLUCOSE      POCT Blood Glucose.: 172 mg/dL (02 Dec 2021 08:03)  POCT Blood Glucose.: 116 mg/dL (01 Dec 2021 21:08)  POCT Blood Glucose.: 175 mg/dL (01 Dec 2021 16:44)  POCT Blood Glucose.: 168 mg/dL (01 Dec 2021 11:57)        RADIOLOGY/OTHER RESULTS      MEDICATIONS  (STANDING):  aspirin enteric coated 81 milliGRAM(s) Oral daily  atorvastatin 80 milliGRAM(s) Oral at bedtime  cyanocobalamin 1000 MICROGram(s) Oral daily  dextrose 40% Gel 15 Gram(s) Oral once  dextrose 5%. 1000 milliLiter(s) (100 mL/Hr) IV Continuous <Continuous>  dextrose 5%. 1000 milliLiter(s) (50 mL/Hr) IV Continuous <Continuous>  dextrose 50% Injectable 25 Gram(s) IV Push once  dextrose 50% Injectable 12.5 Gram(s) IV Push once  dextrose 50% Injectable 25 Gram(s) IV Push once  donepezil 5 milliGRAM(s) Oral at bedtime  famotidine    Tablet 20 milliGRAM(s) Oral daily  fenofibrate Tablet 145 milliGRAM(s) Oral daily  glucagon  Injectable 1 milliGRAM(s) IntraMuscular once  heparin   Injectable 5000 Unit(s) SubCutaneous every 8 hours  hydrALAZINE 10 milliGRAM(s) Oral two times a day  influenza  Vaccine (HIGH DOSE) 0.7 milliLiter(s) IntraMuscular once  insulin glargine Injectable (LANTUS) 15 Unit(s) SubCutaneous at bedtime  insulin lispro (ADMELOG) corrective regimen sliding scale   SubCutaneous three times a day before meals  insulin lispro (ADMELOG) corrective regimen sliding scale   SubCutaneous at bedtime  insulin lispro Injectable (ADMELOG) 5 Unit(s) SubCutaneous three times a day before meals  lidocaine   4% Patch 1 Patch Transdermal <User Schedule>  lisinopril 40 milliGRAM(s) Oral daily  memantine 5 milliGRAM(s) Oral once  memantine 10 milliGRAM(s) Oral two times a day  metFORMIN 1000 milliGRAM(s) Oral two times a day  NIFEdipine XL 30 milliGRAM(s) Oral daily  saccharomyces boulardii 250 milliGRAM(s) Oral two times a day  sertraline 100 milliGRAM(s) Oral daily  zinc oxide 40% Ointment 1 Application(s) Topical two times a day    MEDICATIONS  (PRN):  acetaminophen     Tablet .. 650 milliGRAM(s) Oral every 6 hours PRN Temp greater or equal to 38C (100.4F), Mild Pain (1 - 3)  aluminum hydroxide/magnesium hydroxide/simethicone Suspension 30 milliLiter(s) Oral every 4 hours PRN Dyspepsia  melatonin 3 milliGRAM(s) Oral at bedtime PRN Insomnia

## 2021-12-02 NOTE — PROGRESS NOTE ADULT - SUBJECTIVE AND OBJECTIVE BOX
Patient is a 76y old  Female who presents with a chief complaint of left basal ganglia CVA with very mild right lower extremity weakness (01 Dec 2021 16:56)      SUBJECTIVE / OVERNIGHT EVENTS:  Pt seen and examined at bedside. No acute events overnight.  Pt denies cp, palpitations, sob, abd pain, N/V, fever, chills.    ROS:  All other review of systems negative    Allergies    No Known Allergies    Intolerances        MEDICATIONS  (STANDING):  aspirin enteric coated 81 milliGRAM(s) Oral daily  atorvastatin 80 milliGRAM(s) Oral at bedtime  cyanocobalamin 1000 MICROGram(s) Oral daily  dextrose 40% Gel 15 Gram(s) Oral once  dextrose 5%. 1000 milliLiter(s) (50 mL/Hr) IV Continuous <Continuous>  dextrose 5%. 1000 milliLiter(s) (100 mL/Hr) IV Continuous <Continuous>  dextrose 50% Injectable 25 Gram(s) IV Push once  dextrose 50% Injectable 12.5 Gram(s) IV Push once  dextrose 50% Injectable 25 Gram(s) IV Push once  donepezil 5 milliGRAM(s) Oral at bedtime  famotidine    Tablet 20 milliGRAM(s) Oral daily  fenofibrate Tablet 145 milliGRAM(s) Oral daily  glucagon  Injectable 1 milliGRAM(s) IntraMuscular once  heparin   Injectable 5000 Unit(s) SubCutaneous every 8 hours  hydrALAZINE 10 milliGRAM(s) Oral two times a day  influenza  Vaccine (HIGH DOSE) 0.7 milliLiter(s) IntraMuscular once  insulin glargine Injectable (LANTUS) 15 Unit(s) SubCutaneous at bedtime  insulin lispro (ADMELOG) corrective regimen sliding scale   SubCutaneous three times a day before meals  insulin lispro (ADMELOG) corrective regimen sliding scale   SubCutaneous at bedtime  insulin lispro Injectable (ADMELOG) 5 Unit(s) SubCutaneous three times a day before meals  lidocaine   4% Patch 1 Patch Transdermal <User Schedule>  lisinopril 40 milliGRAM(s) Oral daily  memantine 5 milliGRAM(s) Oral once  memantine 10 milliGRAM(s) Oral two times a day  metFORMIN 1000 milliGRAM(s) Oral two times a day  NIFEdipine XL 30 milliGRAM(s) Oral daily  saccharomyces boulardii 250 milliGRAM(s) Oral two times a day  sertraline 100 milliGRAM(s) Oral daily  zinc oxide 40% Ointment 1 Application(s) Topical two times a day    MEDICATIONS  (PRN):  acetaminophen     Tablet .. 650 milliGRAM(s) Oral every 6 hours PRN Temp greater or equal to 38C (100.4F), Mild Pain (1 - 3)  aluminum hydroxide/magnesium hydroxide/simethicone Suspension 30 milliLiter(s) Oral every 4 hours PRN Dyspepsia  melatonin 3 milliGRAM(s) Oral at bedtime PRN Insomnia      Vital Signs Last 24 Hrs  T(C): 36.6 (02 Dec 2021 08:06), Max: 36.6 (02 Dec 2021 08:06)  T(F): 97.8 (02 Dec 2021 08:06), Max: 97.8 (02 Dec 2021 08:06)  HR: 68 (02 Dec 2021 08:06) (62 - 74)  BP: 111/60 (02 Dec 2021 08:06) (111/60 - 132/65)  BP(mean): --  RR: 16 (02 Dec 2021 08:06) (15 - 16)  SpO2: 96% (02 Dec 2021 08:06) (95% - 96%)  CAPILLARY BLOOD GLUCOSE      POCT Blood Glucose.: 172 mg/dL (02 Dec 2021 08:03)  POCT Blood Glucose.: 116 mg/dL (01 Dec 2021 21:08)  POCT Blood Glucose.: 175 mg/dL (01 Dec 2021 16:44)  POCT Blood Glucose.: 168 mg/dL (01 Dec 2021 11:57)    I&O's Summary      PHYSICAL EXAM:  GENERAL: NAD, well-developed  CHEST/LUNG: Clear to auscultation bilaterally; No wheeze, nonlabored breathing  HEART: Regular rate and rhythm; No murmurs, rubs, or gallops  ABDOMEN: Soft, Nontender, Nondistended; Bowel sounds present  EXTREMITIES:  2+ Peripheral Pulses, No clubbing, cyanosis, or edema  NEUROLOGY: AAOx3, non-focal  PSYCH: calm, appropriate mood    LABS:                        11.5   7.37  )-----------( 300      ( 02 Dec 2021 06:30 )             36.6     12-02    141  |  105  |  31<H>  ----------------------------<  126<H>  4.2   |  27  |  0.92    Ca    9.3      02 Dec 2021 06:30    TPro  6.3  /  Alb  3.1<L>  /  TBili  0.2  /  DBili  x   /  AST  19  /  ALT  23  /  AlkPhos  40  12-02              RADIOLOGY & ADDITIONAL TESTS:  Results Reviewed:   Imaging Personally Reviewed:  Electrocardiogram Personally Reviewed:    COORDINATION OF CARE:  Care Discussed with Consultants/Other Providers [Y/N]:  Prior or Outpatient Records Reviewed [Y/N]:

## 2021-12-03 ENCOUNTER — TRANSCRIPTION ENCOUNTER (OUTPATIENT)
Age: 76
End: 2021-12-03

## 2021-12-03 LAB
GLUCOSE BLDC GLUCOMTR-MCNC: 125 MG/DL — HIGH (ref 70–99)
GLUCOSE BLDC GLUCOMTR-MCNC: 134 MG/DL — HIGH (ref 70–99)
GLUCOSE BLDC GLUCOMTR-MCNC: 159 MG/DL — HIGH (ref 70–99)
GLUCOSE BLDC GLUCOMTR-MCNC: 171 MG/DL — HIGH (ref 70–99)

## 2021-12-03 PROCEDURE — 99233 SBSQ HOSP IP/OBS HIGH 50: CPT

## 2021-12-03 PROCEDURE — 99232 SBSQ HOSP IP/OBS MODERATE 35: CPT

## 2021-12-03 RX ADMIN — LIDOCAINE 1 PATCH: 4 CREAM TOPICAL at 08:13

## 2021-12-03 RX ADMIN — FAMOTIDINE 20 MILLIGRAM(S): 10 INJECTION INTRAVENOUS at 11:58

## 2021-12-03 RX ADMIN — Medication 250 MILLIGRAM(S): at 05:26

## 2021-12-03 RX ADMIN — Medication 10 MILLIGRAM(S): at 05:26

## 2021-12-03 RX ADMIN — HEPARIN SODIUM 5000 UNIT(S): 5000 INJECTION INTRAVENOUS; SUBCUTANEOUS at 21:09

## 2021-12-03 RX ADMIN — Medication 250 MILLIGRAM(S): at 17:02

## 2021-12-03 RX ADMIN — PREGABALIN 1000 MICROGRAM(S): 225 CAPSULE ORAL at 11:57

## 2021-12-03 RX ADMIN — Medication 3 MILLIGRAM(S): at 21:08

## 2021-12-03 RX ADMIN — Medication 5 UNIT(S): at 11:58

## 2021-12-03 RX ADMIN — Medication 1: at 17:03

## 2021-12-03 RX ADMIN — MEMANTINE HYDROCHLORIDE 10 MILLIGRAM(S): 10 TABLET ORAL at 05:26

## 2021-12-03 RX ADMIN — Medication 10 MILLIGRAM(S): at 17:02

## 2021-12-03 RX ADMIN — HEPARIN SODIUM 5000 UNIT(S): 5000 INJECTION INTRAVENOUS; SUBCUTANEOUS at 06:05

## 2021-12-03 RX ADMIN — LIDOCAINE 1 PATCH: 4 CREAM TOPICAL at 20:51

## 2021-12-03 RX ADMIN — LISINOPRIL 40 MILLIGRAM(S): 2.5 TABLET ORAL at 05:26

## 2021-12-03 RX ADMIN — INSULIN GLARGINE 15 UNIT(S): 100 INJECTION, SOLUTION SUBCUTANEOUS at 21:09

## 2021-12-03 RX ADMIN — METFORMIN HYDROCHLORIDE 1000 MILLIGRAM(S): 850 TABLET ORAL at 17:02

## 2021-12-03 RX ADMIN — ZINC OXIDE 1 APPLICATION(S): 200 OINTMENT TOPICAL at 06:08

## 2021-12-03 RX ADMIN — DONEPEZIL HYDROCHLORIDE 5 MILLIGRAM(S): 10 TABLET, FILM COATED ORAL at 21:08

## 2021-12-03 RX ADMIN — LIDOCAINE 1 PATCH: 4 CREAM TOPICAL at 19:51

## 2021-12-03 RX ADMIN — Medication 81 MILLIGRAM(S): at 11:58

## 2021-12-03 RX ADMIN — Medication 145 MILLIGRAM(S): at 11:58

## 2021-12-03 RX ADMIN — Medication 1: at 11:58

## 2021-12-03 RX ADMIN — Medication 5 UNIT(S): at 08:14

## 2021-12-03 RX ADMIN — ATORVASTATIN CALCIUM 80 MILLIGRAM(S): 80 TABLET, FILM COATED ORAL at 21:08

## 2021-12-03 RX ADMIN — SERTRALINE 100 MILLIGRAM(S): 25 TABLET, FILM COATED ORAL at 11:58

## 2021-12-03 RX ADMIN — METFORMIN HYDROCHLORIDE 1000 MILLIGRAM(S): 850 TABLET ORAL at 08:13

## 2021-12-03 RX ADMIN — HEPARIN SODIUM 5000 UNIT(S): 5000 INJECTION INTRAVENOUS; SUBCUTANEOUS at 14:34

## 2021-12-03 RX ADMIN — ZINC OXIDE 1 APPLICATION(S): 200 OINTMENT TOPICAL at 17:02

## 2021-12-03 RX ADMIN — Medication 30 MILLIGRAM(S): at 05:26

## 2021-12-03 RX ADMIN — MEMANTINE HYDROCHLORIDE 10 MILLIGRAM(S): 10 TABLET ORAL at 17:02

## 2021-12-03 RX ADMIN — Medication 5 UNIT(S): at 17:03

## 2021-12-03 NOTE — PROGRESS NOTE ADULT - ASSESSMENT
ASSESSMENT/PLAN  This is a 76 Female with a h/o CVA (no prior deficit), HTN, HLD, IDDM, Dementia, MDD admitted to [facility] on 11/1 for Subacute CVA L Basal Ganglia, Mod-Severe Stenosis Prox R PCA, HTN Urgency, AMS, abnormal speech, and Hypoglycemia.  In ED,  on arrival with some improvement with medications but remains hypertensive.  LKWT 3 days prior to presentation, NIHSS 2, Out of window for TPA. Hospital course significant for UTI- treated with ABT. Patient now with gait Instability, ADL impairments and Functional impairments.    # CVA  - Subacute CVA L Basal Ganglia  - Mod-Severe Stenosis Prox R PCA  - cont Comprehensive Rehab Program: PT/OT/ST- total of 3 hrs/day 5 days/week   - c/w ASA, high dose statin: lipitor    #L shoulder pain--likely RTC tendonitis  - Lidocaine patch in AM    #NPH  - OP f/u with NSGY    #HTN  --SBP goal 120-150s  - Procardia XL  - Lisinopril 40 mg daily  - hydralazine 10 mg BID  bp controlled    #HLD  - on Lipitor  - Fenofibrate 145mg daily    #DM II with hyperglycemia  - ISS and FS  - Lantus and Admelog  --metformin  - Management as per hospitalist  --Discharge on Metformin 1000mg BID and Tresiba 10 units.      #Depression  - Sertraline 100mg daily    #Pain management  - Tylenol PRN    #DVT ppx  - Heparin, SCD, TEDs    cognitive deficits  - Donepezil 5mg daily  - Increase Memantine 5mg to 10mg BID (12/2)    #GI ppx  - Protonix  - maalox for dyspepsia    #Bowel Regimen  - Senna, miralax PRN    #Bladder management  -voiding with low PVRs      #Dysphagia    - SLP: evaluation and treatment  - s/p MBS: easy chew with thin liquid per speech. 100 % supervision for aspiration precaution    #Precaution  - Fall, Aspiration, Seizure      #Skin:  - No active issues at this time  - Desitin to buttocks for IAD  - Pressure injury/Skin: Turn Q2hrs while in bed, OOB to Chair, PT/OT     #Sleep:  - Maintain quiet hours and low stim environment.  -melatonin PRN  - Monitor sleep logs    #IDT 11/30:  - SW: lives in  3STE with  and daughter.  and 2 granddaughters assisted with ADLs. Owns WC, RW, rollator.  - Barriers: retropulsion, coordination, poor balance, poor carryover, cognition  - OT: on track to meet goals at WC level; eating and grooming SV, UBD Kaley, LBD modA, bathing maxA, toilet transfer and shower transfer modA,   --PT: not on track to meet goals; transfers min-modA, ambulation 100' with tomas walker with dorsiflexion assist (for genu recurvatum) min-modA, 4 steps with 1HR mod A. Level of assist fluctuates during the day and on a day to day basis. Would help if 2nd HR can be installed at home.   - SLP: on track; on easy to chew diet, modA cognition, decreased attention. poor problem solving, decreased initiation,  mild-mod receptive deficits, mod dysarthria  - Goals: shower transfers with Kaley o/w SC with ADLs, Kaley with stairs and CG transfers, CG/Kaley with ambulation, SV cognition  - Family training tomorrow 12/1 with  and daughter, Donald.  - EDOD 12/4 home      Outpatient Follow-up (Specialty/Name of physician):    Huan Oquendo; PhD)  Neurology; Vascular Neurology  [address] [facility], [address]  [place] [address]  Phone: [phone]  Fax: [phone]    Alexander Arevalo (MD)  Neurology; Vascular Neurology  [address] [facility], [address] [place] [id number]  Phone: [phone]  Fax: [phone] ASSESSMENT/PLAN  This is a 76 Female with a h/o CVA (no prior deficit), HTN, HLD, IDDM, Dementia, MDD admitted to [facility] on 11/1 for Subacute CVA L Basal Ganglia, Mod-Severe Stenosis Prox R PCA, HTN Urgency, AMS, abnormal speech, and Hypoglycemia.  In ED,  on arrival with some improvement with medications but remains hypertensive.  LKWT 3 days prior to presentation, NIHSS 2, Out of window for TPA. Hospital course significant for UTI- treated with ABT. Patient now with gait Instability, ADL impairments and Functional impairments.    # CVA  - Subacute CVA L Basal Ganglia  - Mod-Severe Stenosis Prox R PCA  - cont Comprehensive Rehab Program: PT/OT/ST- total of 3 hrs/day 5 days/week   - c/w ASA, high dose statin: lipitor    #L shoulder pain--likely RTC tendonitis  - Lidocaine patch in AM    #NPH  - OP f/u with NSGY    #HTN  --SBP goal 120-150s  - Procardia XL  - Lisinopril 40 mg daily  - hydralazine 10 mg BID  bp controlled    #HLD  - on Lipitor  - Fenofibrate 145mg daily    #DM II with hyperglycemia  - ISS and FS  - Lantus and Admelog  --metformin  - Management as per hospitalist  --Discharge on Metformin 1000mg BID and Tresiba 10 units.      #Depression  - Sertraline 100mg daily    #Pain management  - Tylenol PRN    #DVT ppx  - Heparin, SCD, TEDs    cognitive deficits  - Donepezil 5mg daily  - Increased Memantine 5mg to 10mg BID (12/2)    #GI ppx  - Protonix  - maalox for dyspepsia    #Bowel Regimen  - Senna, miralax PRN    #Bladder management  -voiding with low PVRs      #Dysphagia    - SLP: evaluation and treatment  - s/p MBS: easy chew with thin liquid per speech. 100 % supervision for aspiration precaution    #Precaution  - Fall, Aspiration, Seizure      #Skin:  - No active issues at this time  - Desitin to buttocks for IAD  - Pressure injury/Skin: Turn Q2hrs while in bed, OOB to Chair, PT/OT     #Sleep:  - Maintain quiet hours and low stim environment.  -melatonin PRN  - Monitor sleep logs    #IDT 11/30:  - SW: lives in  3STE with  and daughter.  and 2 granddaughters assisted with ADLs. Owns WC, RW, rollator.  - Barriers: retropulsion, coordination, poor balance, poor carryover, cognition  - OT: on track to meet goals at WC level; eating and grooming SV, UBD Kaley, LBD modA, bathing maxA, toilet transfer and shower transfer modA,   --PT: not on track to meet goals; transfers min-modA, ambulation 100' with tomas walker with dorsiflexion assist (for genu recurvatum) min-modA, 4 steps with 1HR mod A. Level of assist fluctuates during the day and on a day to day basis. Would help if 2nd HR can be installed at home.   - SLP: on track; on easy to chew diet, modA cognition, decreased attention. poor problem solving, decreased initiation,  mild-mod receptive deficits, mod dysarthria  - Goals: shower transfers with Kaley o/w SC with ADLs, Kaley with stairs and CG transfers, CG/Kaley with ambulation, SV cognition  - Family training tomorrow 12/1 with  and daughter, Donald.  - EDOD 12/4 home      Outpatient Follow-up (Specialty/Name of physician):    Huan Oquendo; PhD)  Neurology; Vascular Neurology  [address] [facility], [address]  [place] [address]  Phone: [phone]  Fax: [phone]    Alexander Arevalo (MD)  Neurology; Vascular Neurology  [address] [facility], [address] [place] [address]  Phone: [phone]  Fax: [phone]

## 2021-12-03 NOTE — DISCHARGE NOTE NURSING/CASE MANAGEMENT/SOCIAL WORK - PATIENT PORTAL LINK FT
You can access the FollowMyHealth Patient Portal offered by [facility] by registering at the following website: [email or web address]. By joining [employer]’s FollowMyHealth portal, you will also be able to view your health information using other applications (apps) compatible with our system.

## 2021-12-03 NOTE — PROGRESS NOTE ADULT - SUBJECTIVE AND OBJECTIVE BOX
Patient is a 76y old  Female who presents with a chief complaint of left basal ganglia CVA with very mild right lower extremity weakness (03 Dec 2021 10:37)      Patient seen and examined at bedside.  Denies chest pain, dyspnea, abd pain.    ALLERGIES:  No Known Allergies    MEDICATIONS  (STANDING):  aspirin enteric coated 81 milliGRAM(s) Oral daily  atorvastatin 80 milliGRAM(s) Oral at bedtime  cyanocobalamin 1000 MICROGram(s) Oral daily  dextrose 40% Gel 15 Gram(s) Oral once  dextrose 5%. 1000 milliLiter(s) (50 mL/Hr) IV Continuous <Continuous>  dextrose 5%. 1000 milliLiter(s) (100 mL/Hr) IV Continuous <Continuous>  dextrose 50% Injectable 25 Gram(s) IV Push once  dextrose 50% Injectable 12.5 Gram(s) IV Push once  dextrose 50% Injectable 25 Gram(s) IV Push once  donepezil 5 milliGRAM(s) Oral at bedtime  famotidine    Tablet 20 milliGRAM(s) Oral daily  fenofibrate Tablet 145 milliGRAM(s) Oral daily  glucagon  Injectable 1 milliGRAM(s) IntraMuscular once  heparin   Injectable 5000 Unit(s) SubCutaneous every 8 hours  hydrALAZINE 10 milliGRAM(s) Oral two times a day  influenza  Vaccine (HIGH DOSE) 0.7 milliLiter(s) IntraMuscular once  insulin glargine Injectable (LANTUS) 15 Unit(s) SubCutaneous at bedtime  insulin lispro (ADMELOG) corrective regimen sliding scale   SubCutaneous three times a day before meals  insulin lispro (ADMELOG) corrective regimen sliding scale   SubCutaneous at bedtime  insulin lispro Injectable (ADMELOG) 5 Unit(s) SubCutaneous three times a day before meals  lidocaine   4% Patch 1 Patch Transdermal <User Schedule>  lisinopril 40 milliGRAM(s) Oral daily  memantine 10 milliGRAM(s) Oral two times a day  metFORMIN 1000 milliGRAM(s) Oral two times a day  NIFEdipine XL 30 milliGRAM(s) Oral daily  saccharomyces boulardii 250 milliGRAM(s) Oral two times a day  sertraline 100 milliGRAM(s) Oral daily  zinc oxide 40% Ointment 1 Application(s) Topical two times a day    MEDICATIONS  (PRN):  acetaminophen     Tablet .. 650 milliGRAM(s) Oral every 6 hours PRN Temp greater or equal to 38C (100.4F), Mild Pain (1 - 3)  aluminum hydroxide/magnesium hydroxide/simethicone Suspension 30 milliLiter(s) Oral every 4 hours PRN Dyspepsia  melatonin 3 milliGRAM(s) Oral at bedtime PRN Insomnia    Vital Signs Last 24 Hrs  T(F): 97.7 (03 Dec 2021 08:12), Max: 97.8 (02 Dec 2021 21:27)  HR: 72 (03 Dec 2021 08:12) (65 - 79)  BP: 117/65 (03 Dec 2021 08:12) (105/66 - 157/69)  RR: 16 (03 Dec 2021 08:12) (15 - 16)  SpO2: 97% (03 Dec 2021 08:12) (96% - 97%)  I&O's Summary      PHYSICAL EXAM:  General: NAD, A/O x 3  ENT: MMM  Neck: Supple, No JVD  Lungs: Clear to auscultation bilaterally  Cardio: RRR, S1/S2, No murmurs  Abdomen: Soft, Nontender, Nondistended; Bowel sounds present  Extremities: No calf tenderness, No pitting edema    LABS:                        11.5   7.37  )-----------( 300      ( 02 Dec 2021 06:30 )             36.6       12-02    141  |  105  |  31  ----------------------------<  126  4.2   |  27  |  0.92    Ca    9.3      02 Dec 2021 06:30    TPro  6.3  /  Alb  3.1  /  TBili  0.2  /  DBili  x   /  AST  19  /  ALT  23  /  AlkPhos  40  12-02     eGFR if Non African American: 61 mL/min/1.73M2 (12-02-21 @ 06:30)  eGFR if : 71 mL/min/1.73M2 (12-02-21 @ 06:30)             11-02 Chol 248 mg/dL LDL -- HDL 53 mg/dL Trig 151 mg/dL              POCT Blood Glucose.: 125 mg/dL (03 Dec 2021 08:11)  POCT Blood Glucose.: 85 mg/dL (02 Dec 2021 20:54)  POCT Blood Glucose.: 106 mg/dL (02 Dec 2021 16:23)  POCT Blood Glucose.: 101 mg/dL (02 Dec 2021 11:39)          COVID-19 PCR: NotDetec (11-30-21 @ 06:00)  COVID-19 PCR: NotDetec (11-24-21 @ 06:00)  COVID-19 PCR: NotDetec (11-18-21 @ 07:02)  COVID-19 PCR: NotDetec (11-11-21 @ 17:00)  COVID-19 PCR: NotDetec (11-08-21 @ 08:58)      RADIOLOGY & ADDITIONAL TESTS:    Care Discussed with Consultants/Other Providers:

## 2021-12-03 NOTE — DISCHARGE NOTE NURSING/CASE MANAGEMENT/SOCIAL WORK - NSDCPEFALRISK_GEN_ALL_CORE
For information on Fall & Injury Prevention, visit: [email or web address] OR  [email or web address] OR  https://www.cdc.gov/steadi/patient.html

## 2021-12-03 NOTE — PROGRESS NOTE ADULT - TIME BILLING
MEDICAL COMPLEXITY
MEDICAL COMPLEXITY
** Spoke with pt's  __daughter after family training--_which went well.   discussed discharge plan of care.  Pt. will have supervision/ assistance at home from family.  All questions answered.
discussion with family

## 2021-12-03 NOTE — PROGRESS NOTE ADULT - ASSESSMENT
76 Female with a h/o CVA (no prior deficit), HTN, HLD, IDDM, Dementia, MDD admitted to [facility] on 11/1 for Subacute CVA L Basal Ganglia, Mod-Severe Stenosis Prox R PCA, HTN Urgency, AMS, abnormal speech, and Hypoglycemia.  In ED,  on arrival with some improvement with medications but remains hypertensive.  LKWT 3 days prior to presentation, NIHSS 2, Out of window for TPA. Hospital course significant for UTI- treated with ABT. Patient now with gait Instability, ADL impairments and Functional impairments.    # CVA  - Subacute CVA L Basal Ganglia  - Mod-Severe Stenosis Prox R PCA  - continue Comprehensive Rehab Program: PT/OT/ST   - c/a ASA 81mg daily, lipitor 80mg qhs    #NPH  - OP f/u with NSGY    #HTN  - Procardia XL 30mg daily  - Lisinopril 40 mg daily  - hydralazine 10 mg BID    #HLD  - on Lipitor  - Fenofibrate 145mg daily    #DM II  - HA1c 7.6  - Continue Lantus 15units qhs + Admelog 5units premeal + Metformin 1000mg BID + ISS and FS    #Depression  - Sertraline 100mg daily    #Dementia  - Donepezil 5mg daily  - Memantine 5mg BID    #UTI proteus miribalis   - S/p augmentin    #DVT ppx  - Heparin

## 2021-12-03 NOTE — PROGRESS NOTE ADULT - SUBJECTIVE AND OBJECTIVE BOX
HPI:  This is a 76 Female with a h/o CVA (no prior deficit), HTN, HLD, IDDM,  MDD admitted to [facility] on 11/1 for Subacute CVA L Basal Ganglia infarct,  CTA showedMod-Severe Stenosis Prox R PCA.  Pt. with HTN Urgency, and Hypoglycemia. Patient followed by neurology OP. In ED,  on arrival with some improvement with medications but remains hypertensive. Patient noted to have AMS with abnormal speech by daughter. LKWT 3 days prior to presentation. NIHSS 2. Out of window for TPA.  MRI brain showed--  left posterior limb IC/CR stroke, left frontal and right splenium acute strokes,    Per family, patient was followed by neurosurgery for evaluation of possible NPH with LP as outpatient. Neurosurgery recommending outpatient follow up after rehab. Pt also diagnosed with possible UTI on admission and treated with IV antibiotics, urine cx positive for proteus, ID consulted. HTN meds adjusted, bradycardic metoprolol dose decreased, hypoglycemia improved started low dose Lantus, high BP meds adjusted added hydralazine.  MBS performed and recommended easy to chew with thin liquids, with 100% supervision.   Patient was evaluated by PM&R and therapy for functional deficits, gait/ADL impairments and acute rehabilitation was recommended. Patient was medically optimized for discharge to [facility] IRU on 11/11/21.     (11 Nov 2021 13:54)      PAST MEDICAL & SURGICAL HISTORY:  Rheumatoid arthritis    Diabetes mellitus, type 2    Hypertension    HLD (hyperlipidemia)    Dementia    History of CVA (cerebrovascular accident)    Major depression    Anxiety    UTI (urinary tract infection)    History of dental surgery        Subjective:  No new complaints or overnight issues. Patient denies pain or discomfort, denies HA. She was wiping her nose this AM, denies fever, chills, cough.    Vital Signs Last 24 Hrs  T(C): 36.5 (03 Dec 2021 08:12), Max: 36.6 (02 Dec 2021 21:27)  T(F): 97.7 (03 Dec 2021 08:12), Max: 97.8 (02 Dec 2021 21:27)  HR: 72 (03 Dec 2021 08:12) (65 - 79)  BP: 117/65 (03 Dec 2021 08:12) (105/66 - 157/69)  BP(mean): --  RR: 16 (03 Dec 2021 08:12) (15 - 16)  SpO2: 97% (03 Dec 2021 08:12) (96% - 97%)    REVIEW OF SYMPTOMS  Neurological: cognitive deficits  Denies pain, discomfort, headache.   Denies CP/dyspnea  Denies palpitations  Denies abdominal pain     Physical Exam:  Gen - NAD, Comfortable  Pulm - CTAB,   Cardiovascular - RRR,   Abdomen - Soft, NT/ND, +BS  Extremities - No edema  Neuro-     Cognitive - AAO to self, hospital ("[facility]" with cueing) and year--Needs cue for month/date     Communication - +dysarthria, hypophonic, delay processing     Attention: impaired-- delayed processing, distractable     CN: visual fields intact, No facial weakness        Motor -                    LEFT    UE - ShAB 5/5, EF 5/5, EE 5/5, WE 5/5,  5/5                    RIGHT UE - ShAB 5/5, EF 5/5, EE 5/5, WE 5/5,  5/5                    LEFT    LE - HF 5/5, KE 5/5, DF 5/5, PF 5/5                    RIGHT LE - HF 4/5, KE 5-/5, DF 5/5, PF 5/5        Sensory - Intact to LT     Coordination - FTN intact     Tone - normal  Psychiatric - Mood stable, Affect WNL      RECENT LABS               11.5   7.37  )-----------( 300      ( 02 Dec 2021 06:30 )             36.6     12-02    141  |  105  |  31<H>  ----------------------------<  126<H>  4.2   |  27  |  0.92    Ca    9.3      02 Dec 2021 06:30    TPro  6.3  /  Alb  3.1<L>  /  TBili  0.2  /  DBili  x   /  AST  19  /  ALT  23  /  AlkPhos  40  12-02      CAPILLARY BLOOD GLUCOSE      POCT Blood Glucose.: 125 mg/dL (03 Dec 2021 08:11)  POCT Blood Glucose.: 85 mg/dL (02 Dec 2021 20:54)  POCT Blood Glucose.: 106 mg/dL (02 Dec 2021 16:23)  POCT Blood Glucose.: 101 mg/dL (02 Dec 2021 11:39)        RADIOLOGY/OTHER RESULTS    MEDICATIONS  (STANDING):  aspirin enteric coated 81 milliGRAM(s) Oral daily  atorvastatin 80 milliGRAM(s) Oral at bedtime  cyanocobalamin 1000 MICROGram(s) Oral daily  dextrose 40% Gel 15 Gram(s) Oral once  dextrose 5%. 1000 milliLiter(s) (50 mL/Hr) IV Continuous <Continuous>  dextrose 5%. 1000 milliLiter(s) (100 mL/Hr) IV Continuous <Continuous>  dextrose 50% Injectable 25 Gram(s) IV Push once  dextrose 50% Injectable 12.5 Gram(s) IV Push once  dextrose 50% Injectable 25 Gram(s) IV Push once  donepezil 5 milliGRAM(s) Oral at bedtime  famotidine    Tablet 20 milliGRAM(s) Oral daily  fenofibrate Tablet 145 milliGRAM(s) Oral daily  glucagon  Injectable 1 milliGRAM(s) IntraMuscular once  heparin   Injectable 5000 Unit(s) SubCutaneous every 8 hours  hydrALAZINE 10 milliGRAM(s) Oral two times a day  influenza  Vaccine (HIGH DOSE) 0.7 milliLiter(s) IntraMuscular once  insulin glargine Injectable (LANTUS) 15 Unit(s) SubCutaneous at bedtime  insulin lispro (ADMELOG) corrective regimen sliding scale   SubCutaneous three times a day before meals  insulin lispro (ADMELOG) corrective regimen sliding scale   SubCutaneous at bedtime  insulin lispro Injectable (ADMELOG) 5 Unit(s) SubCutaneous three times a day before meals  lidocaine   4% Patch 1 Patch Transdermal <User Schedule>  lisinopril 40 milliGRAM(s) Oral daily  memantine 10 milliGRAM(s) Oral two times a day  metFORMIN 1000 milliGRAM(s) Oral two times a day  NIFEdipine XL 30 milliGRAM(s) Oral daily  saccharomyces boulardii 250 milliGRAM(s) Oral two times a day  sertraline 100 milliGRAM(s) Oral daily  zinc oxide 40% Ointment 1 Application(s) Topical two times a day    MEDICATIONS  (PRN):  acetaminophen     Tablet .. 650 milliGRAM(s) Oral every 6 hours PRN Temp greater or equal to 38C (100.4F), Mild Pain (1 - 3)  aluminum hydroxide/magnesium hydroxide/simethicone Suspension 30 milliLiter(s) Oral every 4 hours PRN Dyspepsia  melatonin 3 milliGRAM(s) Oral at bedtime PRN Insomnia   HPI:  This is a 76 Female with a h/o CVA (no prior deficit), HTN, HLD, IDDM,  MDD admitted to [facility] on 11/1 for Subacute CVA L Basal Ganglia infarct,  CTA showedMod-Severe Stenosis Prox R PCA.  Pt. with HTN Urgency, and Hypoglycemia. Patient followed by neurology OP. In ED,  on arrival with some improvement with medications but remains hypertensive. Patient noted to have AMS with abnormal speech by daughter. LKWT 3 days prior to presentation. NIHSS 2. Out of window for TPA.  MRI brain showed--  left posterior limb IC/CR stroke, left frontal and right splenium acute strokes,    Per family, patient was followed by neurosurgery for evaluation of possible NPH with LP as outpatient. Neurosurgery recommending outpatient follow up after rehab. Pt also diagnosed with possible UTI on admission and treated with IV antibiotics, urine cx positive for proteus, ID consulted. HTN meds adjusted, bradycardic metoprolol dose decreased, hypoglycemia improved started low dose Lantus, high BP meds adjusted added hydralazine.  MBS performed and recommended easy to chew with thin liquids, with 100% supervision.   Patient was evaluated by PM&R and therapy for functional deficits, gait/ADL impairments and acute rehabilitation was recommended. Patient was medically optimized for discharge to [facility] IRU on 11/11/21.   (11 Nov 2021 13:54)      PAST MEDICAL & SURGICAL HISTORY:  Rheumatoid arthritis    Diabetes mellitus, type 2    Hypertension    HLD (hyperlipidemia)    Dementia    History of CVA (cerebrovascular accident)    Major depression    Anxiety    UTI (urinary tract infection)    History of dental surgery        Subjective:  No new complaints or overnight issues. Patient denies pain or discomfort, denies HA. She was wiping her nose this AM, denies fever, chills, cough.    Vital Signs Last 24 Hrs  T(C): 36.5 (03 Dec 2021 08:12), Max: 36.6 (02 Dec 2021 21:27)  T(F): 97.7 (03 Dec 2021 08:12), Max: 97.8 (02 Dec 2021 21:27)  HR: 72 (03 Dec 2021 08:12) (65 - 79)  BP: 117/65 (03 Dec 2021 08:12) (105/66 - 157/69)  BP(mean): --  RR: 16 (03 Dec 2021 08:12) (15 - 16)  SpO2: 97% (03 Dec 2021 08:12) (96% - 97%)    REVIEW OF SYMPTOMS  Neurological: cognitive deficits  Denies pain, discomfort, headache.   Denies CP/dyspnea  Denies palpitations  Denies abdominal pain     Physical Exam:  Gen - NAD, Comfortable  Pulm - CTAB,   Cardiovascular - RRR,   Abdomen - Soft, NT/ND, +BS  Extremities - No edema  Neuro-     Cognitive - AAO to self, hospital ("[facility]" with cueing) and year--Needs cue for month/date     Communication - +dysarthria, hypophonic, delay processing     Attention: impaired-- delayed processing, distractable     CN: visual fields intact, No facial weakness        Motor -                    LEFT    UE - ShAB 5/5, EF 5/5, EE 5/5, WE 5/5,  5/5                    RIGHT UE - ShAB 5/5, EF 5/5, EE 5/5, WE 5/5,  5/5                    LEFT    LE - HF 5/5, KE 5/5, DF 5/5, PF 5/5                    RIGHT LE - HF 4/5, KE 5-/5, DF 5/5, PF 5/5        Sensory - Intact to LT     Coordination - FTN intact     Tone - normal  Psychiatric - Mood stable, Affect WNL      RECENT LABS               11.5   7.37  )-----------( 300      ( 02 Dec 2021 06:30 )             36.6     12-02    141  |  105  |  31<H>  ----------------------------<  126<H>  4.2   |  27  |  0.92    Ca    9.3      02 Dec 2021 06:30    TPro  6.3  /  Alb  3.1<L>  /  TBili  0.2  /  DBili  x   /  AST  19  /  ALT  23  /  AlkPhos  40  12-02      CAPILLARY BLOOD GLUCOSE      POCT Blood Glucose.: 125 mg/dL (03 Dec 2021 08:11)  POCT Blood Glucose.: 85 mg/dL (02 Dec 2021 20:54)  POCT Blood Glucose.: 106 mg/dL (02 Dec 2021 16:23)  POCT Blood Glucose.: 101 mg/dL (02 Dec 2021 11:39)        RADIOLOGY/OTHER RESULTS    MEDICATIONS  (STANDING):  aspirin enteric coated 81 milliGRAM(s) Oral daily  atorvastatin 80 milliGRAM(s) Oral at bedtime  cyanocobalamin 1000 MICROGram(s) Oral daily  dextrose 40% Gel 15 Gram(s) Oral once  dextrose 5%. 1000 milliLiter(s) (50 mL/Hr) IV Continuous <Continuous>  dextrose 5%. 1000 milliLiter(s) (100 mL/Hr) IV Continuous <Continuous>  dextrose 50% Injectable 25 Gram(s) IV Push once  dextrose 50% Injectable 12.5 Gram(s) IV Push once  dextrose 50% Injectable 25 Gram(s) IV Push once  donepezil 5 milliGRAM(s) Oral at bedtime  famotidine    Tablet 20 milliGRAM(s) Oral daily  fenofibrate Tablet 145 milliGRAM(s) Oral daily  glucagon  Injectable 1 milliGRAM(s) IntraMuscular once  heparin   Injectable 5000 Unit(s) SubCutaneous every 8 hours  hydrALAZINE 10 milliGRAM(s) Oral two times a day  influenza  Vaccine (HIGH DOSE) 0.7 milliLiter(s) IntraMuscular once  insulin glargine Injectable (LANTUS) 15 Unit(s) SubCutaneous at bedtime  insulin lispro (ADMELOG) corrective regimen sliding scale   SubCutaneous three times a day before meals  insulin lispro (ADMELOG) corrective regimen sliding scale   SubCutaneous at bedtime  insulin lispro Injectable (ADMELOG) 5 Unit(s) SubCutaneous three times a day before meals  lidocaine   4% Patch 1 Patch Transdermal <User Schedule>  lisinopril 40 milliGRAM(s) Oral daily  memantine 10 milliGRAM(s) Oral two times a day  metFORMIN 1000 milliGRAM(s) Oral two times a day  NIFEdipine XL 30 milliGRAM(s) Oral daily  saccharomyces boulardii 250 milliGRAM(s) Oral two times a day  sertraline 100 milliGRAM(s) Oral daily  zinc oxide 40% Ointment 1 Application(s) Topical two times a day    MEDICATIONS  (PRN):  acetaminophen     Tablet .. 650 milliGRAM(s) Oral every 6 hours PRN Temp greater or equal to 38C (100.4F), Mild Pain (1 - 3)  aluminum hydroxide/magnesium hydroxide/simethicone Suspension 30 milliLiter(s) Oral every 4 hours PRN Dyspepsia  melatonin 3 milliGRAM(s) Oral at bedtime PRN Insomnia

## 2021-12-03 NOTE — PROGRESS NOTE ADULT - ATTENDING COMMENTS
Pt. seen with resident.  Agree with documentation above as per resident with amendments made as appropriate. Patient medically stable. Making progress towards rehab goals.     left Basal ganglia infarct  Stable  d/c planning to home tomorrow-- Pt. seen with resident.  Agree with documentation above as per resident with amendments made as appropriate. Patient medically stable. Making progress towards rehab goals.     left Basal ganglia infarct  Stable  d/c planning to home tomorrow-- discharged medications and instructions discussed with pt's , Emmanuel Pearson.  All questions answered.

## 2021-12-03 NOTE — PROGRESS NOTE ADULT - ATTENDING SUPERVISION STATEMENT
Resident

## 2021-12-04 VITALS
TEMPERATURE: 98 F | DIASTOLIC BLOOD PRESSURE: 70 MMHG | RESPIRATION RATE: 15 BRPM | SYSTOLIC BLOOD PRESSURE: 128 MMHG | OXYGEN SATURATION: 98 % | HEART RATE: 54 BPM

## 2021-12-04 LAB
GLUCOSE BLDC GLUCOMTR-MCNC: 136 MG/DL — HIGH (ref 70–99)
GLUCOSE BLDC GLUCOMTR-MCNC: 137 MG/DL — HIGH (ref 70–99)

## 2021-12-04 PROCEDURE — 99238 HOSP IP/OBS DSCHRG MGMT 30/<: CPT

## 2021-12-04 PROCEDURE — 99232 SBSQ HOSP IP/OBS MODERATE 35: CPT

## 2021-12-04 RX ADMIN — FAMOTIDINE 20 MILLIGRAM(S): 10 INJECTION INTRAVENOUS at 11:07

## 2021-12-04 RX ADMIN — LISINOPRIL 40 MILLIGRAM(S): 2.5 TABLET ORAL at 06:10

## 2021-12-04 RX ADMIN — Medication 145 MILLIGRAM(S): at 11:07

## 2021-12-04 RX ADMIN — HEPARIN SODIUM 5000 UNIT(S): 5000 INJECTION INTRAVENOUS; SUBCUTANEOUS at 06:09

## 2021-12-04 RX ADMIN — PREGABALIN 1000 MICROGRAM(S): 225 CAPSULE ORAL at 11:06

## 2021-12-04 RX ADMIN — METFORMIN HYDROCHLORIDE 1000 MILLIGRAM(S): 850 TABLET ORAL at 07:32

## 2021-12-04 RX ADMIN — Medication 5 UNIT(S): at 07:32

## 2021-12-04 RX ADMIN — Medication 30 MILLIGRAM(S): at 06:09

## 2021-12-04 RX ADMIN — Medication 5 UNIT(S): at 11:06

## 2021-12-04 RX ADMIN — Medication 10 MILLIGRAM(S): at 06:10

## 2021-12-04 RX ADMIN — MEMANTINE HYDROCHLORIDE 10 MILLIGRAM(S): 10 TABLET ORAL at 06:10

## 2021-12-04 RX ADMIN — SERTRALINE 100 MILLIGRAM(S): 25 TABLET, FILM COATED ORAL at 11:06

## 2021-12-04 RX ADMIN — Medication 250 MILLIGRAM(S): at 06:10

## 2021-12-04 RX ADMIN — LIDOCAINE 1 PATCH: 4 CREAM TOPICAL at 07:32

## 2021-12-04 RX ADMIN — Medication 81 MILLIGRAM(S): at 11:07

## 2021-12-04 NOTE — PROGRESS NOTE ADULT - PROVIDER SPECIALTY LIST ADULT
Hospitalist
Hospitalist
Rehab Medicine
Hospitalist
Rehab Medicine
Hospitalist
Rehab Medicine
Hospitalist
Rehab Medicine

## 2021-12-04 NOTE — PROGRESS NOTE ADULT - SUBJECTIVE AND OBJECTIVE BOX
HPI:  This is a 76 Female with a h/o CVA (no prior deficit), HTN, HLD, IDDM, Dementia, MDD admitted to [facility] on 11/1 for Subacute CVA L Basal Ganglia infarct,  CTA showedMod-Severe Stenosis Prox R PCA.  Pt. with HTN Urgency, and Hypoglycemia. Patient followed by neurology OP. In ED,  on arrival with some improvement with medications but remains hypertensive. Patient noted to have AMS with abnormal speech by daughter. LKWT 3 days prior to presentation. NIHSS 2. Out of window for TPA.  MRI brain showed--  left posterior limb IC/CR stroke, left frontal and right splenium acute strokes,    Per family, patient was followed by neurosurgery for evaluation of possible NPH with LP as outpatient. Neurosurgery recommending outpatient follow up after rehab. Pt also diagnosed with possible UTI on admission and treated with IV antibiotics, urine cx positive for proteus, ID consulted. HTN meds adjusted, bradycardic metoprolol dose decreased, hypoglycemia improved started low dose Lantus, high BP meds adjusted added hydralazine.  MBS performed and recommended easy to chew with thin liquids, with 100% supervision.   Patient was evaluated by PM&R and therapy for functional deficits, gait/ADL impairments and acute rehabilitation was recommended. Patient was medically optimized for discharge to [facility] IRU on 11/11/21.     (11 Nov 2021 13:54)      PAST MEDICAL & SURGICAL HISTORY:  Rheumatoid arthritis    Diabetes mellitus, type 2    Hypertension    HLD (hyperlipidemia)    Dementia    History of CVA (cerebrovascular accident)    Major depression    Anxiety    UTI (urinary tract infection)    History of dental surgery        Subjective:  No new issues.  discharge today      VITALS  Vital Signs Last 24 Hrs  T(C): 36.4 (04 Dec 2021 07:35), Max: 36.4 (03 Dec 2021 21:05)  T(F): 97.5 (04 Dec 2021 07:35), Max: 97.5 (03 Dec 2021 21:05)  HR: 54 (04 Dec 2021 07:35) (54 - 73)  BP: 128/70 (04 Dec 2021 07:35) (122/64 - 138/63)  BP(mean): --  RR: 15 (04 Dec 2021 07:35) (15 - 15)  SpO2: 98% (04 Dec 2021 07:35) (97% - 98%)    REVIEW OF SYMPTOMS  Neurological: cognitive deficits  Denies pain, discomfort, headache.   Denies CP/dyspnea  Denies palpitations  Denies abdominal pain     Physical Exam:  Gen - NAD, Comfortable  Pulm - CTAB,   Cardiovascular - RRR,   Abdomen - Soft, NT/ND, +BS  Extremities - No edema  Neuro-     Cognitive - AAO to self, hospital ("[facility]" with cueing) and year--Needs cue for month/date     Communication - +dysarthria, hypophonic, delay processing     Attention: impaired-- delayed processing, distractable     CN: visual fields intact, No facial weakness        Motor -                    LEFT    UE - ShAB 5/5, EF 5/5, EE 5/5, WE 5/5,  5/5                    RIGHT UE - ShAB 5/5, EF 5/5, EE 5/5, WE 5/5,  5/5                    LEFT    LE - HF 5/5, KE 5/5, DF 5/5, PF 5/5                    RIGHT LE - HF 4/5, KE 5-/5, DF 5/5, PF 5/5        Sensory - Intact to LT     Coordination - FTN intact     Tone - normal  Psychiatric - Mood stable, Affect WNL      RECENT LABS                  RADIOLOGY/OTHER RESULTS      MEDICATIONS  (STANDING):  aspirin enteric coated 81 milliGRAM(s) Oral daily  atorvastatin 80 milliGRAM(s) Oral at bedtime  cyanocobalamin 1000 MICROGram(s) Oral daily  dextrose 40% Gel 15 Gram(s) Oral once  dextrose 5%. 1000 milliLiter(s) (50 mL/Hr) IV Continuous <Continuous>  dextrose 5%. 1000 milliLiter(s) (100 mL/Hr) IV Continuous <Continuous>  dextrose 50% Injectable 25 Gram(s) IV Push once  dextrose 50% Injectable 25 Gram(s) IV Push once  dextrose 50% Injectable 12.5 Gram(s) IV Push once  donepezil 5 milliGRAM(s) Oral at bedtime  famotidine    Tablet 20 milliGRAM(s) Oral daily  fenofibrate Tablet 145 milliGRAM(s) Oral daily  glucagon  Injectable 1 milliGRAM(s) IntraMuscular once  heparin   Injectable 5000 Unit(s) SubCutaneous every 8 hours  hydrALAZINE 10 milliGRAM(s) Oral two times a day  influenza  Vaccine (HIGH DOSE) 0.7 milliLiter(s) IntraMuscular once  insulin glargine Injectable (LANTUS) 15 Unit(s) SubCutaneous at bedtime  insulin lispro (ADMELOG) corrective regimen sliding scale   SubCutaneous three times a day before meals  insulin lispro (ADMELOG) corrective regimen sliding scale   SubCutaneous at bedtime  insulin lispro Injectable (ADMELOG) 5 Unit(s) SubCutaneous three times a day before meals  lidocaine   4% Patch 1 Patch Transdermal <User Schedule>  lisinopril 40 milliGRAM(s) Oral daily  memantine 10 milliGRAM(s) Oral two times a day  metFORMIN 1000 milliGRAM(s) Oral two times a day  NIFEdipine XL 30 milliGRAM(s) Oral daily  saccharomyces boulardii 250 milliGRAM(s) Oral two times a day  sertraline 100 milliGRAM(s) Oral daily  zinc oxide 40% Ointment 1 Application(s) Topical two times a day    MEDICATIONS  (PRN):  acetaminophen     Tablet .. 650 milliGRAM(s) Oral every 6 hours PRN Temp greater or equal to 38C (100.4F), Mild Pain (1 - 3)  aluminum hydroxide/magnesium hydroxide/simethicone Suspension 30 milliLiter(s) Oral every 4 hours PRN Dyspepsia  melatonin 3 milliGRAM(s) Oral at bedtime PRN Insomnia

## 2021-12-04 NOTE — PROGRESS NOTE ADULT - ASSESSMENT
ASSESSMENT/PLAN  This is a 76 Female with a h/o CVA (no prior deficit), HTN, HLD, IDDM, Dementia, MDD admitted to [facility] on 11/1 for Subacute CVA L Basal Ganglia, Mod-Severe Stenosis Prox R PCA, HTN Urgency, AMS, abnormal speech, and Hypoglycemia.  In ED,  on arrival with some improvement with medications but remains hypertensive.  LKWT 3 days prior to presentation, NIHSS 2, Out of window for TPA. Hospital course significant for UTI- treated with ABT. Patient now with gait Instability, ADL impairments and Functional impairments.    Patient medically stable for discharge to home today.  Discharge medications and instructions reviewed with patient's  yesterday.  All questions answered.     # CVA  - Subacute CVA L Basal Ganglia  - Mod-Severe Stenosis Prox R PCA  - cont Comprehensive Rehab Program with home care therapy  - c/w ASA, high dose statin: lipitor    #L shoulder pain--likely RTC tendonitis  - Lidocaine patch in AM    #NPH  - OP f/u with NSGY    #HTN  --SBP goal 120-150s  - Procardia XL  - Lisinopril 40 mg daily  - hydralazine 10 mg BID  bp controlled    #HLD  - on Lipitor  - Fenofibrate 145mg daily    #DM II with hyperglycemia  - Management as per hospitalist  --Discharge on Metformin 1000mg BID and Tresiba 10 units.      #Depression  - Sertraline 100mg daily    #Pain management  - Tylenol PRN        cognitive deficits  - Donepezil 5mg daily  - cont. Memantine 10mg BID (12/2)    #GI ppx  - Protonix  - maalox for dyspepsia    #Bowel Regimen  - Senna, miralax PRN    #Bladder management  -voiding with low PVRs      #Dysphagia    - SLP: evaluation and treatment  - s/p MBS: easy chew with thin liquid per speech. 100 % supervision for aspiration precaution    #Precaution  - Fall, Aspiration, Seizure        #IDT 11/30:  - SW: lives in  [address] with  and daughter.  and 2 granddaughters assisted with ADLs. Owns WC, RW, rollator.  - Barriers: retropulsion, coordination, poor balance, poor carryover, cognition  - OT: on track to meet goals at WC level; eating and grooming SV, UBD Kaley, LBD modA, bathing maxA, toilet transfer and shower transfer modA,   --PT: not on track to meet goals; transfers min-modA, ambulation 100' with tomas walker with dorsiflexion assist (for genu recurvatum) min-modA, 4 steps with 1HR mod A. Level of assist fluctuates during the day and on a day to day basis. Would help if 2nd HR can be installed at home.   - SLP: on track; on easy to chew diet, modA cognition, decreased attention. poor problem solving, decreased initiation,  mild-mod receptive deficits, mod dysarthria  - Goals: shower transfers with Kaley o/w SC with ADLs, Kaley with stairs and CG transfers, CG/Kaley with ambulation, SV cognition  - Family training tomorrow 12/1 with  and daughter, Donald.  - EDOD 12/4 home      Outpatient Follow-up (Specialty/Name of physician):    Huan Oquendo; PhD)  Neurology; Vascular Neurology  [address] [facility], [address]  [place] [address]  Phone: [phone]  Fax: [phone]    Alexander Arevalo)  Neurology; Vascular Neurology  [address], [address] [place] [address]  Phone: [phone]  Fax: [phone]

## 2021-12-04 NOTE — PROGRESS NOTE ADULT - ASSESSMENT
76 Female with a h/o CVA (no prior deficit), HTN, HLD, IDDM, Dementia, MDD admitted to [facility] on 11/1 for Subacute CVA L Basal Ganglia, Mod-Severe Stenosis Prox R PCA, HTN Urgency, AMS, abnormal speech, and Hypoglycemia.  In ED,  on arrival with some improvement with medications but remains hypertensive.  LKWT 3 days prior to presentation, NIHSS 2, Out of window for TPA. Hospital course significant for UTI- treated with ABT. Patient now with gait Instability, ADL impairments and Functional impairments.    # CVA  - Subacute CVA L Basal Ganglia  - Mod-Severe Stenosis Prox R PCA  - continue Comprehensive Rehab Program: PT/OT/ST   - c/a ASA 81mg daily, lipitor 80mg qhs    #NPH  - OP f/u with NSGY    #HTN  - Procardia XL 30mg daily  - Lisinopril 40 mg daily  - hydralazine 10 mg BID    #HLD  - on Lipitor  - Fenofibrate 145mg daily    #DM II  - HA1c 7.6  - Continue Lantus 15units qhs + Admelog 5units premeal + Metformin 1000mg BID + ISS and FS    #Depression  - Sertraline 100mg daily    #Dementia  - Donepezil 5mg daily  - Memantine 5mg BID    #UTI proteus miribalis   - S/p augmentin    #DVT ppx  - Heparin

## 2021-12-04 NOTE — PROGRESS NOTE ADULT - SUBJECTIVE AND OBJECTIVE BOX
Patient is a 76y old  Female who presents with a chief complaint of left basal ganglia CVA with very mild right lower extremity weakness (03 Dec 2021 11:04)      Patient seen and examined at bedside.  Denies chest pain, dyspnea, abd pain.    ALLERGIES:  No Known Allergies    MEDICATIONS  (STANDING):  aspirin enteric coated 81 milliGRAM(s) Oral daily  atorvastatin 80 milliGRAM(s) Oral at bedtime  cyanocobalamin 1000 MICROGram(s) Oral daily  dextrose 40% Gel 15 Gram(s) Oral once  dextrose 5%. 1000 milliLiter(s) (50 mL/Hr) IV Continuous <Continuous>  dextrose 5%. 1000 milliLiter(s) (100 mL/Hr) IV Continuous <Continuous>  dextrose 50% Injectable 25 Gram(s) IV Push once  dextrose 50% Injectable 25 Gram(s) IV Push once  dextrose 50% Injectable 12.5 Gram(s) IV Push once  donepezil 5 milliGRAM(s) Oral at bedtime  famotidine    Tablet 20 milliGRAM(s) Oral daily  fenofibrate Tablet 145 milliGRAM(s) Oral daily  glucagon  Injectable 1 milliGRAM(s) IntraMuscular once  heparin   Injectable 5000 Unit(s) SubCutaneous every 8 hours  hydrALAZINE 10 milliGRAM(s) Oral two times a day  influenza  Vaccine (HIGH DOSE) 0.7 milliLiter(s) IntraMuscular once  insulin glargine Injectable (LANTUS) 15 Unit(s) SubCutaneous at bedtime  insulin lispro (ADMELOG) corrective regimen sliding scale   SubCutaneous three times a day before meals  insulin lispro (ADMELOG) corrective regimen sliding scale   SubCutaneous at bedtime  insulin lispro Injectable (ADMELOG) 5 Unit(s) SubCutaneous three times a day before meals  lidocaine   4% Patch 1 Patch Transdermal <User Schedule>  lisinopril 40 milliGRAM(s) Oral daily  memantine 10 milliGRAM(s) Oral two times a day  metFORMIN 1000 milliGRAM(s) Oral two times a day  NIFEdipine XL 30 milliGRAM(s) Oral daily  saccharomyces boulardii 250 milliGRAM(s) Oral two times a day  sertraline 100 milliGRAM(s) Oral daily  zinc oxide 40% Ointment 1 Application(s) Topical two times a day    MEDICATIONS  (PRN):  acetaminophen     Tablet .. 650 milliGRAM(s) Oral every 6 hours PRN Temp greater or equal to 38C (100.4F), Mild Pain (1 - 3)  aluminum hydroxide/magnesium hydroxide/simethicone Suspension 30 milliLiter(s) Oral every 4 hours PRN Dyspepsia  melatonin 3 milliGRAM(s) Oral at bedtime PRN Insomnia    Vital Signs Last 24 Hrs  T(F): 97.5 (04 Dec 2021 07:35), Max: 97.5 (03 Dec 2021 21:05)  HR: 54 (04 Dec 2021 07:35) (54 - 73)  BP: 128/70 (04 Dec 2021 07:35) (122/64 - 138/63)  RR: 15 (04 Dec 2021 07:35) (15 - 15)  SpO2: 98% (04 Dec 2021 07:35) (97% - 98%)  I&O's Summary    03 Dec 2021 07:01  -  04 Dec 2021 07:00  --------------------------------------------------------  IN: 0 mL / OUT: 250 mL / NET: -250 mL        PHYSICAL EXAM:  General: NAD, A/O x 3  ENT: MMM  Neck: Supple, No JVD  Lungs: Clear to auscultation bilaterally  Cardio: RRR, S1/S2, No murmurs  Abdomen: Soft, Nontender, Nondistended; Bowel sounds present  Extremities: No calf tenderness, No pitting edema    LABS:                        11.5   7.37  )-----------( 300      ( 02 Dec 2021 06:30 )             36.6       12-02    141  |  105  |  31  ----------------------------<  126  4.2   |  27  |  0.92    Ca    9.3      02 Dec 2021 06:30    TPro  6.3  /  Alb  3.1  /  TBili  0.2  /  DBili  x   /  AST  19  /  ALT  23  /  AlkPhos  40  12-02     eGFR if Non African American: 61 mL/min/1.73M2 (12-02-21 @ 06:30)  eGFR if : 71 mL/min/1.73M2 (12-02-21 @ 06:30)             11-02 Chol 248 mg/dL LDL -- HDL 53 mg/dL Trig 151 mg/dL              POCT Blood Glucose.: 136 mg/dL (04 Dec 2021 07:29)  POCT Blood Glucose.: 134 mg/dL (03 Dec 2021 20:53)  POCT Blood Glucose.: 171 mg/dL (03 Dec 2021 16:58)  POCT Blood Glucose.: 159 mg/dL (03 Dec 2021 11:56)          COVID-19 PCR: NotDetec (11-30-21 @ 06:00)  COVID-19 PCR: NotDetec (11-24-21 @ 06:00)  COVID-19 PCR: NotDetec (11-18-21 @ 07:02)  COVID-19 PCR: NotDetec (11-11-21 @ 17:00)  COVID-19 PCR: NotDetec (11-08-21 @ 08:58)      RADIOLOGY & ADDITIONAL TESTS:    Care Discussed with Consultants/Other Providers:

## 2021-12-04 NOTE — PROGRESS NOTE ADULT - REASON FOR ADMISSION
left basal ganglia CVA with very mild right lower extremity weakness
CVA
left basal ganglia CVA with very mild right lower extremity weakness
CVA
CVA
left basal ganglia CVA with very mild right lower extremity weakness

## 2021-12-06 PROBLEM — F32.9 MAJOR DEPRESSIVE DISORDER, SINGLE EPISODE, UNSPECIFIED: Chronic | Status: ACTIVE | Noted: 2021-11-11

## 2021-12-06 PROBLEM — N39.0 URINARY TRACT INFECTION, SITE NOT SPECIFIED: Chronic | Status: ACTIVE | Noted: 2021-11-11

## 2021-12-06 PROBLEM — F41.9 ANXIETY DISORDER, UNSPECIFIED: Chronic | Status: ACTIVE | Noted: 2021-11-11

## 2021-12-14 ENCOUNTER — NON-APPOINTMENT (OUTPATIENT)
Age: 76
End: 2021-12-14

## 2021-12-14 ENCOUNTER — APPOINTMENT (OUTPATIENT)
Dept: INTERNAL MEDICINE | Facility: CLINIC | Age: 76
End: 2021-12-14
Payer: MEDICARE

## 2021-12-14 VITALS
HEIGHT: 59 IN | WEIGHT: 150 LBS | SYSTOLIC BLOOD PRESSURE: 153 MMHG | BODY MASS INDEX: 30.24 KG/M2 | DIASTOLIC BLOOD PRESSURE: 75 MMHG | RESPIRATION RATE: 14 BRPM | OXYGEN SATURATION: 99 % | TEMPERATURE: 95.7 F | HEART RATE: 66 BPM

## 2021-12-14 DIAGNOSIS — Z12.39 ENCOUNTER FOR OTHER SCREENING FOR MALIGNANT NEOPLASM OF BREAST: ICD-10-CM

## 2021-12-14 DIAGNOSIS — Z00.00 ENCOUNTER FOR GENERAL ADULT MEDICAL EXAMINATION W/OUT ABNORMAL FINDINGS: ICD-10-CM

## 2021-12-14 DIAGNOSIS — K21.9 GASTRO-ESOPHAGEAL REFLUX DISEASE W/OUT ESOPHAGITIS: ICD-10-CM

## 2021-12-14 DIAGNOSIS — Z78.0 ASYMPTOMATIC MENOPAUSAL STATE: ICD-10-CM

## 2021-12-14 PROCEDURE — 82962 GLUCOSE BLOOD TEST: CPT

## 2021-12-14 PROCEDURE — 93000 ELECTROCARDIOGRAM COMPLETE: CPT

## 2021-12-14 PROCEDURE — G0008: CPT

## 2021-12-14 PROCEDURE — 36415 COLL VENOUS BLD VENIPUNCTURE: CPT

## 2021-12-14 PROCEDURE — 90662 IIV NO PRSV INCREASED AG IM: CPT

## 2021-12-14 PROCEDURE — G0438: CPT

## 2021-12-14 RX ORDER — CHROMIUM 200 MCG
TABLET ORAL
Refills: 0 | Status: DISCONTINUED | COMMUNITY
End: 2021-12-14

## 2021-12-14 RX ORDER — QUINAPRIL HYDROCHLORIDE 40 MG/1
40 TABLET, FILM COATED ORAL DAILY
Qty: 90 | Refills: 0 | Status: DISCONTINUED | COMMUNITY
End: 2021-12-14

## 2021-12-14 RX ORDER — INSULIN ASPART 100 [IU]/ML
100 INJECTION, SOLUTION INTRAVENOUS; SUBCUTANEOUS
Qty: 18 | Refills: 0 | Status: DISCONTINUED | COMMUNITY
Start: 2019-05-30 | End: 2021-12-14

## 2021-12-14 RX ORDER — CYANOCOBALAMIN (VITAMIN B-12)
POWDER (GRAM) MISCELLANEOUS
Refills: 0 | Status: ACTIVE | COMMUNITY
Start: 2021-12-14

## 2021-12-14 RX ORDER — EZETIMIBE AND SIMVASTATIN 10; 20 MG/1; MG/1
10-20 TABLET ORAL DAILY
Qty: 90 | Refills: 0 | Status: DISCONTINUED | COMMUNITY
End: 2021-12-14

## 2021-12-14 RX ORDER — METFORMIN HYDROCHLORIDE 500 MG/1
500 TABLET, FILM COATED ORAL
Refills: 0 | Status: ACTIVE | COMMUNITY

## 2021-12-15 ENCOUNTER — NON-APPOINTMENT (OUTPATIENT)
Age: 76
End: 2021-12-15

## 2021-12-15 ENCOUNTER — APPOINTMENT (OUTPATIENT)
Dept: NEUROLOGY | Facility: CLINIC | Age: 76
End: 2021-12-15
Payer: MEDICARE

## 2021-12-15 VITALS
HEART RATE: 65 BPM | DIASTOLIC BLOOD PRESSURE: 71 MMHG | SYSTOLIC BLOOD PRESSURE: 120 MMHG | OXYGEN SATURATION: 96 % | TEMPERATURE: 97.4 F

## 2021-12-15 DIAGNOSIS — Z86.73 PERSONAL HISTORY OF TRANSIENT ISCHEMIC ATTACK (TIA), AND CEREBRAL INFARCTION W/OUT RESIDUAL DEFICITS: ICD-10-CM

## 2021-12-15 PROCEDURE — 99215 OFFICE O/P EST HI 40 MIN: CPT

## 2021-12-15 NOTE — PHYSICAL EXAM
[FreeTextEntry1] : GENERAL PHYSICAL EXAM:\par GEN: no distress, normal affect\par HEENT: NCAT, OP clear\par EYES: sclera white, conjunctiva clear, no nystagmus\par NECK: supple\par CV: normal rhythm\par PULM: no respiratory distress, normal rhythm and effort\par EXT: no edema, no cyanosis\par MSK: muscle tone and strength normal\par SKIN: warm, dry, no rash or lesion on exposed skin \par \par NEUROLOGICAL EXAM:\par Mental Status\par Orientation: alert and oriented to person, place, time, and situation, 3/3 recall after 5 minutes\par Language: clear and fluent, intact comprehension and repetition, intact naming and reading\par \par Cranial Nerves\par II: visual fields full to confrontation \par III, IV, VI: PERRL, EOMI\par V, VII: facial sensation and movement intact and symmetric \par VIII: hearing intact \par IX, X: uvula midline, soft palate elevates normally \par XI: BL shoulder shrug intact \par XII: tongue midline\par \par Motor\par Shoulder abd: 5 (R), 5 (L)\par EF/EE: 5 (R), 5 (L)\par hand : 5 (R), 5 (L)\par HF/HE: 5 (R), 5 (L)\par KF/KE: 5 (R), 5 (L)\par DF/PF: 5 (R), 5 (L) \par Tone and bulk are normal in upper and lower limbs\par No pronator drift\par \par Sensation\par Intact to light touch in all 4 EXTs\par \par Reflex\par 2+ in BL biceps, brachioradialis, patella\par \par Coordination\par Mild dysmetria noted on the right\par Unable to perform rapid, alternating movements\par \par Gait\par In wheelchair\par Deferred

## 2021-12-15 NOTE — REVIEW OF SYSTEMS
[Confused or Disoriented] : confusion [Memory Lapses or Loss] : memory loss [Decr. Concentrating Ability] : decreased concentrating ability [Difficulty Walking] : difficulty walking [Frequent Falls] : frequent falls [Fever] : no fever [Chills] : no chills [Difficulty with Language] : no ~M difficulty with language [Facial Weakness] : no facial weakness [Arm Weakness] : no arm weakness [Hand Weakness] : no hand weakness [Leg Weakness] : no leg weakness [Numbness] : no numbness [Tingling] : no tingling [Seizures] : no convulsions [Dizziness] : no dizziness [Fainting] : no fainting [Lightheadedness] : no lightheadedness [Anxiety] : no anxiety [Depression] : no depression [Eye Pain] : no eye pain [Red Eyes] : eyes not red [Earache] : no earache [Loss Of Hearing] : no hearing loss [Chest Pain] : no chest pain [Palpitations] : no palpitations [Cough] : no cough [SOB on Exertion] : no shortness of breath during exertion [Constipation] : no constipation [Diarrhea] : no diarrhea [Dysuria] : no dysuria [Incontinence] : no incontinence

## 2021-12-15 NOTE — HISTORY OF PRESENT ILLNESS
[FreeTextEntry1] : Ms. Pearson is a 76 year-old woman with PMH stroke, HTN, HLD, IDDM and dementia who presents to the office for post hospital follow-up. She presented to the ED at [facility] on 11/1/21 with c/o difficulty ambulating after holding ASA for several days prior to LP. CTH demonstrated a subacute left thalamo-capsular infarct and numerous chronic lacunar infarcts and extensive small vessel disease. CTA head demonstrated moderate to severe stenoses of the proximal right PCA and CTA neck showed atherosclerotic changes without hemodynamically significant stenosis. MRI head demonstrated two acute infarcts in the left corona radiata and one acute infarct in the right splenia corpus callosum. She has been evaluated for NPH as an outpatient and is pending a LP. She experiences frequent falls and difficulty walking. She denies interval TIA or stroke-like symptoms.\par \par \par \par

## 2021-12-15 NOTE — DISCUSSION/SUMMARY
[FreeTextEntry1] : Ms. Pearson is a 76 year-old woman with PMH stroke, HTN, HLD, IDDM and dementia who presented to the office for post hospital follow-up. She is 6 weeks post cryptogenic stroke with a small infarct of the right splenium of the corpus callosum and 2 infarcts in the left corona radiata. All imaging reviewed by me. As discussed, these are not due to small vessel disease, as she has been told, because they are multifocal infarcts in different vascular territories. Her TTE showed a normal LA, but given her age and recurrent cryptogenic strokes, she needs to follow-up with her cardiologist and undergo ILR placement to rule out atrial fibrillation. Continue ASA, antihypertensive medications, antidiabetic agents and statin therapy as ordered. Continue physical therapy. Follow-up with me in four months. \par

## 2021-12-16 ENCOUNTER — NON-APPOINTMENT (OUTPATIENT)
Age: 76
End: 2021-12-16

## 2021-12-16 LAB
ALBUMIN SERPL ELPH-MCNC: 4.3 G/DL
ALP BLD-CCNC: 39 U/L
ALT SERPL-CCNC: 20 U/L
ANION GAP SERPL CALC-SCNC: 14 MMOL/L
AST SERPL-CCNC: 15 U/L
BASOPHILS # BLD AUTO: 0.07 K/UL
BASOPHILS NFR BLD AUTO: 0.7 %
BILIRUB SERPL-MCNC: 0.2 MG/DL
BUN SERPL-MCNC: 19 MG/DL
CALCIUM SERPL-MCNC: 10.1 MG/DL
CHLORIDE SERPL-SCNC: 105 MMOL/L
CHOLEST SERPL-MCNC: 102 MG/DL
CO2 SERPL-SCNC: 24 MMOL/L
CREAT SERPL-MCNC: 0.74 MG/DL
EOSINOPHIL # BLD AUTO: 0.26 K/UL
EOSINOPHIL NFR BLD AUTO: 2.7 %
ESTIMATED AVERAGE GLUCOSE: 166 MG/DL
GLUCOSE SERPL-MCNC: 127 MG/DL
HBA1C MFR BLD HPLC: 7.4 %
HCT VFR BLD CALC: 41.2 %
HDLC SERPL-MCNC: 46 MG/DL
HGB BLD-MCNC: 12.8 G/DL
IMM GRANULOCYTES NFR BLD AUTO: 0.4 %
LDLC SERPL CALC-MCNC: 25 MG/DL
LYMPHOCYTES # BLD AUTO: 1.31 K/UL
LYMPHOCYTES NFR BLD AUTO: 13.8 %
MAN DIFF?: NORMAL
MCHC RBC-ENTMCNC: 24.9 PG
MCHC RBC-ENTMCNC: 31.1 GM/DL
MCV RBC AUTO: 80 FL
MONOCYTES # BLD AUTO: 0.74 K/UL
MONOCYTES NFR BLD AUTO: 7.8 %
NEUTROPHILS # BLD AUTO: 7.05 K/UL
NEUTROPHILS NFR BLD AUTO: 74.6 %
NONHDLC SERPL-MCNC: 56 MG/DL
PLATELET # BLD AUTO: 373 K/UL
POTASSIUM SERPL-SCNC: 4.4 MMOL/L
PROT SERPL-MCNC: 6.5 G/DL
RBC # BLD: 5.15 M/UL
RBC # FLD: 19.4 %
SODIUM SERPL-SCNC: 143 MMOL/L
TRIGL SERPL-MCNC: 152 MG/DL
TSH SERPL-ACNC: 1.82 UIU/ML
WBC # FLD AUTO: 9.47 K/UL

## 2021-12-20 ENCOUNTER — NON-APPOINTMENT (OUTPATIENT)
Age: 76
End: 2021-12-20

## 2021-12-22 ENCOUNTER — APPOINTMENT (OUTPATIENT)
Dept: INTERNAL MEDICINE | Facility: CLINIC | Age: 76
End: 2021-12-22
Payer: MEDICARE

## 2021-12-22 VITALS
HEIGHT: 59 IN | RESPIRATION RATE: 15 BRPM | WEIGHT: 150 LBS | SYSTOLIC BLOOD PRESSURE: 161 MMHG | DIASTOLIC BLOOD PRESSURE: 79 MMHG | TEMPERATURE: 97.5 F | OXYGEN SATURATION: 99 % | HEART RATE: 54 BPM | BODY MASS INDEX: 30.24 KG/M2

## 2021-12-22 VITALS — SYSTOLIC BLOOD PRESSURE: 139 MMHG | DIASTOLIC BLOOD PRESSURE: 75 MMHG

## 2021-12-22 DIAGNOSIS — R09.81 NASAL CONGESTION: ICD-10-CM

## 2021-12-22 DIAGNOSIS — Z23 ENCOUNTER FOR IMMUNIZATION: ICD-10-CM

## 2021-12-22 DIAGNOSIS — R19.7 DIARRHEA, UNSPECIFIED: ICD-10-CM

## 2021-12-22 PROBLEM — Z78.0 MENOPAUSE: Status: ACTIVE | Noted: 2021-12-22

## 2021-12-22 PROBLEM — Z12.39 SCREENING FOR BREAST CANCER: Status: ACTIVE | Noted: 2021-12-22

## 2021-12-22 PROCEDURE — 0013A: CPT

## 2021-12-22 PROCEDURE — 99214 OFFICE O/P EST MOD 30 MIN: CPT | Mod: 25

## 2021-12-22 NOTE — HEALTH RISK ASSESSMENT
[Intercurrent hospitalizations] : was admitted to the hospital  [No] : In the past 12 months have you used drugs other than those required for medical reasons? No [Two or more falls in past year] : Patient reported two or more falls in the past year [Assistive Device] : Patient uses an assistive device [Medical reason not done] : Medical reason not done [HIV test declined] : HIV test declined [Hepatitis C test declined] : Hepatitis C test declined [Change in mental status noted] : Change in mental status noted [Language] : difficulty with language [Behavior] : difficulty with behavior [Learning/Retaining New Information] : difficulty learning/retaining new information [Handling Complex Tasks] : difficulty handling complex tasks [Reasoning] : difficulty with reasoning [Spatial Ability and Orientation] : difficulty with spatial ability and orientation [None] : None [With Family] : lives with family [Unemployed] : unemployed [] :  [Feels Safe at Home] : Feels safe at home [Full assistance needed] : managing finances [Former] : Former [5-9] : 5-9 [No Retinopathy] : No retinopathy [Patient reported mammogram was normal] : Patient reported mammogram was normal [Patient reported PAP Smear was normal] : Patient reported PAP Smear was normal [Patient reported colonoscopy was abnormal] : Patient reported colonoscopy was abnormal [FreeTextEntry1] : Unable to assess due to non verbal state  [de-identified] : As hpi  [YearQuit] : 35 years [de-identified] : Wheel chair  [de-identified] : Patient not verbal  [EyeExamDate] : 09/2021 [Sexually Active] : not sexually active [MammogramDate] : 2021 [ColonoscopyDate] : 2010 [ColonoscopyComments] : Osteopenia  [With Patient/Caregiver] : , with patient/caregiver [Reviewed updated] : Reviewed, updated [Name: ___] : Health Care Proxy's Name: [unfilled]  [Relationship: ___] : Relationship: [unfilled] [Aggressive treatment] : aggressive treatment [I will adhere to the patient's wishes.] : I will adhere to the patient's wishes. [AdvancecareDate] : 12/22

## 2021-12-22 NOTE — HISTORY OF PRESENT ILLNESS
[FreeTextEntry1] : CPE and establish care  [de-identified] : 76 Female with a h/o CVA (no prior deficit), HTN, HLD, IDDM,\par Dementia, MDD admitted to [facility] on 11/1 for Subacute CVA L Basal Ganglia\par infarct, CTA showedMod-Severe Stenosis Prox R PCA. Pt. with HTN Urgency, and\par Hypoglycemia. left posterior limb IC/CR\par stroke, left frontal and right splenium acute strokes. Pt also diagnosed with possible UTI on admission and treated with IV\par antibiotics, urine cx positive for proteus. HTN meds adjusted,\par bradycardic metoprolol dose decreased, hypoglycemia improved started low dose\par Lantus, high BP meds adjusted added hydralazine. MBS performed and recommended\par easy to chew with thin liquids, with 100% supervision. Patient was evaluated by PM&R and therapy for functional deficits, gait/ADL\par impairments and acute rehabilitation was recommended. Patient was medically\par optimized for discharge to [facility] IRU on 11/11/21. MR Head No Cont (11.03.21 @ 13:18)\par \par IMPRESSION:\par -Acute infarct involving the left posterior limb internalcapsule/corona\par radiata. Additional smaller punctate acute infarcts involving the left anterior\par frontal corona radiata and right splenium.\par \par -Numerous punctate chronic microhemorrhages, suggestive of underlying amyloid\par angiopathy.\par \par \par CT Angio Head w/ IV Cont (11.01.21 @ 16:15)\par \par IMPRESSION:\par CT head:\par -Interval development of age-indeterminate left basal ganglia lacunar infarct.\par Consider MRI for further evaluation of acuity.\par -Numerous chronic lacunar infarcts and extensive small vessel disease again\par seen.\par -No acute intracranial hemorrhage.\par \par CT angiogram head:\par -Moderate to severe stenoses of the proximal right PCA.\par \par CT angiogram neck:\par -Atherosclerotic changes without hemodynamically significant stenosis.\par \par Patient is accompanied by  and daughter. They are concerned because at rehab metformin was increased to 1000 bid which causes patient to have bouts of diarrhea and HI upset. She is also on tresiba 20 units daily.  states her BS have been around 140 at home. \par Patient is not verbal, incontinent and with minimal ambulation.

## 2021-12-22 NOTE — PHYSICAL EXAM
[Normal] : affect was normal and insight and judgment were intact [de-identified] : non verbal, lower extremity weakness

## 2021-12-22 NOTE — ASSESSMENT
[FreeTextEntry1] : 75 yo F PMHx multiple subacute CVA's with rapid decline since Aug. As per  patient was ambulating with walker and taking before Aug. Patient is being eval for NPH by neurosx was suppose to get LP but patient hospitalized for subacute CVA\par HCM \par Flu vaccine administered today \par Family members unsure of last colonoscopy, mammogram, pap smear, they state they have been normal in the past and patient past age of screening \par Family members signed record release, will attempt to obtain records ?tdap ?pneumovax \par \par CVA \par CTA showedMod-Severe Stenosis Prox R PCA.\par Poor surgical candidate \par Fu neuro \par Cont statin, fenofibrate goal ldl <70 \par \par Uncontrolled htn, resistant htn  \par Cont nifedipine, hydralazine, lisinopril\par resume metoprolol 25 mg\par fu 2 weeks  \par \par DM2, controlled \par Con metformin, tresiba \par as per endo \par \par Dementia \par Fu neuro for eval NPH \par Cont donepezil, memantine \par \par Chronic depression \par cont setraline \par \par

## 2021-12-23 PROBLEM — Z23 ENCOUNTER FOR IMMUNIZATION: Status: RESOLVED | Noted: 2021-12-14 | Resolved: 2021-12-23

## 2021-12-23 PROBLEM — R19.7 DIARRHEA: Status: ACTIVE | Noted: 2021-12-23

## 2021-12-23 NOTE — REVIEW OF SYSTEMS
[Fever] : no fever [Chills] : no chills [Discharge] : no discharge [Redness] : no redness [Dryness] : no dryness  [Earache] : earache [Hearing Loss] : no hearing loss [Nasal Discharge] : nasal discharge [FreeTextEntry5] : Unable to obtain- patient not verbal  [FreeTextEntry6] : Unable to obtain- patient not verbal  [FreeTextEntry7] : Unable to obtain- patient not verbal  [FreeTextEntry8] : Unable to obtain- patient not verbal  [FreeTextEntry9] : Unable to obtain- patient not verbal  [de-identified] : Unable to obtain- patient not verbal  [de-identified] : Unable to obtain- patient not verbal  [de-identified] : Unable to obtain- patient not verbal  [de-identified] : Unable to obtain- patient not verbal  [FreeTextEntry1] : Unable to obtain- patient not verbal

## 2021-12-23 NOTE — HISTORY OF PRESENT ILLNESS
[FreeTextEntry1] : Fu diarrhea  [de-identified] : 75 yo F w PMHx HLD, DM2, GERD, HTN, dementia, CVA \par here for follow up diarrhea. During last visit, family members expressed concern about diarrhea. Patient's diabetic regimen was altered in inpatient rehab facility increasing metformin to 500 twice a day which she was not tolerating. They contacted Endo who recommended d/c metformin for 3 days, and then starting at 500 mg once a day.  states she has been having normal BMs since sunday. She has been taking 500 mg metformin since Monday. Patient expresses she has ear and nose congestion. No fevers or chills. They have seen Neuro who has recommended ILR to r/o a fib considering history of multiple CVAs. They have been referred to electrophysiologist. S he has been receiving home PT weekly. Family members understand they have to reposition her every two hours and education abour decub ulcer provided. Skin check was also performed. BP is also controlled and tolerating Metoprolol added during last visit. All of family members concerned have been addressed and may feel free to contact me with any further questions. \par

## 2021-12-23 NOTE — ASSESSMENT
[FreeTextEntry1] : 77 yo F w PMHx HLD, DM2, GERD, HTN, dementia, CVA \par here for follow up diarrhea. During last visit, family members expressed concern about diarrhea. Patient's diabetic regimen was altered in inpatient rehab facility increasing metformin to 500 twice a day which she was not tolerating. They contacted Endo who recommended d/c metformin for 3 days, and then starting at 500 mg once a day.  states she has been having normal BMs since sunday. She has been taking 500 mg metformin since Monday. Patient expresses she has ear and nose congestion. No fevers or chills. They have seen Neuro who has recommended ILR to r/o a fib considering history of multiple CVAs. They have been referred to electrophysiologist. S he has been receiving home PT weekly. Family members understand they have to reposition her every two hours and education abour decub ulcer provided. Skin check was also performed. BP is also controlled and tolerating Metoprolol added during last visit. All of family members concerned have been addressed and may feel free to contact me with any further questions.

## 2022-01-03 ENCOUNTER — APPOINTMENT (OUTPATIENT)
Dept: ELECTROPHYSIOLOGY | Facility: CLINIC | Age: 77
End: 2022-01-03
Payer: MEDICARE

## 2022-01-03 VITALS
OXYGEN SATURATION: 99 % | HEIGHT: 59 IN | SYSTOLIC BLOOD PRESSURE: 155 MMHG | HEART RATE: 58 BPM | RESPIRATION RATE: 14 BRPM | DIASTOLIC BLOOD PRESSURE: 72 MMHG

## 2022-01-03 PROCEDURE — 99205 OFFICE O/P NEW HI 60 MIN: CPT

## 2022-01-03 RX ORDER — INSULIN DEGLUDEC INJECTION 200 U/ML
INJECTION, SOLUTION SUBCUTANEOUS
Refills: 0 | Status: DISCONTINUED | COMMUNITY
End: 2022-01-03

## 2022-01-03 RX ORDER — RITUXIMAB 10 MG/ML
500 INJECTION, SOLUTION INTRAVENOUS
Refills: 0 | Status: DISCONTINUED | COMMUNITY
Start: 2018-03-29 | End: 2022-01-03

## 2022-01-03 RX ORDER — PEN NEEDLE, DIABETIC 31 GX3/16"
31G X 5 MM NEEDLE, DISPOSABLE MISCELLANEOUS
Qty: 100 | Refills: 0 | Status: DISCONTINUED | COMMUNITY
Start: 2018-01-26 | End: 2022-01-03

## 2022-01-03 RX ORDER — FLUTICASONE PROPIONATE 50 UG/1
50 SPRAY, METERED NASAL DAILY
Qty: 1 | Refills: 0 | Status: DISCONTINUED | COMMUNITY
Start: 2021-12-22 | End: 2022-01-03

## 2022-01-03 RX ORDER — PEN NEEDLE, DIABETIC 32GX 5/32"
32G X 4 MM NEEDLE, DISPOSABLE MISCELLANEOUS
Qty: 100 | Refills: 0 | Status: DISCONTINUED | COMMUNITY
Start: 2018-04-05 | End: 2022-01-03

## 2022-01-03 RX ORDER — ERGOCALCIFEROL 1.25 MG/1
1.25 MG CAPSULE, LIQUID FILLED ORAL
Qty: 12 | Refills: 0 | Status: DISCONTINUED | COMMUNITY
Start: 2019-04-28 | End: 2022-01-03

## 2022-01-03 RX ORDER — METFORMIN HYDROCHLORIDE 1000 MG/1
1000 TABLET, COATED ORAL
Qty: 60 | Refills: 0 | Status: DISCONTINUED | COMMUNITY
Start: 2021-12-14 | End: 2022-01-03

## 2022-01-03 NOTE — REASON FOR VISIT
[FreeTextEntry1] : \par \par 76 year old female with a history of HTN, HLD, DM, dementia, and recent admission in November 2021 at [facility] with difficult walking. She was being evaluated for NPH and an LP was pending. A CT scan of the head was performed and showed a subacute left thalamo-capsular infarct and numerous chronic lacunar infarcts and extensive small vessel disease. An MRI of the brain showed two acute infarcts in the left corona radiata and one acute infarct in the right splenia corpus callosum. A CTA of the head showed moderate to severe stenoses of the proximal right PCA and CTA of the neck showed atherosclerotic changes without hemodynamically significant stenosis. Patient has no known history of AF.

## 2022-01-03 NOTE — PHYSICAL EXAM
[Well Nourished] : well nourished [No Acute Distress] : no acute distress [Normal Conjunctiva] : normal conjunctiva [Normal Venous Pressure] : normal venous pressure [Normal S1, S2] : normal S1, S2 [No Murmur] : no murmur [No Rub] : no rub [No Gallop] : no gallop [Clear Lung Fields] : clear lung fields [Good Air Entry] : good air entry [No Respiratory Distress] : no respiratory distress  [Soft] : abdomen soft [Non Tender] : non-tender [Normal Bowel Sounds] : normal bowel sounds [Abnormal Gait] : abnormal gait [No Edema] : no edema [No Rash] : no rash [Moves all extremities] : moves all extremities [Cognitive Impairment] : cognitive impairment

## 2022-01-04 NOTE — HISTORY OF PRESENT ILLNESS
[FreeTextEntry1] : I had the pleasure of seeing Dawn Pearson today for consultation for an ILR implant in the arrhythmia clinic at [facility]. As you well know, she is a pleasant 76 year old woman with a history of HTN, HLD, DM, dementia, and recent admission in November 2021 at [facility] with difficult walking. She was being evaluated for NPH and an LP was pending. A CT scan of the head was performed and showed a subacute left thalamo-capsular infarct and numerous chronic lacunar infarcts and extensive small vessel disease. An MRI of the brain showed two acute infarcts in the left corona radiata and one acute infarct in the right splenia corpus callosum. A CTA of the head showed moderate to severe stenoses of the proximal right PCA and CTA of the neck showed atherosclerotic changes without hemodynamically significant stenosis. Patient has no known history of AF. Patient unable to give a history but we were able to speak with patient's daughter over the phone. Patient had a Ziopatch placed in April 2021 which showed infrequent brief runs of PAT, but no AF seen.

## 2022-01-04 NOTE — CARDIOLOGY SUMMARY
[de-identified] : 1/3/2022:\par SB @ 58bpm [de-identified] : 5/6/2021:\par Hyperdynamic LV systolic function with near cavity obliteration. Subtle DUNG, LELE, and slight increase in LVOT gradient, with a very slight increase with Valsalva. Normal LA. Mild AS.

## 2022-01-13 ENCOUNTER — APPOINTMENT (OUTPATIENT)
Dept: PHYSICAL MEDICINE AND REHAB | Facility: CLINIC | Age: 77
End: 2022-01-13

## 2022-01-19 ENCOUNTER — NON-APPOINTMENT (OUTPATIENT)
Age: 77
End: 2022-01-19

## 2022-01-19 PROCEDURE — 92526 ORAL FUNCTION THERAPY: CPT

## 2022-01-19 PROCEDURE — 92523 SPEECH SOUND LANG COMPREHEN: CPT

## 2022-01-19 PROCEDURE — 97167 OT EVAL HIGH COMPLEX 60 MIN: CPT

## 2022-01-19 PROCEDURE — 82962 GLUCOSE BLOOD TEST: CPT

## 2022-01-19 PROCEDURE — 97530 THERAPEUTIC ACTIVITIES: CPT

## 2022-01-19 PROCEDURE — 85025 COMPLETE CBC W/AUTO DIFF WBC: CPT

## 2022-01-19 PROCEDURE — 36415 COLL VENOUS BLD VENIPUNCTURE: CPT

## 2022-01-19 PROCEDURE — 92507 TX SP LANG VOICE COMM INDIV: CPT

## 2022-01-19 PROCEDURE — 97163 PT EVAL HIGH COMPLEX 45 MIN: CPT

## 2022-01-19 PROCEDURE — U0005: CPT

## 2022-01-19 PROCEDURE — 97535 SELF CARE MNGMENT TRAINING: CPT

## 2022-01-19 PROCEDURE — 97112 NEUROMUSCULAR REEDUCATION: CPT

## 2022-01-19 PROCEDURE — U0003: CPT

## 2022-01-19 PROCEDURE — 92610 EVALUATE SWALLOWING FUNCTION: CPT

## 2022-01-19 PROCEDURE — 85027 COMPLETE CBC AUTOMATED: CPT

## 2022-01-19 PROCEDURE — 97110 THERAPEUTIC EXERCISES: CPT

## 2022-01-19 PROCEDURE — 97116 GAIT TRAINING THERAPY: CPT

## 2022-01-19 PROCEDURE — 80053 COMPREHEN METABOLIC PANEL: CPT

## 2022-01-19 PROCEDURE — 86769 SARS-COV-2 COVID-19 ANTIBODY: CPT

## 2022-01-24 ENCOUNTER — OUTPATIENT (OUTPATIENT)
Dept: OUTPATIENT SERVICES | Facility: HOSPITAL | Age: 77
LOS: 1 days | End: 2022-01-24
Payer: MEDICARE

## 2022-01-24 VITALS
OXYGEN SATURATION: 96 % | DIASTOLIC BLOOD PRESSURE: 64 MMHG | HEART RATE: 62 BPM | SYSTOLIC BLOOD PRESSURE: 189 MMHG | TEMPERATURE: 98 F | RESPIRATION RATE: 17 BRPM

## 2022-01-24 VITALS
RESPIRATION RATE: 18 BRPM | SYSTOLIC BLOOD PRESSURE: 160 MMHG | OXYGEN SATURATION: 97 % | HEART RATE: 60 BPM | DIASTOLIC BLOOD PRESSURE: 55 MMHG

## 2022-01-24 DIAGNOSIS — I63.9 CEREBRAL INFARCTION, UNSPECIFIED: ICD-10-CM

## 2022-01-24 DIAGNOSIS — Z92.89 PERSONAL HISTORY OF OTHER MEDICAL TREATMENT: Chronic | ICD-10-CM

## 2022-01-24 LAB — SARS-COV-2 N GENE NPH QL NAA+PROBE: NOT DETECTED

## 2022-01-24 PROCEDURE — 33285 INSJ SUBQ CAR RHYTHM MNTR: CPT

## 2022-01-24 PROCEDURE — C1764: CPT

## 2022-01-24 NOTE — ASU DISCHARGE PLAN (ADULT/PEDIATRIC) - CARE PROVIDER_API CALL
Jesse Mullen)  Cardiac Electrophysiology; Cardiology  [address] [place] [address]  Phone: [phone]  Fax: ()-  Follow Up Time: Routine

## 2022-01-24 NOTE — ASU DISCHARGE PLAN (ADULT/PEDIATRIC) - NS MD DC FALL RISK RISK
For information on Fall & Injury Prevention, visit: [email or web address] OR  [email or web address] OR  https://www.cdc.gov/steadi/patient.html

## 2022-01-24 NOTE — ASU DISCHARGE PLAN (ADULT/PEDIATRIC) - ASU DC SPECIAL INSTRUCTIONSFT
WOUND CARE:  Do NOT scrub, rub, or pick at your incision site  the glue will wear off in 5-7 days  do not get your incision wet for 3 days   AFTER 3 days you may shower:   - use mild soap and gentle warm stream, pat dry with towel  DO NOT apply lotions, powders, ointment, or perfumes to incision  wear loose clothing around incision for 7 days  f/u appointment as instructed     ACTIVITY:   resume normal activities    Follow heart healthy diet recommended by your doctor, if you smoke STOP SMOKING ( may call [phone] for center of tobacco control if you need assistance)     ***CALL YOUR DOCTOR***  if you experience: fever, chills, body aches, or severe pain, swelling, redness, heat or yellow discharge at incision site  If you are unable to reach your doctor, you may contact Cardiology Office at [facility] at [phone]

## 2022-01-25 ENCOUNTER — NON-APPOINTMENT (OUTPATIENT)
Age: 77
End: 2022-01-25

## 2022-02-07 ENCOUNTER — NON-APPOINTMENT (OUTPATIENT)
Age: 77
End: 2022-02-07

## 2022-02-15 ENCOUNTER — RESULT CHARGE (OUTPATIENT)
Age: 77
End: 2022-02-15

## 2022-02-16 ENCOUNTER — NON-APPOINTMENT (OUTPATIENT)
Age: 77
End: 2022-02-16

## 2022-02-16 ENCOUNTER — APPOINTMENT (OUTPATIENT)
Dept: ELECTROPHYSIOLOGY | Facility: CLINIC | Age: 77
End: 2022-02-16
Payer: MEDICARE

## 2022-02-16 VITALS
SYSTOLIC BLOOD PRESSURE: 154 MMHG | OXYGEN SATURATION: 99 % | HEART RATE: 75 BPM | HEIGHT: 59 IN | DIASTOLIC BLOOD PRESSURE: 69 MMHG

## 2022-02-16 PROCEDURE — 93291 INTERROG DEV EVAL SCRMS IP: CPT

## 2022-02-16 PROCEDURE — 93000 ELECTROCARDIOGRAM COMPLETE: CPT | Mod: 59

## 2022-02-16 PROCEDURE — 99212 OFFICE O/P EST SF 10 MIN: CPT

## 2022-03-02 ENCOUNTER — APPOINTMENT (OUTPATIENT)
Dept: INTERNAL MEDICINE | Facility: CLINIC | Age: 77
End: 2022-03-02
Payer: MEDICARE

## 2022-03-17 ENCOUNTER — RESULT CHARGE (OUTPATIENT)
Age: 77
End: 2022-03-17

## 2022-03-17 ENCOUNTER — APPOINTMENT (OUTPATIENT)
Dept: INTERNAL MEDICINE | Facility: CLINIC | Age: 77
End: 2022-03-17
Payer: MEDICARE

## 2022-03-17 VITALS
HEART RATE: 70 BPM | SYSTOLIC BLOOD PRESSURE: 130 MMHG | DIASTOLIC BLOOD PRESSURE: 70 MMHG | OXYGEN SATURATION: 98 % | RESPIRATION RATE: 14 BRPM | BODY MASS INDEX: 32.11 KG/M2 | TEMPERATURE: 97 F | WEIGHT: 159 LBS

## 2022-03-17 VITALS
BODY MASS INDEX: 30.04 KG/M2 | WEIGHT: 149 LBS | SYSTOLIC BLOOD PRESSURE: 130 MMHG | DIASTOLIC BLOOD PRESSURE: 60 MMHG | HEIGHT: 59 IN

## 2022-03-17 DIAGNOSIS — R26.89 OTHER ABNORMALITIES OF GAIT AND MOBILITY: ICD-10-CM

## 2022-03-17 DIAGNOSIS — R32 UNSPECIFIED URINARY INCONTINENCE: ICD-10-CM

## 2022-03-17 PROCEDURE — 99214 OFFICE O/P EST MOD 30 MIN: CPT | Mod: 25

## 2022-03-17 PROCEDURE — 83036 HEMOGLOBIN GLYCOSYLATED A1C: CPT | Mod: QW

## 2022-03-17 RX ORDER — FENOFIBRATE 145 MG/1
145 TABLET, COATED ORAL DAILY
Qty: 90 | Refills: 0 | Status: DISCONTINUED | COMMUNITY
End: 2022-03-17

## 2022-03-17 RX ORDER — SERTRALINE HYDROCHLORIDE 100 MG/1
100 TABLET, FILM COATED ORAL DAILY
Qty: 90 | Refills: 3 | Status: DISCONTINUED | COMMUNITY
Start: 2021-12-14 | End: 2022-03-17

## 2022-03-17 RX ORDER — DONEPEZIL HYDROCHLORIDE 5 MG/1
5 TABLET ORAL
Qty: 90 | Refills: 0 | Status: DISCONTINUED | COMMUNITY
Start: 2021-12-14 | End: 2022-03-17

## 2022-03-17 NOTE — REVIEW OF SYSTEMS
[Fever] : no fever [Chills] : no chills [Discharge] : no discharge [Redness] : no redness [Dryness] : no dryness  [Earache] : earache [Hearing Loss] : no hearing loss [Nasal Discharge] : nasal discharge [FreeTextEntry5] : Unable to obtain- patient not verbal  [FreeTextEntry6] : Unable to obtain- patient not verbal  [FreeTextEntry7] : Unable to obtain- patient not verbal  [FreeTextEntry8] : Unable to obtain- patient not verbal  [FreeTextEntry9] : Unable to obtain- patient not verbal  [de-identified] : Unable to obtain- patient not verbal  [de-identified] : Unable to obtain- patient not verbal  [de-identified] : Unable to obtain- patient not verbal  [de-identified] : Unable to obtain- patient not verbal  [FreeTextEntry1] : Unable to obtain- patient not verbal

## 2022-03-17 NOTE — HISTORY OF PRESENT ILLNESS
[FreeTextEntry1] : POCT A1C  [de-identified] : 77 yo F w PMHx HLD, DM2, GERD, HTN, dementia, CVA \par Established care with Dr. Mullen and had ILR placed recently in setting of cryptogenic stroke to assess AF - no incidents so far \par Daughter states that she stopped the setraline and donepizil as her mother was "zoned out" and having diarrhea. States that she is more alert and the diarrhea stropped. \par Diabetes is handled by her Endo, taking 30 units of tresiba in the am and 10 units at night (depending on her readings). Metformin 500 qd \par \par

## 2022-03-17 NOTE — ASSESSMENT
[FreeTextEntry1] : 77 yo F w PMHx HLD, DM2, GERD, HTN, dementia, CVA \par Established care with Dr. Mullen and had ILR placed recently in setting of cryptogenic stroke to assess AF - no incidents so far \par Daughter states that she stopped the setraline and donepizil as her mother was "zoned out" and having diarrhea. States that she is more alert and the diarrhea stropped. \par Diabetes is handled by her Endo, taking 30 units of tresiba in the am and 10 units at night (depending on her readings). Metformin 500 qd \par \par HTN \par Well controlled, cont current tx \par Advised low salt diet\par \par HLD \par Educated eating whole grain foods rich in soluble fiber such as oats and Omega 3 rich fish such as salmon, tout, sardines and bean based meals such as kidney beans, chickpeas and lentils. Small protions of nuts. Limit sugars and alcohol.\par Cont statin \par \par Diabetes \par A1C is 8.5, medications are handled by endo, needs optimization of therapy \par Discussed diabetic diet. Carb allowance of around 130-140 g carbs daily. \par Educated about foot hygiene and need to see opthalmology and podiatry yearly.\par \par Balance problems \par PT referral provided \par \par GERD \par Stable \par Cont ppi \par

## 2022-03-17 NOTE — PHYSICAL EXAM
[Normal] : affect was normal and insight and judgment were intact [de-identified] : non verbal, lower extremity weakness

## 2022-04-03 ENCOUNTER — RX RENEWAL (OUTPATIENT)
Age: 77
End: 2022-04-03

## 2022-04-06 ENCOUNTER — APPOINTMENT (OUTPATIENT)
Dept: ELECTROPHYSIOLOGY | Facility: CLINIC | Age: 77
End: 2022-04-06
Payer: MEDICARE

## 2022-04-06 ENCOUNTER — NON-APPOINTMENT (OUTPATIENT)
Age: 77
End: 2022-04-06

## 2022-04-06 PROCEDURE — 93298 REM INTERROG DEV EVAL SCRMS: CPT

## 2022-04-06 PROCEDURE — G2066: CPT

## 2022-05-11 ENCOUNTER — APPOINTMENT (OUTPATIENT)
Dept: ELECTROPHYSIOLOGY | Facility: CLINIC | Age: 77
End: 2022-05-11
Payer: MEDICARE

## 2022-05-11 ENCOUNTER — NON-APPOINTMENT (OUTPATIENT)
Age: 77
End: 2022-05-11

## 2022-05-11 PROCEDURE — 93298 REM INTERROG DEV EVAL SCRMS: CPT

## 2022-05-11 PROCEDURE — G2066: CPT

## 2022-06-14 ENCOUNTER — NON-APPOINTMENT (OUTPATIENT)
Age: 77
End: 2022-06-14

## 2022-06-14 ENCOUNTER — APPOINTMENT (OUTPATIENT)
Dept: ELECTROPHYSIOLOGY | Facility: CLINIC | Age: 77
End: 2022-06-14
Payer: MEDICARE

## 2022-06-14 PROCEDURE — 93298 REM INTERROG DEV EVAL SCRMS: CPT

## 2022-06-14 PROCEDURE — G2066: CPT

## 2022-06-22 ENCOUNTER — APPOINTMENT (OUTPATIENT)
Dept: INTERNAL MEDICINE | Facility: CLINIC | Age: 77
End: 2022-06-22
Payer: MEDICARE

## 2022-06-22 VITALS
RESPIRATION RATE: 14 BRPM | HEIGHT: 59 IN | TEMPERATURE: 96.7 F | SYSTOLIC BLOOD PRESSURE: 185 MMHG | DIASTOLIC BLOOD PRESSURE: 66 MMHG | HEART RATE: 59 BPM | OXYGEN SATURATION: 97 %

## 2022-06-22 VITALS — DIASTOLIC BLOOD PRESSURE: 80 MMHG | SYSTOLIC BLOOD PRESSURE: 140 MMHG

## 2022-06-22 DIAGNOSIS — J30.9 ALLERGIC RHINITIS, UNSPECIFIED: ICD-10-CM

## 2022-06-22 PROCEDURE — 36415 COLL VENOUS BLD VENIPUNCTURE: CPT

## 2022-06-22 PROCEDURE — 99214 OFFICE O/P EST MOD 30 MIN: CPT | Mod: 25

## 2022-06-22 RX ORDER — FLUTICASONE PROPIONATE 50 UG/1
50 SPRAY, METERED NASAL DAILY
Qty: 1 | Refills: 0 | Status: ACTIVE | COMMUNITY
Start: 2022-06-22 | End: 1900-01-01

## 2022-06-22 NOTE — REVIEW OF SYSTEMS
[Earache] : earache [Nasal Discharge] : nasal discharge [Fever] : no fever [Chills] : no chills [Discharge] : no discharge [Redness] : no redness [Dryness] : no dryness  [Hearing Loss] : no hearing loss [FreeTextEntry5] : Unable to obtain- patient not verbal  [FreeTextEntry6] : Unable to obtain- patient not verbal  [FreeTextEntry7] : Unable to obtain- patient not verbal  [FreeTextEntry8] : Unable to obtain- patient not verbal  [FreeTextEntry9] : Unable to obtain- patient not verbal  [de-identified] : Unable to obtain- patient not verbal  [de-identified] : Unable to obtain- patient not verbal  [de-identified] : Unable to obtain- patient not verbal  [de-identified] : Unable to obtain- patient not verbal  [FreeTextEntry1] : Unable to obtain- patient not verbal

## 2022-06-22 NOTE — ASSESSMENT
[FreeTextEntry1] : 77 yo F w PMHx HLD, DM2, GERD, HTN, dementia, CVA\par T2DM- handled by endocrinologist. Using tresiba 40 u in am and 10 pm \par Establish care with new neurologist- diagnosed with dementia and had MRI. Will be getting results on Monday.  does not remember name of neurologist. \par Has a rheumatologist for RA- gets ?infusions \par No events on loop monitor as per pt \par c/o nasal secretions and coughing \par HTN \par Well controlled, cont current tx \par Advised low salt diet\par \par HLD \par Educated eating whole grain foods rich in soluble fiber such as oats and Omega 3 rich fish such as salmon, tout, sardines and bean based meals such as kidney beans, chickpeas and lentils. Small protions of nuts. Limit sugars and alcohol.\par Cont statin \par \par Diabetes \par medications are handled by endo, needs optimization of therapy \par Discussed diabetic diet. Carb allowance of around 130-140 g carbs daily. \par Educated about foot hygiene and need to see opthalmology and podiatry yearly.\par \par \par GERD \par Stable \par Cont ppi \par \par Allergic rhinitis \par Advised zytrec and flonase \par Record release in order to get information from specialist

## 2022-06-22 NOTE — PHYSICAL EXAM
[Normal] : affect was normal and insight and judgment were intact [de-identified] : non verbal, lower extremity weakness

## 2022-06-22 NOTE — HISTORY OF PRESENT ILLNESS
[FreeTextEntry1] : FU HTN, DEMENTIA, DM2 [de-identified] : 75 yo F w PMHx HLD, DM2, GERD, HTN, dementia, CVA\par T2DM- handled by endocrinologist. Using tresiba 40 u in am and 10 pm \par Establish care with new neurologist- diagnosed with dementia and had MRI. Will be getting results on Monday.  does not remember name of neurologist. \par Has a rheumatologist for RA- gets ?infusions \par No events on loop monitor as per pt \par c/o nasal secretions and coughing \par \par

## 2022-06-23 LAB
ALBUMIN SERPL ELPH-MCNC: 4.3 G/DL
ALP BLD-CCNC: 48 U/L
ALT SERPL-CCNC: 16 U/L
ANION GAP SERPL CALC-SCNC: 18 MMOL/L
AST SERPL-CCNC: 13 U/L
BASOPHILS # BLD AUTO: 0.1 K/UL
BASOPHILS NFR BLD AUTO: 1 %
BILIRUB SERPL-MCNC: <0.2 MG/DL
BUN SERPL-MCNC: 20 MG/DL
CALCIUM SERPL-MCNC: 9.8 MG/DL
CHLORIDE SERPL-SCNC: 108 MMOL/L
CHOLEST SERPL-MCNC: 152 MG/DL
CO2 SERPL-SCNC: 21 MMOL/L
CREAT SERPL-MCNC: 0.87 MG/DL
EGFR: 69 ML/MIN/1.73M2
EOSINOPHIL # BLD AUTO: 0.35 K/UL
EOSINOPHIL NFR BLD AUTO: 3.4 %
ESTIMATED AVERAGE GLUCOSE: 206 MG/DL
GLUCOSE SERPL-MCNC: 173 MG/DL
HBA1C MFR BLD HPLC: 8.8 %
HCT VFR BLD CALC: 40.7 %
HDLC SERPL-MCNC: 47 MG/DL
HGB BLD-MCNC: 12.4 G/DL
IMM GRANULOCYTES NFR BLD AUTO: 0.6 %
LDLC SERPL CALC-MCNC: 67 MG/DL
LYMPHOCYTES # BLD AUTO: 2.08 K/UL
LYMPHOCYTES NFR BLD AUTO: 20.3 %
MAN DIFF?: NORMAL
MCHC RBC-ENTMCNC: 25.3 PG
MCHC RBC-ENTMCNC: 30.5 GM/DL
MCV RBC AUTO: 83.1 FL
MONOCYTES # BLD AUTO: 0.83 K/UL
MONOCYTES NFR BLD AUTO: 8.1 %
NEUTROPHILS # BLD AUTO: 6.82 K/UL
NEUTROPHILS NFR BLD AUTO: 66.6 %
NONHDLC SERPL-MCNC: 105 MG/DL
PLATELET # BLD AUTO: 410 K/UL
POTASSIUM SERPL-SCNC: 4.4 MMOL/L
PROT SERPL-MCNC: 6.5 G/DL
RBC # BLD: 4.9 M/UL
RBC # FLD: 14.6 %
SODIUM SERPL-SCNC: 147 MMOL/L
TRIGL SERPL-MCNC: 187 MG/DL
WBC # FLD AUTO: 10.24 K/UL

## 2022-06-24 ENCOUNTER — NON-APPOINTMENT (OUTPATIENT)
Age: 77
End: 2022-06-24

## 2022-07-19 ENCOUNTER — APPOINTMENT (OUTPATIENT)
Dept: ELECTROPHYSIOLOGY | Facility: CLINIC | Age: 77
End: 2022-07-19

## 2022-07-19 ENCOUNTER — NON-APPOINTMENT (OUTPATIENT)
Age: 77
End: 2022-07-19

## 2022-07-19 PROCEDURE — G2066: CPT

## 2022-07-19 PROCEDURE — 93298 REM INTERROG DEV EVAL SCRMS: CPT

## 2022-08-23 ENCOUNTER — APPOINTMENT (OUTPATIENT)
Dept: ELECTROPHYSIOLOGY | Facility: CLINIC | Age: 77
End: 2022-08-23

## 2022-08-23 ENCOUNTER — NON-APPOINTMENT (OUTPATIENT)
Age: 77
End: 2022-08-23

## 2022-08-23 PROCEDURE — G2066: CPT

## 2022-08-23 PROCEDURE — 93298 REM INTERROG DEV EVAL SCRMS: CPT

## 2022-09-14 ENCOUNTER — APPOINTMENT (OUTPATIENT)
Dept: INTERNAL MEDICINE | Facility: CLINIC | Age: 77
End: 2022-09-14

## 2022-09-14 VITALS
DIASTOLIC BLOOD PRESSURE: 77 MMHG | HEART RATE: 58 BPM | HEIGHT: 59 IN | RESPIRATION RATE: 15 BRPM | OXYGEN SATURATION: 96 % | SYSTOLIC BLOOD PRESSURE: 182 MMHG | TEMPERATURE: 97 F

## 2022-09-14 DIAGNOSIS — Z01.818 ENCOUNTER FOR OTHER PREPROCEDURAL EXAMINATION: ICD-10-CM

## 2022-09-14 DIAGNOSIS — I63.81 OTHER CEREBRAL INFARCTION DUE TO OCCLUSION OR STENOSIS OF SMALL ARTERY: ICD-10-CM

## 2022-09-14 PROCEDURE — 36415 COLL VENOUS BLD VENIPUNCTURE: CPT

## 2022-09-14 PROCEDURE — G0008: CPT

## 2022-09-14 PROCEDURE — 90662 IIV NO PRSV INCREASED AG IM: CPT

## 2022-09-14 PROCEDURE — 99214 OFFICE O/P EST MOD 30 MIN: CPT | Mod: 25

## 2022-09-14 RX ORDER — RITUXIMAB 10 MG/ML
100 INJECTION, SOLUTION INTRAVENOUS
Refills: 0 | Status: ACTIVE | COMMUNITY
Start: 2022-09-14

## 2022-09-15 LAB
ALBUMIN SERPL ELPH-MCNC: 4.3 G/DL
ALP BLD-CCNC: 43 U/L
ALT SERPL-CCNC: 15 U/L
ANION GAP SERPL CALC-SCNC: 13 MMOL/L
AST SERPL-CCNC: 12 U/L
BASOPHILS # BLD AUTO: 0.11 K/UL
BASOPHILS NFR BLD AUTO: 1.1 %
BILIRUB SERPL-MCNC: 0.2 MG/DL
BUN SERPL-MCNC: 21 MG/DL
CALCIUM SERPL-MCNC: 9.9 MG/DL
CHLORIDE SERPL-SCNC: 107 MMOL/L
CO2 SERPL-SCNC: 25 MMOL/L
CREAT SERPL-MCNC: 0.79 MG/DL
EGFR: 78 ML/MIN/1.73M2
EOSINOPHIL # BLD AUTO: 0.37 K/UL
EOSINOPHIL NFR BLD AUTO: 3.6 %
ESTIMATED AVERAGE GLUCOSE: 177 MG/DL
GLUCOSE SERPL-MCNC: 164 MG/DL
HBA1C MFR BLD HPLC: 7.8 %
HCT VFR BLD CALC: 42.2 %
HGB BLD-MCNC: 13.2 G/DL
IMM GRANULOCYTES NFR BLD AUTO: 0.4 %
LYMPHOCYTES # BLD AUTO: 1.78 K/UL
LYMPHOCYTES NFR BLD AUTO: 17.5 %
MAN DIFF?: NORMAL
MCHC RBC-ENTMCNC: 26.4 PG
MCHC RBC-ENTMCNC: 31.3 GM/DL
MCV RBC AUTO: 84.4 FL
MONOCYTES # BLD AUTO: 0.86 K/UL
MONOCYTES NFR BLD AUTO: 8.4 %
NEUTROPHILS # BLD AUTO: 7.04 K/UL
NEUTROPHILS NFR BLD AUTO: 69 %
PLATELET # BLD AUTO: 397 K/UL
POTASSIUM SERPL-SCNC: 4.2 MMOL/L
PROT SERPL-MCNC: 6.4 G/DL
RBC # BLD: 5 M/UL
RBC # FLD: 15.4 %
SODIUM SERPL-SCNC: 145 MMOL/L
TSH SERPL-ACNC: 1.61 UIU/ML
WBC # FLD AUTO: 10.2 K/UL

## 2022-09-16 PROBLEM — I63.81 MULTIPLE LACUNAR INFARCTS: Status: ACTIVE | Noted: 2021-07-01

## 2022-09-16 PROBLEM — Z01.818 PREOP TESTING: Status: ACTIVE | Noted: 2022-01-04

## 2022-09-16 NOTE — PHYSICAL EXAM
[Normal] : affect was normal and insight and judgment were intact [de-identified] : non verbal, lower extremity weakness

## 2022-09-16 NOTE — REVIEW OF SYSTEMS
[Fever] : no fever [Chills] : no chills [Discharge] : no discharge [Redness] : no redness [Dryness] : no dryness  [Earache] : no earache [Hearing Loss] : no hearing loss [Nasal Discharge] : no nasal discharge [FreeTextEntry5] : Unable to obtain- patient not verbal  [FreeTextEntry6] : Unable to obtain- patient not verbal  [FreeTextEntry7] : Unable to obtain- patient not verbal  [FreeTextEntry8] : Unable to obtain- patient not verbal  [FreeTextEntry9] : Unable to obtain- patient not verbal  [de-identified] : Unable to obtain- patient not verbal  [de-identified] : Unable to obtain- patient not verbal  [de-identified] : Unable to obtain- patient not verbal  [de-identified] : Unable to obtain- patient not verbal  [FreeTextEntry1] : Unable to obtain- patient not verbal

## 2022-09-16 NOTE — ASSESSMENT
[FreeTextEntry1] : 77 yo F w PMHx HLD, DM2, GERD, HTN, dementia, CVA\par loop recorder has had no events. Has had it in for the past 6 months. \par Has been seeing neuro- Dr. Bob Malloy for progressive neurodegenerative disorder of unknown etiology. \par She is on Rituxan and was started on Neudexta 20 mg for emotional liability but family members stopped medication because she developed diarrhea. \par Currently patient denies any acute complaints. \par \par HLD \par Educated eating whole grain foods rich in soluble fiber such as oats and Omega 3 rich fish such as salmon, tout, sardines and bean based meals such as kidney beans, chickpeas and lentils. Small protions of nuts. Limit sugars and alcohol.\par Cont statin \par \par Diabetes \par medications are handled by endo, needs optimization of therapy \par Discussed diabetic diet. Carb allowance of around 130-140 g carbs daily. \par Educated about foot hygiene and need to see opthalmology and podiatry yearly.\par \par \par GERD \par Stable \par Cont ppi \par \par Allergic rhinitis \par Advised zytrec and flonase \par \par HTN \par BP is high during encounter,  states he check her bp at home and all within normal limits\par Advised low salt diet\par Diet and exercise discussed. 20 % of weight loss associated to better bp control\par Decrease alcohol intake\par Advised to bring logs to next appointment \par COnt current tx \par \par RTO 2 weeks for BP check

## 2022-09-16 NOTE — HISTORY OF PRESENT ILLNESS
[FreeTextEntry1] : hypernatremia, htn, hld [de-identified] : 75 yo F w PMHx HLD, DM2, GERD, HTN, dementia, CVA\par loop recorder has had no events. Has had it in for the past 6 months. \par Has been seeing neuro- Dr. Bob Malloy for progressive neurodegenerative disorder of unknown etiology. \par She is on Rituxan and was started on Neudexta 20 mg for emotional liability but family members stopped medication because she developed diarrhea. \par Currently patient denies any acute complaints.

## 2022-09-27 ENCOUNTER — NON-APPOINTMENT (OUTPATIENT)
Age: 77
End: 2022-09-27

## 2022-09-27 ENCOUNTER — APPOINTMENT (OUTPATIENT)
Dept: ELECTROPHYSIOLOGY | Facility: CLINIC | Age: 77
End: 2022-09-27

## 2022-09-27 PROCEDURE — 93298 REM INTERROG DEV EVAL SCRMS: CPT

## 2022-09-27 PROCEDURE — G2066: CPT

## 2022-09-29 ENCOUNTER — APPOINTMENT (OUTPATIENT)
Dept: INTERNAL MEDICINE | Facility: CLINIC | Age: 77
End: 2022-09-29

## 2022-09-29 VITALS
HEIGHT: 59 IN | BODY MASS INDEX: 32.05 KG/M2 | RESPIRATION RATE: 15 BRPM | DIASTOLIC BLOOD PRESSURE: 60 MMHG | SYSTOLIC BLOOD PRESSURE: 120 MMHG | TEMPERATURE: 97.5 F | OXYGEN SATURATION: 98 % | HEART RATE: 67 BPM | WEIGHT: 159 LBS

## 2022-09-29 PROCEDURE — 99213 OFFICE O/P EST LOW 20 MIN: CPT

## 2022-09-29 NOTE — REVIEW OF SYSTEMS
[Fever] : no fever [Chills] : no chills [Discharge] : no discharge [Redness] : no redness [Dryness] : no dryness  [Earache] : no earache [Hearing Loss] : no hearing loss [Nasal Discharge] : no nasal discharge [FreeTextEntry5] : Unable to obtain- patient not verbal  [FreeTextEntry6] : Unable to obtain- patient not verbal  [FreeTextEntry7] : Unable to obtain- patient not verbal  [FreeTextEntry8] : Unable to obtain- patient not verbal  [FreeTextEntry9] : Unable to obtain- patient not verbal  [de-identified] : Unable to obtain- patient not verbal  [de-identified] : Unable to obtain- patient not verbal  [de-identified] : Unable to obtain- patient not verbal  [de-identified] : Unable to obtain- patient not verbal  [FreeTextEntry1] : Unable to obtain- patient not verbal

## 2022-09-29 NOTE — ASSESSMENT
[FreeTextEntry1] : 75 yo F w PMHx HLD, DM2, GERD, HTN, dementia, CVA\par BP IS DOING MUCH BETTER \par Now has RLE swelling only. \par Denies pain.\par \par Had R ankle XR- tissue swelling no acute fractures \par US venous r/o blood clot \par BP is stable- continue current regimen \par \par \par

## 2022-09-29 NOTE — PHYSICAL EXAM
[Normal] : affect was normal and insight and judgment were intact [de-identified] : + RLE ankle swelling  [de-identified] : non verbal, lower extremity weakness

## 2022-09-29 NOTE — SWALLOW BEDSIDE ASSESSMENT ADULT - MODE OF PRESENTATION
spoon/fed by clinician fed by clinician cup/self fed/fed by clinician Adbry Pregnancy And Lactation Text: It is unknown if this medication will adversely affect pregnancy or breast feeding.

## 2022-09-29 NOTE — HISTORY OF PRESENT ILLNESS
[FreeTextEntry1] : FU BP, LOWER EXTREMITY SWELLING  [de-identified] : 77 yo F w PMHx HLD, DM2, GERD, HTN, dementia, CVA\par BP IS DOING MUCH BETTER \par Now has RLE swelling only. \par Denies pain.

## 2022-10-27 ENCOUNTER — NON-APPOINTMENT (OUTPATIENT)
Age: 77
End: 2022-10-27

## 2022-11-01 ENCOUNTER — APPOINTMENT (OUTPATIENT)
Dept: ELECTROPHYSIOLOGY | Facility: CLINIC | Age: 77
End: 2022-11-01

## 2022-11-01 ENCOUNTER — NON-APPOINTMENT (OUTPATIENT)
Age: 77
End: 2022-11-01

## 2022-11-01 PROCEDURE — G2066: CPT

## 2022-11-01 PROCEDURE — 93298 REM INTERROG DEV EVAL SCRMS: CPT

## 2022-11-10 ENCOUNTER — OFFICE (OUTPATIENT)
Dept: URBAN - METROPOLITAN AREA CLINIC 102 | Facility: CLINIC | Age: 77
Setting detail: OPHTHALMOLOGY
End: 2022-11-10

## 2022-11-10 DIAGNOSIS — H90.3: ICD-10-CM

## 2022-11-10 PROCEDURE — 92593 HEARING AID CHECK; BINAURAL: CPT | Performed by: AUDIOLOGIST

## 2022-12-06 ENCOUNTER — NON-APPOINTMENT (OUTPATIENT)
Age: 77
End: 2022-12-06

## 2022-12-06 ENCOUNTER — APPOINTMENT (OUTPATIENT)
Dept: ELECTROPHYSIOLOGY | Facility: CLINIC | Age: 77
End: 2022-12-06

## 2022-12-06 PROCEDURE — 93298 REM INTERROG DEV EVAL SCRMS: CPT

## 2022-12-06 PROCEDURE — G2066: CPT

## 2023-01-02 ENCOUNTER — RX RENEWAL (OUTPATIENT)
Age: 78
End: 2023-01-02

## 2023-01-10 ENCOUNTER — APPOINTMENT (OUTPATIENT)
Dept: ELECTROPHYSIOLOGY | Facility: CLINIC | Age: 78
End: 2023-01-10
Payer: MEDICARE

## 2023-01-10 ENCOUNTER — NON-APPOINTMENT (OUTPATIENT)
Age: 78
End: 2023-01-10

## 2023-01-10 PROCEDURE — 93298 REM INTERROG DEV EVAL SCRMS: CPT

## 2023-01-10 PROCEDURE — G2066: CPT

## 2023-01-19 ENCOUNTER — APPOINTMENT (OUTPATIENT)
Dept: INTERNAL MEDICINE | Facility: CLINIC | Age: 78
End: 2023-01-19
Payer: MEDICARE

## 2023-01-19 VITALS
HEIGHT: 59 IN | HEART RATE: 61 BPM | OXYGEN SATURATION: 98 % | TEMPERATURE: 96.7 F | SYSTOLIC BLOOD PRESSURE: 109 MMHG | DIASTOLIC BLOOD PRESSURE: 68 MMHG | RESPIRATION RATE: 15 BRPM

## 2023-01-19 DIAGNOSIS — F32.A DEPRESSION, UNSPECIFIED: ICD-10-CM

## 2023-01-19 PROCEDURE — 99214 OFFICE O/P EST MOD 30 MIN: CPT

## 2023-01-19 NOTE — ASSESSMENT
[FreeTextEntry1] : 75 yo F w PMHx HLD, DM2, GERD, HTN, dementia, CVA, RA \par Follows up with Dr. Bob Valadez- rheum, gets infusion every 6 months \par Was started on trulicity by her endo. \par Her a1c 8.9\par ILR- No events \par Currently needs 24 hour care. Her  takes care of her but he is also older and has his own health issues. \par Today she is a little more verbal, states she is doing well and denies any acute complaints. \par \par HTN \par Advised low salt diet\par Diet and exercise discussed. 20 % of weight loss associated to better bp control\par Decrease alcohol intake\par Well controlled, cont current tx \par \par HLD \par Educated eating whole grain foods rich in soluble fiber such as oats and Omega 3 rich fish such as salmon, tout, sardines and bean based meals such as kidney beans, chickpeas and lentils. Small protions of nuts. Limit sugars and alcohol. \par Atorvastatin 80 mg qd and fenofibrate \par \par T2DM \par Managed by endo Dr. Abad \par Finally retrieved records, \par a1c 8.9, metformin 500 mg bid and tresiba 50 units at night as per note \par Trulicity was added to regiemn\par Discussed diabetic diet. Carb allowance of around 130-140 g carbs daily. \par Educated about foot hygiene and need to see opthalmology and podiatry yearly.\par \par Would benefit from health aid\par PT referral provided \par \par rto 3 mo \par

## 2023-01-19 NOTE — HISTORY OF PRESENT ILLNESS
[FreeTextEntry1] : HLD, DM2, GERD, HTN, dementia, CVA [de-identified] : 75 yo F w PMHx HLD, DM2, GERD, HTN, dementia, CVA, RA \par Follows up with Dr. Bob Valadez- rheum, gets infusion every 6 months \par Was started on trulicity by her endo. \par Her a1c 8.9\par ILR- No events \par Currently needs 24 hour care. Her  takes care of her but he is also older and has his own health issues. \par Today she is a little more verbal, states she is doing well and denies any acute complaints.

## 2023-01-19 NOTE — PHYSICAL EXAM
[Normal] : affect was normal and insight and judgment were intact [de-identified] : BL ankle swelling  [de-identified] : non verbal, lower extremity weakness

## 2023-02-14 ENCOUNTER — NON-APPOINTMENT (OUTPATIENT)
Age: 78
End: 2023-02-14

## 2023-02-14 ENCOUNTER — APPOINTMENT (OUTPATIENT)
Dept: ELECTROPHYSIOLOGY | Facility: CLINIC | Age: 78
End: 2023-02-14
Payer: MEDICARE

## 2023-02-14 PROCEDURE — 93298 REM INTERROG DEV EVAL SCRMS: CPT

## 2023-02-14 PROCEDURE — G2066: CPT

## 2023-02-23 ENCOUNTER — EMERGENCY (EMERGENCY)
Facility: HOSPITAL | Age: 78
LOS: 1 days | Discharge: HOME | End: 2023-02-23
Attending: EMERGENCY MEDICINE
Payer: MEDICARE

## 2023-02-23 VITALS
HEART RATE: 90 BPM | DIASTOLIC BLOOD PRESSURE: 74 MMHG | TEMPERATURE: 98 F | OXYGEN SATURATION: 96 % | RESPIRATION RATE: 18 BRPM | SYSTOLIC BLOOD PRESSURE: 138 MMHG

## 2023-02-23 DIAGNOSIS — Z92.89 PERSONAL HISTORY OF OTHER MEDICAL TREATMENT: Chronic | ICD-10-CM

## 2023-02-23 LAB
ALBUMIN SERPL ELPH-MCNC: 3.9 G/DL — SIGNIFICANT CHANGE UP (ref 3.3–5.2)
ALP SERPL-CCNC: 45 U/L — SIGNIFICANT CHANGE UP (ref 40–120)
ALT FLD-CCNC: 16 U/L — SIGNIFICANT CHANGE UP
ANION GAP SERPL CALC-SCNC: 12 MMOL/L — SIGNIFICANT CHANGE UP (ref 5–17)
AST SERPL-CCNC: 15 U/L — SIGNIFICANT CHANGE UP
BASOPHILS # BLD AUTO: 0.07 K/UL — SIGNIFICANT CHANGE UP (ref 0–0.2)
BASOPHILS NFR BLD AUTO: 0.5 % — SIGNIFICANT CHANGE UP (ref 0–2)
BILIRUB SERPL-MCNC: 0.2 MG/DL — LOW (ref 0.4–2)
BUN SERPL-MCNC: 23.2 MG/DL — HIGH (ref 8–20)
CALCIUM SERPL-MCNC: 9.5 MG/DL — SIGNIFICANT CHANGE UP (ref 8.4–10.5)
CHLORIDE SERPL-SCNC: 105 MMOL/L — SIGNIFICANT CHANGE UP (ref 96–108)
CO2 SERPL-SCNC: 25 MMOL/L — SIGNIFICANT CHANGE UP (ref 22–29)
CREAT SERPL-MCNC: 0.74 MG/DL — SIGNIFICANT CHANGE UP (ref 0.5–1.3)
EGFR: 84 ML/MIN/1.73M2 — SIGNIFICANT CHANGE UP
EOSINOPHIL # BLD AUTO: 0.29 K/UL — SIGNIFICANT CHANGE UP (ref 0–0.5)
EOSINOPHIL NFR BLD AUTO: 2 % — SIGNIFICANT CHANGE UP (ref 0–6)
FLUAV AG NPH QL: SIGNIFICANT CHANGE UP
FLUBV AG NPH QL: SIGNIFICANT CHANGE UP
GLUCOSE SERPL-MCNC: 258 MG/DL — HIGH (ref 70–99)
HCT VFR BLD CALC: 38.9 % — SIGNIFICANT CHANGE UP (ref 34.5–45)
HGB BLD-MCNC: 12.6 G/DL — SIGNIFICANT CHANGE UP (ref 11.5–15.5)
IMM GRANULOCYTES NFR BLD AUTO: 0.4 % — SIGNIFICANT CHANGE UP (ref 0–0.9)
LIDOCAIN IGE QN: 46 U/L — SIGNIFICANT CHANGE UP (ref 22–51)
LYMPHOCYTES # BLD AUTO: 1.73 K/UL — SIGNIFICANT CHANGE UP (ref 1–3.3)
LYMPHOCYTES # BLD AUTO: 11.8 % — LOW (ref 13–44)
MCHC RBC-ENTMCNC: 26.3 PG — LOW (ref 27–34)
MCHC RBC-ENTMCNC: 32.4 GM/DL — SIGNIFICANT CHANGE UP (ref 32–36)
MCV RBC AUTO: 81.2 FL — SIGNIFICANT CHANGE UP (ref 80–100)
MONOCYTES # BLD AUTO: 0.91 K/UL — HIGH (ref 0–0.9)
MONOCYTES NFR BLD AUTO: 6.2 % — SIGNIFICANT CHANGE UP (ref 2–14)
NEUTROPHILS # BLD AUTO: 11.64 K/UL — HIGH (ref 1.8–7.4)
NEUTROPHILS NFR BLD AUTO: 79.1 % — HIGH (ref 43–77)
PLATELET # BLD AUTO: 403 K/UL — HIGH (ref 150–400)
POTASSIUM SERPL-MCNC: 4.1 MMOL/L — SIGNIFICANT CHANGE UP (ref 3.5–5.3)
POTASSIUM SERPL-SCNC: 4.1 MMOL/L — SIGNIFICANT CHANGE UP (ref 3.5–5.3)
PROT SERPL-MCNC: 6.6 G/DL — SIGNIFICANT CHANGE UP (ref 6.6–8.7)
RBC # BLD: 4.79 M/UL — SIGNIFICANT CHANGE UP (ref 3.8–5.2)
RBC # FLD: 14.3 % — SIGNIFICANT CHANGE UP (ref 10.3–14.5)
RSV RNA NPH QL NAA+NON-PROBE: SIGNIFICANT CHANGE UP
SARS-COV-2 RNA SPEC QL NAA+PROBE: SIGNIFICANT CHANGE UP
SODIUM SERPL-SCNC: 142 MMOL/L — SIGNIFICANT CHANGE UP (ref 135–145)
TROPONIN T SERPL-MCNC: <0.01 NG/ML — SIGNIFICANT CHANGE UP (ref 0–0.06)
WBC # BLD: 14.7 K/UL — HIGH (ref 3.8–10.5)
WBC # FLD AUTO: 14.7 K/UL — HIGH (ref 3.8–10.5)

## 2023-02-23 PROCEDURE — 71045 X-RAY EXAM CHEST 1 VIEW: CPT | Mod: 26

## 2023-02-23 PROCEDURE — 93010 ELECTROCARDIOGRAM REPORT: CPT

## 2023-02-23 PROCEDURE — 74177 CT ABD & PELVIS W/CONTRAST: CPT | Mod: 26,MA

## 2023-02-23 PROCEDURE — 99284 EMERGENCY DEPT VISIT MOD MDM: CPT

## 2023-02-23 RX ORDER — KETOROLAC TROMETHAMINE 30 MG/ML
15 SYRINGE (ML) INJECTION ONCE
Refills: 0 | Status: DISCONTINUED | OUTPATIENT
Start: 2023-02-23 | End: 2023-02-23

## 2023-02-23 RX ORDER — ACETAMINOPHEN 500 MG
1000 TABLET ORAL ONCE
Refills: 0 | Status: COMPLETED | OUTPATIENT
Start: 2023-02-23 | End: 2023-02-23

## 2023-02-23 RX ORDER — ONDANSETRON 8 MG/1
4 TABLET, FILM COATED ORAL ONCE
Refills: 0 | Status: COMPLETED | OUTPATIENT
Start: 2023-02-23 | End: 2023-02-23

## 2023-02-23 RX ORDER — ASPIRIN/CALCIUM CARB/MAGNESIUM 324 MG
324 TABLET ORAL ONCE
Refills: 0 | Status: COMPLETED | OUTPATIENT
Start: 2023-02-23 | End: 2023-02-23

## 2023-02-23 RX ADMIN — Medication 15 MILLIGRAM(S): at 20:59

## 2023-02-23 RX ADMIN — Medication 15 MILLIGRAM(S): at 22:00

## 2023-02-23 RX ADMIN — Medication 400 MILLIGRAM(S): at 22:10

## 2023-02-23 RX ADMIN — Medication 324 MILLIGRAM(S): at 22:11

## 2023-02-23 RX ADMIN — ONDANSETRON 4 MILLIGRAM(S): 8 TABLET, FILM COATED ORAL at 20:59

## 2023-02-23 RX ADMIN — Medication 1000 MILLIGRAM(S): at 23:00

## 2023-02-23 NOTE — ED ADULT TRIAGE NOTE - CHIEF COMPLAINT QUOTE
patient with abdominal pain, nausea and decreased appetite x "a few days." denies fevers, vomiting, diarrhea.

## 2023-02-23 NOTE — ED PROVIDER NOTE - NS ED ROS FT
Constitutional: no fever, no sweats, and no chills.  CV: no chest pain, no edema.  Resp: no cough, no dyspnea  GI: +abdominal pain, +nausea and no vomiting.  MSK: no msk pain, no weakness  Neuro: no LOC, no headache, no dizziness  ROS otherwise negative except as noted in HPI.

## 2023-02-23 NOTE — ED PROVIDER NOTE - PATIENT PORTAL LINK FT
You can access the FollowMyHealth Patient Portal offered by [facility] by registering at the following website: [email or web address]. By joining [email or web address]’s FollowMyHealth portal, you will also be able to view your health information using other applications (apps) compatible with our system.

## 2023-02-23 NOTE — ED PROVIDER NOTE - ATTENDING CONTRIBUTION TO CARE
I, Radha Sanders, have personally seen and examined this patient. I have fully participated in the care of this patient. I have reviewed all pertinent clinical information, including history, physical exam, plan and the Resident's note and agree except as noted below.        78yo F with HTN, HLD, DM, LE edema CVA nonverbal with intermittent chest vs upper abd pain for 2 days. more tired than normal and intermittent SOB. pain free at this time. PE- nonverbal moving extremities equaly, no reproducible abd pain   labs notable for leukocytosis, CT with concern for potential cholecystitis but normal transaminitis pending Ultrasound   EKG abnormality with elevation in aVR and V1 with lateral depressions, trop negative, cards consult pending.     Patient signed out to Dr. Valencia pending results of testing and reassessment.  All decisions regarding the progression of care will be made at their discretion.

## 2023-02-23 NOTE — ED PROVIDER NOTE - OBJECTIVE STATEMENT
78yo F w/pmh HTN, HLD, DM, LE edema on furosemide 20mg, CVA presents for epigastric abdominal pain and nausea x2d. Patient is minimally verbal at baseline, follows commands, answers questions shaking her head yes/no. Acting at baseline per daughter. Denies fever, CP, SOB, vomiting.

## 2023-02-23 NOTE — ED PROVIDER NOTE - NSICDXPASTMEDICALHX_GEN_ALL_CORE_FT
PAST MEDICAL HISTORY:  Anxiety     Dementia     Diabetes mellitus, type 2     History of CVA (cerebrovascular accident)     HLD (hyperlipidemia)     Hypertension     Major depression     Rheumatoid arthritis     UTI (urinary tract infection)

## 2023-02-23 NOTE — ED ADULT NURSE NOTE - OBJECTIVE STATEMENT
Assumed care of pt at 2100. Pt is non-verbal with a history of dementia, the pt's daughter states that the pt has had N/abdominal pain/decreased intake for a few days, the pt denies V/D/CP/SOB/fevers, pt's abdomen is firm/nontender, the pt is resting comfortably showing no signs of respiratory distress or pain, the pt is calm and cooperative Assumed care of pt at 2100. Pt is A&0X2 with a history of dementia, the pt's daughter states that the pt has had N/abdominal pain/decreased intake for a few days, the pt denies V/D/CP/SOB/fevers, pt's abdomen is firm/nontender, the pt is resting comfortably showing no signs of respiratory distress or pain, the pt is calm and cooperative

## 2023-02-23 NOTE — ED PROVIDER NOTE - CLINICAL SUMMARY MEDICAL DECISION MAKING FREE TEXT BOX
76yo F w/pmh HTN, HLD, DM, LE edema on furosemide 20mg, CVA presents for epigastric abdominal pain and nausea x2d. EKG, labs, CXR, CT pending.

## 2023-02-23 NOTE — ED PROVIDER NOTE - NSFOLLOWUPINSTRUCTIONS_ED_ALL_ED_FT
Follow up with your medical doctor next 1-2 days  Pepcid 40 mg once daily  Follow up with GI-call to schedule appt  Return sooner for any problems      Gastritis    Gastritis is soreness and swelling (inflammation) of the lining of the stomach. Gastritis can develop as a sudden onset (acute) or long-term (chronic) condition. If gastritis is not treated, it can lead to stomach bleeding and ulcers. Causes include viral and bacterial infections, excessive alcohol consumption, tobacco use, or certain medications. Symptoms include nausea, vomiting, or abdominal pain or burning especially after eating. Avoid foods or drinks that make your symptoms worse such as caffeine, chocolate, spicy foods, acidic foods, or alcohol.    SEEK IMMEDIATE MEDICAL CARE IF YOU HAVE ANY OF THE FOLLOWING SYMPTOMS: black or bloody stools, blood or coffee-ground-colored vomitus, worsening abdominal pain, fever, or inability to keep fluids down.

## 2023-02-23 NOTE — ED PROVIDER NOTE - PHYSICAL EXAMINATION
General: well appearing, NAD  Head: NC/AT  Cardiac: RRR  Respiratory: CTABL, equal chest wall expansions  Abdomen: soft, ND, +epigastric ttp  Neuro: Alert, follows commands, answers yes/no questions by shaking her head, coordinated movement of all 4 extremities  Psych: calm, cooperative, normal affect  Skin: warm and dry

## 2023-02-23 NOTE — ED PROVIDER NOTE - PROGRESS NOTE DETAILS
EKG with notable t wave inversion ant/lateral and ST-D lateral with elevation in V1/aVr concern for ACS and possible proximal LAD lesions. borderline elevation III without elevation in other inferior leads, rt sided ekg obtained. no MARYA in V4. patient is chest pain free at this time. given ASA. trop negative. cards consult placed. -Marilyn DO Repeat CE neg.  US results  as noted.  Repeat abd exam soft with no palp tenderness.  Pt tolerating ginger ale and crackers and feels well for d/c.  Spoke with pt''s daughter who lives with pt and reviewed instructions.  Rx Pepcid

## 2023-02-24 VITALS
SYSTOLIC BLOOD PRESSURE: 160 MMHG | DIASTOLIC BLOOD PRESSURE: 67 MMHG | HEART RATE: 58 BPM | RESPIRATION RATE: 18 BRPM | OXYGEN SATURATION: 96 %

## 2023-02-24 DIAGNOSIS — R94.31 ABNORMAL ELECTROCARDIOGRAM [ECG] [EKG]: ICD-10-CM

## 2023-02-24 DIAGNOSIS — I10 ESSENTIAL (PRIMARY) HYPERTENSION: ICD-10-CM

## 2023-02-24 DIAGNOSIS — E78.5 HYPERLIPIDEMIA, UNSPECIFIED: ICD-10-CM

## 2023-02-24 LAB
APPEARANCE UR: CLEAR — SIGNIFICANT CHANGE UP
BACTERIA # UR AUTO: ABNORMAL
BILIRUB UR-MCNC: NEGATIVE — SIGNIFICANT CHANGE UP
CK SERPL-CCNC: 61 U/L — SIGNIFICANT CHANGE UP (ref 25–170)
COLOR SPEC: YELLOW — SIGNIFICANT CHANGE UP
DIFF PNL FLD: NEGATIVE — SIGNIFICANT CHANGE UP
EPI CELLS # UR: SIGNIFICANT CHANGE UP
GLUCOSE UR QL: NEGATIVE MG/DL — SIGNIFICANT CHANGE UP
KETONES UR-MCNC: NEGATIVE — SIGNIFICANT CHANGE UP
LEUKOCYTE ESTERASE UR-ACNC: ABNORMAL
NITRITE UR-MCNC: NEGATIVE — SIGNIFICANT CHANGE UP
PH UR: 6.5 — SIGNIFICANT CHANGE UP (ref 5–8)
PROT UR-MCNC: NEGATIVE — SIGNIFICANT CHANGE UP
RBC CASTS # UR COMP ASSIST: SIGNIFICANT CHANGE UP /HPF (ref 0–4)
SP GR SPEC: 1 — LOW (ref 1.01–1.02)
TROPONIN T SERPL-MCNC: <0.01 NG/ML — SIGNIFICANT CHANGE UP (ref 0–0.06)
UROBILINOGEN FLD QL: NEGATIVE MG/DL — SIGNIFICANT CHANGE UP
WBC UR QL: SIGNIFICANT CHANGE UP /HPF (ref 0–5)

## 2023-02-24 PROCEDURE — 93010 ELECTROCARDIOGRAM REPORT: CPT

## 2023-02-24 PROCEDURE — 87637 SARSCOV2&INF A&B&RSV AMP PRB: CPT

## 2023-02-24 PROCEDURE — 76705 ECHO EXAM OF ABDOMEN: CPT | Mod: 26

## 2023-02-24 PROCEDURE — 74177 CT ABD & PELVIS W/CONTRAST: CPT | Mod: MA

## 2023-02-24 PROCEDURE — 83690 ASSAY OF LIPASE: CPT

## 2023-02-24 PROCEDURE — 84484 ASSAY OF TROPONIN QUANT: CPT

## 2023-02-24 PROCEDURE — 80053 COMPREHEN METABOLIC PANEL: CPT

## 2023-02-24 PROCEDURE — 96375 TX/PRO/DX INJ NEW DRUG ADDON: CPT

## 2023-02-24 PROCEDURE — 82550 ASSAY OF CK (CPK): CPT

## 2023-02-24 PROCEDURE — 96374 THER/PROPH/DIAG INJ IV PUSH: CPT | Mod: XU

## 2023-02-24 PROCEDURE — 81001 URINALYSIS AUTO W/SCOPE: CPT

## 2023-02-24 PROCEDURE — 76705 ECHO EXAM OF ABDOMEN: CPT

## 2023-02-24 PROCEDURE — 71045 X-RAY EXAM CHEST 1 VIEW: CPT

## 2023-02-24 PROCEDURE — 85025 COMPLETE CBC W/AUTO DIFF WBC: CPT

## 2023-02-24 PROCEDURE — 99285 EMERGENCY DEPT VISIT HI MDM: CPT | Mod: 25

## 2023-02-24 PROCEDURE — 93005 ELECTROCARDIOGRAM TRACING: CPT

## 2023-02-24 PROCEDURE — 87186 SC STD MICRODIL/AGAR DIL: CPT

## 2023-02-24 PROCEDURE — 87086 URINE CULTURE/COLONY COUNT: CPT

## 2023-02-24 PROCEDURE — 36415 COLL VENOUS BLD VENIPUNCTURE: CPT

## 2023-02-24 RX ORDER — FAMOTIDINE 10 MG/ML
1 INJECTION INTRAVENOUS
Qty: 30 | Refills: 0
Start: 2023-02-24 | End: 2023-03-25

## 2023-02-24 NOTE — ED ADULT NURSE REASSESSMENT NOTE - NS ED NURSE REASSESS COMMENT FT1
Assumed care of pt from previous RN. Pt awake, alert, NAD. Breathing even and unlabored. Per night MD pt stable for discharge. Pt pending transportation.
pt on cardiac monitor in sinus shelia, pt resting comfortably showing no signs of respiratory distress or pain, the pt is calm and cooperative
pt resting comfortably showing no signs of respiratory distress or pain, the pt is calm and cooperative, pt on cardiac monitor in sinus shelia

## 2023-02-24 NOTE — CONSULT NOTE ADULT - NS ATTEND AMEND GEN_ALL_CORE FT
Patient was discharged from the hospital before being seen and evaluated by me in the ER .     Daniela Ortega D.O. [facility]  Cardiology/Vascular Cardiology -[facility]   Telephone # [phone]

## 2023-02-24 NOTE — ED ADULT NURSE REASSESSMENT NOTE - ED CARDIAC RHYTHM
regular Hydroxychloroquine Pregnancy And Lactation Text: This medication has been shown to cause fetal harm but it isn't assigned a Pregnancy Risk Category. There are small amounts excreted in breast milk.

## 2023-02-24 NOTE — CONSULT NOTE ADULT - PROBLEM SELECTOR RECOMMENDATION 3
Lipid panel fasting  Continue Statins. Monitor LFTs  DASH diet      Further recommendations base on above findings

## 2023-02-24 NOTE — CONSULT NOTE ADULT - PROBLEM SELECTOR RECOMMENDATION 9
Pt presented co abdominal pain, nauseas and vomiting. Now asymptomatic. Denies any pain. Pt non- verbal at baseline  EKG shows ......   INCOMPLETE NOTE.........  Trop x 1 negative  Pt received  mg x 1  Pt has a ILR placed 1 year ago. Needs interrogation   Echo from 11.02.21 shows EF >75%. Grade I diastolic dysfunction and Trace mitral valve regurgitation.  Telemonitor  Trend CE. Trend trops x 3 q6. Serial EKGs  A1C, lipid panel fasting, TFTs, sed rate to ro comorbidities   Echo to assess structural and functional status  Maintain K+~4 and mag~2  DASH diet Pt presented co abdominal pain, nauseas and vomiting. Now asymptomatic. Denies any pain. Pt non- verbal at baseline  EKG shows NSR with T waves inversions in the anterior-  lateral leads which is new compared to prior EKGs  Trop x 1 negative  Pt received  mg x 1  Pt has a ILR placed 1 year ago. Needs interrogation   Echo from 11.02.21 shows EF >75%. Grade I diastolic dysfunction and Trace mitral valve regurgitation.  Telemonitor  Trend CE. Trend trops x 3 q6. Serial EKGs  A1C, lipid panel fasting, TFTs, sed rate to ro comorbidities   Echo to assess structural and functional status  Maintain K+~4 and mag~2  DASH diet

## 2023-02-24 NOTE — CONSULT NOTE ADULT - SUBJECTIVE AND OBJECTIVE BOX
[facility]                                              CARDIOLOGY AT [address], [place] [address]                                             Telephone: [phone]. Fax:[phone]      CARDIOLOGY CONSULTATION NOTE                                                                                             History obtained by: Patient, daughter at bedside and medical record  [facility] Cardiologist: Dr Ramires   obtained: No   Reason for Consultation: Abnormal EKG    Chief complaint:   Patient is a 77y old  Female who presents with a chief complaint of abdominal pain  HPI: 76 yo F w/pmh RA, DM, HTN, HLD, DM, LE edema on furosemide 20mg, CVA and ILR (placed 1 years ago) presents for epigastric abdominal pain and nausea x2d. Patient is minimally verbal at baseline, follows commands, answers questions shaking her head yes/no. Acting at baseline per daughter. Denies fever, CP, SOB, vomiting.    CARDIAC TESTING     TTE Echo Complete w/ Contrast w/ Doppler (11.02.21 @ 08:54) >  Summary:   1. Technically adequate study.   2. Left ventricular ejection fraction, by visual estimation, is >75%.   3. Hyperdynamic global left ventricular systolic function.   4. Moderately increased LV wall thickness.   5. Spectral Doppler shows impaired relaxation pattern of left ventricular myocardial filling (Grade I diastolic dysfunction).  6. There is moderate concentric left ventricular hypertrophy.   7. Normal left atrial size.   8. Normal right atrial size.   9. Trace mitral valve regurgitation.  10. There is no evidence of pericardial effusion.  11. There are no prior studies on this patient for comparison purposes.  Daniela Ortega D.O. Electronically signed on 11/2/2021 at 10:27:26 AM  < end of copied text >    PAST MEDICAL HISTORY  Rheumatoid arthritis  Diabetes mellitus, type 2  Hypertension  HLD (hyperlipidemia)  Dementia  History of CVA (cerebrovascular accident)  Major depression  Anxiety  UTI (urinary tract infection)    PAST SURGICAL HISTORY  History of dental surgery    SOCIAL HISTORY: Denies smoking/alcohol/drugs    FAMILY HISTORY:  Mother and aunt has PPM/ICD     Family History of Cardiovascular Disease:  Yes  Coronary Artery Disease in first degree relative: Yes   Sudden Cardiac Death in First degree relative: No     HOME MEDICATIONS:   lidocaine 4% topical film:  topically to left shoulder as needed. (02 Dec 2021 16:51)  melatonin 3 mg oral tablet: 1 tab(s) orally once a day (at bedtime), As needed, Insomnia (02 Dec 2021 16:51)    CURRENT CARDIAC MEDICATIONS:     CURRENT OTHER MEDICATIONS:     ALLERGIES:   No Known Allergies    REVIEW OF SYMPTOMS: Denies pain. Pt non-verbal at baseline    VITAL SIGNS:   T(C): 36.6 (02-23-23 @ 23:21), Max: 36.8 (02-23-23 @ 19:35)  T(F): 97.9 (02-23-23 @ 23:21), Max: 98.3 (02-23-23 @ 19:35)  HR: 59 (02-23-23 @ 23:21) (59 - 90)  BP: 166/81 (02-23-23 @ 23:21) (138/74 - 166/81)  RR: 20 (02-23-23 @ 23:21) (18 - 20)  SpO2: 97% (02-23-23 @ 23:21) (96% - 97%)     PHYSICAL EXAM:   Constitutional: Comfortable . No acute distress.   HEENT: Atraumatic and normocephalic , neck is supple . no JVD.  CNS: + confused (baseline)  No focal deficits.   Respiratory: CTAB, unlabored. No wheezing, No crackles or rhonchi   Cardiovascular: RRR normal s1 s2. No murmur. No rubs or gallop.  Gastrointestinal: Soft, non-tender. +Bowel sounds.   Extremities: 2+ Peripheral Pulses, + trace edema edema b/l  Psychiatric: Calm . no agitation.   Skin: Warm and dry    LABS:   ( 23 Feb 2023 20:44 )  Troponin T  <0.01,  CPK  X    , CKMB  X    , BNP X                     12.6   14.70 )-----------( 403      ( 23 Feb 2023 20:44 )             38.9     02-23    142  |  105  |  23.2<H>  ----------------------------<  258<H>  4.1   |  25.0  |  0.74    Ca    9.5      23 Feb 2023 20:44    TPro  6.6  /  Alb  3.9  /  TBili  0.2<L>  /  DBili  x   /  AST  15  /  ALT  16  /  AlkPhos  45  02-23    ECG:   Prior ECG: Yes     RADIOLOGY & ADDITIONAL STUDIES:   CT Abdomen and Pelvis w/ IV Cont (02.23.23 @ 23:50) >  IMPRESSION:  Several small gallstones in the region of the gallbladder neck/cystic   duct. Mild gallbladder wall thickening. Trace pericholecystic fluid.   Additional small gallstones present. No significant biliary ductal   dilatation. Ultrasound correlation recommended for further evaluation.  < end of copied text >    Preliminary evaluation, please await official recommendations     Assessment and recommendations are final when note is signed by the attending.                                      [facility]                                              CARDIOLOGY AT [address], [place] [address]                                             Telephone: [phone]. Fax:[phone]      CARDIOLOGY CONSULTATION NOTE                                                                                             History obtained by: Patient, daughter at bedside and medical record  [facility] Cardiologist: Dr Ramires   obtained: No   Reason for Consultation: Abnormal EKG    Chief complaint:   Patient is a 77y old  Female who presents with a chief complaint of abdominal pain  HPI: 78 yo F w/pmh RA, DM, HTN, HLD, DM, LE edema on furosemide 20mg, CVA and ILR (placed 1 years ago) presents for epigastric abdominal pain and nausea x2d. Patient is minimally verbal at baseline, follows commands, answers questions shaking her head yes/no. Acting at baseline per daughter. Denies fever, CP, SOB, vomiting.    CARDIAC TESTING     TTE Echo Complete w/ Contrast w/ Doppler (11.02.21 @ 08:54) >  Summary:   1. Technically adequate study.   2. Left ventricular ejection fraction, by visual estimation, is >75%.   3. Hyperdynamic global left ventricular systolic function.   4. Moderately increased LV wall thickness.   5. Spectral Doppler shows impaired relaxation pattern of left ventricular myocardial filling (Grade I diastolic dysfunction).  6. There is moderate concentric left ventricular hypertrophy.   7. Normal left atrial size.   8. Normal right atrial size.   9. Trace mitral valve regurgitation.  10. There is no evidence of pericardial effusion.  11. There are no prior studies on this patient for comparison purposes.  Daniela Ortega D.O. Electronically signed on 11/2/2021 at 10:27:26 AM  < end of copied text >    PAST MEDICAL HISTORY  Rheumatoid arthritis  Diabetes mellitus, type 2  Hypertension  HLD (hyperlipidemia)  Dementia  History of CVA (cerebrovascular accident)  Major depression  Anxiety  UTI (urinary tract infection)    PAST SURGICAL HISTORY  History of dental surgery    SOCIAL HISTORY: Denies smoking/alcohol/drugs    FAMILY HISTORY:  Mother and aunt has PPM/ICD     Family History of Cardiovascular Disease:  Yes  Coronary Artery Disease in first degree relative: Yes   Sudden Cardiac Death in First degree relative: No     HOME MEDICATIONS:   lidocaine 4% topical film:  topically to left shoulder as needed. (02 Dec 2021 16:51)  melatonin 3 mg oral tablet: 1 tab(s) orally once a day (at bedtime), As needed, Insomnia (02 Dec 2021 16:51)    CURRENT CARDIAC MEDICATIONS:     CURRENT OTHER MEDICATIONS:     ALLERGIES:   No Known Allergies    REVIEW OF SYMPTOMS: Denies pain. Pt non-verbal at baseline    VITAL SIGNS:   T(C): 36.6 (02-23-23 @ 23:21), Max: 36.8 (02-23-23 @ 19:35)  T(F): 97.9 (02-23-23 @ 23:21), Max: 98.3 (02-23-23 @ 19:35)  HR: 59 (02-23-23 @ 23:21) (59 - 90)  BP: 166/81 (02-23-23 @ 23:21) (138/74 - 166/81)  RR: 20 (02-23-23 @ 23:21) (18 - 20)  SpO2: 97% (02-23-23 @ 23:21) (96% - 97%)     PHYSICAL EXAM:   Constitutional: Comfortable . No acute distress.   HEENT: Atraumatic and normocephalic , neck is supple . no JVD.  CNS: + confused (baseline)  No focal deficits.   Respiratory: CTAB, unlabored. No wheezing, No crackles or rhonchi   Cardiovascular: RRR normal s1 s2. No murmur. No rubs or gallop.  Gastrointestinal: Soft, non-tender. +Bowel sounds.   Extremities: 2+ Peripheral Pulses, + trace edema edema b/l  Psychiatric: Calm . no agitation.   Skin: Warm and dry    LABS:   ( 23 Feb 2023 20:44 )  Troponin T  <0.01,  CPK  X    , CKMB  X    , BNP X                     12.6   14.70 )-----------( 403      ( 23 Feb 2023 20:44 )             38.9     02-23    142  |  105  |  23.2<H>  ----------------------------<  258<H>  4.1   |  25.0  |  0.74    Ca    9.5      23 Feb 2023 20:44    TPro  6.6  /  Alb  3.9  /  TBili  0.2<L>  /  DBili  x   /  AST  15  /  ALT  16  /  AlkPhos  45  02-23    ECG: NSR with T waves inversions in the anterior-  lateral leads which is new compared to prior EKGs  Prior ECG: Yes     RADIOLOGY & ADDITIONAL STUDIES:   CT Abdomen and Pelvis w/ IV Cont (02.23.23 @ 23:50) >  IMPRESSION:  Several small gallstones in the region of the gallbladder neck/cystic   duct. Mild gallbladder wall thickening. Trace pericholecystic fluid.   Additional small gallstones present. No significant biliary ductal   dilatation. Ultrasound correlation recommended for further evaluation.  < end of copied text >    Preliminary evaluation, please await official recommendations     Assessment and recommendations are final when note is signed by the attending.                                      [facility]                                              CARDIOLOGY AT [address], [place] [address]                                             Telephone: [phone]. Fax:[phone]      CARDIOLOGY CONSULTATION NOTE                                                                                             History obtained by: Patient, daughter at bedside and medical record  [facility] Cardiologist: Dr Cuevas   obtained: No   Reason for Consultation: Abnormal EKG    Chief complaint:   Patient is a 77y old  Female who presents with a chief complaint of abdominal pain  HPI: 76 yo F w/pmh RA, DM, HTN, HLD, DM, LE edema on furosemide 20mg, CVA and ILR (placed 1 years ago) presents for epigastric abdominal pain and nausea x2d. Patient is minimally verbal at baseline, follows commands, answers questions shaking her head yes/no. Acting at baseline per daughter. Denies fever, CP, SOB, vomiting.    CARDIAC TESTING     TTE Echo Complete w/ Contrast w/ Doppler (11.02.21 @ 08:54) >  Summary:   1. Technically adequate study.   2. Left ventricular ejection fraction, by visual estimation, is >75%.   3. Hyperdynamic global left ventricular systolic function.   4. Moderately increased LV wall thickness.   5. Spectral Doppler shows impaired relaxation pattern of left ventricular myocardial filling (Grade I diastolic dysfunction).  6. There is moderate concentric left ventricular hypertrophy.   7. Normal left atrial size.   8. Normal right atrial size.   9. Trace mitral valve regurgitation.  10. There is no evidence of pericardial effusion.  11. There are no prior studies on this patient for comparison purposes.  Daniela Ortega D.O. Electronically signed on 11/2/2021 at 10:27:26 AM  < end of copied text >    PAST MEDICAL HISTORY  Rheumatoid arthritis  Diabetes mellitus, type 2  Hypertension  HLD (hyperlipidemia)  Dementia  History of CVA (cerebrovascular accident)  Major depression  Anxiety  UTI (urinary tract infection)    PAST SURGICAL HISTORY  History of dental surgery    SOCIAL HISTORY: Denies smoking/alcohol/drugs    FAMILY HISTORY:  Mother and aunt has PPM/ICD     Family History of Cardiovascular Disease:  Yes  Coronary Artery Disease in first degree relative: Yes   Sudden Cardiac Death in First degree relative: No     HOME MEDICATIONS:   lidocaine 4% topical film:  topically to left shoulder as needed. (02 Dec 2021 16:51)  melatonin 3 mg oral tablet: 1 tab(s) orally once a day (at bedtime), As needed, Insomnia (02 Dec 2021 16:51)    CURRENT CARDIAC MEDICATIONS:     CURRENT OTHER MEDICATIONS:     ALLERGIES:   No Known Allergies    REVIEW OF SYMPTOMS: Denies pain. Pt non-verbal at baseline    VITAL SIGNS:   T(C): 36.6 (02-23-23 @ 23:21), Max: 36.8 (02-23-23 @ 19:35)  T(F): 97.9 (02-23-23 @ 23:21), Max: 98.3 (02-23-23 @ 19:35)  HR: 59 (02-23-23 @ 23:21) (59 - 90)  BP: 166/81 (02-23-23 @ 23:21) (138/74 - 166/81)  RR: 20 (02-23-23 @ 23:21) (18 - 20)  SpO2: 97% (02-23-23 @ 23:21) (96% - 97%)     PHYSICAL EXAM:   Constitutional: Comfortable . No acute distress.   HEENT: Atraumatic and normocephalic , neck is supple . no JVD.  CNS: + confused (baseline)  No focal deficits.   Respiratory: CTAB, unlabored. No wheezing, No crackles or rhonchi   Cardiovascular: RRR normal s1 s2. No murmur. No rubs or gallop.  Gastrointestinal: Soft, non-tender. +Bowel sounds.   Extremities: 2+ Peripheral Pulses, + trace edema edema b/l  Psychiatric: Calm . no agitation.   Skin: Warm and dry    LABS:   ( 23 Feb 2023 20:44 )  Troponin T  <0.01,  CPK  X    , CKMB  X    , BNP X                     12.6   14.70 )-----------( 403      ( 23 Feb 2023 20:44 )             38.9     02-23    142  |  105  |  23.2<H>  ----------------------------<  258<H>  4.1   |  25.0  |  0.74    Ca    9.5      23 Feb 2023 20:44    TPro  6.6  /  Alb  3.9  /  TBili  0.2<L>  /  DBili  x   /  AST  15  /  ALT  16  /  AlkPhos  45  02-23    ECG: NSR with T waves inversions in the anterior-  lateral leads which is new compared to prior EKGs  Prior ECG: Yes     RADIOLOGY & ADDITIONAL STUDIES:   CT Abdomen and Pelvis w/ IV Cont (02.23.23 @ 23:50) >  IMPRESSION:  Several small gallstones in the region of the gallbladder neck/cystic   duct. Mild gallbladder wall thickening. Trace pericholecystic fluid.   Additional small gallstones present. No significant biliary ductal   dilatation. Ultrasound correlation recommended for further evaluation.  < end of copied text >    Preliminary evaluation, please await official recommendations     Assessment and recommendations are final when note is signed by the attending.

## 2023-03-16 ENCOUNTER — APPOINTMENT (OUTPATIENT)
Dept: CARDIOLOGY | Facility: CLINIC | Age: 78
End: 2023-03-16
Payer: MEDICARE

## 2023-03-16 ENCOUNTER — NON-APPOINTMENT (OUTPATIENT)
Age: 78
End: 2023-03-16

## 2023-03-16 VITALS
SYSTOLIC BLOOD PRESSURE: 160 MMHG | BODY MASS INDEX: 32.05 KG/M2 | HEART RATE: 73 BPM | OXYGEN SATURATION: 98 % | DIASTOLIC BLOOD PRESSURE: 78 MMHG | HEIGHT: 59 IN | WEIGHT: 159 LBS

## 2023-03-16 DIAGNOSIS — M79.89 OTHER SPECIFIED SOFT TISSUE DISORDERS: ICD-10-CM

## 2023-03-16 PROCEDURE — 99214 OFFICE O/P EST MOD 30 MIN: CPT

## 2023-03-16 PROCEDURE — 93000 ELECTROCARDIOGRAM COMPLETE: CPT

## 2023-03-16 NOTE — HISTORY OF PRESENT ILLNESS
[FreeTextEntry1] : 3/16/2023\par Followup visit\par Went to [facility] in Feb\par Told ECG looked different\par here with her daughter\par legs are edematous. \par \par She denies any chest pain \par Echo not done. \par \par Meds:\par aspirin 81\par hydralazine 10mg BID\par Lisinopril 40\par Metformin 500\par Memantine\par Metoprolol 25mg daily \par Nifedipine ER 30\par Fenofibrate\par Atorvatsatin \par Trulcity\par \par \par \par Followup visit 5/27/2021\par \par Metoprolol was increased to 50mg daily \par she feels ok\par Here with her daughter\par Using a walker\par Seeing a neurologist and rheumatologist.  \par Neurologist performed carotid duplex. States all was ok.\par Started on HCTZ by Rheumatologist for LE edema, which has improved since starting the med 2 weeks ago.\par  joined by phone\par \par \par Initial visit:\par 74 Yo F hx DM, HTN. Had been following with outside cardiologist. Here today because since December has been feeling weak, falling, with frequent loss of balance. Went to neurologist who did MRI 2/13/21 multiple small subacute deep white matter infarcts in right and left parietal lobes. Chronic lacunar infarcts in thalami. 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 After finding out about possible strokes Daughter who is also a [facility] wanted evaluation with Dr. Cuevas\par \par Home Meds:\par ASA 81mg qD\par Metformin 500mg BID\par metoprolol succinate 25mg qD\par Quinapril 40mg qD\par Atorvastatin 80 qD\par Sertraline 100mg qD\par Tresiba 20/50\par Memantine 5mg BID\par Donepezil 5mg qHS\par \par \par FH: \par Sister (Roseanna): HOCM\par Daughter (Donald): early MI and aortic disease/dissection

## 2023-03-16 NOTE — PHYSICAL EXAM
[General Appearance - Well Developed] : well developed [Normal Appearance] : normal appearance [General Appearance - Well Nourished] : well nourished [Normal Conjunctiva] : the conjunctiva exhibited no abnormalities [Eyelids - No Xanthelasma] : the eyelids demonstrated no xanthelasmas [Normal Oral Mucosa] : normal oral mucosa [Normal Oropharynx] : normal oropharynx [Normal Jugular Venous A Waves Present] : normal jugular venous A waves present [Normal Jugular Venous V Waves Present] : normal jugular venous V waves present [No Jugular Venous Sun A Waves] : no jugular venous sun A waves [Respiration, Rhythm And Depth] : normal respiratory rhythm and effort [Exaggerated Use Of Accessory Muscles For Inspiration] : no accessory muscle use [Auscultation Breath Sounds / Voice Sounds] : lungs were clear to auscultation bilaterally [Chest Palpation] : palpation of the chest revealed no abnormalities [Heart Rate And Rhythm] : heart rate and rhythm were normal [Heart Sounds] : normal S1 and S2 [Murmurs] : no murmurs present [Arterial Pulses Normal] : the arterial pulses were normal [Edema] : no peripheral edema present [Bowel Sounds] : normal bowel sounds [Abdomen Soft] : soft [Abdomen Tenderness] : non-tender [Abdomen Mass (___ Cm)] : no abdominal mass palpated [Nail Clubbing] : no clubbing of the fingernails [Cyanosis, Localized] : no localized cyanosis [Skin Color & Pigmentation] : normal skin color and pigmentation [] : no rash [Skin Turgor] : normal skin turgor [No Venous Stasis] : no venous stasis [Oriented To Time, Place, And Person] : oriented to person, place, and time [Impaired Insight] : insight and judgment were intact [Mood] : the mood was normal [FreeTextEntry1] : lower ext edema

## 2023-03-16 NOTE — ASSESSMENT
[FreeTextEntry1] : Assessment:\par 1.  Cerebral Infarcts\par 2.  HTN\par 3.  HLD\par 4.  Elevated LVOT gradients\par 5.  LE edema\par \par \par Plan\par 1.  Echocardiogram \par 2.  Continue antiplatelet therapy and statin therapy\par  3. Echo to assess LVOT gradients and to eval leg swelling\par 4.  LE venous duplex. \par 5.  Start lasix - LE edema and HTN today \par 6.  See me in 2 weeks. \par 7.  Rheumatologist should hold off on infusions for now.  \par 8.  Send trop and BNP today

## 2023-03-17 LAB
NT-PROBNP SERPL-MCNC: 137 PG/ML
TROPONIN-T, HIGH SENSITIVITY: 12 NG/L

## 2023-03-21 ENCOUNTER — APPOINTMENT (OUTPATIENT)
Dept: ELECTROPHYSIOLOGY | Facility: CLINIC | Age: 78
End: 2023-03-21
Payer: MEDICARE

## 2023-03-21 ENCOUNTER — NON-APPOINTMENT (OUTPATIENT)
Age: 78
End: 2023-03-21

## 2023-03-21 PROCEDURE — G2066: CPT

## 2023-03-21 PROCEDURE — 93298 REM INTERROG DEV EVAL SCRMS: CPT

## 2023-03-29 ENCOUNTER — APPOINTMENT (OUTPATIENT)
Dept: CARDIOLOGY | Facility: CLINIC | Age: 78
End: 2023-03-29
Payer: MEDICARE

## 2023-03-29 PROCEDURE — 93306 TTE W/DOPPLER COMPLETE: CPT

## 2023-03-29 PROCEDURE — 93970 EXTREMITY STUDY: CPT

## 2023-03-30 ENCOUNTER — APPOINTMENT (OUTPATIENT)
Dept: CARDIOLOGY | Facility: CLINIC | Age: 78
End: 2023-03-30
Payer: MEDICARE

## 2023-03-30 VITALS
BODY MASS INDEX: 32.05 KG/M2 | DIASTOLIC BLOOD PRESSURE: 54 MMHG | SYSTOLIC BLOOD PRESSURE: 140 MMHG | HEART RATE: 69 BPM | WEIGHT: 159 LBS | OXYGEN SATURATION: 98 % | HEIGHT: 59 IN

## 2023-03-30 DIAGNOSIS — I11.9 HYPERTENSIVE HEART DISEASE W/OUT HEART FAILURE: ICD-10-CM

## 2023-03-30 DIAGNOSIS — R94.31 ABNORMAL ELECTROCARDIOGRAM [ECG] [EKG]: ICD-10-CM

## 2023-03-30 PROCEDURE — 99213 OFFICE O/P EST LOW 20 MIN: CPT

## 2023-03-30 NOTE — ASSESSMENT
[FreeTextEntry1] : Assessment:\par 1.  Cerebral Infarcts\par 2.  HTN\par 3.  HLD\par 4.  Elevated LVOT gradients\par 5.  LE edema\par \par \par Plan\par 1.  Echocardiogram  was ok\par 2.  Continue antiplatelet therapy and statin therapy\par  3. No additional cardiac testing \par 4.  loop recorder without events\par 5.  To complete another 2 weeks of lasix , then as needed - most of this is likely dependant edema\par 6.  RTC in 6 months, sooner if any issues\par 7.  Followup Neuro and rheumatology

## 2023-03-30 NOTE — HISTORY OF PRESENT ILLNESS
[FreeTextEntry1] : 3/30/2023\par Remains on lasix\par legs still with some edema\par no chest pain \par no shortness of breath\par echo was done yesterday, no signficant findings.  \par here with her daughter. \par \par \par 3/16/2023\par Followup visit\par Went to [facility] in Feb\par Told ECG looked different\par here with her daughter\par legs are edematous. \par \par She denies any chest pain \par Echo not done. \par \par Meds:\par aspirin 81\par hydralazine 10mg BID\par Lisinopril 40\par Metformin 500\par Memantine\par Metoprolol 25mg daily \par Nifedipine ER 30\par Fenofibrate\par Atorvatsatin \par Trulcity\par \par \par \par Followup visit 5/27/2021\par \par Metoprolol was increased to 50mg daily \par she feels ok\par Here with her daughter\par Using a walker\par Seeing a neurologist and rheumatologist.  \par Neurologist performed carotid duplex. States all was ok.\par Started on HCTZ by Rheumatologist for LE edema, which has improved since starting the med 2 weeks ago.\par  joined by phone\par \par \par Initial visit:\par 74 Yo F hx DM, HTN. Had been following with outside cardiologist. Here today because since December has been feeling weak, falling, with frequent loss of balance. Went to neurologist who did MRI 2/13/21 multiple small subacute deep white matter infarcts in right and left parietal lobes. Chronic lacunar infarcts in thalami. 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 After finding out about possible strokes Daughter who is also a patient at [facility] wanted evaluation with Dr. Cuevas\par \par Home Meds:\par ASA 81mg qD\par Metformin 500mg BID\par metoprolol succinate 25mg qD\par Quinapril 40mg qD\par Atorvastatin 80 qD\par Sertraline 100mg qD\par Tresiba 20/50\par Memantine 5mg BID\par Donepezil 5mg qHS\par \par \par FH: \par Sister (Roseanna): HOCM\par Daughter (Donald): early MI and aortic disease/dissection

## 2023-03-30 NOTE — PHYSICAL EXAM
[General Appearance - Well Developed] : well developed [Normal Appearance] : normal appearance [General Appearance - Well Nourished] : well nourished [Normal Conjunctiva] : the conjunctiva exhibited no abnormalities [Eyelids - No Xanthelasma] : the eyelids demonstrated no xanthelasmas [Normal Oral Mucosa] : normal oral mucosa [Normal Oropharynx] : normal oropharynx [Normal Jugular Venous A Waves Present] : normal jugular venous A waves present [Normal Jugular Venous V Waves Present] : normal jugular venous V waves present [No Jugular Venous Sun A Waves] : no jugular venous sun A waves [Respiration, Rhythm And Depth] : normal respiratory rhythm and effort [Exaggerated Use Of Accessory Muscles For Inspiration] : no accessory muscle use [Auscultation Breath Sounds / Voice Sounds] : lungs were clear to auscultation bilaterally [Chest Palpation] : palpation of the chest revealed no abnormalities [Heart Rate And Rhythm] : heart rate and rhythm were normal [Heart Sounds] : normal S1 and S2 [Murmurs] : no murmurs present [Arterial Pulses Normal] : the arterial pulses were normal [Edema] : no peripheral edema present [Bowel Sounds] : normal bowel sounds [Abdomen Soft] : soft [Abdomen Tenderness] : non-tender [Abdomen Mass (___ Cm)] : no abdominal mass palpated [Nail Clubbing] : no clubbing of the fingernails [Cyanosis, Localized] : no localized cyanosis [FreeTextEntry1] : lower ext edema [Skin Color & Pigmentation] : normal skin color and pigmentation [Skin Turgor] : normal skin turgor [] : no rash [No Venous Stasis] : no venous stasis [Oriented To Time, Place, And Person] : oriented to person, place, and time [Impaired Insight] : insight and judgment were intact [Mood] : the mood was normal

## 2023-04-12 ENCOUNTER — OFFICE (OUTPATIENT)
Dept: URBAN - METROPOLITAN AREA CLINIC 102 | Facility: CLINIC | Age: 78
Setting detail: OPHTHALMOLOGY
End: 2023-04-12
Payer: MEDICARE

## 2023-04-12 DIAGNOSIS — E11.3292: ICD-10-CM

## 2023-04-12 DIAGNOSIS — H34.8322: ICD-10-CM

## 2023-04-12 DIAGNOSIS — Z96.1: ICD-10-CM

## 2023-04-12 DIAGNOSIS — H04.563: ICD-10-CM

## 2023-04-12 DIAGNOSIS — E11.3211: ICD-10-CM

## 2023-04-12 PROCEDURE — 92134 CPTRZ OPH DX IMG PST SGM RTA: CPT | Performed by: OPHTHALMOLOGY

## 2023-04-12 PROCEDURE — 92014 COMPRE OPH EXAM EST PT 1/>: CPT | Performed by: OPHTHALMOLOGY

## 2023-04-12 ASSESSMENT — REFRACTION_CURRENTRX
OD_SPHERE: +2.50
OS_SPHERE: +2.00
OS_AXIS: 85
OD_OVR_VA: 20/
OD_ADD: +2.75
OD_ADD: +2.50
OS_CYLINDER: SPHERE
OD_CYLINDER: -0.75
OS_SPHERE: PLANO
OD_AXIS: 085
OS_CYLINDER: -0.75
OS_CYLINDER: -0.75
OS_OVR_VA: 20/
OS_OVR_VA: 20/
OD_CYLINDER: -0.75
OS_SPHERE: +2.50
OS_AXIS: 0
OD_VPRISM_DIRECTION: BF
OD_OVR_VA: 20/
OS_ADD: +2.50
OD_CYLINDER: SPHERE
OS_AXIS: 115
OS_OVR_VA: 20/
OD_SPHERE: +2.25
OD_OVR_VA: 20/
OS_VPRISM_DIRECTION: SV
OD_SPHERE: +0.25
OD_VPRISM_DIRECTION: SV
OD_AXIS: 120
OS_VPRISM_DIRECTION: BF
OS_ADD: +2.75
OD_AXIS: 0

## 2023-04-12 ASSESSMENT — REFRACTION_MANIFEST
OD_ADD: +2.75
OD_CYLINDER: -0.75
OD_VA1: 20/30
OS_AXIS: 85
OS_VA1: 20/20
OU_VA: 20/20
OS_SPHERE: PLANO
OS_ADD: +2.75
OD_SPHERE: +0.25
OS_CYLINDER: -0.75
OD_AXIS: 120

## 2023-04-12 ASSESSMENT — KERATOMETRY
OS_K1POWER_DIOPTERS: 45.00
OS_K2POWER_DIOPTERS: 45.25
OD_AXISANGLE_DEGREES: 012
OD_K2POWER_DIOPTERS: 45.75
OD_K1POWER_DIOPTERS: 45.00
OS_AXISANGLE_DEGREES: 079
METHOD_AUTO_MANUAL: AUTO

## 2023-04-12 ASSESSMENT — REFRACTION_AUTOREFRACTION
OD_CYLINDER: -0.75
OD_SPHERE: +0.50
OS_CYLINDER: -0.25
OD_AXIS: 104
OS_SPHERE: 0.00
OS_AXIS: 097

## 2023-04-12 ASSESSMENT — VISUAL ACUITY
OD_BCVA: 20/25-1
OS_BCVA: 20/60

## 2023-04-12 ASSESSMENT — CONFRONTATIONAL VISUAL FIELD TEST (CVF)
OD_FINDINGS: FULL
OS_FINDINGS: FULL

## 2023-04-12 ASSESSMENT — AXIALLENGTH_DERIVED
OS_AL: 23.0562
OD_AL: 22.8769
OD_AL: 22.9688

## 2023-04-12 ASSESSMENT — TONOMETRY
OD_IOP_MMHG: 17
OS_IOP_MMHG: 12

## 2023-04-12 ASSESSMENT — SPHEQUIV_DERIVED
OD_SPHEQUIV: -0.125
OD_SPHEQUIV: 0.125
OS_SPHEQUIV: -0.125

## 2023-04-22 NOTE — PHYSICAL THERAPY INITIAL EVALUATION ADULT - LEVEL OF INDEPENDENCE: SIT/SUPINE, REHAB EVAL
Discharge Planner Post-Acute Rehab OT:      Discharge Plan: home with assist and HHOT     Precautions: crani, falls, WBAT RLE, per chart review no formal hip precautions, [place] with no hearing aids     Current Status:  ADLs:    Mobility: CGA with walker with ambulation in BR. Min A for STS with walker    Grooming: SBA with FWW standing at the sink    Dressing: UB: set-up and supervision seated; SBA with LBD tasks with walker and AE. Min A with shoelaces.    Bathing: Min-mod A with W/C<>extended tub bench    Toileting: CGA with transfer to toilet, CGA from toilet to standing with RTS, GB, and FWW. SBA with seated murphy-cares.  IADLs: Reports daughter assist with some IADLs at baseline, will continue to assess  Vision/Cognition: Demonstrated deficits in problem solving t/o and appears self-limiting at times, will continue to monitor and assess     Assessment: Ambulated to/from rehab gym with SBA using FWW with w/c follow to provide sitting rest break PRN. Pt completed without rest break required. Facilitated standing tolerance progression via tabletop activity with pt demonstrating ~10 min tolerance in static standing. Mod fatigue and SOB noted on 2L O2 via NC. Pt demo'd x1 LOB with Th dep tx pt to lower to floor surface via GB following pt opening door mod I and forgetting to release door handle causing her to be pulled to R side. Dep for tx from floor to standing position. Reported no additional pain following Th lowering pt to floor, nursing assessed with no new injury reported by pt. Vitals taken at O2 97%, HR 86, /60 (94). Nursing following up with pt on pain and injury mgmt. Pt ambulating ~150 ft following event with CGA using FWW, prompted to use w/c to complete remainder of distance. STS from w/c CGA with FWW to EOB. Left with all needs met, bed alarmed. Educated pt on importance of releasing door knob to prevent reocurrence.     Other Barriers to Discharge (DME, Family Training, etc):   AE/DME: extended  tub bench, RTS, FWW, reacher, long handled sponge, LHSH, sock aid-no [employer] coverage, daughter emailed list of equipment to purchase  Family Training Sunday 4/23/23: 1:15-3:15 PM OT, PT, SLP   moderate assist (50% patients effort)

## 2023-04-24 ENCOUNTER — NON-APPOINTMENT (OUTPATIENT)
Age: 78
End: 2023-04-24

## 2023-04-25 ENCOUNTER — APPOINTMENT (OUTPATIENT)
Dept: ELECTROPHYSIOLOGY | Facility: CLINIC | Age: 78
End: 2023-04-25
Payer: MEDICARE

## 2023-04-25 PROCEDURE — 93298 REM INTERROG DEV EVAL SCRMS: CPT

## 2023-04-25 PROCEDURE — G2066: CPT

## 2023-04-26 ENCOUNTER — APPOINTMENT (OUTPATIENT)
Dept: INTERNAL MEDICINE | Facility: CLINIC | Age: 78
End: 2023-04-26
Payer: MEDICARE

## 2023-04-26 VITALS
RESPIRATION RATE: 15 BRPM | HEART RATE: 51 BPM | TEMPERATURE: 97.5 F | SYSTOLIC BLOOD PRESSURE: 100 MMHG | DIASTOLIC BLOOD PRESSURE: 70 MMHG | HEIGHT: 59 IN | OXYGEN SATURATION: 98 %

## 2023-04-26 DIAGNOSIS — R29.898 OTHER SYMPTOMS AND SIGNS INVOLVING THE MUSCULOSKELETAL SYSTEM: ICD-10-CM

## 2023-04-26 DIAGNOSIS — K80.20 CALCULUS OF GALLBLADDER W/OUT CHOLECYSTITIS W/OUT OBSTRUCTION: ICD-10-CM

## 2023-04-26 DIAGNOSIS — I51.89 OTHER ILL-DEFINED HEART DISEASES: ICD-10-CM

## 2023-04-26 DIAGNOSIS — G31.84 MILD COGNITIVE IMPAIRMENT, SO STATED: ICD-10-CM

## 2023-04-26 PROCEDURE — 99214 OFFICE O/P EST MOD 30 MIN: CPT

## 2023-04-26 RX ORDER — INSULIN DEGLUDEC INJECTION 200 U/ML
200 INJECTION, SOLUTION SUBCUTANEOUS DAILY
Refills: 0 | Status: ACTIVE | COMMUNITY
Start: 2021-12-14

## 2023-04-26 RX ORDER — FAMOTIDINE 40 MG/1
40 TABLET, FILM COATED ORAL
Qty: 90 | Refills: 0 | Status: ACTIVE | COMMUNITY
Start: 2021-12-14 | End: 1900-01-01

## 2023-04-26 NOTE — HISTORY OF PRESENT ILLNESS
[FreeTextEntry1] :  HLD, DM2, GERD, HTN, dementia, CVA, RA \par  [de-identified] : 77 yo F w PMHx HLD, DM2, GERD, HTN, dementia, CVA, RA \par Follows up with Dr. Bob Valadez- rheum, gets infusion every 6 months \par Patient was started on trulicity, on 40 units long acting insulin - last a1c as per pt 7.2  \par Patient went to the ER about one month ago with severe epigastric pain, she was treating with ppi. US and CT of abdomen showed possibly acute cholecystitis. Symptoms improved with ppi. She denies any other symptoms since increasing famotidine. \par \par otherwise denies any other acute complaints.

## 2023-04-26 NOTE — ASSESSMENT
[FreeTextEntry1] : 77 yo F w PMHx HLD, DM2, GERD, HTN, dementia, CVA, RA \par Follows up with Dr. Bob Valadez- rheum, gets infusion every 6 months \par Patient was started on trulicity, on 40 units long acting insulin - last a1c as per pt 7.2  \par Patient went to the ER about one month ago with severe epigastric pain, she was treating with ppi. US and CT of abdomen showed possibly acute cholecystitis. Symptoms improved with ppi. She denies any other symptoms since increasing famotidine. \par \par otherwise denies any other acute complaints.\par \par Cholelithiasis \par Epigastric pain likely not 2/2 acute cholecystitis. Symptoms have improved with increased dose of famotidine. i have also discussed with patient gerd diet. US showed fluid around gallbladder (inflammation) and multiple stone, with that being said could become a problem in the future. Patient is mostly non verbal and would not be able to explain her symptoms. Will refer to surgery for evaluation. \par \par HTN \par Advised low salt diet\par Diet and exercise discussed. 20 % of weight loss associated to better bp control\par Decrease alcohol intake\par Well controlled, cont current tx \par \par HLD \par Educated eating whole grain foods rich in soluble fiber such as oats and Omega 3 rich fish such as salmon, tout, sardines and bean based meals such as kidney beans, chickpeas and lentils. Small protions of nuts. Limit sugars and alcohol. \par Atorvastatin 80 mg qd and fenofibrate \par \par T2DM \par Managed by endo Dr. Abad \par Finally retrieved records, \par a1c 7.2, metformin 500 mg bid and tresiba 50 units at night as per note \par Trulicity was added to regiemn\par Discussed diabetic diet. Carb allowance of around 130-140 g carbs daily. \par Educated about foot hygiene and need to see opthalmology and podiatry yearly.\par \par Would benefit from health aid\par PT referral provided \par \par rto 3 mo

## 2023-04-26 NOTE — PHYSICAL EXAM
[Normal] : affect was normal and insight and judgment were intact [de-identified] : BL ankle swelling  [de-identified] : non verbal, lower extremity weakness

## 2023-05-11 ENCOUNTER — APPOINTMENT (OUTPATIENT)
Dept: SURGERY | Facility: CLINIC | Age: 78
End: 2023-05-11
Payer: MEDICARE

## 2023-05-11 VITALS
RESPIRATION RATE: 16 BRPM | TEMPERATURE: 98.3 F | SYSTOLIC BLOOD PRESSURE: 131 MMHG | HEART RATE: 75 BPM | OXYGEN SATURATION: 96 % | DIASTOLIC BLOOD PRESSURE: 66 MMHG

## 2023-05-11 DIAGNOSIS — K29.70 GASTRITIS, UNSPECIFIED, W/OUT BLEEDING: ICD-10-CM

## 2023-05-11 PROCEDURE — 99244 OFF/OP CNSLTJ NEW/EST MOD 40: CPT

## 2023-05-11 PROCEDURE — 99204 OFFICE O/P NEW MOD 45 MIN: CPT

## 2023-05-11 NOTE — HISTORY OF PRESENT ILLNESS
[de-identified] : Ms. FORREST is a 77 year old woman with left upper abdominal pain with ED visit in February, referred by Dr. Ortiz for evaluation. History obtained from patient and her daughter. She reports multiple episodes of pain, mostly in left upper quadrant and epigastrum. It is unclear if there is association with meals. Pain is mildly improve with H2 blocker. Denies pain radiating to back. Today, the patient denies pain. Denies fever/chills/nausea/emesis, denies changes in bowel or bladder habits. Tolerating diet, reports normal bowel movements. \par \par Right upper quadrant ultrasound and CT scan reviewed - contracted gallbladder with stones at the neck / distal cystic duct

## 2023-05-11 NOTE — CONSULT LETTER
[Dear  ___] : Dear  [unfilled], [Consult Letter:] : I had the pleasure of evaluating your patient, [unfilled]. [Please see my note below.] : Please see my note below. [Consult Closing:] : Thank you very much for allowing me to participate in the care of this patient.  If you have any questions, please do not hesitate to contact me. [Sincerely,] : Sincerely, [FreeTextEntry3] : Uvaldo Rodriguez MD, FACS\par Minimally Invasive and Bariatric Surgery\par [facility]

## 2023-05-11 NOTE — PLAN
[FreeTextEntry1] : Rx for PPI provided \par Keep food / pain journal\par Return to office in 1 week\par \par The patient understands and agrees that if she experiences pain, fever/chills/nausea/emesis or other symptoms she will contact the office or present to the ED immediately.\par

## 2023-05-11 NOTE — PHYSICAL EXAM
[No Rash or Lesion] : No rash or lesion [Alert] : alert [Calm] : calm [JVD] : no jugular venous distention  [de-identified] : No acute distress [de-identified] : No respiratory distress [de-identified] : Regular rate [de-identified] : soft, nontender. no rebound or guarding. Negative Mondragon's sign [de-identified] : presents in wheelchair

## 2023-05-11 NOTE — ASSESSMENT
[FreeTextEntry1] : Ms. FORREST is a 77 year old woman with upper abdominal pain and imaging evidence of cholelithiasis. We discussed possible etiologies of her pain, including symptomatic cholelithiasis and gastritis. I recommend patient take daily PPI and that she and her daughter keep a food/pain journal to document meals and episodes of pain. I want them to document timing of meals and food eaten in relation to any episodes of pain. \par \par The patient understands and agrees that if she experiences pain, fever/chills/nausea/emesis or other symptoms she will contact the office or present to the ED immediately.

## 2023-05-18 ENCOUNTER — APPOINTMENT (OUTPATIENT)
Dept: SURGERY | Facility: CLINIC | Age: 78
End: 2023-05-18

## 2023-05-19 ENCOUNTER — NON-APPOINTMENT (OUTPATIENT)
Age: 78
End: 2023-05-19

## 2023-06-01 ENCOUNTER — NON-APPOINTMENT (OUTPATIENT)
Age: 78
End: 2023-06-01

## 2023-06-01 ENCOUNTER — APPOINTMENT (OUTPATIENT)
Dept: ELECTROPHYSIOLOGY | Facility: CLINIC | Age: 78
End: 2023-06-01
Payer: MEDICARE

## 2023-06-01 PROCEDURE — 93298 REM INTERROG DEV EVAL SCRMS: CPT

## 2023-06-01 PROCEDURE — G2066: CPT

## 2023-07-03 ENCOUNTER — APPOINTMENT (OUTPATIENT)
Dept: ELECTROPHYSIOLOGY | Facility: CLINIC | Age: 78
End: 2023-07-03
Payer: MEDICARE

## 2023-07-03 ENCOUNTER — NON-APPOINTMENT (OUTPATIENT)
Age: 78
End: 2023-07-03

## 2023-07-03 PROCEDURE — G2066: CPT

## 2023-07-03 PROCEDURE — 93298 REM INTERROG DEV EVAL SCRMS: CPT

## 2023-07-11 NOTE — PROVIDER CONTACT NOTE (OTHER) - SITUATION
Patient is requesting a call from Javier regarding appt of 7.24.23    [phone]   Pt /61 HR 84 after receiving 10mg IVP hydralazine at 2120. Pt. resting in bed comfortably with no complaints of pain or discomfort.

## 2023-07-25 ENCOUNTER — RX RENEWAL (OUTPATIENT)
Age: 78
End: 2023-07-25

## 2023-08-03 ENCOUNTER — APPOINTMENT (OUTPATIENT)
Dept: INTERNAL MEDICINE | Facility: CLINIC | Age: 78
End: 2023-08-03
Payer: MEDICARE

## 2023-08-03 VITALS
HEIGHT: 59 IN | DIASTOLIC BLOOD PRESSURE: 62 MMHG | OXYGEN SATURATION: 97 % | TEMPERATURE: 97.2 F | RESPIRATION RATE: 16 BRPM | SYSTOLIC BLOOD PRESSURE: 120 MMHG | HEART RATE: 78 BPM

## 2023-08-03 DIAGNOSIS — I73.9 PERIPHERAL VASCULAR DISEASE, UNSPECIFIED: ICD-10-CM

## 2023-08-03 PROCEDURE — 99214 OFFICE O/P EST MOD 30 MIN: CPT | Mod: 25

## 2023-08-03 PROCEDURE — 36415 COLL VENOUS BLD VENIPUNCTURE: CPT

## 2023-08-03 NOTE — HISTORY OF PRESENT ILLNESS
[FreeTextEntry1] : HLD, DM2, GERD, HTN, dementia, CVA, RA [de-identified] : 75 yo F w PMHx HLD, DM2, GERD, HTN, dementia, CVA, RA  Follows up with Dr. Bob Valadez- rheum, gets infusion every 6 months  Patient was started on trulicity-->increased to 1.5, on 40 units long acting insulin - last a1c as per pt 7.2   Has not had any more epigastric pain and has been stable.  For the past weeks her legs have been very swollen.  otherwise denies any other acute complaints.

## 2023-08-03 NOTE — ASSESSMENT
[FreeTextEntry1] : 77 yo F w PMHx HLD, DM2, GERD, HTN, dementia, CVA, RA  Follows up with Dr. Bob Valadez- rheum, gets infusion every 6 months  Patient was started on trulicity, on 40 units long acting insulin - last a1c as per pt 7.2   Patient went to the ER about one month ago with severe epigastric pain, she was treating with ppi. US and CT of abdomen showed possibly acute cholecystitis. Symptoms improved with ppi. She denies any other symptoms since increasing famotidine.   otherwise denies any other acute complaints.  HTN  Advised low salt diet Diet and exercise discussed. 20 % of weight loss associated to better bp control Decrease alcohol intake Well controlled, cont current tx   HLD  Educated eating whole grain foods rich in soluble fiber such as oats and Omega 3 rich fish such as salmon, tout, sardines and bean based meals such as kidney beans, chickpeas and lentils. Small protions of nuts. Limit sugars and alcohol.  Atorvastatin 80 mg qd and fenofibrate   T2DM  Managed by endo Dr. Abad  Finally retrieved records,  a1c 7.2, metformin 500 mg bid and tresiba 50 units at night as per note  Trulicity was added to regiemn Discussed diabetic diet. Carb allowance of around 130-140 g carbs daily.  Educated about foot hygiene and need to see opthalmology and podiatry yearly.  Would benefit from health aid PT referral provided   Bilateral lower extremity edema  Likely dependent edema  TTE essentially normal  Normal dopplers  Provided script for compression stockings  Elevation of feet above heart  fu UA r/o nephrotic syndrome  Lasix 20 mg x7 days rto 7 days   rto 7 days

## 2023-08-03 NOTE — PHYSICAL EXAM
[Normal] : affect was normal and insight and judgment were intact [de-identified] : BL ankle swelling  [de-identified] : non verbal, lower extremity weakness

## 2023-08-07 ENCOUNTER — APPOINTMENT (OUTPATIENT)
Dept: ELECTROPHYSIOLOGY | Facility: CLINIC | Age: 78
End: 2023-08-07
Payer: MEDICARE

## 2023-08-07 ENCOUNTER — NON-APPOINTMENT (OUTPATIENT)
Age: 78
End: 2023-08-07

## 2023-08-07 LAB
ALBUMIN SERPL ELPH-MCNC: 4.6 G/DL
ALP BLD-CCNC: 57 U/L
ALT SERPL-CCNC: 27 U/L
ANION GAP SERPL CALC-SCNC: 14 MMOL/L
AST SERPL-CCNC: 18 U/L
BILIRUB SERPL-MCNC: 0.2 MG/DL
BUN SERPL-MCNC: 19 MG/DL
CALCIUM SERPL-MCNC: 10 MG/DL
CHLORIDE SERPL-SCNC: 103 MMOL/L
CO2 SERPL-SCNC: 25 MMOL/L
CREAT SERPL-MCNC: 0.75 MG/DL
EGFR: 82 ML/MIN/1.73M2
ESTIMATED AVERAGE GLUCOSE: 189 MG/DL
GLUCOSE SERPL-MCNC: 157 MG/DL
HBA1C MFR BLD HPLC: 8.2 %
POTASSIUM SERPL-SCNC: 4.1 MMOL/L
PROT SERPL-MCNC: 7 G/DL
SODIUM SERPL-SCNC: 141 MMOL/L

## 2023-08-07 PROCEDURE — G2066: CPT

## 2023-08-07 PROCEDURE — 93298 REM INTERROG DEV EVAL SCRMS: CPT

## 2023-08-09 ENCOUNTER — OFFICE (OUTPATIENT)
Dept: URBAN - METROPOLITAN AREA CLINIC 102 | Facility: CLINIC | Age: 78
Setting detail: OPHTHALMOLOGY
End: 2023-08-09
Payer: MEDICARE

## 2023-08-09 DIAGNOSIS — E11.3211: ICD-10-CM

## 2023-08-09 DIAGNOSIS — E11.3292: ICD-10-CM

## 2023-08-09 DIAGNOSIS — H04.563: ICD-10-CM

## 2023-08-09 DIAGNOSIS — Z96.1: ICD-10-CM

## 2023-08-09 DIAGNOSIS — H34.8322: ICD-10-CM

## 2023-08-09 PROCEDURE — 99213 OFFICE O/P EST LOW 20 MIN: CPT | Performed by: OPHTHALMOLOGY

## 2023-08-09 PROCEDURE — 92134 CPTRZ OPH DX IMG PST SGM RTA: CPT | Performed by: OPHTHALMOLOGY

## 2023-08-09 ASSESSMENT — REFRACTION_CURRENTRX
OD_ADD: +2.75
OS_OVR_VA: 20/
OS_OVR_VA: 20/
OS_SPHERE: +2.50
OD_OVR_VA: 20/
OS_AXIS: 85
OS_CYLINDER: -0.75
OS_ADD: +2.50
OD_OVR_VA: 20/
OD_AXIS: 120
OS_OVR_VA: 20/
OD_CYLINDER: SPHERE
OD_SPHERE: +2.50
OD_CYLINDER: -0.75
OD_SPHERE: +2.25
OS_CYLINDER: -0.75
OD_OVR_VA: 20/
OD_AXIS: 0
OD_VPRISM_DIRECTION: BF
OS_VPRISM_DIRECTION: BF
OS_VPRISM_DIRECTION: SV
OD_SPHERE: +0.25
OS_SPHERE: PLANO
OD_ADD: +2.50
OS_SPHERE: +2.00
OS_ADD: +2.75
OD_AXIS: 085
OD_VPRISM_DIRECTION: SV
OS_AXIS: 0
OD_CYLINDER: -0.75
OS_CYLINDER: SPHERE
OS_AXIS: 115

## 2023-08-09 ASSESSMENT — KERATOMETRY
OD_AXISANGLE_DEGREES: 028
OD_K2POWER_DIOPTERS: 45.75
OD_K1POWER_DIOPTERS: 45.00
OS_AXISANGLE_DEGREES: 071
OS_K1POWER_DIOPTERS: 44.75
METHOD_AUTO_MANUAL: AUTO
OS_K2POWER_DIOPTERS: 45.00

## 2023-08-09 ASSESSMENT — TONOMETRY
OS_IOP_MMHG: 13
OD_IOP_MMHG: 13

## 2023-08-09 ASSESSMENT — SPHEQUIV_DERIVED
OD_SPHEQUIV: 0.25
OS_SPHEQUIV: -0.25
OD_SPHEQUIV: -0.125

## 2023-08-09 ASSESSMENT — REFRACTION_AUTOREFRACTION
OS_CYLINDER: -0.50
OS_SPHERE: 0.00
OD_CYLINDER: -1.00
OD_AXIS: 104
OD_SPHERE: +0.75
OS_AXIS: 093

## 2023-08-09 ASSESSMENT — REFRACTION_MANIFEST
OD_ADD: +2.75
OD_AXIS: 120
OD_VA1: 20/30
OS_ADD: +2.75
OD_SPHERE: +0.25
OS_AXIS: 85
OS_VA1: 20/20
OS_CYLINDER: -0.75
OU_VA: 20/20
OS_SPHERE: PLANO
OD_CYLINDER: -0.75

## 2023-08-09 ASSESSMENT — VISUAL ACUITY
OD_BCVA: 20/25-1
OS_BCVA: 20/40-2

## 2023-08-09 ASSESSMENT — AXIALLENGTH_DERIVED
OD_AL: 22.9688
OD_AL: 22.8311
OS_AL: 23.1912

## 2023-08-09 ASSESSMENT — CONFRONTATIONAL VISUAL FIELD TEST (CVF)
OD_FINDINGS: FULL
OS_FINDINGS: FULL

## 2023-08-10 ENCOUNTER — APPOINTMENT (OUTPATIENT)
Dept: INTERNAL MEDICINE | Facility: CLINIC | Age: 78
End: 2023-08-10
Payer: MEDICARE

## 2023-08-10 VITALS
HEART RATE: 66 BPM | TEMPERATURE: 97 F | OXYGEN SATURATION: 96 % | SYSTOLIC BLOOD PRESSURE: 124 MMHG | RESPIRATION RATE: 16 BRPM | HEIGHT: 59 IN | DIASTOLIC BLOOD PRESSURE: 56 MMHG

## 2023-08-10 DIAGNOSIS — M79.89 OTHER SPECIFIED SOFT TISSUE DISORDERS: ICD-10-CM

## 2023-08-10 PROCEDURE — 99214 OFFICE O/P EST MOD 30 MIN: CPT

## 2023-08-10 NOTE — PHYSICAL EXAM
[Normal] : affect was normal and insight and judgment were intact [de-identified] : BL ankle swelling  [de-identified] : non verbal, lower extremity weakness

## 2023-08-10 NOTE — ASSESSMENT
[FreeTextEntry1] : Negro is a 72 yo M w PMHx HLD (on prauluent) , HTN, pre diabetes, gout, hypothyroidism Patient is here because he has developed lower extremity swelling and rash on his feet. His BP has also been running high.  Symptoms started since starting new BP regimen doxasozin and amlodipine    BL lower extremity swelling Fu US ARTERIAL AND VENOUS dopplers  She has not been using compression stockings  Advised to elevate legs above the heart as this is likely dependent edema  Fu pro BNP r/o heart failure  d/c lasix   rto 1 mo

## 2023-08-10 NOTE — HISTORY OF PRESENT ILLNESS
[FreeTextEntry1] : fu swelling of legs  [de-identified] : 75 yo F w PMHx HLD, DM2, GERD, HTN, dementia, CVA, RA  Legs have not gotten better with lasix

## 2023-08-11 ENCOUNTER — NON-APPOINTMENT (OUTPATIENT)
Age: 78
End: 2023-08-11

## 2023-08-11 LAB
ALBUMIN SERPL ELPH-MCNC: 4.5 G/DL
ALP BLD-CCNC: 48 U/L
ALT SERPL-CCNC: 25 U/L
ANION GAP SERPL CALC-SCNC: 11 MMOL/L
AST SERPL-CCNC: 15 U/L
BILIRUB SERPL-MCNC: 0.2 MG/DL
BUN SERPL-MCNC: 24 MG/DL
CALCIUM SERPL-MCNC: 9.7 MG/DL
CHLORIDE SERPL-SCNC: 106 MMOL/L
CO2 SERPL-SCNC: 28 MMOL/L
CREAT SERPL-MCNC: 0.91 MG/DL
EGFR: 65 ML/MIN/1.73M2
GLUCOSE SERPL-MCNC: 262 MG/DL
NT-PROBNP SERPL-MCNC: 296 PG/ML
POTASSIUM SERPL-SCNC: 4 MMOL/L
PROT SERPL-MCNC: 6.9 G/DL
SODIUM SERPL-SCNC: 145 MMOL/L

## 2023-08-21 ENCOUNTER — NON-APPOINTMENT (OUTPATIENT)
Age: 78
End: 2023-08-21

## 2023-09-11 ENCOUNTER — NON-APPOINTMENT (OUTPATIENT)
Age: 78
End: 2023-09-11

## 2023-09-11 ENCOUNTER — APPOINTMENT (OUTPATIENT)
Dept: ELECTROPHYSIOLOGY | Facility: CLINIC | Age: 78
End: 2023-09-11
Payer: MEDICARE

## 2023-09-12 PROCEDURE — 93298 REM INTERROG DEV EVAL SCRMS: CPT

## 2023-09-12 PROCEDURE — G2066: CPT

## 2023-09-19 ENCOUNTER — APPOINTMENT (OUTPATIENT)
Dept: NEUROSURGERY | Facility: CLINIC | Age: 78
End: 2023-09-19
Payer: MEDICARE

## 2023-09-19 ENCOUNTER — NON-APPOINTMENT (OUTPATIENT)
Age: 78
End: 2023-09-19

## 2023-09-19 VITALS
BODY MASS INDEX: 32.05 KG/M2 | HEART RATE: 60 BPM | OXYGEN SATURATION: 95 % | WEIGHT: 159 LBS | HEIGHT: 59 IN | DIASTOLIC BLOOD PRESSURE: 72 MMHG | SYSTOLIC BLOOD PRESSURE: 175 MMHG

## 2023-09-19 PROCEDURE — 99215 OFFICE O/P EST HI 40 MIN: CPT

## 2023-09-19 RX ORDER — ASPIRIN ENTERIC COATED TABLETS 81 MG 81 MG/1
81 TABLET, DELAYED RELEASE ORAL DAILY
Qty: 90 | Refills: 0 | Status: DISCONTINUED | COMMUNITY
Start: 2021-12-14 | End: 2023-09-19

## 2023-10-03 RX ORDER — HYDRALAZINE HYDROCHLORIDE 10 MG/1
10 TABLET ORAL
Qty: 180 | Refills: 2 | Status: ACTIVE | COMMUNITY
Start: 2021-12-14 | End: 1900-01-01

## 2023-10-16 ENCOUNTER — APPOINTMENT (OUTPATIENT)
Dept: ELECTROPHYSIOLOGY | Facility: CLINIC | Age: 78
End: 2023-10-16
Payer: MEDICARE

## 2023-10-16 ENCOUNTER — NON-APPOINTMENT (OUTPATIENT)
Age: 78
End: 2023-10-16

## 2023-10-17 PROCEDURE — G2066: CPT

## 2023-10-17 PROCEDURE — 93298 REM INTERROG DEV EVAL SCRMS: CPT

## 2023-10-18 RX ORDER — OMEPRAZOLE 40 MG/1
40 CAPSULE, DELAYED RELEASE ORAL DAILY
Qty: 30 | Refills: 2 | Status: ACTIVE | COMMUNITY
Start: 2023-10-18 | End: 1900-01-01

## 2023-10-30 ENCOUNTER — NON-APPOINTMENT (OUTPATIENT)
Age: 78
End: 2023-10-30

## 2023-10-30 ENCOUNTER — APPOINTMENT (OUTPATIENT)
Dept: NEUROSURGERY | Facility: CLINIC | Age: 78
End: 2023-10-30
Payer: MEDICARE

## 2023-10-30 VITALS
DIASTOLIC BLOOD PRESSURE: 78 MMHG | HEIGHT: 59 IN | WEIGHT: 163 LBS | HEART RATE: 64 BPM | OXYGEN SATURATION: 96 % | SYSTOLIC BLOOD PRESSURE: 165 MMHG | BODY MASS INDEX: 32.86 KG/M2

## 2023-10-30 DIAGNOSIS — R26.9 UNSPECIFIED ABNORMALITIES OF GAIT AND MOBILITY: ICD-10-CM

## 2023-10-30 PROCEDURE — 99215 OFFICE O/P EST HI 40 MIN: CPT

## 2023-11-02 ENCOUNTER — APPOINTMENT (OUTPATIENT)
Dept: INTERNAL MEDICINE | Facility: CLINIC | Age: 78
End: 2023-11-02
Payer: MEDICARE

## 2023-11-02 VITALS
TEMPERATURE: 95.9 F | HEIGHT: 59 IN | OXYGEN SATURATION: 96 % | SYSTOLIC BLOOD PRESSURE: 158 MMHG | DIASTOLIC BLOOD PRESSURE: 73 MMHG | HEART RATE: 70 BPM

## 2023-11-02 DIAGNOSIS — F03.90 UNSPECIFIED DEMENTIA W/OUT BEHAVIORAL DISTURBANCE: ICD-10-CM

## 2023-11-02 DIAGNOSIS — I63.9 CEREBRAL INFARCTION, UNSPECIFIED: ICD-10-CM

## 2023-11-02 DIAGNOSIS — Z23 ENCOUNTER FOR IMMUNIZATION: ICD-10-CM

## 2023-11-02 PROCEDURE — 99214 OFFICE O/P EST MOD 30 MIN: CPT | Mod: 25

## 2023-11-02 PROCEDURE — 90662 IIV NO PRSV INCREASED AG IM: CPT

## 2023-11-02 PROCEDURE — G0008: CPT

## 2023-11-02 RX ORDER — FUROSEMIDE 20 MG/1
20 TABLET ORAL
Qty: 7 | Refills: 0 | Status: DISCONTINUED | COMMUNITY
Start: 2023-08-03 | End: 2023-11-02

## 2023-11-02 RX ORDER — FUROSEMIDE 20 MG/1
20 TABLET ORAL
Qty: 30 | Refills: 0 | Status: DISCONTINUED | COMMUNITY
Start: 2023-03-16 | End: 2023-11-02

## 2023-11-02 RX ORDER — HYDROCHLOROTHIAZIDE 12.5 MG/1
12.5 TABLET ORAL DAILY
Qty: 30 | Refills: 0 | Status: DISCONTINUED | COMMUNITY
Start: 2022-09-14 | End: 2023-11-02

## 2023-11-02 RX ORDER — OMEPRAZOLE 40 MG/1
40 CAPSULE, DELAYED RELEASE ORAL
Refills: 0 | Status: ACTIVE | COMMUNITY
Start: 2023-11-02

## 2023-11-02 RX ORDER — INSULIN GLARGINE 100 [IU]/ML
100 INJECTION, SOLUTION SUBCUTANEOUS
Qty: 4 | Refills: 3 | Status: ACTIVE | COMMUNITY
Start: 2023-11-02

## 2023-11-02 RX ORDER — DULAGLUTIDE 4.5 MG/.5ML
4.5 INJECTION, SOLUTION SUBCUTANEOUS
Refills: 0 | Status: ACTIVE | COMMUNITY
Start: 2023-01-19

## 2023-11-03 PROBLEM — I63.9 CVA (CEREBRAL VASCULAR ACCIDENT): Status: ACTIVE | Noted: 2021-12-14

## 2023-11-03 PROBLEM — Z23 ENCOUNTER FOR IMMUNIZATION: Status: ACTIVE | Noted: 2021-12-23

## 2023-11-03 PROBLEM — F03.90 DEMENTIA: Status: ACTIVE | Noted: 2021-12-14

## 2023-11-06 ENCOUNTER — APPOINTMENT (OUTPATIENT)
Dept: MRI IMAGING | Facility: CLINIC | Age: 78
End: 2023-11-06

## 2023-11-06 ENCOUNTER — OUTPATIENT (OUTPATIENT)
Dept: OUTPATIENT SERVICES | Facility: HOSPITAL | Age: 78
LOS: 1 days | End: 2023-11-06
Payer: MEDICARE

## 2023-11-06 DIAGNOSIS — R26.9 UNSPECIFIED ABNORMALITIES OF GAIT AND MOBILITY: ICD-10-CM

## 2023-11-06 DIAGNOSIS — Z92.89 PERSONAL HISTORY OF OTHER MEDICAL TREATMENT: Chronic | ICD-10-CM

## 2023-11-06 PROCEDURE — 70551 MRI BRAIN STEM W/O DYE: CPT | Mod: 26

## 2023-11-17 ENCOUNTER — NON-APPOINTMENT (OUTPATIENT)
Age: 78
End: 2023-11-17

## 2023-11-17 ENCOUNTER — APPOINTMENT (OUTPATIENT)
Dept: ELECTROPHYSIOLOGY | Facility: CLINIC | Age: 78
End: 2023-11-17
Payer: MEDICARE

## 2023-11-18 PROCEDURE — G2066: CPT | Mod: NC

## 2023-11-18 PROCEDURE — 93298 REM INTERROG DEV EVAL SCRMS: CPT | Mod: NC

## 2023-11-20 ENCOUNTER — TRANSCRIPTION ENCOUNTER (OUTPATIENT)
Age: 78
End: 2023-11-20

## 2023-11-20 ENCOUNTER — APPOINTMENT (OUTPATIENT)
Dept: NEUROSURGERY | Facility: CLINIC | Age: 78
End: 2023-11-20
Payer: MEDICARE

## 2023-11-20 PROCEDURE — 99213 OFFICE O/P EST LOW 20 MIN: CPT | Mod: 95

## 2023-11-28 NOTE — PATIENT PROFILE ADULT - NSPROEXTENSIONSOFSELF_GEN_A_NUR
Purpose of the visit was to evaluate for: goals of care/advanced care planning. Spoke with RN, patient, and family and discussed palliative care, goals of care, care options, resuscitation status, [facility], advanced care planning, clarify code status, and determination of decision maker.      Assessment:Collaborated with RN. Visited Mr Rodriguez in his room on 3 NT./ His wife is at bedside. He is alert and oriented x 4. He has no complaints of pain or discomfort and appears in no acute distress.    He has a history of urothelial cancer with a diverting urostomy, liver mets, DVTs.   He has also required dialysis in the past.    We discussed his course of treatment and his vision for his future. He tells me he no longer wants to participate in cancer treatments and wants to go home to live the best life he can. Unfortunate his wife is his primary caregiver and is unsure she will be able to care for him adequately.   They have ask me to place a referral to hospice for a EOS. They also ask me to have his code status changed to DNR/DNI to reflect his living will. Dr Caputo did give orders to changes his code status.  His ideal live would include having PT to get stronger and if that is out of reach they would like the support of hospice.    Recommendations/Plan: [facility] referral and DNR/DNI status per his living will .    Tasks Completed: Code Status clarification and Emotional Support.    Palliative care will continue to follow and support as needed    Maritza BARON RN, BSN  Palliative Care   none

## 2023-12-12 RX ORDER — LISINOPRIL 40 MG/1
40 TABLET ORAL DAILY
Qty: 3 | Refills: 3 | Status: ACTIVE | COMMUNITY
Start: 2021-12-14 | End: 1900-01-01

## 2023-12-12 RX ORDER — MEMANTINE HYDROCHLORIDE 10 MG/1
10 TABLET, FILM COATED ORAL TWICE DAILY
Qty: 180 | Refills: 3 | Status: ACTIVE | COMMUNITY
Start: 2021-12-14 | End: 1900-01-01

## 2023-12-12 RX ORDER — OMEPRAZOLE 40 MG/1
40 CAPSULE, DELAYED RELEASE ORAL
Qty: 30 | Refills: 5 | Status: ACTIVE | COMMUNITY
Start: 2023-05-11 | End: 1900-01-01

## 2023-12-20 ENCOUNTER — OUTPATIENT (OUTPATIENT)
Dept: OUTPATIENT SERVICES | Facility: HOSPITAL | Age: 78
LOS: 1 days | End: 2023-12-20
Payer: MEDICARE

## 2023-12-20 VITALS
RESPIRATION RATE: 16 BRPM | DIASTOLIC BLOOD PRESSURE: 83 MMHG | SYSTOLIC BLOOD PRESSURE: 157 MMHG | WEIGHT: 150.8 LBS | TEMPERATURE: 98 F | HEIGHT: 57.48 IN | OXYGEN SATURATION: 96 % | HEART RATE: 69 BPM

## 2023-12-20 DIAGNOSIS — Z98.890 OTHER SPECIFIED POSTPROCEDURAL STATES: Chronic | ICD-10-CM

## 2023-12-20 DIAGNOSIS — G91.2 (IDIOPATHIC) NORMAL PRESSURE HYDROCEPHALUS: ICD-10-CM

## 2023-12-20 DIAGNOSIS — Z01.818 ENCOUNTER FOR OTHER PREPROCEDURAL EXAMINATION: ICD-10-CM

## 2023-12-20 DIAGNOSIS — Z92.89 PERSONAL HISTORY OF OTHER MEDICAL TREATMENT: Chronic | ICD-10-CM

## 2023-12-20 LAB
ANION GAP SERPL CALC-SCNC: 10 MMOL/L — SIGNIFICANT CHANGE UP (ref 5–17)
ANION GAP SERPL CALC-SCNC: 10 MMOL/L — SIGNIFICANT CHANGE UP (ref 5–17)
BUN SERPL-MCNC: 17 MG/DL — SIGNIFICANT CHANGE UP (ref 7–23)
BUN SERPL-MCNC: 17 MG/DL — SIGNIFICANT CHANGE UP (ref 7–23)
CALCIUM SERPL-MCNC: 9.8 MG/DL — SIGNIFICANT CHANGE UP (ref 8.4–10.5)
CALCIUM SERPL-MCNC: 9.8 MG/DL — SIGNIFICANT CHANGE UP (ref 8.4–10.5)
CHLORIDE SERPL-SCNC: 109 MMOL/L — HIGH (ref 96–108)
CHLORIDE SERPL-SCNC: 109 MMOL/L — HIGH (ref 96–108)
CO2 SERPL-SCNC: 26 MMOL/L — SIGNIFICANT CHANGE UP (ref 22–31)
CO2 SERPL-SCNC: 26 MMOL/L — SIGNIFICANT CHANGE UP (ref 22–31)
CREAT SERPL-MCNC: 0.81 MG/DL — SIGNIFICANT CHANGE UP (ref 0.5–1.3)
CREAT SERPL-MCNC: 0.81 MG/DL — SIGNIFICANT CHANGE UP (ref 0.5–1.3)
EGFR: 75 ML/MIN/1.73M2 — SIGNIFICANT CHANGE UP
EGFR: 75 ML/MIN/1.73M2 — SIGNIFICANT CHANGE UP
GLUCOSE SERPL-MCNC: 141 MG/DL — HIGH (ref 70–99)
GLUCOSE SERPL-MCNC: 141 MG/DL — HIGH (ref 70–99)
HCT VFR BLD CALC: 42.6 % — SIGNIFICANT CHANGE UP (ref 34.5–45)
HCT VFR BLD CALC: 42.6 % — SIGNIFICANT CHANGE UP (ref 34.5–45)
HGB BLD-MCNC: 13.6 G/DL — SIGNIFICANT CHANGE UP (ref 11.5–15.5)
HGB BLD-MCNC: 13.6 G/DL — SIGNIFICANT CHANGE UP (ref 11.5–15.5)
MCHC RBC-ENTMCNC: 25.7 PG — LOW (ref 27–34)
MCHC RBC-ENTMCNC: 25.7 PG — LOW (ref 27–34)
MCHC RBC-ENTMCNC: 31.9 GM/DL — LOW (ref 32–36)
MCHC RBC-ENTMCNC: 31.9 GM/DL — LOW (ref 32–36)
MCV RBC AUTO: 80.4 FL — SIGNIFICANT CHANGE UP (ref 80–100)
MCV RBC AUTO: 80.4 FL — SIGNIFICANT CHANGE UP (ref 80–100)
NRBC # BLD: 0 /100 WBCS — SIGNIFICANT CHANGE UP (ref 0–0)
NRBC # BLD: 0 /100 WBCS — SIGNIFICANT CHANGE UP (ref 0–0)
PLATELET # BLD AUTO: 416 K/UL — HIGH (ref 150–400)
PLATELET # BLD AUTO: 416 K/UL — HIGH (ref 150–400)
POTASSIUM SERPL-MCNC: 4.1 MMOL/L — SIGNIFICANT CHANGE UP (ref 3.5–5.3)
POTASSIUM SERPL-MCNC: 4.1 MMOL/L — SIGNIFICANT CHANGE UP (ref 3.5–5.3)
POTASSIUM SERPL-SCNC: 4.1 MMOL/L — SIGNIFICANT CHANGE UP (ref 3.5–5.3)
POTASSIUM SERPL-SCNC: 4.1 MMOL/L — SIGNIFICANT CHANGE UP (ref 3.5–5.3)
RBC # BLD: 5.3 M/UL — HIGH (ref 3.8–5.2)
RBC # BLD: 5.3 M/UL — HIGH (ref 3.8–5.2)
RBC # FLD: 14.5 % — SIGNIFICANT CHANGE UP (ref 10.3–14.5)
RBC # FLD: 14.5 % — SIGNIFICANT CHANGE UP (ref 10.3–14.5)
SODIUM SERPL-SCNC: 145 MMOL/L — SIGNIFICANT CHANGE UP (ref 135–145)
SODIUM SERPL-SCNC: 145 MMOL/L — SIGNIFICANT CHANGE UP (ref 135–145)
WBC # BLD: 10.6 K/UL — HIGH (ref 3.8–10.5)
WBC # BLD: 10.6 K/UL — HIGH (ref 3.8–10.5)
WBC # FLD AUTO: 10.6 K/UL — HIGH (ref 3.8–10.5)
WBC # FLD AUTO: 10.6 K/UL — HIGH (ref 3.8–10.5)

## 2023-12-20 PROCEDURE — 85027 COMPLETE CBC AUTOMATED: CPT

## 2023-12-20 PROCEDURE — 83036 HEMOGLOBIN GLYCOSYLATED A1C: CPT

## 2023-12-20 PROCEDURE — G0463: CPT

## 2023-12-20 PROCEDURE — 80048 BASIC METABOLIC PNL TOTAL CA: CPT

## 2023-12-20 RX ORDER — CHLORHEXIDINE GLUCONATE 213 G/1000ML
1 SOLUTION TOPICAL ONCE
Refills: 0 | Status: DISCONTINUED | OUTPATIENT
Start: 2024-01-09 | End: 2024-01-09

## 2023-12-20 RX ORDER — SODIUM CHLORIDE 9 MG/ML
3 INJECTION INTRAMUSCULAR; INTRAVENOUS; SUBCUTANEOUS EVERY 8 HOURS
Refills: 0 | Status: DISCONTINUED | OUTPATIENT
Start: 2024-01-09 | End: 2024-01-09

## 2023-12-20 RX ORDER — LIDOCAINE HCL 20 MG/ML
0.2 VIAL (ML) INJECTION ONCE
Refills: 0 | Status: DISCONTINUED | OUTPATIENT
Start: 2024-01-09 | End: 2024-01-09

## 2023-12-20 NOTE — H&P PST ADULT - OTHER CARE PROVIDERS
Huan Cuevas (vascular cardiology) / Suzanna Mancilla (neuro) / Has endocrinologist Huan Cuevas (vascular cardiology) - last visit 3/2023 / Suzanna Mancilla (neuro) - last visit 9/2023 / Has endocrinologist but cannot recall name

## 2023-12-20 NOTE — H&P PST ADULT - ATTENDING COMMENTS
Pt known to us from office with significant gait deterioration x 3 years, unable to walk but one step with assistance.  Pt alert.  PEDRAZA.  MRI brain with open aqueduct and possible ventriculomegaly. MRI LS spine with stenosis at L45 > L34, conus at T12-L1.  Plan for high volume LP.  R/B/A discussed in office/telehealth.  Family and pt agree to proceed.

## 2023-12-20 NOTE — H&P PST ADULT - HISTORY OF PRESENT ILLNESS
79 YO F PMH HTN, HLD, T2DM, RA, TIA/stroke (on baby aspirin, s/p loop recorder implant), walking has deteriorated over the past three years, urinary incontinence, under evaluation for NPH, presents to [facility] for HIGH VOLUME LUMBAR PUNCTURE 1/9/2024. Denies any palpitations, SOB, N/V, fever or chills.      77 YO F PMH HTN, HLD, T2DM, RA, TIA/stroke (on baby aspirin, s/p loop recorder implant), walking has deteriorated over the past three years, urinary incontinence, under evaluation for NPH, presents to [facility] for HIGH VOLUME LUMBAR PUNCTURE 1/9/2024. Denies any palpitations, SOB, N/V, fever or chills.

## 2023-12-20 NOTE — H&P PST ADULT - NSICDXPASTSURGICALHX_GEN_ALL_CORE_FT
PAST SURGICAL HISTORY:  H/O carpal tunnel repair     H/O colonoscopy     H/O local excision of skin lesion     H/O toe surgery     History of dental surgery

## 2023-12-20 NOTE — H&P PST ADULT - PROBLEM SELECTOR PLAN 1
HIGH VOLUME LUMBAR PUNCTURE  Pre-op education provided - all questions answered   Chlorhex soap & instructions provided  CBC, BMP, Ha1c sent in PST  To continue on Asprin until day of surgery   Last dose metformin 1/8/24  Last dose trulicity 12/22/23  Last dose tresiba 1/8/24

## 2023-12-20 NOTE — H&P PST ADULT - NSICDXPASTMEDICALHX_GEN_ALL_CORE_FT
PAST MEDICAL HISTORY:  Anxiety     Dementia     Diabetes mellitus, type 2     H/O urinary incontinence     History of balance disorder     History of CVA (cerebrovascular accident)     HLD (hyperlipidemia)     Hypertension     Leg swelling     Major depression     Rheumatoid arthritis     UTI (urinary tract infection)

## 2023-12-20 NOTE — H&P PST ADULT - ASSESSMENT
DASI score: <4 mets  DASI activity: Needs assistance w/ADLs, can stand from wheelchair and take a few steps, feeds self  Loose teeth or denture: dental implants, denies loose teeth

## 2023-12-20 NOTE — H&P PST ADULT - DOES PATIENT HAVE ADVANCE DIRECTIVE
[facility] Emmanuel Pearson  / Donald Isbell  Formerly [facility] Emmanuel Pearson  / Donald Isbell

## 2023-12-20 NOTE — H&P PST ADULT - NSANTHTIREDRD_ENT_A_CORE
You can access the FollowMyHealth Patient Portal offered by [facility] by registering at the following website: [email or web address]. By joining [facility]’s FollowMyHealth portal, you will also be able to view your health information using other applications (apps) compatible with our system.
No

## 2023-12-21 LAB
A1C WITH ESTIMATED AVERAGE GLUCOSE RESULT: 8.3 % — HIGH (ref 4–5.6)
A1C WITH ESTIMATED AVERAGE GLUCOSE RESULT: 8.3 % — HIGH (ref 4–5.6)
ESTIMATED AVERAGE GLUCOSE: 192 MG/DL — HIGH (ref 68–114)
ESTIMATED AVERAGE GLUCOSE: 192 MG/DL — HIGH (ref 68–114)

## 2023-12-22 ENCOUNTER — NON-APPOINTMENT (OUTPATIENT)
Age: 78
End: 2023-12-22

## 2023-12-22 ENCOUNTER — APPOINTMENT (OUTPATIENT)
Dept: ELECTROPHYSIOLOGY | Facility: CLINIC | Age: 78
End: 2023-12-22
Payer: MEDICARE

## 2023-12-23 PROCEDURE — 93298 REM INTERROG DEV EVAL SCRMS: CPT

## 2023-12-23 PROCEDURE — G2066: CPT

## 2023-12-26 PROBLEM — Z87.898 PERSONAL HISTORY OF OTHER SPECIFIED CONDITIONS: Chronic | Status: ACTIVE | Noted: 2023-12-20

## 2023-12-26 PROBLEM — M79.89 OTHER SPECIFIED SOFT TISSUE DISORDERS: Chronic | Status: ACTIVE | Noted: 2023-12-20

## 2024-01-03 ENCOUNTER — NON-APPOINTMENT (OUTPATIENT)
Age: 79
End: 2024-01-03

## 2024-01-03 ENCOUNTER — OUTPATIENT (OUTPATIENT)
Dept: OUTPATIENT SERVICES | Facility: HOSPITAL | Age: 79
LOS: 1 days | End: 2024-01-03

## 2024-01-03 ENCOUNTER — APPOINTMENT (OUTPATIENT)
Dept: MRI IMAGING | Facility: CLINIC | Age: 79
End: 2024-01-03
Payer: MEDICARE

## 2024-01-03 ENCOUNTER — APPOINTMENT (OUTPATIENT)
Dept: INTERNAL MEDICINE | Facility: CLINIC | Age: 79
End: 2024-01-03
Payer: MEDICARE

## 2024-01-03 VITALS
WEIGHT: 163 LBS | RESPIRATION RATE: 16 BRPM | BODY MASS INDEX: 32.86 KG/M2 | HEIGHT: 59 IN | OXYGEN SATURATION: 96 % | TEMPERATURE: 97.2 F | HEART RATE: 63 BPM

## 2024-01-03 VITALS — SYSTOLIC BLOOD PRESSURE: 140 MMHG | DIASTOLIC BLOOD PRESSURE: 84 MMHG | BODY MASS INDEX: 30.3 KG/M2 | WEIGHT: 150 LBS

## 2024-01-03 DIAGNOSIS — G91.9 HYDROCEPHALUS, UNSPECIFIED: ICD-10-CM

## 2024-01-03 DIAGNOSIS — Z98.890 OTHER SPECIFIED POSTPROCEDURAL STATES: Chronic | ICD-10-CM

## 2024-01-03 PROCEDURE — 72148 MRI LUMBAR SPINE W/O DYE: CPT | Mod: 26

## 2024-01-03 PROCEDURE — 99214 OFFICE O/P EST MOD 30 MIN: CPT

## 2024-01-03 NOTE — PHYSICAL EXAM
[Normal] : affect was normal and insight and judgment were intact [de-identified] : BL ankle swelling  [de-identified] : , lower extremity weakness

## 2024-01-03 NOTE — HISTORY OF PRESENT ILLNESS
[Diabetes] : diabetes [(Patient denies any chest pain, claudication, dyspnea on exertion, orthopnea, palpitations or syncope)] : Patient denies any chest pain, claudication, dyspnea on exertion, orthopnea, palpitations or syncope [Poor (<4 METs)] : Poor (<4 METs) [Aortic Stenosis] : no aortic stenosis [Atrial Fibrillation] : no atrial fibrillation [Coronary Artery Disease] : no coronary artery disease [Recent Myocardial Infarction] : no recent myocardial infarction [Implantable Device/Pacemaker] : no implantable device/pacemaker [Asthma] : no asthma [COPD] : no COPD [Sleep Apnea] : no sleep apnea [Smoker] : not a smoker [Family Member] : no family member with adverse anesthesia reaction/sudden death [Self] : no previous adverse anesthesia reaction [Chronic Anticoagulation] : no chronic anticoagulation [Chronic Kidney Disease] : no chronic kidney disease [FreeTextEntry1] : Spinal tap  [FreeTextEntry2] : 1/9/2023 [FreeTextEntry3] : Dr. Zuniga  [FreeTextEntry4] : Dawn is a 79 yo F HLD, type II diabetes mellitus type 2 , HTN, depression, RA   She is going for spinal tap.   Patient is minimally verbal but states she feels well and denies any acute complaints.

## 2024-01-03 NOTE — ASSESSMENT
[High Risk Surgery - Intraperitoneal, Intrathoracic or Supringuinal Vascular Procedures] : High Risk Surgery - Intraperitoneal, Intrathoracic or Supringuinal Vascular Procedures - No (0) [Ischemic Heart Disease] : Ischemic Heart Disease - No (0) [Congestive Heart Failure] : Congestive Heart Failure - No (0) [Prior Cerebrovascular Accident or TIA] : Prior Cerebrovascular Accident or TIA - No (0) [Insulin-dependent Diabetic (1 point)] : Insulin-dependent Diabetic - Yes (1) [Creatinine >= 2mg/dL (1 Point)] : Creatinine >= 2mg/dL - No (0) [1] : 1 , RCRI Class: II, Risk of Post-Op Cardiac Complications: 6.0%, 95% CI for Risk Estimate: 4.9% - 7.4% [Patient Optimized for Surgery] : Patient optimized for surgery [No Further Testing Recommended] : no further testing recommended [FreeTextEntry4] : Dawn is a 79 yo F HLD, type II diabetes mellitus type 2 , HTN, depression, RA   She is going for spinal tap.   Patient is minimally verbal but states she feels well and denies any acute complaints.   Blood work  been reviewed. Patient is medically optimized for planned procedure. Advised to hold NSAIDs/ASA and natural/OTC supplements one week prior to procedure. May use tylenol/acetaminophen for any pains. Patient understood and agreed with plan. Holding jardiance 1 week prior to procedure, not taking ASA Diabetes managed by endo and aware. Patient to continue current tx

## 2024-01-04 ENCOUNTER — NON-APPOINTMENT (OUTPATIENT)
Age: 79
End: 2024-01-04

## 2024-01-08 ENCOUNTER — TRANSCRIPTION ENCOUNTER (OUTPATIENT)
Age: 79
End: 2024-01-08

## 2024-01-09 ENCOUNTER — TRANSCRIPTION ENCOUNTER (OUTPATIENT)
Age: 79
End: 2024-01-09

## 2024-01-09 ENCOUNTER — RESULT REVIEW (OUTPATIENT)
Age: 79
End: 2024-01-09

## 2024-01-09 ENCOUNTER — OUTPATIENT (OUTPATIENT)
Dept: OUTPATIENT SERVICES | Facility: HOSPITAL | Age: 79
LOS: 1 days | End: 2024-01-09
Payer: MEDICARE

## 2024-01-09 ENCOUNTER — APPOINTMENT (OUTPATIENT)
Dept: NEUROSURGERY | Facility: HOSPITAL | Age: 79
End: 2024-01-09

## 2024-01-09 VITALS
HEIGHT: 57.48 IN | SYSTOLIC BLOOD PRESSURE: 160 MMHG | WEIGHT: 150.8 LBS | TEMPERATURE: 98 F | HEART RATE: 69 BPM | DIASTOLIC BLOOD PRESSURE: 75 MMHG | OXYGEN SATURATION: 96 % | RESPIRATION RATE: 18 BRPM

## 2024-01-09 DIAGNOSIS — Z98.890 OTHER SPECIFIED POSTPROCEDURAL STATES: Chronic | ICD-10-CM

## 2024-01-09 DIAGNOSIS — G91.2 (IDIOPATHIC) NORMAL PRESSURE HYDROCEPHALUS: ICD-10-CM

## 2024-01-09 DIAGNOSIS — Z92.89 PERSONAL HISTORY OF OTHER MEDICAL TREATMENT: Chronic | ICD-10-CM

## 2024-01-09 LAB
APPEARANCE CSF: CLEAR — SIGNIFICANT CHANGE UP
APPEARANCE CSF: CLEAR — SIGNIFICANT CHANGE UP
COLOR CSF: SIGNIFICANT CHANGE UP
COLOR CSF: SIGNIFICANT CHANGE UP
GLUCOSE BLDC GLUCOMTR-MCNC: 135 MG/DL — HIGH (ref 70–99)
GLUCOSE BLDC GLUCOMTR-MCNC: 135 MG/DL — HIGH (ref 70–99)
GLUCOSE BLDC GLUCOMTR-MCNC: 144 MG/DL — HIGH (ref 70–99)
GLUCOSE BLDC GLUCOMTR-MCNC: 144 MG/DL — HIGH (ref 70–99)
GLUCOSE BLDC GLUCOMTR-MCNC: 203 MG/DL — HIGH (ref 70–99)
GLUCOSE BLDC GLUCOMTR-MCNC: 203 MG/DL — HIGH (ref 70–99)
GLUCOSE CSF-MCNC: 108 MG/DL — HIGH (ref 40–70)
GLUCOSE CSF-MCNC: 108 MG/DL — HIGH (ref 40–70)
NEUTROPHILS # CSF: SIGNIFICANT CHANGE UP (ref 0–6)
NEUTROPHILS # CSF: SIGNIFICANT CHANGE UP (ref 0–6)
NRBC NFR CSF: <1 /UL — SIGNIFICANT CHANGE UP (ref 0–5)
NRBC NFR CSF: <1 /UL — SIGNIFICANT CHANGE UP (ref 0–5)
PROT CSF-MCNC: 35 MG/DL — SIGNIFICANT CHANGE UP (ref 15–45)
PROT CSF-MCNC: 35 MG/DL — SIGNIFICANT CHANGE UP (ref 15–45)
RBC # CSF: 56 /UL — HIGH (ref 0–0)
RBC # CSF: 56 /UL — HIGH (ref 0–0)
TUBE TYPE: SIGNIFICANT CHANGE UP
TUBE TYPE: SIGNIFICANT CHANGE UP

## 2024-01-09 PROCEDURE — 62270 DX LMBR SPI PNXR: CPT

## 2024-01-09 PROCEDURE — 70450 CT HEAD/BRAIN W/O DYE: CPT | Mod: 26,MA

## 2024-01-09 RX ORDER — PANTOPRAZOLE SODIUM 20 MG/1
40 TABLET, DELAYED RELEASE ORAL
Refills: 0 | Status: DISCONTINUED | OUTPATIENT
Start: 2024-01-09 | End: 2024-01-24

## 2024-01-09 RX ORDER — POLYETHYLENE GLYCOL 3350 17 G/17G
17 POWDER, FOR SOLUTION ORAL DAILY
Refills: 0 | Status: DISCONTINUED | OUTPATIENT
Start: 2024-01-09 | End: 2024-01-24

## 2024-01-09 RX ORDER — CEFAZOLIN SODIUM 1 G
2000 VIAL (EA) INJECTION ONCE
Refills: 0 | Status: COMPLETED | OUTPATIENT
Start: 2024-01-09 | End: 2024-01-09

## 2024-01-09 RX ORDER — INSULIN LISPRO 100/ML
VIAL (ML) SUBCUTANEOUS AT BEDTIME
Refills: 0 | Status: DISCONTINUED | OUTPATIENT
Start: 2024-01-09 | End: 2024-01-10

## 2024-01-09 RX ORDER — ACETAMINOPHEN 500 MG
1000 TABLET ORAL EVERY 6 HOURS
Refills: 0 | Status: DISCONTINUED | OUTPATIENT
Start: 2024-01-09 | End: 2024-01-24

## 2024-01-09 RX ORDER — LISINOPRIL 2.5 MG/1
40 TABLET ORAL DAILY
Refills: 0 | Status: DISCONTINUED | OUTPATIENT
Start: 2024-01-09 | End: 2024-01-24

## 2024-01-09 RX ORDER — DEXTROSE 50 % IN WATER 50 %
25 SYRINGE (ML) INTRAVENOUS ONCE
Refills: 0 | Status: DISCONTINUED | OUTPATIENT
Start: 2024-01-09 | End: 2024-01-24

## 2024-01-09 RX ORDER — SODIUM CHLORIDE 9 MG/ML
1000 INJECTION, SOLUTION INTRAVENOUS
Refills: 0 | Status: DISCONTINUED | OUTPATIENT
Start: 2024-01-09 | End: 2024-01-24

## 2024-01-09 RX ORDER — SENNA PLUS 8.6 MG/1
2 TABLET ORAL AT BEDTIME
Refills: 0 | Status: DISCONTINUED | OUTPATIENT
Start: 2024-01-09 | End: 2024-01-24

## 2024-01-09 RX ORDER — INSULIN LISPRO 100/ML
VIAL (ML) SUBCUTANEOUS
Refills: 0 | Status: DISCONTINUED | OUTPATIENT
Start: 2024-01-09 | End: 2024-01-10

## 2024-01-09 RX ORDER — NIFEDIPINE 30 MG
30 TABLET, EXTENDED RELEASE 24 HR ORAL DAILY
Refills: 0 | Status: DISCONTINUED | OUTPATIENT
Start: 2024-01-09 | End: 2024-01-24

## 2024-01-09 RX ORDER — DEXTROSE 50 % IN WATER 50 %
12.5 SYRINGE (ML) INTRAVENOUS ONCE
Refills: 0 | Status: DISCONTINUED | OUTPATIENT
Start: 2024-01-09 | End: 2024-01-24

## 2024-01-09 RX ORDER — HYDRALAZINE HCL 50 MG
10 TABLET ORAL
Refills: 0 | Status: DISCONTINUED | OUTPATIENT
Start: 2024-01-09 | End: 2024-01-24

## 2024-01-09 RX ORDER — OXYCODONE HYDROCHLORIDE 5 MG/1
5 TABLET ORAL EVERY 4 HOURS
Refills: 0 | Status: DISCONTINUED | OUTPATIENT
Start: 2024-01-09 | End: 2024-01-09

## 2024-01-09 RX ORDER — GLUCAGON INJECTION, SOLUTION 0.5 MG/.1ML
1 INJECTION, SOLUTION SUBCUTANEOUS ONCE
Refills: 0 | Status: DISCONTINUED | OUTPATIENT
Start: 2024-01-09 | End: 2024-01-24

## 2024-01-09 RX ORDER — ACETAMINOPHEN 500 MG
1000 TABLET ORAL ONCE
Refills: 0 | Status: COMPLETED | OUTPATIENT
Start: 2024-01-09 | End: 2024-01-09

## 2024-01-09 RX ORDER — ONDANSETRON 8 MG/1
4 TABLET, FILM COATED ORAL ONCE
Refills: 0 | Status: DISCONTINUED | OUTPATIENT
Start: 2024-01-09 | End: 2024-01-10

## 2024-01-09 RX ORDER — FENOFIBRATE,MICRONIZED 130 MG
145 CAPSULE ORAL DAILY
Refills: 0 | Status: DISCONTINUED | OUTPATIENT
Start: 2024-01-09 | End: 2024-01-24

## 2024-01-09 RX ORDER — SODIUM CHLORIDE 9 MG/ML
1000 INJECTION, SOLUTION INTRAVENOUS
Refills: 0 | Status: DISCONTINUED | OUTPATIENT
Start: 2024-01-09 | End: 2024-01-10

## 2024-01-09 RX ORDER — ONDANSETRON 8 MG/1
4 TABLET, FILM COATED ORAL EVERY 6 HOURS
Refills: 0 | Status: DISCONTINUED | OUTPATIENT
Start: 2024-01-09 | End: 2024-01-24

## 2024-01-09 RX ORDER — DEXTROSE 50 % IN WATER 50 %
15 SYRINGE (ML) INTRAVENOUS ONCE
Refills: 0 | Status: DISCONTINUED | OUTPATIENT
Start: 2024-01-09 | End: 2024-01-24

## 2024-01-09 RX ORDER — MEMANTINE HYDROCHLORIDE 10 MG/1
10 TABLET ORAL
Refills: 0 | Status: DISCONTINUED | OUTPATIENT
Start: 2024-01-09 | End: 2024-01-24

## 2024-01-09 RX ORDER — OXYCODONE HYDROCHLORIDE 5 MG/1
10 TABLET ORAL EVERY 4 HOURS
Refills: 0 | Status: DISCONTINUED | OUTPATIENT
Start: 2024-01-09 | End: 2024-01-09

## 2024-01-09 RX ORDER — METOPROLOL TARTRATE 50 MG
25 TABLET ORAL DAILY
Refills: 0 | Status: DISCONTINUED | OUTPATIENT
Start: 2024-01-09 | End: 2024-01-24

## 2024-01-09 RX ORDER — SODIUM CHLORIDE 9 MG/ML
1000 INJECTION INTRAMUSCULAR; INTRAVENOUS; SUBCUTANEOUS
Refills: 0 | Status: DISCONTINUED | OUTPATIENT
Start: 2024-01-09 | End: 2024-01-09

## 2024-01-09 RX ADMIN — Medication 1000 MILLIGRAM(S): at 19:00

## 2024-01-09 RX ADMIN — SENNA PLUS 2 TABLET(S): 8.6 TABLET ORAL at 21:26

## 2024-01-09 RX ADMIN — Medication 400 MILLIGRAM(S): at 18:30

## 2024-01-09 RX ADMIN — Medication 30 MILLIGRAM(S): at 21:26

## 2024-01-09 RX ADMIN — Medication 10 MILLIGRAM(S): at 20:18

## 2024-01-09 NOTE — ASU DISCHARGE PLAN (ADULT/PEDIATRIC) - ASU DC SPECIAL INSTRUCTIONSFT
- please keep lumbar puncture site clean and dry, do not submerge wound in water for prolonged periods of time, pat dry after showering, and do not use any creams or ointments to incision.  - You can resume ASA 81mg once daily tomorrow (1/11/2024)  - Please call Dr. Lambert's office to set up appointment for follow up within 1 week.   - Please continue your home insulin regimens. Please take Tresiba at noon starting tomorrow. Please check your finger sticks three times per day before meals and notify your primary care physician or endocrinologist if your finger stick <90 or greater than 180.  - Please follow up with your primary care physician and endocrinologist within 1 week after discharge given the time change of Tresiba and long term medical management

## 2024-01-09 NOTE — ASU DISCHARGE PLAN (ADULT/PEDIATRIC) - CARE PROVIDER_API CALL
Chano Lambert  Neurosurgery  [address]  [place] [address]  Phone: [phone]  Fax: [phone]  Established Patient  Follow Up Time:    Chano Lambert  Neurosurgery  [address]  [place] [address]  Phone: [phone]  Fax: [phone]  Established Patient  Follow Up Time:

## 2024-01-09 NOTE — BRIEF OPERATIVE NOTE - COMMENTS
- Lumbar puncture performed at L3-4 level  - Opening pressure: 21-22  - 30cc CSF collected and sent for analysis

## 2024-01-09 NOTE — ASU DISCHARGE PLAN (ADULT/PEDIATRIC) - CALL YOUR DOCTOR IF YOU HAVE ANY OF THE FOLLOWING:
Bleeding that does not stop/Swelling that gets worse/Pain not relieved by Medications/Wound/Surgical Site with redness, or foul smelling discharge or pus/Numbness, tingling, color or temperature change to extremity/Nausea and vomiting that does not stop/Increased irritability or sluggishness

## 2024-01-09 NOTE — ASU PATIENT PROFILE, ADULT - FALL HARM RISK - HARM RISK INTERVENTIONS
Assistance with ambulation/Assistance OOB with selected safe patient handling equipment/Communicate Risk of Fall with Harm to all staff/Discuss with provider need for PT consult/Monitor gait and stability/Provide patient with walking aids - walker, cane, crutches/Reinforce activity limits and safety measures with patient and family/Tailored Fall Risk Interventions/Visual Cue: Yellow wristband and red socks/Bed in lowest position, wheels locked, appropriate side rails in place/Call bell, personal items and telephone in reach/Instruct patient to call for assistance before getting out of bed or chair/Non-slip footwear when patient is out of bed/[place] to call system/Physically safe environment - no spills, clutter or unnecessary equipment/Purposeful Proactive Rounding/Room/bathroom lighting operational, light cord in reach Assistance with ambulation/Assistance OOB with selected safe patient handling equipment/Communicate Risk of Fall with Harm to all staff/Discuss with provider need for PT consult/Monitor gait and stability/Provide patient with walking aids - walker, cane, crutches/Reinforce activity limits and safety measures with patient and family/Tailored Fall Risk Interventions/Visual Cue: Yellow wristband and red socks/Bed in lowest position, wheels locked, appropriate side rails in place/Call bell, personal items and telephone in reach/Instruct patient to call for assistance before getting out of bed or chair/Non-slip footwear when patient is out of bed/[place] to call system/Physically safe environment - no spills, clutter or unnecessary equipment/Purposeful Proactive Rounding/Room/bathroom lighting operational, light cord in reach

## 2024-01-09 NOTE — ASU DISCHARGE PLAN (ADULT/PEDIATRIC) - NS MD DC FALL RISK RISK
For information on Fall & Injury Prevention, visit: [email or web address] OR  [email or web address] OR  https://www.cdc.gov/steadi/patient.html For information on Fall & Injury Prevention, visit: [email or web address] OR  [email or web address] OR  https://www.cdc.gov/steadi/patient.html

## 2024-01-09 NOTE — ASU PATIENT PROFILE, ADULT - NS PRO AD PATIENT TYPE ON CHART
Donald(Daughter): [phone]/Health Care Proxy (HCP) Donald(Daughter): [phone]/Health Care Proxy (HCP)

## 2024-01-10 ENCOUNTER — APPOINTMENT (OUTPATIENT)
Dept: NEUROSURGERY | Facility: CLINIC | Age: 79
End: 2024-01-10

## 2024-01-10 VITALS — SYSTOLIC BLOOD PRESSURE: 140 MMHG | DIASTOLIC BLOOD PRESSURE: 71 MMHG

## 2024-01-10 LAB
A1C WITH ESTIMATED AVERAGE GLUCOSE RESULT: 8.5 % — HIGH (ref 4–5.6)
A1C WITH ESTIMATED AVERAGE GLUCOSE RESULT: 8.5 % — HIGH (ref 4–5.6)
ANION GAP SERPL CALC-SCNC: 7 MMOL/L — SIGNIFICANT CHANGE UP (ref 5–17)
ANION GAP SERPL CALC-SCNC: 7 MMOL/L — SIGNIFICANT CHANGE UP (ref 5–17)
BUN SERPL-MCNC: 12 MG/DL — SIGNIFICANT CHANGE UP (ref 7–23)
BUN SERPL-MCNC: 12 MG/DL — SIGNIFICANT CHANGE UP (ref 7–23)
CALCIUM SERPL-MCNC: 8.9 MG/DL — SIGNIFICANT CHANGE UP (ref 8.4–10.5)
CALCIUM SERPL-MCNC: 8.9 MG/DL — SIGNIFICANT CHANGE UP (ref 8.4–10.5)
CHLORIDE SERPL-SCNC: 107 MMOL/L — SIGNIFICANT CHANGE UP (ref 96–108)
CHLORIDE SERPL-SCNC: 107 MMOL/L — SIGNIFICANT CHANGE UP (ref 96–108)
CO2 SERPL-SCNC: 27 MMOL/L — SIGNIFICANT CHANGE UP (ref 22–31)
CO2 SERPL-SCNC: 27 MMOL/L — SIGNIFICANT CHANGE UP (ref 22–31)
CREAT SERPL-MCNC: 0.73 MG/DL — SIGNIFICANT CHANGE UP (ref 0.5–1.3)
CREAT SERPL-MCNC: 0.73 MG/DL — SIGNIFICANT CHANGE UP (ref 0.5–1.3)
EGFR: 85 ML/MIN/1.73M2 — SIGNIFICANT CHANGE UP
EGFR: 85 ML/MIN/1.73M2 — SIGNIFICANT CHANGE UP
ESTIMATED AVERAGE GLUCOSE: 197 MG/DL — HIGH (ref 68–114)
ESTIMATED AVERAGE GLUCOSE: 197 MG/DL — HIGH (ref 68–114)
GLUCOSE BLDC GLUCOMTR-MCNC: 221 MG/DL — HIGH (ref 70–99)
GLUCOSE BLDC GLUCOMTR-MCNC: 221 MG/DL — HIGH (ref 70–99)
GLUCOSE BLDC GLUCOMTR-MCNC: 235 MG/DL — HIGH (ref 70–99)
GLUCOSE BLDC GLUCOMTR-MCNC: 235 MG/DL — HIGH (ref 70–99)
GLUCOSE SERPL-MCNC: 249 MG/DL — HIGH (ref 70–99)
GLUCOSE SERPL-MCNC: 249 MG/DL — HIGH (ref 70–99)
GRAM STN FLD: SIGNIFICANT CHANGE UP
GRAM STN FLD: SIGNIFICANT CHANGE UP
HCT VFR BLD CALC: 37.7 % — SIGNIFICANT CHANGE UP (ref 34.5–45)
HCT VFR BLD CALC: 37.7 % — SIGNIFICANT CHANGE UP (ref 34.5–45)
HGB BLD-MCNC: 11.8 G/DL — SIGNIFICANT CHANGE UP (ref 11.5–15.5)
HGB BLD-MCNC: 11.8 G/DL — SIGNIFICANT CHANGE UP (ref 11.5–15.5)
MCHC RBC-ENTMCNC: 25.5 PG — LOW (ref 27–34)
MCHC RBC-ENTMCNC: 25.5 PG — LOW (ref 27–34)
MCHC RBC-ENTMCNC: 31.3 GM/DL — LOW (ref 32–36)
MCHC RBC-ENTMCNC: 31.3 GM/DL — LOW (ref 32–36)
MCV RBC AUTO: 81.6 FL — SIGNIFICANT CHANGE UP (ref 80–100)
MCV RBC AUTO: 81.6 FL — SIGNIFICANT CHANGE UP (ref 80–100)
NRBC # BLD: 0 /100 WBCS — SIGNIFICANT CHANGE UP (ref 0–0)
NRBC # BLD: 0 /100 WBCS — SIGNIFICANT CHANGE UP (ref 0–0)
PLATELET # BLD AUTO: 323 K/UL — SIGNIFICANT CHANGE UP (ref 150–400)
PLATELET # BLD AUTO: 323 K/UL — SIGNIFICANT CHANGE UP (ref 150–400)
POTASSIUM SERPL-MCNC: 4 MMOL/L — SIGNIFICANT CHANGE UP (ref 3.5–5.3)
POTASSIUM SERPL-MCNC: 4 MMOL/L — SIGNIFICANT CHANGE UP (ref 3.5–5.3)
POTASSIUM SERPL-SCNC: 4 MMOL/L — SIGNIFICANT CHANGE UP (ref 3.5–5.3)
POTASSIUM SERPL-SCNC: 4 MMOL/L — SIGNIFICANT CHANGE UP (ref 3.5–5.3)
RBC # BLD: 4.62 M/UL — SIGNIFICANT CHANGE UP (ref 3.8–5.2)
RBC # BLD: 4.62 M/UL — SIGNIFICANT CHANGE UP (ref 3.8–5.2)
RBC # FLD: 14.4 % — SIGNIFICANT CHANGE UP (ref 10.3–14.5)
RBC # FLD: 14.4 % — SIGNIFICANT CHANGE UP (ref 10.3–14.5)
SODIUM SERPL-SCNC: 141 MMOL/L — SIGNIFICANT CHANGE UP (ref 135–145)
SODIUM SERPL-SCNC: 141 MMOL/L — SIGNIFICANT CHANGE UP (ref 135–145)
SPECIMEN SOURCE: SIGNIFICANT CHANGE UP
SPECIMEN SOURCE: SIGNIFICANT CHANGE UP
WBC # BLD: 8.93 K/UL — SIGNIFICANT CHANGE UP (ref 3.8–10.5)
WBC # BLD: 8.93 K/UL — SIGNIFICANT CHANGE UP (ref 3.8–10.5)
WBC # FLD AUTO: 8.93 K/UL — SIGNIFICANT CHANGE UP (ref 3.8–10.5)
WBC # FLD AUTO: 8.93 K/UL — SIGNIFICANT CHANGE UP (ref 3.8–10.5)

## 2024-01-10 PROCEDURE — 70450 CT HEAD/BRAIN W/O DYE: CPT | Mod: MA

## 2024-01-10 PROCEDURE — 87070 CULTURE OTHR SPECIMN AEROBIC: CPT

## 2024-01-10 PROCEDURE — 97166 OT EVAL MOD COMPLEX 45 MIN: CPT

## 2024-01-10 PROCEDURE — 99238 HOSP IP/OBS DSCHRG MGMT 30/<: CPT

## 2024-01-10 PROCEDURE — 62270 DX LMBR SPI PNXR: CPT

## 2024-01-10 PROCEDURE — 97161 PT EVAL LOW COMPLEX 20 MIN: CPT

## 2024-01-10 PROCEDURE — 80048 BASIC METABOLIC PNL TOTAL CA: CPT

## 2024-01-10 PROCEDURE — 89051 BODY FLUID CELL COUNT: CPT

## 2024-01-10 PROCEDURE — 85027 COMPLETE CBC AUTOMATED: CPT

## 2024-01-10 PROCEDURE — 82962 GLUCOSE BLOOD TEST: CPT

## 2024-01-10 PROCEDURE — 83036 HEMOGLOBIN GLYCOSYLATED A1C: CPT

## 2024-01-10 PROCEDURE — 82945 GLUCOSE OTHER FLUID: CPT

## 2024-01-10 PROCEDURE — 87205 SMEAR GRAM STAIN: CPT

## 2024-01-10 PROCEDURE — 84157 ASSAY OF PROTEIN OTHER: CPT

## 2024-01-10 RX ORDER — POLYETHYLENE GLYCOL 3350 17 G/17G
17 POWDER, FOR SOLUTION ORAL
Qty: 0 | Refills: 0 | DISCHARGE
Start: 2024-01-10

## 2024-01-10 RX ORDER — INSULIN LISPRO 100/ML
VIAL (ML) SUBCUTANEOUS
Refills: 0 | Status: DISCONTINUED | OUTPATIENT
Start: 2024-01-10 | End: 2024-01-24

## 2024-01-10 RX ORDER — INSULIN GLARGINE 100 [IU]/ML
30 INJECTION, SOLUTION SUBCUTANEOUS EVERY MORNING
Refills: 0 | Status: DISCONTINUED | OUTPATIENT
Start: 2024-01-10 | End: 2024-01-10

## 2024-01-10 RX ORDER — SENNA PLUS 8.6 MG/1
2 TABLET ORAL
Qty: 0 | Refills: 0 | DISCHARGE
Start: 2024-01-10

## 2024-01-10 RX ORDER — INSULIN GLARGINE 100 [IU]/ML
30 INJECTION, SOLUTION SUBCUTANEOUS EVERY MORNING
Refills: 0 | Status: DISCONTINUED | OUTPATIENT
Start: 2024-01-10 | End: 2024-01-24

## 2024-01-10 RX ADMIN — Medication 10 MILLIGRAM(S): at 05:29

## 2024-01-10 RX ADMIN — MEMANTINE HYDROCHLORIDE 10 MILLIGRAM(S): 10 TABLET ORAL at 05:32

## 2024-01-10 RX ADMIN — INSULIN GLARGINE 30 UNIT(S): 100 INJECTION, SOLUTION SUBCUTANEOUS at 11:01

## 2024-01-10 RX ADMIN — Medication 2: at 07:24

## 2024-01-10 RX ADMIN — Medication 4: at 11:04

## 2024-01-10 RX ADMIN — PANTOPRAZOLE SODIUM 40 MILLIGRAM(S): 20 TABLET, DELAYED RELEASE ORAL at 07:09

## 2024-01-10 RX ADMIN — LISINOPRIL 40 MILLIGRAM(S): 2.5 TABLET ORAL at 05:32

## 2024-01-10 RX ADMIN — SODIUM CHLORIDE 75 MILLILITER(S): 9 INJECTION, SOLUTION INTRAVENOUS at 05:31

## 2024-01-10 NOTE — OCCUPATIONAL THERAPY INITIAL EVALUATION ADULT - ADDITIONAL COMMENTS
Functional history obtained from daughter Donald on telephone, Pt lives with  and daughter in a private house, 4 steps to enter, first floor set up, stand up shower with chair, +w/c. Daughter reports she can transfer with 1-2 steps with assist, requires assist with adl's. able to feed self with set up

## 2024-01-10 NOTE — PHYSICAL THERAPY INITIAL EVALUATION ADULT - PLANNED THERAPY INTERVENTIONS, PT EVAL
GOAL: Stair Negotiation Training: Patient will be able to negotiate up & down 4 steps with unilateral rail, step to gait pattern, in 4 weeks./balance training/bed mobility training/gait training/strengthening/transfer training

## 2024-01-10 NOTE — PROGRESS NOTE ADULT - ASSESSMENT
HPI:  77 YO F PMH HTN, HLD, T2DM, RA, TIA/stroke (on baby aspirin, s/p loop recorder implant), walking has deteriorated over the past three years, urinary incontinence, under evaluation for NPH, presents to [facility] for HIGH VOLUME LUMBAR PUNCTURE 1/9/2024. Denies any palpitations, SOB, N/V, fever or chills.     PROCEDURE: 1/10 s/p Lumbar puncture performed at L3-4 level    PLAN:  - neuro and vital check Q4hrs  - post LP CTH stable no heme  - pain control with tylenol and oxycodone prn but pt did not require any oxycodone  - history of TIA/stroke, ok to resume ASA 81mg tomorrow  - dementia, continue memantine  - HTN, continue hydralazine, lisinopril, nifedipine, and lopressor  - T2DM, on tresiba at home, lantus 30u given this am, and continue ISS, pt and patient's daughter was instructed to resume her home diabetes regimens and follow up with pcp/endocrine within 1 week after discharge  - tolerating CCD diet  - continue home protonix  - senna and miralax for bowel regimens  - activity increase as tolerated  - incentive spirometer  - DVT ppx: b/l SCDs and no chemical ppx given pt will be dc home today  Discharge planning: PT/OT- home PT/OT; Dr. Lambert's office was made aware to set up home care services as outpatient; will be dc home today    Patient's daughter was reached out by cellphone ([phone]). All above information and plan were updated to patient's daughter. Questions and concerns were addressed.    plan discussed with Dr. Lambert    [facility] [id number]   HPI:  77 YO F PMH HTN, HLD, T2DM, RA, TIA/stroke (on baby aspirin, s/p loop recorder implant), walking has deteriorated over the past three years, urinary incontinence, under evaluation for NPH, presents to [facility] for HIGH VOLUME LUMBAR PUNCTURE 1/9/2024. Denies any palpitations, SOB, N/V, fever or chills.     PROCEDURE: 1/10 s/p Lumbar puncture performed at L3-4 level    PLAN:  - neuro and vital check Q4hrs  - post LP CTH stable no heme  - pain control with tylenol and oxycodone prn but pt did not require any oxycodone  - history of TIA/stroke, ok to resume ASA 81mg tomorrow  - dementia, continue memantine  - HTN, continue hydralazine, lisinopril, nifedipine, and lopressor  - T2DM, on tresiba at home, lantus 30u given this am, and continue ISS, pt and patient's daughter was instructed to resume her home diabetes regimens and follow up with pcp/endocrine within 1 week after discharge  - tolerating CCD diet  - continue home protonix  - senna and miralax for bowel regimens  - activity increase as tolerated  - incentive spirometer  - DVT ppx: b/l SCDs and no chemical ppx given pt will be dc home today  Discharge planning: PT/OT- home PT/OT; Dr. Lambert's office was made aware to set up home care services as outpatient; will be dc home today    Patient's daughter was reached out by cellphone ([phone]). All above information and plan were updated to patient's daughter. Questions and concerns were addressed.    plan discussed with Dr. Lambert    [facility] [id number]

## 2024-01-10 NOTE — OCCUPATIONAL THERAPY INITIAL EVALUATION ADULT - DIAGNOSIS, OT EVAL
Patient presents with decreased balance, strength, endurance impacting ability to perform ADLs and functional mobility however appears to be slightly improved from her baseline

## 2024-01-10 NOTE — PHYSICAL THERAPY INITIAL EVALUATION ADULT - ADDITIONAL COMMENTS
Pt lives with her daughter and  in a house with 4 steps to enter. Pt with 1st floor setup. Pt required assistance for all ADLs and functional mobility PTA. Pt amb 2-3 steps with RW from bed<>wheelchair and wheelchair<>toilet. Pt owns RW, wheelchair and shower chair.

## 2024-01-10 NOTE — PROGRESS NOTE ADULT - SUBJECTIVE AND OBJECTIVE BOX
Patient seen and examined s/p high volume LP    Doing well. AOx3, generalized weakness throughout but BUE 4/5 and BLE at least 3/5. SILT    T(C): 36 (01-09-24 @ 17:55), Max: 36.6 (01-09-24 @ 12:10)  HR: 50 (01-09-24 @ 19:45) (47 - 69)  BP: 148/58 (01-09-24 @ 19:45) (99/51 - 162/69)  RR: 15 (01-09-24 @ 19:45) (14 - 18)  SpO2: 100% (01-09-24 @ 19:45) (96% - 100%)                    CAPILLARY BLOOD GLUCOSE      POCT Blood Glucose.: 144 mg/dL (09 Jan 2024 18:18)  POCT Blood Glucose.: 203 mg/dL (09 Jan 2024 12:08)    
Patient was seen at bedside this am. Patient felt well. Denied any discomfort.    OVERNIGHT EVENTS: no acute event overnight    Vital Signs Last 24 Hrs  T(C): 36.2 (10 Neri 2024 11:17), Max: 36.6 (09 Jan 2024 15:51)  T(F): 97.2 (10 Neri 2024 11:17), Max: 97.5 (09 Jan 2024 23:00)  HR: 67 (10 Neri 2024 11:17) (47 - 81)  BP: 140/71 (10 Neri 2024 12:16) (99/51 - 179/77)  BP(mean): 99 (10 Neri 2024 12:16) (73 - 111)  RR: 16 (10 Neri 2024 11:17) (14 - 18)  SpO2: 97% (10 Neri 2024 11:17) (94% - 100%)    Parameters below as of 10 Neri 2024 11:17  Patient On (Oxygen Delivery Method): room air        I&O's Detail    09 Jan 2024 07:01  -  10 Neri 2024 07:00  --------------------------------------------------------  IN:    Lactated Ringers: 600 mL    Oral Fluid: 120 mL  Total IN: 720 mL    OUT:    Voided (mL): 600 mL  Total OUT: 600 mL    Total NET: 120 mL        I&O's Summary    09 Jan 2024 07:01  -  10 Neri 2024 07:00  --------------------------------------------------------  IN: 720 mL / OUT: 600 mL / NET: 120 mL        PHYSICAL EXAM:  Neurological:  awake, alert, oriented to person, place, and date, PERRL, hypophonic, face appears symmetric, following commands, LUE HG 4/5 pain limited from IV, otherwise moving b/l UEs 4+/5, b/l LEs 4-/5, sensation intact to light touch  Cardiovascular: +s1, s2  Respiratory: clear to auscultation b/l  Gastrointestinal: soft, non-distended, non-tender  Genitourinary: voiding  Extremities: warm, dry  Incision/Wound: prior lumbar puncture site C/D/I      LABS:                        11.8   8.93  )-----------( 323      ( 10 Neri 2024 09:32 )             37.7     01-10    141  |  107  |  12  ----------------------------<  249<H>  4.0   |  27  |  0.73    Ca    8.9      10 Neri 2024 09:32        Urinalysis Basic - ( 10 Neri 2024 09:32 )    Color: x / Appearance: x / SG: x / pH: x  Gluc: 249 mg/dL / Ketone: x  / Bili: x / Urobili: x   Blood: x / Protein: x / Nitrite: x   Leuk Esterase: x / RBC: x / WBC x   Sq Epi: x / Non Sq Epi: x / Bacteria: x          CAPILLARY BLOOD GLUCOSE      POCT Blood Glucose.: 221 mg/dL (10 Neri 2024 11:00)  POCT Blood Glucose.: 235 mg/dL (10 Neri 2024 07:18)  POCT Blood Glucose.: 135 mg/dL (09 Jan 2024 21:13)  POCT Blood Glucose.: 144 mg/dL (09 Jan 2024 18:18)      Drug Levels: [] N/A    CSF Analysis: [] N/A  RBC Count - Spinal Fluid: 56 /uL (01-09 @ 22:23)  Glucose, CSF: 108 mg/dL (01-09 @ 22:20)  Protein, CSF: 35 mg/dL (01-09 @ 22:20)      Allergies    No Known Allergies    Intolerances      MEDICATIONS:  Antibiotics:    Neuro:  acetaminophen   IVPB .. 1000 milliGRAM(s) IV Intermittent every 6 hours PRN  memantine 10 milliGRAM(s) Oral two times a day  ondansetron Injectable 4 milliGRAM(s) IV Push every 6 hours PRN  oxyCODONE    IR 5 milliGRAM(s) Oral every 4 hours PRN  oxyCODONE    IR 10 milliGRAM(s) Oral every 4 hours PRN    Anticoagulation:    OTHER:  dextrose 50% Injectable 25 Gram(s) IV Push once  dextrose 50% Injectable 25 Gram(s) IV Push once  dextrose 50% Injectable 12.5 Gram(s) IV Push once  dextrose Oral Gel 15 Gram(s) Oral once PRN  fenofibrate Tablet 145 milliGRAM(s) Oral daily  glucagon  Injectable 1 milliGRAM(s) IntraMuscular once  hydrALAZINE 10 milliGRAM(s) Oral two times a day  insulin glargine Injectable (LANTUS) 30 Unit(s) SubCutaneous every morning  insulin lispro (ADMELOG) corrective regimen sliding scale   SubCutaneous Before meals and at bedtime  lisinopril 40 milliGRAM(s) Oral daily  metoprolol tartrate 25 milliGRAM(s) Oral daily  NIFEdipine XL 30 milliGRAM(s) Oral daily  pantoprazole    Tablet 40 milliGRAM(s) Oral before breakfast  polyethylene glycol 3350 17 Gram(s) Oral daily  senna 2 Tablet(s) Oral at bedtime    IVF:  dextrose 5%. 1000 milliLiter(s) IV Continuous <Continuous>  dextrose 5%. 1000 milliLiter(s) IV Continuous <Continuous>    CULTURES:  Culture Results:   No growth (01-09 @ 22:20)    RADIOLOGY & ADDITIONAL TESTS:

## 2024-01-10 NOTE — PHYSICAL THERAPY INITIAL EVALUATION ADULT - ASR WT BEARING STATUS EVAL
Pt noticed lesion to forehead on Friday, concerned it is a spider bite. Pt reports wound has appeared more red over the last several days.   
no weight-bearing restrictions

## 2024-01-10 NOTE — PHYSICAL THERAPY INITIAL EVALUATION ADULT - GENERAL OBSERVATIONS, REHAB EVAL
Pt received semisupine in bed with +IV, +cardiac monitor, +pulse ox, +BP cuff and +external female catheter. NAD noted.

## 2024-01-10 NOTE — PHYSICAL THERAPY INITIAL EVALUATION ADULT - GAIT TRAINING, PT EVAL
GOAL: Patient will ambulate 25 feet with appropriate assistive device as needed and min Ax1, in 4 weeks.

## 2024-01-10 NOTE — OCCUPATIONAL THERAPY INITIAL EVALUATION ADULT - HEALTH SCREEN CRITERIA
INFECTIOUS DISEASE PROGRESS NOTE      SUBJECTIVE  No acute overnight events  Thick secretions per resp therapist  Fever curve improved    MEDICATIONS  Current Facility-Administered Medications   Medication Dose Route Frequency Provider Last Rate Last Admin   • VANCOMYCIN - PHARMACIST MONITORED Misc   Does not apply See Admin Instructions Saleem Arceo MD       • vancomycin 1,500 mg in sodium chloride 0.9% 250 mL IVPB  1,500 mg Intravenous 2 times per day Saleem Arceo  mL/hr at 05/18/21 2127 1,500 mg at 05/18/21 2127   • meropenem (MERREM) 500 mg in sodium chloride 0.9 % 100 mL IVPB  500 mg Intravenous 4 times per day Kamini Ambrose  mL/hr at 05/19/21 0541 500 mg at 05/19/21 0541   • sodium chloride 0.9% infusion   Intravenous Continuous Camila Peters MD 80 mL/hr at 05/17/21 1758 New Bag at 05/17/21 1758   • ipratropium-albuterol (DUONEB) 0.5-2.5 (3) MG/3ML nebulizer solution 3 mL  3 mL Nebulization Q4H Resp Mendy Aguilera MD   3 mL at 05/19/21 0712   • insulin lispro (ADMELOG,HumaLOG) scheduled AND correction dose   Subcutaneous 4 times per day Mendy Aguilera MD   Stopped at 05/11/21 2336   • budesonide (PULMICORT) nebulizer suspension 0.5 mg  0.5 mg Nebulization BID Resp Mauli Serrano, DO   0.5 mg at 05/19/21 0712   • sodium chloride 3 % nebulizer solution 4 mL  4 mL Nebulization BID Mauli Serrano, DO   4 mL at 05/19/21 0712   • [Held by provider] apixaBAN (ELIQUIS) tablet 5 mg  5 mg Per G Tube 2 times per day Kennedi Leon MD   Stopped at 05/18/21 2100   • pantoprazole (PROTONIX) 40 MG/20ML (compounded) suspension 40 mg  40 mg PEG Tube Daily Kennedi Leon MD   40 mg at 05/18/21 0930   • amLODIPine (NORVASC) tablet 5 mg  5 mg Per G Tube Daily Stu Gonzales DO   5 mg at 05/18/21 0929   • levETIRAcetam ORAL (KepPRA) 100 MG/ML oral solution 750 mg  750 mg PEG Tube 2 times per day Stu Gonzales DO   750 mg at 05/18/21 2124   • loperamide (IMODIUM) 1 MG/7.5ML liquid 2 mg  2  mg OG Tube Q2H PRN Efraín Carnes MD   2 mg at 04/25/21 1553   • acetaminophen (TYLENOL) 160 MG/5ML solution 650 mg  650 mg Per NG tube Q4H PRN Ace Wong, DO   650 mg at 05/18/21 0930   • hydrALAZINE (APRESOLINE) injection 10 mg  10 mg Intravenous Q6H PRN Efraín Carnes MD   10 mg at 04/21/21 1731   • petrolatum (white)-mineral oil ophthalmic ointment 1 application  1 application Both Eyes QHS Efraín Carnes MD   1 application at 05/18/21 2125   • petrolatum (white)-mineral oil ophthalmic ointment 1 application  1 application Both Eyes 4x Daily PRN Nathan Serrano, DO   1 application at 04/21/21 0905   • sodium chloride (PF) 0.9 % injection 10 mL  10 mL Injection PRN Nathan Serrano, DO   10 mL at 05/13/21 2056   • sodium chloride (PF) 0.9 % injection 20 mL  20 mL Injection PRN Nathan Serrano, DO       • sodium chloride 0.9 % flush bag 25 mL  25 mL Intravenous PRN Ace Wong, DO 25 mL/hr at 05/17/21 1256 25 mL at 05/17/21 1256   • sodium chloride (PF) 0.9 % injection 2 mL  2 mL Intracatheter 2 times per day Ace Wong, DO   2 mL at 05/18/21 2127   • sodium chloride 0.9% infusion   Intravenous Continuous PRN Ace Wong, DO   Stopped at 04/16/21 1200   • sodium chloride 0.9% infusion   Intravenous Continuous PRN Ace Wong, DO   Stopped at 04/27/21 1713   • sodium chloride (NORMAL SALINE) 0.9 % bolus 500 mL  500 mL Intravenous PRN Ace Wong, DO   Stopped at 04/15/21 0930   • dextrose 50 % injection 25 g  25 g Intravenous PRN Efraín Carnes MD       • dextrose 50 % injection 12.5 g  12.5 g Intravenous PRN Efraín Carnes MD   12.5 g at 04/27/21 2112   • glucagon (GLUCAGEN) injection 1 mg  1 mg Intramuscular PRN Efraín Carnes MD       • dextrose (GLUTOSE) 40 % gel 15 g  15 g Oral PRN Efraín Carnes MD       • dextrose (GLUTOSE) 40 % gel 30 g  30 g Oral PRN Efraín Carnes MD       • pneumococcal 23-valent vaccine (PNEUMOVAX 23) injection 0.5 mL  0.5 mL Intramuscular  Prior to discharge Cecile Elena MD       • lidocaine (LIDOCARE) 4 % patch 1 patch  1 patch Transdermal Daily Nunu Bonilla CNP   1 patch at 05/18/21 0913         Allergy:  ALLERGIES:  No Known Allergies    PHYSICAL EXAM  Temp:  [98.4 °F (36.9 °C)-100.4 °F (38 °C)] 98.4 °F (36.9 °C)  Heart Rate:  [107-123] 118  Resp:  [19-32] 22  BP: (104-142)/(66-85) 130/81  FiO2 (%):  [28 %] 28 %    General appearance: unresponsive on TC  Heart: regular rate and rhythm, S1, S2 normal  Lungs: course anterior rhonchi  Abdomen: soft, NT, ND, PEG  Extremities: well perfused  Skin: no rash    LABORATORY  Recent Labs   Lab 05/19/21  0644   WBC 4.5   RBC 3.39*   HGB 9.5*   HCT 30.8*        Recent Labs   Lab 05/19/21  0644 05/18/21  0716 05/17/21  0653   SODIUM 138 135 134*   POTASSIUM 3.6 3.9 3.7   CHLORIDE 108* 104 102   CO2 25 27 28   BUN 10 14 15   CREATININE 0.46* 0.48* 0.44*   GLUCOSE 133* 132* 151*   CALCIUM 7.8* 8.3* 8.5     Microbiology Results  (Last 10 results in the past 7 days)    Specimen   Gram Smear   Culture Result   Status       05/17/21  1644         Adequate quality specimen. Acute inflammation with moderate/many neutrophils. Mixed bacteria with no predominant type.         05/17/21  1159         Gram negative bacilli.  Comment:  Detected from anaerobic bottle after 1 Days and 7 hours. [P]         05/12/21  1546         Adequate quality specimen. Acute inflammation with moderate/many neutrophils. Mixed bacteria with no predominant type.                IMPRESSION:  -PSAR sepsis. Source unclear, though may be related to tracheitis given thick secretions noted by respiratory therapy and previous positive sputum cx with PSAR  -Complex fluid collection L iliopsoas and L obturator internus (CT 5/17 should improvement of iliopsoas collection but new collection in obturator internus).  Significance unclear--hematoma vs abscess ?  -h/o ICH with anoxic encephalopathy -CT head 5/18 suggestive of extensive laminar  necrosis in setting of bihemispheric infarction, IPH, with moderate hydrocephalus  -HCV with viremia   -transaminitis: stable  -L spinal compression fracture  -SZ disorder    PLAN:  -continue meropenem, day 3  -discontinue vancomycin  -f/u suscep of PSAR  -check surveillance blood cx  -MRI abd/pelvis ordered as per suggestion of IR  -neuro following--MRI brain ordered  -overall prognosis poor  -will follow         Saleem Arceo MD  [facility]  T [phone]  F [phone]  5/19/2021  7:54 AM   yes

## 2024-01-14 LAB
CULTURE RESULTS: SIGNIFICANT CHANGE UP
CULTURE RESULTS: SIGNIFICANT CHANGE UP
SPECIMEN SOURCE: SIGNIFICANT CHANGE UP
SPECIMEN SOURCE: SIGNIFICANT CHANGE UP

## 2024-01-26 ENCOUNTER — APPOINTMENT (OUTPATIENT)
Dept: ELECTROPHYSIOLOGY | Facility: CLINIC | Age: 79
End: 2024-01-26
Payer: MEDICARE

## 2024-01-26 ENCOUNTER — NON-APPOINTMENT (OUTPATIENT)
Age: 79
End: 2024-01-26

## 2024-01-27 PROCEDURE — 93298 REM INTERROG DEV EVAL SCRMS: CPT

## 2024-01-29 ENCOUNTER — APPOINTMENT (OUTPATIENT)
Dept: NEUROSURGERY | Facility: CLINIC | Age: 79
End: 2024-01-29
Payer: MEDICARE

## 2024-01-29 VITALS
HEIGHT: 59 IN | WEIGHT: 150 LBS | BODY MASS INDEX: 30.24 KG/M2 | OXYGEN SATURATION: 96 % | DIASTOLIC BLOOD PRESSURE: 71 MMHG | HEART RATE: 67 BPM | SYSTOLIC BLOOD PRESSURE: 177 MMHG

## 2024-01-29 PROCEDURE — 99214 OFFICE O/P EST MOD 30 MIN: CPT

## 2024-02-03 NOTE — PHYSICAL EXAM
[General Appearance - Alert] : alert [General Appearance - In No Acute Distress] : in no acute distress [Motor Tone] : muscle tone was normal in all four extremities [FreeTextEntry1] : ox self, doctor's office.  Will answer verbally with one word. [FreeTextEntry6] : PEDRAZA, no drift, good . [FreeTextEntry8] : able to get up with two-person assist, took coaxing to get her to take steps, better taking steps with left leg first.  Took 2-3 steps, than sat down.  Unstead requiring two-person assist.

## 2024-02-03 NOTE — HISTORY OF PRESENT ILLNESS
[FreeTextEntry1] : CHINEDU FORREST is a 78-year-old female with PMH of HTN, HLD, DM RA, who initially presented to 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 Pt was following up for with Dr. Montano regarding the finding and was referred to us for evaluation of NPH. Family report that they were concerned for her gait balance and cognitive decline since many years. She had been having increasing weakness, difficulty ambulating, frequent falls with loss of balance since many years. She was seen by many neurologists and completed many imaging since 2018 which revealed NPH. Apparently, she has not received any proper work up for her NPH.   Previously, patient was referred to Dr. Foley for lumbar puncture for evaluation which was never completed due to pt having a TIA/stroke being on baby aspirin. She was in rehab in 2022 after the hospitalization with improvement with PT. Daughter states that her walking has deteriorated over the past three years. She needs constant supervision for walking. Pt also has urinary incontinent which is an ongoing issues since few years. Incontinence happens at any time during day.  Pt s/p LP on 1/9/24 and was better in AM.  The first week was more alert and now back to baseline.  Pt was walking more and talking more. Pt said that her legs did not feel as heavy.  Her urinary frequency was less.  Less urine in diaper. She is back to her baseline at this time per daughter.

## 2024-02-03 NOTE — ASSESSMENT
[FreeTextEntry1] : 77 y/o female with hydrocephalus with OP 21-22 cm, s/p LP 1/9/23 with mild improvements.  CSF pressure was 21-22.  Given pressure level and mild improvement especially in cognition, and with elevated CSF pressures that have likely been going on for a long time, pt may benefit from shunt and am recommending  shunt. Risks include, but not limited to, bleeding, infection, stroke, subdural hematoma, shunt failure, and need for replacement of all or parts if failure occurs, damage to surrounding structures, bowel injury, pneumothorax.    Unclear of degree to which pt can benefit, but it seems promising that the effects could be significant.  Discussed procedure and post-operative expectations and process of gradual reduction of programmable shunt setting over time.  Family is in agreement with shunt with assent from patient.  We will schedule for beginning March, but pt will have to be better controlled with her DM, with A1c and BP.

## 2024-02-08 ENCOUNTER — APPOINTMENT (OUTPATIENT)
Dept: INTERNAL MEDICINE | Facility: CLINIC | Age: 79
End: 2024-02-08
Payer: MEDICARE

## 2024-02-08 VITALS
BODY MASS INDEX: 30.24 KG/M2 | OXYGEN SATURATION: 97 % | RESPIRATION RATE: 16 BRPM | SYSTOLIC BLOOD PRESSURE: 126 MMHG | DIASTOLIC BLOOD PRESSURE: 71 MMHG | HEIGHT: 59 IN | HEART RATE: 62 BPM | WEIGHT: 150 LBS | TEMPERATURE: 97.2 F

## 2024-02-08 DIAGNOSIS — E11.9 TYPE 2 DIABETES MELLITUS W/OUT COMPLICATIONS: ICD-10-CM

## 2024-02-08 PROCEDURE — 99214 OFFICE O/P EST MOD 30 MIN: CPT

## 2024-02-08 NOTE — ASSESSMENT
[FreeTextEntry1] : 77 yo F w PMHx HLD, DM2, GERD, HTN, dementia, CVA, RA  Patient will be having repeat MRI to follow up on ventriculomegaly for possible IV shunt.  Bereket has modified her diabetic regimen. Trulicity has been increased to max dose. Patient reports no side effects.  Now on PPI for acid reflux which has greatly improved.  She feels well and denies any acute complaints.   HTN Advised low salt diet Diet and exercise discussed. 20 % of weight loss associated to better bp control Decrease alcohol intake Well controlled, cont current tx  HLD Educated eating whole grain foods rich in soluble fiber such as oats and Omega 3 rich fish such as salmon, tout, sardines and bean based meals such as kidney beans, chickpeas and lentils. Small protions of nuts. Limit sugars and alcohol. Atorvastatin 80 mg qd and fenofibrate  T2DM A1c is 8.5 on 1/24 - needs to be less than 8 in order to go through shunt procedure  Managed by bereket Abad- does not like other doctors to modify her regimen, advised patients to communicate these result to further adjust her diabetes medication fu a1c- metformin 500 mg bid and tresiba 36 units at night as per note Trulicity 4.5 mg qeekly  Discussed diabetic diet. Carb allowance of around 130-140 g carbs daily. Educated about foot hygiene and need to see opthalmology and podiatry yearly.  rto 3 mo.

## 2024-02-08 NOTE — PHYSICAL EXAM
[Normal] : affect was normal and insight and judgment were intact [de-identified] : BL ankle swelling  [de-identified] : , lower extremity weakness

## 2024-02-08 NOTE — HISTORY OF PRESENT ILLNESS
[FreeTextEntry1] :  HLD, DM2, GERD, HTN, dementia, CVA, RA  [de-identified] : 79 yo F w PMHx HLD, DM2, GERD, HTN, dementia, CVA, RA  Patient has not had RA infusion for the past year  Had bw done through [facility]- a1c was 8.5, needs to be under 8 to have shunt done

## 2024-03-01 ENCOUNTER — APPOINTMENT (OUTPATIENT)
Dept: ELECTROPHYSIOLOGY | Facility: CLINIC | Age: 79
End: 2024-03-01
Payer: MEDICARE

## 2024-03-01 ENCOUNTER — NON-APPOINTMENT (OUTPATIENT)
Age: 79
End: 2024-03-01

## 2024-03-01 PROCEDURE — 93298 REM INTERROG DEV EVAL SCRMS: CPT

## 2024-03-18 ENCOUNTER — OUTPATIENT (OUTPATIENT)
Dept: OUTPATIENT SERVICES | Facility: HOSPITAL | Age: 79
LOS: 1 days | End: 2024-03-18
Payer: MEDICARE

## 2024-03-18 VITALS
TEMPERATURE: 98 F | OXYGEN SATURATION: 97 % | DIASTOLIC BLOOD PRESSURE: 71 MMHG | RESPIRATION RATE: 17 BRPM | HEART RATE: 77 BPM | HEIGHT: 56.3 IN | WEIGHT: 149.91 LBS | SYSTOLIC BLOOD PRESSURE: 135 MMHG

## 2024-03-18 DIAGNOSIS — Z92.89 PERSONAL HISTORY OF OTHER MEDICAL TREATMENT: Chronic | ICD-10-CM

## 2024-03-18 DIAGNOSIS — I63.9 CEREBRAL INFARCTION, UNSPECIFIED: ICD-10-CM
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
## 2024-03-18 DIAGNOSIS — Z98.890 OTHER SPECIFIED POSTPROCEDURAL STATES: Chronic | ICD-10-CM

## 2024-03-18 DIAGNOSIS — G91.2 (IDIOPATHIC) NORMAL PRESSURE HYDROCEPHALUS: ICD-10-CM

## 2024-03-18 DIAGNOSIS — E11.9 TYPE 2 DIABETES MELLITUS WITHOUT COMPLICATIONS: ICD-10-CM

## 2024-03-18 DIAGNOSIS — Z01.818 ENCOUNTER FOR OTHER PREPROCEDURAL EXAMINATION: ICD-10-CM

## 2024-03-18 PROCEDURE — 83036 HEMOGLOBIN GLYCOSYLATED A1C: CPT

## 2024-03-18 PROCEDURE — 86901 BLOOD TYPING SEROLOGIC RH(D): CPT

## 2024-03-18 PROCEDURE — G0463: CPT

## 2024-03-18 PROCEDURE — 86850 RBC ANTIBODY SCREEN: CPT

## 2024-03-18 PROCEDURE — 87641 MR-STAPH DNA AMP PROBE: CPT

## 2024-03-18 PROCEDURE — 80048 BASIC METABOLIC PNL TOTAL CA: CPT

## 2024-03-18 PROCEDURE — 85027 COMPLETE CBC AUTOMATED: CPT

## 2024-03-18 PROCEDURE — 86900 BLOOD TYPING SEROLOGIC ABO: CPT

## 2024-03-18 PROCEDURE — 87640 STAPH A DNA AMP PROBE: CPT

## 2024-03-18 RX ORDER — DULAGLUTIDE 4.5 MG/.5ML
4.5 INJECTION, SOLUTION SUBCUTANEOUS
Refills: 0 | DISCHARGE

## 2024-03-18 RX ORDER — OMEPRAZOLE 10 MG/1
1 CAPSULE, DELAYED RELEASE ORAL
Refills: 0 | DISCHARGE

## 2024-03-18 RX ORDER — METOPROLOL TARTRATE 50 MG
1 TABLET ORAL
Refills: 0 | DISCHARGE

## 2024-03-18 RX ORDER — ACETAMINOPHEN 500 MG
2 TABLET ORAL
Qty: 0 | Refills: 0 | DISCHARGE

## 2024-03-18 NOTE — H&P PST ADULT - PROBLEM SELECTOR PLAN 1
Ventriculoperitoneal shunt  pt family states surgery 4/1 surgery booked 4/2 emailed surgeon  obtain medical eval pre op to surgery

## 2024-03-18 NOTE — H&P PST ADULT - NS SC CAGE ALCOHOL ANNOYED YOU
Noted. Thanks.  
Please call [facility] to determine date of next appt.  Grandmother brought pt to visit and was not sure.  
SHANNAN at [phone] for the clinic to return our call, received a VM and left office number  
Spoke with  [facility], she has an appointment on 1/23/20 at 10:30 with Dr. Levy.  
no

## 2024-03-18 NOTE — H&P PST ADULT - ASSESSMENT
DASI: wheelchair bound Mets 4 walk with walker and daughter from door of bathroom to shower. Chair seat. Can wash upper body     Symptoms : Denies SOB, SHER, palpitations  Airway : no airway abnormalities , denies prior anesthesia complications   Mallampati : 3  Denies loose teeth    Corneal abrasion risk : Denies       CAPRINI SCORE [CLOT]    AGE RELATED RISK FACTORS                                                       MOBILITY RELATED FACTORS  [ ] Age 41-60 years                                            (1 Point)                  [ ] Bed rest                                                        (1 Point)  [ ] Age: 61-74 years                                           (2 Points)                 [ ] Plaster cast                                                   (2 Points)  [x ] Age= 75 years                                              (3 Points)                 [ ] Bed bound for more than 72 hours                 (2 Points)    DISEASE RELATED RISK FACTORS                                               GENDER SPECIFIC FACTORS  [ x] Edema in the lower extremities                       (1 Point)                  [ ] Pregnancy                                                     (1 Point)  [ ] Varicose veins                                               (1 Point)                  [ ] Post-partum < 6 weeks                                   (1 Point)             [ x] BMI > 25 Kg/m2                                            (1 Point)                  [ ] Hormonal therapy  or oral contraception          (1 Point)                 [ ] Sepsis (in the previous month)                        (1 Point)                  [ ] History of pregnancy complications                 (1 point)  [ ] Pneumonia or serious lung disease                                               [ ] Unexplained or recurrent                     (1 Point)           (in the previous month)                               (1 Point)  [ ] Abnormal pulmonary function test                     (1 Point)                 SURGERY RELATED RISK FACTORS  [ ] Acute myocardial infarction                              (1 Point)                 [ ]  Section                                             (1 Point)  [ ] Congestive heart failure (in the previous month)  (1 Point)               [ ] Minor surgery                                                  (1 Point)   [ ] Inflammatory bowel disease                             (1 Point)                 [ ] Arthroscopic surgery                                        (2 Points)  [ ] Central venous access                                      (2 Points)                [x ] General surgery lasting more than 45 minutes   (2 Points)       [ ] Stroke (in the previous month)                          (5 Points)               [ ] Elective arthroplasty                                         (5 Points)                                                                                                                                               HEMATOLOGY RELATED FACTORS                                                 TRAUMA RELATED RISK FACTORS  [ ] Prior episodes of VTE                                     (3 Points)                [ ] Fracture of the hip, pelvis, or leg                       (5 Points)  [ ] Positive family history for VTE                         (3 Points)                 [ ] Acute spinal cord injury (in the previous month)  (5 Points)  [ ] Prothrombin 37279 A                                     (3 Points)                 [ ] Paralysis  (less than 1 month)                             (5 Points)  [ ] Factor V Leiden                                             (3 Points)                  [ ] Multiple Trauma within 1 month                        (5 Points)  [ ] Lupus anticoagulants                                     (3 Points)                                                           [ ] Anticardiolipin antibodies                               (3 Points)                                                       [ ] High homocysteine in the blood                      (3 Points)                                             [ ] Other congenital or acquired thrombophilia      (3 Points)                                                [ ] Heparin induced thrombocytopenia                  (3 Points)                                          Total Score [        7  ]    Caprini Score 0 - 2:  Low Risk, No VTE Prophylaxis required for most patients, encourage ambulation  Caprini Score 3 - 6:  At Risk, pharmacologic VTE prophylaxis is indicated for most patients (in the absence of a contraindication)  Caprini Score Greater than or = 7:  High Risk, pharmacologic VTE prophylaxis is indicated for most patients (in the absence of a contraindication)

## 2024-03-18 NOTE — H&P PST ADULT - HISTORY OF PRESENT ILLNESS
dementia, trouble walking,  started 2020 s/p spina tap dx NPH     flu 2023  pneumovax 2023   78 year old female with hx of Diabetes Mellitus type 2 (Metformin and Trulicity)  HTN, CVA (low dose aspirin)  dementia,  family noticed trouble walking, speech issues   (minimally verbal)  started 2020  work up s/p spinal  tap dx NPH  for  shunt  (per booking sheet surgery 4/2 per family surgery 4/1 message left with surgeon. Given instructions based on booking sheet of 4/2 )    Diabetes Mellitus type 2  Metformin 4/1  Trulicity last 3/22  FS on arrival     flu 2023  pneumovax 2023   78 year old female with hx of Diabetes Mellitus type 2 (Metformin and Trulicity)  HTN, CVA (low dose aspirin)  dementia,  family noticed trouble walking, speech issues   (minimally verbal)  started 2020  work up s/p spinal  tap dx NPH  for  shunt  (per booking sheet surgery 4/2 per family surgery 4/1 message left with surgeon. Given instructions based on booking sheet of 4/2 )    Diabetes Mellitus type 2  Metformin 4/1  Trulicity last 3/22  FS on arrival     flu 2023  pneumovax 2023     **Surgery is scheduled for 4/2, patient will stop aspirin 7 days pre op as per Dr Lambert. Dr Lambert's office will call patient to update. I spoke with patient's daughter Donald, last dose of aspirin will be 3/25/2024

## 2024-03-18 NOTE — H&P PST ADULT - ATTENDING COMMENTS
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  Pt had mild improvement in sensorium and gait after a high volume tap with opening pressure noted to be >20 cm.  After discussion of R/B/A with family, they desire to proceed.  Pt today is alert, awake in NAD, PEDRAZA with np drift.  Pt and family ok to proceed.  Discussed anticipated course in hospital.

## 2024-03-18 NOTE — H&P PST ADULT - PAIN GOAL
Continuity of Care Form    Patient Name: Ngoc Carolina   :  3/11/1926  MRN:  [id number]    Admit date:  2020  Discharge date:  2020    Code Status Order: DNR-CC   Advance Directives:      Admitting Physician:  Darleen De León MD  PCP: Donaldo Saucedo MD    Discharging Nurse: [facility] Unit/Room#: 9HP-8838/9873-38  Discharging Unit Phone Number: ***    Emergency Contact:   Extended Emergency Contact Information  Primary Emergency Contact: [address] Phone: [phone]  Relation: Child  Secondary Emergency Contact: [address] [facility] Phone: [phone]  Relation: Child    Past Surgical History:  Past Surgical History:   Procedure Laterality Date    SINUSOTOMY  2012    ENDOSCOPIC SINUSOTOMY, OPEN LEFT FRONTAL SINUSOTOMY    TOTAL THYROIDECTOMY      Benign condition       Immunization History:   Immunization History   Administered Date(s) Administered    PPD Test 2012       Active Problems:  Patient Active Problem List   Diagnosis Code    Hypertension I10    Cognitive deficits R41.89    COPD (chronic obstructive pulmonary disease) ([address] J44.9    DM2 (diabetes mellitus, type 2) ([address] E11.9    Compression fracture of L5 lumbar vertebra S32.050A    Osteoporosis M81.0    Dementia ([address] F03.90    Asthma J45.909    Hypothyroidism (acquired) E03.9    UTI (urinary tract infection) N39.0    Acute encephalopathy G93.40    Failure to thrive in adult R62.7    Dehydration E86.0    Moderate protein-calorie malnutrition ([address] E44.0       Isolation/Infection:   Isolation            No Isolation          Patient Infection Status       Infection Onset Added Last Indicated Last Indicated By Review Planned Expiration Resolved Resolved By    COVID-19 Rule Out 20 COVID-19 (Ordered) 20              Nurse Assessment:  Last Vital Signs: BP (!) 143/78   Pulse 109   Temp 97.6 °F (36.4 °C) (Axillary)   Resp 18   Ht 5' 2\" (1.575 m)   Wt 123 lb Therapies: {THERAPEUTIC INTERVENTION:5000214495}  Weight Bearing Status/Restrictions: Magali HENLEY Weight Bearin:::0}  Other Medical Equipment (for information only, NOT a DME order):  {EQUIPMENT:088959302}  Other Treatments: ***    Patient's personal belongings (please select all that are sent with patient):  {CHP DME Belongings:188077990:::0}    RN SIGNATURE:  {Esignature:[id number]:::0}    CASE MANAGEMENT/SOCIAL WORK SECTION    Inpatient Status Date: ***    Readmission Risk Assessment Score:  Readmission Risk              Risk of Unplanned Readmission:        22           Discharging to Facility/ Agency   Name:   Address:  Phone:  Fax:    Dialysis Facility (if applicable)   Name:  Address:  Dialysis Schedule:  Phone:  Fax:    / signature: {Esignature:[id number]:::0}    PHYSICIAN SECTION    Prognosis: Poor    Condition at Discharge: Stable    Rehab Potential (if transferring to Rehab): Poor    Recommended Labs or Other Treatments After Discharge: hosp[facility] care at [facility]     Physician Certification: I certify the above information and transfer of Ngoc Carolina  is necessary for the continuing treatment of the diagnosis listed and that she requires Hospice for less 30 days.      Update Admission H&P: No change in H&P    PHYSICIAN SIGNATURE:  Electronically signed by Darleen De León MD on 20 at 12:50 PM EST 0

## 2024-03-18 NOTE — H&P PST ADULT - NSICDXPASTMEDICALHX_GEN_ALL_CORE_FT
PAST MEDICAL HISTORY:  Anxiety     Dementia     Diabetes mellitus, type 2     H/O urinary incontinence     History of balance disorder     History of CVA (cerebrovascular accident)     HLD (hyperlipidemia)     Hypertension     Leg swelling     Major depression     Rheumatoid arthritis     Speech impairment     UTI (urinary tract infection)      PAST MEDICAL HISTORY:  Anxiety     Dementia     Diabetes mellitus, type 2     Fall in home, sequela     H/O urinary incontinence     History of balance disorder     History of CVA (cerebrovascular accident)     HLD (hyperlipidemia)     Hypertension     Leg swelling     Major depression     Rheumatoid arthritis     Speech impairment     UTI (urinary tract infection)

## 2024-03-18 NOTE — H&P PST ADULT - NSICDXPASTSURGICALHX_GEN_ALL_CORE_FT
PAST SURGICAL HISTORY:  H/O carpal tunnel repair     H/O colonoscopy     H/O local excision of skin lesion     H/O toe surgery     History of dental surgery      PAST SURGICAL HISTORY:  H/O carpal tunnel repair     H/O colonoscopy     H/O local excision of skin lesion     H/O toe surgery     History of dental surgery     History of loop recorder

## 2024-03-18 NOTE — H&P PST ADULT - PROBLEM SELECTOR PLAN 3
Metformin last 4/1  Trulicity last 3/22  obtain endocrine note Metformin last 4/1  Trulicity last 3/22

## 2024-03-18 NOTE — H&P PST ADULT - PEDAL EDEMA SEVERITY
[facility] Pediatric Acute Visit   Chief Complaint   Patient presents with   • Cough   • Other     White spots on belly.     History given by Father    HISTORY OF PRESENT ILLNESS:     Zeina is a 4 m.o. female    Pt presents today with new rash/ dry white skin on belly and mild  intermittent dry cough 1-2 times a day generally at night when she lays down . Denies any associated fever, chills, know exposure to Covid, family has been social distancing . The patient has had these symptoms for the last 3-4 weeks now.    Symptoms are waxing and waning, The symptoms are worse with nothing in particular , and improved by nothing in particular.     Overall the patient is Active. Playful. Appetite normal, activity normal, sleeping well. Ample wet diapers.     Pt is breast fed 4-5 times a day - along with some solids.     OTC medication :  None    Sick contacts No -     ROS:     Constitutional: Denies  Fever   Energy and activity levels are normal.   Fussiness/irritability: Denies   HENT:   Ear pulling Denies    Nasal congestion and Rhinorrhea Denies .   Eyes: Conjunctivitis: Denies .  Respiratory: shortness of breath/ noisy breathing/  wheezing Denies   Cardiovascular:  Changes in color, extremity swellingDenies   Gastrointestinal: Vomiting, abdominal pain, diarrhea, constipation or blood in stool Denies   Genitourinary: Denies Signs of pain with urination, number of wet diapers per day 5- 6  Musculoskeletal: Signs of pain with movement of extremities Denies   Skin: + dry skin and areas of white patches to skin.     Patient Active Problem List    Diagnosis Date Noted   • Occipital lymphadenopathy 03/02/2020       Social History:    Social History     Lifestyle   • Physical activity     Days per week: Not on file     Minutes per session: Not on file   • Stress: Not on file   Relationships   • Social connections     Talks on phone: Not on file     Gets together: Not on file     Attends Hoahaoism service: Not on file  "    Active member of club or organization: Not on file     Attends meetings of clubs or organizations: Not on file     Relationship status: Not on file   • Intimate partner violence     Fear of current or ex partner: Not on file     Emotionally abused: Not on file     Physically abused: Not on file     Forced sexual activity: Not on file   Other Topics Concern   • Not on file   Social History Narrative   • Not on file    Lives with parents      Immunizations:  Up to date       Disposition of Patient : interacts appropriate for age -     No current outpatient medications on file.     No current facility-administered medications for this visit.         Patient has no known allergies.    PAST MEDICAL HISTORY:   History reviewed. No pertinent past medical history.    Family History   Problem Relation Age of Onset   • Cancer Maternal Grandmother         had ovarian cancer (Copied from mother's family history at birth)       History reviewed. No pertinent surgical history.    OBJECTIVE:     Vitals:   Pulse 112   Temp 37.5 °C (99.5 °F) (Temporal)   Resp 32   Ht 0.66 m (2' 2\")   Wt 6.72 kg (14 lb 13 oz)   SpO2 99%     Labs:  No visits with results within 2 Day(s) from this visit.   Latest known visit with results is:   Admission on 2019, Discharged on 01/02/2020   Component Date Value   • CV Ph 2019 7.31    • CV Pco2 2019 41.5    • CV Po2 2019 22.9    • CV O2 Saturation 2019 47.2    • CV Hco3 2019 20    • CV Base Excess 2019 -6    • Glucose - Accu-Ck 2019 106*       Physical Exam:  Gen:         Alert, active, well appearing  HEENT:   PERRLA, Right TM normal LeftTM normal  . oropharynx with no  erythema or exudate. There is no nasal congestion and no  rhinorrhea.   Neck:       Supple, FROM without tenderness, no lymphadenopathy  Lungs:     Clear to auscultation bilaterally, no wheezes/rales/rhonchi  CV:          Regular rate and rhythm. Normal S1/S2.  No murmurs.  Good " pulses throughout.  Brisk capillary refill.  Abd:        Soft non tender, non distended. Normal active bowel sounds.  No rebound or  guarding. No hepatosplenomegaly.  Skin/ Ext: Cap refill <3sec, warm/well perfused,  no edema normal extremities,BENAVIDES. + overall pruritis and dry skin with areas of excoriation . There is noted associated areas of hypopigmentation to chest. Pt does have 2 noted areas of hyperpigmentation (left buttock and left lower back) however father states has had since birth. So sign of secondary infection at this time.     ASSESSMENT AND PLAN:   4 m.o. female    1. Infantile eczema  Instructed parent to use moisturizer/thick emollient (Cetaphhil, Aquaphor, Eucerin, Aveeno, etc.) TOP BID to all affected areas. Make sure to apply emollient immediately after bathing. Administer prescribed topical steroid as needed for red, itchy inflamed areas. May use OTC anti-histamine such as Benadryl for itching. RTC for worsening skin breakdown, any purulent drainage, increased pain/discomfort, a fever >101.5, or for any other concerns.     - hydrocortisone 2.5 % Ointment; Apply 1 Application to affected area(s) 2 times a day.  Dispense: 1 g; Refill: 1    2. Skin hypopigmentation  Discussed not abnormal for children with eczema and or ethnicity to have areas of hypopigmentation. I have discussed the role of melanin in the skin.   Discussed if worsening and or spreading may place referral to dermatology, however not really indicated at this time. Discussed importance of keep eczema under control with moisture creams etc and use of unscented soaps detergents as above.   Follow up if symptoms persist/worsen, new symptoms develop or any other concerns arise. Patient/Caregiver verbalized understanding and agrees with the plan of care.                     2+

## 2024-03-20 ENCOUNTER — NON-APPOINTMENT (OUTPATIENT)
Age: 79
End: 2024-03-20
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
## 2024-03-20 PROBLEM — R47.9 UNSPECIFIED SPEECH DISTURBANCES: Chronic | Status: ACTIVE | Noted: 2024-03-18

## 2024-03-25 NOTE — ED ADULT NURSE NOTE - PUPILS PERRL
Attn; Charline Collins, RNCC for Dr. Trivedi   *Patient with question on RX/Hives after MRI     Mayra Storey LPN  Neurosurgery    yes

## 2024-04-01 ENCOUNTER — TRANSCRIPTION ENCOUNTER (OUTPATIENT)
Age: 79
End: 2024-04-01

## 2024-04-01 ENCOUNTER — APPOINTMENT (OUTPATIENT)
Dept: INTERNAL MEDICINE | Facility: CLINIC | Age: 79
End: 2024-04-01
Payer: MEDICARE

## 2024-04-01 VITALS
OXYGEN SATURATION: 96 % | WEIGHT: 150 LBS | TEMPERATURE: 97.1 F | HEART RATE: 60 BPM | BODY MASS INDEX: 30.24 KG/M2 | RESPIRATION RATE: 16 BRPM | HEIGHT: 59 IN

## 2024-04-01 DIAGNOSIS — Z01.818 ENCOUNTER FOR OTHER PREPROCEDURAL EXAMINATION: ICD-10-CM

## 2024-04-01 PROCEDURE — 99214 OFFICE O/P EST MOD 30 MIN: CPT

## 2024-04-01 NOTE — ASSESSMENT
[High Risk Surgery - Intraperitoneal, Intrathoracic or Supringuinal Vascular Procedures] : High Risk Surgery - Intraperitoneal, Intrathoracic or Supringuinal Vascular Procedures - No (0) [Ischemic Heart Disease] : Ischemic Heart Disease - No (0) [Congestive Heart Failure] : Congestive Heart Failure - No (0) [Prior Cerebrovascular Accident or TIA] : Prior Cerebrovascular Accident or TIA - No (0) [Insulin-dependent Diabetic (1 point)] : Insulin-dependent Diabetic - Yes (1) [Creatinine >= 2mg/dL (1 Point)] : Creatinine >= 2mg/dL - No (0) [1] : 1 , RCRI Class: II, Risk of Post-Op Cardiac Complications: 6.0%, 95% CI for Risk Estimate: 4.9% - 7.4% [Patient Optimized for Surgery] : Patient optimized for surgery [No Further Testing Recommended] : no further testing recommended [FreeTextEntry4] : Dawn is a 79 yo F HLD, type II diabetes mellitus type 2 , HTN, depression, RA   Patient is minimally verbal but states she feels well and denies any acute complaints.   Blood work  been reviewed. Patient is medically optimized for planned procedure. Advised to hold NSAIDs/ASA and natural/OTC supplements one week prior to procedure. May use tylenol/acetaminophen for any pains. Patient understood and agreed with plan. Holding trulicity 1 week prior to procedure, not taking ASA Diabetes managed by endo and aware. Patient to continue current tx - NO METFORMIN or insulin day of surgery

## 2024-04-01 NOTE — HISTORY OF PRESENT ILLNESS
[Diabetes] : diabetes [(Patient denies any chest pain, claudication, dyspnea on exertion, orthopnea, palpitations or syncope)] : Patient denies any chest pain, claudication, dyspnea on exertion, orthopnea, palpitations or syncope [Poor (<4 METs)] : Poor (<4 METs) [Aortic Stenosis] : no aortic stenosis [Atrial Fibrillation] : no atrial fibrillation [Recent Myocardial Infarction] : no recent myocardial infarction [Coronary Artery Disease] : no coronary artery disease [Implantable Device/Pacemaker] : no implantable device/pacemaker [COPD] : no COPD [Asthma] : no asthma [Sleep Apnea] : no sleep apnea [Smoker] : not a smoker [Family Member] : no family member with adverse anesthesia reaction/sudden death [Self] : no previous adverse anesthesia reaction [Chronic Kidney Disease] : no chronic kidney disease [Chronic Anticoagulation] : no chronic anticoagulation [FreeTextEntry1] :  shunt  [FreeTextEntry2] : 4/2/2024 [FreeTextEntry3] : Dr. Zuniga  [FreeTextEntry4] : Dawn is a 77 yo F HLD, type II diabetes mellitus type 2 , HTN, depression, RA  Patient will be going for ventricular-peritoneal shunt  Patient holding trulicity (last dose 1 week ago)   Patient is minimally verbal but states she feels well and denies any acute complaints.

## 2024-04-01 NOTE — PHYSICAL EXAM
[Coordination Grossly Intact] : coordination grossly intact [No Focal Deficits] : no focal deficits [Normal] : affect was normal and insight and judgment were intact [Normal Gait] : normal gait

## 2024-04-02 ENCOUNTER — APPOINTMENT (OUTPATIENT)
Dept: NEUROSURGERY | Facility: HOSPITAL | Age: 79
End: 2024-04-02

## 2024-04-02 ENCOUNTER — INPATIENT (INPATIENT)
Facility: HOSPITAL | Age: 79
LOS: 2 days | Discharge: NURSING, REHAB OR LONG-TERM CARE | DRG: 32 | End: 2024-04-05
Attending: NEUROLOGICAL SURGERY | Admitting: NEUROLOGICAL SURGERY
Payer: MEDICARE

## 2024-04-02 VITALS
WEIGHT: 149.91 LBS | DIASTOLIC BLOOD PRESSURE: 75 MMHG | HEIGHT: 56.3 IN | OXYGEN SATURATION: 97 % | HEART RATE: 60 BPM | RESPIRATION RATE: 17 BRPM | SYSTOLIC BLOOD PRESSURE: 180 MMHG | TEMPERATURE: 99 F

## 2024-04-02 DIAGNOSIS — Z98.890 OTHER SPECIFIED POSTPROCEDURAL STATES: Chronic | ICD-10-CM

## 2024-04-02 DIAGNOSIS — G91.2 (IDIOPATHIC) NORMAL PRESSURE HYDROCEPHALUS: ICD-10-CM

## 2024-04-02 DIAGNOSIS — Z92.89 PERSONAL HISTORY OF OTHER MEDICAL TREATMENT: Chronic | ICD-10-CM

## 2024-04-02 LAB
ANION GAP SERPL CALC-SCNC: 13 MMOL/L — SIGNIFICANT CHANGE UP (ref 5–17)
APTT BLD: 30 SEC — SIGNIFICANT CHANGE UP (ref 24.5–35.6)
BUN SERPL-MCNC: 17 MG/DL — SIGNIFICANT CHANGE UP (ref 7–23)
CALCIUM SERPL-MCNC: 8.4 MG/DL — SIGNIFICANT CHANGE UP (ref 8.4–10.5)
CHLORIDE SERPL-SCNC: 107 MMOL/L — SIGNIFICANT CHANGE UP (ref 96–108)
CO2 SERPL-SCNC: 24 MMOL/L — SIGNIFICANT CHANGE UP (ref 22–31)
CREAT SERPL-MCNC: 0.61 MG/DL — SIGNIFICANT CHANGE UP (ref 0.5–1.3)
EGFR: 92 ML/MIN/1.73M2 — SIGNIFICANT CHANGE UP
GLUCOSE BLDC GLUCOMTR-MCNC: 123 MG/DL — HIGH (ref 70–99)
GLUCOSE BLDC GLUCOMTR-MCNC: 190 MG/DL — HIGH (ref 70–99)
GLUCOSE BLDC GLUCOMTR-MCNC: 202 MG/DL — HIGH (ref 70–99)
GLUCOSE SERPL-MCNC: 224 MG/DL — HIGH (ref 70–99)
HCT VFR BLD CALC: 38.3 % — SIGNIFICANT CHANGE UP (ref 34.5–45)
HGB BLD-MCNC: 11.9 G/DL — SIGNIFICANT CHANGE UP (ref 11.5–15.5)
INR BLD: 1.12 RATIO — SIGNIFICANT CHANGE UP (ref 0.85–1.18)
MCHC RBC-ENTMCNC: 24.9 PG — LOW (ref 27–34)
MCHC RBC-ENTMCNC: 31.1 GM/DL — LOW (ref 32–36)
MCV RBC AUTO: 80.3 FL — SIGNIFICANT CHANGE UP (ref 80–100)
NRBC # BLD: 0 /100 WBCS — SIGNIFICANT CHANGE UP (ref 0–0)
PLATELET # BLD AUTO: 309 K/UL — SIGNIFICANT CHANGE UP (ref 150–400)
POTASSIUM SERPL-MCNC: 3.6 MMOL/L — SIGNIFICANT CHANGE UP (ref 3.5–5.3)
POTASSIUM SERPL-SCNC: 3.6 MMOL/L — SIGNIFICANT CHANGE UP (ref 3.5–5.3)
PROTHROM AB SERPL-ACNC: 11.7 SEC — SIGNIFICANT CHANGE UP (ref 9.5–13)
RBC # BLD: 4.77 M/UL — SIGNIFICANT CHANGE UP (ref 3.8–5.2)
RBC # FLD: 14.2 % — SIGNIFICANT CHANGE UP (ref 10.3–14.5)
SODIUM SERPL-SCNC: 144 MMOL/L — SIGNIFICANT CHANGE UP (ref 135–145)
WBC # BLD: 12.71 K/UL — HIGH (ref 3.8–10.5)
WBC # FLD AUTO: 12.71 K/UL — HIGH (ref 3.8–10.5)

## 2024-04-02 PROCEDURE — 61781 SCAN PROC CRANIAL INTRA: CPT

## 2024-04-02 PROCEDURE — 62223 ESTABLISH BRAIN CAVITY SHUNT: CPT | Mod: 62,GC

## 2024-04-02 PROCEDURE — 70450 CT HEAD/BRAIN W/O DYE: CPT | Mod: 26

## 2024-04-02 PROCEDURE — 62223 ESTABLISH BRAIN CAVITY SHUNT: CPT | Mod: 62

## 2024-04-02 PROCEDURE — 70250 X-RAY EXAM OF SKULL: CPT | Mod: 26

## 2024-04-02 PROCEDURE — 74018 RADEX ABDOMEN 1 VIEW: CPT | Mod: 26

## 2024-04-02 DEVICE — VALVE CODMAN CERTAS PLUS INLINE WITH SIPHONGUARD: Type: IMPLANTABLE DEVICE | Status: FUNCTIONAL

## 2024-04-02 DEVICE — SURGIFLO MATRIX WITH THROMBIN KIT: Type: IMPLANTABLE DEVICE | Status: FUNCTIONAL

## 2024-04-02 DEVICE — BACTISEAL SHUNT CATH KIT WITH BARIUM: Type: IMPLANTABLE DEVICE | Status: FUNCTIONAL

## 2024-04-02 DEVICE — SURGIFOAM PAD 8CM X 12.5CM X 10MM (100): Type: IMPLANTABLE DEVICE | Status: FUNCTIONAL

## 2024-04-02 RX ORDER — ACETAMINOPHEN 500 MG
650 TABLET ORAL EVERY 6 HOURS
Refills: 0 | Status: DISCONTINUED | OUTPATIENT
Start: 2024-04-02 | End: 2024-04-05

## 2024-04-02 RX ORDER — DEXTROSE 50 % IN WATER 50 %
25 SYRINGE (ML) INTRAVENOUS ONCE
Refills: 0 | Status: DISCONTINUED | OUTPATIENT
Start: 2024-04-02 | End: 2024-04-02

## 2024-04-02 RX ORDER — FENOFIBRATE,MICRONIZED 130 MG
145 CAPSULE ORAL DAILY
Refills: 0 | Status: DISCONTINUED | OUTPATIENT
Start: 2024-04-02 | End: 2024-04-05

## 2024-04-02 RX ORDER — SODIUM CHLORIDE 9 MG/ML
1000 INJECTION, SOLUTION INTRAVENOUS
Refills: 0 | Status: DISCONTINUED | OUTPATIENT
Start: 2024-04-02 | End: 2024-04-02

## 2024-04-02 RX ORDER — DEXTROSE 50 % IN WATER 50 %
15 SYRINGE (ML) INTRAVENOUS ONCE
Refills: 0 | Status: DISCONTINUED | OUTPATIENT
Start: 2024-04-02 | End: 2024-04-05

## 2024-04-02 RX ORDER — DEXTROSE 50 % IN WATER 50 %
15 SYRINGE (ML) INTRAVENOUS ONCE
Refills: 0 | Status: DISCONTINUED | OUTPATIENT
Start: 2024-04-02 | End: 2024-04-02

## 2024-04-02 RX ORDER — POLYETHYLENE GLYCOL 3350 17 G/17G
17 POWDER, FOR SOLUTION ORAL DAILY
Refills: 0 | Status: DISCONTINUED | OUTPATIENT
Start: 2024-04-02 | End: 2024-04-02

## 2024-04-02 RX ORDER — FENOFIBRATE,MICRONIZED 130 MG
1 CAPSULE ORAL
Refills: 0 | DISCHARGE

## 2024-04-02 RX ORDER — ACETAMINOPHEN 500 MG
1000 TABLET ORAL EVERY 6 HOURS
Refills: 0 | Status: DISCONTINUED | OUTPATIENT
Start: 2024-04-02 | End: 2024-04-02

## 2024-04-02 RX ORDER — METHOCARBAMOL 500 MG/1
500 TABLET, FILM COATED ORAL EVERY 8 HOURS
Refills: 0 | Status: DISCONTINUED | OUTPATIENT
Start: 2024-04-02 | End: 2024-04-02

## 2024-04-02 RX ORDER — CEFAZOLIN SODIUM 1 G
2000 VIAL (EA) INJECTION EVERY 8 HOURS
Refills: 0 | Status: COMPLETED | OUTPATIENT
Start: 2024-04-02 | End: 2024-04-03

## 2024-04-02 RX ORDER — HYDRALAZINE HCL 50 MG
1 TABLET ORAL
Refills: 0 | DISCHARGE

## 2024-04-02 RX ORDER — HYDRALAZINE HCL 50 MG
10 TABLET ORAL
Refills: 0 | Status: DISCONTINUED | OUTPATIENT
Start: 2024-04-02 | End: 2024-04-02

## 2024-04-02 RX ORDER — INSULIN LISPRO 100/ML
VIAL (ML) SUBCUTANEOUS
Refills: 0 | Status: DISCONTINUED | OUTPATIENT
Start: 2024-04-02 | End: 2024-04-05

## 2024-04-02 RX ORDER — OMEPRAZOLE 10 MG/1
1 CAPSULE, DELAYED RELEASE ORAL
Refills: 0 | DISCHARGE

## 2024-04-02 RX ORDER — SODIUM CHLORIDE 9 MG/ML
1000 INJECTION, SOLUTION INTRAVENOUS
Refills: 0 | Status: DISCONTINUED | OUTPATIENT
Start: 2024-04-02 | End: 2024-04-05

## 2024-04-02 RX ORDER — SENNA PLUS 8.6 MG/1
2 TABLET ORAL AT BEDTIME
Refills: 0 | Status: DISCONTINUED | OUTPATIENT
Start: 2024-04-02 | End: 2024-04-04

## 2024-04-02 RX ORDER — LISINOPRIL 2.5 MG/1
40 TABLET ORAL DAILY
Refills: 0 | Status: DISCONTINUED | OUTPATIENT
Start: 2024-04-02 | End: 2024-04-02

## 2024-04-02 RX ORDER — METOPROLOL TARTRATE 50 MG
25 TABLET ORAL DAILY
Refills: 0 | Status: DISCONTINUED | OUTPATIENT
Start: 2024-04-02 | End: 2024-04-05

## 2024-04-02 RX ORDER — METHOCARBAMOL 500 MG/1
500 TABLET, FILM COATED ORAL EVERY 8 HOURS
Refills: 0 | Status: DISCONTINUED | OUTPATIENT
Start: 2024-04-02 | End: 2024-04-05

## 2024-04-02 RX ORDER — ONDANSETRON 8 MG/1
4 TABLET, FILM COATED ORAL EVERY 6 HOURS
Refills: 0 | Status: DISCONTINUED | OUTPATIENT
Start: 2024-04-02 | End: 2024-04-05

## 2024-04-02 RX ORDER — METFORMIN HYDROCHLORIDE 850 MG/1
1 TABLET ORAL
Refills: 0 | DISCHARGE

## 2024-04-02 RX ORDER — ONDANSETRON 8 MG/1
4 TABLET, FILM COATED ORAL EVERY 6 HOURS
Refills: 0 | Status: DISCONTINUED | OUTPATIENT
Start: 2024-04-02 | End: 2024-04-02

## 2024-04-02 RX ORDER — OXYCODONE HYDROCHLORIDE 5 MG/1
10 TABLET ORAL EVERY 4 HOURS
Refills: 0 | Status: DISCONTINUED | OUTPATIENT
Start: 2024-04-02 | End: 2024-04-04

## 2024-04-02 RX ORDER — HYDRALAZINE HCL 50 MG
10 TABLET ORAL
Refills: 0 | Status: DISCONTINUED | OUTPATIENT
Start: 2024-04-02 | End: 2024-04-05

## 2024-04-02 RX ORDER — OXYCODONE HYDROCHLORIDE 5 MG/1
5 TABLET ORAL EVERY 4 HOURS
Refills: 0 | Status: DISCONTINUED | OUTPATIENT
Start: 2024-04-02 | End: 2024-04-02

## 2024-04-02 RX ORDER — OXYCODONE HYDROCHLORIDE 5 MG/1
10 TABLET ORAL EVERY 4 HOURS
Refills: 0 | Status: DISCONTINUED | OUTPATIENT
Start: 2024-04-02 | End: 2024-04-02

## 2024-04-02 RX ORDER — PANTOPRAZOLE SODIUM 20 MG/1
40 TABLET, DELAYED RELEASE ORAL
Refills: 0 | Status: DISCONTINUED | OUTPATIENT
Start: 2024-04-02 | End: 2024-04-05

## 2024-04-02 RX ORDER — LIDOCAINE HCL 20 MG/ML
0.2 VIAL (ML) INJECTION ONCE
Refills: 0 | Status: DISCONTINUED | OUTPATIENT
Start: 2024-04-02 | End: 2024-04-04

## 2024-04-02 RX ORDER — NIFEDIPINE 30 MG
30 TABLET, EXTENDED RELEASE 24 HR ORAL DAILY
Refills: 0 | Status: DISCONTINUED | OUTPATIENT
Start: 2024-04-02 | End: 2024-04-05

## 2024-04-02 RX ORDER — METOPROLOL TARTRATE 50 MG
25 TABLET ORAL DAILY
Refills: 0 | Status: DISCONTINUED | OUTPATIENT
Start: 2024-04-02 | End: 2024-04-02

## 2024-04-02 RX ORDER — ASPIRIN/CALCIUM CARB/MAGNESIUM 324 MG
1 TABLET ORAL
Refills: 0 | DISCHARGE

## 2024-04-02 RX ORDER — DEXTROSE 50 % IN WATER 50 %
12.5 SYRINGE (ML) INTRAVENOUS ONCE
Refills: 0 | Status: DISCONTINUED | OUTPATIENT
Start: 2024-04-02 | End: 2024-04-02

## 2024-04-02 RX ORDER — SODIUM CHLORIDE 9 MG/ML
3 INJECTION INTRAMUSCULAR; INTRAVENOUS; SUBCUTANEOUS EVERY 8 HOURS
Refills: 0 | Status: DISCONTINUED | OUTPATIENT
Start: 2024-04-02 | End: 2024-04-02

## 2024-04-02 RX ORDER — OXYCODONE HYDROCHLORIDE 5 MG/1
5 TABLET ORAL EVERY 4 HOURS
Refills: 0 | Status: DISCONTINUED | OUTPATIENT
Start: 2024-04-02 | End: 2024-04-04

## 2024-04-02 RX ORDER — DULAGLUTIDE 4.5 MG/.5ML
4.5 INJECTION, SOLUTION SUBCUTANEOUS
Refills: 0 | DISCHARGE

## 2024-04-02 RX ORDER — MEMANTINE HYDROCHLORIDE 10 MG/1
10 TABLET ORAL
Refills: 0 | Status: DISCONTINUED | OUTPATIENT
Start: 2024-04-02 | End: 2024-04-02

## 2024-04-02 RX ORDER — INSULIN LISPRO 100/ML
VIAL (ML) SUBCUTANEOUS
Refills: 0 | Status: DISCONTINUED | OUTPATIENT
Start: 2024-04-02 | End: 2024-04-02

## 2024-04-02 RX ORDER — CHLORHEXIDINE GLUCONATE 213 G/1000ML
1 SOLUTION TOPICAL ONCE
Refills: 0 | Status: DISCONTINUED | OUTPATIENT
Start: 2024-04-02 | End: 2024-04-02

## 2024-04-02 RX ORDER — MEMANTINE HYDROCHLORIDE 10 MG/1
1 TABLET ORAL
Refills: 0 | DISCHARGE

## 2024-04-02 RX ORDER — SODIUM CHLORIDE 9 MG/ML
1000 INJECTION, SOLUTION INTRAVENOUS
Refills: 0 | Status: DISCONTINUED | OUTPATIENT
Start: 2024-04-02 | End: 2024-04-03

## 2024-04-02 RX ORDER — INFLUENZA VIRUS VACCINE 15; 15; 15; 15 UG/.5ML; UG/.5ML; UG/.5ML; UG/.5ML
0.7 SUSPENSION INTRAMUSCULAR ONCE
Refills: 0 | Status: DISCONTINUED | OUTPATIENT
Start: 2024-04-02 | End: 2024-04-05

## 2024-04-02 RX ORDER — CEFAZOLIN SODIUM 1 G
2000 VIAL (EA) INJECTION ONCE
Refills: 0 | Status: COMPLETED | OUTPATIENT
Start: 2024-04-02 | End: 2024-04-02

## 2024-04-02 RX ORDER — DEXTROSE 50 % IN WATER 50 %
12.5 SYRINGE (ML) INTRAVENOUS ONCE
Refills: 0 | Status: DISCONTINUED | OUTPATIENT
Start: 2024-04-02 | End: 2024-04-05

## 2024-04-02 RX ORDER — INSULIN LISPRO 100/ML
VIAL (ML) SUBCUTANEOUS AT BEDTIME
Refills: 0 | Status: DISCONTINUED | OUTPATIENT
Start: 2024-04-02 | End: 2024-04-02

## 2024-04-02 RX ORDER — GLUCAGON INJECTION, SOLUTION 0.5 MG/.1ML
1 INJECTION, SOLUTION SUBCUTANEOUS ONCE
Refills: 0 | Status: DISCONTINUED | OUTPATIENT
Start: 2024-04-02 | End: 2024-04-05

## 2024-04-02 RX ORDER — NIFEDIPINE 30 MG
1 TABLET, EXTENDED RELEASE 24 HR ORAL
Refills: 0 | DISCHARGE

## 2024-04-02 RX ORDER — LISINOPRIL 2.5 MG/1
1 TABLET ORAL
Refills: 0 | DISCHARGE

## 2024-04-02 RX ORDER — FENOFIBRATE,MICRONIZED 130 MG
145 CAPSULE ORAL DAILY
Refills: 0 | Status: DISCONTINUED | OUTPATIENT
Start: 2024-04-02 | End: 2024-04-02

## 2024-04-02 RX ORDER — SENNA PLUS 8.6 MG/1
2 TABLET ORAL AT BEDTIME
Refills: 0 | Status: DISCONTINUED | OUTPATIENT
Start: 2024-04-02 | End: 2024-04-02

## 2024-04-02 RX ORDER — NIFEDIPINE 30 MG
30 TABLET, EXTENDED RELEASE 24 HR ORAL DAILY
Refills: 0 | Status: DISCONTINUED | OUTPATIENT
Start: 2024-04-02 | End: 2024-04-02

## 2024-04-02 RX ORDER — LIDOCAINE HCL 20 MG/ML
0.2 VIAL (ML) INJECTION ONCE
Refills: 0 | Status: DISCONTINUED | OUTPATIENT
Start: 2024-04-02 | End: 2024-04-02

## 2024-04-02 RX ORDER — GLUCAGON INJECTION, SOLUTION 0.5 MG/.1ML
1 INJECTION, SOLUTION SUBCUTANEOUS ONCE
Refills: 0 | Status: DISCONTINUED | OUTPATIENT
Start: 2024-04-02 | End: 2024-04-02

## 2024-04-02 RX ORDER — METOPROLOL TARTRATE 50 MG
1 TABLET ORAL
Refills: 0 | DISCHARGE

## 2024-04-02 RX ORDER — LISINOPRIL 2.5 MG/1
40 TABLET ORAL DAILY
Refills: 0 | Status: DISCONTINUED | OUTPATIENT
Start: 2024-04-02 | End: 2024-04-05

## 2024-04-02 RX ORDER — POLYETHYLENE GLYCOL 3350 17 G/17G
17 POWDER, FOR SOLUTION ORAL DAILY
Refills: 0 | Status: DISCONTINUED | OUTPATIENT
Start: 2024-04-02 | End: 2024-04-04

## 2024-04-02 RX ORDER — ACETAMINOPHEN 500 MG
1000 TABLET ORAL EVERY 6 HOURS
Refills: 0 | Status: COMPLETED | OUTPATIENT
Start: 2024-04-02 | End: 2024-04-03

## 2024-04-02 RX ORDER — DEXTROSE 50 % IN WATER 50 %
25 SYRINGE (ML) INTRAVENOUS ONCE
Refills: 0 | Status: DISCONTINUED | OUTPATIENT
Start: 2024-04-02 | End: 2024-04-05

## 2024-04-02 RX ORDER — MEMANTINE HYDROCHLORIDE 10 MG/1
10 TABLET ORAL
Refills: 0 | Status: DISCONTINUED | OUTPATIENT
Start: 2024-04-02 | End: 2024-04-05

## 2024-04-02 RX ORDER — ACETAMINOPHEN 500 MG
650 TABLET ORAL EVERY 6 HOURS
Refills: 0 | Status: DISCONTINUED | OUTPATIENT
Start: 2024-04-02 | End: 2024-04-02

## 2024-04-02 RX ORDER — PANTOPRAZOLE SODIUM 20 MG/1
40 TABLET, DELAYED RELEASE ORAL
Refills: 0 | Status: DISCONTINUED | OUTPATIENT
Start: 2024-04-02 | End: 2024-04-02

## 2024-04-02 RX ADMIN — Medication 1000 MILLIGRAM(S): at 17:45

## 2024-04-02 RX ADMIN — Medication 400 MILLIGRAM(S): at 17:30

## 2024-04-02 RX ADMIN — SODIUM CHLORIDE 75 MILLILITER(S): 9 INJECTION, SOLUTION INTRAVENOUS at 14:55

## 2024-04-02 RX ADMIN — Medication 1000 MILLIGRAM(S): at 23:15

## 2024-04-02 RX ADMIN — Medication 400 MILLIGRAM(S): at 23:00

## 2024-04-02 RX ADMIN — LISINOPRIL 40 MILLIGRAM(S): 2.5 TABLET ORAL at 17:50

## 2024-04-02 RX ADMIN — Medication 100 MILLIGRAM(S): at 17:49

## 2024-04-02 RX ADMIN — Medication 10 MILLIGRAM(S): at 17:50

## 2024-04-02 RX ADMIN — OXYCODONE HYDROCHLORIDE 10 MILLIGRAM(S): 5 TABLET ORAL at 20:15

## 2024-04-02 RX ADMIN — OXYCODONE HYDROCHLORIDE 10 MILLIGRAM(S): 5 TABLET ORAL at 21:00

## 2024-04-02 RX ADMIN — MEMANTINE HYDROCHLORIDE 10 MILLIGRAM(S): 10 TABLET ORAL at 17:51

## 2024-04-02 RX ADMIN — PANTOPRAZOLE SODIUM 40 MILLIGRAM(S): 20 TABLET, DELAYED RELEASE ORAL at 17:52

## 2024-04-02 RX ADMIN — Medication 30 MILLIGRAM(S): at 17:52

## 2024-04-02 RX ADMIN — Medication 4: at 18:13

## 2024-04-02 RX ADMIN — Medication 145 MILLIGRAM(S): at 17:49

## 2024-04-02 RX ADMIN — Medication 25 MILLIGRAM(S): at 17:52

## 2024-04-02 NOTE — BRIEF OPERATIVE NOTE - OPERATION/FINDINGS
shunt placement. Veress needle entry into the LUQ. Abdomen insufflated. Supraumbilical 5mm port placed and RUQ port placed.  Shunt guided into the abdomen. Flow confirmed by visualization after placement into abdomen. Hemostasis confirmed and ports closed with 4-0 Monocryl.  
Right ventriculoperitoneal shunt  - Certas @4

## 2024-04-02 NOTE — PROGRESS NOTE ADULT - SUBJECTIVE AND OBJECTIVE BOX
Patient seen and examined at bedside.    --Anticoagulation--    T(C): 36.5 (04-02-24 @ 16:00), Max: 37 (04-02-24 @ 08:38)  HR: 79 (04-02-24 @ 17:15) (60 - 80)  BP: 170/71 (04-02-24 @ 17:15) (146/60 - 180/75)  RR: 18 (04-02-24 @ 17:15) (16 - 18)  SpO2: 98% (04-02-24 @ 17:15) (94% - 99%)  Wt(kg): --    Exam: Awake, Ox2, PERRL, minimally verbal but responds to questions, FC, PEDRAZA strong

## 2024-04-03 LAB
GLUCOSE BLDC GLUCOMTR-MCNC: 182 MG/DL — HIGH (ref 70–99)
GLUCOSE BLDC GLUCOMTR-MCNC: 252 MG/DL — HIGH (ref 70–99)
GLUCOSE BLDC GLUCOMTR-MCNC: 264 MG/DL — HIGH (ref 70–99)
GLUCOSE BLDC GLUCOMTR-MCNC: 272 MG/DL — HIGH (ref 70–99)

## 2024-04-03 RX ORDER — HYDRALAZINE HCL 50 MG
10 TABLET ORAL ONCE
Refills: 0 | Status: COMPLETED | OUTPATIENT
Start: 2024-04-03 | End: 2024-04-03

## 2024-04-03 RX ORDER — LABETALOL HCL 100 MG
10 TABLET ORAL ONCE
Refills: 0 | Status: COMPLETED | OUTPATIENT
Start: 2024-04-03 | End: 2024-04-03

## 2024-04-03 RX ORDER — HYDRALAZINE HCL 50 MG
5 TABLET ORAL ONCE
Refills: 0 | Status: COMPLETED | OUTPATIENT
Start: 2024-04-03 | End: 2024-04-03

## 2024-04-03 RX ORDER — ENOXAPARIN SODIUM 100 MG/ML
40 INJECTION SUBCUTANEOUS EVERY 24 HOURS
Refills: 0 | Status: DISCONTINUED | OUTPATIENT
Start: 2024-04-04 | End: 2024-04-05

## 2024-04-03 RX ADMIN — Medication 400 MILLIGRAM(S): at 11:30

## 2024-04-03 RX ADMIN — Medication 2: at 07:08

## 2024-04-03 RX ADMIN — OXYCODONE HYDROCHLORIDE 10 MILLIGRAM(S): 5 TABLET ORAL at 06:30

## 2024-04-03 RX ADMIN — Medication 10 MILLIGRAM(S): at 17:41

## 2024-04-03 RX ADMIN — Medication 1000 MILLIGRAM(S): at 21:00

## 2024-04-03 RX ADMIN — SODIUM CHLORIDE 75 MILLILITER(S): 9 INJECTION, SOLUTION INTRAVENOUS at 18:22

## 2024-04-03 RX ADMIN — OXYCODONE HYDROCHLORIDE 10 MILLIGRAM(S): 5 TABLET ORAL at 06:00

## 2024-04-03 RX ADMIN — Medication 145 MILLIGRAM(S): at 16:18

## 2024-04-03 RX ADMIN — OXYCODONE HYDROCHLORIDE 10 MILLIGRAM(S): 5 TABLET ORAL at 00:30

## 2024-04-03 RX ADMIN — Medication 1 TABLET(S): at 12:15

## 2024-04-03 RX ADMIN — Medication 400 MILLIGRAM(S): at 20:22

## 2024-04-03 RX ADMIN — OXYCODONE HYDROCHLORIDE 10 MILLIGRAM(S): 5 TABLET ORAL at 00:15

## 2024-04-03 RX ADMIN — OXYCODONE HYDROCHLORIDE 5 MILLIGRAM(S): 5 TABLET ORAL at 20:05

## 2024-04-03 RX ADMIN — Medication 5 MILLIGRAM(S): at 16:44

## 2024-04-03 RX ADMIN — LISINOPRIL 40 MILLIGRAM(S): 2.5 TABLET ORAL at 06:00

## 2024-04-03 RX ADMIN — MEMANTINE HYDROCHLORIDE 10 MILLIGRAM(S): 10 TABLET ORAL at 06:00

## 2024-04-03 RX ADMIN — Medication 10 MILLIGRAM(S): at 20:38

## 2024-04-03 RX ADMIN — Medication 6: at 16:31

## 2024-04-03 RX ADMIN — Medication 100 MILLIGRAM(S): at 02:00

## 2024-04-03 RX ADMIN — PANTOPRAZOLE SODIUM 40 MILLIGRAM(S): 20 TABLET, DELAYED RELEASE ORAL at 07:02

## 2024-04-03 RX ADMIN — Medication 10 MILLIGRAM(S): at 06:00

## 2024-04-03 RX ADMIN — Medication 1000 MILLIGRAM(S): at 12:00

## 2024-04-03 RX ADMIN — MEMANTINE HYDROCHLORIDE 10 MILLIGRAM(S): 10 TABLET ORAL at 17:41

## 2024-04-03 RX ADMIN — Medication 25 MILLIGRAM(S): at 06:00

## 2024-04-03 RX ADMIN — Medication 30 MILLIGRAM(S): at 06:00

## 2024-04-03 RX ADMIN — OXYCODONE HYDROCHLORIDE 5 MILLIGRAM(S): 5 TABLET ORAL at 19:35

## 2024-04-03 RX ADMIN — Medication 10 MILLIGRAM(S): at 20:09

## 2024-04-03 RX ADMIN — Medication 6: at 11:59

## 2024-04-03 NOTE — OCCUPATIONAL THERAPY INITIAL EVALUATION ADULT - LEVEL OF INDEPENDENCE: DRESS UPPER BODY, OT EVAL
Pt to ED via walk in c.o increased dizziness while laying on his right side and standing for long periods of time. Pt states he was recently seen for ingesting oregano oil, since then dizziness symptoms started. Pt denies headache, neuro intact. Van neg. Tolerates po intake. Denies CP/SOB.   
stand-by assist

## 2024-04-03 NOTE — PHYSICAL THERAPY INITIAL EVALUATION ADULT - CRITERIA FOR SKILLED THERAPEUTIC INTERVENTIONS
Anesthesia Post Evaluation    Patient: Aravind Carl    Procedure(s) Performed: * No procedures listed *    Final Anesthesia Type: epidural      Patient location during evaluation: labor & delivery  Patient participation: Yes- Able to Participate  Level of consciousness: awake and alert  Post-procedure vital signs: reviewed and stable  Pain management: adequate  Airway patency: patent    PONV status at discharge: No PONV  Anesthetic complications: no      Cardiovascular status: blood pressure returned to baseline  Respiratory status: unassisted  Hydration status: euvolemic  Follow-up not needed.              Vitals Value Taken Time   /75 02/13/24 0811   Temp 36.7 °C (98 °F) 02/13/24 0811   Pulse 75 02/13/24 0811   Resp 17 02/13/24 0811   SpO2 94 % 02/13/24 0811         No case tracking events are documented in the log.      Pain/Armin Score: Pain Rating Prior to Med Admin: 4 (2/13/2024 11:38 AM)          
impairments found

## 2024-04-03 NOTE — PROGRESS NOTE ADULT - NUTRITIONAL ASSESSMENT
Pt seen earlier today.  Awake and more talkative than preop- hungry. Voice is hypophonic, PEDRAZA with no drift.  Right head dressing with some blood saturation in part.  CT with no bood, good position of ventricular catheter (Certas set to 7) and drop of pneumocephalus in right frontal horn.  Pt advance to regular diabetic diet if bowel sounds, PT, OOB.  Rehab recommendation.

## 2024-04-03 NOTE — PROGRESS NOTE ADULT - SUBJECTIVE AND OBJECTIVE BOX
Surgery Daily Progress Note    Subjective:   Patient seen and assessed at bedside this AM. Denies acute onset abdominal pain, N/V/D, fevers, chills, SOB, CP, lightheadedness      Objective:  Vital Signs  T(C): 36.2 (04-03 @ 08:00), Max: 36.6 (04-02 @ 18:00)  HR: 76 (04-03 @ 10:56) (64 - 91)  BP: 154/68 (04-03 @ 10:56) (140/68 - 188/73)  RR: 16 (04-03 @ 10:00) (14 - 18)  SpO2: 98% (04-03 @ 10:56) (94% - 100%)  04-02-24 @ 07:01  -  04-03-24 @ 07:00  --------------------------------------------------------  IN:  Total IN: 0 mL    OUT:    Indwelling Catheter - Urethral (mL): 2000 mL  Total OUT: 2000 mL    Total NET: -2000 mL      04-03-24 @ 07:01  -  04-03-24 @ 11:34  --------------------------------------------------------  IN:  Total IN: 0 mL    OUT:    Indwelling Catheter - Urethral (mL): 500 mL  Total OUT: 500 mL    Total NET: -500 mL          Physical Exam:  GEN: resting in bed comfortably in NAD  RESP: no increased WOB  ABD: soft, non-distended, non-tender without rebound tenderness or guarding. incision sites c/d/i  EXTR: warm, well-perfused without gross deformities; spontaneous movement in b/l U/L extrem  NEURO: AAOx4    Labs:                        11.9   12.71 )-----------( 309      ( 02 Apr 2024 14:41 )             38.3   04-02    144  |  107  |  17  ----------------------------<  224<H>  3.6   |  24  |  0.61    Ca    8.4      02 Apr 2024 14:41      CAPILLARY BLOOD GLUCOSE      POCT Blood Glucose.: 182 mg/dL (03 Apr 2024 07:05)  POCT Blood Glucose.: 202 mg/dL (02 Apr 2024 17:57)  POCT Blood Glucose.: 190 mg/dL (02 Apr 2024 14:08)      Medications:   MEDICATIONS  (STANDING):  dextrose 5%. 1000 milliLiter(s) (100 mL/Hr) IV Continuous <Continuous>  dextrose 5%. 1000 milliLiter(s) (50 mL/Hr) IV Continuous <Continuous>  dextrose 50% Injectable 12.5 Gram(s) IV Push once  dextrose 50% Injectable 25 Gram(s) IV Push once  dextrose 50% Injectable 25 Gram(s) IV Push once  fenofibrate Tablet 145 milliGRAM(s) Oral daily  glucagon  Injectable 1 milliGRAM(s) IntraMuscular once  hydrALAZINE 10 milliGRAM(s) Oral two times a day  influenza  Vaccine (HIGH DOSE) 0.7 milliLiter(s) IntraMuscular once  insulin lispro (ADMELOG) corrective regimen sliding scale   SubCutaneous three times a day before meals  lactated ringers. 1000 milliLiter(s) (75 mL/Hr) IV Continuous <Continuous>  lidocaine 1% Injectable 0.2 milliLiter(s) Local Injection once  lisinopril 40 milliGRAM(s) Oral daily  memantine 10 milliGRAM(s) Oral two times a day  metoprolol succinate ER 25 milliGRAM(s) Oral daily  multivitamin 1 Tablet(s) Oral daily  NIFEdipine XL 30 milliGRAM(s) Oral daily  pantoprazole    Tablet 40 milliGRAM(s) Oral before breakfast    MEDICATIONS  (PRN):  acetaminophen     Tablet .. 650 milliGRAM(s) Oral every 6 hours PRN Mild Pain (1 - 3)  acetaminophen   IVPB .. 1000 milliGRAM(s) IV Intermittent every 6 hours PRN Temp greater or equal to 38.5C (101.3F), Severe Pain (7 - 10)  bisacodyl 5 milliGRAM(s) Oral daily PRN Constipation  dextrose Oral Gel 15 Gram(s) Oral once PRN Blood Glucose LESS THAN 70 milliGRAM(s)/deciliter  methocarbamol 500 milliGRAM(s) Oral every 8 hours PRN Muscle Spasm  ondansetron   Disintegrating Tablet 4 milliGRAM(s) Oral every 6 hours PRN Nausea  oxyCODONE    IR 5 milliGRAM(s) Oral every 4 hours PRN Moderate Pain (4 - 6)  oxyCODONE    IR 10 milliGRAM(s) Oral every 4 hours PRN Severe Pain (7 - 10)  polyethylene glycol 3350 17 Gram(s) Oral daily PRN Constipation  senna 2 Tablet(s) Oral at bedtime PRN Constipation

## 2024-04-03 NOTE — OCCUPATIONAL THERAPY INITIAL EVALUATION ADULT - ADDITIONAL COMMENTS
As per PT who spoke to  daughter Donald, Patient lives in a private house, 4 steps to enter and lives with spouse and daughter. At baseline she only ambulates very short distances with RW. She owns a shower chair. Pt required assist with adl's such as showering and dressing

## 2024-04-03 NOTE — PHYSICAL THERAPY INITIAL EVALUATION ADULT - PERTINENT HX OF CURRENT PROBLEM, REHAB EVAL
78 year old female with hx of Diabetes Mellitus type 2 (Metformin and Trulicity)  HTN, CVA (low dose aspirin)  dementia,  family noticed trouble walking, speech issues   (minimally verbal)  started 2020  work up s/p spinal  tap dx NPH  for  shunt.

## 2024-04-03 NOTE — PROGRESS NOTE ADULT - ASSESSMENT
78 year old female with hx of Diabetes Mellitus type 2 (Metformin and Trulicity)  HTN, CVA (low dose aspirin)  dementia,  family noticed trouble walking, speech issues   (minimally verbal)  started 2020  work up s/p spinal  tap dx NPH  for  shunt  (per booking sheet surgery 4/2 per family surgery 4/1 message left with surgeon. Given instructions based on booking sheet of 4/2 )    Diabetes Mellitus type 2  Metformin 4/1  Trulicity last 3/22  FS on arrival     flu 2023  pneumovax 2023     **Surgery is scheduled for 4/2, patient will stop aspirin 7 days pre op as per Dr Lambert. Dr Lambert's office will call patient to update. I spoke with patient's daughter Donald, last dose of aspirin will be 3/25/2024 (18 Mar 2024 17:28)    PROCEDURE: Adm 4/2  shunt insertion Certas set @ 7    POD#1    PLAN:  Neuro: DC Cameron today once OOB to TOV FU. Leukocytosis from steroids in OR. Acute blood loss anemia from surgery. FU if can start SQH tonite.   Inc activity/OOB. PT recomm [facility] once bed available    Surgery  note of 4/3-RECOMMENDATIONS: - advance diet as tolerated- steri strips to fall off themselves- please reconsult surgery prn Plan discussed with surgical attending, Dr. Noonan  ACS/Trauma Surgery [id number]     Respiratory: Patient instructed to use incentive spirometer [ X] YES [ ] NO              DVT ppx: [ ] SQL [ ] SQH and Venodynes [ ] Left [ ] Right [ X] Bilateral    Discharge Planning:  The patient was evaluated by PT and recommended [facility].   She was subsequently DC on>>>in stable condition.    More than 30 minutes spent on total encounter: more than 50% of the visit was spent on educating the patient and family regarding condition, medications, follow up plans, signs and symptoms to be concerned with, preparing paperwork, and questions answered regarding discharge.

## 2024-04-03 NOTE — PROGRESS NOTE ADULT - ASSESSMENT
ASSESSMENT: 79yo F s/p  shunt for NPH. Surgery consulted for assistance with abdominal aspect. Pt recovering well.     RECOMMENDATIONS:   - advance diet as tolerated  - steri strips to fall off themselves  - please reconsult surgery prn    Plan discussed with surgical attending, Dr. Noonan    [facility]/Trauma Surgery [id number]

## 2024-04-03 NOTE — PROGRESS NOTE ADULT - SUBJECTIVE AND OBJECTIVE BOX
SUBJECTIVE: This is my initial visit with this pt, seen in PACU.  Comfortable, but mild incisional pain. NAD    OVERNIGHT EVENTS: None    Vital Signs Last 24 Hrs  T(C): 36.2 (03 Apr 2024 08:00), Max: 36.6 (02 Apr 2024 18:00)  T(F): 97.2 (03 Apr 2024 08:00), Max: 97.9 (02 Apr 2024 18:00)  HR: 80 (03 Apr 2024 12:00) (64 - 91)  BP: 154/68 (03 Apr 2024 10:56) (140/68 - 188/73)  BP(mean): 98 (03 Apr 2024 09:00) (87 - 119)  RR: 16 (03 Apr 2024 12:00) (14 - 18)  SpO2: 98% (03 Apr 2024 12:00) (94% - 100%)    Parameters below as of 03 Apr 2024 09:00  Patient On (Oxygen Delivery Method): room air    IVF: [ ] IVL [X ] NS+K@   DIET: [ ] Regular [X ] CCD [ ] Renal [ ] Puree [ ] Dysphagia [ ] Tube Feeds:   PCA: [ ] YES [ X] NO   RENTERIA: [X ] YES [ ] NO [ ] VOID DC'd 4/3  BM: [ ] YES [X ] NO     DRAINS: NA    PHYSICAL EXAM:    General: No Acute Distress     Neurological: Awake, alert oriented to person and place, think year is 2004.  Following Commands, PERRL, EOMI, Face Symmetrical, Speech dysarthic, Moving all extremities, Muscle Strength normal in all four extremities, No Drift, Sensation to Light Touch Intact    Pulmonary: Clear to Auscultation, No Rales, No Rhonchi, No Wheezes     Cardiovascular: S1, S2, Regular Rate and Rhythm     Gastrointestinal: Soft, Nontender, Nondistended     Incision: Head CDI/Flat. Steri Abd-CDI/Flat    LABS:                        11.9   12.71 )-----------( 309      ( 02 Apr 2024 14:41 )             38.3    04-02    144  |  107  |  17  ----------------------------<  224<H>  3.6   |  24  |  0.61    Ca    8.4      02 Apr 2024 14:41    PT/INR - ( 02 Apr 2024 14:41 )   PT: 11.7 sec;   INR: 1.12 ratio    PTT - ( 02 Apr 2024 14:41 )  PTT:30.0 sec      04-02 @ 07:01  -  04-03 @ 07:00  --------------------------------------------------------  IN: 1700 mL / OUT: 2000 mL / NET: -300 mL    04-03 @ 07:01  -  04-03 @ 12:08  --------------------------------------------------------  IN: 375 mL / OUT: 850 mL / NET: -475 mL    IMAGING:   < from: CT Head No Cont (04.02.24 @ 17:10) >  IMPRESSION:  Status post shunt catheter placement with its tip in the   right frontal horn of the lateral ventricle against the anterior wall   with associated droplet of air. Right frontal extra-axial air. No   postoperative hematoma appreciated. Ventricles are stable in size   compared with the preoperative evaluation. Small rounded focus of   calcification in the left occipital cortical region likely a small   calcified meningioma.    < end of copied text >      MEDICATIONS  (STANDING):  dextrose 5%. 1000 milliLiter(s) (50 mL/Hr) IV Continuous <Continuous>  dextrose 5%. 1000 milliLiter(s) (100 mL/Hr) IV Continuous <Continuous>  dextrose 50% Injectable 12.5 Gram(s) IV Push once  dextrose 50% Injectable 25 Gram(s) IV Push once  dextrose 50% Injectable 25 Gram(s) IV Push once  fenofibrate Tablet 145 milliGRAM(s) Oral daily  glucagon  Injectable 1 milliGRAM(s) IntraMuscular once  hydrALAZINE 10 milliGRAM(s) Oral two times a day  influenza  Vaccine (HIGH DOSE) 0.7 milliLiter(s) IntraMuscular once  insulin lispro (ADMELOG) corrective regimen sliding scale   SubCutaneous three times a day before meals  lactated ringers. 1000 milliLiter(s) (75 mL/Hr) IV Continuous <Continuous>  lidocaine 1% Injectable 0.2 milliLiter(s) Local Injection once  lisinopril 40 milliGRAM(s) Oral daily  memantine 10 milliGRAM(s) Oral two times a day  metoprolol succinate ER 25 milliGRAM(s) Oral daily  multivitamin 1 Tablet(s) Oral daily  NIFEdipine XL 30 milliGRAM(s) Oral daily  pantoprazole    Tablet 40 milliGRAM(s) Oral before breakfast    MEDICATIONS  (PRN):  acetaminophen     Tablet .. 650 milliGRAM(s) Oral every 6 hours PRN Mild Pain (1 - 3)  acetaminophen   IVPB .. 1000 milliGRAM(s) IV Intermittent every 6 hours PRN Temp greater or equal to 38.5C (101.3F), Severe Pain (7 - 10)  bisacodyl 5 milliGRAM(s) Oral daily PRN Constipation  dextrose Oral Gel 15 Gram(s) Oral once PRN Blood Glucose LESS THAN 70 milliGRAM(s)/deciliter  methocarbamol 500 milliGRAM(s) Oral every 8 hours PRN Muscle Spasm  ondansetron   Disintegrating Tablet 4 milliGRAM(s) Oral every 6 hours PRN Nausea  oxyCODONE    IR 5 milliGRAM(s) Oral every 4 hours PRN Moderate Pain (4 - 6)  oxyCODONE    IR 10 milliGRAM(s) Oral every 4 hours PRN Severe Pain (7 - 10)  polyethylene glycol 3350 17 Gram(s) Oral daily PRN Constipation  senna 2 Tablet(s) Oral at bedtime PRN Constipation

## 2024-04-03 NOTE — CHART NOTE - NSCHARTNOTEFT_GEN_A_CORE
General Surgery Post op Check    Pt seen and examined without complaints. Resting comfortably in PACU.     Vital Signs Last 24 Hrs  T(C): 36 (02 Apr 2024 20:00), Max: 37 (02 Apr 2024 08:38)  T(F): 96.8 (02 Apr 2024 20:00), Max: 98.6 (02 Apr 2024 08:38)  HR: 80 (02 Apr 2024 21:00) (60 - 91)  BP: 146/72 (02 Apr 2024 20:00) (145/106 - 180/75)  BP(mean): 98 (02 Apr 2024 20:00) (87 - 119)  RR: 14 (02 Apr 2024 21:00) (14 - 18)  SpO2: 100% (02 Apr 2024 21:00) (94% - 100%)    Parameters below as of 02 Apr 2024 21:00  Patient On (Oxygen Delivery Method): room air    I&O's Summary    02 Apr 2024 07:01  -  03 Apr 2024 00:23  --------------------------------------------------------  IN: 1050 mL / OUT: 1100 mL / NET: -50 mL    Physical Exam  Gen: NAD  Pulm: No respiratory distress  CV: RRR  Abd: Soft, appropriately tender, mildly distended, port sites w/ dressing c/d/i.      A/P: 78y Female s/p  shunt (4/3).    -Care per primary team.
CAPRINI SCORE [CLOT] Score on Admission for     AGE RELATED RISK FACTORS                                                       MOBILITY RELATED FACTORS  [ ] Age 41-60 years                                            (1 Point)                  [ ] Bed rest                                                        (1 Point)  [ ] Age: 61-74 years                                           (2 Points)                 [ ] Plaster cast                                                   (2 Points)  [ X] Age= 75 years                                              (3 Points)                 [ ] Bed bound for more than 72 hours                 (2 Points)    DISEASE RELATED RISK FACTORS                                               GENDER SPECIFIC FACTORS  [ ] Edema in the lower extremities                       (1 Point)                  [ ] Pregnancy                                                     (1 Point)  [ ] Varicose veins                                               (1 Point)                  [ ] Post-partum < 6 weeks                                   (1 Point)             [ X] BMI > 25 Kg/m2                                            (1 Point)                  [ ] Hormonal therapy  or oral contraception          (1 Point)                 [ ] Sepsis (in the previous month)                        (1 Point)                  [ ] History of pregnancy complications                 (1 point)  [ ] Pneumonia or serious lung disease                                               [ ] Unexplained or recurrent                     (1 Point)           (in the previous month)                               (1 Point)  [ ] Abnormal pulmonary function test                     (1 Point)                 SURGERY RELATED RISK FACTORS (include planned surgeries)  [ ] Acute myocardial infarction                              (1 Point)                 [ ]  Section                                             (1 Point)  [ ] Congestive heart failure (in the previous month)  (1 Point)         [ ] Minor surgery                                                  (1 Point)   [ ] Inflammatory bowel disease                             (1 Point)                 [ ] Arthroscopic surgery                                        (2 Points)  [ ] Central venous access                                      (2 Points)                [X ] General surgery lasting more than 45 minutes   (2 Points)       [ ] Stroke (in the previous month)                          (5 Points)               [ ] Elective arthroplasty                                         (5 Points)            [ ] current or past malignancy                              (2 Points)                                                                                                       HEMATOLOGY RELATED FACTORS                                                 TRAUMA RELATED RISK FACTORS  [ ] Prior episodes of VTE                                     (3 Points)                [ ] Fracture of the hip, pelvis, or leg                       (5 Points)  [ ] Positive family history for VTE                         (3 Points)                 [ ] Acute spinal cord injury (in the previous month)  (5 Points)  [ ] Prothrombin 80777 A                                     (3 Points)                 [ ] Paralysis  (less than 1 month)                             (5 Points)  [ ] Factor V Leiden                                             (3 Points)                  [ ] Multiple Trauma within 1 month                        (5 Points)  [ ] Lupus anticoagulants                                     (3 Points)                                                           [ ] Anticardiolipin antibodies                               (3 Points)                                                       [ ] High homocysteine in the blood                      (3 Points)                                             [ ] Other congenital or acquired thrombophilia      (3 Points)                                                [ ] Heparin induced thrombocytopenia                  (3 Points)                                          Total Score [    6      ]    Risk:  Very low 0   Low 1 to 2   Moderate 3 to 4   High =5       VTE Prophylasix Recommednations:  [X] mechanical pneumatic compression devices                                      [ ] contraindicated: _____________________  [ ] chemo prophylasix                                                                                   [ ] contraindicated _____________________    **** HIGH LIKELIHOOD DVT PRESENT ON ADMISSION  [ ] (please order LE dopplers within 24 hours of admission)

## 2024-04-04 LAB
ANION GAP SERPL CALC-SCNC: 10 MMOL/L — SIGNIFICANT CHANGE UP (ref 5–17)
BUN SERPL-MCNC: 11 MG/DL — SIGNIFICANT CHANGE UP (ref 7–23)
CALCIUM SERPL-MCNC: 8.9 MG/DL — SIGNIFICANT CHANGE UP (ref 8.4–10.5)
CHLORIDE SERPL-SCNC: 102 MMOL/L — SIGNIFICANT CHANGE UP (ref 96–108)
CO2 SERPL-SCNC: 24 MMOL/L — SIGNIFICANT CHANGE UP (ref 22–31)
CREAT SERPL-MCNC: 0.63 MG/DL — SIGNIFICANT CHANGE UP (ref 0.5–1.3)
EGFR: 91 ML/MIN/1.73M2 — SIGNIFICANT CHANGE UP
GLUCOSE BLDC GLUCOMTR-MCNC: 258 MG/DL — HIGH (ref 70–99)
GLUCOSE BLDC GLUCOMTR-MCNC: 258 MG/DL — HIGH (ref 70–99)
GLUCOSE BLDC GLUCOMTR-MCNC: 267 MG/DL — HIGH (ref 70–99)
GLUCOSE BLDC GLUCOMTR-MCNC: 279 MG/DL — HIGH (ref 70–99)
GLUCOSE SERPL-MCNC: 301 MG/DL — HIGH (ref 70–99)
HCT VFR BLD CALC: 36.1 % — SIGNIFICANT CHANGE UP (ref 34.5–45)
HGB BLD-MCNC: 11.2 G/DL — LOW (ref 11.5–15.5)
MCHC RBC-ENTMCNC: 25.1 PG — LOW (ref 27–34)
MCHC RBC-ENTMCNC: 31 GM/DL — LOW (ref 32–36)
MCV RBC AUTO: 80.8 FL — SIGNIFICANT CHANGE UP (ref 80–100)
NRBC # BLD: 0 /100 WBCS — SIGNIFICANT CHANGE UP (ref 0–0)
PLATELET # BLD AUTO: 339 K/UL — SIGNIFICANT CHANGE UP (ref 150–400)
POTASSIUM SERPL-MCNC: 3.7 MMOL/L — SIGNIFICANT CHANGE UP (ref 3.5–5.3)
POTASSIUM SERPL-SCNC: 3.7 MMOL/L — SIGNIFICANT CHANGE UP (ref 3.5–5.3)
RBC # BLD: 4.47 M/UL — SIGNIFICANT CHANGE UP (ref 3.8–5.2)
RBC # FLD: 14.3 % — SIGNIFICANT CHANGE UP (ref 10.3–14.5)
SODIUM SERPL-SCNC: 136 MMOL/L — SIGNIFICANT CHANGE UP (ref 135–145)
WBC # BLD: 12.51 K/UL — HIGH (ref 3.8–10.5)
WBC # FLD AUTO: 12.51 K/UL — HIGH (ref 3.8–10.5)

## 2024-04-04 PROCEDURE — 93970 EXTREMITY STUDY: CPT | Mod: 26

## 2024-04-04 RX ORDER — POTASSIUM CHLORIDE 20 MEQ
40 PACKET (EA) ORAL ONCE
Refills: 0 | Status: DISCONTINUED | OUTPATIENT
Start: 2024-04-04 | End: 2024-04-04

## 2024-04-04 RX ORDER — POLYETHYLENE GLYCOL 3350 17 G/17G
17 POWDER, FOR SOLUTION ORAL
Refills: 0 | Status: DISCONTINUED | OUTPATIENT
Start: 2024-04-04 | End: 2024-04-05

## 2024-04-04 RX ORDER — SENNA PLUS 8.6 MG/1
2 TABLET ORAL AT BEDTIME
Refills: 0 | Status: DISCONTINUED | OUTPATIENT
Start: 2024-04-04 | End: 2024-04-05

## 2024-04-04 RX ORDER — OXYCODONE HYDROCHLORIDE 5 MG/1
5 TABLET ORAL EVERY 6 HOURS
Refills: 0 | Status: DISCONTINUED | OUTPATIENT
Start: 2024-04-04 | End: 2024-04-05

## 2024-04-04 RX ORDER — OXYCODONE HYDROCHLORIDE 5 MG/1
10 TABLET ORAL EVERY 6 HOURS
Refills: 0 | Status: DISCONTINUED | OUTPATIENT
Start: 2024-04-04 | End: 2024-04-05

## 2024-04-04 RX ORDER — POTASSIUM CHLORIDE 20 MEQ
40 PACKET (EA) ORAL ONCE
Refills: 0 | Status: COMPLETED | OUTPATIENT
Start: 2024-04-04 | End: 2024-04-04

## 2024-04-04 RX ORDER — INSULIN GLARGINE 100 [IU]/ML
8 INJECTION, SOLUTION SUBCUTANEOUS AT BEDTIME
Refills: 0 | Status: DISCONTINUED | OUTPATIENT
Start: 2024-04-04 | End: 2024-04-05

## 2024-04-04 RX ORDER — INSULIN GLARGINE 100 [IU]/ML
10 INJECTION, SOLUTION SUBCUTANEOUS AT BEDTIME
Refills: 0 | Status: DISCONTINUED | OUTPATIENT
Start: 2024-04-04 | End: 2024-04-04

## 2024-04-04 RX ORDER — INSULIN LISPRO 100/ML
5 VIAL (ML) SUBCUTANEOUS
Refills: 0 | Status: DISCONTINUED | OUTPATIENT
Start: 2024-04-04 | End: 2024-04-05

## 2024-04-04 RX ORDER — INSULIN LISPRO 100/ML
VIAL (ML) SUBCUTANEOUS AT BEDTIME
Refills: 0 | Status: DISCONTINUED | OUTPATIENT
Start: 2024-04-04 | End: 2024-04-05

## 2024-04-04 RX ADMIN — Medication 6: at 12:20

## 2024-04-04 RX ADMIN — Medication 6: at 08:00

## 2024-04-04 RX ADMIN — MEMANTINE HYDROCHLORIDE 10 MILLIGRAM(S): 10 TABLET ORAL at 17:00

## 2024-04-04 RX ADMIN — Medication 650 MILLIGRAM(S): at 09:02

## 2024-04-04 RX ADMIN — Medication 25 MILLIGRAM(S): at 05:09

## 2024-04-04 RX ADMIN — Medication 5 MILLIGRAM(S): at 12:12

## 2024-04-04 RX ADMIN — OXYCODONE HYDROCHLORIDE 5 MILLIGRAM(S): 5 TABLET ORAL at 23:02

## 2024-04-04 RX ADMIN — INSULIN GLARGINE 8 UNIT(S): 100 INJECTION, SOLUTION SUBCUTANEOUS at 21:42

## 2024-04-04 RX ADMIN — MEMANTINE HYDROCHLORIDE 10 MILLIGRAM(S): 10 TABLET ORAL at 05:09

## 2024-04-04 RX ADMIN — Medication 30 MILLIGRAM(S): at 05:08

## 2024-04-04 RX ADMIN — Medication 10 MILLIGRAM(S): at 17:00

## 2024-04-04 RX ADMIN — Medication 40 MILLIEQUIVALENT(S): at 09:43

## 2024-04-04 RX ADMIN — Medication 6: at 21:41

## 2024-04-04 RX ADMIN — OXYCODONE HYDROCHLORIDE 5 MILLIGRAM(S): 5 TABLET ORAL at 22:32

## 2024-04-04 RX ADMIN — OXYCODONE HYDROCHLORIDE 10 MILLIGRAM(S): 5 TABLET ORAL at 01:48

## 2024-04-04 RX ADMIN — SENNA PLUS 2 TABLET(S): 8.6 TABLET ORAL at 21:44

## 2024-04-04 RX ADMIN — Medication 6: at 16:55

## 2024-04-04 RX ADMIN — OXYCODONE HYDROCHLORIDE 10 MILLIGRAM(S): 5 TABLET ORAL at 01:18

## 2024-04-04 RX ADMIN — Medication 145 MILLIGRAM(S): at 12:11

## 2024-04-04 RX ADMIN — Medication 650 MILLIGRAM(S): at 18:06

## 2024-04-04 RX ADMIN — Medication 650 MILLIGRAM(S): at 18:37

## 2024-04-04 RX ADMIN — POLYETHYLENE GLYCOL 3350 17 GRAM(S): 17 POWDER, FOR SOLUTION ORAL at 12:09

## 2024-04-04 RX ADMIN — LISINOPRIL 40 MILLIGRAM(S): 2.5 TABLET ORAL at 05:08

## 2024-04-04 RX ADMIN — Medication 5 UNIT(S): at 16:55

## 2024-04-04 RX ADMIN — Medication 5 UNIT(S): at 12:19

## 2024-04-04 RX ADMIN — Medication 10 MILLIGRAM(S): at 05:08

## 2024-04-04 RX ADMIN — Medication 1 TABLET(S): at 12:11

## 2024-04-04 RX ADMIN — POLYETHYLENE GLYCOL 3350 17 GRAM(S): 17 POWDER, FOR SOLUTION ORAL at 17:12

## 2024-04-04 RX ADMIN — Medication 650 MILLIGRAM(S): at 08:48

## 2024-04-04 RX ADMIN — ENOXAPARIN SODIUM 40 MILLIGRAM(S): 100 INJECTION SUBCUTANEOUS at 17:00

## 2024-04-04 NOTE — SPEECH LANGUAGE PATHOLOGY EVALUATION - SPECIFY REASON(S)
to assess speech production, expressive and receptive language skills, and cognitive-communication skills to assess speech production, expressive and receptive language skills, and cognitive-communication skills. consult reason "hypophonia'

## 2024-04-04 NOTE — SPEECH LANGUAGE PATHOLOGY EVALUATION - SLP DIAGNOSIS
Pt is a 78yoF with pmhx of CVA, dementia, presenting w/ trouble walking and speech issues, found with NPH s/p  shunt placed 4/2/24. Pt p/w functional receptive-expressive language with functional communication. Suspect baseline mild-moderate cognitive-linguistic impairments i/s/o dementia vs prior CVA as pt with difficulty engaging in more complex cognitive-linguistic tasks. Pt noted to be soft spoken w/ slightly decreased articulatory precision (slight  mumbling), but suspect to be behavioral component vs from prior CVA.

## 2024-04-04 NOTE — SPEECH LANGUAGE PATHOLOGY EVALUATION - SLP GENERAL OBSERVATIONS
Pt received upright at bedside, on room air, Aox4, but suspected w/ baseline cognitive-linguistic deficits i/s/o baseline dementia. Noted pt is soft-spoken, but able to increase in vocal volume and clarity with verbal cueing; noted to frequently mumble, but suspect behavioral component vs artifact from prior CVA. Per ACP Ivelisse, pt was significantly more hypophonic this AM, but now significantly improved.  Pt reports that she lives w/ , daughter, and granddaughter and that family manages iADls for her. Pt reports no difficulty with swallowing. NICK Sanchez also reporting no evidence of aspiration w/ meals and liquids.

## 2024-04-04 NOTE — PROGRESS NOTE ADULT - REASON FOR ADMISSION
s/p VPS placement for NPH at [facility] 7, OP 10 (04/02). s/p VPS placement  at [facility] 7, OP 10 for NPH(04/02).

## 2024-04-04 NOTE — PROGRESS NOTE ADULT - SUBJECTIVE AND OBJECTIVE BOX
SUBJECTIVE: HPI:   78 year old female with hx of Diabetes Mellitus type 2 (Metformin and Trulicity)  HTN, CVA (low dose aspirin)  dementia,  family noticed trouble walking, speech issues   (minimally verbal)  started 2020  work up s/p spinal  tap dx NPH  for  shunt  (per booking sheet surgery 4/2 per family surgery 4/1 message left with surgeon. Given instructions based on booking sheet of 4/2 )      OVERNIGHT EVENTS: No acute overnight events or issues reported. Pt was seen and examined at bedside in the morning.     Vital Signs Last 24 Hrs  T(C): 36.9 (04 Apr 2024 08:38), Max: 37.4 (03 Apr 2024 20:30)  T(F): 98.4 (04 Apr 2024 08:38), Max: 99.4 (03 Apr 2024 20:30)  HR: 95 (04 Apr 2024 08:38) (70 - 104)  BP: 139/69 (04 Apr 2024 08:38) (139/69 - 210/86)  BP(mean): 101 (03 Apr 2024 21:00) (101 - 124)  RR: 20 (04 Apr 2024 08:38) (16 - 20)  SpO2: 94% (04 Apr 2024 08:38) (92% - 99%)    Parameters below as of 04 Apr 2024 08:38  Patient On (Oxygen Delivery Method): room air        PHYSICAL EXAM:    General: No Acute Distress     Neurological: Awake, alert oriented to person, place and time, Following Commands, PERRL, EOMI, Face Symmetrical, Speech Fluent, Moving all extremities, Muscle Strength normal in all four extremities, No Drift, Sensation to Light Touch Intact    Pulmonary: Clear to Auscultation, No Rales, No Rhonchi, No Wheezes     Cardiovascular: S1, S2, Regular Rate and Rhythm     Gastrointestinal: Soft, Nontender, Nondistended     Incision:     LABS:                        11.2   12.51 )-----------( 339      ( 04 Apr 2024 06:04 )             36.1    04-04    136  |  102  |  11  ----------------------------<  301<H>  3.7   |  24  |  0.63    Ca    8.9      04 Apr 2024 06:04    PT/INR - ( 02 Apr 2024 14:41 )   PT: 11.7 sec;   INR: 1.12 ratio         PTT - ( 02 Apr 2024 14:41 )  PTT:30.0 sec      04-03 @ 07:01 - 04-04 @ 07:00  --------------------------------------------------------  IN: 1122 mL / OUT: 3200 mL / NET: -2078 mL    04-04 @ 07:01 - 04-04 @ 10:17  --------------------------------------------------------  IN: 260 mL / OUT: 0 mL / NET: 260 mL      DRAINS: None    MEDICATIONS:  Antibiotics:    Neuro:  acetaminophen     Tablet .. 650 milliGRAM(s) Oral every 6 hours PRN Mild Pain (1 - 3)  memantine 10 milliGRAM(s) Oral two times a day  methocarbamol 500 milliGRAM(s) Oral every 8 hours PRN Muscle Spasm  ondansetron   Disintegrating Tablet 4 milliGRAM(s) Oral every 6 hours PRN Nausea  oxyCODONE    IR 5 milliGRAM(s) Oral every 4 hours PRN Moderate Pain (4 - 6)  oxyCODONE    IR 10 milliGRAM(s) Oral every 4 hours PRN Severe Pain (7 - 10)    Cardiac:  hydrALAZINE 10 milliGRAM(s) Oral two times a day  lisinopril 40 milliGRAM(s) Oral daily  metoprolol succinate ER 25 milliGRAM(s) Oral daily  NIFEdipine XL 30 milliGRAM(s) Oral daily    Pulm:    GI/:  bisacodyl 5 milliGRAM(s) Oral daily  pantoprazole    Tablet 40 milliGRAM(s) Oral before breakfast  polyethylene glycol 3350 17 Gram(s) Oral two times a day  senna 2 Tablet(s) Oral at bedtime    Other:   dextrose 5%. 1000 milliLiter(s) IV Continuous <Continuous>  dextrose 5%. 1000 milliLiter(s) IV Continuous <Continuous>  dextrose 50% Injectable 12.5 Gram(s) IV Push once  dextrose 50% Injectable 25 Gram(s) IV Push once  dextrose 50% Injectable 25 Gram(s) IV Push once  dextrose Oral Gel 15 Gram(s) Oral once PRN Blood Glucose LESS THAN 70 milliGRAM(s)/deciliter  enoxaparin Injectable 40 milliGRAM(s) SubCutaneous every 24 hours  fenofibrate Tablet 145 milliGRAM(s) Oral daily  glucagon  Injectable 1 milliGRAM(s) IntraMuscular once  influenza  Vaccine (HIGH DOSE) 0.7 milliLiter(s) IntraMuscular once  insulin glargine Injectable (LANTUS) 8 Unit(s) SubCutaneous at bedtime  insulin lispro (ADMELOG) corrective regimen sliding scale   SubCutaneous at bedtime  insulin lispro (ADMELOG) corrective regimen sliding scale   SubCutaneous three times a day before meals  insulin lispro Injectable (ADMELOG) 5 Unit(s) SubCutaneous three times a day before meals  lidocaine 1% Injectable 0.2 milliLiter(s) Local Injection once  multivitamin 1 Tablet(s) Oral daily    DIET: [] Regular [x] CCD [] Renal [] Puree [] Dysphagia [] Tube Feeds:     IMAGING:   < from: CT Head No Cont (04.02.24 @ 17:10) >  COMPARISON EXAMINATION: 4/2/2024 8:38 AM    FINDINGS:  VENTRICLES AND SULCI: Status post shunt catheter placement with tip in   the region of the frontal horn of the right lateral ventricle along the   ventricular margin. Droplet of air is seen.  INTRA-AXIAL: Microvascular ischemic changes involving the periventricular   and subcortical white matter.  EXTRA-AXIAL: Right frontal extra-axial air. Small rounded focus of   calcification in the region of the left occipital cortex measuring   roughly 6 mm likely small calcified meningioma  VISUALIZED SINUSES:  Clear.  VISUALIZED MASTOIDS:  Clear.  CALVARIUM: Right parietal hannah hole.  MISCELLANEOUS:  None.    IMPRESSION:  Status post shunt catheter placement with its tip in the   right frontal horn of the lateral ventricle against the anterior wall   with associated droplet of air. Right frontal extra-axial air. No   postoperative hematoma appreciated. Ventricles are stable in size   compared with the preoperative evaluation. Small rounded focus of   calcification in the left occipital cortical region likely a small   calcified meningioma.    --- End of Report ---    ARIS YOUSIF MD; Attending Radiologist  This document hasbeen electronically signed. Apr 2 2024  5:20PM    < end of copied text >    < from: Xray Abdomen 1 View PORTABLE -Routine (04.02.24 @ 13:22) >        INTERPRETATION:  DATE OF EXAM: 4/2/24    COMPARISON: None.    CLINICAL INDICATION: Shunt placement    TECHNIQUE: 1 flat abdominal film.    FINDINGS:  Enteric tube tip in stomach.  The  shunt cross the chest to left of thoracic spine - it enters the   abdomen - tip of cathter is to the left of the midline in left lower   quadrant.  There is a nonobstructive bowel gas pattern. No free intraperitoneal air.   No acute bony finding.    IMPRESSION:  Tip of  shunt in left lower quadrant.  The catheter shows no kinking or discontinuities.    --- End of Report ---    CRESCENCIO CASTRO MD; Attending Radiologist  This document has been electronically signed. Apr  3 2024  2:46PM    < end of copied text >       SUBJECTIVE: HPI:   78 year old female with hx of Diabetes Mellitus type 2 (Metformin and Trulicity)  HTN, CVA (low dose aspirin)  dementia,  family noticed trouble walking, speech issues   (minimally verbal)  started 2020  work up s/p spinal  tap dx NPH  for  shunt  (per booking sheet surgery 4/2 per family surgery 4/1 message left with surgeon. Given instructions based on booking sheet of 4/2 )      OVERNIGHT EVENTS: No acute overnight events or issues reported. Pt was seen and examined at bedside in the morning.     Vital Signs Last 24 Hrs  T(C): 36.9 (04 Apr 2024 08:38), Max: 37.4 (03 Apr 2024 20:30)  T(F): 98.4 (04 Apr 2024 08:38), Max: 99.4 (03 Apr 2024 20:30)  HR: 95 (04 Apr 2024 08:38) (70 - 104)  BP: 139/69 (04 Apr 2024 08:38) (139/69 - 210/86)  BP(mean): 101 (03 Apr 2024 21:00) (101 - 124)  RR: 20 (04 Apr 2024 08:38) (16 - 20)  SpO2: 94% (04 Apr 2024 08:38) (92% - 99%)    Parameters below as of 04 Apr 2024 08:38  Patient On (Oxygen Delivery Method): room air        PHYSICAL EXAM:    General: No Acute Distress     Neurological: Awake, alert oriented to person, place and time, Following Commands, PERRL, EOMI, Face Symmetrical, Speech Fluent, Moving all extremities, Muscle Strength normal in all four extremities, No Drift, Sensation to Light Touch Intact    Pulmonary: Clear to Auscultation, No Rales, No Rhonchi, No Wheezes     Cardiovascular: S1, S2, Regular Rate and Rhythm     Gastrointestinal: Soft, Nontender, Nondistended     Incision:     LABS:                        11.2   12.51 )-----------( 339      ( 04 Apr 2024 06:04 )             36.1    04-04    136  |  102  |  11  ----------------------------<  301<H>  3.7   |  24  |  0.63    Ca    8.9      04 Apr 2024 06:04    PT/INR - ( 02 Apr 2024 14:41 )   PT: 11.7 sec;   INR: 1.12 ratio         PTT - ( 02 Apr 2024 14:41 )  PTT:30.0 sec      04-03 @ 07:01 - 04-04 @ 07:00  --------------------------------------------------------  IN: 1122 mL / OUT: 3200 mL / NET: -2078 mL    04-04 @ 07:01 - 04-04 @ 10:17  --------------------------------------------------------  IN: 260 mL / OUT: 0 mL / NET: 260 mL      DRAINS: None    MEDICATIONS:  Antibiotics:    Neuro:  acetaminophen     Tablet .. 650 milliGRAM(s) Oral every 6 hours PRN Mild Pain (1 - 3)  memantine 10 milliGRAM(s) Oral two times a day  methocarbamol 500 milliGRAM(s) Oral every 8 hours PRN Muscle Spasm  ondansetron   Disintegrating Tablet 4 milliGRAM(s) Oral every 6 hours PRN Nausea  oxyCODONE    IR 5 milliGRAM(s) Oral every 4 hours PRN Moderate Pain (4 - 6)  oxyCODONE    IR 10 milliGRAM(s) Oral every 4 hours PRN Severe Pain (7 - 10)    Cardiac:  hydrALAZINE 10 milliGRAM(s) Oral two times a day  lisinopril 40 milliGRAM(s) Oral daily  metoprolol succinate ER 25 milliGRAM(s) Oral daily  NIFEdipine XL 30 milliGRAM(s) Oral daily    Pulm:    GI/:  bisacodyl 5 milliGRAM(s) Oral daily  pantoprazole    Tablet 40 milliGRAM(s) Oral before breakfast  polyethylene glycol 3350 17 Gram(s) Oral two times a day  senna 2 Tablet(s) Oral at bedtime    Other:   dextrose 5%. 1000 milliLiter(s) IV Continuous <Continuous>  dextrose 5%. 1000 milliLiter(s) IV Continuous <Continuous>  dextrose 50% Injectable 12.5 Gram(s) IV Push once  dextrose 50% Injectable 25 Gram(s) IV Push once  dextrose 50% Injectable 25 Gram(s) IV Push once  dextrose Oral Gel 15 Gram(s) Oral once PRN Blood Glucose LESS THAN 70 milliGRAM(s)/deciliter  enoxaparin Injectable 40 milliGRAM(s) SubCutaneous every 24 hours  fenofibrate Tablet 145 milliGRAM(s) Oral daily  glucagon  Injectable 1 milliGRAM(s) IntraMuscular once  influenza  Vaccine (HIGH DOSE) 0.7 milliLiter(s) IntraMuscular once  insulin glargine Injectable (LANTUS) 8 Unit(s) SubCutaneous at bedtime  insulin lispro (ADMELOG) corrective regimen sliding scale   SubCutaneous at bedtime  insulin lispro (ADMELOG) corrective regimen sliding scale   SubCutaneous three times a day before meals  insulin lispro Injectable (ADMELOG) 5 Unit(s) SubCutaneous three times a day before meals  lidocaine 1% Injectable 0.2 milliLiter(s) Local Injection once  multivitamin 1 Tablet(s) Oral daily    DIET: [] Regular [x] CCD [] Renal [] Puree [] Dysphagia [] Tube Feeds:     IMAGING:   < from: CT Head No Cont (04.02.24 @ 17:10) >  COMPARISON EXAMINATION: 4/2/2024 8:38 AM    FINDINGS:  VENTRICLES AND SULCI: Status post shunt catheter placement with tip in   the region of the frontal horn of the right lateral ventricle along the   ventricular margin. Droplet of air is seen.  INTRA-AXIAL: Microvascular ischemic changes involving the periventricular   and subcortical white matter.  EXTRA-AXIAL: Right frontal extra-axial air. Small rounded focus of   calcification in the region of the left occipital cortex measuring   roughly 6 mm likely small calcified meningioma  VISUALIZED SINUSES:  Clear.  VISUALIZED MASTOIDS:  Clear.  CALVARIUM: Right parietal hannah hole.  MISCELLANEOUS:  None.    IMPRESSION:  Status post shunt catheter placement with its tip in the   right frontal horn of the lateral ventricle against the anterior wall   with associated droplet of air. Right frontal extra-axial air. No   postoperative hematoma appreciated. Ventricles are stable in size   compared with the preoperative evaluation. Small rounded focus of   calcification in the left occipital cortical region likely a small   calcified meningioma.    --- End of Report ---    ARIS YOUSIF MD; Attending Radiologist  This document hasbeen electronically signed. Apr 2 2024  5:20PM    < end of copied text >        < from: Xray Abdomen 1 View PORTABLE -Routine (04.02.24 @ 13:22) >  INTERPRETATION:  DATE OF EXAM: 4/2/24    COMPARISON: None.    CLINICAL INDICATION: Shunt placement    TECHNIQUE: 1 flat abdominal film.    FINDINGS:  Enteric tube tip in stomach.  The  shunt cross the chest to left of thoracic spine - it enters the   abdomen - tip of cathter is to the left of the midline in left lower   quadrant.  There is a nonobstructive bowel gas pattern. No free intraperitoneal air.   No acute bony finding.    IMPRESSION:  Tip of  shunt in left lower quadrant.  The catheter shows no kinking or discontinuities.    --- End of Report ---    CRESCENCIO CASTRO MD; Attending Radiologist  This document has been electronically signed. Apr  3 2024  2:46PM    < end of copied text >            INTERPRETATION:  CLINICAL INFORMATION: Postop day one status post    shunt insertion. Bedrest, lower extremity swelling.    COMPARISON: None available.    TECHNIQUE: Duplex sonography of the BILATERAL LOWER extremity veins with   color and spectral Doppler, with and without compression.    FINDINGS:    RIGHT:  Normal compressibility of the RIGHT common femoral, femoral and popliteal   veins. Doppler examination shows normal spontaneous and phasic flow. No   RIGHT calf vein thrombosis is detected.    LEFT:  Normal compressibility of the LEFT common femoral, femoral and popliteal   veins. Doppler examination shows normal spontaneous and phasic flow. No   LEFT calf vein thrombosis is detected.    Mild edema of the superficial soft tissues of the lower extremities.   Correlate clinically.    IMPRESSION:    No acute DVT of the lower extremities.    Mild edema of the superficial soft tissues of the lower extremities.   Correlate clinically.    --- End of Report ---  ALEM MAYNARD M.D., ATTENDING RADIOLOGIST  This document has been electronically signed. Apr 4 2024 12:03PM     SUBJECTIVE: HPI:   78 year old female with hx of Diabetes Mellitus type 2 (Metformin and Trulicity)  HTN, CVA (low dose aspirin)  dementia,  family noticed trouble walking, speech issues   (minimally verbal)  started 2020  work up s/p spinal  tap dx NPH  for  shunt  (per booking sheet surgery 4/2 per family surgery 4/1 message left with surgeon. Given instructions based on booking sheet of 4/2 )      OVERNIGHT EVENTS: No acute overnight events or issues reported. Pt was seen and examined at bedside in the morning, was comfortable.     Vital Signs Last 24 Hrs  T(C): 36.9 (04 Apr 2024 08:38), Max: 37.4 (03 Apr 2024 20:30)  T(F): 98.4 (04 Apr 2024 08:38), Max: 99.4 (03 Apr 2024 20:30)  HR: 95 (04 Apr 2024 08:38) (70 - 104)  BP: 139/69 (04 Apr 2024 08:38) (139/69 - 210/86)  BP(mean): 101 (03 Apr 2024 21:00) (101 - 124)  RR: 20 (04 Apr 2024 08:38) (16 - 20)  SpO2: 94% (04 Apr 2024 08:38) (92% - 99%)    Parameters below as of 04 Apr 2024 08:38  Patient On (Oxygen Delivery Method): room air        PHYSICAL EXAM:    General: No Acute Distress     Neurological: Awake, alert and oriented to person, place and time (name, place, year but not month), Following Commands, PERRL, EOMI, Face Symmetrical, hypophonic, Moving all extremities, Muscle Strength normal in all four extremities, No Drift.     Pulmonary: Clear to Auscultation, No Rales, No Rhonchi, No Wheezes     Cardiovascular: S1, S2, Regular Rate and Rhythm     Gastrointestinal: Soft, Nontender, Nondistended     Incision: Right parietal incision from VPS placement (04/02) is clean, dry, intact and 3 abdominal incisions (left, right and midline of abdomen) covered with Steri-strips are clean, dry, intact.     LABS:                        11.2   12.51 )-----------( 339      ( 04 Apr 2024 06:04 )             36.1    04-04    136  |  102  |  11  ----------------------------<  301<H>  3.7   |  24  |  0.63    Ca    8.9      04 Apr 2024 06:04    PT/INR - ( 02 Apr 2024 14:41 )   PT: 11.7 sec;   INR: 1.12 ratio         PTT - ( 02 Apr 2024 14:41 )  PTT:30.0 sec      04-03 @ 07:01 - 04-04 @ 07:00  --------------------------------------------------------  IN: 1122 mL / OUT: 3200 mL / NET: -2078 mL    04-04 @ 07:01  - 04-04 @ 10:17  --------------------------------------------------------  IN: 260 mL / OUT: 0 mL / NET: 260 mL      DRAINS: None    MEDICATIONS:  Antibiotics:    Neuro:  acetaminophen     Tablet .. 650 milliGRAM(s) Oral every 6 hours PRN Mild Pain (1 - 3)  memantine 10 milliGRAM(s) Oral two times a day  methocarbamol 500 milliGRAM(s) Oral every 8 hours PRN Muscle Spasm  ondansetron   Disintegrating Tablet 4 milliGRAM(s) Oral every 6 hours PRN Nausea  oxyCODONE    IR 5 milliGRAM(s) Oral every 4 hours PRN Moderate Pain (4 - 6)  oxyCODONE    IR 10 milliGRAM(s) Oral every 4 hours PRN Severe Pain (7 - 10)    Cardiac:  hydrALAZINE 10 milliGRAM(s) Oral two times a day  lisinopril 40 milliGRAM(s) Oral daily  metoprolol succinate ER 25 milliGRAM(s) Oral daily  NIFEdipine XL 30 milliGRAM(s) Oral daily    Pulm:    GI/:  bisacodyl 5 milliGRAM(s) Oral daily  pantoprazole    Tablet 40 milliGRAM(s) Oral before breakfast  polyethylene glycol 3350 17 Gram(s) Oral two times a day  senna 2 Tablet(s) Oral at bedtime    Other:   dextrose 5%. 1000 milliLiter(s) IV Continuous <Continuous>  dextrose 5%. 1000 milliLiter(s) IV Continuous <Continuous>  dextrose 50% Injectable 12.5 Gram(s) IV Push once  dextrose 50% Injectable 25 Gram(s) IV Push once  dextrose 50% Injectable 25 Gram(s) IV Push once  dextrose Oral Gel 15 Gram(s) Oral once PRN Blood Glucose LESS THAN 70 milliGRAM(s)/deciliter  enoxaparin Injectable 40 milliGRAM(s) SubCutaneous every 24 hours  fenofibrate Tablet 145 milliGRAM(s) Oral daily  glucagon  Injectable 1 milliGRAM(s) IntraMuscular once  influenza  Vaccine (HIGH DOSE) 0.7 milliLiter(s) IntraMuscular once  insulin glargine Injectable (LANTUS) 8 Unit(s) SubCutaneous at bedtime  insulin lispro (ADMELOG) corrective regimen sliding scale   SubCutaneous at bedtime  insulin lispro (ADMELOG) corrective regimen sliding scale   SubCutaneous three times a day before meals  insulin lispro Injectable (ADMELOG) 5 Unit(s) SubCutaneous three times a day before meals  lidocaine 1% Injectable 0.2 milliLiter(s) Local Injection once  multivitamin 1 Tablet(s) Oral daily    DIET: [] Regular [x] CCD [] Renal [] Puree [] Dysphagia [] Tube Feeds:     IMAGING:   < from: CT Head No Cont (04.02.24 @ 17:10) >  COMPARISON EXAMINATION: 4/2/2024 8:38 AM    FINDINGS:  VENTRICLES AND SULCI: Status post shunt catheter placement with tip in   the region of the frontal horn of the right lateral ventricle along the   ventricular margin. Droplet of air is seen.  INTRA-AXIAL: Microvascular ischemic changes involving the periventricular   and subcortical white matter.  EXTRA-AXIAL: Right frontal extra-axial air. Small rounded focus of   calcification in the region of the left occipital cortex measuring   roughly 6 mm likely small calcified meningioma  VISUALIZED SINUSES:  Clear.  VISUALIZED MASTOIDS:  Clear.  CALVARIUM: Right parietal hannah hole.  MISCELLANEOUS:  None.    IMPRESSION:  Status post shunt catheter placement with its tip in the   right frontal horn of the lateral ventricle against the anterior wall   with associated droplet of air. Right frontal extra-axial air. No   postoperative hematoma appreciated. Ventricles are stable in size   compared with the preoperative evaluation. Small rounded focus of   calcification in the left occipital cortical region likely a small   calcified meningioma.    --- End of Report ---    ARIS YOUSIF MD; Attending Radiologist  This document hasbeen electronically signed. Apr 2 2024  5:20PM    < end of copied text >        < from: Xray Abdomen 1 View PORTABLE -Routine (04.02.24 @ 13:22) >  INTERPRETATION:  DATE OF EXAM: 4/2/24    COMPARISON: None.    CLINICAL INDICATION: Shunt placement    TECHNIQUE: 1 flat abdominal film.    FINDINGS:  Enteric tube tip in stomach.  The  shunt cross the chest to left of thoracic spine - it enters the   abdomen - tip of cathter is to the left of the midline in left lower   quadrant.  There is a nonobstructive bowel gas pattern. No free intraperitoneal air.   No acute bony finding.    IMPRESSION:  Tip of  shunt in left lower quadrant.  The catheter shows no kinking or discontinuities.    --- End of Report ---    CRESCENCIO CASTRO MD; Attending Radiologist  This document has been electronically signed. Apr  3 2024  2:46PM    < end of copied text >            INTERPRETATION:  CLINICAL INFORMATION: Postop day one status post    shunt insertion. Bedrest, lower extremity swelling.    COMPARISON: None available.    TECHNIQUE: Duplex sonography of the BILATERAL LOWER extremity veins with   color and spectral Doppler, with and without compression.    FINDINGS:    RIGHT:  Normal compressibility of the RIGHT common femoral, femoral and popliteal   veins. Doppler examination shows normal spontaneous and phasic flow. No   RIGHT calf vein thrombosis is detected.    LEFT:  Normal compressibility of the LEFT common femoral, femoral and popliteal   veins. Doppler examination shows normal spontaneous and phasic flow. No   LEFT calf vein thrombosis is detected.    Mild edema of the superficial soft tissues of the lower extremities.   Correlate clinically.    IMPRESSION:    No acute DVT of the lower extremities.    Mild edema of the superficial soft tissues of the lower extremities.   Correlate clinically.    --- End of Report ---  ALEM MAYNARD M.D., ATTENDING RADIOLOGIST  This document has been electronically signed. Apr 4 2024 12:03PM     SUBJECTIVE: HPI:   78 year old female with hx of Diabetes Mellitus type 2 (Metformin and Trulicity)  HTN, CVA (low dose aspirin)  dementia,  family noticed trouble walking, speech issues   (minimally verbal)  started 2020  work up s/p spinal  tap dx NPH  for  shunt  (per booking sheet surgery 4/2 per family surgery 4/1 message left with surgeon. Given instructions based on booking sheet of 4/2 )      OVERNIGHT EVENTS: No acute overnight events or issues reported. Pt was seen and examined at bedside this morning, initially sleepy and needed encouragement to do exam, re-examined her again in the afternoon and exam much improved, patient was smiling and participating in exams better.     Vital Signs Last 24 Hrs  T(C): 36.9 (04 Apr 2024 08:38), Max: 37.4 (03 Apr 2024 20:30)  T(F): 98.4 (04 Apr 2024 08:38), Max: 99.4 (03 Apr 2024 20:30)  HR: 95 (04 Apr 2024 08:38) (70 - 104)  BP: 139/69 (04 Apr 2024 08:38) (139/69 - 210/86)  BP(mean): 101 (03 Apr 2024 21:00) (101 - 124)  RR: 20 (04 Apr 2024 08:38) (16 - 20)  SpO2: 94% (04 Apr 2024 08:38) (92% - 99%)    Parameters below as of 04 Apr 2024 08:38  Patient On (Oxygen Delivery Method): room air        PHYSICAL EXAM:    General: No Acute Distress     Neurological: Awake, alert and oriented to person, place and time (name, place, year but not month), PERRL, EOMI, Face Symmetrical, hypophonic speech, following commands, uppers 5/5 (slightly tremulous), no drift, and lowers 4+/5    Pulmonary: Clear to Auscultation, No Rales, No Rhonchi, No Wheezes     Cardiovascular: S1, S2, Regular Rate and Rhythm     Gastrointestinal: Soft, Nontender, Nondistended     Incision: Right parietal incision from VPS placement (04/02) has surgical staples, is clean, dry, intact and 3 abdominal incisions (left, right and midline of abdomen) covered with Steri-strips are clean, dry, intact.     LABS:                        11.2   12.51 )-----------( 339      ( 04 Apr 2024 06:04 )             36.1    04-04    136  |  102  |  11  ----------------------------<  301<H>  3.7   |  24  |  0.63    Ca    8.9      04 Apr 2024 06:04    PT/INR - ( 02 Apr 2024 14:41 )   PT: 11.7 sec;   INR: 1.12 ratio         PTT - ( 02 Apr 2024 14:41 )  PTT:30.0 sec      04-03 @ 07:01  -  04-04 @ 07:00  --------------------------------------------------------  IN: 1122 mL / OUT: 3200 mL / NET: -2078 mL    04-04 @ 07:01  - 04-04 @ 10:17  --------------------------------------------------------  IN: 260 mL / OUT: 0 mL / NET: 260 mL      DRAINS: None    MEDICATIONS:  Antibiotics:    Neuro:  acetaminophen     Tablet .. 650 milliGRAM(s) Oral every 6 hours PRN Mild Pain (1 - 3)  memantine 10 milliGRAM(s) Oral two times a day  methocarbamol 500 milliGRAM(s) Oral every 8 hours PRN Muscle Spasm  ondansetron   Disintegrating Tablet 4 milliGRAM(s) Oral every 6 hours PRN Nausea  oxyCODONE    IR 5 milliGRAM(s) Oral every 4 hours PRN Moderate Pain (4 - 6)  oxyCODONE    IR 10 milliGRAM(s) Oral every 4 hours PRN Severe Pain (7 - 10)    Cardiac:  hydrALAZINE 10 milliGRAM(s) Oral two times a day  lisinopril 40 milliGRAM(s) Oral daily  metoprolol succinate ER 25 milliGRAM(s) Oral daily  NIFEdipine XL 30 milliGRAM(s) Oral daily    Pulm:    GI/:  bisacodyl 5 milliGRAM(s) Oral daily  pantoprazole    Tablet 40 milliGRAM(s) Oral before breakfast  polyethylene glycol 3350 17 Gram(s) Oral two times a day  senna 2 Tablet(s) Oral at bedtime    Other:   dextrose 5%. 1000 milliLiter(s) IV Continuous <Continuous>  dextrose 5%. 1000 milliLiter(s) IV Continuous <Continuous>  dextrose 50% Injectable 12.5 Gram(s) IV Push once  dextrose 50% Injectable 25 Gram(s) IV Push once  dextrose 50% Injectable 25 Gram(s) IV Push once  dextrose Oral Gel 15 Gram(s) Oral once PRN Blood Glucose LESS THAN 70 milliGRAM(s)/deciliter  enoxaparin Injectable 40 milliGRAM(s) SubCutaneous every 24 hours  fenofibrate Tablet 145 milliGRAM(s) Oral daily  glucagon  Injectable 1 milliGRAM(s) IntraMuscular once  influenza  Vaccine (HIGH DOSE) 0.7 milliLiter(s) IntraMuscular once  insulin glargine Injectable (LANTUS) 8 Unit(s) SubCutaneous at bedtime  insulin lispro (ADMELOG) corrective regimen sliding scale   SubCutaneous at bedtime  insulin lispro (ADMELOG) corrective regimen sliding scale   SubCutaneous three times a day before meals  insulin lispro Injectable (ADMELOG) 5 Unit(s) SubCutaneous three times a day before meals  lidocaine 1% Injectable 0.2 milliLiter(s) Local Injection once  multivitamin 1 Tablet(s) Oral daily    DIET: [] Regular [x] CCD [] Renal [] Puree [] Dysphagia [] Tube Feeds:     IMAGING:   < from: CT Head No Cont (04.02.24 @ 17:10) >  COMPARISON EXAMINATION: 4/2/2024 8:38 AM    FINDINGS:  VENTRICLES AND SULCI: Status post shunt catheter placement with tip in   the region of the frontal horn of the right lateral ventricle along the   ventricular margin. Droplet of air is seen.  INTRA-AXIAL: Microvascular ischemic changes involving the periventricular   and subcortical white matter.  EXTRA-AXIAL: Right frontal extra-axial air. Small rounded focus of   calcification in the region of the left occipital cortex measuring   roughly 6 mm likely small calcified meningioma  VISUALIZED SINUSES:  Clear.  VISUALIZED MASTOIDS:  Clear.  CALVARIUM: Right parietal hannah hole.  MISCELLANEOUS:  None.    IMPRESSION:  Status post shunt catheter placement with its tip in the   right frontal horn of the lateral ventricle against the anterior wall   with associated droplet of air. Right frontal extra-axial air. No   postoperative hematoma appreciated. Ventricles are stable in size   compared with the preoperative evaluation. Small rounded focus of   calcification in the left occipital cortical region likely a small   calcified meningioma.    --- End of Report ---    ARIS YOUSIF MD; Attending Radiologist  This document hasbeen electronically signed. Apr 2 2024  5:20PM    < end of copied text >        < from: Xray Abdomen 1 View PORTABLE -Routine (04.02.24 @ 13:22) >  INTERPRETATION:  DATE OF EXAM: 4/2/24    COMPARISON: None.    CLINICAL INDICATION: Shunt placement    TECHNIQUE: 1 flat abdominal film.    FINDINGS:  Enteric tube tip in stomach.  The  shunt cross the chest to left of thoracic spine - it enters the   abdomen - tip of cathter is to the left of the midline in left lower   quadrant.  There is a nonobstructive bowel gas pattern. No free intraperitoneal air.   No acute bony finding.    IMPRESSION:  Tip of  shunt in left lower quadrant.  The catheter shows no kinking or discontinuities.    --- End of Report ---    CRESCENCIO CASTRO MD; Attending Radiologist  This document has been electronically signed. Apr  3 2024  2:46PM    < end of copied text >            INTERPRETATION:  CLINICAL INFORMATION: Postop day one status post    shunt insertion. Bedrest, lower extremity swelling.    COMPARISON: None available.    TECHNIQUE: Duplex sonography of the BILATERAL LOWER extremity veins with   color and spectral Doppler, with and without compression.    FINDINGS:    RIGHT:  Normal compressibility of the RIGHT common femoral, femoral and popliteal   veins. Doppler examination shows normal spontaneous and phasic flow. No   RIGHT calf vein thrombosis is detected.    LEFT:  Normal compressibility of the LEFT common femoral, femoral and popliteal   veins. Doppler examination shows normal spontaneous and phasic flow. No   LEFT calf vein thrombosis is detected.    Mild edema of the superficial soft tissues of the lower extremities.   Correlate clinically.    IMPRESSION:    No acute DVT of the lower extremities.    Mild edema of the superficial soft tissues of the lower extremities.   Correlate clinically.    --- End of Report ---  ALEM MAYNARD M.D., ATTENDING RADIOLOGIST  This document has been electronically signed. Apr 4 2024 12:03PM

## 2024-04-04 NOTE — SPEECH LANGUAGE PATHOLOGY EVALUATION - COMMENTS
hx con't  CTH 4/2  IMPRESSION:  Status post shunt catheter placement with its tip in the right frontal horn of the lateral ventricle against the anterior wall with associated droplet of air. Right frontal extra-axial air. No postoperative hematoma appreciated. Ventricles are stable in size compared with the preoperative evaluation. Small rounded focus of calcification in the left occipital cortical region likely a small calcified meningioma.    Pt w/ hx of x1 bedside swallow exam and x1 MBS at [facility]. MBS on 11/9/21 with recommendations for Easy to chew and thin liquids. "episode x 1-2 of reduced upper airway protection, with +penetration above the cords without clearance, during the swallow, with both nectar & thin via larger cup sip. Cues provided for small/controlled size of intake, w/NO further incident noted. NO aspiration visualized throughout course of study". Also of note "Finger-like projection observed off of the posterior pharyngeal wall at level C5-C6, c/w appearance of cricopharyngeal bar, however do NOT impact bolus progression through pharynx." Higher-level cognitive-linguistic tasks not appropriate as pt w/ baseline dementia and family performs all iADLs and anticipates wants/needs Noted low vocal volume and slightly mumbled speech, but suspected to be behavioral vs slight artifact from prior CVA, as pt able to speak w/ increased precision and vocal volume with verbal cueing d/w NICK Sanchez and PEPPER [place]

## 2024-04-04 NOTE — SPEECH LANGUAGE PATHOLOGY EVALUATION - SLP PERTINENT HISTORY OF CURRENT PROBLEM
78 year old female with hx of Diabetes Mellitus type 2 (Metformin and Trulicity)  HTN, CVA (low dose aspirin)  dementia,  family noticed trouble walking, speech issues   (minimally verbal)  started 2020  work up s/p spinal, tap dx NPH s/p  shunt placement on 4/2.

## 2024-04-04 NOTE — PROGRESS NOTE ADULT - ASSESSMENT
ASSESSMENT AND PLAN: 78 year old female with hx of Diabetes Mellitus type 2 (Metformin and Trulicity)  HTN, CVA (low dose aspirin)  dementia,  family noticed trouble walking, speech issues   (minimally verbal)  started 2020  work up s/p spinal  tap dx NPH  for  shunt.     s/p VPS placement for NPH at [facility], OP 10 (04/02)      NEURO:   - Continue neuro checks q4hrs  - Post-op CTH: shunt catheter placement with its tip in the right frontal horn of the lateral ventricle against the anterior wall, ventricles are stable in size, small rounded focus of calcification in the left occipital cortical region likely a small calcified meningioma.  - Post-op Xray abdomen: Tip of VPS shunt in LLQ with no kinking of catheter  - Continue Tylenol 650mg PO q6hrs, oxycodone for pain  - Continue Robaxin 500mg q8hrs PRN for muscle spasm  - Continue memantine 10mg PO BID for dementia  - Continue ZOfran 4mg PO q6hrs PRN for nausea  - Advance activity as tolerated, PT/OT recommend GÓMEZ    PULM:   - SpO2: 94% on room air  - Continue incentive spirometry    CV:  - SBP: 100-160, In PACU SBP of 210 was stabilized after giving home dose of hydralazine. Since 04/03 BPs have been <155 which is within goal.   - Continue hydralazine 10mg PO BID, lisinopril 40mg PO daily, Metoprolol succinate 25mg PO daily and nifedipine 30mg PO daily for HTN  - Continue fenofibrate 145mg PO daily   - aspirin 81mg PO for loop recorder implant stopped 7 days pre-op.     ENDO:   - HgA1c: 7.3 (03/18), POCT blood glucose: 267; ADMELOG increased to 5 units TID before meals and Lantus 10 units once daily was added   - Continue to monitor fingersticks     HEME/ONC:               - DVT ppx: Lovenox 40mg SQ daily  - LED pending   - Post-op anemia stable CBC Hgb (11.2) on 04/04;  continue to trend.     RENAL:   - IVF  - voiding (cooper dc-ed on 04/03)  - BMP: k+: 3.7, ordered one dose fo 40 meq solution for repletion    ID:   - Afebrile  - MRSA swab negative (03/18)    GI:    - last BM unknown: changed bowel regimen from PRN to standing  - on CCD diet  - Continue Dulcolax 5mg q12hrs, Miralax 17g q12hrs, senna 2 tabs for constipation  - Continue Protonix 40mg PO before breakfast      DISCHARGE PLANNING: planning for GÓMEZ    Appreciate hospitalist for medical co-management of pt    Plan to be discussed with Dr. Lambert      ASSESSMENT AND PLAN: 78 year old female with hx of Diabetes Mellitus type 2 (Metformin and Trulicity)  HTN, CVA (low dose aspirin)  dementia,  family noticed trouble walking, speech issues   (minimally verbal)  started 2020  work up s/p spinal  tap dx NPH  for  shunt.     s/p [facility]PS [facility] for [facility] (04/02)      NEURO:   - Continue neuro checks q4hrs  - VPS placed @ [facility]  - Post-op CTH: shunt catheter placement with its tip in the right frontal horn of the lateral ventricle against the anterior wall, ventricles are stable in size, small rounded focus of calcification in the left occipital cortical region likely a small calcified meningioma.  - Post-op Xray abdomen: Tip of VPS shunt in LLQ with no kinking of catheter  - Continue Tylenol 650mg PO q6hrs, oxycodone for pain  - Continue Robaxin 500mg q8hrs PRN for muscle spasm  - Continue memantine 10mg PO BID for dementia  - Continue ZOfran 4mg PO q6hrs PRN for nausea  - Advance activity as tolerated, PT/OT recommend GÓMEZ    PULM:   - SpO2: 94% on room air  - Continue incentive spirometry    CV:  - SBP: 100-160, In PACU SBP of 210 was stabilized after giving home dose of hydralazine. Since 04/03 BPs have been <155 which is within goal.   - Continue hydralazine 10mg PO BID, lisinopril 40mg PO daily, Metoprolol succinate 25mg PO daily and nifedipine 30mg PO daily for HTN  - Continue fenofibrate 145mg PO daily   - aspirin 81mg PO for loop recorder implant stopped 7 days pre-op.     ENDO:   - HgA1c: 7.3 (03/18), POCT blood glucose: 267; ADMELOG increased to 5 units TID before meals and Lantus 10 units once daily was added   - Continue to monitor fingersticks     HEME/ONC:               - DVT ppx: Lovenox 40mg SQ daily  - LED pending   - Post-op anemia stable CBC Hgb (11.2) on 04/04;  continue to trend.     RENAL:   - IVF  - voiding (cooper dc-ed on 04/03)  - BMP: hypokalemia - 3.7;  ordered one dose fo 40 meq solution for repletion    ID:   - Afebrile  - MRSA swab negative (03/18)    GI:    - Last BM unknown: changed bowel regimen from PRN to standing  - On CCD diet  - Continue Dulcolax 5mg q12hrs, Miralax 17g q12hrs, senna 2 tabs for constipation  - Continue Protonix 40mg PO before breakfast      DISCHARGE PLANNING: planning for GÓMEZ    [facility] hospitalist for medical co-management of pt    Plan to be discussed with Dr. Lambert  [id number]      ASSESSMENT AND PLAN: 78 year old female with hx of Diabetes Mellitus type 2 (Metformin and Trulicity)  HTN, CVA (low dose aspirin)  dementia,  family noticed trouble walking, speech issues   (minimally verbal)  started 2020  work up s/p spinal  tap dx NPH  for  shunt.     s/p VPS placement @ [facility] for [facility] (04/02)      NEURO:   - Continue neuro checks q4hrs  - VPS placed @ [facility]  - Post-op CTH: shunt catheter placement with its tip in the right frontal horn of the lateral ventricle against the anterior wall, ventricles are stable in size, small rounded focus of calcification in the left occipital cortical region likely a small calcified meningioma.  - Post-op Xray abdomen: Tip of VPS shunt in LLQ with no kinking of catheter  - Continue Tylenol 650mg PO q6hrs, oxycodone for pain  - Continue Robaxin 500mg q8hrs PRN for muscle spasm  - Continue memantine 10mg PO BID for dementia  - Continue ZOfran 4mg PO q6hrs PRN for nausea  - Advance activity as tolerated, PT/OT recommend GÓMEZ    PULM:   - SpO2: 94% on room air  - Continue incentive spirometry    CV:  - SBP: 100-160, In PACU SBP of 210 was stabilized after giving home dose of hydralazine. Since 04/03 BPs have been <155 which is within goal.   - Continue hydralazine 10mg PO BID, lisinopril 40mg PO daily, Metoprolol succinate 25mg PO daily and nifedipine 30mg PO daily for HTN  - Continue fenofibrate 145mg PO daily   - aspirin 81mg PO for loop recorder implant stopped 7 days pre-op.     ENDO:   - HgA1c: 7.3 (03/18), POCT blood glucose: 267; ADMELOG increased to 5 units TID before meals and Lantus 10 units once daily was added   - Continue to monitor fingersticks     HEME/ONC:               - DVT ppx: Lovenox 40mg SQ daily  - LED (04/04): No acute DVT of the lower extremities.   - Post-op anemia stable CBC Hgb (11.2) on 04/04;  continue to trend.     RENAL:   - IVF  - voiding (cooper dc-ed on 04/03)  - BMP: hypokalemia - 3.7;  ordered one dose fo 40 meq solution for repletion    ID:   - Afebrile  - MRSA swab negative (03/18)    GI:    - Last BM unknown: changed bowel regimen from PRN to standing  - On CCD diet  - Continue Dulcolax 5mg q12hrs, Miralax 17g q12hrs, senna 2 tabs for constipation  - Continue Protonix 40mg PO before breakfast      DISCHARGE PLANNING: planning for GÓMEZ    [place] hospitalist for medical co-management of pt    Plan to be discussed with Dr. Lambert  [id number]      ASSESSMENT AND PLAN: 78 year old female with hx of Diabetes Mellitus type 2 (Metformin and Trulicity)  HTN, CVA (low dose aspirin)  dementia,  family noticed trouble walking, speech issues   (minimally verbal)  started 2020  work up s/p spinal  tap dx NPH  for  shunt.     s/p VPS placement @ [facility] for [facility] (04/02)      NEURO:   - Continue neuro checks q4hrs  - Continue VPS placed @ [facility]  - Post-op CTH: shunt catheter placement with its tip in the right frontal horn of the lateral ventricle against the anterior wall, ventricles are stable in size, small rounded focus of calcification in the left occipital cortical region likely a small calcified meningioma.  - Post-op Xray abdomen: Tip of VPS shunt in LLQ with no kinking of catheter  - Continue Tylenol 650mg PO q6hrs, oxycodone for pain  - Continue Robaxin 500mg q8hrs PRN for muscle spasm  - Continue Memantine 10mg PO BID for dementia  - Continue Zofran 4mg PO q6hrs PRN for nausea  - Advance activity as tolerated, PT/OT recommend GÓMEZ    PULM:   - SpO2: 94% on room air  - Continue incentive spirometry    CV:  - SBP: 100-160, In PACU SBP of 210 was stabilized after giving home dose of hydralazine. Since 04/03 BPs have been <155 which is within goal.   - Continue hydralazine 10mg PO BID, lisinopril 40mg PO daily, Metoprolol succinate 25mg PO daily and nifedipine 30mg PO daily for HTN  - Continue fenofibrate 145mg PO daily   - aspirin 81mg PO for loop recorder implant stopped 7 days pre-op.     ENDO:   - HgA1c: 7.3 (03/18), POCT blood glucose: 267; ADMELOG increased to 5 units TID before meals and Lantus 10 units once daily was added   - Continue to monitor fingersticks     HEME/ONC:               - DVT ppx: Lovenox 40mg SQ daily  - LED (04/04): No acute DVT of the lower extremities.   - Post-op anemia stable CBC Hgb (11.2) on 04/04;  continue to trend.     RENAL:   - IVF  - voiding (cooper dc-ed on 04/03)  - BMP: hypokalemia - 3.7;  ordered one dose fo 40 meq solution for repletion    ID:   - Afebrile  - MRSA swab negative (03/18)    GI:    - Last BM unknown: changed bowel regimen from PRN to standing  - On CCD diet  - Continue Dulcolax 5mg q12hrs, Miralax 17g q12hrs, senna 2 tabs for constipation  - Continue Protonix 40mg PO before breakfast      DISCHARGE PLANNING: planning for GÓMEZ    [place] hospitalist for medical co-management of pt    Plan to be discussed with Dr. Lambert  [id number]      ASSESSMENT AND PLAN: 78 year old female with hx of Diabetes Mellitus type 2 (Metformin and Trulicity)  HTN, CVA (low dose aspirin)  dementia,  family noticed trouble walking, speech issues   (minimally verbal)  started 2020  work up s/p spinal  tap dx NPH  for  shunt.     s/p VPS placement @ [facility] for [facility]PH (04/02)      NEURO:   - Continue neuro checks q4hrs  - Continue VPS placed @ [facility]  - Post-op CTH: shunt catheter placement with its tip in the right frontal horn of the lateral ventricle against the anterior wall, ventricles are stable in size, small rounded focus of calcification in the left occipital cortical region likely a small calcified meningioma.  - Post-op Xray abdomen: Tip of VPS shunt in LLQ with no kinking of catheter  - Continue Tylenol 650mg PO q6hrs, oxycodone for pain  - Continue Robaxin 500mg q8hrs PRN for muscle spasm  - Continue Memantine 10mg PO BID for dementia  - Continue Zofran 4mg PO q6hrs PRN for nausea  - Advance activity as tolerated, PT/OT recommend GÓMEZ    PULM:   - SpO2: 94% on room air  - Continue incentive spirometry    CV:  - SBP: 100-160, In PACU SBP of 210 was stabilized after giving home dose of hydralazine. Since 04/03 BPs have been <155 which is within goal.   - Continue hydralazine 10mg PO BID, lisinopril 40mg PO daily, Metoprolol succinate 25mg PO daily and nifedipine 30mg PO daily for HTN  - Continue fenofibrate 145mg PO daily   - aspirin 81mg PO for loop recorder implant stopped 7 days pre-op.     ENDO:   - HgA1c: 7.3 (03/18), POCT blood glucose: 267; ADMELOG increased to 5 units TID before meals and Lantus 8 units once daily was added   - Continue to monitor fingersticks     HEME/ONC:               - DVT ppx: Lovenox 40mg SQ daily  - LED (04/04): No acute DVT of the lower extremities.   - Post-op anemia stable CBC Hgb (11.2) on 04/04;  continue to trend.     RENAL:   - IVF  - voiding (cooper dc-ed on 04/03)  - BMP: hypokalemia - 3.7;  ordered one dose fo 40 meq solution for repletion    ID:   - Afebrile  - MRSA swab negative (03/18)    GI:    - Last BM unknown: changed bowel regimen from PRN to standing  - On CCD diet  - Continue Dulcolax 5mg q12hrs, Miralax 17g q12hrs, senna 2 tabs for constipation  - Continue Protonix 40mg PO before breakfast      DISCHARGE PLANNING: planning for GÓMEZ    [place] hospitalist for medical co-management of pt    Plan to be discussed with Dr. Lambert  [id number]      ASSESSMENT AND PLAN: 78 year old female with hx of Diabetes Mellitus type 2 (Metformin and Trulicity)  HTN, CVA (low dose aspirin)  dementia,  family noticed trouble walking, speech issues   (minimally verbal)  started 2020  work up s/p spinal  tap dx NPH  for  shunt.     s/p VPS placement @ [facility] for NPH (04/02)      NEURO:   - Continue neuro checks q4hrs  - Continue VPS placed @ [facility]  - Post-op CTH: shunt catheter placement with its tip in the right frontal horn of the lateral ventricle against the anterior wall, ventricles are stable in size, small rounded focus of calcification in the left occipital cortical region likely a small calcified meningioma.  - Post-op Xray abdomen: Tip of VPS shunt in LLQ with no kinking of catheter  - Continue Tylenol 650mg PO q6hrs, oxycodone for pain  - Continue Robaxin 500mg q8hrs PRN for muscle spasm  - Continue Memantine 10mg PO BID for dementia  - Continue Zofran 4mg PO q6hrs PRN for nausea  - Advance activity as tolerated, PT/OT recommend GÓMEZ    PULM:   - SpO2: 94% on room air  - Continue incentive spirometry    CV:  - SBP: 100-160, In PACU SBP of 210 was stabilized after giving home dose of hydralazine. Since 04/03, BPs have been <155 which is within goal.   - Continue hydralazine 10mg PO BID, lisinopril 40mg PO daily, Metoprolol succinate 25mg PO daily and nifedipine 30mg PO daily for HTN  - Continue fenofibrate 145mg PO daily   - Aspirin 81mg PO for loop recorder implant stopped 7 days pre-op.     ENDO:   - HgA1c: 7.3 (03/18), POCT blood glucose: 267; ADMELOG increased to 5 units TID before meals and Lantus 8 units once daily was added   - Continue to monitor fingersticks     HEME/ONC:               - DVT ppx: Lovenox 40mg SQ daily  - LED (04/04): No acute DVT of the lower extremities.   - Post-op anemia stable CBC Hgb (11.2) on 04/04;  continue to trend.     RENAL:   - IVL  - voiding (cooper dc-ed on 04/03)  - BMP: hypokalemia - 3.7;  ordered one dose of KCl 40 meq solution for repletion    ID:   - Afebrile  - completed 2 post-op doses of Ancef  - MRSA swab negative (03/18)    GI:    - Last BM unknown: changed bowel regimen from PRN to standing, stat Miralax 17g q12hrs ordered  - On CCD diet  - Continue Dulcolax 5mg q12hrs, Miralax 17g q12hrs, senna 2 tabs for constipation  - Continue Protonix 40mg PO before breakfast    DISCHARGE PLANNING: planning for GÓMEZ    [facility] hospitalist for medical co-management of pt    Plan to be discussed with Dr. Lambert  [phone]      ASSESSMENT AND PLAN: 78 year old female with hx of Diabetes Mellitus type 2 (Metformin and Trulicity)  HTN, CVA (low dose aspirin)  dementia,  family noticed trouble walking, speech issues   (minimally verbal)  started 2020  work up s/p spinal  tap dx NPH  for  shunt.     s/p VPS placement @ [facility] for NPH (04/02)    NEURO:   - Continue neuro checks q4hrs  - Continue VPS @ [facility]  - Post-op CTH: shunt catheter placement with its tip in the right frontal horn of the lateral ventricle against the anterior wall, ventricles are stable in size, small rounded focus of calcification in the left occipital cortical region likely a small calcified meningioma.  - Post-op Xray abdomen: Tip of VPS shunt in LLQ with no kinking of catheter  - Continue Tylenol prn and Oxycodone prn for pain  - Continue Robaxin 500mg q8hrs PRN for muscle spasm  - Continue Memantine 10mg PO BID for dementia  - Continue Zofran 4mg PO q6hrs PRN for nausea  - Advance activity as tolerated, PT/OT recommend GÓMEZ    PULM:   - SpO2: 94% on room air  - Continue incentive spirometry    CV:  - SBP: 100-160, In PACU yesterday SBP of 210 was stabilized after giving home dose of hydralazine. Since then, BPs have been <155 which is within goal.   - Continue hydralazine 10mg PO BID, lisinopril 40mg PO daily, Metoprolol succinate 25mg PO daily and nifedipine 30mg PO daily for HTN  - Continue fenofibrate 145mg PO daily for HLD  - Aspirin 81mg at home, currently on hold for recent surgery    ENDO:   - HgA1c: 7.3 (03/18), fingersticks >200s, hyperglycemic; on ISS, added Admelog 5 units pre-meal and Lantus 8 at bedtime, continue to monitor FS  - CCD    HEME/ONC:               - DVT ppx: Lovenox 40mg SQ daily  - LED (04/04): No acute DVT of the lower extremities.   - Post-op anemia stable CBC Hgb (11.2) on 04/04; mild leukocytosis - afebrile    RENAL:   - IVL  - voiding (cooper dc-ed on 04/03)  - 4/4 BMP - 3.7;  ordered one dose of KCl 40 meq solution for supplementation    ID:   - Afebrile  - completed 2 post-op doses of Ancef  - MRSA swab negative (03/18)    GI:    - On oral diet  - Continue Dulcolax 5mg q12hrs, Miralax 17g q12hrs, senna 2 tabs for constipation - changed from prn to standing, BM pending  - Continue Protonix 40mg PO before breakfast    Spoke with daughter Donald Isbell [phone] and gave her updates, answered questions.     DISCHARGE PLANNING: planning for GÓMEZ    Plan to be discussed with Dr. Lambert  [phone]

## 2024-04-05 ENCOUNTER — APPOINTMENT (OUTPATIENT)
Dept: ELECTROPHYSIOLOGY | Facility: CLINIC | Age: 79
End: 2024-04-05
Payer: MEDICARE

## 2024-04-05 ENCOUNTER — TRANSCRIPTION ENCOUNTER (OUTPATIENT)
Age: 79
End: 2024-04-05

## 2024-04-05 ENCOUNTER — NON-APPOINTMENT (OUTPATIENT)
Age: 79
End: 2024-04-05

## 2024-04-05 VITALS
DIASTOLIC BLOOD PRESSURE: 69 MMHG | OXYGEN SATURATION: 96 % | RESPIRATION RATE: 18 BRPM | HEART RATE: 82 BPM | TEMPERATURE: 98 F | SYSTOLIC BLOOD PRESSURE: 138 MMHG

## 2024-04-05 LAB
GLUCOSE BLDC GLUCOMTR-MCNC: 251 MG/DL — HIGH (ref 70–99)
GLUCOSE BLDC GLUCOMTR-MCNC: 269 MG/DL — HIGH (ref 70–99)
GLUCOSE BLDC GLUCOMTR-MCNC: 283 MG/DL — HIGH (ref 70–99)

## 2024-04-05 PROCEDURE — 92523 SPEECH SOUND LANG COMPREHEN: CPT

## 2024-04-05 PROCEDURE — 36415 COLL VENOUS BLD VENIPUNCTURE: CPT

## 2024-04-05 PROCEDURE — 97161 PT EVAL LOW COMPLEX 20 MIN: CPT

## 2024-04-05 PROCEDURE — 93298 REM INTERROG DEV EVAL SCRMS: CPT

## 2024-04-05 PROCEDURE — 97166 OT EVAL MOD COMPLEX 45 MIN: CPT

## 2024-04-05 PROCEDURE — C9399: CPT

## 2024-04-05 PROCEDURE — 80048 BASIC METABOLIC PNL TOTAL CA: CPT

## 2024-04-05 PROCEDURE — C1889: CPT

## 2024-04-05 PROCEDURE — 93970 EXTREMITY STUDY: CPT

## 2024-04-05 PROCEDURE — 85730 THROMBOPLASTIN TIME PARTIAL: CPT

## 2024-04-05 PROCEDURE — 97530 THERAPEUTIC ACTIVITIES: CPT

## 2024-04-05 PROCEDURE — 70250 X-RAY EXAM OF SKULL: CPT

## 2024-04-05 PROCEDURE — 70450 CT HEAD/BRAIN W/O DYE: CPT | Mod: MC

## 2024-04-05 PROCEDURE — 82962 GLUCOSE BLOOD TEST: CPT

## 2024-04-05 PROCEDURE — 85027 COMPLETE CBC AUTOMATED: CPT

## 2024-04-05 PROCEDURE — 85610 PROTHROMBIN TIME: CPT

## 2024-04-05 PROCEDURE — 74018 RADEX ABDOMEN 1 VIEW: CPT

## 2024-04-05 RX ORDER — INSULIN LISPRO 100/ML
8 VIAL (ML) SUBCUTANEOUS
Qty: 0 | Refills: 0 | DISCHARGE
Start: 2024-04-05

## 2024-04-05 RX ORDER — OXYCODONE HYDROCHLORIDE 5 MG/1
1 TABLET ORAL
Qty: 0 | Refills: 0 | DISCHARGE
Start: 2024-04-05

## 2024-04-05 RX ORDER — POLYETHYLENE GLYCOL 3350 17 G/17G
17 POWDER, FOR SOLUTION ORAL
Qty: 0 | Refills: 0 | DISCHARGE
Start: 2024-04-05

## 2024-04-05 RX ORDER — INSULIN GLARGINE 100 [IU]/ML
14 INJECTION, SOLUTION SUBCUTANEOUS
Qty: 0 | Refills: 0 | DISCHARGE
Start: 2024-04-05

## 2024-04-05 RX ORDER — ENOXAPARIN SODIUM 100 MG/ML
40 INJECTION SUBCUTANEOUS
Qty: 0 | Refills: 0 | DISCHARGE
Start: 2024-04-05

## 2024-04-05 RX ORDER — INSULIN GLARGINE 100 [IU]/ML
14 INJECTION, SOLUTION SUBCUTANEOUS AT BEDTIME
Refills: 0 | Status: DISCONTINUED | OUTPATIENT
Start: 2024-04-05 | End: 2024-04-05

## 2024-04-05 RX ORDER — INSULIN GLARGINE 100 [IU]/ML
10 INJECTION, SOLUTION SUBCUTANEOUS AT BEDTIME
Refills: 0 | Status: DISCONTINUED | OUTPATIENT
Start: 2024-04-05 | End: 2024-04-05

## 2024-04-05 RX ORDER — INSULIN LISPRO 100/ML
2 VIAL (ML) SUBCUTANEOUS
Qty: 0 | Refills: 0 | DISCHARGE
Start: 2024-04-05

## 2024-04-05 RX ORDER — INSULIN LISPRO 100/ML
8 VIAL (ML) SUBCUTANEOUS
Refills: 0 | Status: DISCONTINUED | OUTPATIENT
Start: 2024-04-05 | End: 2024-04-05

## 2024-04-05 RX ORDER — SENNA PLUS 8.6 MG/1
2 TABLET ORAL
Qty: 0 | Refills: 0 | DISCHARGE
Start: 2024-04-05

## 2024-04-05 RX ORDER — ACETAMINOPHEN 500 MG
2 TABLET ORAL
Qty: 0 | Refills: 0 | DISCHARGE
Start: 2024-04-05

## 2024-04-05 RX ADMIN — Medication 8 UNIT(S): at 11:48

## 2024-04-05 RX ADMIN — OXYCODONE HYDROCHLORIDE 10 MILLIGRAM(S): 5 TABLET ORAL at 09:00

## 2024-04-05 RX ADMIN — Medication 6: at 07:42

## 2024-04-05 RX ADMIN — POLYETHYLENE GLYCOL 3350 17 GRAM(S): 17 POWDER, FOR SOLUTION ORAL at 05:29

## 2024-04-05 RX ADMIN — Medication 1 TABLET(S): at 11:51

## 2024-04-05 RX ADMIN — OXYCODONE HYDROCHLORIDE 10 MILLIGRAM(S): 5 TABLET ORAL at 08:20

## 2024-04-05 RX ADMIN — LISINOPRIL 40 MILLIGRAM(S): 2.5 TABLET ORAL at 05:28

## 2024-04-05 RX ADMIN — Medication 30 MILLIGRAM(S): at 05:28

## 2024-04-05 RX ADMIN — Medication 10 MILLIGRAM(S): at 05:29

## 2024-04-05 RX ADMIN — Medication 5 UNIT(S): at 07:43

## 2024-04-05 RX ADMIN — Medication 145 MILLIGRAM(S): at 11:50

## 2024-04-05 RX ADMIN — Medication 10 MILLIGRAM(S): at 09:38

## 2024-04-05 RX ADMIN — Medication 6: at 11:48

## 2024-04-05 RX ADMIN — Medication 25 MILLIGRAM(S): at 05:29

## 2024-04-05 RX ADMIN — MEMANTINE HYDROCHLORIDE 10 MILLIGRAM(S): 10 TABLET ORAL at 05:29

## 2024-04-05 RX ADMIN — PANTOPRAZOLE SODIUM 40 MILLIGRAM(S): 20 TABLET, DELAYED RELEASE ORAL at 05:29

## 2024-04-05 NOTE — DISCHARGE NOTE NURSING/CASE MANAGEMENT/SOCIAL WORK - NSDCPEFALRISK_GEN_ALL_CORE
For information on Fall & Injury Prevention, visit: [email or web address] OR  [email or web address] OR  https://www.cdc.gov/steadi/patient.html

## 2024-04-05 NOTE — PROGRESS NOTE ADULT - SUBJECTIVE AND OBJECTIVE BOX
SUBJECTIVE: HPI:   78 year old female with hx of Diabetes Mellitus type 2 (Metformin and Trulicity)  HTN, CVA (low dose aspirin)  dementia,  family noticed trouble walking, speech issues   (minimally verbal)  started 2020  work up s/p spinal  tap dx NPH  for  shunt  (per booking sheet surgery 4/2 per family surgery 4/1 message left with surgeon. Given instructions based on booking sheet of 4/2 )    OVERNIGHT EVENTS: No acute overnights events or issues reported. Pt was seen and examined in the morning, o/w comfortable.     Vital Signs Last 24 Hrs  T(C): 36.6 (05 Apr 2024 09:00), Max: 36.8 (04 Apr 2024 16:09)  T(F): 97.9 (05 Apr 2024 09:00), Max: 98.3 (04 Apr 2024 16:09)  HR: 77 (05 Apr 2024 09:00) (70 - 84)  BP: 120/56 (05 Apr 2024 09:00) (120/56 - 161/79)  BP(mean): --  RR: 18 (05 Apr 2024 09:00) (18 - 18)  SpO2: 97% (05 Apr 2024 09:00) (94% - 97%)    Parameters below as of 05 Apr 2024 09:00  Patient On (Oxygen Delivery Method): room air      PHYSICAL EXAM:    General: No Acute Distress     Neurological: Awake, alert and oriented to person, place and time (name, place, month but not year), PERRL, EOMI, Face Symmetrical, hypophonic speech, following commands, uppers 5/5 (slightly tremulous), no drift, and lowers 4+/5    Pulmonary: Clear to Auscultation, No Rales, No Rhonchi, No Wheezes     Cardiovascular: S1, S2, Regular Rate and Rhythm     Gastrointestinal: Soft, Nontender, Nondistended     Incision: Right parietal incision from VPS placement (04/02) has surgical staples, is clean, dry, intact and 3 abdominal incisions (left, right and midline of abdomen) covered with Steri-strips are clean, dry, intact.       LABS:                        11.2   12.51 )-----------( 339      ( 04 Apr 2024 06:04 )             36.1    04-04    136  |  102  |  11  ----------------------------<  301<H>  3.7   |  24  |  0.63    Ca    8.9      04 Apr 2024 06:04          04-04 @ 07:01  - 04-05 @ 07:00  --------------------------------------------------------  IN: 680 mL / OUT: 1000 mL / NET: -320 mL      DRAINS: none    MEDICATIONS:  Antibiotics:    Neuro:  acetaminophen     Tablet .. 650 milliGRAM(s) Oral every 6 hours PRN Mild Pain (1 - 3)  memantine 10 milliGRAM(s) Oral two times a day  methocarbamol 500 milliGRAM(s) Oral every 8 hours PRN Muscle Spasm  ondansetron   Disintegrating Tablet 4 milliGRAM(s) Oral every 6 hours PRN Nausea  oxyCODONE    IR 5 milliGRAM(s) Oral every 6 hours PRN Moderate Pain (4 - 6)  oxyCODONE    IR 10 milliGRAM(s) Oral every 6 hours PRN Severe Pain (7 - 10)    Cardiac:  hydrALAZINE 10 milliGRAM(s) Oral two times a day  lisinopril 40 milliGRAM(s) Oral daily  metoprolol succinate ER 25 milliGRAM(s) Oral daily  NIFEdipine XL 30 milliGRAM(s) Oral daily    Pulm:    GI/:  bisacodyl 5 milliGRAM(s) Oral every 12 hours  pantoprazole    Tablet 40 milliGRAM(s) Oral before breakfast  polyethylene glycol 3350 17 Gram(s) Oral two times a day  senna 2 Tablet(s) Oral at bedtime    Other:   dextrose 5%. 1000 milliLiter(s) IV Continuous <Continuous>  dextrose 5%. 1000 milliLiter(s) IV Continuous <Continuous>  dextrose 50% Injectable 12.5 Gram(s) IV Push once  dextrose 50% Injectable 25 Gram(s) IV Push once  dextrose 50% Injectable 25 Gram(s) IV Push once  dextrose Oral Gel 15 Gram(s) Oral once PRN Blood Glucose LESS THAN 70 milliGRAM(s)/deciliter  enoxaparin Injectable 40 milliGRAM(s) SubCutaneous every 24 hours  fenofibrate Tablet 145 milliGRAM(s) Oral daily  glucagon  Injectable 1 milliGRAM(s) IntraMuscular once  influenza  Vaccine (HIGH DOSE) 0.7 milliLiter(s) IntraMuscular once  insulin glargine Injectable (LANTUS) 14 Unit(s) SubCutaneous at bedtime  insulin lispro (ADMELOG) corrective regimen sliding scale   SubCutaneous at bedtime  insulin lispro (ADMELOG) corrective regimen sliding scale   SubCutaneous three times a day before meals  insulin lispro Injectable (ADMELOG) 8 Unit(s) SubCutaneous three times a day before meals  multivitamin 1 Tablet(s) Oral daily    DIET: [] Regular [x] CCD [] Renal [] Puree [] Dysphagia [] Tube Feeds:     IMAGING:   < from: CT Head No Cont (04.02.24 @ 17:10) >  FINDINGS:  VENTRICLES AND SULCI: Status post shunt catheter placement with tip in   the region of the frontal horn of the right lateral ventricle along the   ventricular margin. Droplet of air is seen.  INTRA-AXIAL: Microvascular ischemic changes involving the periventricular   and subcortical white matter.  EXTRA-AXIAL: Right frontal extra-axial air. Small rounded focus of   calcification in the region of the left occipital cortex measuring   roughly 6 mm likely small calcified meningioma  VISUALIZED SINUSES:  Clear.  VISUALIZED MASTOIDS:  Clear.  CALVARIUM: Right parietal hannah hole.  MISCELLANEOUS:  None.    IMPRESSION:  Status post shunt catheter placement with its tip in the   right frontal horn of the lateral ventricle against the anterior wall   with associated droplet of air. Right frontal extra-axial air. No   postoperative hematoma appreciated. Ventricles are stable in size   compared with the preoperative evaluation. Small rounded focus of   calcification in the left occipital cortical region likely a small   calcified meningioma.    --- End of Report ---    ARIS YOUSIF MD; Attending Radiologist  This document hasbeen electronically signed. Apr 2 2024  5:20PM    < end of copied text >          < from: [facility] Duplex Lower Ext Vein Scan, Bilat (04.04.24 @ 11:45) >  FINDINGS:    RIGHT:  Normal compressibility of the RIGHT common femoral, femoral and popliteal   veins. Doppler examination shows normal spontaneous and phasic flow. No   RIGHT calf vein thrombosis is detected.    LEFT:  Normal compressibility of the LEFT common femoral, femoral and popliteal   veins. Doppler examination shows normal spontaneous and phasic flow. No   LEFT calf vein thrombosis is detected.    Mild edema of the superficial soft tissues of the lower extremities.   Correlate clinically.    IMPRESSION:    No acute DVT of the lower extremities.    Mild edema of the superficial soft tissues of the lower extremities.   Correlate clinically.    --- End of Report ---    ALEM MAYNARD M.D., ATTENDING RADIOLOGIST  This document has been electronically signed. Apr 4 2024 12:03PM    < end of copied text >       SUBJECTIVE: HPI:   78 year old female with hx of Diabetes Mellitus type 2 (Metformin and Trulicity)  HTN, CVA (low dose aspirin)  dementia,  family noticed trouble walking, speech issues   (minimally verbal)  started 2020  work up s/p spinal  tap dx NPH  for  shunt  (per booking sheet surgery 4/2 per family surgery 4/1 message left with surgeon. Given instructions based on booking sheet of 4/2 )    OVERNIGHT EVENTS: Patient seen and evaluated at bedside this morning, unchanged neurologically and no acute events overnight.     Vital Signs Last 24 Hrs  T(C): 36.6 (05 Apr 2024 09:00), Max: 36.8 (04 Apr 2024 16:09)  T(F): 97.9 (05 Apr 2024 09:00), Max: 98.3 (04 Apr 2024 16:09)  HR: 77 (05 Apr 2024 09:00) (70 - 84)  BP: 120/56 (05 Apr 2024 09:00) (120/56 - 161/79)  BP(mean): --  RR: 18 (05 Apr 2024 09:00) (18 - 18)  SpO2: 97% (05 Apr 2024 09:00) (94% - 97%)    Parameters below as of 05 Apr 2024 09:00  Patient On (Oxygen Delivery Method): room air      PHYSICAL EXAM:    General: No Acute Distress     Neurological: Awake, alert and oriented to person, place and time (name, place, month but not year), PERRL, EOMI, Face Symmetrical, hypophonic speech, following commands, uppers 5/5 (slightly tremulous), no drift, and lowers 4+/5    Pulmonary: Clear to Auscultation, No Rales, No Rhonchi, No Wheezes     Cardiovascular: S1, S2, Regular Rate and Rhythm     Gastrointestinal: Soft, Nontender, Nondistended     Incision: Right parietal incision from VPS placement (04/02) has surgical staples, is clean, dry, intact and 3 abdominal incisions (left, right and midline of abdomen) covered with Steri-strips are clean, dry, intact.       LABS:                        11.2   12.51 )-----------( 339      ( 04 Apr 2024 06:04 )             36.1    04-04    136  |  102  |  11  ----------------------------<  301<H>  3.7   |  24  |  0.63    Ca    8.9      04 Apr 2024 06:04          04-04 @ 07:01 - 04-05 @ 07:00  --------------------------------------------------------  IN: 680 mL / OUT: 1000 mL / NET: -320 mL      DRAINS: none    MEDICATIONS:  Antibiotics:    Neuro:  acetaminophen     Tablet .. 650 milliGRAM(s) Oral every 6 hours PRN Mild Pain (1 - 3)  memantine 10 milliGRAM(s) Oral two times a day  methocarbamol 500 milliGRAM(s) Oral every 8 hours PRN Muscle Spasm  ondansetron   Disintegrating Tablet 4 milliGRAM(s) Oral every 6 hours PRN Nausea  oxyCODONE    IR 5 milliGRAM(s) Oral every 6 hours PRN Moderate Pain (4 - 6)  oxyCODONE    IR 10 milliGRAM(s) Oral every 6 hours PRN Severe Pain (7 - 10)    Cardiac:  hydrALAZINE 10 milliGRAM(s) Oral two times a day  lisinopril 40 milliGRAM(s) Oral daily  metoprolol succinate ER 25 milliGRAM(s) Oral daily  NIFEdipine XL 30 milliGRAM(s) Oral daily    Pulm:    GI/:  bisacodyl 5 milliGRAM(s) Oral every 12 hours  pantoprazole    Tablet 40 milliGRAM(s) Oral before breakfast  polyethylene glycol 3350 17 Gram(s) Oral two times a day  senna 2 Tablet(s) Oral at bedtime    Other:   dextrose 5%. 1000 milliLiter(s) IV Continuous <Continuous>  dextrose 5%. 1000 milliLiter(s) IV Continuous <Continuous>  dextrose 50% Injectable 12.5 Gram(s) IV Push once  dextrose 50% Injectable 25 Gram(s) IV Push once  dextrose 50% Injectable 25 Gram(s) IV Push once  dextrose Oral Gel 15 Gram(s) Oral once PRN Blood Glucose LESS THAN 70 milliGRAM(s)/deciliter  enoxaparin Injectable 40 milliGRAM(s) SubCutaneous every 24 hours  fenofibrate Tablet 145 milliGRAM(s) Oral daily  glucagon  Injectable 1 milliGRAM(s) IntraMuscular once  influenza  Vaccine (HIGH DOSE) 0.7 milliLiter(s) IntraMuscular once  insulin glargine Injectable (LANTUS) 14 Unit(s) SubCutaneous at bedtime  insulin lispro (ADMELOG) corrective regimen sliding scale   SubCutaneous at bedtime  insulin lispro (ADMELOG) corrective regimen sliding scale   SubCutaneous three times a day before meals  insulin lispro Injectable (ADMELOG) 8 Unit(s) SubCutaneous three times a day before meals  multivitamin 1 Tablet(s) Oral daily    DIET: [] Regular [x] CCD [] Renal [] Puree [] Dysphagia [] Tube Feeds:     IMAGING:   < from: CT Head No Cont (04.02.24 @ 17:10) >  FINDINGS:  VENTRICLES AND SULCI: Status post shunt catheter placement with tip in   the region of the frontal horn of the right lateral ventricle along the   ventricular margin. Droplet of air is seen.  INTRA-AXIAL: Microvascular ischemic changes involving the periventricular   and subcortical white matter.  EXTRA-AXIAL: Right frontal extra-axial air. Small rounded focus of   calcification in the region of the left occipital cortex measuring   roughly 6 mm likely small calcified meningioma  VISUALIZED SINUSES:  Clear.  VISUALIZED MASTOIDS:  Clear.  CALVARIUM: Right parietal hannah hole.  MISCELLANEOUS:  None.    IMPRESSION:  Status post shunt catheter placement with its tip in the   right frontal horn of the lateral ventricle against the anterior wall   with associated droplet of air. Right frontal extra-axial air. No   postoperative hematoma appreciated. Ventricles are stable in size   compared with the preoperative evaluation. Small rounded focus of   calcification in the left occipital cortical region likely a small   calcified meningioma.    --- End of Report ---    ARIS YOUSIF MD; Attending Radiologist  This document hasbeen electronically signed. Apr 2 2024  5:20PM    < end of copied text >          < from: [facility] Duplex Lower Ext Vein Scan, Bilat (04.04.24 @ 11:45) >  FINDINGS:    RIGHT:  Normal compressibility of the RIGHT common femoral, femoral and popliteal   veins. Doppler examination shows normal spontaneous and phasic flow. No   RIGHT calf vein thrombosis is detected.    LEFT:  Normal compressibility of the LEFT common femoral, femoral and popliteal   veins. Doppler examination shows normal spontaneous and phasic flow. No   LEFT calf vein thrombosis is detected.    Mild edema of the superficial soft tissues of the lower extremities.   Correlate clinically.    IMPRESSION:    No acute DVT of the lower extremities.    Mild edema of the superficial soft tissues of the lower extremities.   Correlate clinically.    --- End of Report ---    ALEM MAYNARD M.D., ATTENDING RADIOLOGIST  This document has been electronically signed. Apr 4 2024 12:03PM    < end of copied text >

## 2024-04-05 NOTE — DISCHARGE NOTE PROVIDER - NSDCMRMEDTOKEN_GEN_ALL_CORE_FT
fenofibrate 145 mg oral tablet: 1 tab(s) orally once a day  hydrALAZINE 10 mg oral tablet: 1 tab(s) orally 2 times a day  lisinopril 40 mg oral tablet: 1 tab(s) orally once a day  memantine 10 mg oral tablet: 1 tab(s) orally 2 times a day  metoprolol succinate 25 mg oral capsule, extended release: 1 cap(s) orally once a day  NIFEdipine 30 mg oral tablet, extended release: 1 tab(s) orally once a day  omeprazole 40 mg oral delayed release capsule: 1 cap(s) orally once a day (at bedtime)   acetaminophen 325 mg oral tablet: 2 tab(s) orally every 6 hours As needed Mild Pain (1 - 3)  bisacodyl 5 mg oral delayed release tablet: 1 tab(s) orally every 12 hours  enoxaparin: 40 milligram(s) subcutaneous once a day (at bedtime)  fenofibrate 145 mg oral tablet: 1 tab(s) orally once a day  hydrALAZINE 10 mg oral tablet: 1 tab(s) orally 2 times a day  insulin glargine 100 units/mL subcutaneous solution: 14 unit(s) subcutaneous once a day (at bedtime)  insulin lispro 100 units/mL injectable solution: 2 unit(s) subcutaneous 4 times a day (before meals and at bedtime) Please continue as moderate insulin sliding scale.  insulin lispro 100 units/mL injectable solution: 8 unit(s) subcutaneous 3 times a day (before meals)  lisinopril 40 mg oral tablet: 1 tab(s) orally once a day  memantine 10 mg oral tablet: 1 tab(s) orally 2 times a day  metoprolol succinate 25 mg oral capsule, extended release: 1 cap(s) orally once a day  Multiple Vitamins oral tablet: 1 tab(s) orally once a day  NIFEdipine 30 mg oral tablet, extended release: 1 tab(s) orally once a day  omeprazole 40 mg oral delayed release capsule: 1 cap(s) orally once a day (at bedtime)  oxyCODONE 10 mg oral tablet: 1 tab(s) orally every 6 hours As needed Severe Pain (7 - 10)  oxyCODONE 5 mg oral tablet: 1 tab(s) orally every 6 hours As needed Moderate Pain (4 - 6)  polyethylene glycol 3350 oral powder for reconstitution: 17 gram(s) orally 2 times a day  senna leaf extract oral tablet: 2 tab(s) orally once a day (at bedtime)

## 2024-04-05 NOTE — DISCHARGE NOTE PROVIDER - NSDCFUADDINST_GEN_ALL_CORE_FT
Please make all necessary appointments and follow up. Please DO NOT take any NSAIDs (Advil, Aleve, Motrin, Ibuprofen) until cleared by your Neurosurgeon. Please DO NOT do any heavy lifting, bending, twisting and straining. You have surgical staples, they will be removed in 2 weeks on follow up with . You may shower, but NO SOAP / NO SHAMPOO. DO NOT do any scrubbing. Pat dry only. Please come to the emergency room for any of the following: altered mental status, seizures, pain uncontrolled by pain medications, fevers, leaking / bleeding from surgical site, chest pain and shortness of breath.

## 2024-04-05 NOTE — DISCHARGE NOTE NURSING/CASE MANAGEMENT/SOCIAL WORK - PATIENT PORTAL LINK FT
You can access the FollowMyHealth Patient Portal offered by [facility] by registering at the following website: [email or web address]. By joining [facility] FollowMyHealth portal, you will also be able to view your health information using other applications (apps) compatible with our system.

## 2024-04-05 NOTE — DISCHARGE NOTE PROVIDER - HOSPITAL COURSE
Pt is a 77 y/o female with a PMH of Diabetes Mellitus type 2 (Metformin and Trulicity)  HTN, CVA (low dose aspirin)  and dementia. Family noticed trouble walking, speech issues   (minimally verbal) that started in 2020 work up s/p spinal tap dx NPH  for  shunt. Pt was admitted on 04/02 for VPS placement. Shunt was placed on right side at certas 4 that was afterwards increased to certas 7 from PACU to floor. CTH (04/02) showed "Status post shunt catheter placement with its tip in the right frontal horn of the lateral ventricle against the anterior wall with associated droplet of air. Right frontal extra-axial air. No postoperative hematoma appreciated. Ventricles are stable in size compared with the preoperative evaluation. Small rounded focus of calcification in the left occipital cortical region likely a small calcified meningioma." Xray of abdomen (04/02) showed "Tip of  shunt in left lower quadrant. The catheter shows no kinking or discontinuities." Pt had hyperglycemia not controlled on ISS and was started on Ademalog 5 units pre-meal and Lantus 8 units once daily. (04/03). Pt continued to have hyperglycemia and had Ademalog increased to 8 units pre-meals and Lantus increased to 14 units at bedtime (04/05). LE dopplers done on 04/04 showed no acute DVT of the lower extremities.    Pt is neurologically and hemodynamically stable upon discharge today.        Pt is a 77 y/o female with a PMH of Diabetes Mellitus type 2 (Metformin and Trulicity)  HTN, CVA (low dose aspirin)  and dementia. Family noticed trouble walking, speech issues   (minimally verbal) that started in 2020 work up s/p spinal tap dx NPH  for  shunt. Pt was admitted on 04/02 for VPS placement. Shunt was placed on right side at certas 4 that was afterwards increased to certas 7 from PACU to floor. CTH (04/02) showed "Status post shunt catheter placement with its tip in the right frontal horn of the lateral ventricle against the anterior wall with associated droplet of air. Right frontal extra-axial air. No postoperative hematoma appreciated. Ventricles are stable in size compared with the preoperative evaluation. Small rounded focus of calcification in the left occipital cortical region likely a small calcified meningioma." Xray of abdomen (04/02) showed "Tip of  shunt in left lower quadrant. The catheter shows no kinking or discontinuities." Pt had hyperglycemia not controlled on ISS and was started on Ademalog 5 units pre-meal and Lantus 8 units once daily. (04/03). Pt continued to have hyperglycemia and had Ademalog increased to 8 units pre-meals and Lantus increased to 14 units at bedtime (04/05). LE dopplers done on 04/04 showed no acute DVT of the lower extremities. Pt was seen and evaluated PT/OT and PMR and deemed a candidate for sub-acute rehab.     Pt is neurologically and hemodynamically stable upon discharge today.

## 2024-04-05 NOTE — DISCHARGE NOTE PROVIDER - NSDCCPCAREPLAN_GEN_ALL_CORE_FT
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   Please make an appointment and follow-up with Dr. Lambert in 1-2 weeks from discharge from your rehab facility. You have surgical staples that will be removed on post-op day 14 which is 04/16/24.  You may shower however, please dont scrub at your incision site. pat dry only. Keep your surgical incision site clean and dry. You also have 3 small incisions on your abdomen that have steristrips on them that will fall off on their own. You have a programmable shunt and if you have an MRI you should have your shunt reset within 24 hours, please keep a record of your settings. You may take Tylenol (Acetaminophen) as needed for pain control. You may take Robaxin for muscle spasms. Robaxin is a muscle relaxtant. Side effects of robaxin: tiredness, drowsiness. You are also on oxycodone for pain control.  Oxycodone helps to control pain. Oxycodone is an additive medication so it is important to limit the use of this medication.  Side effects include nausea, vomiting, constipation, dry mouth, lightheadedness, dizziness or drowsiness.  It is important to tell your physician if you experience any of these side effects. Please do not use any NSAIDs (Advil, Aleeve, Motrin, Naprosen)as they may increase your risk of bleeding after surgery. Please do not do any heavy lifting, twisting or bending.         SECONDARY DISCHARGE DIAGNOSES  Diagnosis: Type 2 diabetes mellitus  Assessment and Plan of Treatment: Please continue moderate insulin scale, 8 units of Ademalog pre-meal and 14 units of Lantus. Please continue low-carb, low-sugar diet. Please make an appointment and  follow-up with  your primary care in 1-2 weeks after discharge from rehab facility. Please follow-up and make and appointment with Dr. Garcia in 1-2 weeks after discharge from rehab facility for follow-up your diabetes.    Diagnosis: HTN (hypertension)  Assessment and Plan of Treatment: Please continue taking your blood pressure medications consisiting of: hydralazine 10mg by mouth twice daily,  lisinopril 40mg by mouth once daily,  Metoprolol succinate 25mg by mouth once daily, and nifedipine 30mg by mouth once daily as prescribed by your primary care provider.  Please make and appointment and follow-up with your primary care in 1-2 weeks after discharge. Please follow-up and make and appointment with Dr. Cuevas in 1-2 weeks after discharge from rehab facility for follow-up of your high blood pressure.    Diagnosis: Dementia  Assessment and Plan of Treatment: Please continue Memantine (Namenda) 10mg by mouth twice daily as prescribed by your primary care provider.    Diagnosis: Hyperlipidemia  Assessment and Plan of Treatment: Please continue your fenofibrate 145mg once daily as prescribed by your primary care provider.     PRINCIPAL DISCHARGE DIAGNOSIS  Diagnosis: S/P  shunt  Assessment and Plan of Treatment: status post [facility] placement @ [facility] (04/02) for NPH.   Please make an appointment and follow-up with Dr. Lambert in 1-2 weeks from discharge from your rehab facility. You have surgical staples that will be removed on post-op day 14 which is 04/16/24.  You may shower however, please dont scrub at your incision site. pat dry only. Keep your surgical incision site clean and dry. You also have 3 small incisions on your abdomen that have steristrips on them that will fall off on their own. You have a programmable shunt and if you have an MRI you should have your shunt reset within 24 hours, please keep a record of your settings. You may take Tylenol (Acetaminophen) as needed for pain control.  You are also on oxycodone for pain control.  Oxycodone helps to control pain. Oxycodone is an additive medication so it is important to limit the use of this medication.  Side effects include nausea, vomiting, constipation, dry mouth, lightheadedness, dizziness or drowsiness.  It is important to tell your physician if you experience any of these side effects. Please do not use any NSAIDs (Advil, Aleeve, Motrin, Naprosen)as they may increase your risk of bleeding after surgery. Please do not do any heavy lifting, twisting or bending.         SECONDARY DISCHARGE DIAGNOSES  Diagnosis: Type 2 diabetes mellitus  Assessment and Plan of Treatment: Please continue moderate insulin scale, 8 units of Ademalog pre-meal and 14 units of Lantus. Please continue low-carb, low-sugar diet. Please make an appointment and  follow-up with  your primary care in 1-2 weeks after discharge from rehab facility. Please follow-up and make and appointment with Dr. Garcia in 1-2 weeks after discharge from rehab facility for follow-up your diabetes.    Diagnosis: HTN (hypertension)  Assessment and Plan of Treatment: Please continue taking your blood pressure medications consisiting of: hydralazine 10mg by mouth twice daily,  lisinopril 40mg by mouth once daily,  Metoprolol succinate 25mg by mouth once daily, and nifedipine 30mg by mouth once daily as prescribed by your primary care provider.  Please make and appointment and follow-up with your primary care in 1-2 weeks after discharge. Please follow-up and make and appointment with Dr. Cuevas in 1-2 weeks after discharge from rehab facility for follow-up of your high blood pressure.    Diagnosis: Dementia  Assessment and Plan of Treatment: Please continue Memantine (Namenda) 10mg by mouth twice daily as prescribed by your primary care provider.    Diagnosis: Hyperlipidemia  Assessment and Plan of Treatment: Please continue your fenofibrate 145mg once daily as prescribed by your primary care provider.     PRINCIPAL DISCHARGE DIAGNOSIS  Diagnosis: S/P  shunt  Assessment and Plan of Treatment: status post [facility] placement @ [facility] (04/02) for NPH.   Please make an appointment and follow-up with Dr. Lambert in 1-2 weeks from discharge from your rehab facility. You have surgical staples that will be removed on post-op day 14 which is 04/16/24.  You may shower however, please dont scrub at your incision site. pat dry only. Keep your surgical incision site clean and dry. You also have 3 small incisions on your abdomen that have steristrips on them that will fall off on their own. Dr. Lambert wants you to have a CT Head non-contrast in 2 weeks. You have a programmable shunt and if you have an MRI you should have your shunt reset within 24 hours, please keep a record of your settings. You may take Tylenol (Acetaminophen) as needed for mild pain or Oxycodone as needed for moderate to severe pain. Oxycodone helps to control pain. Oxycodone is an additive medication so it is important to limit the use of this medication.  Side effects include nausea, vomiting, constipation, dry mouth, lightheadedness, dizziness or drowsiness.  It is important to tell your physician if you experience any of these side effects. Please do not use any NSAIDs (Advil, Aleeve, Motrin, Ibuprofen, Naproxen)as they may increase your risk of bleeding after surgery. Please do not do any heavy lifting, twisting or bending.         SECONDARY DISCHARGE DIAGNOSES  Diagnosis: Type 2 diabetes mellitus  Assessment and Plan of Treatment: Your A1c is 7.3. Please continue moderate insulin scale, 8 units of Ademalog pre-meal and 14 units of Lantus. Regimen to be adjusted as needed in rehab facility. Please continue low-carb, low-sugar diet. You can transition back to your home medication of Metformin 500mg twice a day and Trulicity 4.5mg / 0.5mL once weekly at rehab. Please follow-up and make and appointment with Dr. Garcia in 1-2 weeks after discharge from rehab facility for follow-up your diabetes.    Diagnosis: HTN (hypertension)  Assessment and Plan of Treatment: Please continue taking your blood pressure medications consisiting of: hydralazine 10mg by mouth twice daily,  lisinopril 40mg by mouth once daily,  Metoprolol succinate 25mg by mouth once daily, and nifedipine 30mg by mouth once daily as prescribed by your primary care provider.  Please make and appointment and follow-up with your primary care in 1-2 weeks after discharge. Please follow-up and make and appointment with Dr. Cuevas in 1-2 weeks after discharge from rehab facility for follow-up of your high blood pressure.    Diagnosis: Dementia  Assessment and Plan of Treatment: Please continue Memantine (Namenda) 10mg by mouth twice daily as prescribed by your primary care provider.    Diagnosis: CVA (cerebrovascular accident)  Assessment and Plan of Treatment: Please do NOT start Aspirin 81mg until cleared by Dr. Lambert. Follow up with her when to restart the medication. Please make an appointment and follow up with your primary care provider in 1-2 weeks after discharge from rehab.    Diagnosis: Hyperlipidemia  Assessment and Plan of Treatment: Please continue your fenofibrate 145mg once daily as prescribed by your primary care provider.

## 2024-04-05 NOTE — DISCHARGE NOTE PROVIDER - PROVIDER TOKENS
PROVIDER:[TOKEN:[[id number]]],PROVIDER:[TOKEN:[[id number]]],PROVIDER:[TOKEN:[[id number]]]

## 2024-04-05 NOTE — PROGRESS NOTE ADULT - ASSESSMENT
ASSESSMENT AND PLAN: 78 year old female with hx of Diabetes Mellitus type 2 (Metformin and Trulicity)  HTN, CVA (low dose aspirin)  dementia,  family noticed trouble walking, speech issues   (minimally verbal)  started 2020  work up s/p spinal  tap dx NPH  for  shunt  (per booking sheet surgery 4/2 per family surgery 4/1 message left with surgeon. Given instructions based on booking sheet of 4/2 )    s/p VPS placement @ [facility] for NPH (04/02)    NEURO:   - Continue neuro checks q4hrs  - Continue VPS @ [facility]  - Post-op CTH: shunt catheter placement with its tip in the right frontal horn of the lateral ventricle against the anterior wall, ventricles are stable in size, small rounded focus of calcification in the left occipital cortical region likely a small calcified meningioma.  - Post-op Xray abdomen: Tip of VPS shunt in LLQ with no kinking of catheter  - Continue Tylenol prn and Oxycodone prn for pain  - Continue Robaxin 500mg q8hrs PRN for muscle spasm  - Continue Memantine 10mg PO BID for dementia  - Continue Zofran 4mg PO q6hrs PRN for nausea  - Advance activity as tolerated, PT/OT recommend GÓMEZ    PULM:   - SpO2: 94% on room air  - Continue incentive spirometry    CV:  - SBP: 100-160, In PACU yesterday SBP of 210 was stabilized after giving home dose of hydralazine. Since then, BPs have been <155 which is within goal.   - Continue hydralazine 10mg PO BID, lisinopril 40mg PO daily, Metoprolol succinate 25mg PO daily and nifedipine 30mg PO daily for HTN  - Continue fenofibrate 145mg PO daily for HLD  - Aspirin 81mg at home, currently on hold for recent surgery    ENDO:   - HgA1c: 7.3 (03/18), fingersticks >200s, hyperglycemic; on ISS, increased Admelog to 8 units pre-meal and Lantus 14 at bedtime, continue to monitor FS  - CCD    HEME/ONC:               - DVT ppx: Lovenox 40mg SQ daily  - LED (04/04): No acute DVT of the lower extremities.   - Post-op anemia stable CBC Hgb (11.2) on 04/04    RENAL:   - IVL  - voiding (cooper dc-ed on 04/03)  - 4/4 BMP - 3.7;  ordered one dose of KCl 40 meq solution for supplementation, stable    ID:   - Afebrile  - completed 2 post-op doses of Ancef  - MRSA swab negative (03/18)    GI:    - On CCD diet  - Continue Dulcolax 5mg q12hrs, Miralax 17g q12hrs, senna 2 tabs for constipation - changed from prn to standing, BM pending  - last BM (04/05): was given dulcolax suppository in AM  - Continue Protonix 40mg PO before breakfast      DISCHARGE PLANNING: planning for GÓMEZ, pending    Plan to be discussed with Dr. Lambert  [id number]      ASSESSMENT AND PLAN: 78 year old female with hx of Diabetes Mellitus type 2 (Metformin and Trulicity)  HTN, CVA (low dose aspirin)  dementia,  family noticed trouble walking, speech issues   (minimally verbal)  started 2020  work up s/p spinal  tap dx NPH  for  shunt  (per booking sheet surgery 4/2 per family surgery 4/1 message left with surgeon. Given instructions based on booking sheet of 4/2 )    s/p VPS placement @ [facility] for NPH (04/02)    NEURO:   - Continue neuro checks q4hrs  - Continue VPS @ [facility]  - Post-op CTH: shunt catheter placement with its tip in the right frontal horn of the lateral ventricle against the anterior wall, ventricles are stable in size, small rounded focus of calcification in the left occipital cortical region likely a small calcified meningioma.  - Post-op Xray abdomen: Tip of VPS shunt in LLQ with no kinking of catheter  - Continue Tylenol prn and Oxycodone prn for pain  - Continue Robaxin 500mg q8hrs PRN for muscle spasm  - Continue Memantine 10mg PO BID for dementia  - Continue Zofran 4mg PO q6hrs PRN for nausea  - Advance activity as tolerated, PT/OT recommend GÓMEZ    PULM:   - SpO2: 94% on room air  - Continue incentive spirometry    CV:  - -160, within goal  - Continue hydralazine 10mg PO BID, lisinopril 40mg PO daily, Metoprolol succinate 25mg PO daily and nifedipine 30mg PO daily for HTN  - Continue fenofibrate 145mg PO daily for HLD  - Aspirin 81mg at home, currently on hold for recent surgery    ENDO:   - HgA1c: 7.3 (03/18), fingersticks >200s, remains hyperglycemic; on ISS, increased Admelog to 8 units pre-meal and Lantus 14 at bedtime, continue to monitor FS  - CCD    HEME/ONC:               - DVT ppx: Lovenox 40mg SQ daily  - LED (04/04): No acute DVT of the lower extremities.   - Post-op anemia stable CBC Hgb (11.2) on 04/04    RENAL:   - IVL  - voiding (cooper dc-ed on 04/03)  - 4/4 BMP - 3.7;  ordered one dose of KCl 40 meq solution for supplementation, stable    ID:   - Afebrile  - completed 2 post-op doses of Ancef  - MRSA swab negative (03/18)    GI:    - On CCD diet  - Continue Dulcolax 5mg q12hrs, Miralax 17g q12hrs, senna 2 tabs for constipation - changed from prn to standing, BM pending  - last BM (04/05): was given dulcolax suppository in AM  - Continue Protonix 40mg PO before breakfast      DISCHARGE PLANNING:   GÓMEZ pending, CM sent for global     Plan to be discussed with Dr. Lambert  [id number]      ASSESSMENT AND PLAN: 78 year old female with hx of Diabetes Mellitus type 2 (Metformin and Trulicity)  HTN, CVA (low dose aspirin)  dementia,  family noticed trouble walking, speech issues   (minimally verbal)  started 2020  work up s/p spinal  tap dx NPH  for  shunt  (per booking sheet surgery 4/2 per family surgery 4/1 message left with surgeon. Given instructions based on booking sheet of 4/2 )    s/p VPS placement @ [facility] for NPH (04/02)    NEURO:   - Continue neuro checks q4hrs  - Continue VPS @ [facility]  - Post-op CTH: shunt catheter placement with its tip in the right frontal horn of the lateral ventricle against the anterior wall, ventricles are stable in size, small rounded focus of calcification in the left occipital cortical region likely a small calcified meningioma.  - Post-op Xray abdomen: Tip of VPS shunt in LLQ with no kinking of catheter  - Continue Tylenol prn and Oxycodone prn for pain  - Continue Robaxin 500mg q8hrs PRN for muscle spasm  - Continue Memantine 10mg PO BID for dementia  - Continue Zofran 4mg PO q6hrs PRN for nausea - has not needed  - Advance activity as tolerated, PT/OT recommend GÓMEZ    PULM:   - SpO2: 94% on room air  - Continue incentive spirometry    CV:  - -160, within goal  - Continue hydralazine 10mg PO BID, lisinopril 40mg PO daily, Metoprolol succinate 25mg PO daily and nifedipine 30mg PO daily for HTN  - Continue fenofibrate 145mg PO daily for HLD  - Aspirin 81mg at home, currently on hold for recent surgery    ENDO:   - HgA1c: 7.3 (03/18), fingersticks >200s, remains hyperglycemic; on ISS, increased Admelog to 8 units pre-meal and Lantus 14 at bedtime, continue to monitor FS  - CCD    HEME/ONC:               - DVT ppx: Lovenox 40mg SQ daily  - LED (04/04): No acute DVT of the lower extremities.   - Post-op anemia stable CBC Hgb (11.2) on 04/04    RENAL:   - IVL  - voiding (cooper dc-ed on 04/03)  - 4/4 BMP - 3.7;  ordered one dose of KCl 40 meq solution for supplementation, stable    ID:   - Afebrile  - completed 2 post-op doses of Ancef  - MRSA swab negative (03/18)    GI:    - On CCD diet  - Continue Dulcolax 5mg q12hrs, Miralax 17g q12hrs, senna 2 tabs for constipation - changed from prn to standing, BM pending  - last BM (04/05): was given dulcolax suppository in AM  - Continue Protonix 40mg PO before breakfast      DISCHARGE PLANNING:   GÓMEZ pending, CM sent for global     Plan to be discussed with Dr. Lambert  [id number]      ASSESSMENT AND PLAN: 78 year old female with hx of Diabetes Mellitus type 2 (Metformin and Trulicity)  HTN, CVA (low dose aspirin)  dementia,  family noticed trouble walking, speech issues   (minimally verbal)  started 2020  work up s/p spinal  tap dx NPH  for  shunt  (per booking sheet surgery 4/2 per family surgery 4/1 message left with surgeon. Given instructions based on booking sheet of 4/2 )    s/p VPS placement @ [facility] for NPH (04/02)    NEURO:   - Continue neuro checks q4hrs  - Continue VPS @ [facility]  - Post-op CTH: shunt catheter placement with its tip in the right frontal horn of the lateral ventricle against the anterior wall, ventricles are stable in size, small rounded focus of calcification in the left occipital cortical region likely a small calcified meningioma.  - Post-op Xray abdomen: Tip of VPS shunt in LLQ with no kinking of catheter  - Continue Tylenol prn and Oxycodone prn for pain  - Continue Robaxin 500mg q8hrs PRN for muscle spasm  - Continue Memantine 10mg PO BID for dementia  - Continue Zofran 4mg PO q6hrs PRN for nausea - has not needed  - Advance activity as tolerated, PT/OT recommend GÓMEZ    PULM:   - SpO2: 94% on room air  - Continue incentive spirometry    CV:  - -160, within goal  - Continue hydralazine 10mg PO BID, lisinopril 40mg PO daily, Metoprolol succinate 25mg PO daily and nifedipine 30mg PO daily for HTN  - Continue fenofibrate 145mg PO daily for HLD  - Aspirin 81mg at home, currently on hold for recent surgery    ENDO:   - HgA1c: 7.3 (03/18), fingersticks >200s, remains hyperglycemic; on ISS, increased Admelog to 8 units pre-meal and Lantus 14 at bedtime, continue to monitor FS. Discussed regimen with Hospitalist.  - CCD  - Patient follows w/ Endocrinologist Dr. Garcia outpatient    HEME/ONC:               - DVT ppx: Lovenox 40mg SQ daily  - LED (04/04): No acute DVT of the lower extremities.   - Post-op anemia stable CBC Hgb (11.2) on 04/04    RENAL:   - IVL  - voiding (copoer dc-ed on 04/03)  - 4/4 BMP - 3.7;  ordered one dose of KCl 40 meq solution for supplementation, stable    ID:   - Afebrile  - completed 2 post-op doses of Ancef  - MRSA swab negative (03/18)    GI:    - On CCD diet  - Continue Dulcolax 5mg q12hrs, Miralax 17g q12hrs, senna 2 tabs for constipation - changed from prn to standing, BM pending  - last BM (04/05): was given dulcolax suppository in AM  - Continue Protonix 40mg PO before breakfast      DISCHARGE PLANNING:   GÓMEZ pending, CM sent for global     Plan to be discussed with Dr. Lambert  [id number]      ASSESSMENT AND PLAN: 78 year old female with hx of Diabetes Mellitus type 2 (Metformin and Trulicity)  HTN, CVA (low dose aspirin)  dementia,  family noticed trouble walking, speech issues   (minimally verbal)  started 2020  work up s/p spinal  tap dx NPH  for  shunt  (per booking sheet surgery 4/2 per family surgery 4/1 message left with surgeon. Given instructions based on booking sheet of 4/2 )    s/p VPS placement @ [facility] for NPH (04/02)    NEURO:   - Continue neuro checks q4hrs  - Continue VPS @ [facility]  - Post-op CTH: shunt catheter placement with its tip in the right frontal horn of the lateral ventricle against the anterior wall, ventricles are stable in size, small rounded focus of calcification in the left occipital cortical region likely a small calcified meningioma.  - Post-op Xray abdomen: Tip of VPS shunt in LLQ with no kinking of catheter  - Continue Tylenol prn and Oxycodone prn for pain  - Continue Robaxin 500mg q8hrs PRN for muscle spasm - has never used, discontinued  - Continue Memantine 10mg PO BID for dementia  - Continue Zofran 4mg PO q6hrs PRN for nausea - has not needed  - Advance activity as tolerated, PT/OT recommend GÓMEZ    PULM:   - SpO2: 94% on room air  - Continue incentive spirometry    CV:  - -160, within goal  - Continue hydralazine 10mg PO BID, lisinopril 40mg PO daily, Metoprolol succinate 25mg PO daily and nifedipine 30mg PO daily for HTN  - Continue fenofibrate 145mg PO daily for HLD  - Aspirin 81mg at home, currently on hold for recent surgery    ENDO:   - HgA1c: 7.3 (03/18), fingersticks >200s, remains hyperglycemic; on ISS, increased Admelog to 8 units pre-meal and Lantus 14 at bedtime, continue to monitor FS. Discussed regimen with Hospitalist.  - CCD  - Patient follows w/ Endocrinologist Dr. Garcia outpatient    HEME/ONC:               - DVT ppx: Lovenox 40mg SQ daily  - LED (04/04): No acute DVT of the lower extremities.   - Post-op anemia stable CBC Hgb (11.2) on 04/04    RENAL:   - IVL  - voiding (cooper dc-ed on 04/03)  - 4/4 BMP - 3.7;  ordered one dose of KCl 40 meq solution for supplementation, stable    ID:   - Afebrile  - completed 2 post-op doses of Ancef  - MRSA swab negative (03/18)    GI:    - On CCD diet  - Continue Dulcolax 5mg q12hrs, Miralax 17g q12hrs, senna 2 tabs for constipation - changed from prn to standing, BM pending  - last BM (04/05): was given dulcolax suppository in AM  - Continue Protonix 40mg PO before breakfast      DISCHARGE PLANNING:   GÓMEZ pending, CM sent for global     Plan to be discussed with Dr. Lambert  [id number]      ASSESSMENT AND PLAN: 78 year old female with hx of Diabetes Mellitus type 2 (Metformin and Trulicity)  HTN, CVA (low dose aspirin)  dementia,  family noticed trouble walking, speech issues   (minimally verbal)  started 2020  work up s/p spinal  tap dx NPH  for  shunt  (per booking sheet surgery 4/2 per family surgery 4/1 message left with surgeon. Given instructions based on booking sheet of 4/2 )    s/p VPS placement @ [facility] for NPH (04/02)    NEURO:   - Continue neuro checks q4hrs  - Continue VPS @ [facility]  - CTH outpatient in 2 weeks as per Dr. Lambert  - Post-op CTH: shunt catheter placement with its tip in the right frontal horn of the lateral ventricle against the anterior wall, ventricles are stable in size, small rounded focus of calcification in the left occipital cortical region likely a small calcified meningioma.  - Post-op Xray abdomen: Tip of VPS shunt in LLQ with no kinking of catheter  - Continue Tylenol prn and Oxycodone prn for pain  - Continue Robaxin 500mg q8hrs PRN for muscle spasm - has never used, discontinued  - Continue Memantine 10mg PO BID for dementia  - Continue Zofran 4mg PO q6hrs PRN for nausea - has not needed  - Advance activity as tolerated, PT/OT recommend GÓMEZ    PULM:   - SpO2: 94% on room air  - Continue incentive spirometry    CV:  - -160, within goal  - Continue hydralazine 10mg PO BID, lisinopril 40mg PO daily, Metoprolol succinate 25mg PO daily and nifedipine 30mg PO daily for HTN  - Continue fenofibrate 145mg PO daily for HLD  - Aspirin 81mg at home, currently on hold for recent surgery    ENDO:   - HgA1c: 7.3 (03/18), fingersticks >200s, remains hyperglycemic; on ISS, increased Admelog to 8 units pre-meal and Lantus 14 at bedtime, continue to monitor FS. Discussed regimen with Hospitalist. Fingerstick fo 283 (04/05) taken 1 hr after pt ate. To continue current regimen in subacute rehab and adjust as needed per hospitalist.   - CCD  - Patient follows w/ Endocrinologist Dr. Garcia outpatient    HEME/ONC:               - DVT ppx: Lovenox 40mg SQ daily  - LED (04/04): No acute DVT of the lower extremities.   - Post-op anemia stable CBC Hgb (11.2) on 04/04    RENAL:   - IVL  - voiding (cooper dc-ed on 04/03)  - 4/4 BMP - 3.7;  ordered one dose of KCl 40 meq solution for supplementation, stable    ID:   - Afebrile  - completed 2 post-op doses of Ancef  - MRSA swab negative (03/18)    GI:    - On CCD diet  - Continue Dulcolax 5mg q12hrs, Miralax 17g q12hrs, senna 2 tabs for constipation - changed from prn to standing, BM pending  - last BM (04/05): was given dulcolax suppository in AM  - Continue Protonix 40mg PO before breakfast      DISCHARGE PLANNING:   GÓMEZ pending, CM sent for global     Plan to be discussed with Dr. Lambert  [id number]

## 2024-04-05 NOTE — DISCHARGE NOTE PROVIDER - CARE PROVIDER_API CALL
Chano Lambert  Neurosurgery  [address], [address]  [place] [address]  Phone: [phone]  Fax: [phone]  Follow Up Time:     Huan Cuevas  Vascular Medicine  [address] [place] [address] Drive, [address] [place] [id number]  Phone: [phone]  Fax: [phone]  Follow Up Time:     Nitin Garcia  Endocrinology/Metab/Diabetes  [address] [facility], [facility] [place] [address]  Phone: [phone]  Fax: [phone]  Follow Up Time:

## 2024-04-05 NOTE — DISCHARGE NOTE PROVIDER - CARE PROVIDERS DIRECT ADDRESSES
,[email or web address],DirectAddress_Unknown

## 2024-04-05 NOTE — DISCHARGE NOTE NURSING/CASE MANAGEMENT/SOCIAL WORK - NSDCPETBCESMAN_GEN_ALL_CORE
Patient Instructions by Reebl Castellanos MD at 9/16/2019  2:30 PM     Author: Rebel Castellanos MD Service: -- Author Type: Physician    Filed: 9/16/2019  3:09 PM Encounter Date: 9/16/2019 Status: Addendum    : Yanet Colvin Scribe (Heron)    Related Notes: Original Note by Yanet Colvin Scribe (Herminioibashok) filed at 9/16/2019  3:06 PM         Give him 1/2 tablet of a Gaastra's Complete Multivitamin Chewable - not gummy   You can crush this and add this to his smoothies     9/16/2019  Wt Readings from Last 1 Encounters:   09/16/19 25 lb 9 oz (11.6 kg) (20 %, Z= -0.84)*     * Growth percentiles are based on [facility] (Boys, 2-20 Years) data.       Acetaminophen Dosing Instructions  (May take every 4-6 hours)      WEIGHT   AGE Infant/Children's  160mg/5ml Children's   Chewable Tabs  80 mg each Renan Strength  Chewable Tabs  160 mg     Milliliter (ml) Soft Chew Tabs Chewable Tabs   6-11 lbs 0-3 months 1.25 ml     12-17 lbs 4-11 months 2.5 ml     18-23 lbs 12-23 months 3.75 ml     24-35 lbs 2-3 years 5 ml 2 tabs    36-47 lbs 4-5 years 7.5 ml 3 tabs    48-59 lbs 6-8 years 10 ml 4 tabs 2 tabs   60-71 lbs 9-10 years 12.5 ml 5 tabs 2.5 tabs   72-95 lbs 11 years 15 ml 6 tabs 3 tabs   96 lbs and over 12 years   4 tabs     Ibuprofen Dosing Instructions- Liquid  (May take every 6-8 hours)      WEIGHT   AGE Concentrated Drops   50 mg/1.25 ml Infant/Children's   100 mg/5ml     Dropperful Milliliter (ml)   12-17 lbs 6- 11 months 1 (1.25 ml)    18-23 lbs 12-23 months 1 1/2 (1.875 ml)    24-35 lbs 2-3 years  5 ml   36-47 lbs 4-5 years  7.5 ml   48-59 lbs 6-8 years  10 ml   60-71 lbs 9-10 years  12.5 ml   72-95 lbs 11 years  15 ml       Ibuprofen Dosing Instructions- Tablets/Caplets  (May take every 6-8 hours)    WEIGHT AGE Children's   Chewable Tabs   50 mg Renan Strength   Chewable Tabs   100 mg Renan Strength   Caplets    100 mg     Tablet Tablet Caplet   24-35 lbs 2-3 years 2 tabs     36-47 lbs 4-5 years 3 tabs     48-59 lbs  6-8 years 4 tabs 2 tabs 2 caps   60-71 lbs 9-10 years 5 tabs 2.5 tabs 2.5 caps   72-95 lbs 11 years 6 tabs 3 tabs 3 caps           Patient Education             [facility] Parent Handout   2 Year Visit  Here are some suggestions from [facility] experts that may be of value to your family.     Your Talking Child    Talk about and describe pictures in books and the things you see and hear together.    Parent-child play, where the child leads, is the best way to help toddlers learn to talk    Read to your child every day.    Your child may love hearing the same story over and over.    Ask your child to point to things as you read.    Stop a story to let your child make an animal sound or finish a part of the story.    Use correct language; be a good model for your child.    Talk slowly and remember that it may take a while for your child to respond.  Your Child and TV    It is better for toddlers to play than watch TV.    Limit TV to 1-2 hours or less each day.    Watch TV together and discuss what you see and think.    Be careful about the programs and advertising your young child sees.    Do other activities with your child such as reading, playing games, and singing.    Be active together as a family. Make sure your child is active at home, at , and with sitters.  Safety    Be sure your almas car safety seat is correctly installed in the back seat of all vehicles.    All children 2 years or older, or those younger than 2 years who have outgrown the rear-facing weight or height limit for their car safety seat, should use a forward-facing car safety seat with a harness for as long as possible, up to the highest weight or height allowed by their car safety seats .   Everyone should wear a seat belt in the car. Do not start the vehicle until everyone is buckled up.    Never leave your child alone in your home or yard, especially near cars, without a mature adult in charge.    When backing  out of the garage or driving in the driveway, have another adult hold your child a safe distance away so he is not run over.    Keep your child away from moving machines, lawn mowers, streets, moving garage doors, and driveways.    Have your child wear a good-fitting helmet on bikes and trikes.    Never have a gun in the home. If you must have a gun, store it unloaded and locked with the ammunition locked separately from the gun.  Toilet Training    Signs of being ready for toilet training    Dry for 2 hours    Knows if she is wet or dry    Can pull pants down and up    Wants to learn    Can tell you if she is going to have a bowel movement    Plan for toilet breaks often. Children use the toilet as many as 10 times each day.    Help your child wash her hands after toileting and diaper changes and before meals.    Clean potty chairs after every use.    Teach your child to cough or sneeze into her shoulder. Use a tissue to wipe her nose.    Take the child to choose underwear when she feels ready to do so. How Your Child Behaves    Praise your child for behaving well.    It is normal for your child to protest being away from you or meeting new people.    Listen to your child and treat him with respect. Expect others to as well.    Play with your child each day, joining in things the child likes to do.    Hug and hold your child often.    Give your child choices between 2 good things in snacks, books, or toys.    Help your child express his feelings and name them.    Help your child play with other children, but do not expect sharing.    Never make fun of the javid fears or allow others to scare your child.    Watch how your child responds to new people or situations.  What to Expect at Your Javid 21/2 Year Visit  We will talk about    Your talking child    Getting ready for     Family activities    Home and car safety    Getting along with other children  _______________________________  Poison Help:  [phone]  Child safety seat inspection: 6-069-MKZHLAYVM; seatcheck.org                If you are a smoker, it is important for your health to stop smoking. Please be aware that second hand smoke is also harmful.

## 2024-04-17 NOTE — HISTORY OF PRESENT ILLNESS
[FreeTextEntry1] : 78-year-old female with a PMH of Diabetes Mellitus type 2 (Metformin and Trulicity) HTN, CVA (low dose aspirin) and dementia. Family noticed trouble walking, speech issues (minimally verbal) that started in 2020. Had work up s/p spinal tap and diagnosis of NPH.  Patient was admitted on 04/02/24 for VPS placement. Shunt was placed on right side at [facility] that was afterwards increased to 7. Postop CT and shunt series were stable.  Patient was discharged to [facility].

## 2024-04-17 NOTE — ASSESSMENT
[FreeTextEntry1] : 77 y/o F with PMH of HTN, DM, CVA on low dose ASA, dementia, NPH s/p R  shunt ([facility]) on 4/2/24.  Postop CT and shunt series were stable. Patient was discharged to [facility].    ****INCOMPLETE****

## 2024-04-19 ENCOUNTER — APPOINTMENT (OUTPATIENT)
Dept: NEUROSURGERY | Facility: CLINIC | Age: 79
End: 2024-04-19

## 2024-04-19 ENCOUNTER — APPOINTMENT (OUTPATIENT)
Dept: NEUROSURGERY | Facility: CLINIC | Age: 79
End: 2024-04-19
Payer: MEDICARE

## 2024-04-19 VITALS
TEMPERATURE: 98.7 F | BODY MASS INDEX: 30.24 KG/M2 | WEIGHT: 150 LBS | DIASTOLIC BLOOD PRESSURE: 75 MMHG | HEIGHT: 59 IN | SYSTOLIC BLOOD PRESSURE: 144 MMHG | OXYGEN SATURATION: 98 % | HEART RATE: 64 BPM

## 2024-04-19 DIAGNOSIS — Z98.2 PRESENCE OF CEREBROSPINAL FLUID DRAINAGE DEVICE: ICD-10-CM

## 2024-04-19 DIAGNOSIS — G91.2 (IDIOPATHIC) NORMAL PRESSURE HYDROCEPHALUS: ICD-10-CM

## 2024-04-19 PROCEDURE — 99024 POSTOP FOLLOW-UP VISIT: CPT

## 2024-04-19 RX ORDER — CEPHALEXIN 250 MG/1
250 CAPSULE ORAL TWICE DAILY
Qty: 14 | Refills: 0 | Status: ACTIVE | COMMUNITY
Start: 2024-04-19 | End: 1900-01-01

## 2024-04-25 NOTE — PHYSICAL EXAM
[Clean] : clean [Dry] : dry [Intact] : intact [FreeTextEntry6] : some erythema/irritation of incision, no dicharge or induration [FreeTextEntry8] : stood with assistance, but had more facility to get up.  Took a few short steps.

## 2024-04-25 NOTE — ASSESSMENT
[FreeTextEntry1] : 77 y/o F with PMH of Diabetes Mellitus type 2 (Metformin and Trulicity), HTN, CVA (low dose aspirin) and dementia, trouble walking, speech issues (minimally verbal), found to have NPH on work up. She was admitted on 04/02/24 for VPS placement. s/p R  shunt Certas @7.  Post op CT and shunt series were stable. Here for her first postop visit.  She is doing great, more interactive, stated her name, knows she is in the office, following commands briskly, moving all extremities and was able to take a few steps. Incision intact but slightly irritated from the staples. Staples removed.  Shunt setting confirmed at 7.  - Will start Keflex 250 mg bid x one week  - CT Head w/o contrast  - f/u in one week

## 2024-04-25 NOTE — HISTORY OF PRESENT ILLNESS
[FreeTextEntry1] : 78-year-old female with a PMH of Diabetes Mellitus type 2 (Metformin and Trulicity), HTN, CVA (low dose aspirin) and dementia, trouble walking, speech issues (minimally verbal), found to have NPH on work up. She was admitted on 04/02/24 for VPS placement. s/p R  shunt Certas @7. Post op CT and shunt series were stable. Hospital course c/b hyperglycemia managed with medications.

## 2024-05-06 ENCOUNTER — APPOINTMENT (OUTPATIENT)
Dept: INTERNAL MEDICINE | Facility: CLINIC | Age: 79
End: 2024-05-06

## 2024-05-09 ENCOUNTER — NON-APPOINTMENT (OUTPATIENT)
Age: 79
End: 2024-05-09

## 2024-05-09 ENCOUNTER — APPOINTMENT (OUTPATIENT)
Dept: ELECTROPHYSIOLOGY | Facility: CLINIC | Age: 79
End: 2024-05-09
Payer: MEDICARE

## 2024-05-09 PROCEDURE — 93298 REM INTERROG DEV EVAL SCRMS: CPT

## 2024-05-14 ENCOUNTER — APPOINTMENT (OUTPATIENT)
Dept: CT IMAGING | Facility: CLINIC | Age: 79
End: 2024-05-14
Payer: MEDICARE

## 2024-05-14 ENCOUNTER — OUTPATIENT (OUTPATIENT)
Dept: OUTPATIENT SERVICES | Facility: HOSPITAL | Age: 79
LOS: 1 days | End: 2024-05-14
Payer: MEDICARE

## 2024-05-14 DIAGNOSIS — Z98.890 OTHER SPECIFIED POSTPROCEDURAL STATES: Chronic | ICD-10-CM

## 2024-05-14 DIAGNOSIS — G91.2 (IDIOPATHIC) NORMAL PRESSURE HYDROCEPHALUS: ICD-10-CM

## 2024-05-14 PROCEDURE — 70450 CT HEAD/BRAIN W/O DYE: CPT | Mod: 26,MH

## 2024-05-14 PROCEDURE — 70450 CT HEAD/BRAIN W/O DYE: CPT

## 2024-05-16 ENCOUNTER — APPOINTMENT (OUTPATIENT)
Dept: INTERNAL MEDICINE | Facility: CLINIC | Age: 79
End: 2024-05-16
Payer: MEDICARE

## 2024-05-16 VITALS
TEMPERATURE: 97.3 F | RESPIRATION RATE: 16 BRPM | SYSTOLIC BLOOD PRESSURE: 120 MMHG | DIASTOLIC BLOOD PRESSURE: 69 MMHG | HEART RATE: 68 BPM | OXYGEN SATURATION: 96 % | HEIGHT: 59 IN

## 2024-05-16 DIAGNOSIS — B37.2 CANDIDIASIS OF SKIN AND NAIL: ICD-10-CM

## 2024-05-16 DIAGNOSIS — K21.9 GASTRO-ESOPHAGEAL REFLUX DISEASE W/OUT ESOPHAGITIS: ICD-10-CM

## 2024-05-16 DIAGNOSIS — E78.5 HYPERLIPIDEMIA, UNSPECIFIED: ICD-10-CM

## 2024-05-16 DIAGNOSIS — I10 ESSENTIAL (PRIMARY) HYPERTENSION: ICD-10-CM

## 2024-05-16 DIAGNOSIS — M06.9 RHEUMATOID ARTHRITIS, UNSPECIFIED: ICD-10-CM

## 2024-05-16 PROCEDURE — 99214 OFFICE O/P EST MOD 30 MIN: CPT

## 2024-05-16 PROCEDURE — 36415 COLL VENOUS BLD VENIPUNCTURE: CPT

## 2024-05-16 RX ORDER — ATORVASTATIN CALCIUM 80 MG/1
80 TABLET, FILM COATED ORAL
Qty: 90 | Refills: 0 | Status: ACTIVE | COMMUNITY
Start: 2021-12-14 | End: 1900-01-01

## 2024-05-16 RX ORDER — NIFEDIPINE 30 MG/1
30 TABLET, EXTENDED RELEASE ORAL
Qty: 90 | Refills: 2 | Status: ACTIVE | COMMUNITY
Start: 2021-12-14 | End: 1900-01-01

## 2024-05-16 RX ORDER — FENOFIBRATE 145 MG/1
145 TABLET, COATED ORAL
Qty: 90 | Refills: 2 | Status: ACTIVE | COMMUNITY
Start: 2021-12-14 | End: 1900-01-01

## 2024-05-16 RX ORDER — METOPROLOL SUCCINATE 25 MG/1
25 TABLET, EXTENDED RELEASE ORAL DAILY
Qty: 90 | Refills: 1 | Status: ACTIVE | COMMUNITY
Start: 2019-07-10 | End: 1900-01-01

## 2024-05-16 RX ORDER — CLOTRIMAZOLE 1% ANTIFUNGAL CREAM 1 G/100G
1 CREAM TOPICAL 3 TIMES DAILY
Qty: 1 | Refills: 2 | Status: ACTIVE | COMMUNITY
Start: 2024-05-16 | End: 1900-01-01

## 2024-05-16 NOTE — ASSESSMENT
[FreeTextEntry1] : 79 yo F w PMHx HLD, DM2, GERD, HTN, dementia, CVA, RA   HTN Advised low salt diet Diet and exercise discussed. 20 % of weight loss associated to better bp control Decrease alcohol intake Well controlled, cont current tx  HLD Educated eating whole grain foods rich in soluble fiber such as oats and Omega 3 rich fish such as salmon, tout, sardines and bean based meals such as kidney beans, chickpeas and lentils. Small protions of nuts. Limit sugars and alcohol. Atorvastatin 80 mg qd and fenofibrate  T2DM fu a1c  Managed by endo Dr. Abad- does not like other doctors to modify her regimen, advised patients to communicate these result to further adjust her diabetes medication fu a1c- metformin 500 mg bid and tresiba 36 units at night as per note Trulicity 4.5 mg weekly  Discussed diabetic diet. Carb allowance of around 130-140 g carbs daily. Educated about foot hygiene and need to see opthalmology and podiatry yearly.  rto 3 mo.

## 2024-05-16 NOTE — PHYSICAL EXAM
[Normal] : affect was normal and insight and judgment were intact [de-identified] : BL ankle swelling  [de-identified] : inner gluteus and vaginal area beefy red rash  [de-identified] : , lower extremity weakness

## 2024-05-16 NOTE — HISTORY OF PRESENT ILLNESS
[FreeTextEntry1] :  HLD, DM2, GERD, HTN [de-identified] : 77 yo F w PMHx HLD, DM2, GERD, HTN, dementia, CVA, RA  Patient was discharged after  shunt placement, she was discharged to subacute rehab she is here for follow up  - Medications reviewed - atorvastatin was held, no documentation why, will continue  Patient has a beefy rash on genital region

## 2024-05-20 ENCOUNTER — APPOINTMENT (OUTPATIENT)
Dept: NEUROSURGERY | Facility: CLINIC | Age: 79
End: 2024-05-20
Payer: MEDICARE

## 2024-05-20 VITALS
OXYGEN SATURATION: 98 % | BODY MASS INDEX: 30.24 KG/M2 | SYSTOLIC BLOOD PRESSURE: 158 MMHG | HEIGHT: 59 IN | WEIGHT: 150 LBS | HEART RATE: 62 BPM | DIASTOLIC BLOOD PRESSURE: 72 MMHG

## 2024-05-20 PROCEDURE — 99024 POSTOP FOLLOW-UP VISIT: CPT

## 2024-05-20 PROCEDURE — 62252 CSF SHUNT REPROGRAM: CPT

## 2024-05-30 NOTE — HISTORY OF PRESENT ILLNESS
[FreeTextEntry1] : 78-year-old female with a PMH of Diabetes Mellitus type 2 (Metformin and Trulicity), HTN, CVA (low dose aspirin) and dementia, trouble walking, speech issues (minimally verbal), found to have NPH . he was admitted on 04/02/24 for VPS placement. s/p R  shunt Certas @7. Post op CT and shunt series were stable.   Today Ms Pearson present on chair, report having HAs in the left side.  Denies speech impairment, vision problems or seizures. Her surgical wound healing well with no signs of infection. As per family she is recently not walking and being non verbal at times. She has her time, sometime she does well with interacting with others.

## 2024-05-30 NOTE — ASSESSMENT
[FreeTextEntry1] : IMPRESSION:  77 y/o F with PMH of Diabetes Mellitus type 2 (Metformin and Trulicity), HTN, CVA (low dose aspirin) and dementia, trouble walking, speech issues (minimally verbal), found to have NPH on work up. on 04/02/24 for VPS placement. s/p R  shunt Certas @7.  Pt doing well, other than having some headache. She is following commands briskly, moving all extremities and was able to take a few steps. surgical wound healing well.  shunt depresses and refill readily. CT head with ventriculomegaly and Right parietal ventricular catheter with its tip in the region of the right frontal lobe. Compared to the previous post op CT, it appears that catheter is more in the frontal parenchyma with ? a few proximal holes in ventricle, as opposed to previously the catheter appeared entirely within the ventricle.  The catheter appears to be in continuity with the valve.  Discussed with family and I suggested that we reprogram the shunt and see if she responds.  Pt may need a tap or shunt patency if no response to adjustments are noted.  Pt is neurologically intact.   PLAN:  --Shunt setting adjusted to 6.  -Continue PT for gait and walking  - F/U in 3 weeks

## 2024-05-30 NOTE — PHYSICAL EXAM
[General Appearance - Alert] : alert [General Appearance - In No Acute Distress] : in no acute distress [Clean] : clean [Dry] : dry [Intact] : intact [Oriented To Time, Place, And Person] : oriented to person, place, and time [Affect] : the affect was normal [Cranial Nerves Optic (II)] : visual acuity intact bilaterally,  pupils equal round and reactive to light [Cranial Nerves Oculomotor (III)] : extraocular motion intact [Cranial Nerves Facial (VII)] : face symmetrical [Cranial Nerves Vestibulocochlear (VIII)] : hearing was intact bilaterally [Cranial Nerves Accessory (XI - Cranial And Spinal)] : head turning and shoulder shrug symmetric [Cranial Nerves Hypoglossal (XII)] : there was no tongue deviation with protrusion [Motor Tone] : muscle tone was normal in all four extremities [Motor Strength] : muscle strength was normal in all four extremities [Sensation Tactile Decrease] : light touch was intact [Sclera] : the sclera and conjunctiva were normal [PERRL With Normal Accommodation] : pupils were equal in size, round, reactive to light, with normal accommodation [Extraocular Movements] : extraocular movements were intact [Outer Ear] : the ears and nose were normal in appearance [Hearing Threshold Finger Rub Not [place]] : hearing was normal [Neck Appearance] : the appearance of the neck was normal [] : no respiratory distress [Exaggerated Use Of Accessory Muscles For Inspiration] : no accessory muscle use [Skin Color & Pigmentation] : normal skin color and pigmentation [FreeTextEntry1] : Shunt confirmed at 7, depresses and refills readily. [FreeTextEntry6] : some erythema/irritation of incision, no dicharge or induration [FreeTextEntry8] : stood with assistance, but had more facility to get up.  Took a few short steps.

## 2024-06-01 LAB
ALBUMIN SERPL ELPH-MCNC: 4.2 G/DL
ALP BLD-CCNC: 47 U/L
ALT SERPL-CCNC: 22 U/L
ANION GAP SERPL CALC-SCNC: 12 MMOL/L
AST SERPL-CCNC: 13 U/L
BASOPHILS # BLD AUTO: 0.06 K/UL
BASOPHILS NFR BLD AUTO: 0.5 %
BILIRUB SERPL-MCNC: 0.2 MG/DL
BUN SERPL-MCNC: 30 MG/DL
CALCIUM SERPL-MCNC: 10.1 MG/DL
CHLORIDE SERPL-SCNC: 104 MMOL/L
CHOLEST SERPL-MCNC: 188 MG/DL
CO2 SERPL-SCNC: 26 MMOL/L
CREAT SERPL-MCNC: 0.75 MG/DL
EGFR: 82 ML/MIN/1.73M2
EOSINOPHIL # BLD AUTO: 0.34 K/UL
EOSINOPHIL NFR BLD AUTO: 3 %
ESTIMATED AVERAGE GLUCOSE: 194 MG/DL
GLUCOSE SERPL-MCNC: 145 MG/DL
HBA1C MFR BLD HPLC: 8.4 %
HCT VFR BLD CALC: 41.6 %
HDLC SERPL-MCNC: 42 MG/DL
HGB BLD-MCNC: 13.2 G/DL
IMM GRANULOCYTES NFR BLD AUTO: 0.4 %
LDLC SERPL CALC-MCNC: 104 MG/DL
LYMPHOCYTES # BLD AUTO: 2.42 K/UL
LYMPHOCYTES NFR BLD AUTO: 21.3 %
MAN DIFF?: NORMAL
MCHC RBC-ENTMCNC: 25.9 PG
MCHC RBC-ENTMCNC: 31.7 GM/DL
MCV RBC AUTO: 81.6 FL
MONOCYTES # BLD AUTO: 0.82 K/UL
MONOCYTES NFR BLD AUTO: 7.2 %
NEUTROPHILS # BLD AUTO: 7.66 K/UL
NEUTROPHILS NFR BLD AUTO: 67.6 %
NONHDLC SERPL-MCNC: 146 MG/DL
PLATELET # BLD AUTO: 387 K/UL
POTASSIUM SERPL-SCNC: 4.4 MMOL/L
PROT SERPL-MCNC: 6.6 G/DL
RBC # BLD: 5.1 M/UL
RBC # FLD: 14.9 %
SODIUM SERPL-SCNC: 142 MMOL/L
TRIGL SERPL-MCNC: 246 MG/DL
WBC # FLD AUTO: 11.34 K/UL

## 2024-06-10 ENCOUNTER — APPOINTMENT (OUTPATIENT)
Dept: CT IMAGING | Facility: CLINIC | Age: 79
End: 2024-06-10
Payer: MEDICARE

## 2024-06-10 ENCOUNTER — APPOINTMENT (OUTPATIENT)
Dept: NEUROSURGERY | Facility: CLINIC | Age: 79
End: 2024-06-10
Payer: MEDICARE

## 2024-06-10 ENCOUNTER — OUTPATIENT (OUTPATIENT)
Dept: OUTPATIENT SERVICES | Facility: HOSPITAL | Age: 79
LOS: 1 days | End: 2024-06-10

## 2024-06-10 VITALS
HEART RATE: 57 BPM | OXYGEN SATURATION: 98 % | HEIGHT: 59 IN | BODY MASS INDEX: 30.24 KG/M2 | DIASTOLIC BLOOD PRESSURE: 78 MMHG | SYSTOLIC BLOOD PRESSURE: 161 MMHG | WEIGHT: 150 LBS

## 2024-06-10 DIAGNOSIS — Z98.890 OTHER SPECIFIED POSTPROCEDURAL STATES: Chronic | ICD-10-CM

## 2024-06-10 DIAGNOSIS — G91.9 HYDROCEPHALUS, UNSPECIFIED: ICD-10-CM

## 2024-06-10 DIAGNOSIS — Z92.89 PERSONAL HISTORY OF OTHER MEDICAL TREATMENT: Chronic | ICD-10-CM

## 2024-06-10 PROCEDURE — 70450 CT HEAD/BRAIN W/O DYE: CPT | Mod: 26

## 2024-06-10 PROCEDURE — 99024 POSTOP FOLLOW-UP VISIT: CPT

## 2024-06-11 ENCOUNTER — NON-APPOINTMENT (OUTPATIENT)
Age: 79
End: 2024-06-11

## 2024-06-12 ENCOUNTER — APPOINTMENT (OUTPATIENT)
Dept: ELECTROPHYSIOLOGY | Facility: CLINIC | Age: 79
End: 2024-06-12
Payer: MEDICARE

## 2024-06-12 ENCOUNTER — APPOINTMENT (OUTPATIENT)
Dept: NEUROSURGERY | Facility: CLINIC | Age: 79
End: 2024-06-12
Payer: MEDICARE

## 2024-06-12 VITALS
SYSTOLIC BLOOD PRESSURE: 169 MMHG | HEIGHT: 59 IN | HEART RATE: 61 BPM | WEIGHT: 150 LBS | TEMPERATURE: 98.2 F | DIASTOLIC BLOOD PRESSURE: 76 MMHG | OXYGEN SATURATION: 98 % | BODY MASS INDEX: 30.24 KG/M2

## 2024-06-12 PROCEDURE — 93298 REM INTERROG DEV EVAL SCRMS: CPT

## 2024-06-12 PROCEDURE — 99024 POSTOP FOLLOW-UP VISIT: CPT

## 2024-06-12 PROCEDURE — 62252 CSF SHUNT REPROGRAM: CPT

## 2024-06-18 NOTE — ASSESSMENT
[FreeTextEntry1] : IMPRESSION:   77 y/o F with PMH of Diabetes Mellitus type 2 (Metformin and Trulicity), HTN, CVA (low dose aspirin) and dementia, trouble walking, speech issues (minimally verbal), found to have NPH on work up. on 04/02/24 for VPS placement. s/p R  shunt Certas, now at 6.  No significant motor/cognitive improvement after her setting change. Pt has a pronator drift that is new.  We will change the setting further after a CT head.   PLAN:  CT Head w/o contrast ASAP -Continue PT for gait and walking  - F/U in 2 days

## 2024-06-18 NOTE — ASSESSMENT
[FreeTextEntry1] : IMPRESSION:   77 y/o F with PMH of Diabetes Mellitus type 2 (Metformin and Trulicity), HTN, CVA (low dose aspirin) and dementia, trouble walking, speech issues (minimally verbal), found to have NPH on work up. on 04/02/24 for VPS placement. s/p R  shunt Certas, now at 6.  New CT head no new collections or bleed. Valve adjusted to 5 today.  PLAN: -Shunt adjusted to 5 today  -Continue PT for gait and walking  - F/U on 7/8/2024 for follow up

## 2024-06-18 NOTE — PHYSICAL EXAM
[General Appearance - Alert] : alert [General Appearance - In No Acute Distress] : in no acute distress [Clean] : clean [Dry] : dry [Intact] : intact [Oriented To Time, Place, And Person] : oriented to person, place, and time [Affect] : the affect was normal [Cranial Nerves Optic (II)] : visual acuity intact bilaterally,  pupils equal round and reactive to light [Cranial Nerves Oculomotor (III)] : extraocular motion intact [Cranial Nerves Facial (VII)] : face symmetrical [Cranial Nerves Vestibulocochlear (VIII)] : hearing was intact bilaterally [Cranial Nerves Accessory (XI - Cranial And Spinal)] : head turning and shoulder shrug symmetric [Cranial Nerves Hypoglossal (XII)] : there was no tongue deviation with protrusion [Motor Tone] : muscle tone was normal in all four extremities [Motor Strength] : muscle strength was normal in all four extremities [Sensation Tactile Decrease] : light touch was intact [Sclera] : the sclera and conjunctiva were normal [PERRL With Normal Accommodation] : pupils were equal in size, round, reactive to light, with normal accommodation [Extraocular Movements] : extraocular movements were intact [Outer Ear] : the ears and nose were normal in appearance [Hearing Threshold Finger Rub Not [place]] : hearing was normal [Neck Appearance] : the appearance of the neck was normal [] : no respiratory distress [Exaggerated Use Of Accessory Muscles For Inspiration] : no accessory muscle use [Skin Color & Pigmentation] : normal skin color and pigmentation [FreeTextEntry1] : Shunt confirmed at 6, depresses and refills readily. [FreeTextEntry6] : PEDRAZA, mild left pronator drift [FreeTextEntry8] : stood with assistance,  Takes a few steps with two person assistance, magnetic and mildly alternating

## 2024-06-18 NOTE — HISTORY OF PRESENT ILLNESS
[FreeTextEntry1] : 78-year-old female with a PMH of Diabetes Mellitus type 2 (Metformin and Trulicity), HTN, CVA (low dose aspirin) and dementia, trouble walking, speech issues (minimally verbal), found to have NPH. he was admitted on 04/02/24 for VPS placement. s/p R  shunt Certas @7. Post op CT and shunt series were stable.  Today Ms Pearson present on chair, report no headache today. Denies speech impairment, vision problems or seizures. Her surgical wound healed well. As per family she is recently not walking. She talks when she feels like it. She is walking with walker but need some support and constant supervision. As per family, no significant changes noted after the change in shunt setting, she has PT coming home for training her with walker. Family c/o vomiting twice after the last shunt change.

## 2024-06-18 NOTE — PHYSICAL EXAM
[General Appearance - Alert] : alert [General Appearance - In No Acute Distress] : in no acute distress [Clean] : clean [Dry] : dry [Intact] : intact [Oriented To Time, Place, And Person] : oriented to person, place, and time [Affect] : the affect was normal [Cranial Nerves Optic (II)] : visual acuity intact bilaterally,  pupils equal round and reactive to light [Cranial Nerves Oculomotor (III)] : extraocular motion intact [Cranial Nerves Facial (VII)] : face symmetrical [Cranial Nerves Vestibulocochlear (VIII)] : hearing was intact bilaterally [Cranial Nerves Accessory (XI - Cranial And Spinal)] : head turning and shoulder shrug symmetric [Cranial Nerves Hypoglossal (XII)] : there was no tongue deviation with protrusion [Motor Tone] : muscle tone was normal in all four extremities [Motor Strength] : muscle strength was normal in all four extremities [Sensation Tactile Decrease] : light touch was intact [Sclera] : the sclera and conjunctiva were normal [PERRL With Normal Accommodation] : pupils were equal in size, round, reactive to light, with normal accommodation [Extraocular Movements] : extraocular movements were intact [Outer Ear] : the ears and nose were normal in appearance [Hearing Threshold Finger Rub Not [place]] : hearing was normal [Neck Appearance] : the appearance of the neck was normal [] : no respiratory distress [Exaggerated Use Of Accessory Muscles For Inspiration] : no accessory muscle use [Skin Color & Pigmentation] : normal skin color and pigmentation [FreeTextEntry1] : Shunt confirmed at 6, depresses and refills readily. [FreeTextEntry6] : no drift [FreeTextEntry8] : stood with assistance, no drift, MILO

## 2024-06-18 NOTE — HISTORY OF PRESENT ILLNESS
[FreeTextEntry1] : 78-year-old female with a PMH of Diabetes Mellitus type 2 (Metformin and Trulicity), HTN, CVA (low dose aspirin) and dementia, trouble walking, speech issues (minimally verbal), found to have NPH. he was admitted on 04/02/24 for VPS placement. s/p R  shunt Certas @7. Post op CT and shunt series were stable. Her shunt setting changed to 6 later in the clinic. Pt continuing with PT/OT at home, but no much progress in her motor functions.   Today Ms Pearson present on chair, after CT head. She reports no headache today. Denies speech impairment, vision problems or seizures. As per family she is recently not walking, but she is able to put some steps with support.  She talks very slow voice, when she feels like it. She has PT coming home for training her with walker. No c/o N/V since the last visit. Daughter does feel that she is mildly improved cognitively since the last adjustment.

## 2024-07-08 ENCOUNTER — APPOINTMENT (OUTPATIENT)
Dept: NEUROSURGERY | Facility: CLINIC | Age: 79
End: 2024-07-08

## 2024-07-08 VITALS
BODY MASS INDEX: 30.24 KG/M2 | SYSTOLIC BLOOD PRESSURE: 153 MMHG | OXYGEN SATURATION: 95 % | WEIGHT: 150 LBS | HEIGHT: 59 IN | HEART RATE: 65 BPM | DIASTOLIC BLOOD PRESSURE: 72 MMHG

## 2024-07-08 PROCEDURE — 99214 OFFICE O/P EST MOD 30 MIN: CPT

## 2024-07-08 PROCEDURE — 62252 CSF SHUNT REPROGRAM: CPT

## 2024-07-17 ENCOUNTER — APPOINTMENT (OUTPATIENT)
Dept: ELECTROPHYSIOLOGY | Facility: CLINIC | Age: 79
End: 2024-07-17

## 2024-07-17 PROCEDURE — 93298 REM INTERROG DEV EVAL SCRMS: CPT

## 2024-07-27 NOTE — ED PROVIDER NOTE - OBJECTIVE STATEMENT
77 y/o F pt with hx of CVA (no residual deficit), htn, hld, dm, dementia presenting today via EMS for evaluation of aphasia with lkw 3 days pta. EMS report that the pt's  noticed that the pt was very slow to respond to questions starting 3 days pta. Did not bring pt to ED at that time, but today daughter came to the pt's home to visit, noticed the pt's aphasia and called EMS. Pt currently denies any complaint. Pt denies any chest pain, dyspnea, fevers, n/v/d, abdominal pain, dysuria, headache, cough, congestion, sore throat, neck pain, back pain, weakness, numbness, tingling, dizziness, syncope, or other complaint.
27-Jul-2024 04:02

## 2024-07-31 PROBLEM — H43.813 POSTERIOR VITREOUS DETACHMENT; ,, BOTH EYES: Status: ACTIVE | Noted: 2024-07-09

## 2024-08-12 ENCOUNTER — APPOINTMENT (OUTPATIENT)
Dept: NEUROSURGERY | Facility: CLINIC | Age: 79
End: 2024-08-12
Payer: MEDICARE

## 2024-08-12 VITALS
BODY MASS INDEX: 30.24 KG/M2 | SYSTOLIC BLOOD PRESSURE: 171 MMHG | HEIGHT: 59 IN | DIASTOLIC BLOOD PRESSURE: 76 MMHG | WEIGHT: 150 LBS | HEART RATE: 59 BPM | OXYGEN SATURATION: 96 %

## 2024-08-12 DIAGNOSIS — G91.2 (IDIOPATHIC) NORMAL PRESSURE HYDROCEPHALUS: ICD-10-CM

## 2024-08-12 PROCEDURE — 99213 OFFICE O/P EST LOW 20 MIN: CPT

## 2024-08-12 PROCEDURE — 62252 CSF SHUNT REPROGRAM: CPT

## 2024-08-12 NOTE — END OF VISIT
Pt c/o bleeding from fistula   
[Time Spent: ___ minutes] : I have spent [unfilled] minutes of time on the encounter.
[Time Spent: ___ minutes] : I have spent [unfilled] minutes of time on the encounter.

## 2024-08-21 ENCOUNTER — APPOINTMENT (OUTPATIENT)
Dept: INTERNAL MEDICINE | Facility: CLINIC | Age: 79
End: 2024-08-21
Payer: MEDICARE

## 2024-08-21 ENCOUNTER — APPOINTMENT (OUTPATIENT)
Dept: ELECTROPHYSIOLOGY | Facility: CLINIC | Age: 79
End: 2024-08-21

## 2024-08-21 VITALS
WEIGHT: 150 LBS | DIASTOLIC BLOOD PRESSURE: 71 MMHG | HEART RATE: 65 BPM | TEMPERATURE: 96.9 F | BODY MASS INDEX: 30.24 KG/M2 | HEIGHT: 59 IN | OXYGEN SATURATION: 98 % | RESPIRATION RATE: 16 BRPM | SYSTOLIC BLOOD PRESSURE: 124 MMHG

## 2024-08-21 DIAGNOSIS — Z00.00 ENCOUNTER FOR GENERAL ADULT MEDICAL EXAMINATION W/OUT ABNORMAL FINDINGS: ICD-10-CM

## 2024-08-21 DIAGNOSIS — E11.9 TYPE 2 DIABETES MELLITUS W/OUT COMPLICATIONS: ICD-10-CM

## 2024-08-21 DIAGNOSIS — M79.89 OTHER SPECIFIED SOFT TISSUE DISORDERS: ICD-10-CM

## 2024-08-21 DIAGNOSIS — E78.5 HYPERLIPIDEMIA, UNSPECIFIED: ICD-10-CM

## 2024-08-21 DIAGNOSIS — Z98.2 PRESENCE OF CEREBROSPINAL FLUID DRAINAGE DEVICE: ICD-10-CM

## 2024-08-21 DIAGNOSIS — I10 ESSENTIAL (PRIMARY) HYPERTENSION: ICD-10-CM

## 2024-08-21 PROCEDURE — G0439: CPT

## 2024-08-21 PROCEDURE — G0444 DEPRESSION SCREEN ANNUAL: CPT

## 2024-08-21 PROCEDURE — 93298 REM INTERROG DEV EVAL SCRMS: CPT

## 2024-08-21 NOTE — HISTORY OF PRESENT ILLNESS
[Spouse] : spouse [FreeTextEntry1] : HARRIET [de-identified] : 77 yo F w PMHx HLD, DM2, GERD, HTN, dementia, CVA, RA s/p  shunt placement 04/2024 - follows with neurology doing well now Medications reviewed, needs refills c/o bilateral leg swelling for 3 mo no pain - is wheelchair bound

## 2024-08-21 NOTE — HEALTH RISK ASSESSMENT
[Good] : ~his/her~  mood as  good [No] : No [No falls in past year] : Patient reported no falls in the past year [Little interest or pleasure doing things] : 1) Little interest or pleasure doing things [Feeling down, depressed, or hopeless] : 2) Feeling down, depressed, or hopeless [0] : 2) Feeling down, depressed, or hopeless: Not at all (0) [PHQ-2 Negative - No further assessment needed] : PHQ-2 Negative - No further assessment needed [Former] : Former [> 15 Years] : > 15 Years [NO] : No [None] : None [With Family] : lives with family [Retired] : retired [] :  [Feels Safe at Home] : Feels safe at home [Smoke Detector] : smoke detector [Carbon Monoxide Detector] : carbon monoxide detector [Seat Belt] :  uses seat belt [Sunscreen] : uses sunscreen [de-identified] : wheelchair bound [de-identified] : good [Change in mental status noted] : No change in mental status noted [Travel to Developing Areas] : does not  travel to developing areas [MammogramDate] : 01/2022 [MammogramComments] : declines [PapSmearComments] : declines [BoneDensityComments] : declines [ColonoscopyComments] : declines  [de-identified] : needs assistance [de-identified] : needs assistance

## 2024-08-21 NOTE — PHYSICAL EXAM
[General Appearance - In No Acute Distress] : in no acute distress [General Appearance - Alert] : alert [Motor Tone] : muscle tone was normal in all four extremities [Motor Strength] : muscle strength was normal in all four extremities [FreeTextEntry1] : Ox1, more verbally responsive, less hypophonic,  [FreeTextEntry6] : Pt PEDRAZA with no drift [FreeTextEntry8] : Able to get up from wheelchair with only mild two-person assist.  Can walk a few asymmetrical but alternating short steps with one person holding heach hand.

## 2024-08-21 NOTE — HISTORY OF PRESENT ILLNESS
[FreeTextEntry1] : 78-year-old female with a PMH of Diabetes Mellitus type 2 (Metformin and Trulicity), HTN, CVA (low dose aspirin) and dementia, trouble walking, speech issues (minimally verbal), found to have NPH. he was admitted on 04/02/24 for VPS placement. s/p R  shunt Certas @7. Post op CT and shunt series were stable. Her shunt setting changed to 5 last visit. Pt continuing with PT/OT at home, but no much progress in her motor functions.   Today Ms Pearson present in wheelchair, per daughter pt is doing better and trying to get up out of chair independently.  Pt has been doing exercises at home, but no PT..  No headaches. Per daughter, pt more interactive at home

## 2024-08-21 NOTE — HISTORY OF PRESENT ILLNESS
[Spouse] : spouse [FreeTextEntry1] : HARRIET [de-identified] : 77 yo F w PMHx HLD, DM2, GERD, HTN, dementia, CVA, RA s/p  shunt placement 04/2024 - follows with neurology doing well now Medications reviewed, needs refills c/o bilateral leg swelling for 3 mo no pain - is wheelchair bound

## 2024-08-21 NOTE — REASON FOR VISIT
reduced kidney function to drink 64 to 80oz of water daily.  Calcium elevated 10.8, advised to hold off calcium supplements, normal prostate, thyroid function, normal cholesterol, A1c 5.9 indicating prediabetes advised to eat healthy and exercise, recheck calcium in 2 weeks. [Annual Wellness Visit] : an annual wellness visit

## 2024-08-21 NOTE — ASSESSMENT
[FreeTextEntry1] : 79 y/o F with PMH of Diabetes Mellitus type 2 (Metformin and Trulicity), HTN, CVA (low dose aspirin) and dementia, trouble walking, speech issues (minimally verbal), found to have NPH on work up was admitted 04/02/24 for VPS placement. s/p R  shunt Certas, was reprogrammed from 4 to 3 as pt is now somewhat improved indicating that shunt is working for now.  PLAN: -Shunt adjusted to 3 today. -Rx outpt PT, or gait and walking -Rx Speech therapy Advised to follow up BP at home.  Has PCP appt next week. - F/U in one month.

## 2024-08-21 NOTE — HEALTH RISK ASSESSMENT
[Good] : ~his/her~  mood as  good [No] : No [No falls in past year] : Patient reported no falls in the past year [Little interest or pleasure doing things] : 1) Little interest or pleasure doing things [Feeling down, depressed, or hopeless] : 2) Feeling down, depressed, or hopeless [0] : 2) Feeling down, depressed, or hopeless: Not at all (0) [PHQ-2 Negative - No further assessment needed] : PHQ-2 Negative - No further assessment needed [Former] : Former [> 15 Years] : > 15 Years [NO] : No [None] : None [With Family] : lives with family [Retired] : retired [] :  [Feels Safe at Home] : Feels safe at home [Smoke Detector] : smoke detector [Carbon Monoxide Detector] : carbon monoxide detector [Seat Belt] :  uses seat belt [Sunscreen] : uses sunscreen [de-identified] : wheelchair bound [de-identified] : good [Change in mental status noted] : No change in mental status noted [Travel to Developing Areas] : does not  travel to developing areas [MammogramDate] : 01/2022 [PapSmearComments] : declines [MammogramComments] : declines [BoneDensityComments] : declines [ColonoscopyComments] : declines  [de-identified] : needs assistance [de-identified] : needs assistance

## 2024-08-21 NOTE — ASSESSMENT
[FreeTextEntry1] : 77 y/o F with PMH of Diabetes Mellitus type 2 (Metformin and Trulicity), HTN, CVA (low dose aspirin) and dementia, trouble walking, speech issues (minimally verbal), found to have NPH on work up was admitted 04/02/24 for VPS placement. s/p R  shunt Certas, was reprogrammed from 4 to 3 as pt is now somewhat improved indicating that shunt is working for now.  PLAN: -Shunt adjusted to 3 today. -Rx outpt PT, or gait and walking -Rx Speech therapy Advised to follow up BP at home.  Has PCP appt next week. - F/U in one month.

## 2024-08-21 NOTE — PHYSICAL EXAM
[Normal] : soft, non-tender, non-distended, no masses palpated, no HSM and normal bowel sounds [de-identified] : sitting on wheelchair [de-identified] : 3/6 SM [de-identified] : B/L LE Edema

## 2024-08-21 NOTE — PHYSICAL EXAM
[Normal] : soft, non-tender, non-distended, no masses palpated, no HSM and normal bowel sounds [de-identified] : sitting on wheelchair [de-identified] : 3/6 SM [de-identified] : B/L LE Edema

## 2024-08-21 NOTE — PLAN
[FreeTextEntry1] :    Annual Physical:  Counseled on dietary modification, daily regular exercise.  Increase fiber/ plant-based diet and decrease intake of meats and animal-based diet.  To routinely use seat belts. Self-testicular/breast and skin exams advised.  Immunizations discussed and counseled. Age-appropriate screening exams discussed.  Colonoscopy: declines Mammogram: declines  BMD: declines   Leg swelling: To ck US to r/o DVT HTN: Stable, advised strict low salt diet, daily regular exercise, to c/w meds, bmp to be monitored closely. Hyperlipidemia: Advised on strict low-fat diet, daily regular exercise, to c/w meds, FLP/LFT to be monitored. s/p  shunt - to f/u with neurology DM2: Advised ADA diet, daily regular exercise, to loose weight. Check FPG/PPG at home and keep a log, to c/w meds, repeat A1c in 3 mo, Advised daily self-foot exams, Annual eval for dilated eye exam - has apt in sept f/u 3 mo.

## 2024-08-30 NOTE — H&P PST ADULT - NS SC CAGE ALCOHOL EYE OPENER
no How Many Mls Were Removed From The 40 Mg/Ml (5ml) Vial When Preparing The Injectable Solution?: 0 Kenalog Preparation: Kenalog Consent: The risks of atrophy were reviewed with the patient. Administered By (Optional): PAC Bill For Wasted Drug (Kenalog)?: no Validate Note Data When Using Inventory: Yes Show Inventory Tab: Hide Total Volume (Ccs): 0.05 Ndc# For Kenalog Only: [id number] Medical Necessity Clause: This procedure was medically necessary because the lesions that were treated were: Detail Level: Simple Concentration Of Kenalog Solution Injected (Mg/Ml): 3.0 Kenalog Type Of Vial: Single Dose

## 2024-09-04 ENCOUNTER — APPOINTMENT (OUTPATIENT)
Dept: ULTRASOUND IMAGING | Facility: CLINIC | Age: 79
End: 2024-09-04
Payer: MEDICARE

## 2024-09-04 ENCOUNTER — OUTPATIENT (OUTPATIENT)
Dept: OUTPATIENT SERVICES | Facility: HOSPITAL | Age: 79
LOS: 1 days | End: 2024-09-04
Payer: MEDICARE

## 2024-09-04 DIAGNOSIS — Z92.89 PERSONAL HISTORY OF OTHER MEDICAL TREATMENT: Chronic | ICD-10-CM

## 2024-09-04 DIAGNOSIS — Z98.890 OTHER SPECIFIED POSTPROCEDURAL STATES: Chronic | ICD-10-CM

## 2024-09-04 DIAGNOSIS — M79.89 OTHER SPECIFIED SOFT TISSUE DISORDERS: ICD-10-CM

## 2024-09-04 PROCEDURE — 93970 EXTREMITY STUDY: CPT

## 2024-09-04 PROCEDURE — 93970 EXTREMITY STUDY: CPT | Mod: 26

## 2024-09-20 ENCOUNTER — OFFICE (OUTPATIENT)
Dept: URBAN - METROPOLITAN AREA CLINIC 102 | Facility: CLINIC | Age: 79
Setting detail: OPHTHALMOLOGY
End: 2024-09-20
Payer: MEDICARE

## 2024-09-20 DIAGNOSIS — Z96.1: ICD-10-CM

## 2024-09-20 DIAGNOSIS — H04.563: ICD-10-CM

## 2024-09-20 DIAGNOSIS — E11.3213: ICD-10-CM

## 2024-09-20 DIAGNOSIS — H34.8322: ICD-10-CM

## 2024-09-20 PROCEDURE — 92014 COMPRE OPH EXAM EST PT 1/>: CPT | Performed by: OPHTHALMOLOGY

## 2024-09-20 PROCEDURE — 92134 CPTRZ OPH DX IMG PST SGM RTA: CPT | Performed by: OPHTHALMOLOGY

## 2024-09-20 ASSESSMENT — CONFRONTATIONAL VISUAL FIELD TEST (CVF)
OD_FINDINGS: FULL
OS_FINDINGS: FULL

## 2024-09-23 NOTE — ASU DISCHARGE PLAN (ADULT/PEDIATRIC) - PROCEDURE
"----- Message from Deidre Dawkins MA sent at 9/23/2024 11:12 AM CDT -----  This patient is scheduled today, he wants to r/s Iovera procedure. Would you mind f/u to r/s please.   Thank you!  ----- Message -----  From: Rashaad Mack  Sent: 9/23/2024  11:09 AM CDT  To: Soniya Doss Staff    Pt Requesting to Reschedule an Appointment     Pt is requesting to Reschedule an appointment that our scheduling dept cannot Reschedule.    Who called: pt  Initial appt date: 9/23/24  When pt wants appt: next available  Reason: "transportation problems"  Best call back #:  [phone]  Additional notes:  "
lumbar puncture

## 2024-09-24 ENCOUNTER — NON-APPOINTMENT (OUTPATIENT)
Age: 79
End: 2024-09-24

## 2024-09-25 ENCOUNTER — APPOINTMENT (OUTPATIENT)
Dept: ELECTROPHYSIOLOGY | Facility: CLINIC | Age: 79
End: 2024-09-25
Payer: MEDICARE

## 2024-09-25 PROCEDURE — 93298 REM INTERROG DEV EVAL SCRMS: CPT

## 2024-09-27 ENCOUNTER — APPOINTMENT (OUTPATIENT)
Dept: NEUROSURGERY | Facility: CLINIC | Age: 79
End: 2024-09-27

## 2024-09-27 VITALS
SYSTOLIC BLOOD PRESSURE: 146 MMHG | DIASTOLIC BLOOD PRESSURE: 57 MMHG | OXYGEN SATURATION: 95 % | HEART RATE: 61 BPM | HEIGHT: 59 IN

## 2024-09-27 PROCEDURE — 99214 OFFICE O/P EST MOD 30 MIN: CPT

## 2024-09-27 NOTE — REVIEW OF SYSTEMS
[Cluster Headache] : cluster headaches [Joint Pain] : joint pain [Limb Pain] : limb pain [Negative] : Genitourinary

## 2024-09-27 NOTE — END OF VISIT
[Time Spent: ___ minutes] : I have spent [unfilled] minutes of time on the encounter which excludes teaching and separately reported services. Female Completion Statement: After discussing her treatment course we decided to discontinue isotretinoin therapy at this time. I explained that she would need to continue her birth control methods for at least one month after the last dosage. She should also get a pregnancy test one month after the last dose. She shouldn't donate blood for one month after the last dose. She should call with any new symptoms of depression.

## 2024-10-03 NOTE — ASSESSMENT
[FreeTextEntry1] : IMPRESSION 79 y/o F with PMH of Diabetes Mellitus type 2 (Metformin and Trulicity), HTN, CVA (low dose aspirin) and dementia, trouble walking, speech issues (minimally verbal), found to have NPH on work up was admitted 04/02/24 for VPS placement. s/p R  shunt Certas, was reprogrammed 3 in the last visit. Pt is now somewhat improved, walking bit more and also more alert and interactive. Pt has not received any PT for the past few months, will try to get some oupatient therapy. Will do shunt patency study for evaluation given only modest improvement with setting at 3.   PLAN:  --STARS PT for gait instability and balance for 2-3 times/week x 8 weeks.   --NM Shunt patency study --CT Head w/o contrast   --F/U in one month.

## 2024-10-03 NOTE — HISTORY OF PRESENT ILLNESS
[FreeTextEntry1] : 78-year-old female with a PMH of Diabetes Mellitus type 2 (Metformin and Trulicity), HTN, CVA (low dose aspirin) and dementia, trouble walking, speech issues (minimally verbal), found to have NPH. he was admitted on 04/02/24 for VPS placement. s/p R  shunt Certas @7. Post op CT and shunt series were stable. Her shunt setting changed to 3 in the last visit. Pt continuing with PT/OT at home. Last visit, the shunt was adjusted to 3.  Today Ms Pearson present in wheelchair with good spirit. She is accompanied by her daughter who explains that pt is doing the same in regard to ambulation, not many changes. Pt doing some minimal exercises at home, but no PT. Pt walking everyday with two persons assist. Per daughter, pt c/o occasional headache and she is occasionally more interactive at home.

## 2024-10-03 NOTE — ASSESSMENT
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 s/p R  shunt Certas, was reprogrammed 3 in the last visit. Pt is now somewhat improved, walking bit more and also more alert and interactive. Pt has not received any PT for the past few months, will try to get some oupatient therapy. Will do shunt patency study for evaluation given only modest improvement with setting at 3.   PLAN:  --STARS PT for gait instability and balance for 2-3 times/week x 8 weeks.   --NM Shunt patency study --CT Head w/o contrast   --F/U in one month.

## 2024-10-03 NOTE — PHYSICAL EXAM
[General Appearance - Alert] : alert [General Appearance - In No Acute Distress] : in no acute distress [Motor Tone] : muscle tone was normal in all four extremities [Motor Strength] : muscle strength was normal in all four extremities [Person] : oriented to person [Sclera] : the sclera and conjunctiva were normal [PERRL With Normal Accommodation] : pupils were equal in size, round, reactive to light, with normal accommodation [] : no respiratory distress [Respiration, Rhythm And Depth] : normal respiratory rhythm and effort [Heart Rate And Rhythm] : heart rate was normal and rhythm regular [No Spinal Tenderness] : no spinal tenderness [Involuntary Movements] : no involuntary movements were seen [FreeTextEntry1] : Ox1, more verbally responsive, less hypophonic, follow commands [FreeTextEntry6] : Pt PEDRAZA with no drift [FreeTextEntry8] : Able to get up from wheelchair with only mild one person assist.  Can walk a few asymmetrical but alternating short steps with one person holding each hand.Pt dragging the right leg every now and often.

## 2024-10-07 NOTE — ED PROVIDER NOTE - MUSCULOSKELETAL, MLM
Thank you for choosing our Emergency Department for your care.  It is our privilege to care for you in your time of need.  In the next several days, you may receive a survey via email or mailed to your home about your experience with our team.  We would greatly appreciate you taking a few minutes to complete the survey, as we use this information to learn what we have done well and what we could be doing better. Thank you for trusting us with your care!    Below you will find a list of your tests from today's visit.   Labs  Recent Results (from the past 12 hour(s))   CBC with Auto Differential    Collection Time: 10/06/24  8:09 PM   Result Value Ref Range    WBC 4.3 4.1 - 11.1 K/uL    RBC 4.81 4.10 - 5.70 M/uL    Hemoglobin 13.4 12.1 - 17.0 g/dL    Hematocrit 42.6 36.6 - 50.3 %    MCV 88.6 80.0 - 99.0 FL    MCH 27.9 26.0 - 34.0 PG    MCHC 31.5 30.0 - 36.5 g/dL    RDW 16.3 (H) 11.5 - 14.5 %    Platelets 102 (L) 150 - 400 K/uL    MPV 11.0 8.9 - 12.9 FL    Nucleated RBCs 0.0 0.0  WBC    nRBC 0.00 0.00 - 0.01 K/uL    Neutrophils % 46 32 - 75 %    Lymphocytes % 35 12 - 49 %    Monocytes % 11 5 - 13 %    Eosinophils % 5 0 - 7 %    Basophils % 2 (H) 0 - 1 %    Immature Granulocytes % 1 (H) 0 - 0.5 %    Neutrophils Absolute 2.0 1.8 - 8.0 K/UL    Lymphocytes Absolute 1.5 0.8 - 3.5 K/UL    Monocytes Absolute 0.5 0.0 - 1.0 K/UL    Eosinophils Absolute 0.2 0.0 - 0.4 K/UL    Basophils Absolute 0.1 0.0 - 0.1 K/UL    Immature Granulocytes Absolute 0.0 0.00 - 0.04 K/UL    Differential Type AUTOMATED     Comprehensive Metabolic Panel    Collection Time: 10/06/24  8:09 PM   Result Value Ref Range    Sodium 140 136 - 145 mmol/L    Potassium 4.6 3.5 - 5.1 mmol/L    Chloride 110 (H) 97 - 108 mmol/L    CO2 26 21 - 32 mmol/L    Anion Gap 4 2 - 12 mmol/L    Glucose 87 65 - 100 mg/dL    BUN 11 6 - 20 mg/dL    Creatinine 0.75 0.70 - 1.30 mg/dL    BUN/Creatinine Ratio 15 12 - 20      Est, Glom Filt Rate >90 >60 ml/min/1.73m2     Spine appears normal, range of motion is not limited, no muscle or joint tenderness

## 2024-10-30 ENCOUNTER — APPOINTMENT (OUTPATIENT)
Dept: ELECTROPHYSIOLOGY | Facility: CLINIC | Age: 79
End: 2024-10-30

## 2024-10-30 PROCEDURE — 93298 REM INTERROG DEV EVAL SCRMS: CPT

## 2024-11-20 ENCOUNTER — APPOINTMENT (OUTPATIENT)
Dept: INTERNAL MEDICINE | Facility: CLINIC | Age: 79
End: 2024-11-20

## 2024-11-22 ENCOUNTER — APPOINTMENT (OUTPATIENT)
Dept: NEUROSURGERY | Facility: CLINIC | Age: 79
End: 2024-11-22

## 2024-11-27 ENCOUNTER — OUTPATIENT (OUTPATIENT)
Dept: OUTPATIENT SERVICES | Facility: HOSPITAL | Age: 79
LOS: 1 days | End: 2024-11-27
Payer: MEDICARE

## 2024-11-27 ENCOUNTER — APPOINTMENT (OUTPATIENT)
Dept: CT IMAGING | Facility: CLINIC | Age: 79
End: 2024-11-27
Payer: MEDICARE

## 2024-11-27 DIAGNOSIS — Z98.890 OTHER SPECIFIED POSTPROCEDURAL STATES: Chronic | ICD-10-CM

## 2024-11-27 DIAGNOSIS — Z92.89 PERSONAL HISTORY OF OTHER MEDICAL TREATMENT: Chronic | ICD-10-CM

## 2024-11-27 DIAGNOSIS — R26.9 UNSPECIFIED ABNORMALITIES OF GAIT AND MOBILITY: ICD-10-CM

## 2024-11-27 DIAGNOSIS — G91.9 HYDROCEPHALUS, UNSPECIFIED: ICD-10-CM

## 2024-11-27 PROCEDURE — 70450 CT HEAD/BRAIN W/O DYE: CPT

## 2024-11-27 PROCEDURE — 70450 CT HEAD/BRAIN W/O DYE: CPT | Mod: 26,MH

## 2024-12-04 ENCOUNTER — APPOINTMENT (OUTPATIENT)
Dept: ELECTROPHYSIOLOGY | Facility: CLINIC | Age: 79
End: 2024-12-04

## 2024-12-04 PROCEDURE — 93298 REM INTERROG DEV EVAL SCRMS: CPT

## 2024-12-09 NOTE — PHYSICAL THERAPY INITIAL EVALUATION ADULT - ORIENTATION, REHAB EVAL
Health Maintenance       Influenza Vaccine (1)  Overdue since 9/1/2024    COVID-19 Vaccine (3 - 2024-25 season)  Overdue since 9/1/2024           Following review of the above:  Patient is not proceeding with: COVID-19 and Influenza    Note: Refer to final orders and clinician documentation.         person/place/time

## 2024-12-11 ENCOUNTER — APPOINTMENT (OUTPATIENT)
Dept: INTERNAL MEDICINE | Facility: CLINIC | Age: 79
End: 2024-12-11
Payer: MEDICARE

## 2024-12-11 VITALS
RESPIRATION RATE: 14 BRPM | SYSTOLIC BLOOD PRESSURE: 149 MMHG | DIASTOLIC BLOOD PRESSURE: 77 MMHG | TEMPERATURE: 97.3 F | HEART RATE: 57 BPM | OXYGEN SATURATION: 96 % | HEIGHT: 59 IN

## 2024-12-11 DIAGNOSIS — I10 ESSENTIAL (PRIMARY) HYPERTENSION: ICD-10-CM

## 2024-12-11 DIAGNOSIS — M06.9 RHEUMATOID ARTHRITIS, UNSPECIFIED: ICD-10-CM

## 2024-12-11 DIAGNOSIS — E78.5 HYPERLIPIDEMIA, UNSPECIFIED: ICD-10-CM

## 2024-12-11 DIAGNOSIS — E11.9 TYPE 2 DIABETES MELLITUS W/OUT COMPLICATIONS: ICD-10-CM

## 2024-12-11 PROCEDURE — 99214 OFFICE O/P EST MOD 30 MIN: CPT

## 2024-12-11 PROCEDURE — G2211 COMPLEX E/M VISIT ADD ON: CPT

## 2024-12-13 LAB
25(OH)D3 SERPL-MCNC: 18.3 NG/ML
ALBUMIN SERPL ELPH-MCNC: 3.9 G/DL
ALP BLD-CCNC: 43 U/L
ALT SERPL-CCNC: 14 U/L
ANION GAP SERPL CALC-SCNC: 15 MMOL/L
AST SERPL-CCNC: 11 U/L
BASOPHILS # BLD AUTO: 0.11 K/UL
BASOPHILS NFR BLD AUTO: 1.1 %
BILIRUB SERPL-MCNC: 0.2 MG/DL
BUN SERPL-MCNC: 22 MG/DL
CALCIUM SERPL-MCNC: 9.3 MG/DL
CHLORIDE SERPL-SCNC: 108 MMOL/L
CHOLEST SERPL-MCNC: 102 MG/DL
CO2 SERPL-SCNC: 22 MMOL/L
CREAT SERPL-MCNC: 0.77 MG/DL
EGFR: 79 ML/MIN/1.73M2
EOSINOPHIL # BLD AUTO: 0.4 K/UL
EOSINOPHIL NFR BLD AUTO: 4.1 %
ESTIMATED AVERAGE GLUCOSE: 194 MG/DL
GLUCOSE SERPL-MCNC: 120 MG/DL
HBA1C MFR BLD HPLC: 8.4 %
HCT VFR BLD CALC: 38.9 %
HDLC SERPL-MCNC: 41 MG/DL
HGB BLD-MCNC: 12.4 G/DL
IMM GRANULOCYTES NFR BLD AUTO: 0.5 %
LDLC SERPL CALC-MCNC: 41 MG/DL
LYMPHOCYTES # BLD AUTO: 2.13 K/UL
LYMPHOCYTES NFR BLD AUTO: 22 %
MAN DIFF?: NORMAL
MCHC RBC-ENTMCNC: 27 PG
MCHC RBC-ENTMCNC: 31.9 G/DL
MCV RBC AUTO: 84.6 FL
MONOCYTES # BLD AUTO: 0.75 K/UL
MONOCYTES NFR BLD AUTO: 7.8 %
NEUTROPHILS # BLD AUTO: 6.22 K/UL
NEUTROPHILS NFR BLD AUTO: 64.5 %
NONHDLC SERPL-MCNC: 61 MG/DL
PLATELET # BLD AUTO: 319 K/UL
POTASSIUM SERPL-SCNC: 3.9 MMOL/L
PROT SERPL-MCNC: 6.2 G/DL
RBC # BLD: 4.6 M/UL
RBC # FLD: 14.7 %
SODIUM SERPL-SCNC: 145 MMOL/L
TRIGL SERPL-MCNC: 107 MG/DL
TSH SERPL-ACNC: 2.09 UIU/ML
URATE SERPL-MCNC: 2.9 MG/DL
WBC # FLD AUTO: 9.66 K/UL

## 2025-01-08 ENCOUNTER — APPOINTMENT (OUTPATIENT)
Dept: ELECTROPHYSIOLOGY | Facility: CLINIC | Age: 80
End: 2025-01-08
Payer: MEDICARE

## 2025-01-08 ENCOUNTER — NON-APPOINTMENT (OUTPATIENT)
Age: 80
End: 2025-01-08

## 2025-01-08 PROCEDURE — 93298 REM INTERROG DEV EVAL SCRMS: CPT

## 2025-01-10 NOTE — PROGRESS NOTE ADULT - ASSESSMENT
ASSESSMENT/PLAN  This is a 76 Female with a h/o CVA (no prior deficit), HTN, HLD, IDDM, Dementia, MDD admitted to [facility] on 11/1 for Subacute CVA L Basal Ganglia, Mod-Severe Stenosis Prox R PCA, HTN Urgency, AMS, abnormal speech, and Hypoglycemia.  In ED,  on arrival with some improvement with medications but remains hypertensive.  LKWT 3 days prior to presentation, NIHSS 2, Out of window for TPA. Hospital course significant for UTI- treated with ABT. Patient now with gait Instability, ADL impairments and Functional impairments.    # CVA  - Subacute CVA L Basal Ganglia  - Mod-Severe Stenosis Prox R PCA  - cont Comprehensive Rehab Program: PT/OT/ST- total of 3 hrs/day 5 days/week   - c/w ASA, high dose statin: lipitor    #NPH  - OP f/u with NSGY    #HTN  --SBP goal 120-150s  -stopped Metoprolol 11/16  - Procardia XL  - Lisinopril 40 mg daily  - hydralazine 10 mg BID    #HLD  - on Lipitor  - Fenofibrate 145mg daily    #DM II with hyperglycemia  - ISS and FS  - Lantus and Admelog  -Management as per hospitalist    #Depression  - Sertraline 100mg daily    #Pain management  - Tylenol PRN    #DVT ppx  - Heparin, SCD, TEDs    #Dementia  - Donepezil 5mg daily  - Memantine 5mg BID    #GI ppx  - Protonix  - maalox for dyspepsia    #Bowel Regimen  - Senna, miralax PRN    #Bladder management  -voiding with low PVRs      #Dysphagia    - SLP: evaluation and treatment  - s/p MBS: easy chew with thin liquid per speech. 100 % supervision for aspiration precaution    #Precaution  - Fall, Aspiration, Seizure      #Skin:  - No active issues at this time  - Desitin to buttocks for IAD  - Pressure injury/Skin: Turn Q2hrs while in bed, OOB to Chair, PT/OT       #Sleep:  - Maintain quiet hours and low stim environment.  -melatonin PRN  - Monitor sleep logs    #IDT 11/16:  - SW: lives in  3STE with  and daughter.  and 2 granddaughters assisted with ADLs. Owns WC, RW, rollator.  - Barriers: retropulsion, coordination, poor balance  - Eating SV, Grooming Kaley, UBD Kaley, LBD maxA, bathing maxA, toilet transfer modA, shower transfer totalA, transfers Kaley, ambulation 62' with RW Mod A and WC follow. Needs assist moving RLE forward and preventing hyperextension of knee, 4 steps with 2 HR maxA.  - On easy to chew diet, modA cognition, decreased attention. poor problem solving, decreased initiation,  mild-mod receptive deficits, mod dysarthria  - Goals: shower transfers with Kaley o/w SC with ADLs, Kaley with stairs and CG transfers, SV with ambulation, SV cognition  - Will need family training  - EDOD 12/4 home      Outpatient Follow-up (Specialty/Name of physician):    Huan Oquendo; PhD)  Neurology; Vascular Neurology  [address], [address]  [place] [address]  Phone: [phone]  Fax: [phone]    Alexander Arevalo (MD)  Neurology; Vascular Neurology  [address] [facility], [address] [place] [address]  Phone: [phone]  Fax: [phone] ASSESSMENT/PLAN  This is a 76 Female with a h/o CVA (no prior deficit), HTN, HLD, IDDM, Dementia, MDD admitted to [facility] on 11/1 for Subacute CVA L Basal Ganglia, Mod-Severe Stenosis Prox R PCA, HTN Urgency, AMS, abnormal speech, and Hypoglycemia.  In ED,  on arrival with some improvement with medications but remains hypertensive.  LKWT 3 days prior to presentation, NIHSS 2, Out of window for TPA. Hospital course significant for UTI- treated with ABT. Patient now with gait Instability, ADL impairments and Functional impairments.    # CVA  - Subacute CVA L Basal Ganglia  - Mod-Severe Stenosis Prox R PCA  - cont Comprehensive Rehab Program: PT/OT/ST- total of 3 hrs/day 5 days/week   - c/w ASA, high dose statin: lipitor    #NPH  - OP f/u with NSGY    #HTN  --SBP goal 120-150s  -stopped Metoprolol 11/16  - Procardia XL  - Lisinopril 40 mg daily  - hydralazine 10 mg BID    #HLD  - on Lipitor  - Fenofibrate 145mg daily    #DM II with hyperglycemia  - ISS and FS  - Lantus and Admelog  -Management as per hospitalist    #Depression  - Sertraline 100mg daily    #Pain management  - Tylenol PRN    #DVT ppx  - Heparin, SCD, TEDs    #Dementia  - Donepezil 5mg daily  - Memantine 5mg BID    #GI ppx  - Protonix  - maalox for dyspepsia    #Bowel Regimen  - Senna, miralax PRN    #Bladder management  -voiding with low PVRs      #Dysphagia    - SLP: evaluation and treatment  - s/p MBS: easy chew with thin liquid per speech. 100 % supervision for aspiration precaution    #Precaution  - Fall, Aspiration, Seizure      #Skin:  - No active issues at this time  - Desitin to buttocks for IAD  - Pressure injury/Skin: Turn Q2hrs while in bed, OOB to Chair, PT/OT       #Sleep:  - Maintain quiet hours and low stim environment.  -melatonin PRN  - Monitor sleep logs    #IDT 11/23:  - SW: lives in  3STE with  and daughter.  and 2 granddaughters assisted with ADLs. Owns WC, RW, rollator.  - Barriers: retropulsion, coordination, poor balance  - Eating and Grooming SV, UBD Kaley, LBD modA, bathing maxA, toilet transfer modA, shower transfer totalA, transfers Kaley, ambulation 100' with tomas walker with dorsiflexion assist (for genu recurvatum) Mod A and WC follow. Needs assist moving RLE forward and preventing hyperextension of knee, 4 steps with 2 HR maxA.  - On easy to chew diet, modA cognition, decreased attention. poor problem solving, decreased initiation,  mild-mod receptive deficits, mod dysarthria  - Goals: shower transfers with Kaley o/w SC with ADLs, Kaley with stairs and CG transfers, CG/Kaley with ambulation, SV cognition  - Will need family training  - EDOD 12/4 home      Outpatient Follow-up (Specialty/Name of physician):    Huan Oquendo; PhD)  Neurology; Vascular Neurology  [address] [facility], [address]  [place] [address]  Phone: [phone]  Fax: [phone]    Alexander Arevalo (MD)  Neurology; Vascular Neurology  [address] [place] [address], [address] [place] [address]  Phone: [phone]  Fax: [phone] ASSESSMENT/PLAN  This is a 76 Female with a h/o CVA (no prior deficit), HTN, HLD, IDDM, Dementia, MDD admitted to [facility] on 11/1 for Subacute CVA L Basal Ganglia, Mod-Severe Stenosis Prox R PCA, HTN Urgency, AMS, abnormal speech, and Hypoglycemia.  In ED,  on arrival with some improvement with medications but remains hypertensive.  LKWT 3 days prior to presentation, NIHSS 2, Out of window for TPA. Hospital course significant for UTI- treated with ABT. Patient now with gait Instability, ADL impairments and Functional impairments.    # CVA  - Subacute CVA L Basal Ganglia  - Mod-Severe Stenosis Prox R PCA  - cont Comprehensive Rehab Program: PT/OT/ST- total of 3 hrs/day 5 days/week   - c/w ASA, high dose statin: lipitor    #NPH  - OP f/u with NSGY    #HTN  --SBP goal 120-150s  -stopped Metoprolol 11/16  - Procardia XL  - Lisinopril 40 mg daily  - hydralazine 10 mg BID    #HLD  - on Lipitor  - Fenofibrate 145mg daily    #DM II with hyperglycemia  - ISS and FS  - Lantus and Admelog  -Management as per hospitalist    #Depression  - Sertraline 100mg daily    #Pain management  - Tylenol PRN    #DVT ppx  - Heparin, SCD, TEDs    #Dementia  - Donepezil 5mg daily  - Memantine 5mg BID    #GI ppx  - Protonix  - maalox for dyspepsia    #Bowel Regimen  - Senna, miralax PRN    #Bladder management  -voiding with low PVRs      #Dysphagia    - SLP: evaluation and treatment  - s/p MBS: easy chew with thin liquid per speech. 100 % supervision for aspiration precaution    #Precaution  - Fall, Aspiration, Seizure      #Skin:  - No active issues at this time  - Desitin to buttocks for IAD  - Pressure injury/Skin: Turn Q2hrs while in bed, OOB to Chair, PT/OT       #Sleep:  - Maintain quiet hours and low stim environment.  -melatonin PRN  - Monitor sleep logs    #IDT 11/23:  - SW: lives in  3STE with  and daughter.  and 2 granddaughters assisted with ADLs. Owns WC, RW, rollator.  - Barriers: retropulsion, coordination, poor balance  - Eating and Grooming SV, UBD Kaley, LBD modA, bathing maxA, toilet transfer modA, shower transfer totalA,   --transfers Kaley, ambulation 100' with tomas walker with dorsiflexion assist (for genu recurvatum) Min A, 8 steps with 2 HR mod A.  - On easy to chew diet, modA cognition, decreased attention. poor problem solving, decreased initiation,  mild-mod receptive deficits, mod dysarthria  - Goals: shower transfers with Kaley o/w SC with ADLs, Kaley with stairs and CG transfers, CG/Kaley with ambulation, SV cognition  - Will need family training  - EDOD 12/4 home      Outpatient Follow-up (Specialty/Name of physician):    Huan Oquendo; PhD)  Neurology; Vascular Neurology  [address] [facility], [address]  [place] [address]  Phone: [phone]  Fax: [phone]    Alexander Arevalo (MD)  Neurology; Vascular Neurology  [address] [facility], [address] [place] [address]  Phone: [phone]  Fax: [phone] n/a

## 2025-02-12 ENCOUNTER — APPOINTMENT (OUTPATIENT)
Dept: ULTRASOUND IMAGING | Facility: CLINIC | Age: 80
End: 2025-02-12
Payer: MEDICARE

## 2025-02-12 ENCOUNTER — NON-APPOINTMENT (OUTPATIENT)
Age: 80
End: 2025-02-12

## 2025-02-12 ENCOUNTER — APPOINTMENT (OUTPATIENT)
Dept: ELECTROPHYSIOLOGY | Facility: CLINIC | Age: 80
End: 2025-02-12
Payer: MEDICARE

## 2025-02-12 ENCOUNTER — OUTPATIENT (OUTPATIENT)
Dept: OUTPATIENT SERVICES | Facility: HOSPITAL | Age: 80
LOS: 1 days | End: 2025-02-12
Payer: MEDICARE

## 2025-02-12 DIAGNOSIS — Z98.890 OTHER SPECIFIED POSTPROCEDURAL STATES: Chronic | ICD-10-CM

## 2025-02-12 DIAGNOSIS — Z92.89 PERSONAL HISTORY OF OTHER MEDICAL TREATMENT: Chronic | ICD-10-CM

## 2025-02-12 DIAGNOSIS — I63.9 CEREBRAL INFARCTION, UNSPECIFIED: ICD-10-CM

## 2025-02-12 PROCEDURE — 93298 REM INTERROG DEV EVAL SCRMS: CPT

## 2025-02-12 PROCEDURE — 93880 EXTRACRANIAL BILAT STUDY: CPT | Mod: 26

## 2025-02-12 PROCEDURE — 93880 EXTRACRANIAL BILAT STUDY: CPT

## 2025-02-21 ENCOUNTER — APPOINTMENT (OUTPATIENT)
Dept: CARDIOLOGY | Facility: CLINIC | Age: 80
End: 2025-02-21
Payer: MEDICARE

## 2025-02-21 ENCOUNTER — NON-APPOINTMENT (OUTPATIENT)
Age: 80
End: 2025-02-21

## 2025-02-21 VITALS
RESPIRATION RATE: 16 BRPM | BODY MASS INDEX: 30.3 KG/M2 | DIASTOLIC BLOOD PRESSURE: 74 MMHG | OXYGEN SATURATION: 95 % | SYSTOLIC BLOOD PRESSURE: 179 MMHG | HEART RATE: 68 BPM | WEIGHT: 150 LBS

## 2025-02-21 DIAGNOSIS — I63.9 CEREBRAL INFARCTION, UNSPECIFIED: ICD-10-CM

## 2025-02-21 DIAGNOSIS — I10 ESSENTIAL (PRIMARY) HYPERTENSION: ICD-10-CM

## 2025-02-21 PROCEDURE — 99214 OFFICE O/P EST MOD 30 MIN: CPT

## 2025-02-21 PROCEDURE — G2211 COMPLEX E/M VISIT ADD ON: CPT

## 2025-03-07 ENCOUNTER — APPOINTMENT (OUTPATIENT)
Dept: NEUROSURGERY | Facility: CLINIC | Age: 80
End: 2025-03-07
Payer: MEDICARE

## 2025-03-07 VITALS
SYSTOLIC BLOOD PRESSURE: 157 MMHG | WEIGHT: 150 LBS | HEART RATE: 65 BPM | OXYGEN SATURATION: 96 % | DIASTOLIC BLOOD PRESSURE: 67 MMHG | HEIGHT: 59 IN | BODY MASS INDEX: 30.24 KG/M2

## 2025-03-07 DIAGNOSIS — G91.2 (IDIOPATHIC) NORMAL PRESSURE HYDROCEPHALUS: ICD-10-CM

## 2025-03-07 PROCEDURE — 99214 OFFICE O/P EST MOD 30 MIN: CPT

## 2025-03-19 ENCOUNTER — NON-APPOINTMENT (OUTPATIENT)
Age: 80
End: 2025-03-19

## 2025-03-19 ENCOUNTER — APPOINTMENT (OUTPATIENT)
Dept: ELECTROPHYSIOLOGY | Facility: CLINIC | Age: 80
End: 2025-03-19
Payer: MEDICARE

## 2025-03-19 PROCEDURE — 93298 REM INTERROG DEV EVAL SCRMS: CPT

## 2025-03-26 ENCOUNTER — APPOINTMENT (OUTPATIENT)
Dept: INTERNAL MEDICINE | Facility: CLINIC | Age: 80
End: 2025-03-26
Payer: MEDICARE

## 2025-03-26 VITALS
TEMPERATURE: 97.3 F | OXYGEN SATURATION: 96 % | HEIGHT: 59 IN | SYSTOLIC BLOOD PRESSURE: 122 MMHG | DIASTOLIC BLOOD PRESSURE: 68 MMHG | RESPIRATION RATE: 14 BRPM | HEART RATE: 56 BPM

## 2025-03-26 DIAGNOSIS — E11.9 TYPE 2 DIABETES MELLITUS W/OUT COMPLICATIONS: ICD-10-CM

## 2025-03-26 DIAGNOSIS — E78.5 HYPERLIPIDEMIA, UNSPECIFIED: ICD-10-CM

## 2025-03-26 DIAGNOSIS — I10 ESSENTIAL (PRIMARY) HYPERTENSION: ICD-10-CM

## 2025-03-26 DIAGNOSIS — M06.9 RHEUMATOID ARTHRITIS, UNSPECIFIED: ICD-10-CM

## 2025-03-26 PROCEDURE — G2211 COMPLEX E/M VISIT ADD ON: CPT

## 2025-03-26 PROCEDURE — 99214 OFFICE O/P EST MOD 30 MIN: CPT

## 2025-04-23 ENCOUNTER — APPOINTMENT (OUTPATIENT)
Dept: ELECTROPHYSIOLOGY | Facility: CLINIC | Age: 80
End: 2025-04-23

## 2025-04-23 PROCEDURE — 93298 REM INTERROG DEV EVAL SCRMS: CPT

## 2025-05-28 ENCOUNTER — NON-APPOINTMENT (OUTPATIENT)
Age: 80
End: 2025-05-28

## 2025-05-28 ENCOUNTER — APPOINTMENT (OUTPATIENT)
Dept: ELECTROPHYSIOLOGY | Facility: CLINIC | Age: 80
End: 2025-05-28

## 2025-05-28 PROCEDURE — 93298 REM INTERROG DEV EVAL SCRMS: CPT

## 2025-06-06 ENCOUNTER — APPOINTMENT (OUTPATIENT)
Dept: NEUROSURGERY | Facility: CLINIC | Age: 80
End: 2025-06-06

## 2025-06-06 DIAGNOSIS — G91.2 (IDIOPATHIC) NORMAL PRESSURE HYDROCEPHALUS: ICD-10-CM

## 2025-06-06 DIAGNOSIS — Z78.9 OTHER SPECIFIED HEALTH STATUS: ICD-10-CM

## 2025-06-11 ENCOUNTER — OUTPATIENT (OUTPATIENT)
Dept: OUTPATIENT SERVICES | Facility: HOSPITAL | Age: 80
LOS: 1 days | End: 2025-06-11
Payer: MEDICARE

## 2025-06-11 ENCOUNTER — APPOINTMENT (OUTPATIENT)
Dept: CT IMAGING | Facility: CLINIC | Age: 80
End: 2025-06-11
Payer: MEDICARE

## 2025-06-11 DIAGNOSIS — Z98.890 OTHER SPECIFIED POSTPROCEDURAL STATES: Chronic | ICD-10-CM

## 2025-06-11 DIAGNOSIS — Z92.89 PERSONAL HISTORY OF OTHER MEDICAL TREATMENT: Chronic | ICD-10-CM

## 2025-06-11 DIAGNOSIS — R26.9 UNSPECIFIED ABNORMALITIES OF GAIT AND MOBILITY: ICD-10-CM

## 2025-06-11 DIAGNOSIS — G61.9 INFLAMMATORY POLYNEUROPATHY, UNSPECIFIED: ICD-10-CM

## 2025-06-11 PROCEDURE — 70450 CT HEAD/BRAIN W/O DYE: CPT

## 2025-06-11 PROCEDURE — 70450 CT HEAD/BRAIN W/O DYE: CPT | Mod: 26

## 2025-06-18 ENCOUNTER — APPOINTMENT (OUTPATIENT)
Dept: INTERNAL MEDICINE | Facility: CLINIC | Age: 80
End: 2025-06-18
Payer: MEDICARE

## 2025-06-18 VITALS
BODY MASS INDEX: 30.24 KG/M2 | HEIGHT: 59 IN | HEART RATE: 57 BPM | SYSTOLIC BLOOD PRESSURE: 130 MMHG | DIASTOLIC BLOOD PRESSURE: 78 MMHG | TEMPERATURE: 97.7 F | OXYGEN SATURATION: 100 % | WEIGHT: 150 LBS

## 2025-06-18 PROCEDURE — 99214 OFFICE O/P EST MOD 30 MIN: CPT

## 2025-06-18 PROCEDURE — G2211 COMPLEX E/M VISIT ADD ON: CPT

## 2025-07-01 ENCOUNTER — APPOINTMENT (OUTPATIENT)
Dept: ELECTROPHYSIOLOGY | Facility: CLINIC | Age: 80
End: 2025-07-01

## 2025-07-01 ENCOUNTER — NON-APPOINTMENT (OUTPATIENT)
Age: 80
End: 2025-07-01

## 2025-07-01 PROCEDURE — 93298 REM INTERROG DEV EVAL SCRMS: CPT

## 2025-07-11 ENCOUNTER — NON-APPOINTMENT (OUTPATIENT)
Age: 80
End: 2025-07-11

## 2025-07-11 ENCOUNTER — APPOINTMENT (OUTPATIENT)
Dept: NEUROSURGERY | Facility: CLINIC | Age: 80
End: 2025-07-11
Payer: MEDICARE

## 2025-07-11 VITALS
OXYGEN SATURATION: 97 % | BODY MASS INDEX: 30.24 KG/M2 | DIASTOLIC BLOOD PRESSURE: 64 MMHG | HEIGHT: 59 IN | SYSTOLIC BLOOD PRESSURE: 147 MMHG | WEIGHT: 150 LBS | HEART RATE: 57 BPM

## 2025-07-11 PROCEDURE — 99214 OFFICE O/P EST MOD 30 MIN: CPT

## 2025-07-11 PROCEDURE — 62252 CSF SHUNT REPROGRAM: CPT

## 2025-07-21 ENCOUNTER — APPOINTMENT (OUTPATIENT)
Dept: INTERNAL MEDICINE | Facility: CLINIC | Age: 80
End: 2025-07-21
Payer: MEDICARE

## 2025-07-21 VITALS
TEMPERATURE: 97.2 F | HEART RATE: 61 BPM | OXYGEN SATURATION: 97 % | BODY MASS INDEX: 30.24 KG/M2 | RESPIRATION RATE: 14 BRPM | SYSTOLIC BLOOD PRESSURE: 120 MMHG | HEIGHT: 59 IN | DIASTOLIC BLOOD PRESSURE: 70 MMHG | WEIGHT: 150 LBS

## 2025-07-21 DIAGNOSIS — E11.9 TYPE 2 DIABETES MELLITUS W/OUT COMPLICATIONS: ICD-10-CM

## 2025-07-21 DIAGNOSIS — L03.115 CELLULITIS OF RIGHT LOWER LIMB: ICD-10-CM

## 2025-07-21 DIAGNOSIS — I10 ESSENTIAL (PRIMARY) HYPERTENSION: ICD-10-CM

## 2025-07-21 DIAGNOSIS — E78.5 HYPERLIPIDEMIA, UNSPECIFIED: ICD-10-CM

## 2025-07-21 PROCEDURE — G2211 COMPLEX E/M VISIT ADD ON: CPT

## 2025-07-21 PROCEDURE — 99214 OFFICE O/P EST MOD 30 MIN: CPT

## 2025-07-21 RX ORDER — AMOXICILLIN AND CLAVULANATE POTASSIUM 875; 125 MG/1; MG/1
875-125 TABLET, COATED ORAL
Qty: 14 | Refills: 0 | Status: ACTIVE | COMMUNITY
Start: 2025-07-21 | End: 1900-01-01

## 2025-08-05 ENCOUNTER — APPOINTMENT (OUTPATIENT)
Dept: ELECTROPHYSIOLOGY | Facility: CLINIC | Age: 80
End: 2025-08-05
Payer: MEDICARE

## 2025-08-05 ENCOUNTER — NON-APPOINTMENT (OUTPATIENT)
Age: 80
End: 2025-08-05

## 2025-08-05 PROCEDURE — 93298 REM INTERROG DEV EVAL SCRMS: CPT

## 2025-08-08 ENCOUNTER — APPOINTMENT (OUTPATIENT)
Dept: NEUROSURGERY | Facility: CLINIC | Age: 80
End: 2025-08-08

## 2025-08-25 ENCOUNTER — APPOINTMENT (OUTPATIENT)
Dept: INTERNAL MEDICINE | Facility: CLINIC | Age: 80
End: 2025-08-25

## 2025-08-27 ENCOUNTER — APPOINTMENT (OUTPATIENT)
Dept: CARDIOLOGY | Facility: CLINIC | Age: 80
End: 2025-08-27

## 2025-09-09 ENCOUNTER — APPOINTMENT (OUTPATIENT)
Dept: ELECTROPHYSIOLOGY | Facility: CLINIC | Age: 80
End: 2025-09-09

## 2025-09-09 PROCEDURE — 93298 REM INTERROG DEV EVAL SCRMS: CPT

## 2025-09-12 ENCOUNTER — APPOINTMENT (OUTPATIENT)
Dept: INTERNAL MEDICINE | Facility: CLINIC | Age: 80
End: 2025-09-12
Payer: COMMERCIAL

## 2025-09-12 VITALS
DIASTOLIC BLOOD PRESSURE: 75 MMHG | BODY MASS INDEX: 30.24 KG/M2 | OXYGEN SATURATION: 98 % | HEART RATE: 75 BPM | RESPIRATION RATE: 16 BRPM | SYSTOLIC BLOOD PRESSURE: 150 MMHG | TEMPERATURE: 97.2 F | HEIGHT: 59 IN | WEIGHT: 150 LBS

## 2025-09-12 DIAGNOSIS — I10 ESSENTIAL (PRIMARY) HYPERTENSION: ICD-10-CM

## 2025-09-12 DIAGNOSIS — L03.90 CELLULITIS, UNSPECIFIED: ICD-10-CM

## 2025-09-12 PROCEDURE — 99203 OFFICE O/P NEW LOW 30 MIN: CPT | Mod: GC

## 2025-09-12 PROCEDURE — G0444 DEPRESSION SCREEN ANNUAL: CPT | Mod: 59

## 2025-09-12 PROCEDURE — G2211 COMPLEX E/M VISIT ADD ON: CPT | Mod: NC

## 2025-09-12 RX ORDER — CEPHALEXIN 500 MG/1
500 TABLET ORAL EVERY 8 HOURS
Qty: 21 | Refills: 0 | Status: DISCONTINUED | COMMUNITY
Start: 2025-09-12 | End: 2025-09-12

## 2025-09-12 RX ORDER — DOXYCYCLINE HYCLATE 100 MG/1
100 TABLET ORAL
Qty: 20 | Refills: 0 | Status: ACTIVE | COMMUNITY
Start: 2025-09-12 | End: 1900-01-01

## (undated) DEVICE — WARMING BLANKET LOWER ADULT

## (undated) DEVICE — GLV 7 PROTEXIS (WHITE)

## (undated) DEVICE — SUT BIOSYN 4-0 18" P-12

## (undated) DEVICE — MEDICATION LABELS W MARKER

## (undated) DEVICE — PREP CHLORAPREP HI-LITE ORANGE 26ML

## (undated) DEVICE — ELCTR 4-DISC 20MM 49" (RED, BLUE, GREEN, BLACK)

## (undated) DEVICE — STAPLER SKIN VISI-STAT 35 WIDE

## (undated) DEVICE — DRAPE MAYO STAND 30"

## (undated) DEVICE — DRSG CURITY GAUZE SPONGE 4 X 4" 12-PLY

## (undated) DEVICE — ELCTR BOVIE TIP BLADE INSULATED 2.75" EDGE

## (undated) DEVICE — CONNECTOR STRAIGHT 3/8 X 3/8"

## (undated) DEVICE — DRSG STERISTRIPS 0.5 X 4"

## (undated) DEVICE — ELCTR MONOPOLAR STIMULATOR PROBE FLUSH-TIP

## (undated) DEVICE — TUBING BIPOLAR IRRIGATOR AND CORD SET

## (undated) DEVICE — BIPOLAR FORCEP SYMMETRY BAYONET 7" X 1.5MM SMOOTH (SILVER)

## (undated) DEVICE — GLV 8 PROTEXIS (WHITE)

## (undated) DEVICE — MARKING PEN W RULER

## (undated) DEVICE — GLV 7.5 PROTEXIS (WHITE)

## (undated) DEVICE — ELCTR BIPOLAR CORD IRRIGATION AESCULAP DISP

## (undated) DEVICE — CODMAN PERFORATOR 14MM (BLUE)

## (undated) DEVICE — DRAPE 3/4 SHEET W REINFORCEMENT 56X77"

## (undated) DEVICE — ELCTR BIPOLAR PROBE

## (undated) DEVICE — MEDTRONIC AXIEM STYLET 23CM

## (undated) DEVICE — DRSG TELFA 3 X 8

## (undated) DEVICE — SUT CHROMIC 3-0 30" V-20

## (undated) DEVICE — SUT VICRYL 2-0 18" CP-2 UNDYED (POP-OFF)

## (undated) DEVICE — SPECIMEN CONTAINER 100ML

## (undated) DEVICE — PACK VP SHUNT

## (undated) DEVICE — ELCTR PEDICLE SCREW PROBE 3MM BALL 1.8MM X 100MM

## (undated) DEVICE — SOL IRR POUR NS 0.9% 500ML

## (undated) DEVICE — VENODYNE/SCD SLEEVE CALF LARGE

## (undated) DEVICE — SOL IRR POUR H2O 250ML

## (undated) DEVICE — GLV 7 DERMAPRENE ULTRA

## (undated) DEVICE — TROCAR CODMAN SPLIT DISP

## (undated) DEVICE — POSITIONER FOAM EGG CRATE ULNAR 2PCS (PINK)

## (undated) DEVICE — DRAPE CAMERA VIDEO 7"X96"

## (undated) DEVICE — MEDTRONIC TRACKER BUNDLE NI

## (undated) DEVICE — DRSG TAPE HYPAFIX 4"

## (undated) DEVICE — VENODYNE/SCD SLEEVE CALF MEDIUM

## (undated) DEVICE — WOUND IRR SURGIPHOR

## (undated) DEVICE — GLV 8.5 PROTEXIS (WHITE)

## (undated) DEVICE — MEDTRONIC AXIEM TRACER POINTER

## (undated) DEVICE — PREP DURAPREP 26CC

## (undated) DEVICE — DRAPE C ARM UNIVERSAL

## (undated) DEVICE — DRAPE TOWEL BLUE 17" X 24"

## (undated) DEVICE — DRAPE LIGHT HANDLE COVER (GREEN)

## (undated) DEVICE — LUMBAR PUNCTURE KIT 20G X 3.5"

## (undated) DEVICE — Device

## (undated) DEVICE — DRAPE LIGHT HANDLE COVER (BLUE)

## (undated) DEVICE — MIDAS REX LEGEND LUBRICANT DIFFUSER CARTRIDGE

## (undated) DEVICE — GLV 6.5 PROTEXIS (WHITE)

## (undated) DEVICE — SUT SOFSILK 2-0 18" TIES

## (undated) DEVICE — DRAPE 1/2 SHEET 40X57"

## (undated) DEVICE — MEDTRONIC AXIEM PATIENT TRACKER NON-INVASIVE

## (undated) DEVICE — INTRO SHEATH INTEGRA PD 61CM

## (undated) DEVICE — TROCAR COVIDIEN VERSAPORT BLADELESS OPTICAL 5MM STANDARD

## (undated) DEVICE — ELCTR SUBDERMAL NDL 27G X 1/2" WITH TWISTED PAIR

## (undated) DEVICE — SUT VICRYL 3-0 18" X-1 (POP-OFF)

## (undated) DEVICE — DRSG XEROFORM 1 X 8"

## (undated) DEVICE — DRSG MASTISOL

## (undated) DEVICE — INSUFFLATION NDL COVIDIEN STEP 14G FOR STEP/VERSASTEP

## (undated) DEVICE — DRSG OPSITE 13.75 X 4"

## (undated) DEVICE — SOLIDIFIER ISOLYZER 2000 CC

## (undated) DEVICE — GOWN TRIMAX LG

## (undated) DEVICE — ELCTR SUBDERMAL NDL CLASSIC 1.5M X 59" (6 COLOR)

## (undated) DEVICE — DRAPE SURGICAL #1010

## (undated) DEVICE — SUT BOOT STANDARD (ASSORTED) 5 PAIR

## (undated) DEVICE — ELCTR SUBDERMAL CORKSCREW NDL 1.2MM